# Patient Record
Sex: MALE | Race: BLACK OR AFRICAN AMERICAN | NOT HISPANIC OR LATINO | URBAN - METROPOLITAN AREA
[De-identification: names, ages, dates, MRNs, and addresses within clinical notes are randomized per-mention and may not be internally consistent; named-entity substitution may affect disease eponyms.]

---

## 2015-04-28 RX ORDER — METFORMIN HYDROCHLORIDE 850 MG/1
2 TABLET ORAL
Qty: 0 | Refills: 0 | DISCHARGE
Start: 2015-04-28

## 2015-04-28 RX ORDER — INSULIN GLARGINE 100 [IU]/ML
15 INJECTION, SOLUTION SUBCUTANEOUS
Qty: 0 | Refills: 0 | DISCHARGE
Start: 2015-04-28

## 2019-07-05 ENCOUNTER — INPATIENT (INPATIENT)
Facility: HOSPITAL | Age: 69
LOS: 32 days | Discharge: NURSING, REHAB OR LONG-TERM CARE | DRG: 239 | End: 2019-08-07
Attending: HOSPITALIST | Admitting: INTERNAL MEDICINE
Payer: MEDICARE

## 2019-07-05 VITALS — DIASTOLIC BLOOD PRESSURE: 76 MMHG | OXYGEN SATURATION: 97 % | SYSTOLIC BLOOD PRESSURE: 113 MMHG | HEART RATE: 110 BPM

## 2019-07-05 DIAGNOSIS — I21.4 NON-ST ELEVATION (NSTEMI) MYOCARDIAL INFARCTION: ICD-10-CM

## 2019-07-05 LAB
BASE EXCESS BLDV CALC-SCNC: -2.6 MMOL/L — LOW (ref -2–2)
CA-I SERPL-SCNC: 1.19 MMOL/L — SIGNIFICANT CHANGE UP (ref 1.12–1.3)
CHLORIDE BLDV-SCNC: 109 MMOL/L — HIGH (ref 96–108)
CO2 BLDV-SCNC: 23 MMOL/L — SIGNIFICANT CHANGE UP (ref 22–30)
GAS PNL BLDV: 138 MMOL/L — SIGNIFICANT CHANGE UP (ref 135–145)
GAS PNL BLDV: SIGNIFICANT CHANGE UP
GAS PNL BLDV: SIGNIFICANT CHANGE UP
GLUCOSE BLDV-MCNC: 305 MG/DL — HIGH (ref 70–99)
HCO3 BLDV-SCNC: 22 MMOL/L — SIGNIFICANT CHANGE UP (ref 21–29)
HCT VFR BLDA CALC: 34 % — LOW (ref 39–50)
HGB BLD CALC-MCNC: 11.2 G/DL — LOW (ref 13–17)
HOROWITZ INDEX BLDV+IHG-RTO: 40 — SIGNIFICANT CHANGE UP
LACTATE BLDV-MCNC: 3 MMOL/L — HIGH (ref 0.7–2)
OTHER CELLS CSF MANUAL: 10 ML/DL — LOW (ref 18–22)
PCO2 BLDV: 38 MMHG — SIGNIFICANT CHANGE UP (ref 35–50)
PH BLDV: 7.38 — SIGNIFICANT CHANGE UP (ref 7.35–7.45)
PO2 BLDV: 39 MMHG — SIGNIFICANT CHANGE UP (ref 25–45)
POTASSIUM BLDV-SCNC: 3.9 MMOL/L — SIGNIFICANT CHANGE UP (ref 3.5–5.3)
SAO2 % BLDV: 65 % — LOW (ref 67–88)

## 2019-07-05 RX ORDER — DOBUTAMINE HCL 250MG/20ML
5 VIAL (ML) INTRAVENOUS
Qty: 500 | Refills: 0 | Status: DISCONTINUED | OUTPATIENT
Start: 2019-07-05 | End: 2019-07-07

## 2019-07-05 RX ORDER — HEPARIN SODIUM 5000 [USP'U]/ML
1740 INJECTION INTRAVENOUS; SUBCUTANEOUS
Qty: 25000 | Refills: 0 | Status: DISCONTINUED | OUTPATIENT
Start: 2019-07-05 | End: 2019-07-06

## 2019-07-05 RX ORDER — CHLORHEXIDINE GLUCONATE 213 G/1000ML
1 SOLUTION TOPICAL
Refills: 0 | Status: DISCONTINUED | OUTPATIENT
Start: 2019-07-05 | End: 2019-07-09

## 2019-07-05 RX ORDER — NOREPINEPHRINE BITARTRATE/D5W 8 MG/250ML
0.05 PLASTIC BAG, INJECTION (ML) INTRAVENOUS
Qty: 8 | Refills: 0 | Status: DISCONTINUED | OUTPATIENT
Start: 2019-07-05 | End: 2019-07-06

## 2019-07-05 RX ORDER — HEPARIN SODIUM 5000 [USP'U]/ML
4000 INJECTION INTRAVENOUS; SUBCUTANEOUS EVERY 6 HOURS
Refills: 0 | Status: DISCONTINUED | OUTPATIENT
Start: 2019-07-05 | End: 2019-07-06

## 2019-07-05 RX ORDER — PANTOPRAZOLE SODIUM 20 MG/1
40 TABLET, DELAYED RELEASE ORAL DAILY
Refills: 0 | Status: DISCONTINUED | OUTPATIENT
Start: 2019-07-05 | End: 2019-07-09

## 2019-07-05 RX ORDER — DEXMEDETOMIDINE HYDROCHLORIDE IN 0.9% SODIUM CHLORIDE 4 UG/ML
1.5 INJECTION INTRAVENOUS
Qty: 200 | Refills: 0 | Status: DISCONTINUED | OUTPATIENT
Start: 2019-07-05 | End: 2019-07-06

## 2019-07-05 RX ORDER — ASPIRIN/CALCIUM CARB/MAGNESIUM 324 MG
81 TABLET ORAL DAILY
Refills: 0 | Status: DISCONTINUED | OUTPATIENT
Start: 2019-07-05 | End: 2019-07-22

## 2019-07-05 RX ORDER — CLOPIDOGREL BISULFATE 75 MG/1
75 TABLET, FILM COATED ORAL DAILY
Refills: 0 | Status: DISCONTINUED | OUTPATIENT
Start: 2019-07-05 | End: 2019-07-22

## 2019-07-05 RX ADMIN — DEXMEDETOMIDINE HYDROCHLORIDE IN 0.9% SODIUM CHLORIDE 33.52 MICROGRAM(S)/KG/HR: 4 INJECTION INTRAVENOUS at 23:48

## 2019-07-05 RX ADMIN — Medication 13.41 MICROGRAM(S)/KG/MIN: at 23:48

## 2019-07-05 NOTE — H&P ADULT - ASSESSMENT
68 y/o M w/ PMH of diabetes with poor medication compliance admitted to CCU for STEMI management with a course complicated with hypoxemic respiratory failure requiring intubation and Afib w/ RVR 68 y/o M w/ PMH of diabetes with poor medication compliance and PAD admitted to CCU for STEMI management with a course complicated by hypoxemic respiratory failure requiring intubation and Afib w/ RVR      #Neuro  Intubated and sedated currently on prexedex 1  Wean off as tolerated and reassess mental status; as per nephew pt has intact cognition and is able to carry out his ADLs at baseline    #Respiratory:  Intubated for airway protection  Currently intubated with vent setting: RR 14,   PEEP 5   Monitor ABGs   Consider CPAP trial in AM     #CV   Coronaries  s/p aspirin, plavix load, will continue with maintenance dose of aspirnin/plavix  c/w hep gtt  Pt will need cath once more HD stable  f/u lipid profile    Heart function  will obtain echo  c/w dobutamine 5 and downtitrate as tolerated  Monitor I/Os    #GI  NG tube feeds while sedated/unrespoive    #Renal  No active issues, no VINAY      #ID   Pt w/ fevers and leukocytosis meeting SIRS criteria  will f/u UA, blood culture, RVP, legionella; can consider starting Abx pending infectious w.u    #Endo  Pt w/ hx of DM unknown duration not compliant with meds with unclear home medication regimen  f.u AI1C   start in ISS, f/u regarding home insulin regimen    #Heme  no active issues    #DVT ppx  hep gtt 68 y/o M w/ PMH of diabetes with poor medication compliance and PAD admitted to CCU for STEMI management with a course complicated by hypoxemic respiratory failure requiring intubation and Afib w/ RVR      #Neuro  Intubated and sedated currently on prexedex 1  Wean off as tolerated and reassess mental status; as per nephew pt has intact cognition and is able to carry out his ADLs at baseline    #Respiratory:  Intubated for airway protection  Currently intubated with vent setting: RR 14,   PEEP 5   Monitor ABGs   Consider CPAP trial in AM     #CV   Coronaries  s/p aspirin, plavix load, will continue with maintenance dose of aspirnin/plavix  c/w hep gtt  Pt will need cath once more HD stable  f/u lipid profile    Heart function  will obtain echo  c/w dobutamine 5 and downtitrate as tolerated  Monitor I/Os    #GI  NG tube feeds while sedated/unrespoive    #Renal  No active issues, no VINAY      #ID   Pt w/ fevers and leukocytosis meeting SIRS criteria  will f/u UA, blood culture, RVP, legionella; can consider starting Abx pending infectious w.u    #Endo  Pt w/ hx of DM unknown duration not compliant with meds with unclear home medication regimen  f/u AI1C  start in ISS, f/u regarding home insulin regimen  f/u TSH    #Heme  no active issues    #DVT ppx  hep gtt 70 y/o M w/ PMH of diabetes with poor medication compliance and PAD admitted to CCU for STEMI management with a course complicated by hypoxemic respiratory failure requiring intubation and Afib w/ RVR      #Neuro  Intubated and sedated currently on prexedex 1  Wean off as tolerated and reassess mental status; as per nephew pt has intact cognition and is able to carry out his ADLs at baseline    #Respiratory:  Intubated for airway protection  Vent settings: RR 14,   PEEP 5   Monitor ABGs   Consider CPAP trial in AM     #CV   Coronaries  s/p aspirin, plavix load, will continue with maintenance dose of aspirin/plavix  c/w hep gtt  Pt will need cath once more HD stable  f/u lipid profile    Heart function  will obtain echo  c/w dobutamine 5 and downtitrate as tolerated  Monitor I/Os    #GI  Pt w/ transaminitis 1811/1148 likely in the setting of cardiogenic shock  Will continue to trend CMPs  NG tube feeds while sedated/ unresponsive    #Renal  No active issues, no VINAY      #ID   Pt w/ fevers and leukocytosis meeting SIRS criteria  will f/u UA, blood culture, RVP, legionella; can consider starting Abx pending infectious w.u    #Endo  Pt w/ hx of DM unknown duration and non-compliance  As per chart review, pt on metformin 500 BID at home  f/u AI1C  start in ISS, f/u regarding home insulin regimen  f/u TSH    #Heme  no active issues    #DVT ppx  hep gtt 70 y/o M w/ PMH of diabetes with poor medication compliance and PAD admitted to CCU for cardiogenic shock management w/ NSTEMI and a course complicated by hypoxemic respiratory failure secondary to suspected aspiration PNA requiring intubation now in septic shock      #Neuro  Intubated and sedated currently on prexedex 1  Wean off as tolerated and reassess mental status; as per nephew pt has intact cognition and is able to carry out his ADLs at baseline    #Respiratory:  Intubated for airway protection  Vent settings: RR 14,   PEEP 5  FiO2 40  Monitor ABGs   CPAP trial once more clinically stable     #CV   Coronaries  s/p aspirin, plavix load, will continue with maintenance dose of aspirin/plavix  c/w hep gtt  Pt may need cath once more   f/u lipid profile    Heart function  will obtain repeat ECHO: as per documentation, ECHO at [facility] showed EF<15%  c/w dobutamine 5 and downtitrate as tolerated  Monitor I/Os    #GI  Pt w/ transaminitis 1811/1148 likely in the setting of cardiogenic/septic shock  Will continue to trend CMPs  NG tube feeds while sedated/ unresponsive    #Renal  No active issues, no VINAY; trend CMP      #ID   Pt w/ fevers and leukocytosis w/ R lung opaciy on CXR and evidence of end organ damage with transaminitis and elevated lactate w/ hemodynamic instability requiring pressor support suggestive of septic shock  will f/u UA, blood culture, RVP, legionella; will started on broad spectrum Abx with vanc/zosyn pending infectious w/u    #Endo  Pt w/ hx of DM unknown duration and non-compliance  As per chart review, pt on metformin 500 BID at home  f/u AI1C  start in ISS, f/u regarding home insulin regimen  f/u TSH    #Heme  no active issues    #DVT ppx  hep gtt 70 y/o M w/ PMH of diabetes with poor medication compliance and PAD admitted to CCU for cardiogenic shock management w/ NSTEMI and a course complicated by hypoxemic respiratory failure secondary to suspected aspiration PNA requiring intubation now in septic shock      #Neuro  Intubated and sedated currently on prexedex 1  Wean off as tolerated and reassess mental status; as per nephew pt has intact cognition and is able to carry out his ADLs at baseline    #Respiratory:  Intubated for airway protection  Vent settings: RR 14,   PEEP 5  FiO2 40  Monitor ABGs   CPAP trial once more clinically stable     #CV   Coronaries  s/p aspirin, plavix load for NSTEMI, will continue with maintenance dose of aspirin/plavix  c/w hep gtt  Pt may need cath once more   f/u lipid profile    Heart function  Pt in cardiogenic shock with EF<15% and end-organ organ damage with hemodynamic instability requiring pressor suport  will obtain repeat ECHO: as per documentation, ECHO at [facility] showed EF<15%  c/w dobutamine 5 and downtitrate as tolerated  Monitor I/Os    #GI  Pt w/ transaminitis 1811/1148 likely in the setting of cardiogenic/septic shock  Will continue to trend CMPs  NG tube feeds while sedated/ unresponsive    #Renal  Pt with VINAY to 1.6, baseline at around 1; likely in the setting of cardiogenic/spetic shock  Trend CMP, monitor UO, avoid nephrotoxic agents, renally dose meds      #ID   Pt w/ fevers and leukocytosis w/ R lung opaciy on CXR and evidence of end organ damage with transaminitis and elevated lactate w/ hemodynamic instability requiring pressor support suggestive of septic shock  will f/u UA, blood culture, RVP, legionella; will started on broad spectrum Abx with vanc/zosyn pending infectious w/u    #Endo  Pt w/ hx of DM unknown duration and non-compliance  As per chart review, pt on metformin 500 BID at home  f/u AI1C  start in ISS, f/u regarding home insulin regimen  f/u TSH    #Heme  Pt normocytic anemia  Will send hemolysis labs, iron studies, B12, folate; no active source of bleed identified    #DVT ppx  hep gtt 70 y/o M w/ PMH of diabetes with poor medication compliance and PAD admitted to CCU for cardiogenic shock management w/ NSTEMI and a course complicated by hypoxemic respiratory failure secondary to suspected aspiration PNA requiring intubation now in septic shock      #Neuro  Intubated and sedated currently on prexedex 1  Wean off as tolerated and reassess mental status; as per nephew pt has intact cognition and is able to carry out his ADLs at baseline    #Respiratory:  Intubated for airway protection  Vent settings: RR 14,   PEEP 5  FiO2 40  Monitor ABGs   CPAP trial once more clinically stable     #CV   Coronaries  s/p aspirin, plavix load for NSTEMI, will continue with maintenance dose of aspirin/plavix  c/w hep gtt  Pt may need cath once more   f/u lipid profile    Heart function  Pt in cardiogenic shock with EF<15% and end-organ organ damage with hemodynamic instability requiring pressor suport  will obtain repeat ECHO: as per documentation, ECHO at [facility] showed EF<15%  c/w dobutamine 5 and downtitrate as tolerated  Monitor I/Os    #GI  Pt w/ transaminitis 1811/1148 likely in the setting of cardiogenic/septic shock  Will continue to trend CMPs  NG tube feeds while sedated/ unresponsive    #Renal  Pt with VINAY to 1.6, baseline at around 1; likely in the setting of cardiogenic/spetic shock  Trend CMP, monitor UO, avoid nephrotoxic agents, renally dose meds, send urine studies      #ID   Pt w/ fevers and leukocytosis w/ R lung opacity on CXR and evidence of end organ damage with transaminitis and elevated lactate w/ hemodynamic instability requiring pressor support suggestive of septic shock  will f/u UA, blood culture, RVP, legionella; will started on broad spectrum Abx with vanc/zosyn pending infectious w/u    #Endo  Pt w/ hx of DM unknown duration and non-compliance  As per chart review, pt on metformin 500 BID at home and lantus 20 daily  f/u AI1C  start in ISS, f/u regarding home insulin regimen  f/u TSH  Should f/u w/ pharmacy in AM to confirm medication regimen    #Heme  Pt normocytic anemia  Will send hemolysis labs, iron studies, B12, folate; no active source of bleed identified    #DVT ppx  hep gtt 70 y/o M w/ PMH of diabetes with poor medication compliance and PAD admitted to CCU for cardiogenic shock management w/ NSTEMI and a course complicated by hypoxemic respiratory failure secondary to suspected aspiration PNA requiring intubation now in septic shock      #Neuro  Intubated and sedated currently on prexedex 1  Wean off as tolerated and reassess mental status; as per nephew pt has intact cognition and is able to carry out his ADLs at baseline    #Respiratory:  Intubated for airway protection  Vent settings: RR 14,   PEEP 5  FiO2 40  Monitor ABGs   CPAP trial once more clinically stable     #CV   Coronaries  s/p aspirin, plavix load for NSTEMI, will continue with maintenance dose of aspirin/plavix  c/w hep gtt  Pt may need cath once more   f/u lipid profile    Heart function  Pt in cardiogenic shock with EF<15% and end-organ organ damage with hemodynamic instability requiring pressor suport  will obtain repeat ECHO: as per documentation, ECHO at [facility] showed EF<15%  c/w dobutamine 5 and levo; downtitrate as tolerated  Monitor I/Os    #GI  Pt w/ transaminitis 1811/1148 likely in the setting of cardiogenic/septic shock  Will continue to trend CMPs  NG tube feeds while sedated/ unresponsive    #Renal  Pt with VINAY to 1.6, baseline at around 1; likely in the setting of cardiogenic/spetic shock  Trend CMP, monitor UO, avoid nephrotoxic agents, renally dose meds, send urine studies      #ID   Pt w/ fevers and leukocytosis w/ R lung opacity on CXR and evidence of end organ damage with transaminitis and elevated lactate w/ hemodynamic instability requiring pressor support suggestive of septic shock  will f/u UA, blood culture, RVP, legionella; will started on broad spectrum Abx with vanc/zosyn pending infectious w/u    #Endo  Pt w/ hx of DM unknown duration and non-compliance  As per chart review, pt on metformin 500 BID at home and lantus 20 daily  f/u AI1C  start in ISS, f/u regarding home insulin regimen  f/u TSH  Should f/u w/ pharmacy in AM to confirm medication regimen    #Heme  Pt normocytic anemia  Will send hemolysis labs, iron studies, B12, folate; no active source of bleed identified    #DVT ppx  hep gtt 70 y/o M w/ PMH of diabetes with poor medication compliance and PAD admitted to CCU for cardiogenic shock management w/ NSTEMI and a course complicated by hypoxemic respiratory failure secondary to suspected aspiration PNA requiring intubation now in septic shock      #Neuro  Intubated and sedated currently on prexedex 1  Wean off as tolerated and reassess mental status; as per nephew pt has intact cognition and is able to carry out his ADLs at baseline    #Respiratory:  Intubated for airway protection  Vent settings: RR 14,   PEEP 5  FiO2 40  Monitor ABGs   CPAP trial once more clinically stable     #CV   Coronaries  s/p aspirin, plavix load for NSTEMI, will continue with maintenance dose of aspirin/plavix  c/w hep gtt  Pt may need cath once more clinically stable  f/u lipid profile    Heart function  Pt in cardiogenic shock with EF<15% and end-organ organ damage with hemodynamic instability requiring pressor suport  will obtain repeat ECHO: as per documentation, ECHO at [facility] showed EF<15%  c/w dobutamine 5 and levo; downtitrate as tolerated  Monitor I/Os    #GI  Pt w/ transaminitis 1811/1148 likely in the setting of cardiogenic/septic shock  Will continue to trend CMPs  NG tube feeds while sedated/ unresponsive    #Renal  Pt with VINAY to 1.6, baseline at around 1; likely in the setting of cardiogenic/spetic shock  Trend CMP, monitor UO, avoid nephrotoxic agents, renally dose meds, send urine studies      #ID   Pt w/ fevers and leukocytosis w/ R lung opacity on CXR and evidence of end organ damage with transaminitis and elevated lactate w/ hemodynamic instability requiring pressor support suggestive of septic shock  will f/u UA, blood culture, RVP, legionella; will started on broad spectrum Abx with vanc/zosyn pending infectious w/u    #Endo  Pt w/ hx of DM unknown duration and non-compliance  As per chart review, pt on metformin 500 BID at home and lantus 20 daily  f/u AI1C  start in ISS, f/u regarding home insulin regimen  f/u TSH  Should f/u w/ pharmacy in AM to confirm medication regimen    #Heme  Pt normocytic anemia  Will send hemolysis labs, iron studies, B12, folate; no active source of bleed identified    #DVT ppx  hep gtt 70 y/o M w/ PMH of diabetes with poor medication compliance and PAD admitted to CCU for cardiogenic shock management w/ NSTEMI and a course complicated by hypoxemic respiratory failure secondary to suspected aspiration PNA requiring intubation now in septic shock      #Neuro  Intubated and sedated currently on prexedex 1  Wean off as tolerated and reassess mental status; as per nephew pt has intact cognition and is able to carry out his ADLs at baseline    #Respiratory:  Intubated for airway protection  Vent settings: RR 14,   PEEP 5  FiO2 40  Monitor ABGs   CPAP trial once more clinically stable     #CV   Coronaries  s/p aspirin, plavix load for NSTEMI, will continue with maintenance dose of aspirin/plavix  c/w hep gtt  Pt may need cath once more clinically stable  f/u lipid profile    Heart function  Pt in cardiogenic shock with EF<15% and end-organ organ damage with hemodynamic instability requiring pressor suport  will obtain repeat ECHO: as per documentation, ECHO at [facility] showed EF<15%  c/w dobutamine 5 and levo; downtitrate as tolerated  Monitor I/Os    #GI  Pt w/ transaminitis 1811/1148 likely in the setting of cardiogenic/septic shock  Will continue to trend CMPs  NG tube feeds while sedated/ unresponsive    #Renal  Pt with VINAY to 1.6, baseline at around 1; likely in the setting of cardiogenic/spetic shock  Trend CMP, monitor UO, avoid nephrotoxic agents, renally dose meds, send urine studies      #ID   Pt w/ fevers and leukocytosis w/ R lung opacity on CXR and evidence of end organ damage with transaminitis and elevated lactate w/ hemodynamic instability requiring pressor support suggestive of septic shock  will f/u UA, blood culture, RVP, legionella; will started on broad spectrum Abx with vanc/zosyn pending infectious w/u    #Endo  Pt w/ hx of DM unknown duration and non-compliance  As per chart review, pt on metformin 500 BID at home and lantus 20 daily  f/u AI1C  start in ISS and reduced lantus dose; will titrate as needed    Should f/u w/ pharmacy in AM to confirm medication regimen    #Heme  Pt normocytic anemia  Will send hemolysis labs, iron studies, B12, folate; no active source of bleed identified    #DVT ppx  hep gtt 70 y/o M w/ PMH of diabetes with poor medication compliance and PAD admitted to CCU for cardiogenic shock management w/ NSTEMI and a course complicated by hypoxemic respiratory failure secondary to suspected aspiration PNA requiring intubation now in septic shock      #Neuro  Intubated and sedated currently on prexedex 1  Wean off as tolerated and reassess mental status; as per nephew pt has intact cognition and is able to carry out his ADLs at baseline    #Respiratory:  Intubated for airway protection  Vent settings: RR 14,   PEEP 5  FiO2 40  Monitor ABGs   CPAP trial once more clinically stable     #CV   Coronaries  s/p aspirin, plavix load for NSTEMI, will continue with maintenance dose of aspirin/plavix  c/w hep gtt  Pt may need cath once more clinically stable  f/u lipid profile    Heart function  Pt in cardiogenic shock with EF<15% and end-organ organ damage with hemodynamic instability requiring pressor suport  will obtain repeat ECHO: as per documentation, ECHO at [facility] showed EF<15%  c/w dobutamine 5 and levo; downtitrate as tolerated  Monitor I/Os    #GI  Pt w/ transaminitis 1811/1148 likely in the setting of cardiogenic/septic shock  Will continue to trend CMPs  NG tube feeds while sedated/ unresponsive    #Renal  Pt with VINAY to 1.6, baseline at around 1; likely in the setting of cardiogenic/spetic shock  Trend CMP, monitor UO, avoid nephrotoxic agents, renally dose meds, send urine studies      #ID   Pt w/ fevers and leukocytosis w/ R lung opacity on CXR and evidence of end organ damage with transaminitis and elevated lactate w/ hemodynamic instability requiring pressor support suggestive of septic shock  will f/u UA, blood culture, RVP, legionella; will started on broad spectrum Abx with vanc/zosyn pending infectious w/u    #Endo  Pt w/ hx of DM unknown duration and non-compliance  As per chart review, pt on metformin 500 BID at home and lantus 20 daily  f/u AI1C  start in ISS and reduced lantus dose; will titrate as needed  Should f/u w/ pharmacy in AM to confirm medication regimen    #Heme  Pt normocytic anemia  Will send hemolysis labs, iron studies, B12, folate; no active source of bleed identified    #DVT ppx  hep gtt 68 y/o M w/ PMH of diabetes with poor medication compliance and PAD admitted to CCU for cardiogenic shock management w/ NSTEMI and a course complicated by hypoxemic respiratory failure secondary to suspected aspiration PNA requiring intubation now in septic shock      #Neuro  Intubated and sedated currently on prexedex 1  Wean off as tolerated and reassess mental status; as per nephew pt has intact cognition and is able to carry out his ADLs at baseline    #Respiratory:  Intubated for airway protection  Vent settings: RR 14,   PEEP 5  FiO2 40  Monitor ABGs   CPAP trial once more clinically stable     #CV   Coronaries  s/p aspirin, plavix load for NSTEMI, will continue with maintenance dose of aspirin/plavix  c/w hep gtt  Pt may need cath once more clinically stable  f/u lipid profile    Heart function  Pt in cardiogenic shock with EF<15% and end-organ organ damage with hemodynamic instability requiring pressor suport  will obtain repeat ECHO: as per documentation, ECHO at [facility] showed EF<15%  c/w dobutamine 5 and levo; downtitrate as tolerated  Monitor I/Os    #GI  Pt w/ transaminitis 1811/1148 likely in the setting of cardiogenic/septic shock  Will continue to trend CMPs  NG tube feeds while sedated/ unresponsive    #Renal  Pt with VINAY to 1.6, baseline at around 1; likely in the setting of cardiogenic/spetic shock  Trend CMP, monitor UO, avoid nephrotoxic agents, renally dose meds, send urine studies      #ID   Pt w/ fevers and leukocytosis w/ R lung opacity on CXR and evidence of end organ damage with transaminitis and elevated lactate w/ hemodynamic instability requiring pressor support suggestive of septic shock  will f/u UA, blood culture, RVP, legionella; will started on broad spectrum Abx with vanc/zosyn pending infectious w/u    #Endo  Pt w/ hx of DM unknown duration and non-compliance  As per chart review, pt on metformin 500 BID at home and lantus 20 daily  f/u AI1C  start in ISS and reduced lantus dose; will titrate as needed  Should f/u w/ pharmacy in AM to confirm medication regimen    #Heme  Pt normocytic anemia  Will send hemolysis labs, iron studies, B12, folate; no active source of bleed identified    #DVT ppx  hep gtt  Vascular consult for LLE fem-pt bypass 68 y/o M w/ PMH of DM, HTN with poor medication compliance and PAD is admitted to CCU for cardiogenic shock management w/ NSTEMI and a course complicated by hypoxemic respiratory failure secondary to suspected aspiration PNA requiring intubation now in septic shock      #Neuro  -Intubated and sedated currently on versed and levophed  -Wean off as tolerated and reassess mental status; as per nephew pt has intact cognition and is able to carry out his -ADLs at baseline    #Respiratory:  -Intubated for airway protection  -Vent settings: RR 14,   PEEP 5  FiO2 40  -Monitor ABGs   -CPAP trial once more clinically stable     #CV   -Coronaries  -s/p aspirin, plavix load for NSTEMI, will continue with maintenance dose of aspirin/plavix  -c/w hep gtt  -Pt may need cath once more clinically stable  -f/u lipid profile    Heart function  -Pt in cardiogenic shock with EF<15% and end-organ organ damage with hemodynamic instability requiring pressor suport  -will obtain repeat ECHO: as per documentation, ECHO at [facility] showed EF<15% and global hypokinesis  -c/w dobutamine 5 and levo; downtitrate as tolerated  -Consider swan placement for hemodynamic monitoring  -Monitor I/Os    #GI  -Pt w/ transaminitis 1811/1148 likely in the setting of cardiogenic/septic shock  -Hold atorvastatin for now  -Will continue to trend CMPs  -NG tube feeds while sedated/ unresponsive    #Renal  -Pt with VINAY to 1.6, baseline at around 1; likely in the setting of cardiogenic/spetic shock  -Trend CMP, monitor UO, avoid nephrotoxic agents, renally dose meds, send urine studies      #ID   -Pt w/ fevers and leukocytosis w/ R lung opacity on CXR and evidence of end organ damage with transaminitis and elevated lactate w/ hemodynamic instability requiring pressor support suggestive of septic shock  -UA negative RVP positive for coronavirus; blood culture, urine legionella pending;  started on broad spectrum Abx with vanc/zosyn pending infectious w/u    #Endo  -Pt w/ hx of DM unknown duration and non-compliance  -As per chart review, pt on metformin 500 BID at home and lantus 20 daily  -f/u AI1C  -start in ISS and reduced lantus dose 10mg daily; will titrate as needed  -Should f/u w/ pharmacy in AM to confirm medication regimen    #Heme  Pt normocytic anemia  Will send hemolysis labs, iron studies, B12, folate; no active source of bleed identified    #DVT ppx  hep gtt  Vascular consult for LLE fem-pt bypass

## 2019-07-05 NOTE — H&P ADULT - HISTORY OF PRESENT ILLNESS
History obtained from sign out/nephew  The pt is a 70 y/o M with a PMH of HTN, DM, PAD who is transferred to [facility] for management of cardiogenic shock and NSTEMI. As per sign out, the pt was admitted for L toe popliteal bypass to [facility]. During his time there, he was refusing medications  and was found to be progressively more dyspneic. He had an echo done there which showed that he has an EF of <15% with global hypokinesis; he received 20mg IV lasix. The pt''s respiratory status continued to worsen and the pt was intubated for airway protection. The pt vomited during this time and there was a concern for aspiration. The pt did not complain of CP during the time prior to intubation. As per nephew, the pt has not been complaining of CP, SOB and has been able to carry out his ADLs. The pt has not been compliant with his medications as an outpatient; he know that he had amlodipine prescribed but doesn't know what medications he takes for his diaebetes. History obtained from sign out/nephew  The pt is a 70 y/o M with a PMH of HTN, DM, PAD who is transferred to [facility] for management of cardiogenic shock and NSTEMI. As per sign out, the pt was admitted for L toe popliteal bypass to [facility]. During his time there, he was refusing medications  and was found to be progressively more dyspneic. He had an echo done there which showed that he has an EF of <15% with global hypokinesis; he received 20mg IV lasix. The pt''s respiratory status continued to worsen and the pt was intubated for airway protection. The pt vomited during this time and there was a concern for aspiration. The pt did not complain of CP during the time prior to intubation. As per nephew, the pt has not been complaining of CP, SOB prior to admission to the hospital and has been able to carry out his ADLs. As per the nephew, pt has not been compliant with his medications as an outpatient; he knows that he had amlodipine prescribed but doesn't know what medications he takes for his diabetes.  As per chart from [facility], pt has been prescribed to take amlodipine 10 daily and metformin 500 BID at home. As per med rec from 2015, pt has been taking lantus 20 mg daily at home in addition to metformin. History obtained from sign out/nephew  The pt is a 68 y/o M with a PMH of HTN, DM, PAD who is transferred to [facility] for management of cardiogenic shock and NSTEMI. As per sign out, the pt was admitted for L toe popliteal bypass to [facility]. During his time there, he was refusing medications  and was found to be progressively more dyspneic. He had an echo done there which showed that he has an EF of <15% with global hypokinesis; he received 20mg IV lasix. The pt''s respiratory status continued to worsen and the pt was intubated for airway protection. The pt vomited during this time and there was a concern for aspiration. The pt did not complain of CP during the time prior to intubation. As per nephew, the pt has not been complaining of CP, SOB prior to admission to the hospital and has been able to carry out his ADLs. As per the nephew, pt has not been compliant with his medications as an outpatient; he knows that he had amlodipine prescribed but doesn't know what medications the takes for his diabetes.  As per chart from [facility], pt has been prescribed to take amlodipine 10 daily and metformin 500 BID at home. As per med rec from 2015, pt has been taking lantus 20 mg daily at home in addition to metformin. History obtained from sign out/nephew  The pt is a 68 y/o M with a PMH of HTN, DM, PAD who is transferred to [facility] for management of cardiogenic shock and NSTEMI. As per sign out, the pt was admitted for L popliteal bypass to [facility]. During his time there, he was refusing medications  and was found to be progressively more dyspneic. He had an echo done there which showed that he has an EF of <15% with global hypokinesis; he received 20mg IV lasix for fluid overload. His respiratory status continued to worsen and the pt was intubated for airway protection. The pt vomited during this time and there was a concern for aspiration. The pt did not complain of CP during the time prior to intubation. As per nephew, the pt has not been complaining of CP, SOB prior to admission to the hospital and has been able to carry out his ADLs. As per the nephew, pt has not been compliant with his medications as an outpatient; the nephew knows that he had amlodipine prescribed but doesn't know what medications the the pt takes for his diabetes.  As per chart from [facility], pt has been prescribed to take amlodipine 10 daily and metformin 500 BID at home. As per med rec from 2015, pt has been taking lantus 20 mg daily at home in addition to metformin.

## 2019-07-05 NOTE — H&P ADULT - NSHPSOCIALHISTORY_GEN_ALL_CORE
1 PPD smoker for 30-40 years, has transitioned to a few cigarettes per day about 2 years ago. Pt has been drinking since he was a teenager; he drinks multiple beers per day. He has not used any illicit drugs. 1 PPD smoker for 30-40 years, has transitioned to a few cigarettes per day about 2 years ago. Pt has been drinking since he was a teenager; he drinks multiple beers per day, though the nephew was not able to quantify how many. The nephew denies prior hospitilizations for alcohol withdrawal. He has not used any illicit drugs. 1 PPD smoker for 30-40 years, has transitioned to a few cigarettes per day about 2 years ago. Pt has been drinking since he was a teenager; he drinks multiple beers per day, though the nephew was not able to quantify how many. The nephew denies prior hospitalizations for the pt for alcohol withdrawal. He has not used any illicit drugs.

## 2019-07-05 NOTE — H&P ADULT - NSHPLABSRESULTS_GEN_ALL_CORE
11.0   14.5  )-----------( 240      ( 2019 23:46 )             32.8       07-05    139  |  106  |  37<H>  ----------------------------<  317<H>  4.2   |  20<L>  |  1.63<H>    Ca    8.8      2019 23:46  Phos  2.7     -  Mg     2.2     07-05    TPro  6.6  /  Alb  3.0<L>  /  TBili  0.4  /  DBili  x   /  AST  1811<H>  /  ALT  1148<H>  /  AlkPhos  128<H>  07-05          ABG - ( 2019 00:38 )  pH, Arterial: 7.48  pH, Blood: x     /  pCO2: 27    /  pO2: 95    / HCO3: 20    / Base Excess: -2.3  /  SaO2: 98                  Urinalysis Basic - ( 2019 00:36 )    Color: Yellow / Appearance: Slightly Turbid / S.048 / pH: x  Gluc: x / Ketone: Negative  / Bili: Negative / Urobili: Negative   Blood: x / Protein: 100 / Nitrite: Negative   Leuk Esterase: Negative / RBC: x / WBC x   Sq Epi: x / Non Sq Epi: x / Bacteria: x        PT/INR - ( 2019 23:46 )   PT: 15.6 sec;   INR: 1.36 ratio         PTT - ( 2019 23:46 )  PTT:51.0 sec    Lactate Trend      CARDIAC MARKERS ( 2019 23:46 )  x     / x     / 959 U/L / x     / 40.4 ng/mL        CAPILLARY BLOOD GLUCOSE 11.0   14.5  )-----------( 240      ( 2019 23:46 )             32.8       07-05    139  |  106  |  37<H>  ----------------------------<  317<H>  4.2   |  20<L>  |  1.63<H>    Ca    8.8      2019 23:46  Phos  2.7     07-  Mg     2.2     07-05    TPro  6.6  /  Alb  3.0<L>  /  TBili  0.4  /  DBili  x   /  AST  1811<H>  /  ALT  1148<H>  /  AlkPhos  128<H>  07-05          ABG - ( 2019 00:38 )  pH, Arterial: 7.48  pH, Blood: x     /  pCO2: 27    /  pO2: 95    / HCO3: 20    / Base Excess: -2.3  /  SaO2: 98                  Urinalysis Basic - ( 2019 00:36 )    Color: Yellow / Appearance: Slightly Turbid / S.048 / pH: x  Gluc: x / Ketone: Negative  / Bili: Negative / Urobili: Negative   Blood: x / Protein: 100 / Nitrite: Negative   Leuk Esterase: Negative / RBC: x / WBC x   Sq Epi: x / Non Sq Epi: x / Bacteria: x        PT/INR - ( 2019 23:46 )   PT: 15.6 sec;   INR: 1.36 ratio         PTT - ( 2019 23:46 )  PTT:51.0 sec    Lactate Trend      CARDIAC MARKERS ( 2019 23:46 )  x     / x     / 959 U/L / x     / 40.4 ng/mL        CAPILLARY BLOOD GLUCOSE    19 CXR: endotracheal tube tip above the deepak. enteric tube tip   below the diaphragm, likely in the stomach.  right IJ central venous   catheter with tip in the SVC. right sided opacity, question pneumonia.   left pleural effusion

## 2019-07-05 NOTE — H&P ADULT - NSHPPHYSICALEXAM_GEN_ALL_CORE
PHYSICAL EXAM:    GENERAL: Intubated and sedated  HEAD:  Normocephalic, atraumatic  HEENT: Moist mucous membranes  NECK: Supple, No JVD  NERVOUS SYSTEM:  pt sedated and unresponsive  CHEST/LUNG: Clear to auscultation bilaterally  HEART: Regular rate and rhythm, no murmur   ABDOMEN: Soft, Nontender, Nondistended, Bowel sounds present  EXTREMITIES:   No clubbing, cyanosis, or edema  MUSCULOSKELTAL- No muscle tenderness, no joint tenderness  SKIN- warm and dry, no rash PHYSICAL EXAM:    GENERAL: Intubated and sedated  HEAD:  Normocephalic, atraumatic  HEENT: Moist mucous membranes  NECK: Supple, No JVD  NERVOUS SYSTEM:  pt sedated and unresponsive  CHEST/LUNG: course BS b/l  HEART: Regular rate and rhythm, no murmur   ABDOMEN: Soft, Nontender, Nondistended, Bowel sounds present  EXTREMITIES:   No clubbing, cyanosis, or edema; pulses faint, more difficult to appreciate in L foot  MUSCULOSKELTAL- No muscle tenderness, no joint tenderness  SKIN- warm and dry, no rash

## 2019-07-06 DIAGNOSIS — I21.4 NON-ST ELEVATION (NSTEMI) MYOCARDIAL INFARCTION: ICD-10-CM

## 2019-07-06 DIAGNOSIS — Z90.89 ACQUIRED ABSENCE OF OTHER ORGANS: Chronic | ICD-10-CM

## 2019-07-06 DIAGNOSIS — I99.8 OTHER DISORDER OF CIRCULATORY SYSTEM: ICD-10-CM

## 2019-07-06 DIAGNOSIS — R57.9 SHOCK, UNSPECIFIED: ICD-10-CM

## 2019-07-06 LAB
ALBUMIN SERPL ELPH-MCNC: 2.8 G/DL — LOW (ref 3.3–5)
ALBUMIN SERPL ELPH-MCNC: 2.9 G/DL — LOW (ref 3.3–5)
ALBUMIN SERPL ELPH-MCNC: 2.9 G/DL — LOW (ref 3.3–5)
ALBUMIN SERPL ELPH-MCNC: 3 G/DL — LOW (ref 3.3–5)
ALBUMIN SERPL ELPH-MCNC: 3 G/DL — LOW (ref 3.3–5)
ALP SERPL-CCNC: 117 U/L — SIGNIFICANT CHANGE UP (ref 40–120)
ALP SERPL-CCNC: 117 U/L — SIGNIFICANT CHANGE UP (ref 40–120)
ALP SERPL-CCNC: 122 U/L — HIGH (ref 40–120)
ALP SERPL-CCNC: 122 U/L — HIGH (ref 40–120)
ALP SERPL-CCNC: 128 U/L — HIGH (ref 40–120)
ALT FLD-CCNC: 1023 U/L — HIGH (ref 10–45)
ALT FLD-CCNC: 1148 U/L — HIGH (ref 10–45)
ALT FLD-CCNC: 808 U/L — HIGH (ref 10–45)
ALT FLD-CCNC: 948 U/L — HIGH (ref 10–45)
ANION GAP SERPL CALC-SCNC: 11 MMOL/L — SIGNIFICANT CHANGE UP (ref 5–17)
ANION GAP SERPL CALC-SCNC: 12 MMOL/L — SIGNIFICANT CHANGE UP (ref 5–17)
ANION GAP SERPL CALC-SCNC: 13 MMOL/L — SIGNIFICANT CHANGE UP (ref 5–17)
ANION GAP SERPL CALC-SCNC: 13 MMOL/L — SIGNIFICANT CHANGE UP (ref 5–17)
ANION GAP SERPL CALC-SCNC: 14 MMOL/L — SIGNIFICANT CHANGE UP (ref 5–17)
APPEARANCE UR: ABNORMAL
APTT BLD: 51 SEC — HIGH (ref 27.5–36.3)
APTT BLD: 59.6 SEC — HIGH (ref 27.5–36.3)
APTT BLD: 61.7 SEC — HIGH (ref 27.5–36.3)
AST SERPL-CCNC: 1071 U/L — HIGH (ref 10–40)
AST SERPL-CCNC: 1491 U/L — HIGH (ref 10–40)
AST SERPL-CCNC: 1811 U/L — HIGH (ref 10–40)
AST SERPL-CCNC: 318 U/L — HIGH (ref 10–40)
AST SERPL-CCNC: 490 U/L — HIGH (ref 10–40)
BASE EXCESS BLDMV CALC-SCNC: -0.3 MMOL/L — SIGNIFICANT CHANGE UP (ref -3–3)
BASE EXCESS BLDMV CALC-SCNC: -0.4 MMOL/L — SIGNIFICANT CHANGE UP (ref -3–3)
BASE EXCESS BLDV CALC-SCNC: -1.1 MMOL/L — SIGNIFICANT CHANGE UP (ref -2–2)
BASE EXCESS BLDV CALC-SCNC: -1.5 MMOL/L — SIGNIFICANT CHANGE UP (ref -2–2)
BILIRUB SERPL-MCNC: 0.4 MG/DL — SIGNIFICANT CHANGE UP (ref 0.2–1.2)
BILIRUB SERPL-MCNC: 0.4 MG/DL — SIGNIFICANT CHANGE UP (ref 0.2–1.2)
BILIRUB SERPL-MCNC: 0.5 MG/DL — SIGNIFICANT CHANGE UP (ref 0.2–1.2)
BILIRUB SERPL-MCNC: 0.5 MG/DL — SIGNIFICANT CHANGE UP (ref 0.2–1.2)
BILIRUB SERPL-MCNC: 0.6 MG/DL — SIGNIFICANT CHANGE UP (ref 0.2–1.2)
BILIRUB UR-MCNC: NEGATIVE — SIGNIFICANT CHANGE UP
BLD GP AB SCN SERPL QL: NEGATIVE — SIGNIFICANT CHANGE UP
BUN SERPL-MCNC: 37 MG/DL — HIGH (ref 7–23)
BUN SERPL-MCNC: 40 MG/DL — HIGH (ref 7–23)
BUN SERPL-MCNC: 40 MG/DL — HIGH (ref 7–23)
BUN SERPL-MCNC: 42 MG/DL — HIGH (ref 7–23)
BUN SERPL-MCNC: 42 MG/DL — HIGH (ref 7–23)
CA-I SERPL-SCNC: 1.15 MMOL/L — SIGNIFICANT CHANGE UP (ref 1.12–1.3)
CA-I SERPL-SCNC: 1.16 MMOL/L — SIGNIFICANT CHANGE UP (ref 1.12–1.3)
CALCIUM SERPL-MCNC: 8.1 MG/DL — LOW (ref 8.4–10.5)
CALCIUM SERPL-MCNC: 8.2 MG/DL — LOW (ref 8.4–10.5)
CALCIUM SERPL-MCNC: 8.4 MG/DL — SIGNIFICANT CHANGE UP (ref 8.4–10.5)
CALCIUM SERPL-MCNC: 8.6 MG/DL — SIGNIFICANT CHANGE UP (ref 8.4–10.5)
CALCIUM SERPL-MCNC: 8.8 MG/DL — SIGNIFICANT CHANGE UP (ref 8.4–10.5)
CHLORIDE BLDV-SCNC: 108 MMOL/L — SIGNIFICANT CHANGE UP (ref 96–108)
CHLORIDE BLDV-SCNC: 108 MMOL/L — SIGNIFICANT CHANGE UP (ref 96–108)
CHLORIDE SERPL-SCNC: 103 MMOL/L — SIGNIFICANT CHANGE UP (ref 96–108)
CHLORIDE SERPL-SCNC: 104 MMOL/L — SIGNIFICANT CHANGE UP (ref 96–108)
CHLORIDE SERPL-SCNC: 106 MMOL/L — SIGNIFICANT CHANGE UP (ref 96–108)
CHOLEST SERPL-MCNC: 106 MG/DL — SIGNIFICANT CHANGE UP (ref 10–199)
CK MB BLD-MCNC: 3.8 % — HIGH (ref 0–3.5)
CK MB BLD-MCNC: 4.2 % — HIGH (ref 0–3.5)
CK MB CFR SERPL CALC: 29.8 NG/ML — HIGH (ref 0–6.7)
CK MB CFR SERPL CALC: 40.4 NG/ML — HIGH (ref 0–6.7)
CK SERPL-CCNC: 777 U/L — HIGH (ref 30–200)
CK SERPL-CCNC: 959 U/L — HIGH (ref 30–200)
CO2 BLDMV-SCNC: 26 MMOL/L — SIGNIFICANT CHANGE UP (ref 21–29)
CO2 BLDMV-SCNC: 27 MMOL/L — SIGNIFICANT CHANGE UP (ref 21–29)
CO2 BLDV-SCNC: 23 MMOL/L — SIGNIFICANT CHANGE UP (ref 22–30)
CO2 BLDV-SCNC: 25 MMOL/L — SIGNIFICANT CHANGE UP (ref 22–30)
CO2 SERPL-SCNC: 19 MMOL/L — LOW (ref 22–31)
CO2 SERPL-SCNC: 20 MMOL/L — LOW (ref 22–31)
CO2 SERPL-SCNC: 21 MMOL/L — LOW (ref 22–31)
COLOR SPEC: YELLOW — SIGNIFICANT CHANGE UP
CREAT ?TM UR-MCNC: 182 MG/DL — SIGNIFICANT CHANGE UP
CREAT SERPL-MCNC: 1.39 MG/DL — HIGH (ref 0.5–1.3)
CREAT SERPL-MCNC: 1.45 MG/DL — HIGH (ref 0.5–1.3)
CREAT SERPL-MCNC: 1.54 MG/DL — HIGH (ref 0.5–1.3)
CREAT SERPL-MCNC: 1.61 MG/DL — HIGH (ref 0.5–1.3)
CREAT SERPL-MCNC: 1.63 MG/DL — HIGH (ref 0.5–1.3)
DIFF PNL FLD: ABNORMAL
FERRITIN SERPL-MCNC: 3394 NG/ML — HIGH (ref 30–400)
FOLATE SERPL-MCNC: 14.9 NG/ML — SIGNIFICANT CHANGE UP
GAS PNL BLDA: SIGNIFICANT CHANGE UP
GAS PNL BLDMV: SIGNIFICANT CHANGE UP
GAS PNL BLDMV: SIGNIFICANT CHANGE UP
GAS PNL BLDV: 136 MMOL/L — SIGNIFICANT CHANGE UP (ref 135–145)
GAS PNL BLDV: 137 MMOL/L — SIGNIFICANT CHANGE UP (ref 135–145)
GAS PNL BLDV: SIGNIFICANT CHANGE UP
GLUCOSE BLDC GLUCOMTR-MCNC: 160 MG/DL — HIGH (ref 70–99)
GLUCOSE BLDC GLUCOMTR-MCNC: 189 MG/DL — HIGH (ref 70–99)
GLUCOSE BLDC GLUCOMTR-MCNC: 213 MG/DL — HIGH (ref 70–99)
GLUCOSE BLDC GLUCOMTR-MCNC: 222 MG/DL — HIGH (ref 70–99)
GLUCOSE BLDC GLUCOMTR-MCNC: 312 MG/DL — HIGH (ref 70–99)
GLUCOSE BLDV-MCNC: 327 MG/DL — HIGH (ref 70–99)
GLUCOSE BLDV-MCNC: 342 MG/DL — HIGH (ref 70–99)
GLUCOSE SERPL-MCNC: 171 MG/DL — HIGH (ref 70–99)
GLUCOSE SERPL-MCNC: 210 MG/DL — HIGH (ref 70–99)
GLUCOSE SERPL-MCNC: 317 MG/DL — HIGH (ref 70–99)
GLUCOSE SERPL-MCNC: 337 MG/DL — HIGH (ref 70–99)
GLUCOSE SERPL-MCNC: 350 MG/DL — HIGH (ref 70–99)
GLUCOSE UR QL: ABNORMAL
GRAM STN FLD: SIGNIFICANT CHANGE UP
HAPTOGLOB SERPL-MCNC: 325 MG/DL — HIGH (ref 34–200)
HBA1C BLD-MCNC: 10 % — HIGH (ref 4–5.6)
HCO3 BLDMV-SCNC: 25 MMOL/L — SIGNIFICANT CHANGE UP (ref 20–28)
HCO3 BLDMV-SCNC: 26 MMOL/L — SIGNIFICANT CHANGE UP (ref 20–28)
HCO3 BLDV-SCNC: 22 MMOL/L — SIGNIFICANT CHANGE UP (ref 21–29)
HCO3 BLDV-SCNC: 24 MMOL/L — SIGNIFICANT CHANGE UP (ref 21–29)
HCOV PNL SPEC NAA+PROBE: DETECTED
HCT VFR BLD CALC: 31.3 % — LOW (ref 39–50)
HCT VFR BLD CALC: 31.5 % — LOW (ref 39–50)
HCT VFR BLD CALC: 31.7 % — LOW (ref 39–50)
HCT VFR BLD CALC: 31.8 % — LOW (ref 39–50)
HCT VFR BLD CALC: 32.8 % — LOW (ref 39–50)
HCT VFR BLDA CALC: 32 % — LOW (ref 39–50)
HCT VFR BLDA CALC: 33 % — LOW (ref 39–50)
HCV AB S/CO SERPL IA: 0.05 S/CO — SIGNIFICANT CHANGE UP (ref 0–0.99)
HCV AB SERPL-IMP: SIGNIFICANT CHANGE UP
HDLC SERPL-MCNC: 37 MG/DL — LOW
HGB BLD CALC-MCNC: 10.5 G/DL — LOW (ref 13–17)
HGB BLD CALC-MCNC: 10.6 G/DL — LOW (ref 13–17)
HGB BLD-MCNC: 10.8 G/DL — LOW (ref 13–17)
HGB BLD-MCNC: 11 G/DL — LOW (ref 13–17)
HGB BLD-MCNC: 11 G/DL — LOW (ref 13–17)
HOROWITZ INDEX BLDMV+IHG-RTO: 40 — SIGNIFICANT CHANGE UP
HOROWITZ INDEX BLDMV+IHG-RTO: 40 — SIGNIFICANT CHANGE UP
HOROWITZ INDEX BLDV+IHG-RTO: 40 — SIGNIFICANT CHANGE UP
HOROWITZ INDEX BLDV+IHG-RTO: 40 — SIGNIFICANT CHANGE UP
INR BLD: 1.36 RATIO — HIGH (ref 0.88–1.16)
IRON SATN MFR SERPL: 29 % — SIGNIFICANT CHANGE UP (ref 16–55)
IRON SATN MFR SERPL: 51 UG/DL — SIGNIFICANT CHANGE UP (ref 45–165)
KETONES UR-MCNC: NEGATIVE — SIGNIFICANT CHANGE UP
LACTATE BLDV-MCNC: 1.8 MMOL/L — SIGNIFICANT CHANGE UP (ref 0.7–2)
LACTATE BLDV-MCNC: 2 MMOL/L — SIGNIFICANT CHANGE UP (ref 0.7–2)
LDH SERPL L TO P-CCNC: 1065 U/L — HIGH (ref 50–242)
LEGIONELLA AG UR QL: NEGATIVE — SIGNIFICANT CHANGE UP
LEUKOCYTE ESTERASE UR-ACNC: NEGATIVE — SIGNIFICANT CHANGE UP
LIPID PNL WITH DIRECT LDL SERPL: 55 MG/DL — SIGNIFICANT CHANGE UP
MAGNESIUM SERPL-MCNC: 2.2 MG/DL — SIGNIFICANT CHANGE UP (ref 1.6–2.6)
MAGNESIUM SERPL-MCNC: 2.2 MG/DL — SIGNIFICANT CHANGE UP (ref 1.6–2.6)
MAGNESIUM SERPL-MCNC: 2.3 MG/DL — SIGNIFICANT CHANGE UP (ref 1.6–2.6)
MCHC RBC-ENTMCNC: 31.9 PG — SIGNIFICANT CHANGE UP (ref 27–34)
MCHC RBC-ENTMCNC: 32 PG — SIGNIFICANT CHANGE UP (ref 27–34)
MCHC RBC-ENTMCNC: 32.4 PG — SIGNIFICANT CHANGE UP (ref 27–34)
MCHC RBC-ENTMCNC: 32.7 PG — SIGNIFICANT CHANGE UP (ref 27–34)
MCHC RBC-ENTMCNC: 33 PG — SIGNIFICANT CHANGE UP (ref 27–34)
MCHC RBC-ENTMCNC: 33.7 GM/DL — SIGNIFICANT CHANGE UP (ref 32–36)
MCHC RBC-ENTMCNC: 33.9 GM/DL — SIGNIFICANT CHANGE UP (ref 32–36)
MCHC RBC-ENTMCNC: 34.3 GM/DL — SIGNIFICANT CHANGE UP (ref 32–36)
MCHC RBC-ENTMCNC: 34.6 GM/DL — SIGNIFICANT CHANGE UP (ref 32–36)
MCHC RBC-ENTMCNC: 34.6 GM/DL — SIGNIFICANT CHANGE UP (ref 32–36)
MCV RBC AUTO: 93.5 FL — SIGNIFICANT CHANGE UP (ref 80–100)
MCV RBC AUTO: 94.4 FL — SIGNIFICANT CHANGE UP (ref 80–100)
MCV RBC AUTO: 94.5 FL — SIGNIFICANT CHANGE UP (ref 80–100)
MCV RBC AUTO: 95.2 FL — SIGNIFICANT CHANGE UP (ref 80–100)
MCV RBC AUTO: 95.4 FL — SIGNIFICANT CHANGE UP (ref 80–100)
NITRITE UR-MCNC: NEGATIVE — SIGNIFICANT CHANGE UP
NT-PROBNP SERPL-SCNC: 5792 PG/ML — HIGH (ref 0–300)
O2 CT VFR BLD CALC: 36 MMHG — SIGNIFICANT CHANGE UP (ref 30–65)
O2 CT VFR BLD CALC: 37 MMHG — SIGNIFICANT CHANGE UP (ref 30–65)
OTHER CELLS CSF MANUAL: 7 ML/DL — LOW (ref 18–22)
OTHER CELLS CSF MANUAL: 8 ML/DL — LOW (ref 18–22)
PCO2 BLDMV: 46 MMHG — SIGNIFICANT CHANGE UP (ref 30–65)
PCO2 BLDMV: 50 MMHG — SIGNIFICANT CHANGE UP (ref 30–65)
PCO2 BLDV: 36 MMHG — SIGNIFICANT CHANGE UP (ref 35–50)
PCO2 BLDV: 41 MMHG — SIGNIFICANT CHANGE UP (ref 35–50)
PH BLDMV: 7.33 — SIGNIFICANT CHANGE UP (ref 7.32–7.45)
PH BLDMV: 7.35 — SIGNIFICANT CHANGE UP (ref 7.32–7.45)
PH BLDV: 7.37 — SIGNIFICANT CHANGE UP (ref 7.35–7.45)
PH BLDV: 7.41 — SIGNIFICANT CHANGE UP (ref 7.35–7.45)
PH UR: 6 — SIGNIFICANT CHANGE UP (ref 5–8)
PHOSPHATE SERPL-MCNC: 2.7 MG/DL — SIGNIFICANT CHANGE UP (ref 2.5–4.5)
PHOSPHATE SERPL-MCNC: 3 MG/DL — SIGNIFICANT CHANGE UP (ref 2.5–4.5)
PHOSPHATE SERPL-MCNC: 3.2 MG/DL — SIGNIFICANT CHANGE UP (ref 2.5–4.5)
PLATELET # BLD AUTO: 210 K/UL — SIGNIFICANT CHANGE UP (ref 150–400)
PLATELET # BLD AUTO: 213 K/UL — SIGNIFICANT CHANGE UP (ref 150–400)
PLATELET # BLD AUTO: 220 K/UL — SIGNIFICANT CHANGE UP (ref 150–400)
PLATELET # BLD AUTO: 225 K/UL — SIGNIFICANT CHANGE UP (ref 150–400)
PLATELET # BLD AUTO: 240 K/UL — SIGNIFICANT CHANGE UP (ref 150–400)
PO2 BLDV: 29 MMHG — SIGNIFICANT CHANGE UP (ref 25–45)
PO2 BLDV: 33 MMHG — SIGNIFICANT CHANGE UP (ref 25–45)
POTASSIUM BLDV-SCNC: 4.1 MMOL/L — SIGNIFICANT CHANGE UP (ref 3.5–5.3)
POTASSIUM BLDV-SCNC: 4.3 MMOL/L — SIGNIFICANT CHANGE UP (ref 3.5–5.3)
POTASSIUM SERPL-MCNC: 4 MMOL/L — SIGNIFICANT CHANGE UP (ref 3.5–5.3)
POTASSIUM SERPL-MCNC: 4.1 MMOL/L — SIGNIFICANT CHANGE UP (ref 3.5–5.3)
POTASSIUM SERPL-MCNC: 4.2 MMOL/L — SIGNIFICANT CHANGE UP (ref 3.5–5.3)
POTASSIUM SERPL-MCNC: 4.4 MMOL/L — SIGNIFICANT CHANGE UP (ref 3.5–5.3)
POTASSIUM SERPL-MCNC: 4.6 MMOL/L — SIGNIFICANT CHANGE UP (ref 3.5–5.3)
POTASSIUM SERPL-SCNC: 4 MMOL/L — SIGNIFICANT CHANGE UP (ref 3.5–5.3)
POTASSIUM SERPL-SCNC: 4.1 MMOL/L — SIGNIFICANT CHANGE UP (ref 3.5–5.3)
POTASSIUM SERPL-SCNC: 4.2 MMOL/L — SIGNIFICANT CHANGE UP (ref 3.5–5.3)
POTASSIUM SERPL-SCNC: 4.4 MMOL/L — SIGNIFICANT CHANGE UP (ref 3.5–5.3)
POTASSIUM SERPL-SCNC: 4.6 MMOL/L — SIGNIFICANT CHANGE UP (ref 3.5–5.3)
PROT SERPL-MCNC: 6.2 G/DL — SIGNIFICANT CHANGE UP (ref 6–8.3)
PROT SERPL-MCNC: 6.5 G/DL — SIGNIFICANT CHANGE UP (ref 6–8.3)
PROT SERPL-MCNC: 6.5 G/DL — SIGNIFICANT CHANGE UP (ref 6–8.3)
PROT SERPL-MCNC: 6.6 G/DL — SIGNIFICANT CHANGE UP (ref 6–8.3)
PROT SERPL-MCNC: 6.6 G/DL — SIGNIFICANT CHANGE UP (ref 6–8.3)
PROT UR-MCNC: 100 — SIGNIFICANT CHANGE UP
PROTHROM AB SERPL-ACNC: 15.6 SEC — HIGH (ref 10–12.9)
RAPID RVP RESULT: DETECTED
RBC # BLD: 3.29 M/UL — LOW (ref 4.2–5.8)
RBC # BLD: 3.3 M/UL — LOW (ref 4.2–5.8)
RBC # BLD: 3.36 M/UL — LOW (ref 4.2–5.8)
RBC # BLD: 3.39 M/UL — LOW (ref 4.2–5.8)
RBC # BLD: 3.47 M/UL — LOW (ref 4.2–5.8)
RBC # FLD: 11.9 % — SIGNIFICANT CHANGE UP (ref 10.3–14.5)
RBC # FLD: 11.9 % — SIGNIFICANT CHANGE UP (ref 10.3–14.5)
RBC # FLD: 12 % — SIGNIFICANT CHANGE UP (ref 10.3–14.5)
RBC # FLD: 12.1 % — SIGNIFICANT CHANGE UP (ref 10.3–14.5)
RBC # FLD: 12.2 % — SIGNIFICANT CHANGE UP (ref 10.3–14.5)
RH IG SCN BLD-IMP: POSITIVE — SIGNIFICANT CHANGE UP
SAO2 % BLDMV: 57 % — LOW (ref 60–90)
SAO2 % BLDMV: 62 % — SIGNIFICANT CHANGE UP (ref 60–90)
SAO2 % BLDV: 47 % — LOW (ref 67–88)
SAO2 % BLDV: 53 % — LOW (ref 67–88)
SODIUM SERPL-SCNC: 137 MMOL/L — SIGNIFICANT CHANGE UP (ref 135–145)
SODIUM SERPL-SCNC: 138 MMOL/L — SIGNIFICANT CHANGE UP (ref 135–145)
SODIUM SERPL-SCNC: 139 MMOL/L — SIGNIFICANT CHANGE UP (ref 135–145)
SP GR SPEC: 1.05 — HIGH (ref 1.01–1.02)
SPECIMEN SOURCE: SIGNIFICANT CHANGE UP
TIBC SERPL-MCNC: 173 UG/DL — LOW (ref 220–430)
TOTAL CHOLESTEROL/HDL RATIO MEASUREMENT: 2.9 RATIO — LOW (ref 3.4–9.6)
TRIGL SERPL-MCNC: 72 MG/DL — SIGNIFICANT CHANGE UP (ref 10–149)
TROPONIN T, HIGH SENSITIVITY RESULT: 2174 NG/L — HIGH (ref 0–51)
TROPONIN T, HIGH SENSITIVITY RESULT: 2806 NG/L — HIGH (ref 0–51)
TSH SERPL-MCNC: 0.94 UIU/ML — SIGNIFICANT CHANGE UP (ref 0.27–4.2)
UIBC SERPL-MCNC: 122 UG/DL — SIGNIFICANT CHANGE UP (ref 110–370)
UROBILINOGEN FLD QL: NEGATIVE — SIGNIFICANT CHANGE UP
UUN UR-MCNC: 1102 MG/DL — SIGNIFICANT CHANGE UP
VIT B12 SERPL-MCNC: >2000 PG/ML — HIGH (ref 232–1245)
WBC # BLD: 10.8 K/UL — HIGH (ref 3.8–10.5)
WBC # BLD: 11.5 K/UL — HIGH (ref 3.8–10.5)
WBC # BLD: 11.8 K/UL — HIGH (ref 3.8–10.5)
WBC # BLD: 13.5 K/UL — HIGH (ref 3.8–10.5)
WBC # BLD: 14.5 K/UL — HIGH (ref 3.8–10.5)
WBC # FLD AUTO: 10.8 K/UL — HIGH (ref 3.8–10.5)
WBC # FLD AUTO: 11.5 K/UL — HIGH (ref 3.8–10.5)
WBC # FLD AUTO: 11.8 K/UL — HIGH (ref 3.8–10.5)
WBC # FLD AUTO: 13.5 K/UL — HIGH (ref 3.8–10.5)
WBC # FLD AUTO: 14.5 K/UL — HIGH (ref 3.8–10.5)

## 2019-07-06 PROCEDURE — 99291 CRITICAL CARE FIRST HOUR: CPT

## 2019-07-06 PROCEDURE — 93306 TTE W/DOPPLER COMPLETE: CPT | Mod: 26

## 2019-07-06 PROCEDURE — 71045 X-RAY EXAM CHEST 1 VIEW: CPT | Mod: 26

## 2019-07-06 PROCEDURE — 93925 LOWER EXTREMITY STUDY: CPT | Mod: 26

## 2019-07-06 RX ORDER — INSULIN GLARGINE 100 [IU]/ML
10 INJECTION, SOLUTION SUBCUTANEOUS ONCE
Refills: 0 | Status: COMPLETED | OUTPATIENT
Start: 2019-07-06 | End: 2019-07-06

## 2019-07-06 RX ORDER — GLUCAGON INJECTION, SOLUTION 0.5 MG/.1ML
1 INJECTION, SOLUTION SUBCUTANEOUS ONCE
Refills: 0 | Status: DISCONTINUED | OUTPATIENT
Start: 2019-07-06 | End: 2019-07-14

## 2019-07-06 RX ORDER — FUROSEMIDE 40 MG
5 TABLET ORAL
Qty: 500 | Refills: 0 | Status: DISCONTINUED | OUTPATIENT
Start: 2019-07-06 | End: 2019-07-07

## 2019-07-06 RX ORDER — FUROSEMIDE 40 MG
40 TABLET ORAL ONCE
Refills: 0 | Status: COMPLETED | OUTPATIENT
Start: 2019-07-06 | End: 2019-07-06

## 2019-07-06 RX ORDER — HEPARIN SODIUM 5000 [USP'U]/ML
1800 INJECTION INTRAVENOUS; SUBCUTANEOUS
Qty: 25000 | Refills: 0 | Status: DISCONTINUED | OUTPATIENT
Start: 2019-07-06 | End: 2019-07-13

## 2019-07-06 RX ORDER — VANCOMYCIN HCL 1 G
VIAL (EA) INTRAVENOUS
Refills: 0 | Status: DISCONTINUED | OUTPATIENT
Start: 2019-07-06 | End: 2019-07-08

## 2019-07-06 RX ORDER — INSULIN LISPRO 100/ML
VIAL (ML) SUBCUTANEOUS EVERY 6 HOURS
Refills: 0 | Status: DISCONTINUED | OUTPATIENT
Start: 2019-07-06 | End: 2019-07-08

## 2019-07-06 RX ORDER — HEPARIN SODIUM 5000 [USP'U]/ML
5300 INJECTION INTRAVENOUS; SUBCUTANEOUS EVERY 6 HOURS
Refills: 0 | Status: DISCONTINUED | OUTPATIENT
Start: 2019-07-06 | End: 2019-07-13

## 2019-07-06 RX ORDER — DEXTROSE 50 % IN WATER 50 %
15 SYRINGE (ML) INTRAVENOUS ONCE
Refills: 0 | Status: DISCONTINUED | OUTPATIENT
Start: 2019-07-06 | End: 2019-07-14

## 2019-07-06 RX ORDER — INSULIN LISPRO 100/ML
VIAL (ML) SUBCUTANEOUS AT BEDTIME
Refills: 0 | Status: DISCONTINUED | OUTPATIENT
Start: 2019-07-06 | End: 2019-07-06

## 2019-07-06 RX ORDER — FENTANYL CITRATE 50 UG/ML
0.5 INJECTION INTRAVENOUS
Qty: 5000 | Refills: 0 | Status: DISCONTINUED | OUTPATIENT
Start: 2019-07-06 | End: 2019-07-08

## 2019-07-06 RX ORDER — ATORVASTATIN CALCIUM 80 MG/1
80 TABLET, FILM COATED ORAL AT BEDTIME
Refills: 0 | Status: DISCONTINUED | OUTPATIENT
Start: 2019-07-06 | End: 2019-07-06

## 2019-07-06 RX ORDER — ACETAMINOPHEN 500 MG
650 TABLET ORAL EVERY 6 HOURS
Refills: 0 | Status: DISCONTINUED | OUTPATIENT
Start: 2019-07-06 | End: 2019-07-06

## 2019-07-06 RX ORDER — VANCOMYCIN HCL 1 G
1000 VIAL (EA) INTRAVENOUS ONCE
Refills: 0 | Status: COMPLETED | OUTPATIENT
Start: 2019-07-06 | End: 2019-07-06

## 2019-07-06 RX ORDER — INSULIN GLARGINE 100 [IU]/ML
15 INJECTION, SOLUTION SUBCUTANEOUS AT BEDTIME
Refills: 0 | Status: DISCONTINUED | OUTPATIENT
Start: 2019-07-06 | End: 2019-07-08

## 2019-07-06 RX ORDER — VANCOMYCIN HCL 1 G
1000 VIAL (EA) INTRAVENOUS EVERY 12 HOURS
Refills: 0 | Status: DISCONTINUED | OUTPATIENT
Start: 2019-07-06 | End: 2019-07-08

## 2019-07-06 RX ORDER — SODIUM CHLORIDE 9 MG/ML
1000 INJECTION, SOLUTION INTRAVENOUS
Refills: 0 | Status: DISCONTINUED | OUTPATIENT
Start: 2019-07-06 | End: 2019-07-14

## 2019-07-06 RX ORDER — MIDAZOLAM HYDROCHLORIDE 1 MG/ML
0.02 INJECTION, SOLUTION INTRAMUSCULAR; INTRAVENOUS
Qty: 100 | Refills: 0 | Status: DISCONTINUED | OUTPATIENT
Start: 2019-07-06 | End: 2019-07-07

## 2019-07-06 RX ORDER — FUROSEMIDE 40 MG
60 TABLET ORAL ONCE
Refills: 0 | Status: COMPLETED | OUTPATIENT
Start: 2019-07-06 | End: 2019-07-06

## 2019-07-06 RX ORDER — INSULIN GLARGINE 100 [IU]/ML
10 INJECTION, SOLUTION SUBCUTANEOUS AT BEDTIME
Refills: 0 | Status: DISCONTINUED | OUTPATIENT
Start: 2019-07-06 | End: 2019-07-06

## 2019-07-06 RX ORDER — DEXTROSE 50 % IN WATER 50 %
25 SYRINGE (ML) INTRAVENOUS ONCE
Refills: 0 | Status: DISCONTINUED | OUTPATIENT
Start: 2019-07-06 | End: 2019-07-14

## 2019-07-06 RX ORDER — INSULIN LISPRO 100/ML
VIAL (ML) SUBCUTANEOUS
Refills: 0 | Status: DISCONTINUED | OUTPATIENT
Start: 2019-07-06 | End: 2019-07-06

## 2019-07-06 RX ORDER — PIPERACILLIN AND TAZOBACTAM 4; .5 G/20ML; G/20ML
3.38 INJECTION, POWDER, LYOPHILIZED, FOR SOLUTION INTRAVENOUS EVERY 8 HOURS
Refills: 0 | Status: DISCONTINUED | OUTPATIENT
Start: 2019-07-06 | End: 2019-07-13

## 2019-07-06 RX ORDER — PIPERACILLIN AND TAZOBACTAM 4; .5 G/20ML; G/20ML
3.38 INJECTION, POWDER, LYOPHILIZED, FOR SOLUTION INTRAVENOUS ONCE
Refills: 0 | Status: COMPLETED | OUTPATIENT
Start: 2019-07-06 | End: 2019-07-06

## 2019-07-06 RX ORDER — FENTANYL CITRATE 50 UG/ML
0.5 INJECTION INTRAVENOUS
Qty: 2500 | Refills: 0 | Status: DISCONTINUED | OUTPATIENT
Start: 2019-07-06 | End: 2019-07-06

## 2019-07-06 RX ORDER — DEXTROSE 50 % IN WATER 50 %
12.5 SYRINGE (ML) INTRAVENOUS ONCE
Refills: 0 | Status: DISCONTINUED | OUTPATIENT
Start: 2019-07-06 | End: 2019-07-14

## 2019-07-06 RX ORDER — CHLORHEXIDINE GLUCONATE 213 G/1000ML
15 SOLUTION TOPICAL
Refills: 0 | Status: DISCONTINUED | OUTPATIENT
Start: 2019-07-06 | End: 2019-07-22

## 2019-07-06 RX ADMIN — CHLORHEXIDINE GLUCONATE 1 APPLICATION(S): 213 SOLUTION TOPICAL at 23:15

## 2019-07-06 RX ADMIN — HEPARIN SODIUM 1800 UNIT(S)/HR: 5000 INJECTION INTRAVENOUS; SUBCUTANEOUS at 01:45

## 2019-07-06 RX ADMIN — PIPERACILLIN AND TAZOBACTAM 25 GRAM(S): 4; .5 INJECTION, POWDER, LYOPHILIZED, FOR SOLUTION INTRAVENOUS at 10:10

## 2019-07-06 RX ADMIN — Medication 1 APPLICATION(S): at 22:25

## 2019-07-06 RX ADMIN — Medication 2: at 08:35

## 2019-07-06 RX ADMIN — Medication 1 APPLICATION(S): at 13:21

## 2019-07-06 RX ADMIN — FENTANYL CITRATE 2.23 MICROGRAM(S)/KG/HR: 50 INJECTION INTRAVENOUS at 03:49

## 2019-07-06 RX ADMIN — MIDAZOLAM HYDROCHLORIDE 1.79 MG/KG/HR: 1 INJECTION, SOLUTION INTRAMUSCULAR; INTRAVENOUS at 07:31

## 2019-07-06 RX ADMIN — Medication 60 MILLIGRAM(S): at 13:20

## 2019-07-06 RX ADMIN — Medication 4: at 02:09

## 2019-07-06 RX ADMIN — Medication 13.41 MICROGRAM(S)/KG/MIN: at 07:31

## 2019-07-06 RX ADMIN — CHLORHEXIDINE GLUCONATE 15 MILLILITER(S): 213 SOLUTION TOPICAL at 18:09

## 2019-07-06 RX ADMIN — HEPARIN SODIUM 1800 UNIT(S)/HR: 5000 INJECTION INTRAVENOUS; SUBCUTANEOUS at 08:51

## 2019-07-06 RX ADMIN — Medication 8.38 MICROGRAM(S)/KG/MIN: at 00:35

## 2019-07-06 RX ADMIN — INSULIN GLARGINE 15 UNIT(S): 100 INJECTION, SOLUTION SUBCUTANEOUS at 23:15

## 2019-07-06 RX ADMIN — Medication 250 MILLIGRAM(S): at 17:45

## 2019-07-06 RX ADMIN — FENTANYL CITRATE 2.23 MICROGRAM(S)/KG/HR: 50 INJECTION INTRAVENOUS at 07:31

## 2019-07-06 RX ADMIN — MIDAZOLAM HYDROCHLORIDE 1.79 MG/KG/HR: 1 INJECTION, SOLUTION INTRAMUSCULAR; INTRAVENOUS at 03:50

## 2019-07-06 RX ADMIN — INSULIN GLARGINE 10 UNIT(S): 100 INJECTION, SOLUTION SUBCUTANEOUS at 01:55

## 2019-07-06 RX ADMIN — Medication 40 MILLIGRAM(S): at 17:38

## 2019-07-06 RX ADMIN — CLOPIDOGREL BISULFATE 75 MILLIGRAM(S): 75 TABLET, FILM COATED ORAL at 13:15

## 2019-07-06 RX ADMIN — Medication 2: at 23:14

## 2019-07-06 RX ADMIN — Medication 2.5 MG/HR: at 19:40

## 2019-07-06 RX ADMIN — PIPERACILLIN AND TAZOBACTAM 200 GRAM(S): 4; .5 INJECTION, POWDER, LYOPHILIZED, FOR SOLUTION INTRAVENOUS at 01:47

## 2019-07-06 RX ADMIN — Medication 4: at 13:31

## 2019-07-06 RX ADMIN — Medication 81 MILLIGRAM(S): at 13:15

## 2019-07-06 RX ADMIN — Medication 1 APPLICATION(S): at 05:44

## 2019-07-06 RX ADMIN — CHLORHEXIDINE GLUCONATE 1 APPLICATION(S): 213 SOLUTION TOPICAL at 05:44

## 2019-07-06 RX ADMIN — MIDAZOLAM HYDROCHLORIDE 1.79 MG/KG/HR: 1 INJECTION, SOLUTION INTRAMUSCULAR; INTRAVENOUS at 21:03

## 2019-07-06 RX ADMIN — Medication 8.38 MICROGRAM(S)/KG/MIN: at 07:32

## 2019-07-06 RX ADMIN — Medication 4: at 18:00

## 2019-07-06 RX ADMIN — Medication 650 MILLIGRAM(S): at 02:23

## 2019-07-06 RX ADMIN — PANTOPRAZOLE SODIUM 40 MILLIGRAM(S): 20 TABLET, DELAYED RELEASE ORAL at 13:19

## 2019-07-06 RX ADMIN — PIPERACILLIN AND TAZOBACTAM 25 GRAM(S): 4; .5 INJECTION, POWDER, LYOPHILIZED, FOR SOLUTION INTRAVENOUS at 17:49

## 2019-07-06 RX ADMIN — HEPARIN SODIUM 1800 UNIT(S)/HR: 5000 INJECTION INTRAVENOUS; SUBCUTANEOUS at 16:16

## 2019-07-06 RX ADMIN — Medication 250 MILLIGRAM(S): at 02:23

## 2019-07-06 NOTE — CONSULT NOTE ADULT - SUBJECTIVE AND OBJECTIVE BOX
[facility] General Surgery Consultation     Patient is a 69y old  Male who presents with a chief complaint of NSTEMI (2019 07:24)      HPI:  History obtained from sign out/nephew  The pt is a 70 y/o M with a PMH of HTN, DM, PAD who is transferred to [facility] for management of cardiogenic shock and NSTEMI. As per sign out, the pt was admitted for L popliteal bypass to [facility]. During his time there, he was refusing medications  and was found to be progressively more dyspneic. He had an echo done there which showed that he has an EF of <15% with global hypokinesis; he received 20mg IV lasix for fluid overload. His respiratory status continued to worsen and the pt was intubated for airway protection. The pt vomited during this time and there was a concern for aspiration. The pt did not complain of CP during the time prior to intubation. As per nephew, the pt has not been complaining of CP, SOB prior to admission to the hospital and has been able to carry out his ADLs. As per the nephew, pt has not been compliant with his medications as an outpatient; the nephew knows that he had amlodipine prescribed but doesn't know what medications the the pt takes for his diabetes.  As per chart from [facility], pt has been prescribed to take amlodipine 10 daily and metformin 500 BID at home. As per med rec from , pt has been taking lantus 20 mg daily at home in addition to metformin. (2019 23:50)    Patient was admitted, currently in CCU on heparin ggt, currently subtherapeutic on pressors (dobutamine 5, norepinephrine 0.05) Vascular surgery was consulted because of no pulses/signals this AM after LLE fem to pop bypass (done in [facility], 7/3/19). [facility] was called and confirmed LLE bypass was fem to pop with vein graft, had PT with loss of signals in PACU. later hep ggt was initiated and patient had biphasic PT, left [facility] with PT signal.      PAST MEDICAL & SURGICAL HISTORY:  Hyperlipidemia  Hypertension  Diabetes  H/O mastoidectomy      FAMILY HISTORY:  FHx: diabetes mellitus: In all siblings(2 sisters and 4 brothers)      SOCIAL HISTORY:    MEDICATIONS  (STANDING):  aspirin  chewable 81 milliGRAM(s) Oral daily  chlorhexidine 2% Cloths 1 Application(s) Topical <User Schedule>  clopidogrel Tablet 75 milliGRAM(s) Oral daily  dextrose 5%. 1000 milliLiter(s) (50 mL/Hr) IV Continuous <Continuous>  dextrose 50% Injectable 12.5 Gram(s) IV Push once  dextrose 50% Injectable 25 Gram(s) IV Push once  dextrose 50% Injectable 25 Gram(s) IV Push once  DOBUTamine Infusion 5 MICROgram(s)/kG/Min (13.41 mL/Hr) IV Continuous <Continuous>  fentaNYL   Infusion. 0.5 MICROgram(s)/kG/Hr (2.235 mL/Hr) IV Continuous <Continuous>  heparin  Infusion. 1800 Unit(s)/Hr (18 mL/Hr) IV Continuous <Continuous>  insulin glargine Injectable (LANTUS) 10 Unit(s) SubCutaneous at bedtime  insulin lispro (HumaLOG) corrective regimen sliding scale   SubCutaneous three times a day before meals  insulin lispro (HumaLOG) corrective regimen sliding scale   SubCutaneous at bedtime  midazolam Infusion 0.02 mG/kG/Hr (1.788 mL/Hr) IV Continuous <Continuous>  norepinephrine Infusion 0.05 MICROgram(s)/kG/Min (8.381 mL/Hr) IV Continuous <Continuous>  pantoprazole  Injectable 40 milliGRAM(s) IV Push daily  petrolatum Ophthalmic Ointment 1 Application(s) Both EYES every 8 hours  piperacillin/tazobactam IVPB.. 3.375 Gram(s) IV Intermittent every 8 hours  vancomycin  IVPB      vancomycin  IVPB 1000 milliGRAM(s) IV Intermittent every 12 hours    MEDICATIONS  (PRN):  dextrose 40% Gel 15 Gram(s) Oral once PRN Blood Glucose LESS THAN 70 milliGRAM(s)/deciliter  glucagon  Injectable 1 milliGRAM(s) IntraMuscular once PRN Glucose LESS THAN 70 milligrams/deciliter  heparin  Injectable 5300 Unit(s) IV Push every 6 hours PRN For aPTT less than 40    Allergies    No Known Allergies    Intolerances        Vital Signs Last 24 Hrs  T(C): 38.7 (2019 05:00), Max: 38.7 (2019 05:00)  T(F): 101.7 (2019 05:00), Max: 101.7 (2019 05:00)  HR: 98 (2019 08:07) (98 - 112)  BP: 86/59 (2019 00:30) (70/54 - 117/75)  BP(mean): 69 (2019 00:30) (59 - 93)  RR: 24 (2019 06:45) (9 - 24)  SpO2: 100% (2019 08:07) (69% - 100%)  Daily Height in cm: 182.88 (2019 23:07)    Daily Weight in k.5 (2019 06:30)    General: NAD, well-nourished  Resp: Intubated  CV: Normal sinus rhythm  Ext: RLE: no dopplerable signals, discoloration of toes, LLE: no dopplerable signals, discoloration of toes with tissue loss of 1st toe, leg dressing c/d/i               10.8   11.5  )-----------( 220      ( 2019 08:17 )             31.8     07-06    137  |  106  |  42<H>  ----------------------------<  350<H>  4.4   |  19<L>  |  1.54<H>    Ca    8.6      2019 05:32  Phos  3.0     07-06  Mg     2.3     07-06    TPro  6.5  /  Alb  2.9<L>  /  TBili  0.5  /  DBili  x   /  AST  1071<H>  /  ALT  948<H>  /  AlkPhos  122<H>  07-06    PT/INR - ( 2019 23:46 )   PT: 15.6 sec;   INR: 1.36 ratio         PTT - ( 2019 08:17 )  PTT:59.6 sec  Urinalysis Basic - ( 2019 00:36 )    Color: Yellow / Appearance: Slightly Turbid / S.048 / pH: x  Gluc: x / Ketone: Negative  / Bili: Negative / Urobili: Negative   Blood: x / Protein: 100 / Nitrite: Negative   Leuk Esterase: Negative / RBC: 5 /hpf / WBC 22 /HPF   Sq Epi: x / Non Sq Epi: 11 / Bacteria: Negative        Radiographic Findings:       Assessment: [facility] General Surgery Consultation     Patient is a 69y old  Male who presents with a chief complaint of NSTEMI (2019 07:24)      HPI:  History obtained from sign out/nephew  The pt is a 68 y/o M with a PMH of HTN, DM, PAD who is transferred to [facility] for management of cardiogenic shock and NSTEMI. As per sign out, the pt was admitted for L popliteal bypass to [facility]. During his time there, he was refusing medications  and was found to be progressively more dyspneic. He had an echo done there which showed that he has an EF of <15% with global hypokinesis; he received 20mg IV lasix for fluid overload. His respiratory status continued to worsen and the pt was intubated for airway protection. The pt vomited during this time and there was a concern for aspiration. The pt did not complain of CP during the time prior to intubation. As per nephew, the pt has not been complaining of CP, SOB prior to admission to the hospital and has been able to carry out his ADLs. As per the nephew, pt has not been compliant with his medications as an outpatient; the nephew knows that he had amlodipine prescribed but doesn't know what medications the the pt takes for his diabetes.  As per chart from [facility], pt has been prescribed to take amlodipine 10 daily and metformin 500 BID at home. As per med rec from , pt has been taking lantus 20 mg daily at home in addition to metformin. (2019 23:50)    Patient was admitted, currently in CCU on heparin ggt currently subtherapeutic, on pressors (dobutamine 5, norepinephrine 0.05) Vascular surgery was consulted because of no pulses/signals this AM after LLE fem to pop bypass (done in [facility], 7/3/19). [facility] was called and confirmed LLE bypass was fem to pop with vein graft, had PT with loss of signals in PACU. later hep ggt was initiated and patient had biphasic PT, left [facility] with PT signal.       PAST MEDICAL & SURGICAL HISTORY:  Hyperlipidemia  Hypertension  Diabetes  H/O mastoidectomy      FAMILY HISTORY:  FHx: diabetes mellitus: In all siblings(2 sisters and 4 brothers)      SOCIAL HISTORY:    MEDICATIONS  (STANDING):  aspirin  chewable 81 milliGRAM(s) Oral daily  chlorhexidine 2% Cloths 1 Application(s) Topical <User Schedule>  clopidogrel Tablet 75 milliGRAM(s) Oral daily  dextrose 5%. 1000 milliLiter(s) (50 mL/Hr) IV Continuous <Continuous>  dextrose 50% Injectable 12.5 Gram(s) IV Push once  dextrose 50% Injectable 25 Gram(s) IV Push once  dextrose 50% Injectable 25 Gram(s) IV Push once  DOBUTamine Infusion 5 MICROgram(s)/kG/Min (13.41 mL/Hr) IV Continuous <Continuous>  fentaNYL   Infusion. 0.5 MICROgram(s)/kG/Hr (2.235 mL/Hr) IV Continuous <Continuous>  heparin  Infusion. 1800 Unit(s)/Hr (18 mL/Hr) IV Continuous <Continuous>  insulin glargine Injectable (LANTUS) 10 Unit(s) SubCutaneous at bedtime  insulin lispro (HumaLOG) corrective regimen sliding scale   SubCutaneous three times a day before meals  insulin lispro (HumaLOG) corrective regimen sliding scale   SubCutaneous at bedtime  midazolam Infusion 0.02 mG/kG/Hr (1.788 mL/Hr) IV Continuous <Continuous>  norepinephrine Infusion 0.05 MICROgram(s)/kG/Min (8.381 mL/Hr) IV Continuous <Continuous>  pantoprazole  Injectable 40 milliGRAM(s) IV Push daily  petrolatum Ophthalmic Ointment 1 Application(s) Both EYES every 8 hours  piperacillin/tazobactam IVPB.. 3.375 Gram(s) IV Intermittent every 8 hours  vancomycin  IVPB      vancomycin  IVPB 1000 milliGRAM(s) IV Intermittent every 12 hours    MEDICATIONS  (PRN):  dextrose 40% Gel 15 Gram(s) Oral once PRN Blood Glucose LESS THAN 70 milliGRAM(s)/deciliter  glucagon  Injectable 1 milliGRAM(s) IntraMuscular once PRN Glucose LESS THAN 70 milligrams/deciliter  heparin  Injectable 5300 Unit(s) IV Push every 6 hours PRN For aPTT less than 40    Allergies    No Known Allergies    Intolerances        Vital Signs Last 24 Hrs  T(C): 38.7 (2019 05:00), Max: 38.7 (2019 05:00)  T(F): 101.7 (2019 05:00), Max: 101.7 (2019 05:00)  HR: 98 (2019 08:07) (98 - 112)  BP: 86/59 (2019 00:30) (70/54 - 117/75)  BP(mean): 69 (2019 00:30) (59 - 93)  RR: 24 (2019 06:45) (9 - 24)  SpO2: 100% (2019 08:07) (69% - 100%)  Daily Height in cm: 182.88 (2019 23:07)    Daily Weight in k.5 (2019 06:30)    General: NAD, well-nourished  Resp: Intubated  CV: Normal sinus rhythm  Ext: RLE: no dopplerable signals, discoloration of toes, LLE: no dopplerable signals, discoloration of toes with tissue loss of 1st toe, leg dressing c/d/i               10.8   11.5  )-----------( 220      ( 2019 08:17 )             31.8     07-06    137  |  106  |  42<H>  ----------------------------<  350<H>  4.4   |  19<L>  |  1.54<H>    Ca    8.6      2019 05:32  Phos  3.0     07-06  Mg     2.3     07-06    TPro  6.5  /  Alb  2.9<L>  /  TBili  0.5  /  DBili  x   /  AST  1071<H>  /  ALT  948<H>  /  AlkPhos  122<H>  07-06    PT/INR - ( 2019 23:46 )   PT: 15.6 sec;   INR: 1.36 ratio         PTT - ( 2019 08:17 )  PTT:59.6 sec  Urinalysis Basic - ( 2019 00:36 )    Color: Yellow / Appearance: Slightly Turbid / S.048 / pH: x  Gluc: x / Ketone: Negative  / Bili: Negative / Urobili: Negative   Blood: x / Protein: 100 / Nitrite: Negative   Leuk Esterase: Negative / RBC: 5 /hpf / WBC 22 /HPF   Sq Epi: x / Non Sq Epi: 11 / Bacteria: Negative        Radiographic Findings:       Assessment: [facility] General Surgery Consultation     Patient is a 69y old  Male who presents with a chief complaint of NSTEMI (2019 07:24)      HPI:  History obtained from sign out/nephew  The pt is a 70 y/o M with a PMH of HTN, DM, PAD who is transferred to [facility] for management of cardiogenic shock and NSTEMI. As per sign out, the pt was admitted for L popliteal bypass to [facility]. During his time there, he was refusing medications  and was found to be progressively more dyspneic. He had an echo done there which showed that he has an EF of <15% with global hypokinesis; he received 20mg IV lasix for fluid overload. His respiratory status continued to worsen and the pt was intubated for airway protection. The pt vomited during this time and there was a concern for aspiration. The pt did not complain of CP during the time prior to intubation. As per nephew, the pt has not been complaining of CP, SOB prior to admission to the hospital and has been able to carry out his ADLs. As per the nephew, pt has not been compliant with his medications as an outpatient; the nephew knows that he had amlodipine prescribed but doesn't know what medications the the pt takes for his diabetes.  As per chart from [facility], pt has been prescribed to take amlodipine 10 daily and metformin 500 BID at home. As per med rec from , pt has been taking lantus 20 mg daily at home in addition to metformin. (2019 23:50)    Patient was admitted, currently in CCU on heparin ggt currently subtherapeutic, on pressors (dobutamine 5, norepinephrine 0.05) Vascular surgery was consulted because of no pulses/signals this AM after LLE fem to pop bypass (done in [facility], 7/3/19). [facility] was called and confirmed LLE bypass was fem to pop with vein graft, had PT with loss of signals in PACU. later hep ggt was initiated and patient had biphasic PT, left [facility] with PT signal.       PAST MEDICAL & SURGICAL HISTORY:  Hyperlipidemia  Hypertension  Diabetes  H/O mastoidectomy      FAMILY HISTORY:  FHx: diabetes mellitus: In all siblings(2 sisters and 4 brothers)      SOCIAL HISTORY:    MEDICATIONS  (STANDING):  aspirin  chewable 81 milliGRAM(s) Oral daily  chlorhexidine 2% Cloths 1 Application(s) Topical <User Schedule>  clopidogrel Tablet 75 milliGRAM(s) Oral daily  dextrose 5%. 1000 milliLiter(s) (50 mL/Hr) IV Continuous <Continuous>  dextrose 50% Injectable 12.5 Gram(s) IV Push once  dextrose 50% Injectable 25 Gram(s) IV Push once  dextrose 50% Injectable 25 Gram(s) IV Push once  DOBUTamine Infusion 5 MICROgram(s)/kG/Min (13.41 mL/Hr) IV Continuous <Continuous>  fentaNYL   Infusion. 0.5 MICROgram(s)/kG/Hr (2.235 mL/Hr) IV Continuous <Continuous>  heparin  Infusion. 1800 Unit(s)/Hr (18 mL/Hr) IV Continuous <Continuous>  insulin glargine Injectable (LANTUS) 10 Unit(s) SubCutaneous at bedtime  insulin lispro (HumaLOG) corrective regimen sliding scale   SubCutaneous three times a day before meals  insulin lispro (HumaLOG) corrective regimen sliding scale   SubCutaneous at bedtime  midazolam Infusion 0.02 mG/kG/Hr (1.788 mL/Hr) IV Continuous <Continuous>  norepinephrine Infusion 0.05 MICROgram(s)/kG/Min (8.381 mL/Hr) IV Continuous <Continuous>  pantoprazole  Injectable 40 milliGRAM(s) IV Push daily  petrolatum Ophthalmic Ointment 1 Application(s) Both EYES every 8 hours  piperacillin/tazobactam IVPB.. 3.375 Gram(s) IV Intermittent every 8 hours  vancomycin  IVPB      vancomycin  IVPB 1000 milliGRAM(s) IV Intermittent every 12 hours    MEDICATIONS  (PRN):  dextrose 40% Gel 15 Gram(s) Oral once PRN Blood Glucose LESS THAN 70 milliGRAM(s)/deciliter  glucagon  Injectable 1 milliGRAM(s) IntraMuscular once PRN Glucose LESS THAN 70 milligrams/deciliter  heparin  Injectable 5300 Unit(s) IV Push every 6 hours PRN For aPTT less than 40    Allergies    No Known Allergies    Intolerances        Vital Signs Last 24 Hrs  T(C): 38.7 (2019 05:00), Max: 38.7 (2019 05:00)  T(F): 101.7 (2019 05:00), Max: 101.7 (2019 05:00)  HR: 98 (2019 08:07) (98 - 112)  BP: 86/59 (2019 00:30) (70/54 - 117/75)  BP(mean): 69 (2019 00:30) (59 - 93)  RR: 24 (2019 06:45) (9 - 24)  SpO2: 100% (2019 08:07) (69% - 100%)  Daily Height in cm: 182.88 (2019 23:07)    Daily Weight in k.5 (2019 06:30)    General: NAD, well-nourished  Resp: Intubated  CV: Normal sinus rhythm  Ext: RLE: no dopplerable signals, discoloration of toes, LLE: no dopplerable signals, discoloration of toes with tissue loss of 1st toe, leg dressing c/d/i               10.8   11.5  )-----------( 220      ( 2019 08:17 )             31.8     07-06    137  |  106  |  42<H>  ----------------------------<  350<H>  4.4   |  19<L>  |  1.54<H>    Ca    8.6      2019 05:32  Phos  3.0     07-06  Mg     2.3     07-06    TPro  6.5  /  Alb  2.9<L>  /  TBili  0.5  /  DBili  x   /  AST  1071<H>  /  ALT  948<H>  /  AlkPhos  122<H>  07-06    PT/INR - ( 2019 23:46 )   PT: 15.6 sec;   INR: 1.36 ratio         PTT - ( 2019 08:17 )  PTT:59.6 sec  Urinalysis Basic - ( 2019 00:36 )    Color: Yellow / Appearance: Slightly Turbid / S.048 / pH: x  Gluc: x / Ketone: Negative  / Bili: Negative / Urobili: Negative   Blood: x / Protein: 100 / Nitrite: Negative   Leuk Esterase: Negative / RBC: 5 /hpf / WBC 22 /HPF   Sq Epi: x / Non Sq Epi: 11 / Bacteria: Negative        Radiographic Findings:

## 2019-07-06 NOTE — PATIENT PROFILE ADULT - NSPROGENDIFFINTUB_GEN_A_NUR
unable to assess; patient intubated previously intubated - no problems/unable to assess; patient intubated

## 2019-07-06 NOTE — CHART NOTE - NSCHARTNOTEFT_GEN_A_CORE
====================  CCU MIDNIGHT ROUNDS  ====================    JIM PAREDES  [id number]    ====================  SUMMARY: HPI:  History obtained from sign out/nephew  The pt is a 68 y/o M with a PMH of HTN, DM, PAD who is transferred to [facility] for management of cardiogenic shock and NSTEMI. As per sign out, the pt was admitted for L popliteal bypass to [facility]. During his time there, he was refusing medications  and was found to be progressively more dyspneic. He had an echo done there which showed that he has an EF of <15% with global hypokinesis; he received 20mg IV lasix for fluid overload. His respiratory status continued to worsen and the pt was intubated for airway protection. The pt vomited during this time and there was a concern for aspiration. The pt did not complain of CP during the time prior to intubation. As per nephew, the pt has not been complaining of CP, SOB prior to admission to the hospital and has been able to carry out his ADLs. As per the nephew, pt has not been compliant with his medications as an outpatient; the nephew knows that he had amlodipine prescribed but doesn't know what medications the the pt takes for his diabetes.  As per chart from [facility], pt has been prescribed to take amlodipine 10 daily and metformin 500 BID at home. As per med rec from , pt has been taking lantus 20 mg daily at home in addition to metformin. (2019 23:50)    ====================        ====================  NEW EVENTS:  ====================    Pt continues to be febrile and has been ordered for hypothermia blanket.  Currently on lasix 5, hep gtt, fentanyl 1,  5, midazolam .03      ====================  VITALS (Last 12 hrs):  ====================    T(C): 38.3 (19 @ 22:00), Max: 38.3 (19 @ 22:00)  HR: 108 (19 @ 22:00) (98 - 110)  BP: --  BP(mean): --  ABP: 96/56 (19 @ 22:00) (84/54 - 110/72)  ABP(mean): 68 (19 @ 22:00) (64 - 84)  RR: 14 (19 @ :00) (10 - 15)  SpO2: 100% (19:00) (98% - 100%)  Wt(kg): --  CVP(mm Hg): 10 (19 @ :00) (9 - 20)  CO: --  CI: --  PA: --  PA(mean): --  PA(direct): --  PCWP: --  SVR: --    TELEMETRY:  Teli didn't read events    Mode: AC/ CMV (Assist Control/ Continuous Mandatory Ventilation)  RR (machine): 14  TV (machine): 500  FiO2: 40  PEEP: 5  ITime: 1  MAP: 10  PIP: 31      *BLOOD GAS/ARTERIAL/MIXED/VENOUS  *LACTATE    I&O's Summary    2019 07:  -  2019 07:00  --------------------------------------------------------  IN: 829.4 mL / OUT: 385 mL / NET: 444.4 mL    2019 07:  -  2019 22:53  --------------------------------------------------------  IN: 1152.4 mL / OUT: 1535 mL / NET: -382.6 mL        ====================  PLAN:  ====================    Problem/Plan - 1:  ·  Problem: Shock.  Plan: Cardiogenic Shock 2/2 to NSTEMI and Distributative Shock 2/2 to ASP PNA  reported EF<15% and end-organ organ damage with hemodynamic instability  -c/w dobutamine 5, lasix 5 and levo for MAP >65mmHg  -Consider swan placement for hemodynamic monitoring  -Monitor I/Os.      Problem/Plan - 2:  ·  Problem: NSTEMI (non-ST elevated myocardial infarction).  Plan: Continue Heparin gtt, ASA, Plavix and Lipitor  Cath when stable.      Problem/Plan - 3:  ·  Problem: Ischemia of left lower extremity.  Plan: s/p LLE Fem-Pop Bypass at [facility] 7/3 reportedly loss palpable pulse post procedure regained biphasic doppler pulse with Heparin Infusion  Currently No audible pulses 2/2 to poor vascularity and poor perfusion in Shock state requiring Pressors and Inotropes  Vascular consulted, pt not a candidate for periph vasc intervention due to overall condition ====================  CCU MIDNIGHT ROUNDS  ====================    JIM PAREDES  [id number]    ====================  SUMMARY: HPI:  History obtained from sign out/nephew  The pt is a 70 y/o M with a PMH of HTN, DM, PAD who is transferred to [facility] for management of cardiogenic shock and NSTEMI. As per sign out, the pt was admitted for L popliteal bypass to [facility]. During his time there, he was refusing medications  and was found to be progressively more dyspneic. He had an echo done there which showed that he has an EF of <15% with global hypokinesis; he received 20mg IV lasix for fluid overload. His respiratory status continued to worsen and the pt was intubated for airway protection. The pt vomited during this time and there was a concern for aspiration. The pt did not complain of CP during the time prior to intubation. As per nephew, the pt has not been complaining of CP, SOB prior to admission to the hospital and has been able to carry out his ADLs. As per the nephew, pt has not been compliant with his medications as an outpatient; the nephew knows that he had amlodipine prescribed but doesn't know what medications the the pt takes for his diabetes.  As per chart from [facility], pt has been prescribed to take amlodipine 10 daily and metformin 500 BID at home. As per med rec from , pt has been taking lantus 20 mg daily at home in addition to metformin. (2019 23:50)    ====================        ====================  NEW EVENTS:  ====================    Pt continues to be febrile and has been ordered for hypothermia blanket.  Currently on lasix 5, hep gtt, fentanyl 1,  5, midazolam .03      ====================  VITALS (Last 12 hrs):  ====================    T(C): 38.3 (19 @ 22:00), Max: 38.3 (19 @ 22:00)  HR: 108 (19 @ 22:00) (98 - 110)  BP: --  BP(mean): --  ABP: 96/56 (19 @ 22:00) (84/54 - 110/72)  ABP(mean): 68 (19 @ 22:00) (64 - 84)  RR: 14 (19 @ :00) (10 - 15)  SpO2: 100% (19:00) (98% - 100%)  Wt(kg): --  CVP(mm Hg): 10 (19 @ :00) (9 - 20)  CO: --  CI: --  PA: --  PA(mean): --  PA(direct): --  PCWP: --  SVR: --    TELEMETRY:  Teli didn't read events    Mode: AC/ CMV (Assist Control/ Continuous Mandatory Ventilation)  RR (machine): 14  TV (machine): 500  FiO2: 40  PEEP: 5  ITime: 1  MAP: 10  PIP: 31      *BLOOD GAS/ARTERIAL/MIXED/VENOUS  *LACTATE    I&O's Summary    2019 07:  -  2019 07:00  --------------------------------------------------------  IN: 829.4 mL / OUT: 385 mL / NET: 444.4 mL    2019 07:  -  2019 22:53  --------------------------------------------------------  IN: 1152.4 mL / OUT: 1535 mL / NET: -382.6 mL        ====================  PLAN:  ====================    Problem/Plan - 1:  ·  Problem: Shock.  Plan: Cardiogenic Shock 2/2 to NSTEMI and Distributative Shock 2/2 to ASP PNA  reported EF<15% and end-organ organ damage with hemodynamic instability  -c/w dobutamine 5, lasix 5 and levo for MAP >65mmHg  -Consider swan placement for hemodynamic monitoring  -Monitor I/Os.      Problem/Plan - 2:  ·  Problem: NSTEMI (non-ST elevated myocardial infarction).  Plan: Continue Heparin gtt, ASA, Plavix and Lipitor  Cath when stable.      Problem/Plan - 3:  ·  Problem: Ischemia of left lower extremity.  Plan: s/p LLE Fem-Pop Bypass at [facility] 7/3 reportedly loss palpable pulse post procedure regained biphasic doppler pulse with Heparin Infusion  Currently No audible pulses 2/2 to poor vascularity and poor perfusion in Shock state requiring Pressors and Inotropes  Vascular consulted, pt not a candidate for periph vasc intervention due to overall condition    CCU Fellow Addendum    - Mixed cardiogenic and distributive shock on admission  - Treatment of pneumonia with Vancomycin, Zosyn  - Weaned off of Levophed  - On inotropic support with Dobutamine at 5  - DIureses with Lasix gtt at 5. Net -400 tonight.  - Latest hemodynamics: CO 4.79 CI 2.26 SVR 1086 CVP 9-13, lactate 1.1  - TTE shows EF 20-25%  - Medical mgmt of NSTEMI with heparin gtt, ASA, Plavix  - Sedation vacation/CPAP trial in AM  - Vascular surgery f/u for PAD      Eric Dhaliwal MD  Cardiology Fellow  [phone]  All Cardiology service information can be found  on [email or web address], password: [employer]

## 2019-07-06 NOTE — CONSULT NOTE ADULT - ASSESSMENT
68 y/o M with a PMH of HTN, DM, PAD, s/p LLE fem-pop bypass with vein graft who is transferred to [facility] for management of cardiogenic shock and NSTEMI, vascular surgery consulted for no dopplerable signals    - Please order urgent LLE arterial duplex   - Continue Hep ggt, achieve therapeutic level by trending PTT  - Wean pressors as tolerated  - Care per primary team      Vacular Surgery, [id number]  JEMIMA Mckay, PGY2 70 y/o M with a PMH of HTN, DM, PAD, s/p LLE fem-pop bypass with vein graft who is transferred to [facility] for management of cardiogenic shock and NSTEMI, vascular surgery consulted for no dopplerable signals    - Please order urgent lower extremity arterial duplex   - Continue Hep ggt, achieve therapeutic level by trending PTT  - Wean pressors as tolerated  - Care per primary team      Vacular Surgery, [id number]  JEMIMA Mckay, PGY2

## 2019-07-06 NOTE — PROGRESS NOTE ADULT - SUBJECTIVE AND OBJECTIVE BOX
CHIEF COMPLAINT::    INTERVAL HISTORY:    REVIEW OF SYSTEMS: all others negative    MEDICATIONS  (STANDING):  aspirin  chewable 81 milliGRAM(s) Oral daily  chlorhexidine 2% Cloths 1 Application(s) Topical <User Schedule>  clopidogrel Tablet 75 milliGRAM(s) Oral daily  dextrose 5%. 1000 milliLiter(s) (50 mL/Hr) IV Continuous <Continuous>  dextrose 50% Injectable 12.5 Gram(s) IV Push once  dextrose 50% Injectable 25 Gram(s) IV Push once  dextrose 50% Injectable 25 Gram(s) IV Push once  DOBUTamine Infusion 5 MICROgram(s)/kG/Min (13.41 mL/Hr) IV Continuous <Continuous>  fentaNYL   Infusion. 0.5 MICROgram(s)/kG/Hr (2.235 mL/Hr) IV Continuous <Continuous>  heparin  Infusion. 1800 Unit(s)/Hr (18 mL/Hr) IV Continuous <Continuous>  insulin glargine Injectable (LANTUS) 10 Unit(s) SubCutaneous at bedtime  insulin lispro (HumaLOG) corrective regimen sliding scale   SubCutaneous three times a day before meals  insulin lispro (HumaLOG) corrective regimen sliding scale   SubCutaneous at bedtime  midazolam Infusion 0.02 mG/kG/Hr (1.788 mL/Hr) IV Continuous <Continuous>  norepinephrine Infusion 0.05 MICROgram(s)/kG/Min (8.381 mL/Hr) IV Continuous <Continuous>  pantoprazole  Injectable 40 milliGRAM(s) IV Push daily  petrolatum Ophthalmic Ointment 1 Application(s) Both EYES every 8 hours  piperacillin/tazobactam IVPB.. 3.375 Gram(s) IV Intermittent every 8 hours  vancomycin  IVPB      vancomycin  IVPB 1000 milliGRAM(s) IV Intermittent every 12 hours    MEDICATIONS  (PRN):  dextrose 40% Gel 15 Gram(s) Oral once PRN Blood Glucose LESS THAN 70 milliGRAM(s)/deciliter  glucagon  Injectable 1 milliGRAM(s) IntraMuscular once PRN Glucose LESS THAN 70 milligrams/deciliter  heparin  Injectable 5300 Unit(s) IV Push every 6 hours PRN For aPTT less than 40      Objective:  Vital Signs Last 24 Hrs  T(C): 38.7 (2019 05:00), Max: 38.7 (2019 05:00)  T(F): 101.7 (2019 05:00), Max: 101.7 (2019 05:00)  HR: 102 (2019 06:45) (98 - 112)  BP: 86/59 (2019 00:30) (70/54 - 117/75)  BP(mean): 69 (2019 00:30) (59 - 93)  RR: 24 (2019 06:45) (9 - 24)  SpO2: 100% (2019 06:45) (69% - 100%)  ICU Vital Signs Last 24 Hrs  T(C): 38.7 (2019 05:00), Max: 38.7 (2019 05:00)  T(F): 101.7 (2019 05:00), Max: 101.7 (2019 05:00)  HR: 102 (2019 06:45) (98 - 112)  BP: 86/59 (2019 00:30) (70/54 - 117/75)  BP(mean): 69 (2019 00:30) (59 - 93)  ABP: 96/56 (2019 06:45) (84/50 - 102/60)  ABP(mean): 68 (2019 06:45) (60 - 72)  RR: 24 (2019 06:45) (9 - 24)  SpO2: 100% (2019 06:45) (69% - 100%)    Mode: AC/ CMV (Assist Control/ Continuous Mandatory Ventilation)  RR (machine): 14  TV (machine): 500  FiO2: 40  PEEP: 5  ITime: 1  MAP: 10  PIP: 18    Adult Advanced Hemodynamics Last 24 Hrs  CVP(mm Hg): 8 (2019 06:45) (8 - 18)  CVP(cm H2O): --  CO: --  CI: --  PA: --  PA(mean): --  PCWP: --  SVR: --  SVRI: --  PVR: --  PVRI: --       @ 07:01  -   @ 07:00  --------------------------------------------------------  IN: 782.4 mL / OUT: 385 mL / NET: 397.4 mL      Daily Height in cm: 182.88 (2019 23:07)    Daily Weight in k.5 (2019 06:30)    PHYSICAL EXAM:      Constitutional:    Eyes:    ENMT:    Neck:    Breasts:    Back:    Respiratory:    Cardiovascular:    Gastrointestinal:    Genitourinary:    Rectal:    Extremities:    Vascular:    Neurological:    Skin:    Lymph Nodes:    Musculoskeletal:    Psychiatric:        TELEMETRY:     EKG:     CARDIAC CATH:     ECHO:    IMAGIN.0   11.8  )-----------( 225      ( 2019 05:32 )             31.7         137  |  106  |  42<H>  ----------------------------<  350<H>  4.4   |  19<L>  |  1.54<H>    Ca    8.6      2019 05:32  Phos  3.0       Mg     2.3         TPro  6.5  /  Alb  2.9<L>  /  TBili  0.5  /  DBili  x   /  AST  1071<H>  /  ALT  948<H>  /  AlkPhos  122<H>      LIVER FUNCTIONS - ( 2019 05:32 )  Alb: 2.9 g/dL / Pro: 6.5 g/dL / ALK PHOS: 122 U/L / ALT: 948 U/L / AST: 1071 U/L / GGT: x           PT/INR - ( 2019 23:46 )   PT: 15.6 sec;   INR: 1.36 ratio         PTT - ( 2019 23:46 )  PTT:51.0 sec  Creatine Kinase, Serum: 777 U/L ( @ 06:29)  CKMB Units: 29.8 ng/mL ( @ 06:29)  CKMB Units: 40.4 ng/mL ( @ 23:46)  Creatine Kinase, Serum: 959 U/L ( @ 23:46)    ABG - ( 2019 05:26 )  pH, Arterial: 7.43  pH, Blood: x     /  pCO2: 32    /  pO2: 86    / HCO3: 21    / Base Excess: -2.0  /  SaO2: 96                *BLOOD GAS/ARTERIAL/MIXED/VENOUS  *LACTATE  Urinalysis Basic - ( 2019 00:36 )    Color: Yellow / Appearance: Slightly Turbid / S.048 / pH: x  Gluc: x / Ketone: Negative  / Bili: Negative / Urobili: Negative   Blood: x / Protein: 100 / Nitrite: Negative   Leuk Esterase: Negative / RBC: 5 /hpf / WBC 22 /HPF   Sq Epi: x / Non Sq Epi: 11 / Bacteria: Negative        HEALTH ISSUES - PROBLEM Dx:        HEALTH ISSUES - R/O PROBLEM Dx:      DARRELL Odonnell NP   #[id number] CHIEF COMPLAINT::    INTERVAL HISTORY:  This is a 69 year old man with a past medical history of HTN, DM, PAD who is transferred to [facility] for management of cardiogenic shock and NSTEMI. As per sign out, the pt was admitted for L popliteal bypass to [facility]. During his time there, he was refusing medications  and was found to be progressively more dyspneic. He had an echo done there which showed that he has an EF of <15% with global hypokinesis; he received 20mg IV lasix for fluid overload. His respiratory status continued to worsen and the pt was intubated for airway protection. The pt vomited during this time and there was a concern for aspiration. The pt did not complain of CP during the time prior to intubation. As per nephew, the pt has not been complaining of CP, SOB prior to admission to the hospital and has been able to carry out his ADLs. As per the nephew, pt has not been compliant with his medications as an outpatient; the nephew knows that he had amlodipine prescribed but doesn't know what medications the patient takes for his diabetes.  As per chart from [facility], pt has been prescribed to take amlodipine 10 daily and metformin 500 BID at home. As per med rec from , pt has been taking lantus 20 mg daily at home in addition to metformin.  Patient being treated for sepsis now on Dobutamine and Lasix.  Vascular contacted for loss of lower extremity pulses in the setting of cardogenic/distributive shock.    REVIEW OF SYSTEMS: all others negative    MEDICATIONS  (STANDING):  aspirin  chewable 81 milliGRAM(s) Oral daily  chlorhexidine 2% Cloths 1 Application(s) Topical <User Schedule>  clopidogrel Tablet 75 milliGRAM(s) Oral daily  dextrose 5%. 1000 milliLiter(s) (50 mL/Hr) IV Continuous <Continuous>  dextrose 50% Injectable 12.5 Gram(s) IV Push once  dextrose 50% Injectable 25 Gram(s) IV Push once  dextrose 50% Injectable 25 Gram(s) IV Push once  DOBUTamine Infusion 5 MICROgram(s)/kG/Min (13.41 mL/Hr) IV Continuous <Continuous>  fentaNYL   Infusion. 0.5 MICROgram(s)/kG/Hr (2.235 mL/Hr) IV Continuous <Continuous>  heparin  Infusion. 1800 Unit(s)/Hr (18 mL/Hr) IV Continuous <Continuous>  insulin glargine Injectable (LANTUS) 10 Unit(s) SubCutaneous at bedtime  insulin lispro (HumaLOG) corrective regimen sliding scale   SubCutaneous three times a day before meals  insulin lispro (HumaLOG) corrective regimen sliding scale   SubCutaneous at bedtime  midazolam Infusion 0.02 mG/kG/Hr (1.788 mL/Hr) IV Continuous <Continuous>  norepinephrine Infusion 0.05 MICROgram(s)/kG/Min (8.381 mL/Hr) IV Continuous <Continuous>  pantoprazole  Injectable 40 milliGRAM(s) IV Push daily  petrolatum Ophthalmic Ointment 1 Application(s) Both EYES every 8 hours  piperacillin/tazobactam IVPB.. 3.375 Gram(s) IV Intermittent every 8 hours  vancomycin  IVPB      vancomycin  IVPB 1000 milliGRAM(s) IV Intermittent every 12 hours    MEDICATIONS  (PRN):  dextrose 40% Gel 15 Gram(s) Oral once PRN Blood Glucose LESS THAN 70 milliGRAM(s)/deciliter  glucagon  Injectable 1 milliGRAM(s) IntraMuscular once PRN Glucose LESS THAN 70 milligrams/deciliter  heparin  Injectable 5300 Unit(s) IV Push every 6 hours PRN For aPTT less than 40      Objective:  Vital Signs Last 24 Hrs  T(C): 38.7 (2019 05:00), Max: 38.7 (2019 05:00)  T(F): 101.7 (2019 05:00), Max: 101.7 (2019 05:00)  HR: 102 (2019 06:45) (98 - 112)  BP: 86/59 (2019 00:30) (70/54 - 117/75)  BP(mean): 69 (2019 00:30) (59 - 93)  RR: 24 (2019 06:45) (9 - 24)  SpO2: 100% (2019 06:45) (69% - 100%)  ICU Vital Signs Last 24 Hrs  T(C): 38.7 (2019 05:00), Max: 38.7 (2019 05:00)  T(F): 101.7 (2019 05:00), Max: 101.7 (2019 05:00)  HR: 102 (2019 06:45) (98 - 112)  BP: 86/59 (2019 00:30) (70/54 - 117/75)  BP(mean): 69 (2019 00:30) (59 - 93)  ABP: 96/56 (2019 06:45) (84/50 - 102/60)  ABP(mean): 68 (2019 06:45) (60 - 72)  RR: 24 (2019 06:45) (9 - 24)  SpO2: 100% (2019 06:45) (69% - 100%)    Mode: AC/ CMV (Assist Control/ Continuous Mandatory Ventilation)  RR (machine): 14  TV (machine): 500  FiO2: 40  PEEP: 5  ITime: 1  MAP: 10  PIP: 18    Adult Advanced Hemodynamics Last 24 Hrs  CVP(mm Hg): 8 (2019 06:45) (8 - 18)  CVP(cm H2O): --  CO: --  CI: --  PA: --  PA(mean): --  PCWP: --  SVR: --  SVRI: --  PVR: --  PVRI: --       @ 07:01  -  07- @ 07:00  --------------------------------------------------------  IN: 782.4 mL / OUT: 385 mL / NET: 397.4 mL      Daily Height in cm: 182.88 (2019 23:07)    Daily Weight in k.5 (2019 06:30)    PHYSICAL EXAM:      Constitutional:    Eyes:    ENMT:    Neck:    Breasts:    Back:    Respiratory:    Cardiovascular:    Gastrointestinal:    Genitourinary:    Rectal:    Extremities:    Vascular:    Neurological:    Skin:    Lymph Nodes:    Musculoskeletal:    Psychiatric:        TELEMETRY:     EKG:     CARDIAC CATH:     ECHO:    IMAGIN.0   11.8  )-----------( 225      ( 2019 05:32 )             31.7     07-    137  |  106  |  42<H>  ----------------------------<  350<H>  4.4   |  19<L>  |  1.54<H>    Ca    8.6      2019 05:32  Phos  3.0       Mg     2.3         TPro  6.5  /  Alb  2.9<L>  /  TBili  0.5  /  DBili  x   /  AST  1071<H>  /  ALT  948<H>  /  AlkPhos  122<H>      LIVER FUNCTIONS - ( 2019 05:32 )  Alb: 2.9 g/dL / Pro: 6.5 g/dL / ALK PHOS: 122 U/L / ALT: 948 U/L / AST: 1071 U/L / GGT: x           PT/INR - ( 2019 23:46 )   PT: 15.6 sec;   INR: 1.36 ratio         PTT - ( 2019 23:46 )  PTT:51.0 sec  Creatine Kinase, Serum: 777 U/L ( @ 06:29)  CKMB Units: 29.8 ng/mL ( @ 06:29)  CKMB Units: 40.4 ng/mL ( @ 23:46)  Creatine Kinase, Serum: 959 U/L ( @ 23:46)    ABG - ( 2019 05:26 )  pH, Arterial: 7.43  pH, Blood: x     /  pCO2: 32    /  pO2: 86    / HCO3: 21    / Base Excess: -2.0  /  SaO2: 96                *BLOOD GAS/ARTERIAL/MIXED/VENOUS  *LACTATE  Urinalysis Basic - ( 2019 00:36 )    Color: Yellow / Appearance: Slightly Turbid / S.048 / pH: x  Gluc: x / Ketone: Negative  / Bili: Negative / Urobili: Negative   Blood: x / Protein: 100 / Nitrite: Negative   Leuk Esterase: Negative / RBC: 5 /hpf / WBC 22 /HPF   Sq Epi: x / Non Sq Epi: 11 / Bacteria: Negative        HEALTH ISSUES - PROBLEM Dx:        HEALTH ISSUES - R/O PROBLEM Dx:      Torin Muñoz, CCU NP   #[id number] CHIEF COMPLAINT: NSTEMI    INTERVAL HISTORY:  This is a 69 year old man with a past medical history of HTN, DM, PAD who is transferred to [facility] for management of cardiogenic shock and NSTEMI. As per sign out, the pt was admitted for L popliteal bypass to [facility]. During his time there, he was refusing medications  and was found to be progressively more dyspneic. He had an echo done there which showed that he has an EF of <15% with global hypokinesis; he received 20mg IV lasix for fluid overload. His respiratory status continued to worsen and the pt was intubated for airway protection. The pt vomited during this time and there was a concern for aspiration. The pt did not complain of CP during the time prior to intubation. As per nephew, the pt has not been complaining of CP, SOB prior to admission to the hospital and has been able to carry out his ADLs. As per the nephew, pt has not been compliant with his medications as an outpatient; the nephew knows that he had amlodipine prescribed but doesn't know what medications the patient takes for his diabetes.  As per chart from [facility], pt has been prescribed to take amlodipine 10 daily and metformin 500 BID at home. As per med rec from , pt has been taking lantus 20 mg daily at home in addition to metformin.  Patient being treated for sepsis now on Dobutamine and Lasix.  Vascular contacted for loss of lower extremity pulses in the setting of cardogenic/distributive shock.    REVIEW OF SYSTEMS: Unable to assess intubated Vented and Sedated    MEDICATIONS  (STANDING):  aspirin  chewable 81 milliGRAM(s) Oral daily  chlorhexidine 2% Cloths 1 Application(s) Topical <User Schedule>  clopidogrel Tablet 75 milliGRAM(s) Oral daily  dextrose 5%. 1000 milliLiter(s) (50 mL/Hr) IV Continuous <Continuous>  dextrose 50% Injectable 12.5 Gram(s) IV Push once  dextrose 50% Injectable 25 Gram(s) IV Push once  dextrose 50% Injectable 25 Gram(s) IV Push once  DOBUTamine Infusion 5 MICROgram(s)/kG/Min (13.41 mL/Hr) IV Continuous <Continuous>  fentaNYL   Infusion. 0.5 MICROgram(s)/kG/Hr (2.235 mL/Hr) IV Continuous <Continuous>  heparin  Infusion. 1800 Unit(s)/Hr (18 mL/Hr) IV Continuous <Continuous>  insulin glargine Injectable (LANTUS) 10 Unit(s) SubCutaneous at bedtime  insulin lispro (HumaLOG) corrective regimen sliding scale   SubCutaneous three times a day before meals  insulin lispro (HumaLOG) corrective regimen sliding scale   SubCutaneous at bedtime  midazolam Infusion 0.02 mG/kG/Hr (1.788 mL/Hr) IV Continuous <Continuous>  norepinephrine Infusion 0.05 MICROgram(s)/kG/Min (8.381 mL/Hr) IV Continuous <Continuous>  pantoprazole  Injectable 40 milliGRAM(s) IV Push daily  petrolatum Ophthalmic Ointment 1 Application(s) Both EYES every 8 hours  piperacillin/tazobactam IVPB.. 3.375 Gram(s) IV Intermittent every 8 hours  vancomycin  IVPB      vancomycin  IVPB 1000 milliGRAM(s) IV Intermittent every 12 hours    MEDICATIONS  (PRN):  dextrose 40% Gel 15 Gram(s) Oral once PRN Blood Glucose LESS THAN 70 milliGRAM(s)/deciliter  glucagon  Injectable 1 milliGRAM(s) IntraMuscular once PRN Glucose LESS THAN 70 milligrams/deciliter  heparin  Injectable 5300 Unit(s) IV Push every 6 hours PRN For aPTT less than 40      Objective:  Vital Signs Last 24 Hrs  T(C): 38.7 (2019 05:00), Max: 38.7 (2019 05:00)  T(F): 101.7 (2019 05:00), Max: 101.7 (2019 05:00)  HR: 102 (2019 06:45) (98 - 112)  BP: 86/59 (2019 00:30) (70/54 - 117/75)  BP(mean): 69 (2019 00:30) (59 - 93)  RR: 24 (2019 06:45) (9 - 24)  SpO2: 100% (2019 06:45) (69% - 100%)  ICU Vital Signs Last 24 Hrs  T(C): 38.7 (2019 05:00), Max: 38.7 (2019 05:00)  T(F): 101.7 (2019 05:00), Max: 101.7 (2019 05:00)  HR: 102 (2019 06:45) (98 - 112)  BP: 86/59 (2019 00:30) (70/54 - 117/75)  BP(mean): 69 (2019 00:30) (59 - 93)  ABP: 96/56 (2019 06:45) (84/50 - 102/60)  ABP(mean): 68 (2019 06:45) (60 - 72)  RR: 24 (2019 06:45) (9 - 24)  SpO2: 100% (2019 06:45) (69% - 100%)    Mode: AC/ CMV (Assist Control/ Continuous Mandatory Ventilation)  RR (machine): 14  TV (machine): 500  FiO2: 40  PEEP: 5  ITime: 1  MAP: 10  PIP: 18    Adult Advanced Hemodynamics Last 24 Hrs  CVP(mm Hg): 8 (2019 06:45) (8 - 18)  CVP(cm H2O): --  CO: --  CI: --  PA: --  PA(mean): --  PCWP: --  SVR: --  SVRI: --  PVR: --  PVRI: --      - @ 07:01  -  -06 @ 07:00  --------------------------------------------------------  IN: 782.4 mL / OUT: 385 mL / NET: 397.4 mL      Daily Height in cm: 182.88 (2019 23:07)    Daily Weight in k.5 (2019 06:30)    PHYSICAL EXAM:      GENERAL: Intubated and sedated  HEAD:  Normocephalic, atraumatic  HEENT: Moist mucous membranes  NECK: Supple, No JVD  NERVOUS SYSTEM:  pt sedated and unresponsive  CHEST/LUNG: course BS b/l  HEART: Regular rate and rhythm, no murmur   ABDOMEN: Soft, Nontender, Nondistended, Bowel sounds present  EXTREMITIES:   No PT or PD Pulses bilateral feet, s/p LLE Fem Pop bypass to staples  MUSCULOSKELTAL- No muscle tenderness, no joint tenderness  SKIN- Mottled skin to bilateral LE        TELEMETRY: SR/ST    EKG:     CARDIAC CATH:     ECHO:    IMAGIN.0   11.8  )-----------( 225      ( 2019 05:32 )             31.7         137  |  106  |  42<H>  ----------------------------<  350<H>  4.4   |  19<L>  |  1.54<H>    Ca    8.6      2019 05:32  Phos  3.0       Mg     2.3         TPro  6.5  /  Alb  2.9<L>  /  TBili  0.5  /  DBili  x   /  AST  1071<H>  /  ALT  948<H>  /  AlkPhos  122<H>      LIVER FUNCTIONS - ( 2019 05:32 )  Alb: 2.9 g/dL / Pro: 6.5 g/dL / ALK PHOS: 122 U/L / ALT: 948 U/L / AST: 1071 U/L / GGT: x           PT/INR - ( 2019 23:46 )   PT: 15.6 sec;   INR: 1.36 ratio         PTT - ( 2019 23:46 )  PTT:51.0 sec  Creatine Kinase, Serum: 777 U/L ( @ 06:29)  CKMB Units: 29.8 ng/mL ( @ 06:29)  CKMB Units: 40.4 ng/mL ( @ 23:46)  Creatine Kinase, Serum: 959 U/L ( @ 23:46)    ABG - ( 2019 05:26 )  pH, Arterial: 7.43  pH, Blood: x     /  pCO2: 32    /  pO2: 86    / HCO3: 21    / Base Excess: -2.0  /  SaO2: 96                *BLOOD GAS/ARTERIAL/MIXED/VENOUS  *LACTATE  Urinalysis Basic - ( 2019 00:36 )    Color: Yellow / Appearance: Slightly Turbid / S.048 / pH: x  Gluc: x / Ketone: Negative  / Bili: Negative / Urobili: Negative   Blood: x / Protein: 100 / Nitrite: Negative   Leuk Esterase: Negative / RBC: 5 /hpf / WBC 22 /HPF   Sq Epi: x / Non Sq Epi: 11 / Bacteria: Negative        HEALTH ISSUES - PROBLEM Dx:        HEALTH ISSUES - R/O PROBLEM Dx:      Torin Muñoz, CCU NP   #[id number]

## 2019-07-06 NOTE — PROGRESS NOTE ADULT - PROBLEM SELECTOR PLAN 3
s/p LLE Fem-Pop Bypass at [facility] 7/3 reportedly loss palpable pulse post procedure regained biphasic doppler pulse with Heparin Infusion  Currently No audible pulses 2/2 to poor vascularity and poor perfusion in Shock state requiring Pressors and Inotropes  Vascular consulted.

## 2019-07-06 NOTE — PROGRESS NOTE ADULT - ASSESSMENT
This is a 69 year old man with a past medical history of HTN, DM, PAD who is transferred to [facility] for management of cardiogenic shock and NSTEMI. As per sign out, the pt was admitted for L popliteal bypass to [facility]. During his time there, he was refusing medications  and was found to be progressively more dyspneic. He had an echo done there which showed that he has an EF of <15% with global hypokinesis; he received 20mg IV lasix for fluid overload. His respiratory status continued to worsen and the pt was intubated for airway protection. The pt vomited during this time and there was a concern for aspiration. The pt did not complain of CP during the time prior to intubation. As per nephew, the pt has not been complaining of CP, SOB prior to admission to the hospital and has been able to carry out his ADLs. As per the nephew, pt has not been compliant with his medications as an outpatient; the nephew knows that he had amlodipine prescribed but doesn't know what medications the patient takes for his diabetes.  As per chart from [facility], pt has been prescribed to take amlodipine 10 daily and metformin 500 BID at home. As per med rec from 2015, pt has been taking lantus 20 mg daily at home in addition to metformin.  Patient being treated for sepsis now on Dobutamine and Lasix.  Vascular contacted for loss of lower extremity pulses in the setting of cardogenic/distributive shock.

## 2019-07-06 NOTE — PROGRESS NOTE ADULT - PROBLEM SELECTOR PLAN 1
Cardiogenic Shock 2/2 to NSTEMI and Distributative Shock 2/2 to ASP PNA  reported EF<15% and end-organ organ damage with hemodynamic instability  -c/w dobutamine 5 and levo for MAP >65mmHg  -Consider swan placement for hemodynamic monitoring  -Monitor I/Os

## 2019-07-07 DIAGNOSIS — J69.0 PNEUMONITIS DUE TO INHALATION OF FOOD AND VOMIT: ICD-10-CM

## 2019-07-07 LAB
ALBUMIN SERPL ELPH-MCNC: 2.8 G/DL — LOW (ref 3.3–5)
ALBUMIN SERPL ELPH-MCNC: 2.8 G/DL — LOW (ref 3.3–5)
ALBUMIN SERPL ELPH-MCNC: 2.9 G/DL — LOW (ref 3.3–5)
ALP SERPL-CCNC: 109 U/L — SIGNIFICANT CHANGE UP (ref 40–120)
ALP SERPL-CCNC: 138 U/L — HIGH (ref 40–120)
ALP SERPL-CCNC: 169 U/L — HIGH (ref 40–120)
ALT FLD-CCNC: 517 U/L — HIGH (ref 10–45)
ALT FLD-CCNC: 566 U/L — HIGH (ref 10–45)
ALT FLD-CCNC: 631 U/L — HIGH (ref 10–45)
ALT FLD-CCNC: 715 U/L — HIGH (ref 10–45)
ANION GAP SERPL CALC-SCNC: 12 MMOL/L — SIGNIFICANT CHANGE UP (ref 5–17)
ANION GAP SERPL CALC-SCNC: 13 MMOL/L — SIGNIFICANT CHANGE UP (ref 5–17)
ANION GAP SERPL CALC-SCNC: 13 MMOL/L — SIGNIFICANT CHANGE UP (ref 5–17)
APTT BLD: 60.2 SEC — HIGH (ref 27.5–36.3)
AST SERPL-CCNC: 152 U/L — HIGH (ref 10–40)
AST SERPL-CCNC: 169 U/L — HIGH (ref 10–40)
AST SERPL-CCNC: 232 U/L — HIGH (ref 10–40)
BASE EXCESS BLDA CALC-SCNC: 0.4 MMOL/L — SIGNIFICANT CHANGE UP (ref -2–2)
BASE EXCESS BLDA CALC-SCNC: 0.6 MMOL/L — SIGNIFICANT CHANGE UP (ref -2–2)
BASE EXCESS BLDMV CALC-SCNC: -0.2 MMOL/L — SIGNIFICANT CHANGE UP (ref -3–3)
BASE EXCESS BLDMV CALC-SCNC: 2.1 MMOL/L — SIGNIFICANT CHANGE UP (ref -3–3)
BASE EXCESS BLDMV CALC-SCNC: 2.3 MMOL/L — SIGNIFICANT CHANGE UP (ref -3–3)
BASOPHILS # BLD AUTO: 0 K/UL — SIGNIFICANT CHANGE UP (ref 0–0.2)
BASOPHILS NFR BLD AUTO: 0.3 % — SIGNIFICANT CHANGE UP (ref 0–2)
BILIRUB SERPL-MCNC: 0.7 MG/DL — SIGNIFICANT CHANGE UP (ref 0.2–1.2)
BUN SERPL-MCNC: 37 MG/DL — HIGH (ref 7–23)
BUN SERPL-MCNC: 39 MG/DL — HIGH (ref 7–23)
BUN SERPL-MCNC: 42 MG/DL — HIGH (ref 7–23)
CALCIUM SERPL-MCNC: 7.9 MG/DL — LOW (ref 8.4–10.5)
CALCIUM SERPL-MCNC: 8 MG/DL — LOW (ref 8.4–10.5)
CALCIUM SERPL-MCNC: 8.2 MG/DL — LOW (ref 8.4–10.5)
CHLORIDE SERPL-SCNC: 100 MMOL/L — SIGNIFICANT CHANGE UP (ref 96–108)
CHLORIDE SERPL-SCNC: 101 MMOL/L — SIGNIFICANT CHANGE UP (ref 96–108)
CHLORIDE SERPL-SCNC: 104 MMOL/L — SIGNIFICANT CHANGE UP (ref 96–108)
CK MB BLD-MCNC: 3.5 % — SIGNIFICANT CHANGE UP (ref 0–3.5)
CK MB BLD-MCNC: 3.8 % — HIGH (ref 0–3.5)
CK MB BLD-MCNC: 4.7 % — HIGH (ref 0–3.5)
CK MB CFR SERPL CALC: 10.6 NG/ML — HIGH (ref 0–6.7)
CK MB CFR SERPL CALC: 16.7 NG/ML — HIGH (ref 0–6.7)
CK MB CFR SERPL CALC: 9.6 NG/ML — HIGH (ref 0–6.7)
CK SERPL-CCNC: 273 U/L — HIGH (ref 30–200)
CK SERPL-CCNC: 282 U/L — HIGH (ref 30–200)
CK SERPL-CCNC: 356 U/L — HIGH (ref 30–200)
CO2 BLDA-SCNC: 24 MMOL/L — SIGNIFICANT CHANGE UP (ref 22–30)
CO2 BLDA-SCNC: 25 MMOL/L — SIGNIFICANT CHANGE UP (ref 22–30)
CO2 BLDMV-SCNC: 26 MMOL/L — SIGNIFICANT CHANGE UP (ref 21–29)
CO2 BLDMV-SCNC: 28 MMOL/L — SIGNIFICANT CHANGE UP (ref 21–29)
CO2 BLDMV-SCNC: 28 MMOL/L — SIGNIFICANT CHANGE UP (ref 21–29)
CO2 SERPL-SCNC: 21 MMOL/L — LOW (ref 22–31)
CO2 SERPL-SCNC: 22 MMOL/L — SIGNIFICANT CHANGE UP (ref 22–31)
CO2 SERPL-SCNC: 23 MMOL/L — SIGNIFICANT CHANGE UP (ref 22–31)
CREAT SERPL-MCNC: 1.26 MG/DL — SIGNIFICANT CHANGE UP (ref 0.5–1.3)
CREAT SERPL-MCNC: 1.27 MG/DL — SIGNIFICANT CHANGE UP (ref 0.5–1.3)
CREAT SERPL-MCNC: 1.39 MG/DL — HIGH (ref 0.5–1.3)
EOSINOPHIL # BLD AUTO: 0 K/UL — SIGNIFICANT CHANGE UP (ref 0–0.5)
EOSINOPHIL NFR BLD AUTO: 0.3 % — SIGNIFICANT CHANGE UP (ref 0–6)
GAS PNL BLDA: SIGNIFICANT CHANGE UP
GAS PNL BLDMV: SIGNIFICANT CHANGE UP
GLUCOSE BLDC GLUCOMTR-MCNC: 238 MG/DL — HIGH (ref 70–99)
GLUCOSE BLDC GLUCOMTR-MCNC: 254 MG/DL — HIGH (ref 70–99)
GLUCOSE BLDC GLUCOMTR-MCNC: 285 MG/DL — HIGH (ref 70–99)
GLUCOSE BLDC GLUCOMTR-MCNC: 298 MG/DL — HIGH (ref 70–99)
GLUCOSE SERPL-MCNC: 225 MG/DL — HIGH (ref 70–99)
GLUCOSE SERPL-MCNC: 251 MG/DL — HIGH (ref 70–99)
GLUCOSE SERPL-MCNC: 287 MG/DL — HIGH (ref 70–99)
HCO3 BLDA-SCNC: 23 MMOL/L — SIGNIFICANT CHANGE UP (ref 21–29)
HCO3 BLDA-SCNC: 24 MMOL/L — SIGNIFICANT CHANGE UP (ref 21–29)
HCO3 BLDMV-SCNC: 25 MMOL/L — SIGNIFICANT CHANGE UP (ref 20–28)
HCO3 BLDMV-SCNC: 26 MMOL/L — SIGNIFICANT CHANGE UP (ref 20–28)
HCO3 BLDMV-SCNC: 27 MMOL/L — SIGNIFICANT CHANGE UP (ref 20–28)
HCT VFR BLD CALC: 29.5 % — LOW (ref 39–50)
HCT VFR BLD CALC: 29.8 % — LOW (ref 39–50)
HCT VFR BLD CALC: 30.8 % — LOW (ref 39–50)
HGB BLD-MCNC: 10.2 G/DL — LOW (ref 13–17)
HGB BLD-MCNC: 9.8 G/DL — LOW (ref 13–17)
HGB BLD-MCNC: 9.9 G/DL — LOW (ref 13–17)
HOROWITZ INDEX BLDA+IHG-RTO: 35 — SIGNIFICANT CHANGE UP
HOROWITZ INDEX BLDA+IHG-RTO: 35 — SIGNIFICANT CHANGE UP
HOROWITZ INDEX BLDMV+IHG-RTO: 35 — SIGNIFICANT CHANGE UP
HOROWITZ INDEX BLDMV+IHG-RTO: 35 — SIGNIFICANT CHANGE UP
HOROWITZ INDEX BLDMV+IHG-RTO: 40 — SIGNIFICANT CHANGE UP
INR BLD: 1.4 RATIO — HIGH (ref 0.88–1.16)
LACTATE SERPL-SCNC: 1.3 MMOL/L — SIGNIFICANT CHANGE UP (ref 0.7–2)
LYMPHOCYTES # BLD AUTO: 1.3 K/UL — SIGNIFICANT CHANGE UP (ref 1–3.3)
LYMPHOCYTES # BLD AUTO: 11.6 % — LOW (ref 13–44)
MAGNESIUM SERPL-MCNC: 2.2 MG/DL — SIGNIFICANT CHANGE UP (ref 1.6–2.6)
MAGNESIUM SERPL-MCNC: 2.2 MG/DL — SIGNIFICANT CHANGE UP (ref 1.6–2.6)
MCHC RBC-ENTMCNC: 31.4 PG — SIGNIFICANT CHANGE UP (ref 27–34)
MCHC RBC-ENTMCNC: 31.4 PG — SIGNIFICANT CHANGE UP (ref 27–34)
MCHC RBC-ENTMCNC: 31.6 PG — SIGNIFICANT CHANGE UP (ref 27–34)
MCHC RBC-ENTMCNC: 33.1 GM/DL — SIGNIFICANT CHANGE UP (ref 32–36)
MCHC RBC-ENTMCNC: 33.1 GM/DL — SIGNIFICANT CHANGE UP (ref 32–36)
MCHC RBC-ENTMCNC: 33.2 GM/DL — SIGNIFICANT CHANGE UP (ref 32–36)
MCV RBC AUTO: 94.6 FL — SIGNIFICANT CHANGE UP (ref 80–100)
MCV RBC AUTO: 94.7 FL — SIGNIFICANT CHANGE UP (ref 80–100)
MCV RBC AUTO: 95.3 FL — SIGNIFICANT CHANGE UP (ref 80–100)
MONOCYTES # BLD AUTO: 1.2 K/UL — HIGH (ref 0–0.9)
MONOCYTES NFR BLD AUTO: 10.6 % — SIGNIFICANT CHANGE UP (ref 2–14)
NEUTROPHILS # BLD AUTO: 8.6 K/UL — HIGH (ref 1.8–7.4)
NEUTROPHILS NFR BLD AUTO: 77.3 % — HIGH (ref 43–77)
O2 CT VFR BLD CALC: 28 MMHG — LOW (ref 30–65)
O2 CT VFR BLD CALC: 31 MMHG — SIGNIFICANT CHANGE UP (ref 30–65)
O2 CT VFR BLD CALC: 32 MMHG — SIGNIFICANT CHANGE UP (ref 30–65)
PCO2 BLDA: 30 MMHG — LOW (ref 32–46)
PCO2 BLDA: 35 MMHG — SIGNIFICANT CHANGE UP (ref 32–46)
PCO2 BLDMV: 42 MMHG — SIGNIFICANT CHANGE UP (ref 30–65)
PCO2 BLDMV: 44 MMHG — SIGNIFICANT CHANGE UP (ref 30–65)
PCO2 BLDMV: 47 MMHG — SIGNIFICANT CHANGE UP (ref 30–65)
PH BLDA: 7.45 — SIGNIFICANT CHANGE UP (ref 7.35–7.45)
PH BLDA: 7.49 — HIGH (ref 7.35–7.45)
PH BLDMV: 7.35 — SIGNIFICANT CHANGE UP (ref 7.32–7.45)
PH BLDMV: 7.4 — SIGNIFICANT CHANGE UP (ref 7.32–7.45)
PH BLDMV: 7.41 — SIGNIFICANT CHANGE UP (ref 7.32–7.45)
PHOSPHATE SERPL-MCNC: 2 MG/DL — LOW (ref 2.5–4.5)
PHOSPHATE SERPL-MCNC: 3.3 MG/DL — SIGNIFICANT CHANGE UP (ref 2.5–4.5)
PLATELET # BLD AUTO: 197 K/UL — SIGNIFICANT CHANGE UP (ref 150–400)
PLATELET # BLD AUTO: 200 K/UL — SIGNIFICANT CHANGE UP (ref 150–400)
PLATELET # BLD AUTO: 204 K/UL — SIGNIFICANT CHANGE UP (ref 150–400)
PO2 BLDA: 106 MMHG — SIGNIFICANT CHANGE UP (ref 74–108)
PO2 BLDA: 115 MMHG — HIGH (ref 74–108)
POTASSIUM SERPL-MCNC: 3.6 MMOL/L — SIGNIFICANT CHANGE UP (ref 3.5–5.3)
POTASSIUM SERPL-MCNC: 3.8 MMOL/L — SIGNIFICANT CHANGE UP (ref 3.5–5.3)
POTASSIUM SERPL-MCNC: 4.3 MMOL/L — SIGNIFICANT CHANGE UP (ref 3.5–5.3)
POTASSIUM SERPL-SCNC: 3.6 MMOL/L — SIGNIFICANT CHANGE UP (ref 3.5–5.3)
POTASSIUM SERPL-SCNC: 3.8 MMOL/L — SIGNIFICANT CHANGE UP (ref 3.5–5.3)
POTASSIUM SERPL-SCNC: 4.3 MMOL/L — SIGNIFICANT CHANGE UP (ref 3.5–5.3)
PROCALCITONIN SERPL-MCNC: 4.76 NG/ML — HIGH (ref 0.02–0.1)
PROT SERPL-MCNC: 6.3 G/DL — SIGNIFICANT CHANGE UP (ref 6–8.3)
PROT SERPL-MCNC: 6.3 G/DL — SIGNIFICANT CHANGE UP (ref 6–8.3)
PROT SERPL-MCNC: 6.4 G/DL — SIGNIFICANT CHANGE UP (ref 6–8.3)
PROTHROM AB SERPL-ACNC: 16.3 SEC — HIGH (ref 10–12.9)
RBC # BLD: 3.12 M/UL — LOW (ref 4.2–5.8)
RBC # BLD: 3.15 M/UL — LOW (ref 4.2–5.8)
RBC # BLD: 3.23 M/UL — LOW (ref 4.2–5.8)
RBC # FLD: 12 % — SIGNIFICANT CHANGE UP (ref 10.3–14.5)
RBC # FLD: 12 % — SIGNIFICANT CHANGE UP (ref 10.3–14.5)
RBC # FLD: 12.2 % — SIGNIFICANT CHANGE UP (ref 10.3–14.5)
SAO2 % BLDA: 98 % — HIGH (ref 92–96)
SAO2 % BLDA: 99 % — HIGH (ref 92–96)
SAO2 % BLDMV: 44 % — LOW (ref 60–90)
SAO2 % BLDMV: 52 % — LOW (ref 60–90)
SAO2 % BLDMV: 56 % — LOW (ref 60–90)
SODIUM SERPL-SCNC: 135 MMOL/L — SIGNIFICANT CHANGE UP (ref 135–145)
SODIUM SERPL-SCNC: 137 MMOL/L — SIGNIFICANT CHANGE UP (ref 135–145)
SODIUM SERPL-SCNC: 137 MMOL/L — SIGNIFICANT CHANGE UP (ref 135–145)
TROPONIN T, HIGH SENSITIVITY RESULT: 2605 NG/L — HIGH (ref 0–51)
TROPONIN T, HIGH SENSITIVITY RESULT: 2617 NG/L — HIGH (ref 0–51)
TROPONIN T, HIGH SENSITIVITY RESULT: 2899 NG/L — HIGH (ref 0–51)
VANCOMYCIN TROUGH SERPL-MCNC: 12.3 UG/ML — SIGNIFICANT CHANGE UP (ref 10–20)
WBC # BLD: 11.1 K/UL — HIGH (ref 3.8–10.5)
WBC # BLD: 12.5 K/UL — HIGH (ref 3.8–10.5)
WBC # BLD: 15.1 K/UL — HIGH (ref 3.8–10.5)
WBC # FLD AUTO: 11.1 K/UL — HIGH (ref 3.8–10.5)
WBC # FLD AUTO: 12.5 K/UL — HIGH (ref 3.8–10.5)
WBC # FLD AUTO: 15.1 K/UL — HIGH (ref 3.8–10.5)

## 2019-07-07 PROCEDURE — 71045 X-RAY EXAM CHEST 1 VIEW: CPT | Mod: 26

## 2019-07-07 PROCEDURE — 99233 SBSQ HOSP IP/OBS HIGH 50: CPT

## 2019-07-07 RX ORDER — POTASSIUM CHLORIDE 20 MEQ
40 PACKET (EA) ORAL ONCE
Refills: 0 | Status: COMPLETED | OUTPATIENT
Start: 2019-07-07 | End: 2019-07-07

## 2019-07-07 RX ORDER — DOBUTAMINE HCL 250MG/20ML
2.5 VIAL (ML) INTRAVENOUS
Qty: 500 | Refills: 0 | Status: DISCONTINUED | OUTPATIENT
Start: 2019-07-07 | End: 2019-07-07

## 2019-07-07 RX ORDER — FUROSEMIDE 40 MG
10 TABLET ORAL
Qty: 500 | Refills: 0 | Status: DISCONTINUED | OUTPATIENT
Start: 2019-07-07 | End: 2019-07-08

## 2019-07-07 RX ORDER — DEXMEDETOMIDINE HYDROCHLORIDE IN 0.9% SODIUM CHLORIDE 4 UG/ML
0.2 INJECTION INTRAVENOUS
Qty: 200 | Refills: 0 | Status: DISCONTINUED | OUTPATIENT
Start: 2019-07-07 | End: 2019-07-08

## 2019-07-07 RX ORDER — POTASSIUM CHLORIDE 20 MEQ
40 PACKET (EA) ORAL ONCE
Refills: 0 | Status: DISCONTINUED | OUTPATIENT
Start: 2019-07-07 | End: 2019-07-07

## 2019-07-07 RX ADMIN — FENTANYL CITRATE 2.23 MICROGRAM(S)/KG/HR: 50 INJECTION INTRAVENOUS at 04:37

## 2019-07-07 RX ADMIN — PIPERACILLIN AND TAZOBACTAM 25 GRAM(S): 4; .5 INJECTION, POWDER, LYOPHILIZED, FOR SOLUTION INTRAVENOUS at 01:35

## 2019-07-07 RX ADMIN — FENTANYL CITRATE 2.23 MICROGRAM(S)/KG/HR: 50 INJECTION INTRAVENOUS at 10:56

## 2019-07-07 RX ADMIN — Medication 81 MILLIGRAM(S): at 11:36

## 2019-07-07 RX ADMIN — PANTOPRAZOLE SODIUM 40 MILLIGRAM(S): 20 TABLET, DELAYED RELEASE ORAL at 11:35

## 2019-07-07 RX ADMIN — Medication 6.71 MICROGRAM(S)/KG/MIN: at 09:52

## 2019-07-07 RX ADMIN — Medication 250 MILLIGRAM(S): at 06:10

## 2019-07-07 RX ADMIN — Medication 13.41 MICROGRAM(S)/KG/MIN: at 01:02

## 2019-07-07 RX ADMIN — Medication 1 APPLICATION(S): at 21:39

## 2019-07-07 RX ADMIN — CHLORHEXIDINE GLUCONATE 15 MILLILITER(S): 213 SOLUTION TOPICAL at 05:21

## 2019-07-07 RX ADMIN — HEPARIN SODIUM 1800 UNIT(S)/HR: 5000 INJECTION INTRAVENOUS; SUBCUTANEOUS at 05:20

## 2019-07-07 RX ADMIN — PIPERACILLIN AND TAZOBACTAM 25 GRAM(S): 4; .5 INJECTION, POWDER, LYOPHILIZED, FOR SOLUTION INTRAVENOUS at 17:21

## 2019-07-07 RX ADMIN — CLOPIDOGREL BISULFATE 75 MILLIGRAM(S): 75 TABLET, FILM COATED ORAL at 11:36

## 2019-07-07 RX ADMIN — Medication 4: at 05:21

## 2019-07-07 RX ADMIN — Medication 6: at 21:36

## 2019-07-07 RX ADMIN — Medication 6: at 17:29

## 2019-07-07 RX ADMIN — DEXMEDETOMIDINE HYDROCHLORIDE IN 0.9% SODIUM CHLORIDE 4.47 MICROGRAM(S)/KG/HR: 4 INJECTION INTRAVENOUS at 21:02

## 2019-07-07 RX ADMIN — CHLORHEXIDINE GLUCONATE 15 MILLILITER(S): 213 SOLUTION TOPICAL at 17:21

## 2019-07-07 RX ADMIN — Medication 250 MILLIGRAM(S): at 20:52

## 2019-07-07 RX ADMIN — Medication 1 APPLICATION(S): at 13:41

## 2019-07-07 RX ADMIN — FENTANYL CITRATE 2.23 MICROGRAM(S)/KG/HR: 50 INJECTION INTRAVENOUS at 19:34

## 2019-07-07 RX ADMIN — Medication 6: at 11:35

## 2019-07-07 RX ADMIN — INSULIN GLARGINE 15 UNIT(S): 100 INJECTION, SOLUTION SUBCUTANEOUS at 21:36

## 2019-07-07 RX ADMIN — PIPERACILLIN AND TAZOBACTAM 25 GRAM(S): 4; .5 INJECTION, POWDER, LYOPHILIZED, FOR SOLUTION INTRAVENOUS at 09:46

## 2019-07-07 RX ADMIN — Medication 5 MG/HR: at 09:52

## 2019-07-07 RX ADMIN — Medication 40 MILLIEQUIVALENT(S): at 16:36

## 2019-07-07 RX ADMIN — Medication 1 APPLICATION(S): at 05:21

## 2019-07-07 NOTE — PROGRESS NOTE ADULT - PROBLEM SELECTOR PLAN 1
Mixed Cardiogenic 2/2 to NSTEMI and Distributative 2/2 to ASP PNA  Lactate 1.2 MVo2 56 CVP 7-12 and PAP 40s/20s CO/CI 4.7/2.2 SVR 1038  Levophed weaned to off  Continue Dobutamine 5  Strict I+Os

## 2019-07-07 NOTE — PROGRESS NOTE ADULT - SUBJECTIVE AND OBJECTIVE BOX
====================  CCU MIDNIGHT ROUNDS  ====================    JIM PAREDES  [id number]    Patient is a 69y old  Male who presents with a chief complaint of NSTEMI (07 Jul 2019 07:05)    ====================  SUMMARY:  ====================    ====================  NEW EVENTS:  ====================    MEDICATIONS  (STANDING):  aspirin  chewable 81 milliGRAM(s) Oral daily  chlorhexidine 0.12% Liquid 15 milliLiter(s) Oral Mucosa two times a day  chlorhexidine 2% Cloths 1 Application(s) Topical <User Schedule>  clopidogrel Tablet 75 milliGRAM(s) Oral daily  dextrose 5%. 1000 milliLiter(s) (50 mL/Hr) IV Continuous <Continuous>  dextrose 50% Injectable 12.5 Gram(s) IV Push once  dextrose 50% Injectable 25 Gram(s) IV Push once  dextrose 50% Injectable 25 Gram(s) IV Push once  fentaNYL   Infusion. 0.5 MICROgram(s)/kG/Hr (2.235 mL/Hr) IV Continuous <Continuous>  furosemide Infusion 10 mG/Hr (5 mL/Hr) IV Continuous <Continuous>  heparin  Infusion. 1800 Unit(s)/Hr (18 mL/Hr) IV Continuous <Continuous>  insulin glargine Injectable (LANTUS) 15 Unit(s) SubCutaneous at bedtime  insulin lispro (HumaLOG) corrective regimen sliding scale   SubCutaneous every 6 hours  pantoprazole  Injectable 40 milliGRAM(s) IV Push daily  petrolatum Ophthalmic Ointment 1 Application(s) Both EYES every 8 hours  piperacillin/tazobactam IVPB.. 3.375 Gram(s) IV Intermittent every 8 hours  vancomycin  IVPB      vancomycin  IVPB 1000 milliGRAM(s) IV Intermittent every 12 hours    MEDICATIONS  (PRN):  dextrose 40% Gel 15 Gram(s) Oral once PRN Blood Glucose LESS THAN 70 milliGRAM(s)/deciliter  glucagon  Injectable 1 milliGRAM(s) IntraMuscular once PRN Glucose LESS THAN 70 milligrams/deciliter  heparin  Injectable 5300 Unit(s) IV Push every 6 hours PRN For aPTT less than 40    ====================  VITALS (Last 12 hrs):  ====================  T(C): 37.9 (07-07-19 @ 18:00), Max: 38.1 (07-07-19 @ 11:00)  T(F): 100.2 (07-07-19 @ 18:00), Max: 100.6 (07-07-19 @ 11:00)  ABP: 98/58 (07-07-19 @ 18:00) (94/50 - 110/54)  ABP(mean): 70 (07-07-19 @ 18:00) (62 - 76)  RR: 14 (07-07-19 @ 18:00) (14 - 20)  SpO2: 100% (07-07-19 @ 18:00) (95% - 100%)  CVP(mm Hg): 12 (07-07-19 @ 18:00) (10 - 14)      I&O's Summary    06 Jul 2019 07:01  -  07 Jul 2019 07:00  --------------------------------------------------------  IN: 1876.2 mL / OUT: 2315 mL / NET: -438.8 mL    07 Jul 2019 07:01  -  07 Jul 2019 19:25  --------------------------------------------------------  IN: 664.7 mL / OUT: 1975 mL / NET: -1310.3 mL        Mode: AC/ CMV (Assist Control/ Continuous Mandatory Ventilation)  RR (machine): 12  TV (machine): 500  FiO2: 35  PEEP: 5  ITime: 1  MAP: 8  PIP: 14      ====================  NEW LABS:  ====================  07-07    137  |  101  |  39<H>  ----------------------------<  251<H>  3.6   |  23  |  1.27    Ca    8.0<L>      07 Jul 2019 14:30  Phos  3.3     07-07  Mg     2.2     07-07    TPro  6.4  /  Alb  2.9<L>  /  TBili  0.7  /  DBili  x   /  AST  169<H>  /  ALT  566<H>  /  AlkPhos  138<H>  07-07    PT/INR - ( 07 Jul 2019 04:49 )   PT: 16.3 sec;   INR: 1.40 ratio       PTT - ( 07 Jul 2019 04:49 )  PTT:60.2 sec  Creatine Kinase, Serum: 282 U/L <H> (07-07-19 @ 15:10)  Creatine Kinase, Serum: 356 U/L <H> (07-07-19 @ 04:49)    CKMB Units: 10.6 ng/mL (07-07 @ 15:10)  CKMB Units: 16.7 ng/mL (07-07 @ 04:49)    ABG - ( 07 Jul 2019 14:29 )  pH, Arterial: 7.45  pH, Blood: x     /  pCO2: 35    /  pO2: 115   / HCO3: 24    / Base Excess: .4    /  SaO2: 99        ====================  PLAN:  ====================      Tran Christine [facility] NP  Beeper #[id number]  Spectra # [phone] ====================  CCU MIDNIGHT ROUNDS  ====================    JIM PAREDES  [id number]    Patient is a 69y old  Male who presents with a chief complaint of NSTEMI (2019 07:05)    ====================  SUMMARY: 69 year old man with a past medical history of HTN, DM, PAD who is transferred to [facility] for management of cardiogenic shock and NSTEMI. As per sign out, the pt was admitted for L popliteal bypass to [facility]. During his time there, he was refusing medications  and was found to be progressively more dyspneic. He had an echo done there which showed that he has an EF of <15% with global hypokinesis; he received 20mg IV lasix for fluid overload. His respiratory status continued to worsen and the pt was intubated for airway protection. The pt vomited during this time and there was a concern for aspiration. The pt did not complain of CP during the time prior to intubation. As per nephew, the pt has not been complaining of CP, SOB prior to admission to the hospital and has been able to carry out his ADLs. As per the nephew, pt has not been compliant with his medications as an outpatient; the nephew knows that he had amlodipine prescribed but doesn't know what medications the patient takes for his diabetes.  As per chart from [facility], pt has been prescribed to take amlodipine 10 daily and metformin 500 BID at home. As per med rec from , pt has been taking lantus 20 mg daily at home in addition to metformin.  Patient being treated for sepsis now on Dobutamine and Lasix.  Vascular contacted for loss of lower extremity pulses in the setting of cardogenic/distributive shock.  ====================    MEDICATIONS  (STANDING):  aspirin  chewable 81 milliGRAM(s) Oral daily  chlorhexidine 0.12% Liquid 15 milliLiter(s) Oral Mucosa two times a day  chlorhexidine 2% Cloths 1 Application(s) Topical <User Schedule>  clopidogrel Tablet 75 milliGRAM(s) Oral daily  dextrose 5%. 1000 milliLiter(s) (50 mL/Hr) IV Continuous <Continuous>  dextrose 50% Injectable 12.5 Gram(s) IV Push once  dextrose 50% Injectable 25 Gram(s) IV Push once  dextrose 50% Injectable 25 Gram(s) IV Push once  fentaNYL   Infusion. 0.5 MICROgram(s)/kG/Hr (2.235 mL/Hr) IV Continuous <Continuous>  furosemide Infusion 10 mG/Hr (5 mL/Hr) IV Continuous <Continuous>  heparin  Infusion. 1800 Unit(s)/Hr (18 mL/Hr) IV Continuous <Continuous>  insulin glargine Injectable (LANTUS) 15 Unit(s) SubCutaneous at bedtime  insulin lispro (HumaLOG) corrective regimen sliding scale   SubCutaneous every 6 hours  pantoprazole  Injectable 40 milliGRAM(s) IV Push daily  petrolatum Ophthalmic Ointment 1 Application(s) Both EYES every 8 hours  piperacillin/tazobactam IVPB.. 3.375 Gram(s) IV Intermittent every 8 hours  vancomycin  IVPB      vancomycin  IVPB 1000 milliGRAM(s) IV Intermittent every 12 hours    MEDICATIONS  (PRN):  dextrose 40% Gel 15 Gram(s) Oral once PRN Blood Glucose LESS THAN 70 milliGRAM(s)/deciliter  glucagon  Injectable 1 milliGRAM(s) IntraMuscular once PRN Glucose LESS THAN 70 milligrams/deciliter  heparin  Injectable 5300 Unit(s) IV Push every 6 hours PRN For aPTT less than 40    ====================  VITALS (Last 12 hrs):  ====================  T(C): 37.9 (19 @ 18:00), Max: 38.1 (19 @ 11:00)  T(F): 100.2 (19 @ 18:00), Max: 100.6 (19 @ 11:00)  ABP: 98/58 (19 @ 18:00) (94/50 - 110/54)  ABP(mean): 70 (19 @ 18:00) (62 - 76)  RR: 14 (19 @ 18:00) (14 - 20)  SpO2: 100% (07-07-19 @ 18:00) (95% - 100%)  CVP(mm Hg): 12 (19 @ 18:00) (10 - 14)      I&O's Summary    2019 07:  -  2019 07:00  --------------------------------------------------------  IN: 1876.2 mL / OUT: 2315 mL / NET: -438.8 mL    2019 07:  -  2019 19:25  --------------------------------------------------------  IN: 664.7 mL / OUT: 1975 mL / NET: -1310.3 mL        Mode: AC/ CMV (Assist Control/ Continuous Mandatory Ventilation)  RR (machine): 12  TV (machine): 500  FiO2: 35  PEEP: 5  ITime: 1  MAP: 8  PIP: 14      ====================  NEW LABS:  ====================      137  |  101  |  39<H>  ----------------------------<  251<H>  3.6   |  23  |  1.27    Ca    8.0<L>      2019 14:30  Phos  3.3       Mg     2.2         TPro  6.4  /  Alb  2.9<L>  /  TBili  0.7  /  DBili  x   /  AST  169<H>  /  ALT  566<H>  /  AlkPhos  138<H>      PT/INR - ( 2019 04:49 )   PT: 16.3 sec;   INR: 1.40 ratio       PTT - ( 2019 04:49 )  PTT:60.2 sec  Creatine Kinase, Serum: 282 U/L <H> (19 @ 15:10)  Creatine Kinase, Serum: 356 U/L <H> (19 @ 04:49)    CKMB Units: 10.6 ng/mL ( @ 15:10)  CKMB Units: 16.7 ng/mL ( @ 04:49)    ABG - ( 2019 14:29 )  pH, Arterial: 7.45  pH, Blood: x     /  pCO2: 35    /  pO2: 115   / HCO3: 24    / Base Excess: .4    /  SaO2: 99        ====================  PLAN:  ====================  #NSTEMI  - Cont. ACS Heparin gtt, DAPT, high intensity statin   - Holding beta blocker / afterload agents 2/2 liable BPs  - Trend CE, echo, pending [facility]     #Shock; mixed Cardiogenic/Distributive   - Lactate 1.3 MVo2 56 -> 46 CVP 7-12 and PAP 40s/20s CO/CI 4.7/2.2 SVR 1038  - Weaned off pressors, remains off ; repeat hemodynamics q6hrs   - Cont. strict I/Os    #Critical limb ischemia s/p LLE Fem-Pop Bypass at [facility] 7/3   - Loss palpable pulses post-op, regained biphasic doppler pulses with Heparin gtt  - Currently No audible pulses 2/2 to poor vascularity/poor perfusion shock state   - Lactate wnl, weaned off Vaso; cont. to trend CBC w/ diff, lactate, procal   - Vascular following no plans for surgical revasc intervention due to current condition  - Consult Dr. Simmons in AM for second opinion   - Neurovascular checks per protocol     #Aspiration pneumonia  - Cont. broad spectrum Abx, f/u BCx   - Trend CBC w/ diff, lactate, procal  - Antipyretics prn, pan culture if spikes         Tran Christine [facility] NP  Beeper #[id number]  Spectra # [phone]

## 2019-07-07 NOTE — PROGRESS NOTE ADULT - SUBJECTIVE AND OBJECTIVE BOX
CHIEF COMPLAINT::    INTERVAL HISTORY:    REVIEW OF SYSTEMS: all others negative    MEDICATIONS  (STANDING):  aspirin  chewable 81 milliGRAM(s) Oral daily  chlorhexidine 0.12% Liquid 15 milliLiter(s) Oral Mucosa two times a day  chlorhexidine 2% Cloths 1 Application(s) Topical <User Schedule>  clopidogrel Tablet 75 milliGRAM(s) Oral daily  dextrose 5%. 1000 milliLiter(s) (50 mL/Hr) IV Continuous <Continuous>  dextrose 50% Injectable 12.5 Gram(s) IV Push once  dextrose 50% Injectable 25 Gram(s) IV Push once  dextrose 50% Injectable 25 Gram(s) IV Push once  DOBUTamine Infusion 5 MICROgram(s)/kG/Min (13.41 mL/Hr) IV Continuous <Continuous>  fentaNYL   Infusion. 0.5 MICROgram(s)/kG/Hr (2.235 mL/Hr) IV Continuous <Continuous>  furosemide Infusion 5 mG/Hr (2.5 mL/Hr) IV Continuous <Continuous>  heparin  Infusion. 1800 Unit(s)/Hr (18 mL/Hr) IV Continuous <Continuous>  insulin glargine Injectable (LANTUS) 15 Unit(s) SubCutaneous at bedtime  insulin lispro (HumaLOG) corrective regimen sliding scale   SubCutaneous every 6 hours  midazolam Infusion 0.02 mG/kG/Hr (1.788 mL/Hr) IV Continuous <Continuous>  pantoprazole  Injectable 40 milliGRAM(s) IV Push daily  petrolatum Ophthalmic Ointment 1 Application(s) Both EYES every 8 hours  piperacillin/tazobactam IVPB.. 3.375 Gram(s) IV Intermittent every 8 hours  vancomycin  IVPB      vancomycin  IVPB 1000 milliGRAM(s) IV Intermittent every 12 hours    MEDICATIONS  (PRN):  dextrose 40% Gel 15 Gram(s) Oral once PRN Blood Glucose LESS THAN 70 milliGRAM(s)/deciliter  glucagon  Injectable 1 milliGRAM(s) IntraMuscular once PRN Glucose LESS THAN 70 milligrams/deciliter  heparin  Injectable 5300 Unit(s) IV Push every 6 hours PRN For aPTT less than 40      Objective:  Vital Signs Last 24 Hrs  T(C): 37.9 (2019 06:00), Max: 38.5 (2019 08:00)  T(F): 100.2 (2019 06:00), Max: 101.3 (2019 08:00)  HR: 106 (2019 06:00) (94 - 110)  BP: --  BP(mean): --  RR: 12 (2019 06:00) (10 - 18)  SpO2: 96% (2019 06:00) (96% - 100%)  ICU Vital Signs Last 24 Hrs  T(C): 37.9 (2019 06:00), Max: 38.5 (2019 08:00)  T(F): 100.2 (2019 06:00), Max: 101.3 (2019 08:00)  HR: 106 (2019 06:00) (94 - 110)  BP: --  BP(mean): --  ABP: 102/56 (2019 06:00) (84/54 - 112/64)  ABP(mean): 70 (2019 06:00) (64 - 84)  RR: 12 (2019 06:00) (10 - 18)  SpO2: 96% (2019 06:00) (96% - 100%)    Mode: AC/ CMV (Assist Control/ Continuous Mandatory Ventilation)  RR (machine): 12  TV (machine): 500  FiO2: 40  PEEP: 5  ITime: 1  MAP: 10  PIP: 26    Adult Advanced Hemodynamics Last 24 Hrs  CVP(mm Hg): 7 (2019 06:00) (7 - 20)  CVP(cm H2O): --  CO: --  CI: --  PA: --  PA(mean): --  PCWP: --  SVR: --  SVRI: --  PVR: --  PVRI: --       @ 07:01  -   @ 07:00  --------------------------------------------------------  IN: 1842.3 mL / OUT: 2215 mL / NET: -372.7 mL      Daily     Daily Weight in k.3 (2019 04:00)    PHYSICAL EXAM:      Constitutional:    Eyes:    ENMT:    Neck:    Breasts:    Back:    Respiratory:    Cardiovascular:    Gastrointestinal:    Genitourinary:    Rectal:    Extremities:    Vascular:    Neurological:    Skin:    Lymph Nodes:    Musculoskeletal:    Psychiatric:        TELEMETRY:     EKG:     CARDIAC CATH:     ECHO:    IMAGING:                           10.2   15.1  )-----------( 200      ( 2019 04:49 )             30.8     -    137  |  104  |  42<H>  ----------------------------<  225<H>  4.3   |  21<L>  |  1.39<H>    Ca    7.9<L>      2019 04:49  Phos  3.3       Mg     2.2         TPro  6.3  /  Alb  2.8<L>  /  TBili  0.7  /  DBili  x   /  AST  232<H>  /  ALT  631<H>  /  AlkPhos  109      LIVER FUNCTIONS - ( 2019 04:49 )  Alb: 2.8 g/dL / Pro: 6.3 g/dL / ALK PHOS: 109 U/L / ALT: 631 U/L / AST: 232 U/L / GGT: x           PT/INR - ( 2019 04:49 )   PT: 16.3 sec;   INR: 1.40 ratio         PTT - ( 2019 04:49 )  PTT:60.2 sec    ABG - ( 2019 04:44 )  pH, Arterial: 7.38  pH, Blood: x     /  pCO2: 40    /  pO2: 251   / HCO3: 23    / Base Excess: -1.4  /  SaO2: 100               *BLOOD GAS/ARTERIAL/MIXED/VENOUS  *LACTATE  Urinalysis Basic - ( 2019 00:36 )    Color: Yellow / Appearance: Slightly Turbid / S.048 / pH: x  Gluc: x / Ketone: Negative  / Bili: Negative / Urobili: Negative   Blood: x / Protein: 100 / Nitrite: Negative   Leuk Esterase: Negative / RBC: 5 /hpf / WBC 22 /HPF   Sq Epi: x / Non Sq Epi: 11 / Bacteria: Negative        HEALTH ISSUES - PROBLEM Dx:  Ischemia of left lower extremity: Ischemia of left lower extremity  NSTEMI (non-ST elevated myocardial infarction): NSTEMI (non-ST elevated myocardial infarction)  Shock: Shock        HEALTH ISSUES - R/O PROBLEM Dx:      Torin Muñoz, CCU NP   #[id number] CHIEF COMPLAINT::    INTERVAL HISTORY:  This is a 69 year old man with a past medical history of HTN, DM, PAD who is transferred to [facility] for management of cardiogenic shock and NSTEMI. As per sign out, the pt was admitted for L popliteal bypass to [facility]. During his time there, he was refusing medications  and was found to be progressively more dyspneic. He had an echo done there which showed that he has an EF of <15% with global hypokinesis; he received 20mg IV lasix for fluid overload. His respiratory status continued to worsen and the pt was intubated for airway protection. The pt vomited during this time and there was a concern for aspiration. The pt did not complain of CP during the time prior to intubation. As per nephew, the pt has not been complaining of CP, SOB prior to admission to the hospital and has been able to carry out his ADLs. As per the nephew, pt has not been compliant with his medications as an outpatient; the nephew knows that he had amlodipine prescribed but doesn't know what medications the patient takes for his diabetes.  As per chart from [facility], pt has been prescribed to take amlodipine 10 daily and metformin 500 BID at home. As per med rec from , pt has been taking lantus 20 mg daily at home in addition to metformin.  Patient being treated for sepsis now on Dobutamine and Lasix.  Vascular contacted for loss of lower extremity pulses in the setting of cardogenic/distributive shock.    REVIEW OF SYSTEMS: all others negative    MEDICATIONS  (STANDING):  aspirin  chewable 81 milliGRAM(s) Oral daily  chlorhexidine 0.12% Liquid 15 milliLiter(s) Oral Mucosa two times a day  chlorhexidine 2% Cloths 1 Application(s) Topical <User Schedule>  clopidogrel Tablet 75 milliGRAM(s) Oral daily  dextrose 5%. 1000 milliLiter(s) (50 mL/Hr) IV Continuous <Continuous>  dextrose 50% Injectable 12.5 Gram(s) IV Push once  dextrose 50% Injectable 25 Gram(s) IV Push once  dextrose 50% Injectable 25 Gram(s) IV Push once  DOBUTamine Infusion 5 MICROgram(s)/kG/Min (13.41 mL/Hr) IV Continuous <Continuous>  fentaNYL   Infusion. 0.5 MICROgram(s)/kG/Hr (2.235 mL/Hr) IV Continuous <Continuous>  furosemide Infusion 5 mG/Hr (2.5 mL/Hr) IV Continuous <Continuous>  heparin  Infusion. 1800 Unit(s)/Hr (18 mL/Hr) IV Continuous <Continuous>  insulin glargine Injectable (LANTUS) 15 Unit(s) SubCutaneous at bedtime  insulin lispro (HumaLOG) corrective regimen sliding scale   SubCutaneous every 6 hours  midazolam Infusion 0.02 mG/kG/Hr (1.788 mL/Hr) IV Continuous <Continuous>  pantoprazole  Injectable 40 milliGRAM(s) IV Push daily  petrolatum Ophthalmic Ointment 1 Application(s) Both EYES every 8 hours  piperacillin/tazobactam IVPB.. 3.375 Gram(s) IV Intermittent every 8 hours  vancomycin  IVPB      vancomycin  IVPB 1000 milliGRAM(s) IV Intermittent every 12 hours    MEDICATIONS  (PRN):  dextrose 40% Gel 15 Gram(s) Oral once PRN Blood Glucose LESS THAN 70 milliGRAM(s)/deciliter  glucagon  Injectable 1 milliGRAM(s) IntraMuscular once PRN Glucose LESS THAN 70 milligrams/deciliter  heparin  Injectable 5300 Unit(s) IV Push every 6 hours PRN For aPTT less than 40      Objective:  Vital Signs Last 24 Hrs  T(C): 37.9 (2019 06:00), Max: 38.5 (2019 08:00)  T(F): 100.2 (2019 06:00), Max: 101.3 (2019 08:00)  HR: 106 (2019 06:00) (94 - 110)  BP: --  BP(mean): --  RR: 12 (2019 06:00) (10 - 18)  SpO2: 96% (2019 06:00) (96% - 100%)  ICU Vital Signs Last 24 Hrs  T(C): 37.9 (2019 06:00), Max: 38.5 (2019 08:00)  T(F): 100.2 (2019 06:00), Max: 101.3 (2019 08:00)  HR: 106 (2019 06:00) (94 - 110)  BP: --  BP(mean): --  ABP: 102/56 (2019 06:00) (84/54 - 112/64)  ABP(mean): 70 (2019 06:00) (64 - 84)  RR: 12 (2019 06:00) (10 - 18)  SpO2: 96% (2019 06:00) (96% - 100%)    Mode: AC/ CMV (Assist Control/ Continuous Mandatory Ventilation)  RR (machine): 12  TV (machine): 500  FiO2: 40  PEEP: 5  ITime: 1  MAP: 10  PIP: 26    Adult Advanced Hemodynamics Last 24 Hrs  CVP(mm Hg): 7 (2019 06:00) (7 - 20)  CVP(cm H2O): --  CO: --  CI: --  PA: --  PA(mean): --  PCWP: --  SVR: --  SVRI: --  PVR: --  PVRI: --       @ 07:01  -   @ 07:00  --------------------------------------------------------  IN: 1842.3 mL / OUT: 2215 mL / NET: -372.7 mL      Daily     Daily Weight in k.3 (2019 04:00)    PHYSICAL EXAM:      Constitutional:    Eyes:    ENMT:    Neck:    Breasts:    Back:    Respiratory:    Cardiovascular:    Gastrointestinal:    Genitourinary:    Rectal:    Extremities:    Vascular:    Neurological:    Skin:    Lymph Nodes:    Musculoskeletal:    Psychiatric:        TELEMETRY:     EKG:     CARDIAC CATH:     ECHO:    IMAGING:                           10.2   15.1  )-----------( 200      ( 2019 04:49 )             30.8     07    137  |  104  |  42<H>  ----------------------------<  225<H>  4.3   |  21<L>  |  1.39<H>    Ca    7.9<L>      2019 04:49  Phos  3.3       Mg     2.2     -07    TPro  6.3  /  Alb  2.8<L>  /  TBili  0.7  /  DBili  x   /  AST  232<H>  /  ALT  631<H>  /  AlkPhos  109  07-07    LIVER FUNCTIONS - ( 2019 04:49 )  Alb: 2.8 g/dL / Pro: 6.3 g/dL / ALK PHOS: 109 U/L / ALT: 631 U/L / AST: 232 U/L / GGT: x           PT/INR - ( 2019 04:49 )   PT: 16.3 sec;   INR: 1.40 ratio         PTT - ( 2019 04:49 )  PTT:60.2 sec    ABG - ( 2019 04:44 )  pH, Arterial: 7.38  pH, Blood: x     /  pCO2: 40    /  pO2: 251   / HCO3: 23    / Base Excess: -1.4  /  SaO2: 100               *BLOOD GAS/ARTERIAL/MIXED/VENOUS  *LACTATE  Urinalysis Basic - ( 2019 00:36 )    Color: Yellow / Appearance: Slightly Turbid / S.048 / pH: x  Gluc: x / Ketone: Negative  / Bili: Negative / Urobili: Negative   Blood: x / Protein: 100 / Nitrite: Negative   Leuk Esterase: Negative / RBC: 5 /hpf / WBC 22 /HPF   Sq Epi: x / Non Sq Epi: 11 / Bacteria: Negative        HEALTH ISSUES - PROBLEM Dx:  Ischemia of left lower extremity: Ischemia of left lower extremity  NSTEMI (non-ST elevated myocardial infarction): NSTEMI (non-ST elevated myocardial infarction)  Shock: Shock        HEALTH ISSUES - R/O PROBLEM Dx:      Torin Muñoz, CCU NP   #[id number] CHIEF COMPLAINT: NSTEMI    INTERVAL HISTORY:  This is a 69 year old man with a past medical history of HTN, DM, PAD who is transferred to [facility] for management of cardiogenic shock and NSTEMI. As per sign out, the pt was admitted for L popliteal bypass to [facility]. During his time there, he was refusing medications  and was found to be progressively more dyspneic. He had an echo done there which showed that he has an EF of <15% with global hypokinesis; he received 20mg IV lasix for fluid overload. His respiratory status continued to worsen and the pt was intubated for airway protection. The pt vomited during this time and there was a concern for aspiration. The pt did not complain of CP during the time prior to intubation. As per nephew, the pt has not been complaining of CP, SOB prior to admission to the hospital and has been able to carry out his ADLs. As per the nephew, pt has not been compliant with his medications as an outpatient; the nephew knows that he had amlodipine prescribed but doesn't know what medications the patient takes for his diabetes.  As per chart from [facility], pt has been prescribed to take amlodipine 10 daily and metformin 500 BID at home. As per med rec from , pt has been taking lantus 20 mg daily at home in addition to metformin.  Patient being treated for sepsis now on Dobutamine and Lasix.  Vascular contacted for loss of lower extremity pulses in the setting of cardogenic/distributive shock.    REVIEW OF SYSTEMS: Unable to assess due to Intubation And Sedtion    MEDICATIONS  (STANDING):  aspirin  chewable 81 milliGRAM(s) Oral daily  chlorhexidine 0.12% Liquid 15 milliLiter(s) Oral Mucosa two times a day  chlorhexidine 2% Cloths 1 Application(s) Topical <User Schedule>  clopidogrel Tablet 75 milliGRAM(s) Oral daily  dextrose 5%. 1000 milliLiter(s) (50 mL/Hr) IV Continuous <Continuous>  dextrose 50% Injectable 12.5 Gram(s) IV Push once  dextrose 50% Injectable 25 Gram(s) IV Push once  dextrose 50% Injectable 25 Gram(s) IV Push once  DOBUTamine Infusion 5 MICROgram(s)/kG/Min (13.41 mL/Hr) IV Continuous <Continuous>  fentaNYL   Infusion. 0.5 MICROgram(s)/kG/Hr (2.235 mL/Hr) IV Continuous <Continuous>  furosemide Infusion 5 mG/Hr (2.5 mL/Hr) IV Continuous <Continuous>  heparin  Infusion. 1800 Unit(s)/Hr (18 mL/Hr) IV Continuous <Continuous>  insulin glargine Injectable (LANTUS) 15 Unit(s) SubCutaneous at bedtime  insulin lispro (HumaLOG) corrective regimen sliding scale   SubCutaneous every 6 hours  midazolam Infusion 0.02 mG/kG/Hr (1.788 mL/Hr) IV Continuous <Continuous>  pantoprazole  Injectable 40 milliGRAM(s) IV Push daily  petrolatum Ophthalmic Ointment 1 Application(s) Both EYES every 8 hours  piperacillin/tazobactam IVPB.. 3.375 Gram(s) IV Intermittent every 8 hours  vancomycin  IVPB      vancomycin  IVPB 1000 milliGRAM(s) IV Intermittent every 12 hours    MEDICATIONS  (PRN):  dextrose 40% Gel 15 Gram(s) Oral once PRN Blood Glucose LESS THAN 70 milliGRAM(s)/deciliter  glucagon  Injectable 1 milliGRAM(s) IntraMuscular once PRN Glucose LESS THAN 70 milligrams/deciliter  heparin  Injectable 5300 Unit(s) IV Push every 6 hours PRN For aPTT less than 40      Objective:  Vital Signs Last 24 Hrs  T(C): 37.9 (2019 06:00), Max: 38.5 (2019 08:00)  T(F): 100.2 (2019 06:00), Max: 101.3 (2019 08:00)  HR: 106 (2019 06:00) (94 - 110)  BP: --  BP(mean): --  RR: 12 (2019 06:00) (10 - 18)  SpO2: 96% (2019 06:00) (96% - 100%)  ICU Vital Signs Last 24 Hrs  T(C): 37.9 (2019 06:00), Max: 38.5 (2019 08:00)  T(F): 100.2 (2019 06:00), Max: 101.3 (2019 08:00)  HR: 106 (2019 06:00) (94 - 110)  BP: --  BP(mean): --  ABP: 102/56 (2019 06:00) (84/54 - 112/64)  ABP(mean): 70 (2019 06:00) (64 - 84)  RR: 12 (2019 06:00) (10 - 18)  SpO2: 96% (2019 06:00) (96% - 100%)    Mode: AC/ CMV (Assist Control/ Continuous Mandatory Ventilation)  RR (machine): 12  TV (machine): 500  FiO2: 40  PEEP: 5  ITime: 1  MAP: 10  PIP: 26    Adult Advanced Hemodynamics Last 24 Hrs  CVP(mm Hg): 7 (2019 06:00) (7 - 20)  CVP(cm H2O): --  CO: --  CI: --  PA: --  PA(mean): --  PCWP: --  SVR: --  SVRI: --  PVR: --  PVRI: --       @ 07:01  -   @ 07:00  --------------------------------------------------------  IN: 1842.3 mL / OUT: 2215 mL / NET: -372.7 mL      Daily     Daily Weight in k.3 (2019 04:00)    PHYSICAL EXAM:      Constitutional:    Eyes:    ENMT:    Neck:    Breasts:    Back:    Respiratory:    Cardiovascular:    Gastrointestinal:    Genitourinary:    Rectal:    Extremities:    Vascular:    Neurological:    Skin:    Lymph Nodes:    Musculoskeletal:    Psychiatric:        TELEMETRY:     EKG:     CARDIAC CATH:     ECHO:    IMAGING:                           10.2   15.1  )-----------( 200      ( 2019 04:49 )             30.8     -07    137  |  104  |  42<H>  ----------------------------<  225<H>  4.3   |  21<L>  |  1.39<H>    Ca    7.9<L>      2019 04:49  Phos  3.3       Mg     2.2     07-07    TPro  6.3  /  Alb  2.8<L>  /  TBili  0.7  /  DBili  x   /  AST  232<H>  /  ALT  631<H>  /  AlkPhos  109  -07    LIVER FUNCTIONS - ( 2019 04:49 )  Alb: 2.8 g/dL / Pro: 6.3 g/dL / ALK PHOS: 109 U/L / ALT: 631 U/L / AST: 232 U/L / GGT: x           PT/INR - ( 2019 04:49 )   PT: 16.3 sec;   INR: 1.40 ratio         PTT - ( 2019 04:49 )  PTT:60.2 sec    ABG - ( 2019 04:44 )  pH, Arterial: 7.38  pH, Blood: x     /  pCO2: 40    /  pO2: 251   / HCO3: 23    / Base Excess: -1.4  /  SaO2: 100               *BLOOD GAS/ARTERIAL/MIXED/VENOUS  *LACTATE  Urinalysis Basic - ( 2019 00:36 )    Color: Yellow / Appearance: Slightly Turbid / S.048 / pH: x  Gluc: x / Ketone: Negative  / Bili: Negative / Urobili: Negative   Blood: x / Protein: 100 / Nitrite: Negative   Leuk Esterase: Negative / RBC: 5 /hpf / WBC 22 /HPF   Sq Epi: x / Non Sq Epi: 11 / Bacteria: Negative        HEALTH ISSUES - PROBLEM Dx:  Ischemia of left lower extremity: Ischemia of left lower extremity  NSTEMI (non-ST elevated myocardial infarction): NSTEMI (non-ST elevated myocardial infarction)  Shock: Shock        HEALTH ISSUES - R/O PROBLEM Dx:      Torin Muñoz, CCU NP   #[id number] CHIEF COMPLAINT: NSTEMI    INTERVAL HISTORY:  This is a 69 year old man with a past medical history of HTN, DM, PAD who is transferred to [facility] for management of cardiogenic shock and NSTEMI. As per sign out, the pt was admitted for L popliteal bypass to [facility]. During his time there, he was refusing medications  and was found to be progressively more dyspneic. He had an echo done there which showed that he has an EF of <15% with global hypokinesis; he received 20mg IV lasix for fluid overload. His respiratory status continued to worsen and the pt was intubated for airway protection. The pt vomited during this time and there was a concern for aspiration. The pt did not complain of CP during the time prior to intubation. As per nephew, the pt has not been complaining of CP, SOB prior to admission to the hospital and has been able to carry out his ADLs. As per the nephew, pt has not been compliant with his medications as an outpatient; the nephew knows that he had amlodipine prescribed but doesn't know what medications the patient takes for his diabetes.  As per chart from [facility], pt has been prescribed to take amlodipine 10 daily and metformin 500 BID at home. As per med rec from , pt has been taking lantus 20 mg daily at home in addition to metformin.  Patient being treated for sepsis now on Dobutamine and Lasix.  Vascular contacted for loss of lower extremity pulses in the setting of cardogenic/distributive shock.    REVIEW OF SYSTEMS: Unable to assess due to Intubation And Sedtion    MEDICATIONS  (STANDING):  aspirin  chewable 81 milliGRAM(s) Oral daily  chlorhexidine 0.12% Liquid 15 milliLiter(s) Oral Mucosa two times a day  chlorhexidine 2% Cloths 1 Application(s) Topical <User Schedule>  clopidogrel Tablet 75 milliGRAM(s) Oral daily  dextrose 5%. 1000 milliLiter(s) (50 mL/Hr) IV Continuous <Continuous>  dextrose 50% Injectable 12.5 Gram(s) IV Push once  dextrose 50% Injectable 25 Gram(s) IV Push once  dextrose 50% Injectable 25 Gram(s) IV Push once  DOBUTamine Infusion 5 MICROgram(s)/kG/Min (13.41 mL/Hr) IV Continuous <Continuous>  fentaNYL   Infusion. 0.5 MICROgram(s)/kG/Hr (2.235 mL/Hr) IV Continuous <Continuous>  furosemide Infusion 5 mG/Hr (2.5 mL/Hr) IV Continuous <Continuous>  heparin  Infusion. 1800 Unit(s)/Hr (18 mL/Hr) IV Continuous <Continuous>  insulin glargine Injectable (LANTUS) 15 Unit(s) SubCutaneous at bedtime  insulin lispro (HumaLOG) corrective regimen sliding scale   SubCutaneous every 6 hours  midazolam Infusion 0.02 mG/kG/Hr (1.788 mL/Hr) IV Continuous <Continuous>  pantoprazole  Injectable 40 milliGRAM(s) IV Push daily  petrolatum Ophthalmic Ointment 1 Application(s) Both EYES every 8 hours  piperacillin/tazobactam IVPB.. 3.375 Gram(s) IV Intermittent every 8 hours  vancomycin  IVPB      vancomycin  IVPB 1000 milliGRAM(s) IV Intermittent every 12 hours    MEDICATIONS  (PRN):  dextrose 40% Gel 15 Gram(s) Oral once PRN Blood Glucose LESS THAN 70 milliGRAM(s)/deciliter  glucagon  Injectable 1 milliGRAM(s) IntraMuscular once PRN Glucose LESS THAN 70 milligrams/deciliter  heparin  Injectable 5300 Unit(s) IV Push every 6 hours PRN For aPTT less than 40      Objective:  Vital Signs Last 24 Hrs  T(C): 37.9 (2019 06:00), Max: 38.5 (2019 08:00)  T(F): 100.2 (2019 06:00), Max: 101.3 (2019 08:00)  HR: 106 (2019 06:00) (94 - 110)  BP: --  BP(mean): --  RR: 12 (2019 06:00) (10 - 18)  SpO2: 96% (2019 06:00) (96% - 100%)  ICU Vital Signs Last 24 Hrs  T(C): 37.9 (2019 06:00), Max: 38.5 (2019 08:00)  T(F): 100.2 (2019 06:00), Max: 101.3 (2019 08:00)  HR: 106 (2019 06:00) (94 - 110)  BP: --  BP(mean): --  ABP: 102/56 (2019 06:00) (84/54 - 112/64)  ABP(mean): 70 (2019 06:00) (64 - 84)  RR: 12 (2019 06:00) (10 - 18)  SpO2: 96% (2019 06:00) (96% - 100%)    Mode: AC/ CMV (Assist Control/ Continuous Mandatory Ventilation)  RR (machine): 12  TV (machine): 500  FiO2: 40  PEEP: 5  ITime: 1  MAP: 10  PIP: 26    Adult Advanced Hemodynamics Last 24 Hrs  CVP(mm Hg): 7 (2019 06:00) (7 - 20)  CVP(cm H2O): --  CO: --  CI: --  PA: --  PA(mean): --  PCWP: --  SVR: --  SVRI: --  PVR: --  PVRI: --       @ 07:01  -   @ 07:00  --------------------------------------------------------  IN: 1842.3 mL / OUT: 2215 mL / NET: -372.7 mL      Daily     Daily Weight in k.3 (2019 04:00)    PHYSICAL EXAM:      Constitutional: Vented and Sedated    Eyes: Sluggish    ENMT: nml oral mucosa orally intubated    Respiratory: CTA Bilaterally    Cardiovascular: S1S2 RRR    Gastrointestinal: +BS    Extremities: Bilat LE dusky, cool Left LE [place]    Vascular: Doppler Pulses at Popliteal    Neurological: Sedated        TELEMETRY: SR    EKG:     CARDIAC CATH:     ECHO:  < from: Transthoracic Echocardiogram (19 @ 09:48) >    Patient name: JIM PAREDES  YOB: 1950   Age: 69 (M)   MR#: [id number]  Study Date: 2019  Location: [place] [id number]: [facility] [place]  Study quality: Technically good  Referring Physician: Jillian Shahid MD  Blood Pressure: 100/58 mmHg  Height: 182 cm  Weight: 89 kg  BSA: 2.1 m2  Heart Rate: 100 mmHg  ------------------------------------------------------------------------  PROCEDURE: Transthoracic echocardiogram with 2-D, M-Mode  and complete spectral and color flowDoppler.  INDICATION: Cardiogenic shock (R57.0)  ------------------------------------------------------------------------  Dimensions:    Normal Values:  LA:     3.9    2.0 - 4.0 cm  Ao:     3.4    2.0 - 3.8 cm  SEPTUM: 1.3    0.6 - 1.2 cm  PWT:    1.2    0.6 - 1.1 cm  LVIDd:  96.3    3.0 - 5.6 cm  LVIDs:  5.6    1.8 - 4.0 cm  Derived variables:  LVMI: 36442 g/m2  RWT: 0.02  Doppler Peak Velocity (m/sec): AoV=0.8  ------------------------------------------------------------------------  Observations:  Mitral Valve: Mild mitral regurgitation.  Aortic Valve/Aorta: Trileaflet aortic valve. There is no  aortic stenosis.  Peak transaortic valve gradient equals 3 mm Hg, mean  transaortic valve gradient equals 1 mm Hg, aortic valve  velocity time integral equals 11 cm. There is no aortic  regurgitation. Peak left ventricular outflow tract gradient  equals 1 mm Hg, LVOT velocity time integral equals 7 cm.  Aortic Root: 3.4 cm.  Normal sized aortic root.  Left Atrium: Moderately dilated left atrium.  LA volume  index = 47 cc/m2.  The interatrial setpum is bowed towards the right  suggesting elevated left atrial pressure.  Left Ventricle: There is global hypokineiss with minor  regional variation.  The LVEF about 20-25%. The left  ventricle is moderately dilated.  Right Heart: Normal right atrium.  The right atrial area=  12cm2. Normal right ventricular size and function. Normal  tricuspid valve. No tricuspid regurgitation. Minimal  pulmonic regurgitation.  Pericardium/Pleura: There is no pericardial effusion.  There is a left pleural effusion.  Hemodynamic: The IVC is dilated to 2.1cm.  The patient was  intubated at the time of the study.  ------------------------------------------------------------------------  Conclusions:  1. Mild mitral regurgitation.  2. Moderately dilated left atrium.  LA volume index = 47  cc/m2.  The interatrial setpum is bowed towards the right  suggesting elevated left atrial pressure.  3. The left ventricle is moderately dilated.  4. There is global hypokineiss with minor regional  variation.  The LVEF about 20-25%.  5. Normal right ventricular size and function.  6. Normal tricuspid valve. No tricuspid regurgitation.  7. There is no pericardial effusion.  8. There is a left pleural effusion.  There are no prior studies available for comparison.  ------------------------------------------------------------------------  Confirmed on  2019 - 13:40:59 by Marilee Hahn M.D.  ------------------------------------------------------------------------    < end of copied text >      IMAGING:                           10.2   15.1  )-----------( 200      ( 2019 04:49 )             30.8     -07    137  |  104  |  42<H>  ----------------------------<  225<H>  4.3   |  21<L>  |  1.39<H>    Ca    7.9<L>      2019 04:49  Phos  3.3       Mg     2.2         TPro  6.3  /  Alb  2.8<L>  /  TBili  0.7  /  DBili  x   /  AST  232<H>  /  ALT  631<H>  /  AlkPhos  109      LIVER FUNCTIONS - ( 2019 04:49 )  Alb: 2.8 g/dL / Pro: 6.3 g/dL / ALK PHOS: 109 U/L / ALT: 631 U/L / AST: 232 U/L / GGT: x           PT/INR - ( 2019 04:49 )   PT: 16.3 sec;   INR: 1.40 ratio         PTT - ( 2019 04:49 )  PTT:60.2 sec    ABG - ( 2019 04:44 )  pH, Arterial: 7.38  pH, Blood: x     /  pCO2: 40    /  pO2: 251   / HCO3: 23    / Base Excess: -1.4  /  SaO2: 100               *BLOOD GAS/ARTERIAL/MIXED/VENOUS  *LACTATE  Urinalysis Basic - ( 2019 00:36 )    Color: Yellow / Appearance: Slightly Turbid / S.048 / pH: x  Gluc: x / Ketone: Negative  / Bili: Negative / Urobili: Negative   Blood: x / Protein: 100 / Nitrite: Negative   Leuk Esterase: Negative / RBC: 5 /hpf / WBC 22 /HPF   Sq Epi: x / Non Sq Epi: 11 / Bacteria: Negative        HEALTH ISSUES - PROBLEM Dx:  Ischemia of left lower extremity: Ischemia of left lower extremity  NSTEMI (non-ST elevated myocardial infarction): NSTEMI (non-ST elevated myocardial infarction)  Shock: Shock        HEALTH ISSUES - R/O PROBLEM Dx:      Torin Muñoz, CCU NP   #[id number]

## 2019-07-07 NOTE — PROGRESS NOTE ADULT - ASSESSMENT
This is a 69 year old man with a past medical history of HTN, DM, PAD who is transferred to [facility] for management of cardiogenic shock and NSTEMI. As per sign out, the pt was admitted for L popliteal bypass to [facility]. During his time there, he was refusing medications  and was found to be progressively more dyspneic. He had an echo done there which showed that he has an EF of <15% with global hypokinesis; he received 20mg IV lasix for fluid overload. His respiratory status continued to worsen and the pt was intubated for airway protection. The pt vomited during this time and there was a concern for aspiration. The pt did not complain of CP during the time prior to intubation. As per nephew, the pt has not been complaining of CP, SOB prior to admission to the hospital and has been able to carry out his ADLs. As per the nephew, pt has not been compliant with his medications as an outpatient; the nephew knows that he had amlodipine prescribed but doesn't know what medications the patient takes for his diabetes.  As per chart from [facility], pt has been prescribed to take amlodipine 10 daily and metformin 500 BID at home. As per med rec from 2015, pt has been taking lantus 20 mg daily at home in addition to metformin.  Patient being treated for sepsis now on Dobutamine and Lasix.  Vascular contacted for loss of lower extremity pulses in the setting of cardogenic/distributive shock. This is a 69 year old man with a past medical history of HTN, DM, PAD who is transferred to [facility] for management of cardiogenic shock and NSTEMI. As per sign out, the pt was admitted for L popliteal bypass to [facility]. During his time there, he was refusing medications  and was found to be progressively more dyspneic. He had an echo done there which showed that he has an EF of <15% with global hypokinesis; he received 20mg IV lasix for fluid overload. His respiratory status continued to worsen and the pt was intubated for airway protection. The pt vomited during this time and there was a concern for aspiration. The pt did not complain of CP during the time prior to intubation. As per nephew, the pt has not been complaining of CP, SOB prior to admission to the hospital and has been able to carry out his ADLs. As per the nephew, pt has not been compliant with his medications as an outpatient; the nephew knows that he had amlodipine prescribed but doesn't know what medications the patient takes for his diabetes.  As per chart from [facility], pt has been prescribed to take amlodipine 10 daily and metformin 500 BID at home. As per med rec from 2015, pt has been taking lantus 20 mg daily at home in addition to metformin.  Patient being treated for sepsis now on Dobutamine and Lasix.  Vascular contacted for loss of lower extremity pulses in the setting of cardogenic/distributive shock.         Problem/Plan - 3:  ·  Problem: Ischemia of left lower extremity.  Plan: s/p LLE Fem-Pop Bypass at [facility] 7/3 reportedly loss palpable pulse post procedure regained biphasic doppler pulse with Heparin Infusion  Currently No audible pulses 2/2 to poor vascularity and poor perfusion in Shock state requiring Pressors and Inotropes  Vascular consulted.

## 2019-07-07 NOTE — PROGRESS NOTE ADULT - PROBLEM SELECTOR PLAN 2
Trop elevated CK CKMB downtrending  Continue Heparin gtt, ASA, Plavix and Lipitor  Cath when stable.   Continue to trend Sailaja

## 2019-07-07 NOTE — PROGRESS NOTE ADULT - PROBLEM SELECTOR PLAN 3
s/p LLE Fem-Pop Bypass at [facility] 7/3 reportedly loss palpable pulse post procedure regained biphasic doppler pulse with Heparin Infusion  Currently No audible pulses 2/2 to poor vascularity and poor perfusion in Shock state requiring Pressors and Inotropes  Lactate 1.2  Levo weaned to off  Vascular following no plans for surgical revasc intervention due to current condition  f/u Arterial Dopplers of Bilat LE  Trend lactates. LE Pulse checks q4h

## 2019-07-08 LAB
ALBUMIN SERPL ELPH-MCNC: 2.7 G/DL — LOW (ref 3.3–5)
ALBUMIN SERPL ELPH-MCNC: 2.7 G/DL — LOW (ref 3.3–5)
ALBUMIN SERPL ELPH-MCNC: 2.8 G/DL — LOW (ref 3.3–5)
ALP SERPL-CCNC: 198 U/L — HIGH (ref 40–120)
ALP SERPL-CCNC: 204 U/L — HIGH (ref 40–120)
ALP SERPL-CCNC: 210 U/L — HIGH (ref 40–120)
ALT FLD-CCNC: 397 U/L — HIGH (ref 10–45)
ALT FLD-CCNC: 453 U/L — HIGH (ref 10–45)
ALT FLD-CCNC: 517 U/L — HIGH (ref 10–45)
ANION GAP SERPL CALC-SCNC: 13 MMOL/L — SIGNIFICANT CHANGE UP (ref 5–17)
ANION GAP SERPL CALC-SCNC: 14 MMOL/L — SIGNIFICANT CHANGE UP (ref 5–17)
ANION GAP SERPL CALC-SCNC: 15 MMOL/L — SIGNIFICANT CHANGE UP (ref 5–17)
APTT BLD: 45.6 SEC — HIGH (ref 27.5–36.3)
APTT BLD: 45.6 SEC — HIGH (ref 27.5–36.3)
APTT BLD: 63.7 SEC — HIGH (ref 27.5–36.3)
AST SERPL-CCNC: 111 U/L — HIGH (ref 10–40)
AST SERPL-CCNC: 139 U/L — HIGH (ref 10–40)
AST SERPL-CCNC: 190 U/L — HIGH (ref 10–40)
BASE EXCESS BLDA CALC-SCNC: 1.3 MMOL/L — SIGNIFICANT CHANGE UP (ref -2–2)
BASE EXCESS BLDMV CALC-SCNC: 3 MMOL/L — SIGNIFICANT CHANGE UP (ref -3–3)
BASE EXCESS BLDMV CALC-SCNC: 3.2 MMOL/L — HIGH (ref -3–3)
BASOPHILS # BLD AUTO: 0 K/UL — SIGNIFICANT CHANGE UP (ref 0–0.2)
BASOPHILS NFR BLD AUTO: 0 % — SIGNIFICANT CHANGE UP (ref 0–2)
BASOPHILS NFR BLD AUTO: 0.1 % — SIGNIFICANT CHANGE UP (ref 0–2)
BASOPHILS NFR BLD AUTO: 0.5 % — SIGNIFICANT CHANGE UP (ref 0–2)
BILIRUB SERPL-MCNC: 0.6 MG/DL — SIGNIFICANT CHANGE UP (ref 0.2–1.2)
BILIRUB SERPL-MCNC: 0.9 MG/DL — SIGNIFICANT CHANGE UP (ref 0.2–1.2)
BILIRUB SERPL-MCNC: 0.9 MG/DL — SIGNIFICANT CHANGE UP (ref 0.2–1.2)
BUN SERPL-MCNC: 32 MG/DL — HIGH (ref 7–23)
BUN SERPL-MCNC: 35 MG/DL — HIGH (ref 7–23)
BUN SERPL-MCNC: 38 MG/DL — HIGH (ref 7–23)
CALCIUM SERPL-MCNC: 8.6 MG/DL — SIGNIFICANT CHANGE UP (ref 8.4–10.5)
CALCIUM SERPL-MCNC: 8.7 MG/DL — SIGNIFICANT CHANGE UP (ref 8.4–10.5)
CALCIUM SERPL-MCNC: 8.8 MG/DL — SIGNIFICANT CHANGE UP (ref 8.4–10.5)
CHLORIDE SERPL-SCNC: 100 MMOL/L — SIGNIFICANT CHANGE UP (ref 96–108)
CHLORIDE SERPL-SCNC: 102 MMOL/L — SIGNIFICANT CHANGE UP (ref 96–108)
CHLORIDE SERPL-SCNC: 103 MMOL/L — SIGNIFICANT CHANGE UP (ref 96–108)
CK MB BLD-MCNC: 2.8 % — SIGNIFICANT CHANGE UP (ref 0–3.5)
CK MB CFR SERPL CALC: 7.3 NG/ML — HIGH (ref 0–6.7)
CK SERPL-CCNC: 260 U/L — HIGH (ref 30–200)
CO2 BLDA-SCNC: 25 MMOL/L — SIGNIFICANT CHANGE UP (ref 22–30)
CO2 BLDMV-SCNC: 27 MMOL/L — SIGNIFICANT CHANGE UP (ref 21–29)
CO2 BLDMV-SCNC: 28 MMOL/L — SIGNIFICANT CHANGE UP (ref 21–29)
CO2 SERPL-SCNC: 23 MMOL/L — SIGNIFICANT CHANGE UP (ref 22–31)
CREAT SERPL-MCNC: 1.1 MG/DL — SIGNIFICANT CHANGE UP (ref 0.5–1.3)
CREAT SERPL-MCNC: 1.17 MG/DL — SIGNIFICANT CHANGE UP (ref 0.5–1.3)
CREAT SERPL-MCNC: 1.32 MG/DL — HIGH (ref 0.5–1.3)
CULTURE RESULTS: SIGNIFICANT CHANGE UP
EOSINOPHIL # BLD AUTO: 0 K/UL — SIGNIFICANT CHANGE UP (ref 0–0.5)
EOSINOPHIL NFR BLD AUTO: 0.1 % — SIGNIFICANT CHANGE UP (ref 0–6)
EOSINOPHIL NFR BLD AUTO: 0.3 % — SIGNIFICANT CHANGE UP (ref 0–6)
EOSINOPHIL NFR BLD AUTO: 0.4 % — SIGNIFICANT CHANGE UP (ref 0–6)
GAS PNL BLDA: SIGNIFICANT CHANGE UP
GAS PNL BLDMV: SIGNIFICANT CHANGE UP
GAS PNL BLDMV: SIGNIFICANT CHANGE UP
GLUCOSE BLDC GLUCOMTR-MCNC: 163 MG/DL — HIGH (ref 70–99)
GLUCOSE BLDC GLUCOMTR-MCNC: 181 MG/DL — HIGH (ref 70–99)
GLUCOSE SERPL-MCNC: 146 MG/DL — HIGH (ref 70–99)
GLUCOSE SERPL-MCNC: 150 MG/DL — HIGH (ref 70–99)
GLUCOSE SERPL-MCNC: 197 MG/DL — HIGH (ref 70–99)
HCO3 BLDA-SCNC: 24 MMOL/L — SIGNIFICANT CHANGE UP (ref 21–29)
HCO3 BLDMV-SCNC: 26 MMOL/L — SIGNIFICANT CHANGE UP (ref 20–28)
HCO3 BLDMV-SCNC: 27 MMOL/L — SIGNIFICANT CHANGE UP (ref 20–28)
HCT VFR BLD CALC: 28.7 % — LOW (ref 39–50)
HCT VFR BLD CALC: 29 % — LOW (ref 39–50)
HCT VFR BLD CALC: 32.4 % — LOW (ref 39–50)
HGB BLD-MCNC: 10.2 G/DL — LOW (ref 13–17)
HGB BLD-MCNC: 9.8 G/DL — LOW (ref 13–17)
HGB BLD-MCNC: 9.9 G/DL — LOW (ref 13–17)
HOROWITZ INDEX BLDA+IHG-RTO: 35 — SIGNIFICANT CHANGE UP
HOROWITZ INDEX BLDMV+IHG-RTO: 32 — SIGNIFICANT CHANGE UP
HOROWITZ INDEX BLDMV+IHG-RTO: 35 — SIGNIFICANT CHANGE UP
INR BLD: 1.41 RATIO — HIGH (ref 0.88–1.16)
LACTATE SERPL-SCNC: 1 MMOL/L — SIGNIFICANT CHANGE UP (ref 0.7–2)
LACTATE SERPL-SCNC: 1.3 MMOL/L — SIGNIFICANT CHANGE UP (ref 0.7–2)
LYMPHOCYTES # BLD AUTO: 0.9 K/UL — LOW (ref 1–3.3)
LYMPHOCYTES # BLD AUTO: 1.4 K/UL — SIGNIFICANT CHANGE UP (ref 1–3.3)
LYMPHOCYTES # BLD AUTO: 1.6 K/UL — SIGNIFICANT CHANGE UP (ref 1–3.3)
LYMPHOCYTES # BLD AUTO: 13.6 % — SIGNIFICANT CHANGE UP (ref 13–44)
LYMPHOCYTES # BLD AUTO: 14.1 % — SIGNIFICANT CHANGE UP (ref 13–44)
LYMPHOCYTES # BLD AUTO: 9.4 % — LOW (ref 13–44)
MAGNESIUM SERPL-MCNC: 1.9 MG/DL — SIGNIFICANT CHANGE UP (ref 1.6–2.6)
MAGNESIUM SERPL-MCNC: 2.1 MG/DL — SIGNIFICANT CHANGE UP (ref 1.6–2.6)
MAGNESIUM SERPL-MCNC: 2.1 MG/DL — SIGNIFICANT CHANGE UP (ref 1.6–2.6)
MCHC RBC-ENTMCNC: 29.7 PG — SIGNIFICANT CHANGE UP (ref 27–34)
MCHC RBC-ENTMCNC: 31.4 GM/DL — LOW (ref 32–36)
MCHC RBC-ENTMCNC: 32 PG — SIGNIFICANT CHANGE UP (ref 27–34)
MCHC RBC-ENTMCNC: 32.4 PG — SIGNIFICANT CHANGE UP (ref 27–34)
MCHC RBC-ENTMCNC: 33.8 GM/DL — SIGNIFICANT CHANGE UP (ref 32–36)
MCHC RBC-ENTMCNC: 34.6 GM/DL — SIGNIFICANT CHANGE UP (ref 32–36)
MCV RBC AUTO: 93.7 FL — SIGNIFICANT CHANGE UP (ref 80–100)
MCV RBC AUTO: 94.4 FL — SIGNIFICANT CHANGE UP (ref 80–100)
MCV RBC AUTO: 94.6 FL — SIGNIFICANT CHANGE UP (ref 80–100)
MONOCYTES # BLD AUTO: 1 K/UL — HIGH (ref 0–0.9)
MONOCYTES # BLD AUTO: 1.2 K/UL — HIGH (ref 0–0.9)
MONOCYTES # BLD AUTO: 1.2 K/UL — HIGH (ref 0–0.9)
MONOCYTES NFR BLD AUTO: 10.5 % — SIGNIFICANT CHANGE UP (ref 2–14)
MONOCYTES NFR BLD AUTO: 10.8 % — SIGNIFICANT CHANGE UP (ref 2–14)
MONOCYTES NFR BLD AUTO: 11.4 % — SIGNIFICANT CHANGE UP (ref 2–14)
NEUTROPHILS # BLD AUTO: 7.7 K/UL — HIGH (ref 1.8–7.4)
NEUTROPHILS # BLD AUTO: 7.9 K/UL — HIGH (ref 1.8–7.4)
NEUTROPHILS # BLD AUTO: 8.2 K/UL — HIGH (ref 1.8–7.4)
NEUTROPHILS NFR BLD AUTO: 74.7 % — SIGNIFICANT CHANGE UP (ref 43–77)
NEUTROPHILS NFR BLD AUTO: 75 % — SIGNIFICANT CHANGE UP (ref 43–77)
NEUTROPHILS NFR BLD AUTO: 79.2 % — HIGH (ref 43–77)
O2 CT VFR BLD CALC: 30 MMHG — SIGNIFICANT CHANGE UP (ref 30–65)
O2 CT VFR BLD CALC: 33 MMHG — SIGNIFICANT CHANGE UP (ref 30–65)
PCO2 BLDA: 30 MMHG — LOW (ref 32–46)
PCO2 BLDMV: 34 MMHG — SIGNIFICANT CHANGE UP (ref 30–65)
PCO2 BLDMV: 41 MMHG — SIGNIFICANT CHANGE UP (ref 30–65)
PH BLDA: 7.5 — HIGH (ref 7.35–7.45)
PH BLDMV: 7.44 — SIGNIFICANT CHANGE UP (ref 7.32–7.45)
PH BLDMV: 7.49 — HIGH (ref 7.32–7.45)
PHOSPHATE SERPL-MCNC: 1.7 MG/DL — LOW (ref 2.5–4.5)
PHOSPHATE SERPL-MCNC: 2 MG/DL — LOW (ref 2.5–4.5)
PHOSPHATE SERPL-MCNC: 2.2 MG/DL — LOW (ref 2.5–4.5)
PLATELET # BLD AUTO: 190 K/UL — SIGNIFICANT CHANGE UP (ref 150–400)
PLATELET # BLD AUTO: 207 K/UL — SIGNIFICANT CHANGE UP (ref 150–400)
PLATELET # BLD AUTO: 224 K/UL — SIGNIFICANT CHANGE UP (ref 150–400)
PO2 BLDA: 125 MMHG — HIGH (ref 74–108)
POTASSIUM SERPL-MCNC: 3.1 MMOL/L — LOW (ref 3.5–5.3)
POTASSIUM SERPL-MCNC: 3.7 MMOL/L — SIGNIFICANT CHANGE UP (ref 3.5–5.3)
POTASSIUM SERPL-MCNC: 3.9 MMOL/L — SIGNIFICANT CHANGE UP (ref 3.5–5.3)
POTASSIUM SERPL-SCNC: 3.1 MMOL/L — LOW (ref 3.5–5.3)
POTASSIUM SERPL-SCNC: 3.7 MMOL/L — SIGNIFICANT CHANGE UP (ref 3.5–5.3)
POTASSIUM SERPL-SCNC: 3.9 MMOL/L — SIGNIFICANT CHANGE UP (ref 3.5–5.3)
PROCALCITONIN SERPL-MCNC: 2.64 NG/ML — HIGH (ref 0.02–0.1)
PROCALCITONIN SERPL-MCNC: 4.36 NG/ML — HIGH (ref 0.02–0.1)
PROT SERPL-MCNC: 6.2 G/DL — SIGNIFICANT CHANGE UP (ref 6–8.3)
PROT SERPL-MCNC: 6.5 G/DL — SIGNIFICANT CHANGE UP (ref 6–8.3)
PROT SERPL-MCNC: 6.6 G/DL — SIGNIFICANT CHANGE UP (ref 6–8.3)
PROTHROM AB SERPL-ACNC: 16.2 SEC — HIGH (ref 10–12.9)
RBC # BLD: 3.06 M/UL — LOW (ref 4.2–5.8)
RBC # BLD: 3.07 M/UL — LOW (ref 4.2–5.8)
RBC # BLD: 3.44 M/UL — LOW (ref 4.2–5.8)
RBC # FLD: 11.9 % — SIGNIFICANT CHANGE UP (ref 10.3–14.5)
RBC # FLD: 11.9 % — SIGNIFICANT CHANGE UP (ref 10.3–14.5)
RBC # FLD: 12 % — SIGNIFICANT CHANGE UP (ref 10.3–14.5)
SAO2 % BLDA: 99 % — HIGH (ref 92–96)
SAO2 % BLDMV: 49 % — LOW (ref 60–90)
SAO2 % BLDMV: 60 % — SIGNIFICANT CHANGE UP (ref 60–90)
SODIUM SERPL-SCNC: 137 MMOL/L — SIGNIFICANT CHANGE UP (ref 135–145)
SODIUM SERPL-SCNC: 138 MMOL/L — SIGNIFICANT CHANGE UP (ref 135–145)
SODIUM SERPL-SCNC: 141 MMOL/L — SIGNIFICANT CHANGE UP (ref 135–145)
SPECIMEN SOURCE: SIGNIFICANT CHANGE UP
TROPONIN T, HIGH SENSITIVITY RESULT: 3194 NG/L — HIGH (ref 0–51)
WBC # BLD: 10.5 K/UL — SIGNIFICANT CHANGE UP (ref 3.8–10.5)
WBC # BLD: 11 K/UL — HIGH (ref 3.8–10.5)
WBC # BLD: 9.7 K/UL — SIGNIFICANT CHANGE UP (ref 3.8–10.5)
WBC # FLD AUTO: 10.5 K/UL — SIGNIFICANT CHANGE UP (ref 3.8–10.5)
WBC # FLD AUTO: 11 K/UL — HIGH (ref 3.8–10.5)
WBC # FLD AUTO: 9.7 K/UL — SIGNIFICANT CHANGE UP (ref 3.8–10.5)

## 2019-07-08 PROCEDURE — 76700 US EXAM ABDOM COMPLETE: CPT | Mod: 26

## 2019-07-08 PROCEDURE — 71045 X-RAY EXAM CHEST 1 VIEW: CPT | Mod: 26

## 2019-07-08 RX ORDER — MAGNESIUM SULFATE 500 MG/ML
2 VIAL (ML) INJECTION ONCE
Refills: 0 | Status: COMPLETED | OUTPATIENT
Start: 2019-07-08 | End: 2019-07-08

## 2019-07-08 RX ORDER — POTASSIUM CHLORIDE 20 MEQ
20 PACKET (EA) ORAL ONCE
Refills: 0 | Status: COMPLETED | OUTPATIENT
Start: 2019-07-08 | End: 2019-07-08

## 2019-07-08 RX ORDER — INSULIN GLARGINE 100 [IU]/ML
6 INJECTION, SOLUTION SUBCUTANEOUS AT BEDTIME
Refills: 0 | Status: DISCONTINUED | OUTPATIENT
Start: 2019-07-08 | End: 2019-07-11

## 2019-07-08 RX ORDER — POTASSIUM PHOSPHATE, MONOBASIC POTASSIUM PHOSPHATE, DIBASIC 236; 224 MG/ML; MG/ML
30 INJECTION, SOLUTION INTRAVENOUS ONCE
Refills: 0 | Status: COMPLETED | OUTPATIENT
Start: 2019-07-08 | End: 2019-07-08

## 2019-07-08 RX ORDER — ATORVASTATIN CALCIUM 80 MG/1
40 TABLET, FILM COATED ORAL AT BEDTIME
Refills: 0 | Status: DISCONTINUED | OUTPATIENT
Start: 2019-07-08 | End: 2019-07-09

## 2019-07-08 RX ORDER — POTASSIUM CHLORIDE 20 MEQ
40 PACKET (EA) ORAL
Refills: 0 | Status: DISCONTINUED | OUTPATIENT
Start: 2019-07-08 | End: 2019-07-08

## 2019-07-08 RX ORDER — FUROSEMIDE 40 MG
60 TABLET ORAL ONCE
Refills: 0 | Status: DISCONTINUED | OUTPATIENT
Start: 2019-07-08 | End: 2019-07-08

## 2019-07-08 RX ORDER — POTASSIUM CHLORIDE 20 MEQ
20 PACKET (EA) ORAL
Refills: 0 | Status: COMPLETED | OUTPATIENT
Start: 2019-07-08 | End: 2019-07-08

## 2019-07-08 RX ORDER — CALCIUM GLUCONATE 100 MG/ML
2 VIAL (ML) INTRAVENOUS ONCE
Refills: 0 | Status: COMPLETED | OUTPATIENT
Start: 2019-07-08 | End: 2019-07-08

## 2019-07-08 RX ORDER — ATORVASTATIN CALCIUM 80 MG/1
40 TABLET, FILM COATED ORAL AT BEDTIME
Refills: 0 | Status: DISCONTINUED | OUTPATIENT
Start: 2019-07-08 | End: 2019-07-08

## 2019-07-08 RX ORDER — INSULIN LISPRO 100/ML
VIAL (ML) SUBCUTANEOUS EVERY 6 HOURS
Refills: 0 | Status: DISCONTINUED | OUTPATIENT
Start: 2019-07-08 | End: 2019-07-10

## 2019-07-08 RX ADMIN — INSULIN GLARGINE 6 UNIT(S): 100 INJECTION, SOLUTION SUBCUTANEOUS at 23:45

## 2019-07-08 RX ADMIN — CHLORHEXIDINE GLUCONATE 1 APPLICATION(S): 213 SOLUTION TOPICAL at 05:21

## 2019-07-08 RX ADMIN — Medication 100 MILLIEQUIVALENT(S): at 18:14

## 2019-07-08 RX ADMIN — Medication 100 MILLIEQUIVALENT(S): at 20:15

## 2019-07-08 RX ADMIN — Medication 1 APPLICATION(S): at 14:42

## 2019-07-08 RX ADMIN — POTASSIUM PHOSPHATE, MONOBASIC POTASSIUM PHOSPHATE, DIBASIC 83.33 MILLIMOLE(S): 236; 224 INJECTION, SOLUTION INTRAVENOUS at 23:27

## 2019-07-08 RX ADMIN — HEPARIN SODIUM 2200 UNIT(S)/HR: 5000 INJECTION INTRAVENOUS; SUBCUTANEOUS at 12:36

## 2019-07-08 RX ADMIN — HEPARIN SODIUM 2200 UNIT(S)/HR: 5000 INJECTION INTRAVENOUS; SUBCUTANEOUS at 18:19

## 2019-07-08 RX ADMIN — Medication 2: at 05:26

## 2019-07-08 RX ADMIN — CLOPIDOGREL BISULFATE 75 MILLIGRAM(S): 75 TABLET, FILM COATED ORAL at 09:56

## 2019-07-08 RX ADMIN — Medication 200 GRAM(S): at 14:45

## 2019-07-08 RX ADMIN — Medication 50 GRAM(S): at 17:59

## 2019-07-08 RX ADMIN — PIPERACILLIN AND TAZOBACTAM 25 GRAM(S): 4; .5 INJECTION, POWDER, LYOPHILIZED, FOR SOLUTION INTRAVENOUS at 17:13

## 2019-07-08 RX ADMIN — Medication 5 MG/HR: at 07:02

## 2019-07-08 RX ADMIN — DEXMEDETOMIDINE HYDROCHLORIDE IN 0.9% SODIUM CHLORIDE 4.47 MICROGRAM(S)/KG/HR: 4 INJECTION INTRAVENOUS at 02:00

## 2019-07-08 RX ADMIN — ATORVASTATIN CALCIUM 40 MILLIGRAM(S): 80 TABLET, FILM COATED ORAL at 23:27

## 2019-07-08 RX ADMIN — PANTOPRAZOLE SODIUM 40 MILLIGRAM(S): 20 TABLET, DELAYED RELEASE ORAL at 09:56

## 2019-07-08 RX ADMIN — Medication 100 MILLIEQUIVALENT(S): at 17:13

## 2019-07-08 RX ADMIN — Medication 1 APPLICATION(S): at 21:58

## 2019-07-08 RX ADMIN — Medication 20 MILLIEQUIVALENT(S): at 11:28

## 2019-07-08 RX ADMIN — Medication 1 APPLICATION(S): at 05:22

## 2019-07-08 RX ADMIN — Medication 250 MILLIGRAM(S): at 05:43

## 2019-07-08 RX ADMIN — HEPARIN SODIUM 2000 UNIT(S)/HR: 5000 INJECTION INTRAVENOUS; SUBCUTANEOUS at 05:47

## 2019-07-08 RX ADMIN — Medication 81 MILLIGRAM(S): at 09:56

## 2019-07-08 RX ADMIN — CHLORHEXIDINE GLUCONATE 15 MILLILITER(S): 213 SOLUTION TOPICAL at 17:13

## 2019-07-08 RX ADMIN — Medication 20 MILLIEQUIVALENT(S): at 02:06

## 2019-07-08 RX ADMIN — Medication 5 MG/HR: at 03:07

## 2019-07-08 RX ADMIN — PIPERACILLIN AND TAZOBACTAM 25 GRAM(S): 4; .5 INJECTION, POWDER, LYOPHILIZED, FOR SOLUTION INTRAVENOUS at 09:57

## 2019-07-08 RX ADMIN — CHLORHEXIDINE GLUCONATE 15 MILLILITER(S): 213 SOLUTION TOPICAL at 05:21

## 2019-07-08 RX ADMIN — Medication 1: at 23:45

## 2019-07-08 RX ADMIN — PIPERACILLIN AND TAZOBACTAM 25 GRAM(S): 4; .5 INJECTION, POWDER, LYOPHILIZED, FOR SOLUTION INTRAVENOUS at 02:01

## 2019-07-08 NOTE — PROGRESS NOTE ADULT - ASSESSMENT
70 y/o M w/ PMH of DM, HTN with poor medication compliance and PAD is admitted to CCU for cardiogenic shock management w/ NSTEMI and a course complicated by hypoxemic respiratory failure secondary to suspected aspiration PNA requiring intubation now in septic shock      #Neuro  -Intubated and sedated currently on versed and levophed  -Wean off as tolerated and reassess mental status; as per nephew pt has intact cognition and is able to carry out his -ADLs at baseline    #Respiratory:  -Intubated for airway protection  -Vent settings: RR 14,   PEEP 5  FiO2 40  -Monitor ABGs   -CPAP trial once more clinically stable     #CV   -Coronaries  -s/p aspirin, plavix load for NSTEMI, will continue with maintenance dose of aspirin/plavix  -c/w hep gtt  -Pt may need cath once more clinically stable  -f/u lipid profile    Heart function  -Pt in cardiogenic shock with EF<15% and end-organ organ damage with hemodynamic instability requiring pressor suport  -will obtain repeat ECHO: as per documentation, ECHO at [facility] showed EF<15% and global hypokinesis  -c/w dobutamine 5 and levo; downtitrate as tolerated  -Consider swan placement for hemodynamic monitoring  -Monitor I/Os    #GI  -Pt w/ transaminitis 1811/1148 likely in the setting of cardiogenic/septic shock  -Hold atorvastatin for now  -Will continue to trend CMPs  -NG tube feeds while sedated/ unresponsive    #Renal  -Pt with VINAY to 1.6, baseline at around 1; likely in the setting of cardiogenic/spetic shock  -Trend CMP, monitor UO, avoid nephrotoxic agents, renally dose meds, send urine studies      #ID   -Pt w/ fevers and leukocytosis w/ R lung opacity on CXR and evidence of end organ damage with transaminitis and elevated lactate w/ hemodynamic instability requiring pressor support suggestive of septic shock  -UA negative RVP positive for coronavirus; blood culture, urine legionella pending;  started on broad spectrum Abx with vanc/zosyn pending infectious w/u    #Endo  -Pt w/ hx of DM unknown duration and non-compliance  -As per chart review, pt on metformin 500 BID at home and lantus 20 daily  -f/u AI1C  -start in ISS and reduced lantus dose 10mg daily; will titrate as needed  -Should f/u w/ pharmacy in AM to confirm medication regimen    #Heme  Pt normocytic anemia  Will send hemolysis labs, iron studies, B12, folate; no active source of bleed identified    #DVT ppx  hep gtt  Vascular consult for LLE fem-pt bypass 68 y/o M w/ PMH of DM, HTN with poor medication compliance and PAD is admitted to CCU for cardiogenic shock management w/ NSTEMI and a course complicated by hypoxemic respiratory failure secondary to suspected aspiration PNA requiring intubation now extubated 7/8.        #Neuro  -off all sedation and extubated on 7/8  -reassess mental status     #Respiratory:  -extubated on 7/8  -wean O2 as tolerated     #CV   NSTEMI  -s/p aspirin, plavix load for NSTEMI, will continue with maintenance dose of aspirin/plavix  -c/w hep gtt  -c/w atorvastatin     Cardiogenic shock -resolved  -required dobutamine and levophed but currently off  -TTE: EF 20-25% with global hypokinesis and mod dilated LV/LA  -currently has swan for hemodynamic monitoring: CO 4.3; CI 2; SVR: 1116.3; MVO2: 49; CVP 4-10    PAD  -VA duplex of b/l LE: 07/06: On the right, there is a severe flow-limiting stenosis of the popliteal artery and the trifurcation arteries are occluded in the calf.  On the left, the bypass graft is occluded, the popliteal artery is occluded and the trifurcation arteries are occluded.  -vascular surgery on board: no surgical intervention at this time;   -will c/s vascular cardiology    #Pulm  aspiration pneumonia  -c/w vanc/zosyn (07/06-) day 3  -f/u vanc trough  -continue to monitor    Pulm edema  -c/w lasix ggt 10mg/hr  -strict I/o  -continue to monitor    #GI  -Pt w/ transaminitis 1811/1148 likely in the setting of cardiogenic/septic shock; currently trending down   -Will continue to trend CMPs  -NG tube feeds for now; S&S later as patient becomes more alert     #Renal  -Pt with VINAY to 1.6, baseline at around 1; likely in the setting of cardiogenic/spetic shock  -Trend CMP, monitor UO, avoid nephrotoxic agents, renally dose meds,   -currently 1.3    #ID   -Pt w/ fevers and leukocytosis w/ R lung opacity on CXR and evidence of end organ damage with transaminitis and elevated lactate w/ hemodynamic instability requiring pressor support suggestive of septic shock; currently resolved  -UA negative; RVP positive for coronavirus;  -blood cx: NGTD;    -urine legionella negative   -c/w Abx with vanc/zosyn (day 3)     #Endo  -Pt w/ hx of DM unknown duration and non-compliance  -As per chart review, pt on metformin 500 BID at home and lantus 20 daily  -HbA1c: 10  -start in ISS and c/w Lantus 15U    #Heme  Pt normocytic anemia  -vitamin B12/folate normal  -iron studies consistent with anemia of chronic disease   -continue to monitor     #DVT ppx  hep gtt

## 2019-07-08 NOTE — PROGRESS NOTE ADULT - SUBJECTIVE AND OBJECTIVE BOX
PATIENT:  JIM PAREDES  [id number]    CHIEF COMPLAINT:  Patient is a 69y old  Male who presents with a chief complaint of NSTEMI (07 Jul 2019 19:25)      INTERVAL HISTORY / SUBJECTIVE:        MEDICATIONS  (STANDING):  aspirin  chewable 81 milliGRAM(s) Oral daily  chlorhexidine 0.12% Liquid 15 milliLiter(s) Oral Mucosa two times a day  chlorhexidine 2% Cloths 1 Application(s) Topical <User Schedule>  clopidogrel Tablet 75 milliGRAM(s) Oral daily  dexmedetomidine Infusion 0.2 MICROgram(s)/kG/Hr (4.47 mL/Hr) IV Continuous <Continuous>  dextrose 5%. 1000 milliLiter(s) (50 mL/Hr) IV Continuous <Continuous>  dextrose 50% Injectable 12.5 Gram(s) IV Push once  dextrose 50% Injectable 25 Gram(s) IV Push once  dextrose 50% Injectable 25 Gram(s) IV Push once  furosemide Infusion 10 mG/Hr (5 mL/Hr) IV Continuous <Continuous>  heparin  Infusion. 1800 Unit(s)/Hr (18 mL/Hr) IV Continuous <Continuous>  insulin glargine Injectable (LANTUS) 15 Unit(s) SubCutaneous at bedtime  insulin lispro (HumaLOG) corrective regimen sliding scale   SubCutaneous every 6 hours  pantoprazole  Injectable 40 milliGRAM(s) IV Push daily  petrolatum Ophthalmic Ointment 1 Application(s) Both EYES every 8 hours  piperacillin/tazobactam IVPB.. 3.375 Gram(s) IV Intermittent every 8 hours  vancomycin  IVPB      vancomycin  IVPB 1000 milliGRAM(s) IV Intermittent every 12 hours    MEDICATIONS  (PRN):  dextrose 40% Gel 15 Gram(s) Oral once PRN Blood Glucose LESS THAN 70 milliGRAM(s)/deciliter  glucagon  Injectable 1 milliGRAM(s) IntraMuscular once PRN Glucose LESS THAN 70 milligrams/deciliter  heparin  Injectable 5300 Unit(s) IV Push every 6 hours PRN For aPTT less than 40      OBJECTIVE:  ICU Vital Signs Last 24 Hrs  T(C): 38 (08 Jul 2019 07:00), Max: 38.2 (08 Jul 2019 01:00)  T(F): 100.4 (08 Jul 2019 07:00), Max: 100.8 (08 Jul 2019 01:00)  HR: 84 (08 Jul 2019 07:00) (76 - 111)  BP: 90/63 (07 Jul 2019 19:45) (90/63 - 90/63)  BP(mean): 71 (07 Jul 2019 19:45) (71 - 71)  ABP: 96/54 (08 Jul 2019 07:00) (90/52 - 113/57)  ABP(mean): 68 (08 Jul 2019 07:00) (60 - 76)  RR: 18 (08 Jul 2019 07:00) (14 - 20)  SpO2: 99% (08 Jul 2019 07:00) (95% - 100%)    Mode: AC/ CMV (Assist Control/ Continuous Mandatory Ventilation)  RR (machine): 12  TV (machine): 500  FiO2: 35  PEEP: 5  ITime: 1  MAP: 8  PIP: 16    Adult Advanced Hemodynamics Last 24 Hrs  CVP(mm Hg): 7 (08 Jul 2019 07:00) (6 - 14)  CVP(cm H2O): --  CO: 4.3 (08 Jul 2019 05:00) (3.9 - 4.3)  CI: 2 (08 Jul 2019 05:00) (1.8 - 2)  PA: 36/13 (08 Jul 2019 07:00) (30/12 - 48/19)  PA(mean): 25 (08 Jul 2019 07:00) (21 - 36)  PCWP: --  SVR: 1331 (07 Jul 2019 21:30) (1331 - 1331)  SVRI: 2885 (07 Jul 2019 21:30) (2885 - 2885)  PVR: --  PVRI: --  CAPILLARY BLOOD GLUCOSE      POCT Blood Glucose.: 181 mg/dL (08 Jul 2019 05:06)  POCT Blood Glucose.: 285 mg/dL (07 Jul 2019 21:27)  POCT Blood Glucose.: 254 mg/dL (07 Jul 2019 17:19)  POCT Blood Glucose.: 298 mg/dL (07 Jul 2019 11:28)    CAPILLARY BLOOD GLUCOSE      POCT Blood Glucose.: 181 mg/dL (08 Jul 2019 05:06)      I&O's Summary    07 Jul 2019 07:01  -  08 Jul 2019 07:00  --------------------------------------------------------  IN: 2147 mL / OUT: 3350 mL / NET: -1203 mL        PHYSICAL EXAM:       General: Comfortable, NAD       HEENT: Head atraumatic; EOMI, PERRL; normal oropharynx, no oral lesions       Neck: Supple, no JVD, no LAD, thyroid normal       Chest/Lungs: Clear to ausculation bilaterally; no wheezes, rales or rhonchi       Heart: RRR, normal S1, S2, no murmurs       Abdomen: Soft, ND, NTTP       : No CVA tenderness       Extremities: Peripheral pulses 2+ bilaterally; no LE edema or cyanosis       Skin: Warm, well-perfused; no rashes or lesions       Neuro: AOx3, no focal deficits      TELEMETRY:     EKG:     LABS:  ABG - ( 08 Jul 2019 05:09 )  pH, Arterial: 7.50  pH, Blood: x     /  pCO2: 30    /  pO2: 125   / HCO3: 24    / Base Excess: 1.3   /  SaO2: 99                                      9.8    9.7   )-----------( 190      ( 08 Jul 2019 05:11 )             29.0     07-08    141  |  103  |  38<H>  ----------------------------<  197<H>  3.9   |  23  |  1.32<H>    Ca    8.7      08 Jul 2019 05:10  Phos  2.2     07-08  Mg     2.1     07-08    TPro  6.6  /  Alb  2.8<L>  /  TBili  0.6  /  DBili  x   /  AST  190<H>  /  ALT  517<H>  /  AlkPhos  210<H>  07-08    LIVER FUNCTIONS - ( 08 Jul 2019 05:10 )  Alb: 2.8 g/dL / Pro: 6.6 g/dL / ALK PHOS: 210 U/L / ALT: 517 U/L / AST: 190 U/L / GGT: x           PT/INR - ( 08 Jul 2019 05:11 )   PT: 16.2 sec;   INR: 1.41 ratio         PTT - ( 08 Jul 2019 05:11 )  PTT:45.6 sec  CKMB Units: 7.3 ng/mL (07-08 @ 05:10)  Creatine Kinase, Serum: 260 U/L (07-08 @ 05:10)  Creatine Kinase, Serum: 273 U/L (07-07 @ 21:31)  CKMB Units: 9.6 ng/mL (07-07 @ 21:31)  CKMB Units: 10.6 ng/mL (07-07 @ 15:10)  Creatine Kinase, Serum: 282 U/L (07-07 @ 15:10)        IMAGING: PATIENT:  JIM PAREDES  [id number]    CHIEF COMPLAINT:  Patient is a 69y old  Male who presents with a chief complaint of NSTEMI (07 Jul 2019 19:25)      INTERVAL HISTORY / SUBJECTIVE:    No acute events overnight.  Patient was intubated this AM and off sedation.  Patient self-extubated.  Following commands.        MEDICATIONS  (STANDING):  aspirin  chewable 81 milliGRAM(s) Oral daily  chlorhexidine 0.12% Liquid 15 milliLiter(s) Oral Mucosa two times a day  chlorhexidine 2% Cloths 1 Application(s) Topical <User Schedule>  clopidogrel Tablet 75 milliGRAM(s) Oral daily  dexmedetomidine Infusion 0.2 MICROgram(s)/kG/Hr (4.47 mL/Hr) IV Continuous <Continuous>  dextrose 5%. 1000 milliLiter(s) (50 mL/Hr) IV Continuous <Continuous>  dextrose 50% Injectable 12.5 Gram(s) IV Push once  dextrose 50% Injectable 25 Gram(s) IV Push once  dextrose 50% Injectable 25 Gram(s) IV Push once  furosemide Infusion 10 mG/Hr (5 mL/Hr) IV Continuous <Continuous>  heparin  Infusion. 1800 Unit(s)/Hr (18 mL/Hr) IV Continuous <Continuous>  insulin glargine Injectable (LANTUS) 15 Unit(s) SubCutaneous at bedtime  insulin lispro (HumaLOG) corrective regimen sliding scale   SubCutaneous every 6 hours  pantoprazole  Injectable 40 milliGRAM(s) IV Push daily  petrolatum Ophthalmic Ointment 1 Application(s) Both EYES every 8 hours  piperacillin/tazobactam IVPB.. 3.375 Gram(s) IV Intermittent every 8 hours  vancomycin  IVPB      vancomycin  IVPB 1000 milliGRAM(s) IV Intermittent every 12 hours    MEDICATIONS  (PRN):  dextrose 40% Gel 15 Gram(s) Oral once PRN Blood Glucose LESS THAN 70 milliGRAM(s)/deciliter  glucagon  Injectable 1 milliGRAM(s) IntraMuscular once PRN Glucose LESS THAN 70 milligrams/deciliter  heparin  Injectable 5300 Unit(s) IV Push every 6 hours PRN For aPTT less than 40      OBJECTIVE:  ICU Vital Signs Last 24 Hrs  T(C): 38 (08 Jul 2019 07:00), Max: 38.2 (08 Jul 2019 01:00)  T(F): 100.4 (08 Jul 2019 07:00), Max: 100.8 (08 Jul 2019 01:00)  HR: 84 (08 Jul 2019 07:00) (76 - 111)  BP: 90/63 (07 Jul 2019 19:45) (90/63 - 90/63)  BP(mean): 71 (07 Jul 2019 19:45) (71 - 71)  ABP: 96/54 (08 Jul 2019 07:00) (90/52 - 113/57)  ABP(mean): 68 (08 Jul 2019 07:00) (60 - 76)  RR: 18 (08 Jul 2019 07:00) (14 - 20)  SpO2: 99% (08 Jul 2019 07:00) (95% - 100%)    Mode: AC/ CMV (Assist Control/ Continuous Mandatory Ventilation)  RR (machine): 12  TV (machine): 500  FiO2: 35  PEEP: 5  ITime: 1  MAP: 8  PIP: 16    Adult Advanced Hemodynamics Last 24 Hrs  CVP(mm Hg): 7 (08 Jul 2019 07:00) (6 - 14)  CVP(cm H2O): --  CO: 4.3 (08 Jul 2019 05:00) (3.9 - 4.3)  CI: 2 (08 Jul 2019 05:00) (1.8 - 2)  PA: 36/13 (08 Jul 2019 07:00) (30/12 - 48/19)  PA(mean): 25 (08 Jul 2019 07:00) (21 - 36)  PCWP: --  SVR: 1331 (07 Jul 2019 21:30) (1331 - 1331)  SVRI: 2885 (07 Jul 2019 21:30) (2885 - 2885)  PVR: --  PVRI: --  CAPILLARY BLOOD GLUCOSE      POCT Blood Glucose.: 181 mg/dL (08 Jul 2019 05:06)  POCT Blood Glucose.: 285 mg/dL (07 Jul 2019 21:27)  POCT Blood Glucose.: 254 mg/dL (07 Jul 2019 17:19)  POCT Blood Glucose.: 298 mg/dL (07 Jul 2019 11:28)    CAPILLARY BLOOD GLUCOSE      POCT Blood Glucose.: 181 mg/dL (08 Jul 2019 05:06)      I&O's Summary    07 Jul 2019 07:01  -  08 Jul 2019 07:00  --------------------------------------------------------  IN: 2147 mL / OUT: 3350 mL / NET: -1203 mL        PHYSICAL EXAM:       General: mild agitated; has mittens trying to take them off        HEENT: Head atraumatic; EOMI, PERRL;        Neck: R swan-man in R pulm artery        Chest/Lungs: Clear to ausculation bilaterally; no wheezes, rales or rhonchi       Heart: RRR, normal S1, S2, no murmurs       Abdomen: Soft, ND, NTTP       Extremities: b/l feet and shins cold to touch; no DP/PT pulses; popliteal pulse by dopplers; staples present on L leg        Neuro: following commands       TELEMETRY: no events overnight         LABS:  ABG - ( 08 Jul 2019 05:09 )  pH, Arterial: 7.50  pH, Blood: x     /  pCO2: 30    /  pO2: 125   / HCO3: 24    / Base Excess: 1.3   /  SaO2: 99                                      9.8    9.7   )-----------( 190      ( 08 Jul 2019 05:11 )             29.0     07-08    141  |  103  |  38<H>  ----------------------------<  197<H>  3.9   |  23  |  1.32<H>    Ca    8.7      08 Jul 2019 05:10  Phos  2.2     07-08  Mg     2.1     07-08    TPro  6.6  /  Alb  2.8<L>  /  TBili  0.6  /  DBili  x   /  AST  190<H>  /  ALT  517<H>  /  AlkPhos  210<H>  07-08    LIVER FUNCTIONS - ( 08 Jul 2019 05:10 )  Alb: 2.8 g/dL / Pro: 6.6 g/dL / ALK PHOS: 210 U/L / ALT: 517 U/L / AST: 190 U/L / GGT: x           PT/INR - ( 08 Jul 2019 05:11 )   PT: 16.2 sec;   INR: 1.41 ratio         PTT - ( 08 Jul 2019 05:11 )  PTT:45.6 sec  CKMB Units: 7.3 ng/mL (07-08 @ 05:10)  Creatine Kinase, Serum: 260 U/L (07-08 @ 05:10)  Creatine Kinase, Serum: 273 U/L (07-07 @ 21:31)  CKMB Units: 9.6 ng/mL (07-07 @ 21:31)  CKMB Units: 10.6 ng/mL (07-07 @ 15:10)  Creatine Kinase, Serum: 282 U/L (07-07 @ 15:10)        IMAGING: No new imaging.

## 2019-07-08 NOTE — PROGRESS NOTE ADULT - SUBJECTIVE AND OBJECTIVE BOX
====================  CCU MIDNIGHT ROUNDS  ====================    JIM PAREDES  [id number]    Patient is a 69y old  Male who presents with a chief complaint of NSTEMI (2019 07:51)    ====================  SUMMARY: 69 year old man with a past medical history of HTN, DM, PAD who is transferred to [facility] for management of cardiogenic shock and NSTEMI. As per sign out, the pt was admitted for L popliteal bypass to [facility]. During his time there, he was refusing medications  and was found to be progressively more dyspneic. He had an echo done there which showed that he has an EF of <15% with global hypokinesis; he received 20mg IV lasix for fluid overload. His respiratory status continued to worsen and the pt was intubated for airway protection. The pt vomited during this time and there was a concern for aspiration. The pt did not complain of CP during the time prior to intubation. As per nephew, the pt has not been complaining of CP, SOB prior to admission to the hospital and has been able to carry out his ADLs. As per the nephew, pt has not been compliant with his medications as an outpatient; the nephew knows that he had amlodipine prescribed but doesn't know what medications the patient takes for his diabetes.  As per chart from [facility], pt has been prescribed to take amlodipine 10 daily and metformin 500 BID at home. As per med rec from , pt has been taking lantus 20 mg daily at home in addition to metformin.  Patient being treated for sepsis now on Dobutamine and Lasix.  Vascular contacted for loss of lower extremity pulses in the setting of cardogenic/distributive shock.  ====================    ====================  NEW EVENTS: 10pm: Lactate 1.0 MVo2 60 CVP 7-12 CO/CI 5.9/2.7   ====================    MEDICATIONS  (STANDING):  aspirin  chewable 81 milliGRAM(s) Oral daily  atorvastatin 40 milliGRAM(s) Oral at bedtime  chlorhexidine 0.12% Liquid 15 milliLiter(s) Oral Mucosa two times a day  chlorhexidine 2% Cloths 1 Application(s) Topical <User Schedule>  clopidogrel Tablet 75 milliGRAM(s) Oral daily  dextrose 5%. 1000 milliLiter(s) (50 mL/Hr) IV Continuous <Continuous>  dextrose 50% Injectable 12.5 Gram(s) IV Push once  dextrose 50% Injectable 25 Gram(s) IV Push once  dextrose 50% Injectable 25 Gram(s) IV Push once  heparin  Infusion. 1800 Unit(s)/Hr (18 mL/Hr) IV Continuous <Continuous>  insulin glargine Injectable (LANTUS) 6 Unit(s) SubCutaneous at bedtime  insulin lispro (HumaLOG) corrective regimen sliding scale   SubCutaneous every 6 hours  pantoprazole  Injectable 40 milliGRAM(s) IV Push daily  petrolatum Ophthalmic Ointment 1 Application(s) Both EYES every 8 hours  piperacillin/tazobactam IVPB.. 3.375 Gram(s) IV Intermittent every 8 hours    MEDICATIONS  (PRN):  dextrose 40% Gel 15 Gram(s) Oral once PRN Blood Glucose LESS THAN 70 milliGRAM(s)/deciliter  glucagon  Injectable 1 milliGRAM(s) IntraMuscular once PRN Glucose LESS THAN 70 milligrams/deciliter  heparin  Injectable 5300 Unit(s) IV Push every 6 hours PRN For aPTT less than 40    ====================  VITALS (Last 12 hrs):  ====================  T(C): 37.6 (19 @ 23:30), Max: 37.7 (19 @ 21:00)  T(F): 99.7 (19 @ 23:30), Max: 99.9 (19 @ 21:00)  HR: 82 (19 @ 23:30) (82 - 86)  BP: 94/49 (19 @ 20:00) (94/49 - 94/49)  BP(mean): 69 (07-08-19 @ 20:00) (69 - 69)  ABP: 100/44 (19 @ 23:30) (100/44 - 140/66)  ABP(mean): 60 (19 @ 23:30) (60 - 86)  RR: 20 (19 @ 23:30) (17 - 24)  SpO2: 100% (19 @ 23:30) (95% - 100%)  CVP(mm Hg): 7 (19 @ 23:30) (2 - 7)  PA: 36/12 (19 @ 23:30) (33/13 - 43/16)  PA(mean): 19 (19 @ 23:30) (18 - 25)      I&O's Summary    2019 07:  -  2019 07:00  --------------------------------------------------------  IN: 2147 mL / OUT: 3350 mL / NET: -1203 mL    2019 07:  -  2019 23:59  --------------------------------------------------------  IN: 1387.3 mL / OUT: 3125 mL / NET: -1737.7 mL      ====================  NEW LABS:  ====================      138  |  102  |  32<H>  ----------------------------<  150<H>  3.7   |  23  |  1.10    Ca    8.6      2019 22:19  Phos  1.7     07-08  Mg     2.1     07-08    TPro  6.2  /  Alb  2.7<L>  /  TBili  0.9  /  DBili  x   /  AST  111<H>  /  ALT  397<H>  /  AlkPhos  198<H>      PT/INR - ( 2019 05:11 )   PT: 16.2 sec;   INR: 1.41 ratio       PTT - ( 2019 17:53 )  PTT:63.7 sec  Creatine Kinase, Serum: 260 U/L <H> (19 @ 05:10)    CKMB Units: 7.3 ng/mL ( @ 05:10)    ABG - ( 2019 22:19 )  pH, Arterial: 7.54  pH, Blood: x     /  pCO2: 29    /  pO2: 71    / HCO3: 24    / Base Excess: 2.5   /  SaO2: 96        ====================  PLAN:  ====================  #NSTEMI  - Cont. ACS Heparin gtt, DAPT, high intensity statin   - Holding beta blocker / afterload agents 2/2 liable BPs  - Pending ischemic work up     #Shock; mixed Cardiogenic/Distributive   - Lactate 1.0 MVo2 60 CVP 7-12 CO/CI 5.9/2.7   - Remains off  and Levo; c/w hemodynamics q6hrs   - Cont. strict I/Os    #Critical limb ischemia s/p LLE Fem-Pop Bypass at [facility] 7/3   - Loss palpable pulses post-op, regained biphasic doppler pulses with Heparin gtt  - Currently No audible pulses 2/2 to poor vascularity/poor perfusion shock state   - Lactate wnl, remains off pressors; cont. to trend CBC w/ diff, lactate, procal   - Vascular following no plans for surgical revasc intervention due to current condition  - Neurovascular checks per protocol     #Aspiration pneumonia  - Cont. broad spectrum Abx, f/u BCx   - Trend CBC w/ diff, lactate, procal  - Antipyretics prn, pan culture if spikes     Tran Christine CCU NP  Beeper #[id number]  Spectra # [phone]

## 2019-07-09 DIAGNOSIS — I10 ESSENTIAL (PRIMARY) HYPERTENSION: ICD-10-CM

## 2019-07-09 DIAGNOSIS — R74.0 NONSPECIFIC ELEVATION OF LEVELS OF TRANSAMINASE AND LACTIC ACID DEHYDROGENASE [LDH]: ICD-10-CM

## 2019-07-09 DIAGNOSIS — N17.9 ACUTE KIDNEY FAILURE, UNSPECIFIED: ICD-10-CM

## 2019-07-09 DIAGNOSIS — E11.65 TYPE 2 DIABETES MELLITUS WITH HYPERGLYCEMIA: ICD-10-CM

## 2019-07-09 LAB
ALBUMIN SERPL ELPH-MCNC: 2.7 G/DL — LOW (ref 3.3–5)
ALBUMIN SERPL ELPH-MCNC: 2.7 G/DL — LOW (ref 3.3–5)
ALP SERPL-CCNC: 200 U/L — HIGH (ref 40–120)
ALP SERPL-CCNC: 214 U/L — HIGH (ref 40–120)
ALT FLD-CCNC: 324 U/L — HIGH (ref 10–45)
ALT FLD-CCNC: 349 U/L — HIGH (ref 10–45)
ANION GAP SERPL CALC-SCNC: 13 MMOL/L — SIGNIFICANT CHANGE UP (ref 5–17)
ANION GAP SERPL CALC-SCNC: 14 MMOL/L — SIGNIFICANT CHANGE UP (ref 5–17)
APTT BLD: 64.7 SEC — HIGH (ref 27.5–36.3)
APTT BLD: 68.3 SEC — HIGH (ref 27.5–36.3)
APTT BLD: 70.3 SEC — HIGH (ref 27.5–36.3)
APTT BLD: 82.4 SEC — HIGH (ref 27.5–36.3)
AST SERPL-CCNC: 77 U/L — HIGH (ref 10–40)
AST SERPL-CCNC: 93 U/L — HIGH (ref 10–40)
BASE EXCESS BLDMV CALC-SCNC: 2.6 MMOL/L — SIGNIFICANT CHANGE UP (ref -3–3)
BASOPHILS # BLD AUTO: 0 K/UL — SIGNIFICANT CHANGE UP (ref 0–0.2)
BASOPHILS # BLD AUTO: 0 K/UL — SIGNIFICANT CHANGE UP (ref 0–0.2)
BASOPHILS NFR BLD AUTO: 0 % — SIGNIFICANT CHANGE UP (ref 0–2)
BASOPHILS NFR BLD AUTO: 0.2 % — SIGNIFICANT CHANGE UP (ref 0–2)
BILIRUB SERPL-MCNC: 1 MG/DL — SIGNIFICANT CHANGE UP (ref 0.2–1.2)
BILIRUB SERPL-MCNC: 1.1 MG/DL — SIGNIFICANT CHANGE UP (ref 0.2–1.2)
BUN SERPL-MCNC: 28 MG/DL — HIGH (ref 7–23)
BUN SERPL-MCNC: 30 MG/DL — HIGH (ref 7–23)
CALCIUM SERPL-MCNC: 8.3 MG/DL — LOW (ref 8.4–10.5)
CALCIUM SERPL-MCNC: 8.4 MG/DL — SIGNIFICANT CHANGE UP (ref 8.4–10.5)
CHLORIDE SERPL-SCNC: 103 MMOL/L — SIGNIFICANT CHANGE UP (ref 96–108)
CHLORIDE SERPL-SCNC: 103 MMOL/L — SIGNIFICANT CHANGE UP (ref 96–108)
CK MB BLD-MCNC: 2 % — SIGNIFICANT CHANGE UP (ref 0–3.5)
CK MB CFR SERPL CALC: 3.3 NG/ML — SIGNIFICANT CHANGE UP (ref 0–6.7)
CK SERPL-CCNC: 162 U/L — SIGNIFICANT CHANGE UP (ref 30–200)
CO2 BLDMV-SCNC: 27 MMOL/L — SIGNIFICANT CHANGE UP (ref 21–29)
CO2 SERPL-SCNC: 22 MMOL/L — SIGNIFICANT CHANGE UP (ref 22–31)
CO2 SERPL-SCNC: 23 MMOL/L — SIGNIFICANT CHANGE UP (ref 22–31)
CREAT SERPL-MCNC: 0.96 MG/DL — SIGNIFICANT CHANGE UP (ref 0.5–1.3)
CREAT SERPL-MCNC: 1 MG/DL — SIGNIFICANT CHANGE UP (ref 0.5–1.3)
EOSINOPHIL # BLD AUTO: 0 K/UL — SIGNIFICANT CHANGE UP (ref 0–0.5)
EOSINOPHIL # BLD AUTO: 0 K/UL — SIGNIFICANT CHANGE UP (ref 0–0.5)
EOSINOPHIL NFR BLD AUTO: 0 % — SIGNIFICANT CHANGE UP (ref 0–6)
EOSINOPHIL NFR BLD AUTO: 0.1 % — SIGNIFICANT CHANGE UP (ref 0–6)
GAS PNL BLDA: SIGNIFICANT CHANGE UP
GAS PNL BLDMV: SIGNIFICANT CHANGE UP
GLUCOSE BLDC GLUCOMTR-MCNC: 141 MG/DL — HIGH (ref 70–99)
GLUCOSE BLDC GLUCOMTR-MCNC: 151 MG/DL — HIGH (ref 70–99)
GLUCOSE BLDC GLUCOMTR-MCNC: 160 MG/DL — HIGH (ref 70–99)
GLUCOSE BLDC GLUCOMTR-MCNC: 191 MG/DL — HIGH (ref 70–99)
GLUCOSE SERPL-MCNC: 163 MG/DL — HIGH (ref 70–99)
GLUCOSE SERPL-MCNC: 165 MG/DL — HIGH (ref 70–99)
HCO3 BLDMV-SCNC: 26 MMOL/L — SIGNIFICANT CHANGE UP (ref 20–28)
HCT VFR BLD CALC: 28.5 % — LOW (ref 39–50)
HCT VFR BLD CALC: 30 % — LOW (ref 39–50)
HGB BLD-MCNC: 10.1 G/DL — LOW (ref 13–17)
HGB BLD-MCNC: 9.9 G/DL — LOW (ref 13–17)
HOROWITZ INDEX BLDMV+IHG-RTO: 32 — SIGNIFICANT CHANGE UP
INR BLD: 1.34 RATIO — HIGH (ref 0.88–1.16)
INR BLD: 1.37 RATIO — HIGH (ref 0.88–1.16)
LACTATE SERPL-SCNC: 1 MMOL/L — SIGNIFICANT CHANGE UP (ref 0.7–2)
LYMPHOCYTES # BLD AUTO: 1.4 K/UL — SIGNIFICANT CHANGE UP (ref 1–3.3)
LYMPHOCYTES # BLD AUTO: 1.5 K/UL — SIGNIFICANT CHANGE UP (ref 1–3.3)
LYMPHOCYTES # BLD AUTO: 11.5 % — LOW (ref 13–44)
LYMPHOCYTES # BLD AUTO: 13.6 % — SIGNIFICANT CHANGE UP (ref 13–44)
MAGNESIUM SERPL-MCNC: 2 MG/DL — SIGNIFICANT CHANGE UP (ref 1.6–2.6)
MAGNESIUM SERPL-MCNC: 2 MG/DL — SIGNIFICANT CHANGE UP (ref 1.6–2.6)
MCHC RBC-ENTMCNC: 31.2 PG — SIGNIFICANT CHANGE UP (ref 27–34)
MCHC RBC-ENTMCNC: 32.7 PG — SIGNIFICANT CHANGE UP (ref 27–34)
MCHC RBC-ENTMCNC: 33.8 GM/DL — SIGNIFICANT CHANGE UP (ref 32–36)
MCHC RBC-ENTMCNC: 34.9 GM/DL — SIGNIFICANT CHANGE UP (ref 32–36)
MCV RBC AUTO: 92.2 FL — SIGNIFICANT CHANGE UP (ref 80–100)
MCV RBC AUTO: 93.8 FL — SIGNIFICANT CHANGE UP (ref 80–100)
MONOCYTES # BLD AUTO: 1.2 K/UL — HIGH (ref 0–0.9)
MONOCYTES # BLD AUTO: 1.2 K/UL — HIGH (ref 0–0.9)
MONOCYTES NFR BLD AUTO: 10.6 % — SIGNIFICANT CHANGE UP (ref 2–14)
MONOCYTES NFR BLD AUTO: 9.5 % — SIGNIFICANT CHANGE UP (ref 2–14)
NEUTROPHILS # BLD AUTO: 8.5 K/UL — HIGH (ref 1.8–7.4)
NEUTROPHILS # BLD AUTO: 9.5 K/UL — HIGH (ref 1.8–7.4)
NEUTROPHILS NFR BLD AUTO: 75.8 % — SIGNIFICANT CHANGE UP (ref 43–77)
NEUTROPHILS NFR BLD AUTO: 78.7 % — HIGH (ref 43–77)
O2 CT VFR BLD CALC: 32 MMHG — SIGNIFICANT CHANGE UP (ref 30–65)
PCO2 BLDMV: 36 MMHG — SIGNIFICANT CHANGE UP (ref 30–65)
PH BLDMV: 7.46 — HIGH (ref 7.32–7.45)
PHOSPHATE SERPL-MCNC: 2.9 MG/DL — SIGNIFICANT CHANGE UP (ref 2.5–4.5)
PHOSPHATE SERPL-MCNC: 3.8 MG/DL — SIGNIFICANT CHANGE UP (ref 2.5–4.5)
PLATELET # BLD AUTO: 209 K/UL — SIGNIFICANT CHANGE UP (ref 150–400)
PLATELET # BLD AUTO: 260 K/UL — SIGNIFICANT CHANGE UP (ref 150–400)
POTASSIUM SERPL-MCNC: 3.6 MMOL/L — SIGNIFICANT CHANGE UP (ref 3.5–5.3)
POTASSIUM SERPL-MCNC: 4 MMOL/L — SIGNIFICANT CHANGE UP (ref 3.5–5.3)
POTASSIUM SERPL-SCNC: 3.6 MMOL/L — SIGNIFICANT CHANGE UP (ref 3.5–5.3)
POTASSIUM SERPL-SCNC: 4 MMOL/L — SIGNIFICANT CHANGE UP (ref 3.5–5.3)
PROCALCITONIN SERPL-MCNC: 1.97 NG/ML — HIGH (ref 0.02–0.1)
PROT SERPL-MCNC: 6.2 G/DL — SIGNIFICANT CHANGE UP (ref 6–8.3)
PROT SERPL-MCNC: 6.4 G/DL — SIGNIFICANT CHANGE UP (ref 6–8.3)
PROTHROM AB SERPL-ACNC: 15.6 SEC — HIGH (ref 10–12.9)
PROTHROM AB SERPL-ACNC: 15.9 SEC — HIGH (ref 10–12.9)
RBC # BLD: 3.03 M/UL — LOW (ref 4.2–5.8)
RBC # BLD: 3.25 M/UL — LOW (ref 4.2–5.8)
RBC # FLD: 11.7 % — SIGNIFICANT CHANGE UP (ref 10.3–14.5)
RBC # FLD: 11.8 % — SIGNIFICANT CHANGE UP (ref 10.3–14.5)
SAO2 % BLDMV: 55 % — LOW (ref 60–90)
SODIUM SERPL-SCNC: 139 MMOL/L — SIGNIFICANT CHANGE UP (ref 135–145)
SODIUM SERPL-SCNC: 139 MMOL/L — SIGNIFICANT CHANGE UP (ref 135–145)
TROPONIN T, HIGH SENSITIVITY RESULT: 2468 NG/L — HIGH (ref 0–51)
WBC # BLD: 11.2 K/UL — HIGH (ref 3.8–10.5)
WBC # BLD: 12.1 K/UL — HIGH (ref 3.8–10.5)
WBC # FLD AUTO: 11.2 K/UL — HIGH (ref 3.8–10.5)
WBC # FLD AUTO: 12.1 K/UL — HIGH (ref 3.8–10.5)

## 2019-07-09 PROCEDURE — 99233 SBSQ HOSP IP/OBS HIGH 50: CPT | Mod: GC

## 2019-07-09 PROCEDURE — 99223 1ST HOSP IP/OBS HIGH 75: CPT

## 2019-07-09 PROCEDURE — 73630 X-RAY EXAM OF FOOT: CPT | Mod: 26,50

## 2019-07-09 RX ORDER — FUROSEMIDE 40 MG
40 TABLET ORAL DAILY
Refills: 0 | Status: DISCONTINUED | OUTPATIENT
Start: 2019-07-09 | End: 2019-07-21

## 2019-07-09 RX ORDER — FUROSEMIDE 40 MG
40 TABLET ORAL DAILY
Refills: 0 | Status: DISCONTINUED | OUTPATIENT
Start: 2019-07-09 | End: 2019-07-09

## 2019-07-09 RX ORDER — POTASSIUM CHLORIDE 20 MEQ
40 PACKET (EA) ORAL ONCE
Refills: 0 | Status: COMPLETED | OUTPATIENT
Start: 2019-07-09 | End: 2019-07-09

## 2019-07-09 RX ORDER — ATORVASTATIN CALCIUM 80 MG/1
80 TABLET, FILM COATED ORAL AT BEDTIME
Refills: 0 | Status: DISCONTINUED | OUTPATIENT
Start: 2019-07-09 | End: 2019-07-22

## 2019-07-09 RX ADMIN — PIPERACILLIN AND TAZOBACTAM 25 GRAM(S): 4; .5 INJECTION, POWDER, LYOPHILIZED, FOR SOLUTION INTRAVENOUS at 01:21

## 2019-07-09 RX ADMIN — CHLORHEXIDINE GLUCONATE 15 MILLILITER(S): 213 SOLUTION TOPICAL at 05:16

## 2019-07-09 RX ADMIN — PIPERACILLIN AND TAZOBACTAM 25 GRAM(S): 4; .5 INJECTION, POWDER, LYOPHILIZED, FOR SOLUTION INTRAVENOUS at 09:41

## 2019-07-09 RX ADMIN — PANTOPRAZOLE SODIUM 40 MILLIGRAM(S): 20 TABLET, DELAYED RELEASE ORAL at 07:48

## 2019-07-09 RX ADMIN — Medication 1: at 11:24

## 2019-07-09 RX ADMIN — Medication 1 APPLICATION(S): at 05:16

## 2019-07-09 RX ADMIN — HEPARIN SODIUM 2200 UNIT(S)/HR: 5000 INJECTION INTRAVENOUS; SUBCUTANEOUS at 00:10

## 2019-07-09 RX ADMIN — Medication 40 MILLIEQUIVALENT(S): at 09:41

## 2019-07-09 RX ADMIN — HEPARIN SODIUM 2100 UNIT(S)/HR: 5000 INJECTION INTRAVENOUS; SUBCUTANEOUS at 14:48

## 2019-07-09 RX ADMIN — PIPERACILLIN AND TAZOBACTAM 25 GRAM(S): 4; .5 INJECTION, POWDER, LYOPHILIZED, FOR SOLUTION INTRAVENOUS at 17:31

## 2019-07-09 RX ADMIN — INSULIN GLARGINE 6 UNIT(S): 100 INJECTION, SOLUTION SUBCUTANEOUS at 23:17

## 2019-07-09 RX ADMIN — HEPARIN SODIUM 2100 UNIT(S)/HR: 5000 INJECTION INTRAVENOUS; SUBCUTANEOUS at 21:25

## 2019-07-09 RX ADMIN — CHLORHEXIDINE GLUCONATE 1 APPLICATION(S): 213 SOLUTION TOPICAL at 05:16

## 2019-07-09 RX ADMIN — Medication 1: at 18:08

## 2019-07-09 RX ADMIN — HEPARIN SODIUM 2100 UNIT(S)/HR: 5000 INJECTION INTRAVENOUS; SUBCUTANEOUS at 06:32

## 2019-07-09 RX ADMIN — Medication 1: at 05:19

## 2019-07-09 RX ADMIN — CLOPIDOGREL BISULFATE 75 MILLIGRAM(S): 75 TABLET, FILM COATED ORAL at 07:48

## 2019-07-09 RX ADMIN — Medication 81 MILLIGRAM(S): at 07:48

## 2019-07-09 RX ADMIN — Medication 40 MILLIGRAM(S): at 10:52

## 2019-07-09 NOTE — DIETITIAN INITIAL EVALUATION ADULT. - OTHER INFO
INFORMATION PTA  Source: RN, electronic medical record  Attempted to interview Patient; limited information obtained; noted to be altered per RN.  Pt answered yes/no questions.  •         Diet PTA: Unable to assess  •         Nutrition Status PTA: Unable to assess  •         Nutrition Supplements PTA: none per Pt report  •         Food Allergies: None per Pt report  •         Weight History PTA: Pt unsure of usual body weight. No weight records x2 years per [facility] flowsheets  INFORMATION THIS ADMISSION  •         Last BM: unknown; ?PTA  •         Other Subjective Information: Limited subjective information obtained from Pt. NGT in place; previously on TF.  S/p extubation 7/8.  Per RN, Pt failed bedside swallow. Would consider consult to speech; defer diet advancement and texture to SLP/medical team.  •         Therapeutic Diet Education Provided: Noted HgbA1c 10%, however, Pt unable to participate in education at this time.  RD remains available PRN.

## 2019-07-09 NOTE — PROGRESS NOTE ADULT - SUBJECTIVE AND OBJECTIVE BOX
PATIENT:  JIM PAREDES  [id number]    CHIEF COMPLAINT:  Patient is a 69y old  Male who presents with a chief complaint of NSTEMI (08 Jul 2019 23:58)      INTERVAL HISTORY / SUBJECTIVE:    No acute events overnight.       MEDICATIONS  (STANDING):  aspirin  chewable 81 milliGRAM(s) Oral daily  atorvastatin 40 milliGRAM(s) Oral at bedtime  chlorhexidine 0.12% Liquid 15 milliLiter(s) Oral Mucosa two times a day  chlorhexidine 2% Cloths 1 Application(s) Topical <User Schedule>  clopidogrel Tablet 75 milliGRAM(s) Oral daily  dextrose 5%. 1000 milliLiter(s) (50 mL/Hr) IV Continuous <Continuous>  dextrose 50% Injectable 12.5 Gram(s) IV Push once  dextrose 50% Injectable 25 Gram(s) IV Push once  dextrose 50% Injectable 25 Gram(s) IV Push once  heparin  Infusion. 1800 Unit(s)/Hr (18 mL/Hr) IV Continuous <Continuous>  insulin glargine Injectable (LANTUS) 6 Unit(s) SubCutaneous at bedtime  insulin lispro (HumaLOG) corrective regimen sliding scale   SubCutaneous every 6 hours  pantoprazole  Injectable 40 milliGRAM(s) IV Push daily  petrolatum Ophthalmic Ointment 1 Application(s) Both EYES every 8 hours  piperacillin/tazobactam IVPB.. 3.375 Gram(s) IV Intermittent every 8 hours  potassium chloride   Powder 40 milliEquivalent(s) Oral once    MEDICATIONS  (PRN):  dextrose 40% Gel 15 Gram(s) Oral once PRN Blood Glucose LESS THAN 70 milliGRAM(s)/deciliter  glucagon  Injectable 1 milliGRAM(s) IntraMuscular once PRN Glucose LESS THAN 70 milligrams/deciliter  heparin  Injectable 5300 Unit(s) IV Push every 6 hours PRN For aPTT less than 40      OBJECTIVE:  ICU Vital Signs Last 24 Hrs  T(C): 37.2 (09 Jul 2019 07:00), Max: 37.7 (08 Jul 2019 21:00)  T(F): 99 (09 Jul 2019 07:00), Max: 99.9 (08 Jul 2019 21:00)  HR: 86 (09 Jul 2019 07:00) (78 - 92)  BP: 94/49 (08 Jul 2019 20:00) (94/49 - 94/49)  BP(mean): 69 (08 Jul 2019 20:00) (69 - 69)  ABP: 118/44 (09 Jul 2019 07:00) (92/50 - 140/66)  ABP(mean): 66 (09 Jul 2019 07:00) (60 - 86)  RR: 14 (09 Jul 2019 07:00) (14 - 25)  SpO2: 95% (09 Jul 2019 07:00) (95% - 100%)      Adult Advanced Hemodynamics Last 24 Hrs  CVP(mm Hg): 5 (09 Jul 2019 07:00) (2 - 9)  CVP(cm H2O): --  CO: 5 (09 Jul 2019 05:00) (5 - 5.9)  CI: 2.3 (09 Jul 2019 05:00) (2.3 - 2.7)  PA: 38/13 (09 Jul 2019 07:00) (30/12 - 48/19)  PA(mean): 24 (09 Jul 2019 07:00) (18 - 32)  PCWP: --  SVR: 958 (09 Jul 2019 05:00) (799 - 958)  SVRI: 2084 (09 Jul 2019 05:00) (1745 - 2084)  PVR: --  PVRI: --  CAPILLARY BLOOD GLUCOSE      POCT Blood Glucose.: 191 mg/dL (09 Jul 2019 05:08)  POCT Blood Glucose.: 163 mg/dL (08 Jul 2019 23:40)    CAPILLARY BLOOD GLUCOSE      POCT Blood Glucose.: 191 mg/dL (09 Jul 2019 05:08)      I&O's Summary    08 Jul 2019 07:01  -  09 Jul 2019 07:00  --------------------------------------------------------  IN: 2056.8 mL / OUT: 3450 mL / NET: -1393.2 mL        PHYSICAL EXAM:       General: Comfortable, NAD       HEENT: Head atraumatic; EOMI, PERRL; normal oropharynx, no oral lesions       Neck: Supple, no JVD, no LAD, thyroid normal       Chest/Lungs: Clear to ausculation bilaterally; no wheezes, rales or rhonchi       Heart: RRR, normal S1, S2, no murmurs       Abdomen: Soft, ND, NTTP       : No CVA tenderness       Extremities: Peripheral pulses 2+ bilaterally; no LE edema or cyanosis       Skin: Warm, well-perfused; no rashes or lesions       Neuro: AOx3, no focal deficits      TELEMETRY:     EKG:     LABS:  ABG - ( 09 Jul 2019 05:11 )  pH, Arterial: 7.54  pH, Blood: x     /  pCO2: 27    /  pO2: 81    / HCO3: 23    / Base Excess: 1.6   /  SaO2: 97                                      9.9    11.2  )-----------( 209      ( 09 Jul 2019 05:16 )             28.5     07-09    139  |  103  |  30<H>  ----------------------------<  165<H>  3.6   |  23  |  1.00    Ca    8.3<L>      09 Jul 2019 05:16  Phos  3.8     07-09  Mg     2.0     07-09    TPro  6.2  /  Alb  2.7<L>  /  TBili  1.0  /  DBili  x   /  AST  93<H>  /  ALT  349<H>  /  AlkPhos  200<H>  07-09    LIVER FUNCTIONS - ( 09 Jul 2019 05:16 )  Alb: 2.7 g/dL / Pro: 6.2 g/dL / ALK PHOS: 200 U/L / ALT: 349 U/L / AST: 93 U/L / GGT: x           PT/INR - ( 09 Jul 2019 05:16 )   PT: 15.9 sec;   INR: 1.37 ratio         PTT - ( 09 Jul 2019 05:16 )  PTT:82.4 sec  CKMB Units: 3.3 ng/mL (07-09 @ 05:16)  Creatine Kinase, Serum: 162 U/L (07-09 @ 05:16)        IMAGING: PATIENT:  JIM PAREDES  [id number]    CHIEF COMPLAINT:  Patient is a 69y old  Male who presents with a chief complaint of NSTEMI (08 Jul 2019 23:58)      INTERVAL HISTORY / SUBJECTIVE:    No acute events overnight. Denies any fevers, chills, night sweats.  Denies any chest pain, SOB, lightheadedness, dizziness.  Denies any leg pain.        MEDICATIONS  (STANDING):  aspirin  chewable 81 milliGRAM(s) Oral daily  atorvastatin 40 milliGRAM(s) Oral at bedtime  chlorhexidine 0.12% Liquid 15 milliLiter(s) Oral Mucosa two times a day  chlorhexidine 2% Cloths 1 Application(s) Topical <User Schedule>  clopidogrel Tablet 75 milliGRAM(s) Oral daily  dextrose 5%. 1000 milliLiter(s) (50 mL/Hr) IV Continuous <Continuous>  dextrose 50% Injectable 12.5 Gram(s) IV Push once  dextrose 50% Injectable 25 Gram(s) IV Push once  dextrose 50% Injectable 25 Gram(s) IV Push once  heparin  Infusion. 1800 Unit(s)/Hr (18 mL/Hr) IV Continuous <Continuous>  insulin glargine Injectable (LANTUS) 6 Unit(s) SubCutaneous at bedtime  insulin lispro (HumaLOG) corrective regimen sliding scale   SubCutaneous every 6 hours  pantoprazole  Injectable 40 milliGRAM(s) IV Push daily  petrolatum Ophthalmic Ointment 1 Application(s) Both EYES every 8 hours  piperacillin/tazobactam IVPB.. 3.375 Gram(s) IV Intermittent every 8 hours  potassium chloride   Powder 40 milliEquivalent(s) Oral once    MEDICATIONS  (PRN):  dextrose 40% Gel 15 Gram(s) Oral once PRN Blood Glucose LESS THAN 70 milliGRAM(s)/deciliter  glucagon  Injectable 1 milliGRAM(s) IntraMuscular once PRN Glucose LESS THAN 70 milligrams/deciliter  heparin  Injectable 5300 Unit(s) IV Push every 6 hours PRN For aPTT less than 40      OBJECTIVE:  ICU Vital Signs Last 24 Hrs  T(C): 37.2 (09 Jul 2019 07:00), Max: 37.7 (08 Jul 2019 21:00)  T(F): 99 (09 Jul 2019 07:00), Max: 99.9 (08 Jul 2019 21:00)  HR: 86 (09 Jul 2019 07:00) (78 - 92)  BP: 94/49 (08 Jul 2019 20:00) (94/49 - 94/49)  BP(mean): 69 (08 Jul 2019 20:00) (69 - 69)  ABP: 118/44 (09 Jul 2019 07:00) (92/50 - 140/66)  ABP(mean): 66 (09 Jul 2019 07:00) (60 - 86)  RR: 14 (09 Jul 2019 07:00) (14 - 25)  SpO2: 95% (09 Jul 2019 07:00) (95% - 100%)      Adult Advanced Hemodynamics Last 24 Hrs  CVP(mm Hg): 5 (09 Jul 2019 07:00) (2 - 9)  CVP(cm H2O): --  CO: 5 (09 Jul 2019 05:00) (5 - 5.9)  CI: 2.3 (09 Jul 2019 05:00) (2.3 - 2.7)  PA: 38/13 (09 Jul 2019 07:00) (30/12 - 48/19)  PA(mean): 24 (09 Jul 2019 07:00) (18 - 32)  PCWP: --  SVR: 958 (09 Jul 2019 05:00) (799 - 958)  SVRI: 2084 (09 Jul 2019 05:00) (1745 - 2084)  PVR: --  PVRI: --  CAPILLARY BLOOD GLUCOSE      POCT Blood Glucose.: 191 mg/dL (09 Jul 2019 05:08)  POCT Blood Glucose.: 163 mg/dL (08 Jul 2019 23:40)    CAPILLARY BLOOD GLUCOSE      POCT Blood Glucose.: 191 mg/dL (09 Jul 2019 05:08)      I&O's Summary    08 Jul 2019 07:01  -  09 Jul 2019 07:00  --------------------------------------------------------  IN: 2056.8 mL / OUT: 3450 mL / NET: -1393.2 mL        PHYSICAL EXAM:       General: Comfortable, NAD       HEENT: Head atraumatic; EOMI, +swan       Chest/Lungs: +ronchi L > R        Heart: +holosystolic murmur in RUSB, normal S1, S2        Abdomen: Soft, ND, NTTP       Extremities:  b/l feet and shins cold to touch; no DP/PT pulses; popliteal pulse by dopplers; staples present on L leg        Neuro: AOx1, no focal deficits      TELEMETRY: none      LABS:  ABG - ( 09 Jul 2019 05:11 )  pH, Arterial: 7.54  pH, Blood: x     /  pCO2: 27    /  pO2: 81    / HCO3: 23    / Base Excess: 1.6   /  SaO2: 97                                      9.9    11.2  )-----------( 209      ( 09 Jul 2019 05:16 )             28.5     07-09    139  |  103  |  30<H>  ----------------------------<  165<H>  3.6   |  23  |  1.00    Ca    8.3<L>      09 Jul 2019 05:16  Phos  3.8     07-09  Mg     2.0     07-09    TPro  6.2  /  Alb  2.7<L>  /  TBili  1.0  /  DBili  x   /  AST  93<H>  /  ALT  349<H>  /  AlkPhos  200<H>  07-09    LIVER FUNCTIONS - ( 09 Jul 2019 05:16 )  Alb: 2.7 g/dL / Pro: 6.2 g/dL / ALK PHOS: 200 U/L / ALT: 349 U/L / AST: 93 U/L / GGT: x           PT/INR - ( 09 Jul 2019 05:16 )   PT: 15.9 sec;   INR: 1.37 ratio         PTT - ( 09 Jul 2019 05:16 )  PTT:82.4 sec  CKMB Units: 3.3 ng/mL (07-09 @ 05:16)  Creatine Kinase, Serum: 162 U/L (07-09 @ 05:16)        IMAGING: No new imaging.

## 2019-07-09 NOTE — DIETITIAN INITIAL EVALUATION ADULT. - MALNUTRITION
However, continue to monitor given limited subjective information available and no weight records noted.

## 2019-07-09 NOTE — CONSULT NOTE ADULT - SUBJECTIVE AND OBJECTIVE BOX
Podiatry pager #: [facility] [phone]/ [phone]    Patient is a 69y old  Male who presents with a chief complaint of NSTEMI (09 Jul 2019 13:03)      HPI:  History obtained from sign out/nephew  The pt is a 68 y/o M with a PMH of HTN, DM, PAD who is transferred to [facility] for management of cardiogenic shock and NSTEMI. As per sign out, the pt was admitted for L popliteal bypass to [facility]. During his time there, he was refusing medications  and was found to be progressively more dyspneic. He had an echo done there which showed that he has an EF of <15% with global hypokinesis; he received 20mg IV lasix for fluid overload. His respiratory status continued to worsen and the pt was intubated for airway protection. The pt vomited during this time and there was a concern for aspiration. The pt did not complain of CP during the time prior to intubation. As per nephew, the pt has not been complaining of CP, SOB prior to admission to the hospital and has been able to carry out his ADLs. As per the nephew, pt has not been compliant with his medications as an outpatient; the nephew knows that he had amlodipine prescribed but doesn't know what medications the the pt takes for his diabetes.  As per chart from [facility], pt has been prescribed to take amlodipine 10 daily and metformin 500 BID at home. As per med rec from 2015, pt has been taking lantus 20 mg daily at home in addition to metformin. (05 Jul 2019 23:50)      PAST MEDICAL & SURGICAL HISTORY:  Hyperlipidemia  Hypertension  Diabetes  H/O mastoidectomy      MEDICATIONS  (STANDING):  aspirin  chewable 81 milliGRAM(s) Oral daily  atorvastatin 80 milliGRAM(s) Oral at bedtime  chlorhexidine 0.12% Liquid 15 milliLiter(s) Oral Mucosa two times a day  chlorhexidine 2% Cloths 1 Application(s) Topical <User Schedule>  clopidogrel Tablet 75 milliGRAM(s) Oral daily  dextrose 5%. 1000 milliLiter(s) (50 mL/Hr) IV Continuous <Continuous>  dextrose 50% Injectable 12.5 Gram(s) IV Push once  dextrose 50% Injectable 25 Gram(s) IV Push once  dextrose 50% Injectable 25 Gram(s) IV Push once  furosemide   Injectable 40 milliGRAM(s) IV Push daily  heparin  Infusion. 1800 Unit(s)/Hr (18 mL/Hr) IV Continuous <Continuous>  insulin glargine Injectable (LANTUS) 6 Unit(s) SubCutaneous at bedtime  insulin lispro (HumaLOG) corrective regimen sliding scale   SubCutaneous every 6 hours  piperacillin/tazobactam IVPB.. 3.375 Gram(s) IV Intermittent every 8 hours    MEDICATIONS  (PRN):  dextrose 40% Gel 15 Gram(s) Oral once PRN Blood Glucose LESS THAN 70 milliGRAM(s)/deciliter  glucagon  Injectable 1 milliGRAM(s) IntraMuscular once PRN Glucose LESS THAN 70 milligrams/deciliter  heparin  Injectable 5300 Unit(s) IV Push every 6 hours PRN For aPTT less than 40      Allergies    No Known Allergies    Intolerances        VITALS:    Vital Signs Last 24 Hrs  T(C): 37.2 (09 Jul 2019 07:00), Max: 37.7 (08 Jul 2019 21:00)  T(F): 99 (09 Jul 2019 07:00), Max: 99.9 (08 Jul 2019 21:00)  HR: 80 (09 Jul 2019 13:00) (80 - 92)  BP: 112/59 (09 Jul 2019 13:00) (94/49 - 112/59)  BP(mean): 73 (09 Jul 2019 13:00) (69 - 73)  RR: 21 (09 Jul 2019 13:00) (14 - 26)  SpO2: 94% (09 Jul 2019 13:00) (92% - 100%)    LABS:                          10.1   12.1  )-----------( 260      ( 09 Jul 2019 13:28 )             30.0       07-09    139  |  103  |  28<H>  ----------------------------<  163<H>  4.0   |  22  |  0.96    Ca    8.4      09 Jul 2019 13:28  Phos  2.9     07-09  Mg     2.0     07-09    TPro  6.4  /  Alb  2.7<L>  /  TBili  1.1  /  DBili  x   /  AST  77<H>  /  ALT  324<H>  /  AlkPhos  214<H>  07-09      CAPILLARY BLOOD GLUCOSE      POCT Blood Glucose.: 151 mg/dL (09 Jul 2019 11:22)  POCT Blood Glucose.: 191 mg/dL (09 Jul 2019 05:08)  POCT Blood Glucose.: 163 mg/dL (08 Jul 2019 23:40)      PT/INR - ( 09 Jul 2019 05:16 )   PT: 15.9 sec;   INR: 1.37 ratio         PTT - ( 09 Jul 2019 13:28 )  PTT:70.3 sec    LOWER EXTREMITY PHYSICAL EXAM:    Vasular: DP/PT 0/4, B/L, CFT not appreciated bl, Temperature gradient cold, B/L.   Neuro: Epicritic sensation diminished to the level of digits, B/L.  Musculoskeletal/Ortho: contracted digits b/l  Skin: cold bilateral LE with dusky toes. Dry gangrene to the left hallux and dry eschar to the distal right hallux. No active signs of infection   Etiology: arterial     RADIOLOGY & ADDITIONAL STUDIES:

## 2019-07-09 NOTE — PROGRESS NOTE ADULT - PROBLEM SELECTOR PLAN 6
transaminitis 1811/1148 likely in the setting of cardiogenic/septic shock; currently trending down   -Will continue to trend CMPs

## 2019-07-09 NOTE — CONSULT NOTE ADULT - ASSESSMENT
70 yo M presents with bilateral dusky toes and dry gangrene   - pt was seen and evaluated   - feet are cold, no CFT notes, dusky toes and dry stable gangrene to the bilateral hallux. No active signs of infection  - betadine applied to the dry gangrene   - 7/6 Art dulp: bilateral LE arterial and bypass occlusion  - no podiatry emergent surgical intervention  - foot does not appear to be the source of infection  - d/w attending

## 2019-07-09 NOTE — PROGRESS NOTE ADULT - SUBJECTIVE AND OBJECTIVE BOX
VACULAR SURGERY DAILY PROGRESS NOTE:       Subjective:  Patient seen and examined on AM rounds. No acute events overnight. At this time denies pain. Extubated. Off pressors.       Objective:    General: NAD, well-nourished  Resp: Extubated, responding appropriately   CV: Normal sinus rhythm  Ext: RLE: cool, no dopplerable signals, discoloration of toes, LLE: cool, no dopplerable signals, discoloration of toes with tissue loss of 1st toe, leg dressing c/d/i    Vital Signs Last 24 Hrs  T(C): 37.2 (2019 07:00), Max: 37.7 (2019 21:00)  T(F): 99 (2019 07:00), Max: 99.9 (2019 21:00)  HR: 84 (2019 11:00) (80 - 92)  BP: 94/49 (2019 20:00) (94/49 - 94/49)  BP(mean): 69 (2019 20:00) (69 - 69)  RR: 26 (2019 11:00) (14 - 26)  SpO2: 95% (2019 11:00) (92% - 100%)    I&O's Detail    2019 07:01  -  2019 07:00  --------------------------------------------------------  IN:    Enteral Tube Flush: 240 mL    furosemide Infusion: 50 mL    heparin  Infusion.: 517 mL    IV PiggyBack: 1049.8 mL    Solution: 200 mL  Total IN: 2056.8 mL    OUT:    Indwelling Catheter - Urethral: 3500 mL  Total OUT: 3500 mL    Total NET: -1443.2 mL      2019 07:01  -  2019 13:03  --------------------------------------------------------  IN:    heparin  Infusion.: 84 mL    IV PiggyBack: 75 mL  Total IN: 159 mL    OUT:    Indwelling Catheter - Urethral: 625 mL  Total OUT: 625 mL    Total NET: -466 mL          Daily     Daily Weight in k.3 (2019 11:40)    MEDICATIONS  (STANDING):  aspirin  chewable 81 milliGRAM(s) Oral daily  atorvastatin 80 milliGRAM(s) Oral at bedtime  chlorhexidine 0.12% Liquid 15 milliLiter(s) Oral Mucosa two times a day  chlorhexidine 2% Cloths 1 Application(s) Topical <User Schedule>  clopidogrel Tablet 75 milliGRAM(s) Oral daily  dextrose 5%. 1000 milliLiter(s) (50 mL/Hr) IV Continuous <Continuous>  dextrose 50% Injectable 12.5 Gram(s) IV Push once  dextrose 50% Injectable 25 Gram(s) IV Push once  dextrose 50% Injectable 25 Gram(s) IV Push once  furosemide   Injectable 40 milliGRAM(s) IV Push daily  heparin  Infusion. 1800 Unit(s)/Hr (18 mL/Hr) IV Continuous <Continuous>  insulin glargine Injectable (LANTUS) 6 Unit(s) SubCutaneous at bedtime  insulin lispro (HumaLOG) corrective regimen sliding scale   SubCutaneous every 6 hours  piperacillin/tazobactam IVPB.. 3.375 Gram(s) IV Intermittent every 8 hours    MEDICATIONS  (PRN):  dextrose 40% Gel 15 Gram(s) Oral once PRN Blood Glucose LESS THAN 70 milliGRAM(s)/deciliter  glucagon  Injectable 1 milliGRAM(s) IntraMuscular once PRN Glucose LESS THAN 70 milligrams/deciliter  heparin  Injectable 5300 Unit(s) IV Push every 6 hours PRN For aPTT less than 40      LABS:                        9.9    11.2  )-----------( 209      ( 2019 05:16 )             28.5         139  |  103  |  30<H>  ----------------------------<  165<H>  3.6   |  23  |  1.00    Ca    8.3<L>      2019 05:16  Phos  3.8       Mg     2.0         TPro  6.2  /  Alb  2.7<L>  /  TBili  1.0  /  DBili  x   /  AST  93<H>  /  ALT  349<H>  /  AlkPhos  200<H>  07-09    PT/INR - ( 2019 05:16 )   PT: 15.9 sec;   INR: 1.37 ratio         PTT - ( 2019 05:16 )  PTT:82.4 sec      RADIOLOGY & ADDITIONAL STUDIES:

## 2019-07-09 NOTE — PROGRESS NOTE ADULT - ASSESSMENT
70 y/o M with a PMH of HTN, DM, PAD, s/p LLE fem-pop bypass with vein graft who is transferred to [facility] for management of cardiogenic shock and NSTEMI, vascular surgery consulted for no dopplerable signals    - Continue to watch for demarcation of b/l lower extremities   - Will continue to follow and re-address amputation discussion at later time   - Duplex reviewed   - Continue Hep ggt  - Avoid pressors as tolerated   - Care per primary team      Vacular Surgery, [id number] 68 y/o M with a PMH of HTN, DM, PAD, s/p LLE fem-pop bypass with vein graft who is transferred to [facility] for management of cardiogenic shock and NSTEMI, vascular surgery consulted for no dopplerable signals    - Continue to watch for demarcation of b/l lower extremities   - Will continue to follow and re-address amputation discussion at later time   - Tentatively plan for OR early next week  - Duplex reviewed   - Continue Hep ggt  - Avoid pressors as tolerated   - Care per primary team      Vacular Surgery, [id number]

## 2019-07-09 NOTE — PROGRESS NOTE ADULT - PROBLEM SELECTOR PLAN 5
Pt with VINAY to 1.6, now 1.3. likely in the setting of cardiogenic/spetic shock  - BMP daily avoid nephrotoxic agents, renally dose meds,

## 2019-07-09 NOTE — DIETITIAN INITIAL EVALUATION ADULT. - ENTERAL
If EN appropriate recommend Glucerna 1.2 - start at 20 ml/hr advance by 15 ml q4h as tolerated to gal rate 65 ml/hr x 24 hours.  Provides 1560 ml, 1872 kcal, 93.6 gm protein, 1256 ml free water.  Per dosing weight 89.4 k kcal/kg, 1.05 gm protein/kg.

## 2019-07-09 NOTE — PROGRESS NOTE ADULT - SUBJECTIVE AND OBJECTIVE BOX
Mohsin Khan, MD  Attending Physician, Division Of Hospital Medicine  Pager: [phone], Office: [phone]  Off hour pager: [phone]    Accepted the patient as a CCU transfer-  This is a 69 M w h/o DM, HTN, PAD, underwent L fem-pop bypass on 7/3, post-op developed hypoxic respiratory failure requiring intubation, noted to have elevated cardiac enzymes and LV dysfunction, transferred to [facility] from [facility]. He was in cardiogenic shock and found to have aspiration pneumonia ( On IV Zosyn 4/7). Vascular Sx and Podiatry has been following and recommended for possible amputation.      SUBJECTIVE / OVERNIGHT EVENTS:  Sitting in bed, No acute distress, no chest pain, SOB. Has a NFT for feeding, at time agitated.  BP- 108/71, T 97.4F, O2 96% on O2        MEDICATIONS  (STANDING):  aspirin  chewable 81 milliGRAM(s) Oral daily  atorvastatin 80 milliGRAM(s) Oral at bedtime  chlorhexidine 0.12% Liquid 15 milliLiter(s) Oral Mucosa two times a day  clopidogrel Tablet 75 milliGRAM(s) Oral daily  dextrose 5%. 1000 milliLiter(s) (50 mL/Hr) IV Continuous <Continuous>  dextrose 50% Injectable 12.5 Gram(s) IV Push once  dextrose 50% Injectable 25 Gram(s) IV Push once  dextrose 50% Injectable 25 Gram(s) IV Push once  furosemide   Injectable 40 milliGRAM(s) IV Push daily  heparin  Infusion. 1800 Unit(s)/Hr (18 mL/Hr) IV Continuous <Continuous>  insulin glargine Injectable (LANTUS) 6 Unit(s) SubCutaneous at bedtime  insulin lispro (HumaLOG) corrective regimen sliding scale   SubCutaneous every 6 hours  piperacillin/tazobactam IVPB.. 3.375 Gram(s) IV Intermittent every 8 hours    MEDICATIONS  (PRN):  dextrose 40% Gel 15 Gram(s) Oral once PRN Blood Glucose LESS THAN 70 milliGRAM(s)/deciliter  glucagon  Injectable 1 milliGRAM(s) IntraMuscular once PRN Glucose LESS THAN 70 milligrams/deciliter  heparin  Injectable 5300 Unit(s) IV Push every 6 hours PRN For aPTT less than 40      Vital Signs Last 24 Hrs  T(C): 36.6 (09 Jul 2019 16:14), Max: 37.7 (08 Jul 2019 21:00)  T(F): 97.9 (09 Jul 2019 16:14), Max: 99.9 (08 Jul 2019 21:00)  HR: 89 (09 Jul 2019 16:14) (80 - 92)  BP: 108/71 (09 Jul 2019 16:14) (94/49 - 112/59)  BP(mean): 72 (09 Jul 2019 15:00) (69 - 74)  RR: 18 (09 Jul 2019 16:14) (14 - 26)  SpO2: 96% (09 Jul 2019 16:14) (92% - 100%)  CAPILLARY BLOOD GLUCOSE      POCT Blood Glucose.: 151 mg/dL (09 Jul 2019 11:22)  POCT Blood Glucose.: 191 mg/dL (09 Jul 2019 05:08)  POCT Blood Glucose.: 163 mg/dL (08 Jul 2019 23:40)    I&O's Summary    08 Jul 2019 07:01  -  09 Jul 2019 07:00  --------------------------------------------------------  IN: 2056.8 mL / OUT: 3500 mL / NET: -1443.2 mL    09 Jul 2019 07:01  -  09 Jul 2019 17:03  --------------------------------------------------------  IN: 247 mL / OUT: 750 mL / NET: -503 mL        PHYSICAL EXAM:-  GENERAL: NAD, well-developed, no SOB, has NGT, calm  EYES: EOMI, PERRLA, conjunctiva and sclera clear  NECK: Supple, No JVD, no thyromegaly  CHEST/LUNG: rhonchi at right base to auscultation bilaterally; No wheeze  HEART: Regular rate and rhythm; S1, S2 audible, No murmurs, rubs, or gallops  ABDOMEN: Soft, Nontender, Nondistended; Bowel sounds present  EXTREMITIES:  2+ Peripheral Pulses in UE and upto mid leg of LE, cold distal LE. LLE- s/p bypass, staples noted  NEURO: AAOx3, no focal deficit      LABS:                        10.1   12.1  )-----------( 260      ( 09 Jul 2019 13:28 )             30.0     07-09    139  |  103  |  28<H>  ----------------------------<  163<H>  4.0   |  22  |  0.96    Ca    8.4      09 Jul 2019 13:28  Phos  2.9     07-09  Mg     2.0     07-09    TPro  6.4  /  Alb  2.7<L>  /  TBili  1.1  /  DBili  x   /  AST  77<H>  /  ALT  324<H>  /  AlkPhos  214<H>  07-09    PT/INR - ( 09 Jul 2019 05:16 )   PT: 15.9 sec;   INR: 1.37 ratio         PTT - ( 09 Jul 2019 13:28 )  PTT:70.3 sec  CARDIAC MARKERS ( 09 Jul 2019 05:16 )  x     / x     / 162 U/L / x     / 3.3 ng/mL  CARDIAC MARKERS ( 08 Jul 2019 05:10 )  x     / x     / 260 U/L / x     / 7.3 ng/mL  CARDIAC MARKERS ( 07 Jul 2019 21:31 )  x     / x     / 273 U/L / x     / 9.6 ng/mL      RADIOLOGY & ADDITIONAL TESTS:    Imaging Personally Reviewed: CXR, Doppled LR, Echo  Consultant(s) Notes Reviewed: Card, Vascular Sx, Podiatry  Care Discussed with Consultants/Other Providers: Vascular Sx. Card

## 2019-07-09 NOTE — CHART NOTE - NSCHARTNOTEFT_GEN_A_CORE
CCU Transfer Note    Transfer from: CCU    Transfer to: ( x ) Medicine    (  ) Telemetry    (  ) RCU                               (  ) Palliative    (  ) Stroke Unit    (  ) MICU    (  ) __________________    Accepting Physician:    Signout given to:     HPI / CCU COURSE:    70 y/o M with a PMH of HTN, DM, PAD who is transferred to [facility] for management of cardiogenic shock and NSTEMI. As per sign out, the pt was admitted for L popliteal bypass to [facility]. During his time there, he was refusing medications  and was found to be progressively more dyspneic. He had an echo done there which showed that he has an EF of <15% with global hypokinesis; he received 20mg IV lasix for fluid overload. His respiratory status continued to worsen and the pt was intubated for airway protection. The pt vomited during this time and there was a concern for aspiration. The pt did not complain of CP during the time prior to intubation. As per nephew, the pt has not been complaining of CP, SOB prior to admission to the hospital and has been able to carry out his ADLs.  Patient admitted to CCU for cardiogenic shock management w/ NSTEMI and a course complicated by hypoxemic respiratory failure secondary to suspected aspiration PNA requiring intubation now extubated 7/8.  Patient on day 4 of 7 zosyn.      For PAD, vascular surgery was consulted and said no surgical intervention at this time.  Vascular cardiology is aware of the patient.          Vital Signs Last 24 Hrs  T(C): 37.2 (09 Jul 2019 07:00), Max: 37.7 (08 Jul 2019 21:00)  T(F): 99 (09 Jul 2019 07:00), Max: 99.9 (08 Jul 2019 21:00)  HR: 82 (09 Jul 2019 14:00) (80 - 92)  BP: 111/54 (09 Jul 2019 14:00) (94/49 - 112/59)  BP(mean): 74 (09 Jul 2019 14:00) (69 - 74)  RR: 21 (09 Jul 2019 14:00) (14 - 26)  SpO2: 94% (09 Jul 2019 14:00) (92% - 100%)    I&O's Summary    08 Jul 2019 07:01  -  09 Jul 2019 07:00  --------------------------------------------------------  IN: 2056.8 mL / OUT: 3500 mL / NET: -1443.2 mL    09 Jul 2019 07:01  -  09 Jul 2019 14:33  --------------------------------------------------------  IN: 159 mL / OUT: 625 mL / NET: -466 mL        Physical Exam:       LABS:   CARDIAC MARKERS ( 09 Jul 2019 05:16 )  x     / x     / 162 U/L / x     / 3.3 ng/mL  CARDIAC MARKERS ( 08 Jul 2019 05:10 )  x     / x     / 260 U/L / x     / 7.3 ng/mL  CARDIAC MARKERS ( 07 Jul 2019 21:31 )  x     / x     / 273 U/L / x     / 9.6 ng/mL  CARDIAC MARKERS ( 07 Jul 2019 15:10 )  x     / x     / 282 U/L / x     / 10.6 ng/mL                              10.1   12.1  )-----------( 260      ( 09 Jul 2019 13:28 )             30.0       07-09    139  |  103  |  28<H>  ----------------------------<  163<H>  4.0   |  22  |  0.96    Ca    8.4      09 Jul 2019 13:28  Phos  2.9     07-09  Mg     2.0     07-09    TPro  6.4  /  Alb  2.7<L>  /  TBili  1.1  /  DBili  x   /  AST  77<H>  /  ALT  324<H>  /  AlkPhos  214<H>  07-09      PT/INR - ( 09 Jul 2019 05:16 )   PT: 15.9 sec;   INR: 1.37 ratio         PTT - ( 09 Jul 2019 13:28 )  PTT:70.3 sec    ABG - ( 09 Jul 2019 05:11 )  pH, Arterial: 7.54  pH, Blood: x     /  pCO2: 27    /  pO2: 81    / HCO3: 23    / Base Excess: 1.6   /  SaO2: 97                ASSESSMENT & PLAN:     70 y/o M w/ PMH of DM, HTN with poor medication compliance and PAD is admitted to CCU for cardiogenic shock management w/ NSTEMI and a course complicated by hypoxemic respiratory failure secondary to suspected aspiration PNA requiring intubation now extubated 7/8.        #Neuro  -off all sedation and extubated on 7/8  -pt only oriented to person but not place or time (unclear what baseline is)  - continue to assess      #Respiratory:  -extubated on 7/8  -wean O2 as tolerated     #CV   NSTEMI  -s/p aspirin, plavix load for NSTEMI, will continue with maintenance dose of aspirin/plavix  -c/w hep gtt  -c/w atorvastatin     Cardiogenic shock -resolved  -required dobutamine and levophed but currently off  -TTE: EF 20-25% with global hypokinesis and mod dilated LV/LA  -take out [place]     PAD  -VA duplex of b/l LE: 07/06: On the right, there is a severe flow-limiting stenosis of the popliteal artery and the trifurcation arteries are occluded in the calf.  On the left, the bypass graft is occluded, the popliteal artery is occluded and the trifurcation arteries are occluded.  -vascular surgery on board: no surgical intervention at this time;   -await vascular cardiology recs     #Pulm  aspiration pneumonia  -c/w zosyn day 4of 7  -continue to monitor    Pulm edema  -currently euvolemic   -strict I/o  -continue to monitor    #GI  -Pt w/ transaminitis 1811/1148 likely in the setting of cardiogenic/septic shock; currently trending down   -Will continue to trend CMPs  -NG tube feeds for now; S&S later as patient becomes more alert     #Renal  -Pt with VINAY to 1.6, baseline at around 1; likely in the setting of cardiogenic/spetic shock  -Trend CMP, monitor UO, avoid nephrotoxic agents, renally dose meds,   -currently 1.3    #ID   -Pt w/ fevers and leukocytosis w/ R lung opacity on CXR and evidence of end organ damage with transaminitis and elevated lactate w/ hemodynamic instability requiring pressor support suggestive of septic shock; currently resolved  -UA negative; RVP positive for coronavirus;  -blood cx: NGTD;    -urine legionella negative   -c/w zosyn day 4 of 7    #Endo  -Pt w/ hx of DM unknown duration and non-compliance  -As per chart review, pt on metformin 500 BID at home and lantus 20 daily  -HbA1c: 10  -start in ISS and c/w Lantus 15U    #Heme  Pt normocytic anemia  -vitamin B12/folate normal  -iron studies consistent with anemia of chronic disease   -continue to monitor     #DVT ppx  hep gtt        FOR FOLLOW UP:  [ ] for PAD, f/u with vascular surgery; vascular cardiology is aware of the pt  [ ] complete 7 day course of abx      Cl Perez, PGY-2   Pager [phone] | [phone] CCU Transfer Note    Transfer from: CCU    Transfer to: ( x ) Medicine    (  ) Telemetry    (  ) RCU                               (  ) Palliative    (  ) Stroke Unit    (  ) MICU    (  ) __________________    Accepting Physician:    Signout given to:     HPI / CCU COURSE:    70 y/o M with a PMH of HTN, DM, PAD who is transferred to [facility] for management of cardiogenic shock and NSTEMI. As per sign out, the pt was admitted for L popliteal bypass to [facility]. During his time there, he was refusing medications  and was found to be progressively more dyspneic. He had an echo done there which showed that he has an EF of <15% with global hypokinesis; he received 20mg IV lasix for fluid overload. His respiratory status continued to worsen and the pt was intubated for airway protection. The pt vomited during this time and there was a concern for aspiration. The pt did not complain of CP during the time prior to intubation. As per nephew, the pt has not been complaining of CP, SOB prior to admission to the hospital and has been able to carry out his ADLs.  Patient admitted to CCU for cardiogenic shock management w/ NSTEMI and a course complicated by hypoxemic respiratory failure secondary to suspected aspiration PNA requiring intubation now extubated 7/8.  Patient on day 4 of 7 zosyn.      For PAD, vascular surgery was consulted and said no surgical intervention at this time.  Vascular cardiology is aware of the patient.          Vital Signs Last 24 Hrs  T(C): 37.2 (09 Jul 2019 07:00), Max: 37.7 (08 Jul 2019 21:00)  T(F): 99 (09 Jul 2019 07:00), Max: 99.9 (08 Jul 2019 21:00)  HR: 82 (09 Jul 2019 14:00) (80 - 92)  BP: 111/54 (09 Jul 2019 14:00) (94/49 - 112/59)  BP(mean): 74 (09 Jul 2019 14:00) (69 - 74)  RR: 21 (09 Jul 2019 14:00) (14 - 26)  SpO2: 94% (09 Jul 2019 14:00) (92% - 100%)    I&O's Summary    08 Jul 2019 07:01  -  09 Jul 2019 07:00  --------------------------------------------------------  IN: 2056.8 mL / OUT: 3500 mL / NET: -1443.2 mL    09 Jul 2019 07:01  -  09 Jul 2019 14:33  --------------------------------------------------------  IN: 159 mL / OUT: 625 mL / NET: -466 mL        Physical Exam:       LABS:   CARDIAC MARKERS ( 09 Jul 2019 05:16 )  x     / x     / 162 U/L / x     / 3.3 ng/mL  CARDIAC MARKERS ( 08 Jul 2019 05:10 )  x     / x     / 260 U/L / x     / 7.3 ng/mL  CARDIAC MARKERS ( 07 Jul 2019 21:31 )  x     / x     / 273 U/L / x     / 9.6 ng/mL  CARDIAC MARKERS ( 07 Jul 2019 15:10 )  x     / x     / 282 U/L / x     / 10.6 ng/mL                              10.1   12.1  )-----------( 260      ( 09 Jul 2019 13:28 )             30.0       07-09    139  |  103  |  28<H>  ----------------------------<  163<H>  4.0   |  22  |  0.96    Ca    8.4      09 Jul 2019 13:28  Phos  2.9     07-09  Mg     2.0     07-09    TPro  6.4  /  Alb  2.7<L>  /  TBili  1.1  /  DBili  x   /  AST  77<H>  /  ALT  324<H>  /  AlkPhos  214<H>  07-09      PT/INR - ( 09 Jul 2019 05:16 )   PT: 15.9 sec;   INR: 1.37 ratio         PTT - ( 09 Jul 2019 13:28 )  PTT:70.3 sec    ABG - ( 09 Jul 2019 05:11 )  pH, Arterial: 7.54  pH, Blood: x     /  pCO2: 27    /  pO2: 81    / HCO3: 23    / Base Excess: 1.6   /  SaO2: 97                ASSESSMENT & PLAN:     70 y/o M w/ PMH of DM, HTN with poor medication compliance and PAD is admitted to CCU for cardiogenic shock management w/ NSTEMI and a course complicated by hypoxemic respiratory failure secondary to suspected aspiration PNA requiring intubation now extubated 7/8.        #Neuro  -off all sedation and extubated on 7/8  -pt only oriented to person but not place or time (unclear what baseline is)  - continue to assess      #Respiratory:  -extubated on 7/8  -wean O2 as tolerated     #CV   NSTEMI  -s/p aspirin, plavix load for NSTEMI, will continue with maintenance dose of aspirin/plavix  -c/w hep gtt  -c/w atorvastatin     Cardiogenic shock -resolved  -required dobutamine and levophed but currently off  -TTE: EF 20-25% with global hypokinesis and mod dilated LV/LA  -take out [place]     PAD  -VA duplex of b/l LE: 07/06: On the right, there is a severe flow-limiting stenosis of the popliteal artery and the trifurcation arteries are occluded in the calf.  On the left, the bypass graft is occluded, the popliteal artery is occluded and the trifurcation arteries are occluded.  -vascular surgery on board: no surgical intervention at this time;   -await vascular cardiology recs     #Pulm  aspiration pneumonia  -c/w zosyn day 4of 7  -continue to monitor    Pulm edema  -currently euvolemic   -strict I/o  -continue to monitor    #GI  -Pt w/ transaminitis 1811/1148 likely in the setting of cardiogenic/septic shock; currently trending down   -Will continue to trend CMPs  -NG tube feeds for now; S&S later as patient becomes more alert     #Renal  -Pt with VINAY to 1.6, baseline at around 1; likely in the setting of cardiogenic/spetic shock  -Trend CMP, monitor UO, avoid nephrotoxic agents, renally dose meds,   -currently 1.3    #ID   -Pt w/ fevers and leukocytosis w/ R lung opacity on CXR and evidence of end organ damage with transaminitis and elevated lactate w/ hemodynamic instability requiring pressor support suggestive of septic shock; currently resolved  -UA negative; RVP positive for coronavirus;  -blood cx: NGTD;    -urine legionella negative   -c/w zosyn day 4 of 7    #Endo  -Pt w/ hx of DM unknown duration and non-compliance  -As per chart review, pt on metformin 500 BID at home and lantus 20 daily  -HbA1c: 10  -start in ISS and c/w Lantus 15U    #Heme  Pt normocytic anemia  -vitamin B12/folate normal  -iron studies consistent with anemia of chronic disease   -continue to monitor     #DVT ppx  hep gtt        FOR FOLLOW UP:  [ ] for PAD, f/u with vascular surgery; vascular cardiology is aware of the pt  [ ] complete 7 day course of abx  [ ] f/u official speech and swallow and advance diet per mirian Perez, PGY-2   Pager [phone] | [phone]

## 2019-07-09 NOTE — CHART NOTE - NSCHARTNOTEFT_GEN_A_CORE
70 y/o M with a PMH of HTN, DM, PAD who is transferred to [facility] for management of cardiogenic shock and NSTEMI. As per sign out, the pt was admitted for L popliteal bypass to [facility]. During his time there, he was refusing medications  and was found to be progressively more dyspneic. He had an echo done there which showed that he has an EF of <15% with global hypokinesis; he received 20mg IV lasix for fluid overload. His respiratory status continued to worsen and the pt was intubated for airway protection. The pt vomited during this time and there was a concern for aspiration. The pt did not complain of CP during the time prior to intubation. As per nephew, the pt has not been complaining of CP, SOB prior to admission to the hospital and has been able to carry out his ADLs.  Patient admitted to CCU for cardiogenic shock management w/ NSTEMI and a course complicated by hypoxemic respiratory failure secondary to suspected aspiration PNA requiring intubation now self-extubated 7/8.  Patient on day 4 of 7 zosyn.      For PAD, vascular surgery was consulted and said no surgical intervention at this time.  Vascular cardiology is aware of the patient.      FOR FOLLOW UP:  [ ] for PAD, f/u with vascular surgery; vascular cardiology is aware of the pt c/w hep gtt and DAPT for now and await recs  [ ] complete 7 day course of abx w/ zosyn (7/6-)  [ ] f/u official speech and swallow and advance diet per recs, NPO for now, very agitated at times and has restraints (self-extubated and was pulling at swan-mna) 70 y/o M with a PMH of HTN, DM, PAD who is transferred to [facility] for management of cardiogenic shock and NSTEMI. As per sign out, the pt was admitted for L popliteal bypass to [facility]. During his time there, he was refusing medications  and was found to be progressively more dyspneic. He had an echo done there which showed that he has an EF of <15% with global hypokinesis; he received 20mg IV lasix for fluid overload. His respiratory status continued to worsen and the pt was intubated for airway protection. The pt vomited during this time and there was a concern for aspiration. The pt did not complain of CP during the time prior to intubation. As per nephew, the pt has not been complaining of CP, SOB prior to admission to the hospital and has been able to carry out his ADLs.  Patient admitted to CCU for cardiogenic shock management w/ NSTEMI and a course complicated by hypoxemic respiratory failure secondary to suspected aspiration PNA requiring intubation now self-extubated 7/8.  Patient on day 4 of 7 zosyn.      For PAD, vascular surgery was consulted and said no surgical intervention at this time.  Vascular cardiology is aware of the patient.      FOR FOLLOW UP:  [ ] for PAD, f/u with vascular surgery; vascular cardiology is aware of the pt c/w hep gtt and DAPT for now and await recs  [ ] complete 7 day course of abx w/ zosyn (7/6-)  [ ] f/u official speech and swallow and advance diet per recs, NPO for now, very agitated at times and has restraints (self-extubated and was pulling at swan-man)  [ ] pending repeat [facility]

## 2019-07-09 NOTE — PROGRESS NOTE ADULT - PROBLEM SELECTOR PLAN 4
His T2DM is of unknown duration and non-compliance on diet and meds  -As per chart review, pt on metformin 500 BID at home and lantus 20 daily  -HbA1c: 10%  - c/e current basal and bolus insulin with HSS

## 2019-07-09 NOTE — DIETITIAN INITIAL EVALUATION ADULT. - PHYSICAL APPEARANCE
other (specify) Skin per nursing documentation: no pressure injuries noted; ulcer L foot  Edema: none noted  nutrition focused physical exam deferred - view obstructed by icu equipment / Pt wearing mitts at time of RD visit.  Continue to monitor.

## 2019-07-09 NOTE — PROGRESS NOTE ADULT - PROBLEM SELECTOR PLAN 1
Stable now, no c/o chest pain, SOB in rest. Sailaja were >3000 in this admission, Reviewed Echo - EF 20-25%, global systolic dysfunction  - c/w DAPT, IV heparin gtt, statin  - will f/u plan from cardiology for Cath

## 2019-07-09 NOTE — PROGRESS NOTE ADULT - ASSESSMENT
70 y/o M w/ PMH of DM, HTN with poor medication compliance and PAD is admitted to CCU for cardiogenic shock management w/ NSTEMI and a course complicated by hypoxemic respiratory failure secondary to suspected aspiration PNA requiring intubation now extubated 7/8.

## 2019-07-09 NOTE — DIETITIAN INITIAL EVALUATION ADULT. - REASON INDICATOR FOR ASSESSMENT
Patient seen for nutrition assessment, ICU length of stay >48h.  Per chart: 70 y/o M w/ PMH of DM, HTN with poor medication compliance and PAD is admitted to CCU for cardiogenic shock management w/ NSTEMI and a course complicated by hypoxemic respiratory failure secondary to suspected aspiration PNA requiring intubation now extubated 7/8.

## 2019-07-09 NOTE — DIETITIAN INITIAL EVALUATION ADULT. - ADD RECOMMEND
1) If po diet appropriate, recommend DASH, consistent carbohydrate.  Defer texture to medical team.  2) RD remains available for education PRN / as Pt able to participate.

## 2019-07-09 NOTE — PROGRESS NOTE ADULT - PROBLEM SELECTOR PLAN 3
S/P septic shock requiring pressor support suggestive of septic shock; Self extubated on 7/8, off pressors. afebrile, blood c/s neg, O2 sat 96% on O2  -c/w IV zosyn day 4/7, on NGT feed now  - S & S eval, aspiration precaution

## 2019-07-09 NOTE — PROGRESS NOTE ADULT - PROBLEM SELECTOR PLAN 2
Distal LE are cold, no pulses. VA duplex of b/l LE: 07/06: On the right, there is a severe flow-limiting stenosis of the popliteal artery and the trifurcation arteries are occluded in the calf.  On the left, the bypass graft is occluded, the popliteal artery is occluded and the trifurcation arteries are occluded.  - vascular surgery and podiatry plan noted, Possible amputation   needed   - Plan to be made from Card & vascular combined at this point   - c/w DAPT, IV heparin gtt, statin

## 2019-07-09 NOTE — PROGRESS NOTE ADULT - ASSESSMENT
70 y/o M w/ PMH of DM, HTN with poor medication compliance and PAD is admitted to CCU for cardiogenic shock management w/ NSTEMI and a course complicated by hypoxemic respiratory failure secondary to suspected aspiration PNA requiring intubation now extubated 7/8.        #Neuro  -off all sedation and extubated on 7/8  -reassess mental status     #Respiratory:  -extubated on 7/8  -wean O2 as tolerated     #CV   NSTEMI  -s/p aspirin, plavix load for NSTEMI, will continue with maintenance dose of aspirin/plavix  -c/w hep gtt  -c/w atorvastatin     Cardiogenic shock -resolved  -required dobutamine and levophed but currently off  -TTE: EF 20-25% with global hypokinesis and mod dilated LV/LA  -currently has swan for hemodynamic monitoring: CO 4.3; CI 2; SVR: 1116.3; MVO2: 49; CVP 4-10    PAD  -VA duplex of b/l LE: 07/06: On the right, there is a severe flow-limiting stenosis of the popliteal artery and the trifurcation arteries are occluded in the calf.  On the left, the bypass graft is occluded, the popliteal artery is occluded and the trifurcation arteries are occluded.  -vascular surgery on board: no surgical intervention at this time;   -will c/s vascular cardiology    #Pulm  aspiration pneumonia  -c/w vanc/zosyn (07/06-) day 4  -f/u vanc trough  -continue to monitor    Pulm edema  -c/w lasix ggt 10mg/hr  -strict I/o  -continue to monitor    #GI  -Pt w/ transaminitis 1811/1148 likely in the setting of cardiogenic/septic shock; currently trending down   -Will continue to trend CMPs  -NG tube feeds for now; S&S later as patient becomes more alert     #Renal  -Pt with VINAY to 1.6, baseline at around 1; likely in the setting of cardiogenic/spetic shock  -Trend CMP, monitor UO, avoid nephrotoxic agents, renally dose meds,   -currently 1.3    #ID   -Pt w/ fevers and leukocytosis w/ R lung opacity on CXR and evidence of end organ damage with transaminitis and elevated lactate w/ hemodynamic instability requiring pressor support suggestive of septic shock; currently resolved  -UA negative; RVP positive for coronavirus;  -blood cx: NGTD;    -urine legionella negative   -c/w Abx with vanc/zosyn (day 3)     #Endo  -Pt w/ hx of DM unknown duration and non-compliance  -As per chart review, pt on metformin 500 BID at home and lantus 20 daily  -HbA1c: 10  -start in ISS and c/w Lantus 15U    #Heme  Pt normocytic anemia  -vitamin B12/folate normal  -iron studies consistent with anemia of chronic disease   -continue to monitor     #DVT ppx  hep gtt 68 y/o M w/ PMH of DM, HTN with poor medication compliance and PAD is admitted to CCU for cardiogenic shock management w/ NSTEMI and a course complicated by hypoxemic respiratory failure secondary to suspected aspiration PNA requiring intubation now extubated 7/8.        #Neuro  -off all sedation and extubated on 7/8  -pt only oriented to person but not place or time (unclear what baseline is)  - continue to assess      #Respiratory:  -extubated on 7/8  -wean O2 as tolerated     #CV   NSTEMI  -s/p aspirin, plavix load for NSTEMI, will continue with maintenance dose of aspirin/plavix  -c/w hep gtt  -c/w atorvastatin     Cardiogenic shock -resolved  -required dobutamine and levophed but currently off  -TTE: EF 20-25% with global hypokinesis and mod dilated LV/LA  -take out Mulga     PAD  -VA duplex of b/l LE: 07/06: On the right, there is a severe flow-limiting stenosis of the popliteal artery and the trifurcation arteries are occluded in the calf.  On the left, the bypass graft is occluded, the popliteal artery is occluded and the trifurcation arteries are occluded.  -vascular surgery on board: no surgical intervention at this time;   -await vascular cardiology recs     #Pulm  aspiration pneumonia  -c/w zosyn day 4of 7  -continue to monitor    Pulm edema  -currently euvolemic   -strict I/o  -continue to monitor    #GI  -Pt w/ transaminitis 1811/1148 likely in the setting of cardiogenic/septic shock; currently trending down   -Will continue to trend CMPs  -NG tube feeds for now; S&S later as patient becomes more alert     #Renal  -Pt with VINAY to 1.6, baseline at around 1; likely in the setting of cardiogenic/spetic shock  -Trend CMP, monitor UO, avoid nephrotoxic agents, renally dose meds,   -currently 1.3    #ID   -Pt w/ fevers and leukocytosis w/ R lung opacity on CXR and evidence of end organ damage with transaminitis and elevated lactate w/ hemodynamic instability requiring pressor support suggestive of septic shock; currently resolved  -UA negative; RVP positive for coronavirus;  -blood cx: NGTD;    -urine legionella negative   -c/w zosyn day 4 of 7    #Endo  -Pt w/ hx of DM unknown duration and non-compliance  -As per chart review, pt on metformin 500 BID at home and lantus 20 daily  -HbA1c: 10  -start in ISS and c/w Lantus 15U    #Heme  Pt normocytic anemia  -vitamin B12/folate normal  -iron studies consistent with anemia of chronic disease   -continue to monitor     #DVT ppx  hep gtt

## 2019-07-10 DIAGNOSIS — R45.1 RESTLESSNESS AND AGITATION: ICD-10-CM

## 2019-07-10 DIAGNOSIS — J96.01 ACUTE RESPIRATORY FAILURE WITH HYPOXIA: ICD-10-CM

## 2019-07-10 DIAGNOSIS — I50.21 ACUTE SYSTOLIC (CONGESTIVE) HEART FAILURE: ICD-10-CM

## 2019-07-10 DIAGNOSIS — D64.9 ANEMIA, UNSPECIFIED: ICD-10-CM

## 2019-07-10 LAB
ANION GAP SERPL CALC-SCNC: 13 MMOL/L — SIGNIFICANT CHANGE UP (ref 5–17)
APTT BLD: 41.9 SEC — HIGH (ref 27.5–36.3)
APTT BLD: 70.3 SEC — HIGH (ref 27.5–36.3)
BASOPHILS # BLD AUTO: 0.02 K/UL — SIGNIFICANT CHANGE UP (ref 0–0.2)
BASOPHILS NFR BLD AUTO: 0.2 % — SIGNIFICANT CHANGE UP (ref 0–2)
BUN SERPL-MCNC: 27 MG/DL — HIGH (ref 7–23)
CALCIUM SERPL-MCNC: 8.5 MG/DL — SIGNIFICANT CHANGE UP (ref 8.4–10.5)
CHLORIDE SERPL-SCNC: 103 MMOL/L — SIGNIFICANT CHANGE UP (ref 96–108)
CO2 SERPL-SCNC: 26 MMOL/L — SIGNIFICANT CHANGE UP (ref 22–31)
CREAT SERPL-MCNC: 1.01 MG/DL — SIGNIFICANT CHANGE UP (ref 0.5–1.3)
EOSINOPHIL # BLD AUTO: 0 K/UL — SIGNIFICANT CHANGE UP (ref 0–0.5)
EOSINOPHIL NFR BLD AUTO: 0 % — SIGNIFICANT CHANGE UP (ref 0–6)
GLUCOSE BLDC GLUCOMTR-MCNC: 135 MG/DL — HIGH (ref 70–99)
GLUCOSE BLDC GLUCOMTR-MCNC: 151 MG/DL — HIGH (ref 70–99)
GLUCOSE BLDC GLUCOMTR-MCNC: 152 MG/DL — HIGH (ref 70–99)
GLUCOSE BLDC GLUCOMTR-MCNC: 162 MG/DL — HIGH (ref 70–99)
GLUCOSE BLDC GLUCOMTR-MCNC: 214 MG/DL — HIGH (ref 70–99)
GLUCOSE BLDC GLUCOMTR-MCNC: 277 MG/DL — HIGH (ref 70–99)
GLUCOSE SERPL-MCNC: 165 MG/DL — HIGH (ref 70–99)
HCT VFR BLD CALC: 29.4 % — LOW (ref 39–50)
HGB BLD-MCNC: 9.5 G/DL — LOW (ref 13–17)
IMM GRANULOCYTES NFR BLD AUTO: 0.6 % — SIGNIFICANT CHANGE UP (ref 0–1.5)
LYMPHOCYTES # BLD AUTO: 1.68 K/UL — SIGNIFICANT CHANGE UP (ref 1–3.3)
LYMPHOCYTES # BLD AUTO: 15.5 % — SIGNIFICANT CHANGE UP (ref 13–44)
MCHC RBC-ENTMCNC: 30 PG — SIGNIFICANT CHANGE UP (ref 27–34)
MCHC RBC-ENTMCNC: 32.3 GM/DL — SIGNIFICANT CHANGE UP (ref 32–36)
MCV RBC AUTO: 92.7 FL — SIGNIFICANT CHANGE UP (ref 80–100)
MONOCYTES # BLD AUTO: 1.02 K/UL — HIGH (ref 0–0.9)
MONOCYTES NFR BLD AUTO: 9.4 % — SIGNIFICANT CHANGE UP (ref 2–14)
NEUTROPHILS # BLD AUTO: 8.04 K/UL — HIGH (ref 1.8–7.4)
NEUTROPHILS NFR BLD AUTO: 74.3 % — SIGNIFICANT CHANGE UP (ref 43–77)
PLATELET # BLD AUTO: 265 K/UL — SIGNIFICANT CHANGE UP (ref 150–400)
POTASSIUM SERPL-MCNC: 3.5 MMOL/L — SIGNIFICANT CHANGE UP (ref 3.5–5.3)
POTASSIUM SERPL-SCNC: 3.5 MMOL/L — SIGNIFICANT CHANGE UP (ref 3.5–5.3)
RBC # BLD: 3.17 M/UL — LOW (ref 4.2–5.8)
RBC # FLD: 12.6 % — SIGNIFICANT CHANGE UP (ref 10.3–14.5)
SODIUM SERPL-SCNC: 142 MMOL/L — SIGNIFICANT CHANGE UP (ref 135–145)
TSH SERPL-MCNC: 3.57 UIU/ML — SIGNIFICANT CHANGE UP (ref 0.27–4.2)
WBC # BLD: 10.83 K/UL — HIGH (ref 3.8–10.5)
WBC # FLD AUTO: 10.83 K/UL — HIGH (ref 3.8–10.5)

## 2019-07-10 PROCEDURE — 93010 ELECTROCARDIOGRAM REPORT: CPT

## 2019-07-10 PROCEDURE — 99233 SBSQ HOSP IP/OBS HIGH 50: CPT

## 2019-07-10 RX ORDER — INSULIN LISPRO 100/ML
VIAL (ML) SUBCUTANEOUS
Refills: 0 | Status: DISCONTINUED | OUTPATIENT
Start: 2019-07-10 | End: 2019-07-14

## 2019-07-10 RX ORDER — INSULIN LISPRO 100/ML
VIAL (ML) SUBCUTANEOUS AT BEDTIME
Refills: 0 | Status: DISCONTINUED | OUTPATIENT
Start: 2019-07-10 | End: 2019-07-14

## 2019-07-10 RX ORDER — HALOPERIDOL DECANOATE 100 MG/ML
0.5 INJECTION INTRAMUSCULAR ONCE
Refills: 0 | Status: COMPLETED | OUTPATIENT
Start: 2019-07-10 | End: 2019-07-10

## 2019-07-10 RX ORDER — LANOLIN ALCOHOL/MO/W.PET/CERES
3 CREAM (GRAM) TOPICAL AT BEDTIME
Refills: 0 | Status: DISCONTINUED | OUTPATIENT
Start: 2019-07-10 | End: 2019-07-22

## 2019-07-10 RX ORDER — QUETIAPINE FUMARATE 200 MG/1
25 TABLET, FILM COATED ORAL AT BEDTIME
Refills: 0 | Status: DISCONTINUED | OUTPATIENT
Start: 2019-07-10 | End: 2019-07-14

## 2019-07-10 RX ORDER — QUETIAPINE FUMARATE 200 MG/1
25 TABLET, FILM COATED ORAL
Refills: 0 | Status: DISCONTINUED | OUTPATIENT
Start: 2019-07-10 | End: 2019-07-14

## 2019-07-10 RX ADMIN — PIPERACILLIN AND TAZOBACTAM 25 GRAM(S): 4; .5 INJECTION, POWDER, LYOPHILIZED, FOR SOLUTION INTRAVENOUS at 09:29

## 2019-07-10 RX ADMIN — Medication 2: at 16:46

## 2019-07-10 RX ADMIN — Medication 40 MILLIGRAM(S): at 05:44

## 2019-07-10 RX ADMIN — Medication 1: at 12:45

## 2019-07-10 RX ADMIN — HALOPERIDOL DECANOATE 0.5 MILLIGRAM(S): 100 INJECTION INTRAMUSCULAR at 15:36

## 2019-07-10 RX ADMIN — PIPERACILLIN AND TAZOBACTAM 25 GRAM(S): 4; .5 INJECTION, POWDER, LYOPHILIZED, FOR SOLUTION INTRAVENOUS at 18:49

## 2019-07-10 RX ADMIN — QUETIAPINE FUMARATE 25 MILLIGRAM(S): 200 TABLET, FILM COATED ORAL at 21:47

## 2019-07-10 RX ADMIN — ATORVASTATIN CALCIUM 80 MILLIGRAM(S): 80 TABLET, FILM COATED ORAL at 21:47

## 2019-07-10 RX ADMIN — Medication 1: at 21:47

## 2019-07-10 RX ADMIN — HEPARIN SODIUM 2300 UNIT(S)/HR: 5000 INJECTION INTRAVENOUS; SUBCUTANEOUS at 18:50

## 2019-07-10 RX ADMIN — HEPARIN SODIUM 2300 UNIT(S)/HR: 5000 INJECTION INTRAVENOUS; SUBCUTANEOUS at 11:27

## 2019-07-10 RX ADMIN — INSULIN GLARGINE 6 UNIT(S): 100 INJECTION, SOLUTION SUBCUTANEOUS at 21:46

## 2019-07-10 RX ADMIN — QUETIAPINE FUMARATE 25 MILLIGRAM(S): 200 TABLET, FILM COATED ORAL at 17:15

## 2019-07-10 RX ADMIN — PIPERACILLIN AND TAZOBACTAM 25 GRAM(S): 4; .5 INJECTION, POWDER, LYOPHILIZED, FOR SOLUTION INTRAVENOUS at 02:04

## 2019-07-10 RX ADMIN — Medication 1: at 06:56

## 2019-07-10 RX ADMIN — Medication 3 MILLIGRAM(S): at 21:47

## 2019-07-10 NOTE — PROGRESS NOTE ADULT - ASSESSMENT
68 y/o M w/ PMH of DM, HTN with poor medication compliance and PAD is admitted to CCU for cardiogenic shock management w/ NSTEMI and a course complicated by hypoxemic respiratory failure secondary to suspected aspiration PNA requiring intubation now extubated 7/8. 70 y/o M w/ PMH of poor med compliance DM, HTN, PAD s/p recent surgery at [facility] transferred to [facility] CCU for NSTEMI with cardiogenic shock with course complicated by acute hypoxemic respiratory failure +/- septic shock secondary to suspected aspiration PNA requiring intubation now extubated 7/8 downgraded from CCU

## 2019-07-10 NOTE — PROGRESS NOTE ADULT - ASSESSMENT
A/P: 70 y/o M with a PMH of HTN, DM, PAD who is transferred to [facility] for management of cardiogenic shock and NSTEMI, transferred out of CCU.    - clinically stable with no chest pain  - self-extubated, bilateral hand mittens  - c/w DAPT, heparin  - will consider LHC once clinically stable off mittens and no restrictions  - will continue to follow, please call with further questions    Bashir Mulligan, #[phone]  Cardiology Fellow

## 2019-07-10 NOTE — PROGRESS NOTE ADULT - PROBLEM SELECTOR PLAN 2
Distal LE are cold, no pulses. VA duplex of b/l LE: 07/06: On the right, there is a severe flow-limiting stenosis of the popliteal artery and the trifurcation arteries are occluded in the calf.  On the left, the bypass graft is occluded, the popliteal artery is occluded and the trifurcation arteries are occluded.  - vascular surgery and podiatry plan noted, Possible amputation   needed   - Plan to be made from Card & vascular combined at this point   - c/w DAPT, IV heparin gtt, statin Distal LE are cold, no pulses. VA duplex of b/l LE: 07/06: On the right, there is a severe flow-limiting stenosis of the popliteal artery and the trifurcation arteries are occluded in the calf.  On the left, the bypass graft is occluded, the popliteal artery is occluded and the trifurcation arteries are occluded.  - vascular surgery and podiatry plan noted, Possible amputation  needed   - c/w DAPT, IV heparin gtt, statin - TTE with EF 20-25%, global systolic dysfunction, mildly hypervolemic with b/l pleural efffusions  -c/w IV lasix 40mg IV daily  -VINAY improving, eventually will need ACE  -cards following  -strict I and O, daily weights - TTE with EF 20-25%, global systolic dysfunction, mildly hypervolemic with b/l pleural efffusions  -c/w IV lasix 40mg IV daily  -VINAY improving, eventually will need ACE  -cards following  -strict I and O, daily weights  -TOV today

## 2019-07-10 NOTE — PROGRESS NOTE ADULT - ASSESSMENT
70 y/o M with a PMH of HTN, DM, PAD, s/p LLE fem-pop bypass with vein graft who is transferred to [facility] for management of cardiogenic shock and NSTEMI, vascular surgery consulted for no dopplerable signals    - Continue to watch for demarcation of b/l lower extremities   - Will continue to follow and re-address amputation discussion at later time   - Tentatively plan for OR early next week  - Duplex reviewed   - Continue Hep ggt  - Avoid pressors as tolerated   - Care per primary team    Alicia Navas PA-C   Vacular Surgery   [phone]

## 2019-07-10 NOTE — PROGRESS NOTE ADULT - PROBLEM SELECTOR PLAN 5
Pt with VINAY to 1.6, now 1.3. likely in the setting of cardiogenic/spetic shock  - BMP daily avoid nephrotoxic agents, renally dose meds, a1c 10.0, FS stable  - c/w lantus 6 units qhs and FIDENCIO  -monitor FS suspected aspiration pna  -c/w IV zosyn day 5/7, afebrile, leukocytosis improving  -pulled out NG tube overnight, NPO except meds until speech eval today  - S & S eval, aspiration precaution.

## 2019-07-10 NOTE — PROGRESS NOTE ADULT - PROBLEM SELECTOR PLAN 1
Stable now, no c/o chest pain, SOB in rest. Sailaja were >3000 in this admission, Reviewed Echo - EF 20-25%, global systolic dysfunction  - c/w DAPT, IV heparin gtt, statin  - will f/u plan from cardiology for Cath Trops downtrended  - TTE with EF 20-25%, global systolic dysfunction  - c/w DAPT, IV heparin gtt, statin  - per cards, will need eventual cath when not agitated

## 2019-07-10 NOTE — PROGRESS NOTE ADULT - PROBLEM SELECTOR PROBLEM 5
VINAY (acute kidney injury) Uncontrolled type 2 diabetes mellitus with hyperglycemia Aspiration pneumonia

## 2019-07-10 NOTE — PROGRESS NOTE ADULT - SUBJECTIVE AND OBJECTIVE BOX
Podiatry pager #: [phone] ([place])/ [phone] ([facility])    Patient is a 69y old  Male who presents with a chief complaint of NSTEMI (10 Jul 2019 08:19)       INTERVAL HPI/OVERNIGHT EVENTS:  Patient seen and evaluated at bedside.  Pt is resting comfortable in NAD. Denies N/V/F/C.     Allergies    No Known Allergies    Intolerances        Vital Signs Last 24 Hrs  T(C): 36.9 (10 Jul 2019 04:05), Max: 37.1 (09 Jul 2019 20:05)  T(F): 98.5 (10 Jul 2019 04:05), Max: 98.8 (09 Jul 2019 20:05)  HR: 72 (10 Jul 2019 04:05) (72 - 89)  BP: 115/67 (10 Jul 2019 04:05) (105/55 - 115/67)  BP(mean): 72 (09 Jul 2019 15:00) (72 - 74)  RR: 18 (10 Jul 2019 04:05) (18 - 26)  SpO2: 92% (10 Jul 2019 04:05) (92% - 98%)    LABS:                        10.1   12.1  )-----------( 260      ( 09 Jul 2019 13:28 )             30.0     07-10    142  |  103  |  27<H>  ----------------------------<  165<H>  3.5   |  26  |  1.01    Ca    8.5      10 Jul 2019 07:14  Phos  2.9     07-09  Mg     2.0     07-09    TPro  6.4  /  Alb  2.7<L>  /  TBili  1.1  /  DBili  x   /  AST  77<H>  /  ALT  324<H>  /  AlkPhos  214<H>  07-09    PT/INR - ( 09 Jul 2019 05:16 )   PT: 15.9 sec;   INR: 1.37 ratio         PTT - ( 09 Jul 2019 20:53 )  PTT:64.7 sec    CAPILLARY BLOOD GLUCOSE      POCT Blood Glucose.: 152 mg/dL (10 Jul 2019 08:05)  POCT Blood Glucose.: 162 mg/dL (10 Jul 2019 06:12)  POCT Blood Glucose.: 135 mg/dL (10 Jul 2019 00:08)  POCT Blood Glucose.: 141 mg/dL (09 Jul 2019 22:50)  POCT Blood Glucose.: 160 mg/dL (09 Jul 2019 18:03)  POCT Blood Glucose.: 151 mg/dL (09 Jul 2019 11:22)      Lower Extremity Physical Exam:  Vascular: DP/PT 0/4, B/L, CFT not appreciated bl, Temperature gradient cold, B/L.   Neuro: Epicritic sensation diminished to the level of digits, B/L.  Musculoskeletal/Ortho: contracted digits b/l  Skin: cold bilateral LE with dusky toes. Dry gangrene to the left hallux and dry eschar to the distal right hallux. No active signs of infection   Etiology: arterial

## 2019-07-10 NOTE — PROGRESS NOTE ADULT - PROBLEM SELECTOR PLAN 8
BP stable  - c/w IV Lasix 40mg daily  -monitor bp VINAY now resolved, likely due to cardiogenic/septic shock  -monitor bmp

## 2019-07-10 NOTE — SWALLOW BEDSIDE ASSESSMENT ADULT - SLP PERTINENT HISTORY OF CURRENT PROBLEM
68 y/o M w/ PMH of DM, HTN with poor medication compliance and PAD was admitted to CCU (7/5/19) from [facility] for cardiogenic shock management w/ NSTEMI and a course complicated by hypoxemic respiratory failure secondary to suspected aspiration PNA 2/2 episode of vomiting while intubated; now in septic shock. Known hx of poor medication compliance. Vascular surgery consulted (7/6/19) and following for no dopplerable signals and ordered an urgent lower extremity arterial duplex. Patient regained biphasic Doppler pulse when given Heparin infusion as per Cardiology (7/6/19). Patient self-extubated himself (7/8/19). Podiatry consulted (7/9/19) for dry gangrene, being treated with betadine. Patient transferred from CCU to general medicine (7/9/19). Patient self-removed NGT (7/9/19), B/L mittens placed. 68 y/o M w/ PMH of DM, HTN with poor medication compliance and PAD was admitted to CCU (7/5/19) from [facility] for cardiogenic shock management w/ NSTEMI and a course complicated by hypoxemic respiratory failure secondary to suspected aspiration PNA 2/2 episode of vomiting while intubated; now in septic shock. Known hx of poor medication compliance. Vascular surgery consulted (7/6/19) and following for no dopplerable signals and ordered an urgent lower extremity arterial duplex. Patient regained biphasic Doppler pulse when given Heparin infusion as per Cardiology (7/6/19). Patient self-extubated (7/8/19). Podiatry consulted (7/9/19) for dry gangrene, being treated with betadine. Patient transferred from CCU to general medicine (7/9/19). Patient self-removed NGT (7/9/19), B/L mittens placed. Patient remains on antibiotics for aspiration PNA. Pt with VINAY likely in the setting of cardiogenic/septic shock. 68 y/o M w/ PMH of DM, HTN with poor medication compliance and PAD was admitted to CCU (7/5/19) from [facility] s/p L popliteal bypass for cardiogenic shock management w/ NSTEMI and a course complicated by hypoxemic respiratory failure secondary to suspected aspiration PNA 2/2 episode of vomiting while intubated; now in septic shock. Known hx of poor medication compliance. Vascular surgery consulted (7/6/19) and following for no dopplerable signals and ordered an urgent lower extremity arterial duplex. Patient regained biphasic Doppler pulse when given Heparin infusion as per Cardiology (7/6/19). Patient self-extubated (7/8/19). Podiatry consulted (7/9/19) for dry gangrene, being treated with betadine. Patient transferred from CCU to general medicine (7/9/19). Patient self-removed NGT (7/9/19), B/L mittens placed. Patient remains on antibiotics for aspiration PNA. Pt with VINAY likely in the setting of cardiogenic/septic shock.

## 2019-07-10 NOTE — SWALLOW BEDSIDE ASSESSMENT ADULT - SWALLOW EVAL: DIAGNOSIS
Patient presents with oropharyngeal dysphagia compounded by cognitive deficits Patient presents with oropharyngeal dysphagia compounded by cognitive deficits. Oral stage characterized by prolonged oral prep/AP transit on ground consistency 2/2 reduced lingual coordination, missing dentition, and distractibility. Pharyngeal stage characterized by mildly delayed swallow initiation via palpation during puree and ground consistencies. Patient observed with audible swallow and slight change in work of breathing during PO intake of thin liquids, clinically suggestive of penetration/aspiration.

## 2019-07-10 NOTE — PROGRESS NOTE ADULT - PROBLEM SELECTOR PLAN 3
S/P septic shock requiring pressor support suggestive of septic shock; Self extubated on 7/8, off pressors. afebrile, blood c/s neg, O2 sat 96% on O2  -c/w IV zosyn day 4/7, on NGT feed now  - S & S eval, aspiration precaution acute hypoxic respiratory failure due to suspected aspiration pna  -self extubated on 7/8, off pressors, currentrly satting well on 3L NC  -c/w IV zosyn day 5/7,   -pulled out NG tube overnight, NPO except meds until speech eval today  - S & S eval, aspiration precaution. PAD s/p recent R Leg surgery at [facility]  -VA duplex of b/l LE: 07/06: On the right, there is a severe flow-limiting stenosis of the popliteal artery and the trifurcation arteries are occluded in the calf.  On the left, the bypass graft is occluded, the popliteal artery is occluded and the trifurcation arteries are occluded.  - vascular surgery and podiatry plan noted, Possible amputation needed, monitor LEs  - c/w DAPT, IV heparin gtt, statin

## 2019-07-10 NOTE — PROGRESS NOTE ADULT - SUBJECTIVE AND OBJECTIVE BOX
Patient is a 69y old  Male who presents with a chief complaint of NSTEMI (10 Jul 2019 09:30)      SUBJECTIVE / OVERNIGHT EVENTS: Overnight was very agitated, pulled out NG tube. Pt says he feels well, says hes hungry. Denies cp, sob    MEDICATIONS  (STANDING):  aspirin  chewable 81 milliGRAM(s) Oral daily  atorvastatin 80 milliGRAM(s) Oral at bedtime  chlorhexidine 0.12% Liquid 15 milliLiter(s) Oral Mucosa two times a day  clopidogrel Tablet 75 milliGRAM(s) Oral daily  dextrose 5%. 1000 milliLiter(s) (50 mL/Hr) IV Continuous <Continuous>  dextrose 50% Injectable 12.5 Gram(s) IV Push once  dextrose 50% Injectable 25 Gram(s) IV Push once  dextrose 50% Injectable 25 Gram(s) IV Push once  furosemide   Injectable 40 milliGRAM(s) IV Push daily  haloperidol    Injectable 0.5 milliGRAM(s) IV Push once  heparin  Infusion. 1800 Unit(s)/Hr (18 mL/Hr) IV Continuous <Continuous>  insulin glargine Injectable (LANTUS) 6 Unit(s) SubCutaneous at bedtime  insulin lispro (HumaLOG) corrective regimen sliding scale   SubCutaneous every 6 hours  melatonin 3 milliGRAM(s) Oral at bedtime  piperacillin/tazobactam IVPB.. 3.375 Gram(s) IV Intermittent every 8 hours  QUEtiapine 25 milliGRAM(s) Oral at bedtime    MEDICATIONS  (PRN):  dextrose 40% Gel 15 Gram(s) Oral once PRN Blood Glucose LESS THAN 70 milliGRAM(s)/deciliter  glucagon  Injectable 1 milliGRAM(s) IntraMuscular once PRN Glucose LESS THAN 70 milligrams/deciliter  heparin  Injectable 5300 Unit(s) IV Push every 6 hours PRN For aPTT less than 40  QUEtiapine 25 milliGRAM(s) Oral two times a day PRN agitation      Vital Signs Last 24 Hrs  T(C): 36.7 (10 Jul 2019 11:54), Max: 37.1 (09 Jul 2019 20:05)  T(F): 98.1 (10 Jul 2019 11:54), Max: 98.8 (09 Jul 2019 20:05)  HR: 78 (10 Jul 2019 11:54) (72 - 89)  BP: 110/58 (10 Jul 2019 11:54) (105/66 - 115/67)  BP(mean): --  RR: 18 (10 Jul 2019 11:54) (18 - 18)  SpO2: 98% (10 Jul 2019 11:54) (92% - 98%)  CAPILLARY BLOOD GLUCOSE      POCT Blood Glucose.: 151 mg/dL (10 Jul 2019 12:13)  POCT Blood Glucose.: 152 mg/dL (10 Jul 2019 08:05)  POCT Blood Glucose.: 162 mg/dL (10 Jul 2019 06:12)  POCT Blood Glucose.: 135 mg/dL (10 Jul 2019 00:08)  POCT Blood Glucose.: 141 mg/dL (09 Jul 2019 22:50)  POCT Blood Glucose.: 160 mg/dL (09 Jul 2019 18:03)    I&O's Summary    09 Jul 2019 07:01  -  10 Jul 2019 07:00  --------------------------------------------------------  IN: 488.5 mL / OUT: 1750 mL / NET: -1261.5 mL    10 Jul 2019 07:01  -  10 Jul 2019 15:22  --------------------------------------------------------  IN: 0 mL / OUT: 950 mL / NET: -950 mL        PHYSICAL EXAM:  GENERAL: NAD, well-developed  HEAD:  Atraumatic, Normocephalic  EYES: EOMI, PERRLA, conjunctiva and sclera clear  NECK: Supple, No JVD  CHEST/LUNG: Clear to auscultation bilaterally; No wheeze  HEART: Regular rate and rhythm; No murmurs, rubs, or gallops  ABDOMEN: Soft, Nontender, Nondistended; Bowel sounds present  EXTREMITIES:  2+ Peripheral Pulses, No clubbing, cyanosis, or edema  PSYCH: AAOx3  NEUROLOGY: non-focal  SKIN: No rashes or lesions    LABS:                        9.5    10.83 )-----------( 265      ( 10 Jul 2019 10:14 )             29.4     07-10    142  |  103  |  27<H>  ----------------------------<  165<H>  3.5   |  26  |  1.01    Ca    8.5      10 Jul 2019 07:14  Phos  2.9     07-09  Mg     2.0     07-09    TPro  6.4  /  Alb  2.7<L>  /  TBili  1.1  /  DBili  x   /  AST  77<H>  /  ALT  324<H>  /  AlkPhos  214<H>  07-09    PT/INR - ( 09 Jul 2019 05:16 )   PT: 15.9 sec;   INR: 1.37 ratio         PTT - ( 10 Jul 2019 10:04 )  PTT:41.9 sec  CARDIAC MARKERS ( 09 Jul 2019 05:16 )  x     / x     / 162 U/L / x     / 3.3 ng/mL              RADIOLOGY & ADDITIONAL TESTS:    Imaging Personally Reviewed:    Consultant(s) Notes Reviewed:      Care Discussed with Consultants/Other Providers: Patient is a 69y old  Male who presents with a chief complaint of NSTEMI (10 Jul 2019 09:30)      SUBJECTIVE / OVERNIGHT EVENTS: Overnight was very agitated, pulled out NG tube, was put on mittens. Pt says he feels well, says hes hungry. Denies pain in general but Cannot get good ROS as patient agitated and not making sense.     Tele reviewed: sinus 70-80, pvcs    MEDICATIONS  (STANDING):  aspirin  chewable 81 milliGRAM(s) Oral daily  atorvastatin 80 milliGRAM(s) Oral at bedtime  chlorhexidine 0.12% Liquid 15 milliLiter(s) Oral Mucosa two times a day  clopidogrel Tablet 75 milliGRAM(s) Oral daily  dextrose 5%. 1000 milliLiter(s) (50 mL/Hr) IV Continuous <Continuous>  dextrose 50% Injectable 12.5 Gram(s) IV Push once  dextrose 50% Injectable 25 Gram(s) IV Push once  dextrose 50% Injectable 25 Gram(s) IV Push once  furosemide   Injectable 40 milliGRAM(s) IV Push daily  haloperidol    Injectable 0.5 milliGRAM(s) IV Push once  heparin  Infusion. 1800 Unit(s)/Hr (18 mL/Hr) IV Continuous <Continuous>  insulin glargine Injectable (LANTUS) 6 Unit(s) SubCutaneous at bedtime  insulin lispro (HumaLOG) corrective regimen sliding scale   SubCutaneous every 6 hours  melatonin 3 milliGRAM(s) Oral at bedtime  piperacillin/tazobactam IVPB.. 3.375 Gram(s) IV Intermittent every 8 hours  QUEtiapine 25 milliGRAM(s) Oral at bedtime    MEDICATIONS  (PRN):  dextrose 40% Gel 15 Gram(s) Oral once PRN Blood Glucose LESS THAN 70 milliGRAM(s)/deciliter  glucagon  Injectable 1 milliGRAM(s) IntraMuscular once PRN Glucose LESS THAN 70 milligrams/deciliter  heparin  Injectable 5300 Unit(s) IV Push every 6 hours PRN For aPTT less than 40  QUEtiapine 25 milliGRAM(s) Oral two times a day PRN agitation      Vital Signs Last 24 Hrs  T(C): 36.7 (10 Jul 2019 11:54), Max: 37.1 (09 Jul 2019 20:05)  T(F): 98.1 (10 Jul 2019 11:54), Max: 98.8 (09 Jul 2019 20:05)  HR: 78 (10 Jul 2019 11:54) (72 - 89)  BP: 110/58 (10 Jul 2019 11:54) (105/66 - 115/67)  BP(mean): --  RR: 18 (10 Jul 2019 11:54) (18 - 18)  SpO2: 98% (10 Jul 2019 11:54) (92% - 98%)  CAPILLARY BLOOD GLUCOSE      POCT Blood Glucose.: 151 mg/dL (10 Jul 2019 12:13)  POCT Blood Glucose.: 152 mg/dL (10 Jul 2019 08:05)  POCT Blood Glucose.: 162 mg/dL (10 Jul 2019 06:12)  POCT Blood Glucose.: 135 mg/dL (10 Jul 2019 00:08)  POCT Blood Glucose.: 141 mg/dL (09 Jul 2019 22:50)  POCT Blood Glucose.: 160 mg/dL (09 Jul 2019 18:03)    I&O's Summary    09 Jul 2019 07:01  -  10 Jul 2019 07:00  --------------------------------------------------------  IN: 488.5 mL / OUT: 1750 mL / NET: -1261.5 mL    10 Jul 2019 07:01  -  10 Jul 2019 15:22  --------------------------------------------------------  IN: 0 mL / OUT: 950 mL / NET: -950 mL        PHYSICAL EXAM:  GENERAL: NAD, well-developed, mildly agitated,   HEAD:  Atraumatic, Normocephalic  NECK: Supple, No JVD  CHEST/LUNG: Clear to auscultation bilaterally; No wheeze  HEART: Regular rate and rhythm;   ABDOMEN: Soft, Nontender, Nondistended; Bowel sounds present  EXTREMITIES:  No clubbing, cyanosis, or edema. LLE with staple marks, DP nonpalpable b/l, LE cold  PSYCH: AAOx1, thinks hes in [facility],. agitated+  s    LABS:                        9.5    10.83 )-----------( 265      ( 10 Jul 2019 10:14 )             29.4     07-10    142  |  103  |  27<H>  ----------------------------<  165<H>  3.5   |  26  |  1.01    Ca    8.5      10 Jul 2019 07:14  Phos  2.9     07-09  Mg     2.0     07-09    TPro  6.4  /  Alb  2.7<L>  /  TBili  1.1  /  DBili  x   /  AST  77<H>  /  ALT  324<H>  /  AlkPhos  214<H>  07-09    PT/INR - ( 09 Jul 2019 05:16 )   PT: 15.9 sec;   INR: 1.37 ratio         PTT - ( 10 Jul 2019 10:04 )  PTT:41.9 sec  CARDIAC MARKERS ( 09 Jul 2019 05:16 )  x     / x     / 162 U/L / x     / 3.3 ng/mL              RADIOLOGY & ADDITIONAL TESTS:    Imaging Personally Reviewed: reviewed EKG: sinus 68, with 1st degree AV block,  LBBB, qtc 467    Consultant(s) Notes Reviewed:  all    Care Discussed with Consultants/Other Providers: Patient is a 69y old  Male who presents with a chief complaint of NSTEMI (10 Jul 2019 09:30)      SUBJECTIVE / OVERNIGHT EVENTS: Overnight was very agitated, pulled out NG tube, was put on mittens. Pt says he feels well, says hes hungry. Denies pain in general but Cannot get good ROS as patient agitated and not making sense. No bleeding noted per nursing    Tele reviewed: sinus 70-80, pvcs    MEDICATIONS  (STANDING):  aspirin  chewable 81 milliGRAM(s) Oral daily  atorvastatin 80 milliGRAM(s) Oral at bedtime  chlorhexidine 0.12% Liquid 15 milliLiter(s) Oral Mucosa two times a day  clopidogrel Tablet 75 milliGRAM(s) Oral daily  dextrose 5%. 1000 milliLiter(s) (50 mL/Hr) IV Continuous <Continuous>  dextrose 50% Injectable 12.5 Gram(s) IV Push once  dextrose 50% Injectable 25 Gram(s) IV Push once  dextrose 50% Injectable 25 Gram(s) IV Push once  furosemide   Injectable 40 milliGRAM(s) IV Push daily  haloperidol    Injectable 0.5 milliGRAM(s) IV Push once  heparin  Infusion. 1800 Unit(s)/Hr (18 mL/Hr) IV Continuous <Continuous>  insulin glargine Injectable (LANTUS) 6 Unit(s) SubCutaneous at bedtime  insulin lispro (HumaLOG) corrective regimen sliding scale   SubCutaneous every 6 hours  melatonin 3 milliGRAM(s) Oral at bedtime  piperacillin/tazobactam IVPB.. 3.375 Gram(s) IV Intermittent every 8 hours  QUEtiapine 25 milliGRAM(s) Oral at bedtime    MEDICATIONS  (PRN):  dextrose 40% Gel 15 Gram(s) Oral once PRN Blood Glucose LESS THAN 70 milliGRAM(s)/deciliter  glucagon  Injectable 1 milliGRAM(s) IntraMuscular once PRN Glucose LESS THAN 70 milligrams/deciliter  heparin  Injectable 5300 Unit(s) IV Push every 6 hours PRN For aPTT less than 40  QUEtiapine 25 milliGRAM(s) Oral two times a day PRN agitation      Vital Signs Last 24 Hrs  T(C): 36.7 (10 Jul 2019 11:54), Max: 37.1 (09 Jul 2019 20:05)  T(F): 98.1 (10 Jul 2019 11:54), Max: 98.8 (09 Jul 2019 20:05)  HR: 78 (10 Jul 2019 11:54) (72 - 89)  BP: 110/58 (10 Jul 2019 11:54) (105/66 - 115/67)  BP(mean): --  RR: 18 (10 Jul 2019 11:54) (18 - 18)  SpO2: 98% (10 Jul 2019 11:54) (92% - 98%)  CAPILLARY BLOOD GLUCOSE      POCT Blood Glucose.: 151 mg/dL (10 Jul 2019 12:13)  POCT Blood Glucose.: 152 mg/dL (10 Jul 2019 08:05)  POCT Blood Glucose.: 162 mg/dL (10 Jul 2019 06:12)  POCT Blood Glucose.: 135 mg/dL (10 Jul 2019 00:08)  POCT Blood Glucose.: 141 mg/dL (09 Jul 2019 22:50)  POCT Blood Glucose.: 160 mg/dL (09 Jul 2019 18:03)    I&O's Summary    09 Jul 2019 07:01  -  10 Jul 2019 07:00  --------------------------------------------------------  IN: 488.5 mL / OUT: 1750 mL / NET: -1261.5 mL    10 Jul 2019 07:01  -  10 Jul 2019 15:22  --------------------------------------------------------  IN: 0 mL / OUT: 950 mL / NET: -950 mL        PHYSICAL EXAM:  GENERAL: NAD, well-developed, mildly agitated,   HEAD:  Atraumatic, Normocephalic  NECK: Supple, No JVD  CHEST/LUNG: Clear to auscultation bilaterally; No wheeze  HEART: Regular rate and rhythm;   ABDOMEN: Soft, Nontender, Nondistended; Bowel sounds present  EXTREMITIES:  No clubbing, cyanosis, or edema. LLE with staple marks, DP nonpalpable b/l, LE cold  PSYCH: AAOx1, thinks hes in [facility],. agitated+  : south draining yellow urine    LABS:                        9.5    10.83 )-----------( 265      ( 10 Jul 2019 10:14 )             29.4     07-10    142  |  103  |  27<H>  ----------------------------<  165<H>  3.5   |  26  |  1.01    Ca    8.5      10 Jul 2019 07:14  Phos  2.9     07-09  Mg     2.0     07-09    TPro  6.4  /  Alb  2.7<L>  /  TBili  1.1  /  DBili  x   /  AST  77<H>  /  ALT  324<H>  /  AlkPhos  214<H>  07-09    PT/INR - ( 09 Jul 2019 05:16 )   PT: 15.9 sec;   INR: 1.37 ratio         PTT - ( 10 Jul 2019 10:04 )  PTT:41.9 sec  CARDIAC MARKERS ( 09 Jul 2019 05:16 )  x     / x     / 162 U/L / x     / 3.3 ng/mL              RADIOLOGY & ADDITIONAL TESTS:    Imaging Personally Reviewed: reviewed EKG: sinus 68, with 1st degree AV block,  LBBB, qtc 467    Consultant(s) Notes Reviewed:  all    Care Discussed with Consultants/Other Providers:

## 2019-07-10 NOTE — PROGRESS NOTE ADULT - PROBLEM SELECTOR PLAN 4
His T2DM is of unknown duration and non-compliance on diet and meds  -As per chart review, pt on metformin 500 BID at home and lantus 20 daily  -HbA1c: 10%  - c/e current basal and bolus insulin with HSS agitated overnight, oriented x 1 this morning, likely delirium with possible hx of dementia, will need to get more hx  -reviewed EKG, Qtc 467, start seroquel 25mg bid prn agitation, seroquel 25mg qhs and melatonin 3mg qhs  -monitor EKGs periodically likely due to aspiration pna and b/l pleural effusions, s/p self extubation on 7/8  -diuresis as above  -antibiotics as below  -satting well on 3L NC, wean as tolerated

## 2019-07-10 NOTE — PROGRESS NOTE ADULT - SUBJECTIVE AND OBJECTIVE BOX
Cardiology Progress Note    Interval: Pt resting comfortably in bed.     Tele:    HPI:  History obtained from sign out/nephew  The pt is a 70 y/o M with a PMH of HTN, DM, PAD who is transferred to [facility] for management of cardiogenic shock and NSTEMI. As per sign out, the pt was admitted for L popliteal bypass to [facility]. During his time there, he was refusing medications  and was found to be progressively more dyspneic. He had an echo done there which showed that he has an EF of <15% with global hypokinesis; he received 20mg IV lasix for fluid overload. His respiratory status continued to worsen and the pt was intubated for airway protection. The pt vomited during this time and there was a concern for aspiration. The pt did not complain of CP during the time prior to intubation. As per nephew, the pt has not been complaining of CP, SOB prior to admission to the hospital and has been able to carry out his ADLs. As per the nephew, pt has not been compliant with his medications as an outpatient; the nephew knows that he had amlodipine prescribed but doesn't know what medications the the pt takes for his diabetes.  As per chart from [facility], pt has been prescribed to take amlodipine 10 daily and metformin 500 BID at home. As per med rec from , pt has been taking lantus 20 mg daily at home in addition to metformin. (2019 23:50)      Medications:  aspirin  chewable 81 milliGRAM(s) Oral daily  atorvastatin 80 milliGRAM(s) Oral at bedtime  chlorhexidine 0.12% Liquid 15 milliLiter(s) Oral Mucosa two times a day  clopidogrel Tablet 75 milliGRAM(s) Oral daily  dextrose 40% Gel 15 Gram(s) Oral once PRN  dextrose 5%. 1000 milliLiter(s) IV Continuous <Continuous>  dextrose 50% Injectable 12.5 Gram(s) IV Push once  dextrose 50% Injectable 25 Gram(s) IV Push once  dextrose 50% Injectable 25 Gram(s) IV Push once  furosemide   Injectable 40 milliGRAM(s) IV Push daily  glucagon  Injectable 1 milliGRAM(s) IntraMuscular once PRN  heparin  Infusion. 1800 Unit(s)/Hr IV Continuous <Continuous>  heparin  Injectable 5300 Unit(s) IV Push every 6 hours PRN  insulin glargine Injectable (LANTUS) 6 Unit(s) SubCutaneous at bedtime  insulin lispro (HumaLOG) corrective regimen sliding scale   SubCutaneous every 6 hours  piperacillin/tazobactam IVPB.. 3.375 Gram(s) IV Intermittent every 8 hours      Review of Systems:  Constitutional: [ ] Fever [ ] Chills [ ] Fatigue [ ] Weight change   HEENT: [ ] Blurred vision [ ] Eye Pain [ ] Headache [ ] Runny nose [ ] Sore Throat   Respiratory: [ ] Cough [ ] Wheezing [ ] Shortness of breath  Cardiovascular: [ ] Chest Pain [ ] Palpitations [ ] ABARCA [ ] PND [ ] Orthopnea  Gastrointestinal: [ ] Abdominal Pain [ ] Diarrhea [ ] Constipation [ ] Hemorrhoids [ ] Nausea [ ] Vomiting  Genitourinary: [ ] Nocturia [ ] Dysuria [ ] Incontinence  Extremities: [ ] Swelling [ ] Joint Pain  Neurologic: [ ] Focal deficit [ ] Paresthesias [ ] Syncope  Lymphatic: [ ] Swelling [ ] Lymphadenopathy   Skin: [ ] Rash [ ] Ecchymoses [ ] Wounds [ ] Lesions  Psychiatry: [ ] Depression [ ] Suicidal/Homicidal Ideation [ ] Anxiety [ ] Sleep Disturbances  [ ] 10 point review of systems is otherwise negative except as mentioned above            [ ]Unable to obtain    Vitals:  T(C): 36.6 (07-10-19 @ 09:00), Max: 37.1 (19 @ 20:05)  HR: 72 (07-10-19 @ 09:00) (72 - 89)  BP: 105/66 (07-10-19 @ 09:00) (105/55 - 115/67)  BP(mean): 72 (19 @ 15:00) (72 - 74)  RR: 18 (07-10-19 @ 09:00) (18 - 26)  SpO2: 92% (07-10-19 @ 04:05) (92% - 98%)  Wt(kg): --  Daily     Daily Weight in k.3 (2019 11:40)  I&O's Summary    2019 07:01  -  10 Jul 2019 07:00  --------------------------------------------------------  IN: 488.5 mL / OUT: 1750 mL / NET: -1261.5 mL        Physical Exam:  General: NAD  Eye: PERRL, EOMI  HENT: Normal oral mucosa NC/AT  CV: Normal S1/S2, RRR, No M/R/G, no edema, no elevation in JVP  Resp: Normal respiratory effort, clear to auscultation bilaterally, no wheezing, no crackles  Abd: Soft, Non-tender, Non-distended, BS+  Ext: No clubbing, No joint deformity   Neuro: Non-focal, motor and sensory intact  Lymph: No lymphadenopathy  Psych: AAOx3, Mood & affect appropriate  Skin: No rashes, No ecchymoses, No cyanosis    Labs:                        10.1   12.1  )-----------( 260      ( 2019 13:28 )             30.0     07-10    142  |  103  |  27<H>  ----------------------------<  165<H>  3.5   |  26  |  1.01    Ca    8.5      10 Jul 2019 07:14  Phos  2.9     07-  Mg     2.0     07-    TPro  6.4  /  Alb  2.7<L>  /  TBili  1.1  /  DBili  x   /  AST  77<H>  /  ALT  324<H>  /  AlkPhos  214<H>  07    PT/INR - ( 2019 05:16 )   PT: 15.9 sec;   INR: 1.37 ratio         PTT - ( 2019 20:53 )  PTT:64.7 sec  CARDIAC MARKERS ( 2019 05:16 )  x     / x     / 162 U/L / x     / 3.3 ng/mL      Serum Pro-Brain Natriuretic Peptide: 5792 pg/mL ( @ 08:38)          New results/imaging: Cardiology Progress Note    Interval: Pt resting comfortably in bed. No chest pain.    Tele:    HPI:  History obtained from sign out/nephew  The pt is a 68 y/o M with a PMH of HTN, DM, PAD who is transferred to [facility] for management of cardiogenic shock and NSTEMI. As per sign out, the pt was admitted for L popliteal bypass to [facility]. During his time there, he was refusing medications  and was found to be progressively more dyspneic. He had an echo done there which showed that he has an EF of <15% with global hypokinesis; he received 20mg IV lasix for fluid overload. His respiratory status continued to worsen and the pt was intubated for airway protection. The pt vomited during this time and there was a concern for aspiration. The pt did not complain of CP during the time prior to intubation. As per nephew, the pt has not been complaining of CP, SOB prior to admission to the hospital and has been able to carry out his ADLs. As per the nephew, pt has not been compliant with his medications as an outpatient; the nephew knows that he had amlodipine prescribed but doesn't know what medications the the pt takes for his diabetes.  As per chart from [facility], pt has been prescribed to take amlodipine 10 daily and metformin 500 BID at home. As per med rec from , pt has been taking lantus 20 mg daily at home in addition to metformin. (2019 23:50)      Medications:  aspirin  chewable 81 milliGRAM(s) Oral daily  atorvastatin 80 milliGRAM(s) Oral at bedtime  chlorhexidine 0.12% Liquid 15 milliLiter(s) Oral Mucosa two times a day  clopidogrel Tablet 75 milliGRAM(s) Oral daily  dextrose 40% Gel 15 Gram(s) Oral once PRN  dextrose 5%. 1000 milliLiter(s) IV Continuous <Continuous>  dextrose 50% Injectable 12.5 Gram(s) IV Push once  dextrose 50% Injectable 25 Gram(s) IV Push once  dextrose 50% Injectable 25 Gram(s) IV Push once  furosemide   Injectable 40 milliGRAM(s) IV Push daily  glucagon  Injectable 1 milliGRAM(s) IntraMuscular once PRN  heparin  Infusion. 1800 Unit(s)/Hr IV Continuous <Continuous>  heparin  Injectable 5300 Unit(s) IV Push every 6 hours PRN  insulin glargine Injectable (LANTUS) 6 Unit(s) SubCutaneous at bedtime  insulin lispro (HumaLOG) corrective regimen sliding scale   SubCutaneous every 6 hours  piperacillin/tazobactam IVPB.. 3.375 Gram(s) IV Intermittent every 8 hours      Review of Systems:  Constitutional: [ ] Fever [ ] Chills [ ] Fatigue [ ] Weight change   HEENT: [ ] Blurred vision [ ] Eye Pain [ ] Headache [ ] Runny nose [ ] Sore Throat   Respiratory: [ ] Cough [ ] Wheezing [ ] Shortness of breath  Cardiovascular: [ ] Chest Pain [ ] Palpitations [ ] ABARCA [ ] PND [ ] Orthopnea  Gastrointestinal: [ ] Abdominal Pain [ ] Diarrhea [ ] Constipation [ ] Hemorrhoids [ ] Nausea [ ] Vomiting  Genitourinary: [ ] Nocturia [ ] Dysuria [ ] Incontinence  Extremities: [ ] Swelling [ ] Joint Pain  Neurologic: [ ] Focal deficit [ ] Paresthesias [ ] Syncope  Lymphatic: [ ] Swelling [ ] Lymphadenopathy   Skin: [ ] Rash [ ] Ecchymoses [ ] Wounds [ ] Lesions  Psychiatry: [ ] Depression [ ] Suicidal/Homicidal Ideation [ ] Anxiety [ ] Sleep Disturbances  [ ] 10 point review of systems is otherwise negative except as mentioned above            [ ]Unable to obtain    Vitals:  T(C): 36.6 (07-10-19 @ 09:00), Max: 37.1 (19 @ 20:05)  HR: 72 (07-10-19 @ 09:00) (72 - 89)  BP: 105/66 (07-10-19 @ 09:00) (105/55 - 115/67)  BP(mean): 72 (19 @ 15:00) (72 - 74)  RR: 18 (07-10-19 @ 09:00) (18 - 26)  SpO2: 92% (07-10-19 @ 04:05) (92% - 98%)  Wt(kg): --  Daily     Daily Weight in k.3 (2019 11:40)  I&O's Summary    2019 07:01  -  10 Jul 2019 07:00  --------------------------------------------------------  IN: 488.5 mL / OUT: 1750 mL / NET: -1261.5 mL        Physical Exam:  General: NAD  Eye: PERRL, EOMI  HENT: Normal oral mucosa NC/AT  CV: Normal S1/S2, RRR, No M/R/G, no edema, no elevation in JVP  Resp: Normal respiratory effort, clear to auscultation bilaterally, no wheezing, no crackles  Abd: Soft, Non-tender, Non-distended, BS+  Ext: No clubbing, No joint deformity, hand mittens  Neuro: Non-focal, motor and sensory intact  Lymph: No lymphadenopathy  Psych: AAOx3, Mood & affect appropriate  Skin: No rashes, No ecchymoses, No cyanosis    Labs:                        10.1   12.1  )-----------( 260      ( 2019 13:28 )             30.0     07-10    142  |  103  |  27<H>  ----------------------------<  165<H>  3.5   |  26  |  1.01    Ca    8.5      10 Jul 2019 07:14  Phos  2.9     07-  Mg     2.0     07-    TPro  6.4  /  Alb  2.7<L>  /  TBili  1.1  /  DBili  x   /  AST  77<H>  /  ALT  324<H>  /  AlkPhos  214<H>      PT/INR - ( 2019 05:16 )   PT: 15.9 sec;   INR: 1.37 ratio         PTT - ( 2019 20:53 )  PTT:64.7 sec  CARDIAC MARKERS ( 2019 05:16 )  x     / x     / 162 U/L / x     / 3.3 ng/mL      Serum Pro-Brain Natriuretic Peptide: 5792 pg/mL ( @ 08:38)          New results/imaging:

## 2019-07-10 NOTE — PROGRESS NOTE ADULT - PROBLEM SELECTOR PROBLEM 4
Uncontrolled type 2 diabetes mellitus with hyperglycemia Agitation Acute respiratory failure with hypoxia

## 2019-07-10 NOTE — PROGRESS NOTE ADULT - PROBLEM SELECTOR PLAN 7
BP has been stable now  - c/w IV Lasix 40mgdaily transaminitis  likely in the setting of cardiogenic/septic shock;   -currently trending down, monitor cmps a1c 10.0, FS stable  - c/w lantus 6 units qhs and FIDENCIO  -monitor FS

## 2019-07-10 NOTE — PROGRESS NOTE ADULT - PROBLEM SELECTOR PLAN 10
Hgb downtrending to 9.5, admit was 11.0, no s/s of bleeding, labs consistent with AOCD  -trend cbcs,   Dvt ppx: on heparin Hgb downtrending to 9.5, admit was 11.0, no s/s of bleeding, labs consistent with AOCD  -trend cbcs,   Dvt ppx: on heparin  prognosis guarded

## 2019-07-10 NOTE — PROGRESS NOTE ADULT - ASSESSMENT
70 yo M w/ bilateral dusky toes and dry gangrene of b/l hallux  - Pt seen and evaluated   - Feet are cold, no CFT, dusky toes and dry stable gangrene to the bilateral hallux, no acute signs of infection  - Betadine applied to the dry gangrene   - 7/6 Art dulp: bilateral LE arterial and bypass occlusion  - Awaiting vasc plan  - No emergent podiatry surgical intervention  - Foot does not appear to be the source of infection  - Seen w/ attending

## 2019-07-10 NOTE — SWALLOW BEDSIDE ASSESSMENT ADULT - COMMENTS
See results tab for imaging. See results tab for imaging.  Pt w/ fevers and leukocytosis w/ R lung opacity on CXR and evidence of end organ damage with transaminitis and elevated lactate w/ hemodynamic instability requiring pressor support suggestive of septic shock; currently resolved

## 2019-07-10 NOTE — PROGRESS NOTE ADULT - SUBJECTIVE AND OBJECTIVE BOX
SURGERY DAILY PROGRESS NOTE: VASCULAR SURGERY       SUBJECTIVE/ROS: Patient seen and examined on AM rounds. On enhanced supervision for confusion, oriented to only self. Transferred out of CCU yesterday.   Denies nausea, vomiting, chest pain, shortness of breath.        OBJECTIVE:    Vital Signs Last 24 Hrs  T(C): 36.9 (10 Jul 2019 04:05), Max: 37.1 (2019 20:05)  T(F): 98.5 (10 Jul 2019 04:05), Max: 98.8 (2019 20:05)  HR: 72 (10 Jul 2019 04:05) (72 - 89)  BP: 115/67 (10 Jul 2019 04:05) (105/55 - 115/67)  BP(mean): 72 (2019 15:00) (72 - 74)  RR: 18 (10 Jul 2019 04:05) (18 - 26)  SpO2: 92% (10 Jul 2019 04:05) (92% - 98%)  I&O's Detail    2019 07:01  -  10 Jul 2019 07:00  --------------------------------------------------------  IN:    heparin  Infusion.: 388.5 mL    IV PiggyBack: 100 mL  Total IN: 488.5 mL    OUT:    Indwelling Catheter - Urethral: 1750 mL  Total OUT: 1750 mL    Total NET: -1261.5 mL        Daily     Daily Weight in k.3 (2019 11:40)  MEDICATIONS  (STANDING):  aspirin  chewable 81 milliGRAM(s) Oral daily  atorvastatin 80 milliGRAM(s) Oral at bedtime  chlorhexidine 0.12% Liquid 15 milliLiter(s) Oral Mucosa two times a day  clopidogrel Tablet 75 milliGRAM(s) Oral daily  dextrose 5%. 1000 milliLiter(s) (50 mL/Hr) IV Continuous <Continuous>  dextrose 50% Injectable 12.5 Gram(s) IV Push once  dextrose 50% Injectable 25 Gram(s) IV Push once  dextrose 50% Injectable 25 Gram(s) IV Push once  furosemide   Injectable 40 milliGRAM(s) IV Push daily  heparin  Infusion. 1800 Unit(s)/Hr (18 mL/Hr) IV Continuous <Continuous>  insulin glargine Injectable (LANTUS) 6 Unit(s) SubCutaneous at bedtime  insulin lispro (HumaLOG) corrective regimen sliding scale   SubCutaneous every 6 hours  piperacillin/tazobactam IVPB.. 3.375 Gram(s) IV Intermittent every 8 hours    MEDICATIONS  (PRN):  dextrose 40% Gel 15 Gram(s) Oral once PRN Blood Glucose LESS THAN 70 milliGRAM(s)/deciliter  glucagon  Injectable 1 milliGRAM(s) IntraMuscular once PRN Glucose LESS THAN 70 milligrams/deciliter  heparin  Injectable 5300 Unit(s) IV Push every 6 hours PRN For aPTT less than 40      LABS:                        10.1   12.1  )-----------( 260      ( 2019 13:28 )             30.0     07-10    142  |  103  |  27<H>  ----------------------------<  165<H>  3.5   |  26  |  1.01    Ca    8.5      10 Jul 2019 07:14  Phos  2.9       Mg     2.0         TPro  6.4  /  Alb  2.7<L>  /  TBili  1.1  /  DBili  x   /  AST  77<H>  /  ALT  324<H>  /  AlkPhos  214<H>      PT/INR - ( 2019 05:16 )   PT: 15.9 sec;   INR: 1.37 ratio         PTT - ( 2019 20:53 )  PTT:64.7 sec      PHYSICAL EXAM:  General: NAD, well-nourished  Neuro: A&O x 1   Resp: non labored breathing   CV: Normal sinus rhythm  Ext: RLE: cool, no dopplerable signals, discoloration of toes, LLE: cool, no dopplerable signals, discoloration of toes with tissue loss of 1st toe, leg dressing c/d/i

## 2019-07-10 NOTE — PROGRESS NOTE ADULT - PROBLEM SELECTOR PLAN 6
transaminitis 1811/1148 likely in the setting of cardiogenic/septic shock; currently trending down   -Will continue to trend CMPs VINAY now resolved, likely due to cardiogenic/septic shock  -monitor bmp agitated overnight, oriented x 1 this morning, likely delirium with possible hx of dementia, will need to get more hx  -reviewed EKG, Qtc 467, start seroquel 25mg bid prn agitation, seroquel 25mg qhs and melatonin 3mg qhs  -monitor EKGs periodically

## 2019-07-11 LAB
ALBUMIN SERPL ELPH-MCNC: 2.7 G/DL — LOW (ref 3.3–5)
ALP SERPL-CCNC: 209 U/L — HIGH (ref 40–120)
ALT FLD-CCNC: 165 U/L — HIGH (ref 10–45)
ANION GAP SERPL CALC-SCNC: 14 MMOL/L — SIGNIFICANT CHANGE UP (ref 5–17)
ANION GAP SERPL CALC-SCNC: 14 MMOL/L — SIGNIFICANT CHANGE UP (ref 5–17)
APTT BLD: 79.6 SEC — HIGH (ref 27.5–36.3)
APTT BLD: 81 SEC — HIGH (ref 27.5–36.3)
APTT BLD: 93.9 SEC — HIGH (ref 27.5–36.3)
AST SERPL-CCNC: 29 U/L — SIGNIFICANT CHANGE UP (ref 10–40)
BILIRUB SERPL-MCNC: 1.2 MG/DL — SIGNIFICANT CHANGE UP (ref 0.2–1.2)
BUN SERPL-MCNC: 21 MG/DL — SIGNIFICANT CHANGE UP (ref 7–23)
BUN SERPL-MCNC: 21 MG/DL — SIGNIFICANT CHANGE UP (ref 7–23)
CALCIUM SERPL-MCNC: 8.6 MG/DL — SIGNIFICANT CHANGE UP (ref 8.4–10.5)
CALCIUM SERPL-MCNC: 8.7 MG/DL — SIGNIFICANT CHANGE UP (ref 8.4–10.5)
CHLORIDE SERPL-SCNC: 101 MMOL/L — SIGNIFICANT CHANGE UP (ref 96–108)
CHLORIDE SERPL-SCNC: 99 MMOL/L — SIGNIFICANT CHANGE UP (ref 96–108)
CO2 SERPL-SCNC: 25 MMOL/L — SIGNIFICANT CHANGE UP (ref 22–31)
CO2 SERPL-SCNC: 25 MMOL/L — SIGNIFICANT CHANGE UP (ref 22–31)
CREAT SERPL-MCNC: 0.96 MG/DL — SIGNIFICANT CHANGE UP (ref 0.5–1.3)
CREAT SERPL-MCNC: 0.99 MG/DL — SIGNIFICANT CHANGE UP (ref 0.5–1.3)
CULTURE RESULTS: SIGNIFICANT CHANGE UP
CULTURE RESULTS: SIGNIFICANT CHANGE UP
GLUCOSE BLDC GLUCOMTR-MCNC: 226 MG/DL — HIGH (ref 70–99)
GLUCOSE BLDC GLUCOMTR-MCNC: 239 MG/DL — HIGH (ref 70–99)
GLUCOSE BLDC GLUCOMTR-MCNC: 257 MG/DL — HIGH (ref 70–99)
GLUCOSE BLDC GLUCOMTR-MCNC: 281 MG/DL — HIGH (ref 70–99)
GLUCOSE SERPL-MCNC: 258 MG/DL — HIGH (ref 70–99)
GLUCOSE SERPL-MCNC: 265 MG/DL — HIGH (ref 70–99)
HCT VFR BLD CALC: 30.4 % — LOW (ref 39–50)
HGB BLD-MCNC: 9.8 G/DL — LOW (ref 13–17)
MAGNESIUM SERPL-MCNC: 1.9 MG/DL — SIGNIFICANT CHANGE UP (ref 1.6–2.6)
MCHC RBC-ENTMCNC: 30.2 PG — SIGNIFICANT CHANGE UP (ref 27–34)
MCHC RBC-ENTMCNC: 32.2 GM/DL — SIGNIFICANT CHANGE UP (ref 32–36)
MCV RBC AUTO: 93.5 FL — SIGNIFICANT CHANGE UP (ref 80–100)
PHOSPHATE SERPL-MCNC: 3 MG/DL — SIGNIFICANT CHANGE UP (ref 2.5–4.5)
PLATELET # BLD AUTO: 287 K/UL — SIGNIFICANT CHANGE UP (ref 150–400)
POTASSIUM SERPL-MCNC: 3.2 MMOL/L — LOW (ref 3.5–5.3)
POTASSIUM SERPL-MCNC: 3.2 MMOL/L — LOW (ref 3.5–5.3)
POTASSIUM SERPL-SCNC: 3.2 MMOL/L — LOW (ref 3.5–5.3)
POTASSIUM SERPL-SCNC: 3.2 MMOL/L — LOW (ref 3.5–5.3)
PROT SERPL-MCNC: 6.4 G/DL — SIGNIFICANT CHANGE UP (ref 6–8.3)
RBC # BLD: 3.25 M/UL — LOW (ref 4.2–5.8)
RBC # FLD: 12.7 % — SIGNIFICANT CHANGE UP (ref 10.3–14.5)
SODIUM SERPL-SCNC: 138 MMOL/L — SIGNIFICANT CHANGE UP (ref 135–145)
SODIUM SERPL-SCNC: 140 MMOL/L — SIGNIFICANT CHANGE UP (ref 135–145)
SPECIMEN SOURCE: SIGNIFICANT CHANGE UP
SPECIMEN SOURCE: SIGNIFICANT CHANGE UP
WBC # BLD: 9.33 K/UL — SIGNIFICANT CHANGE UP (ref 3.8–10.5)
WBC # FLD AUTO: 9.33 K/UL — SIGNIFICANT CHANGE UP (ref 3.8–10.5)

## 2019-07-11 PROCEDURE — 99233 SBSQ HOSP IP/OBS HIGH 50: CPT

## 2019-07-11 PROCEDURE — 74230 X-RAY XM SWLNG FUNCJ C+: CPT | Mod: 26

## 2019-07-11 RX ORDER — POTASSIUM CHLORIDE 20 MEQ
10 PACKET (EA) ORAL
Refills: 0 | Status: COMPLETED | OUTPATIENT
Start: 2019-07-11 | End: 2019-07-11

## 2019-07-11 RX ORDER — INSULIN GLARGINE 100 [IU]/ML
7 INJECTION, SOLUTION SUBCUTANEOUS AT BEDTIME
Refills: 0 | Status: DISCONTINUED | OUTPATIENT
Start: 2019-07-11 | End: 2019-07-12

## 2019-07-11 RX ORDER — POTASSIUM CHLORIDE 20 MEQ
20 PACKET (EA) ORAL ONCE
Refills: 0 | Status: COMPLETED | OUTPATIENT
Start: 2019-07-11 | End: 2019-07-11

## 2019-07-11 RX ADMIN — PIPERACILLIN AND TAZOBACTAM 25 GRAM(S): 4; .5 INJECTION, POWDER, LYOPHILIZED, FOR SOLUTION INTRAVENOUS at 17:25

## 2019-07-11 RX ADMIN — HEPARIN SODIUM 2100 UNIT(S)/HR: 5000 INJECTION INTRAVENOUS; SUBCUTANEOUS at 11:19

## 2019-07-11 RX ADMIN — Medication 3: at 12:51

## 2019-07-11 RX ADMIN — Medication 20 MILLIEQUIVALENT(S): at 12:54

## 2019-07-11 RX ADMIN — CHLORHEXIDINE GLUCONATE 15 MILLILITER(S): 213 SOLUTION TOPICAL at 05:53

## 2019-07-11 RX ADMIN — Medication 81 MILLIGRAM(S): at 12:54

## 2019-07-11 RX ADMIN — HEPARIN SODIUM 0 UNIT(S)/HR: 5000 INJECTION INTRAVENOUS; SUBCUTANEOUS at 17:43

## 2019-07-11 RX ADMIN — Medication 100 MILLIEQUIVALENT(S): at 12:54

## 2019-07-11 RX ADMIN — QUETIAPINE FUMARATE 25 MILLIGRAM(S): 200 TABLET, FILM COATED ORAL at 21:57

## 2019-07-11 RX ADMIN — CLOPIDOGREL BISULFATE 75 MILLIGRAM(S): 75 TABLET, FILM COATED ORAL at 12:54

## 2019-07-11 RX ADMIN — Medication 100 MILLIEQUIVALENT(S): at 14:17

## 2019-07-11 RX ADMIN — ATORVASTATIN CALCIUM 80 MILLIGRAM(S): 80 TABLET, FILM COATED ORAL at 21:57

## 2019-07-11 RX ADMIN — PIPERACILLIN AND TAZOBACTAM 25 GRAM(S): 4; .5 INJECTION, POWDER, LYOPHILIZED, FOR SOLUTION INTRAVENOUS at 09:03

## 2019-07-11 RX ADMIN — Medication 40 MILLIGRAM(S): at 05:53

## 2019-07-11 RX ADMIN — Medication 100 MILLIEQUIVALENT(S): at 15:45

## 2019-07-11 RX ADMIN — CHLORHEXIDINE GLUCONATE 15 MILLILITER(S): 213 SOLUTION TOPICAL at 17:12

## 2019-07-11 RX ADMIN — Medication 2: at 09:02

## 2019-07-11 RX ADMIN — HEPARIN SODIUM 1850 UNIT(S)/HR: 5000 INJECTION INTRAVENOUS; SUBCUTANEOUS at 18:51

## 2019-07-11 RX ADMIN — HEPARIN SODIUM 2200 UNIT(S)/HR: 5000 INJECTION INTRAVENOUS; SUBCUTANEOUS at 01:25

## 2019-07-11 RX ADMIN — INSULIN GLARGINE 7 UNIT(S): 100 INJECTION, SOLUTION SUBCUTANEOUS at 21:57

## 2019-07-11 RX ADMIN — PIPERACILLIN AND TAZOBACTAM 25 GRAM(S): 4; .5 INJECTION, POWDER, LYOPHILIZED, FOR SOLUTION INTRAVENOUS at 02:12

## 2019-07-11 RX ADMIN — Medication 3: at 17:25

## 2019-07-11 RX ADMIN — Medication 3 MILLIGRAM(S): at 21:57

## 2019-07-11 NOTE — PHARMACOTHERAPY INTERVENTION NOTE - COMMENTS
Allergies:  No Known Allergies    CC/HPI:  Patient is a 69y old  Male who presents with a chief complaint of NSTEMI (10 Jul 2019 15:22)    PMH:  Hyperlipidemia  Hypertension  Diabetes  No pertinent past medical history    Vitals:  Height (cm): 182.88 (07-05 @ 23:07)  Weight (kg): 89.4 (07-05 @ 23:07)  BMI (kg/m2): 26.7 (07-05 @ 23:07)    Outpatient DM Medications: metformin, insulin glargine 20 units subcutaneously qhs     Current Inpatient Medications (Endocrine/Metabolic):    atorvastatin 80 milliGRAM(s) Oral at bedtime  dextrose 40% Gel 15 Gram(s) Oral once PRN Blood Glucose LESS THAN 70 milliGRAM(s)/deciliter  dextrose 50% Injectable 12.5 Gram(s) IV Push once  dextrose 50% Injectable 25 Gram(s) IV Push once  dextrose 50% Injectable 25 Gram(s) IV Push once  glucagon  Injectable 1 milliGRAM(s) IntraMuscular once PRN Glucose LESS THAN 70 milligrams/deciliter  insulin glargine Injectable (LANTUS) 6 Unit(s) SubCutaneous at bedtime  insulin lispro (HumaLOG) corrective regimen sliding scale   SubCutaneous three times a day before meals  insulin lispro (HumaLOG) corrective regimen sliding scale   SubCutaneous at bedtime    Current Inpatient Medications (Other):    aspirin  chewable 81 milliGRAM(s) Oral daily  chlorhexidine 0.12% Liquid 15 milliLiter(s) Oral Mucosa two times a day  clopidogrel Tablet 75 milliGRAM(s) Oral daily  dextrose 5%. 1000 milliLiter(s) IV Continuous <Continuous>  furosemide   Injectable 40 milliGRAM(s) IV Push daily  heparin  Infusion. 1800 Unit(s)/Hr IV Continuous <Continuous>  heparin  Injectable 5300 Unit(s) IV Push every 6 hours PRN  melatonin 3 milliGRAM(s) Oral at bedtime  piperacillin/tazobactam IVPB.. 3.375 Gram(s) IV Intermittent every 8 hours  QUEtiapine 25 milliGRAM(s) Oral two times a day PRN  QUEtiapine 25 milliGRAM(s) Oral at bedtime      Labs:  07-11    140  |  101  |  21  ----------------------------<  258<H>  3.2<L>   |  25  |  0.96    Ca    8.7      11 Jul 2019 09:57  Phos  2.9     07-09  Mg     2.0     07-09    TPro  6.4  /  Alb  2.7<L>  /  TBili  1.2  /  DBili  x   /  AST  29  /  ALT  165<H>  /  AlkPhos  209<H>  07-11                          9.5    10.83 )-----------( 265      ( 10 Jul 2019 10:14 )             29.4     Hemoglobin A1C, Whole Blood: 10.0 % (07-06 @ 04:54)    CAPILLARY BLOOD GLUCOSE    POCT Blood Glucose.: 239 mg/dL (11 Jul 2019 08:32)  POCT Blood Glucose.: 277 mg/dL (10 Jul 2019 20:53)  POCT Blood Glucose.: 214 mg/dL (10 Jul 2019 16:32)  POCT Blood Glucose.: 151 mg/dL (10 Jul 2019 12:13)    Diet:  dysphagia 1 pureed- nectar     ASSESSMENT / RECOMMENDATIONS:  Correctional Coverage   7/10: 1 - 1 - 2 - 1  7/11: 2   this morning, may be increased with patient starting PO intake  Recommend increasing insulin glargine to 7 or 8 units qhs

## 2019-07-11 NOTE — PROGRESS NOTE ADULT - SUBJECTIVE AND OBJECTIVE BOX
Lisa Madrid MD  Attending Physician (Hospitalist)  pager: [phone]    Patient is a 69y old  Male who presents with a chief complaint of NSTEMI (10 Jul 2019 15:22)        SUBJECTIVE / OVERNIGHT EVENTS:  awake and responsive. oriented to self only. not time and place.   Denies any specific complaints. afebrile. some agitation overnight now calm.     MEDICATIONS  (STANDING):  aspirin  chewable 81 milliGRAM(s) Oral daily  atorvastatin 80 milliGRAM(s) Oral at bedtime  chlorhexidine 0.12% Liquid 15 milliLiter(s) Oral Mucosa two times a day  clopidogrel Tablet 75 milliGRAM(s) Oral daily  dextrose 5%. 1000 milliLiter(s) (50 mL/Hr) IV Continuous <Continuous>  dextrose 50% Injectable 12.5 Gram(s) IV Push once  dextrose 50% Injectable 25 Gram(s) IV Push once  dextrose 50% Injectable 25 Gram(s) IV Push once  furosemide   Injectable 40 milliGRAM(s) IV Push daily  heparin  Infusion. 1800 Unit(s)/Hr (18 mL/Hr) IV Continuous <Continuous>  insulin glargine Injectable (LANTUS) 7 Unit(s) SubCutaneous at bedtime  insulin lispro (HumaLOG) corrective regimen sliding scale   SubCutaneous three times a day before meals  insulin lispro (HumaLOG) corrective regimen sliding scale   SubCutaneous at bedtime  melatonin 3 milliGRAM(s) Oral at bedtime  piperacillin/tazobactam IVPB.. 3.375 Gram(s) IV Intermittent every 8 hours  potassium chloride  10 mEq/100 mL IVPB 10 milliEquivalent(s) IV Intermittent every 1 hour  QUEtiapine 25 milliGRAM(s) Oral at bedtime    MEDICATIONS  (PRN):  dextrose 40% Gel 15 Gram(s) Oral once PRN Blood Glucose LESS THAN 70 milliGRAM(s)/deciliter  glucagon  Injectable 1 milliGRAM(s) IntraMuscular once PRN Glucose LESS THAN 70 milligrams/deciliter  heparin  Injectable 5300 Unit(s) IV Push every 6 hours PRN For aPTT less than 40  QUEtiapine 25 milliGRAM(s) Oral two times a day PRN agitation      Vital Signs Last 24 Hrs  T(C): 37.2 (11 Jul 2019 11:15), Max: 37.2 (11 Jul 2019 11:15)  T(F): 98.9 (11 Jul 2019 11:15), Max: 98.9 (11 Jul 2019 11:15)  HR: 72 (11 Jul 2019 12:46) (70 - 96)  BP: 109/54 (11 Jul 2019 12:46) (107/64 - 121/73)  BP(mean): --  RR: 18 (11 Jul 2019 11:15) (18 - 18)  SpO2: 96% (11 Jul 2019 11:15) (94% - 96%)  CAPILLARY BLOOD GLUCOSE      POCT Blood Glucose.: 257 mg/dL (11 Jul 2019 11:50)  POCT Blood Glucose.: 239 mg/dL (11 Jul 2019 08:32)  POCT Blood Glucose.: 277 mg/dL (10 Jul 2019 20:53)  POCT Blood Glucose.: 214 mg/dL (10 Jul 2019 16:32)    I&O's Summary    10 Jul 2019 07:01  -  11 Jul 2019 07:00  --------------------------------------------------------  IN: 269 mL / OUT: 951 mL / NET: -682 mL    11 Jul 2019 07:01  -  11 Jul 2019 13:53  --------------------------------------------------------  IN: 360 mL / OUT: 1 mL / NET: 359 mL          PHYSICAL EXAM  GENERAL: NAD, well-developed  HEAD:  Atraumatic, Normocephalic  EYES: EOMI, conjunctiva and sclera clear  NECK: Supple, No JVD  CHEST/LUNG: Clear to auscultation bilaterally; No wheeze  HEART: Regular rate and rhythm; No murmurs, rubs, or gallops  ABDOMEN: Soft, Nontender, Nondistended; Bowel sounds present  EXTREMITIES:  LLE surg wound with staples. cold bilateral LE with dusky toes.   Dry gangrene to the left hallux and dry eschar to the distal right hallux.  PSYCH: AAOx1  SKIN: No rashes or lesions    LABS:                        9.8    9.33  )-----------( 287      ( 11 Jul 2019 13:27 )             30.4     07-11    140  |  101  |  21  ----------------------------<  258<H>  3.2<L>   |  25  |  0.96    Ca    8.7      11 Jul 2019 09:57  Phos  3.0     07-11  Mg     1.9     07-11    TPro  6.4  /  Alb  2.7<L>  /  TBili  1.2  /  DBili  x   /  AST  29  /  ALT  165<H>  /  AlkPhos  209<H>  07-11    PTT - ( 11 Jul 2019 09:57 )  PTT:81.0 sec            RADIOLOGY & ADDITIONAL TESTS:    Imaging Personally Reviewed:  Consultant(s) Notes Reviewed:    Care Discussed with Consultants/Other Providers:

## 2019-07-11 NOTE — SWALLOW VFSS/MBS ASSESSMENT ADULT - ORAL PHASE
Uncontrolled bolus / spillover in lambert-pharynx/mild spillover to the valleculae, trace lingual residue/Incomplete tongue to palate contact Delayed oral transit time/Incomplete tongue to palate contact/Uncontrolled bolus / spillover in lambert-pharynx/Uncontrolled bolus / spillover in hypopharynx/variable spillover to the pyriform sinus (mild-moderate), x1 episode of trace laryngeal penetration along the tip of the epiglottis prior to the swallow moderate spillover to the valleculae, trace oral residue/Delayed oral transit time/Uncontrolled bolus / spillover in lambert-pharynx mild spillover to the valleculae moderate oral cavity residue/Delayed oral transit time/Reduced anterior - posterior transport/Incomplete tongue to palate contact/Uncontrolled bolus / spillover in lambert-pharynx/Residue in oral cavity

## 2019-07-11 NOTE — PROGRESS NOTE ADULT - ASSESSMENT
68 y/o M w/ PMH of poor med compliance DM, HTN, PAD s/p recent surgery at [facility] transferred to [facility] CCU for NSTEMI with cardiogenic shock with course complicated by acute hypoxemic respiratory failure +/- septic shock secondary to suspected aspiration PNA requiring intubation now extubated 7/8 downgraded from CCU

## 2019-07-11 NOTE — SWALLOW VFSS/MBS ASSESSMENT ADULT - RECOMMENDED FEEDING/EATING TECHNIQUES
crush medication (when feasible)/no straws/oral hygiene/small sips/bites/maintain upright posture during/after eating for 30 mins/position upright (90 degrees)

## 2019-07-11 NOTE — PROGRESS NOTE ADULT - PROBLEM SELECTOR PLAN 10
Hgb downtrending to 9.5, admit was 11.0, no s/s of bleeding, labs consistent with AOCD  -trend cbcs,   Dvt ppx: on heparin  prognosis guarded

## 2019-07-11 NOTE — PROGRESS NOTE ADULT - PROBLEM SELECTOR PLAN 5
suspected aspiration pna  -c/w IV zosyn day 6/7, afebrile, leukocytosis improving  -pulled out NG tube overnight, NPO except meds until speech eval today  - S & S eval, aspiration precaution.

## 2019-07-11 NOTE — SWALLOW VFSS/MBS ASSESSMENT ADULT - SLP GENERAL OBSERVATIONS
Pt arrived in radiology secure in LULU chair. +IV, patient on room air. Pt following directions given frequent verbal cues ~25%. Pt requires frequent re-direction to task. Speech is tangential.

## 2019-07-11 NOTE — PROGRESS NOTE ADULT - PROBLEM SELECTOR PLAN 6
agitated overnight, oriented x 1 this morning, likely delirium with possible hx of dementia, will need to get more hx  - start seroquel 25mg bid prn agitation, seroquel 25mg qhs and melatonin 3mg qhs  -monitor EKGs periodically

## 2019-07-11 NOTE — PROGRESS NOTE ADULT - PROBLEM SELECTOR PLAN 4
likely due to aspiration pna and b/l pleural effusions, s/p self extubation on 7/8  -diuresis as above  -antibiotics as below  -satting well on 3L NC, wean as tolerated

## 2019-07-11 NOTE — PROGRESS NOTE ADULT - PROBLEM SELECTOR PLAN 3
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  On the left, the bypass graft is occluded, the popliteal artery is occluded and the trifurcation arteries are occluded.  - vascular surgery and podiatry plan noted, Possible amputation needed, monitor LEs  - c/w DAPT, IV heparin gtt, statin

## 2019-07-11 NOTE — PROGRESS NOTE ADULT - PROBLEM SELECTOR PLAN 1
Trops downtrended  - TTE with EF 20-25%, global systolic dysfunction  - c/w DAPT, IV heparin gtt, statin  - per cards, will need eventual cath

## 2019-07-11 NOTE — PROGRESS NOTE ADULT - PROBLEM SELECTOR PLAN 2
- TTE with EF 20-25%, global systolic dysfunction, mildly hypervolemic with b/l pleural efffusions  -c/w IV lasix 40mg IV daily  -VINAY improving, eventually will need ACE  -cards following  -strict I and O, daily weights

## 2019-07-12 LAB
ALBUMIN SERPL ELPH-MCNC: 2.8 G/DL — LOW (ref 3.3–5)
ALP SERPL-CCNC: 191 U/L — HIGH (ref 40–120)
ALT FLD-CCNC: 128 U/L — HIGH (ref 10–45)
ANION GAP SERPL CALC-SCNC: 14 MMOL/L — SIGNIFICANT CHANGE UP (ref 5–17)
APTT BLD: 63.1 SEC — HIGH (ref 27.5–36.3)
APTT BLD: 65.4 SEC — HIGH (ref 27.5–36.3)
AST SERPL-CCNC: 25 U/L — SIGNIFICANT CHANGE UP (ref 10–40)
BILIRUB SERPL-MCNC: 1.2 MG/DL — SIGNIFICANT CHANGE UP (ref 0.2–1.2)
BUN SERPL-MCNC: 17 MG/DL — SIGNIFICANT CHANGE UP (ref 7–23)
CALCIUM SERPL-MCNC: 8.9 MG/DL — SIGNIFICANT CHANGE UP (ref 8.4–10.5)
CHLORIDE SERPL-SCNC: 100 MMOL/L — SIGNIFICANT CHANGE UP (ref 96–108)
CO2 SERPL-SCNC: 25 MMOL/L — SIGNIFICANT CHANGE UP (ref 22–31)
CREAT SERPL-MCNC: 0.92 MG/DL — SIGNIFICANT CHANGE UP (ref 0.5–1.3)
GLUCOSE BLDC GLUCOMTR-MCNC: 184 MG/DL — HIGH (ref 70–99)
GLUCOSE BLDC GLUCOMTR-MCNC: 246 MG/DL — HIGH (ref 70–99)
GLUCOSE BLDC GLUCOMTR-MCNC: 285 MG/DL — HIGH (ref 70–99)
GLUCOSE BLDC GLUCOMTR-MCNC: 287 MG/DL — HIGH (ref 70–99)
GLUCOSE SERPL-MCNC: 266 MG/DL — HIGH (ref 70–99)
HCT VFR BLD CALC: 34.2 % — LOW (ref 39–50)
HGB BLD-MCNC: 11.1 G/DL — LOW (ref 13–17)
MAGNESIUM SERPL-MCNC: 1.9 MG/DL — SIGNIFICANT CHANGE UP (ref 1.6–2.6)
MCHC RBC-ENTMCNC: 30 PG — SIGNIFICANT CHANGE UP (ref 27–34)
MCHC RBC-ENTMCNC: 32.4 GM/DL — SIGNIFICANT CHANGE UP (ref 32–36)
MCV RBC AUTO: 92.6 FL — SIGNIFICANT CHANGE UP (ref 80–100)
PHOSPHATE SERPL-MCNC: 3.1 MG/DL — SIGNIFICANT CHANGE UP (ref 2.5–4.5)
PLATELET # BLD AUTO: 345 K/UL — SIGNIFICANT CHANGE UP (ref 150–400)
POTASSIUM SERPL-MCNC: 3.3 MMOL/L — LOW (ref 3.5–5.3)
POTASSIUM SERPL-SCNC: 3.3 MMOL/L — LOW (ref 3.5–5.3)
PROT SERPL-MCNC: 6.8 G/DL — SIGNIFICANT CHANGE UP (ref 6–8.3)
RBC # BLD: 3.69 M/UL — LOW (ref 4.2–5.8)
RBC # FLD: 11.8 % — SIGNIFICANT CHANGE UP (ref 10.3–14.5)
SODIUM SERPL-SCNC: 139 MMOL/L — SIGNIFICANT CHANGE UP (ref 135–145)
WBC # BLD: 8.8 K/UL — SIGNIFICANT CHANGE UP (ref 3.8–10.5)
WBC # FLD AUTO: 8.8 K/UL — SIGNIFICANT CHANGE UP (ref 3.8–10.5)

## 2019-07-12 PROCEDURE — 99232 SBSQ HOSP IP/OBS MODERATE 35: CPT

## 2019-07-12 PROCEDURE — 99233 SBSQ HOSP IP/OBS HIGH 50: CPT

## 2019-07-12 PROCEDURE — 99222 1ST HOSP IP/OBS MODERATE 55: CPT

## 2019-07-12 RX ORDER — POTASSIUM CHLORIDE 20 MEQ
40 PACKET (EA) ORAL EVERY 4 HOURS
Refills: 0 | Status: COMPLETED | OUTPATIENT
Start: 2019-07-12 | End: 2019-07-12

## 2019-07-12 RX ORDER — INSULIN GLARGINE 100 [IU]/ML
9 INJECTION, SOLUTION SUBCUTANEOUS AT BEDTIME
Refills: 0 | Status: DISCONTINUED | OUTPATIENT
Start: 2019-07-12 | End: 2019-07-14

## 2019-07-12 RX ORDER — NICOTINE POLACRILEX 2 MG
1 GUM BUCCAL DAILY
Refills: 0 | Status: DISCONTINUED | OUTPATIENT
Start: 2019-07-12 | End: 2019-07-22

## 2019-07-12 RX ORDER — INSULIN LISPRO 100/ML
3 VIAL (ML) SUBCUTANEOUS
Refills: 0 | Status: DISCONTINUED | OUTPATIENT
Start: 2019-07-12 | End: 2019-07-14

## 2019-07-12 RX ORDER — HALOPERIDOL DECANOATE 100 MG/ML
0.5 INJECTION INTRAMUSCULAR ONCE
Refills: 0 | Status: COMPLETED | OUTPATIENT
Start: 2019-07-12 | End: 2019-07-12

## 2019-07-12 RX ADMIN — Medication 3 UNIT(S): at 17:11

## 2019-07-12 RX ADMIN — INSULIN GLARGINE 9 UNIT(S): 100 INJECTION, SOLUTION SUBCUTANEOUS at 22:31

## 2019-07-12 RX ADMIN — CHLORHEXIDINE GLUCONATE 15 MILLILITER(S): 213 SOLUTION TOPICAL at 08:12

## 2019-07-12 RX ADMIN — PIPERACILLIN AND TAZOBACTAM 25 GRAM(S): 4; .5 INJECTION, POWDER, LYOPHILIZED, FOR SOLUTION INTRAVENOUS at 09:10

## 2019-07-12 RX ADMIN — Medication 1 PATCH: at 19:31

## 2019-07-12 RX ADMIN — Medication 40 MILLIGRAM(S): at 05:32

## 2019-07-12 RX ADMIN — Medication 3 UNIT(S): at 12:15

## 2019-07-12 RX ADMIN — Medication 3 MILLIGRAM(S): at 22:31

## 2019-07-12 RX ADMIN — Medication 40 MILLIEQUIVALENT(S): at 17:48

## 2019-07-12 RX ADMIN — CHLORHEXIDINE GLUCONATE 15 MILLILITER(S): 213 SOLUTION TOPICAL at 17:12

## 2019-07-12 RX ADMIN — Medication 3: at 17:11

## 2019-07-12 RX ADMIN — Medication 1 PATCH: at 17:12

## 2019-07-12 RX ADMIN — HALOPERIDOL DECANOATE 0.5 MILLIGRAM(S): 100 INJECTION INTRAMUSCULAR at 23:38

## 2019-07-12 RX ADMIN — ATORVASTATIN CALCIUM 80 MILLIGRAM(S): 80 TABLET, FILM COATED ORAL at 22:32

## 2019-07-12 RX ADMIN — Medication 2: at 12:15

## 2019-07-12 RX ADMIN — Medication 81 MILLIGRAM(S): at 12:15

## 2019-07-12 RX ADMIN — HEPARIN SODIUM 1850 UNIT(S)/HR: 5000 INJECTION INTRAVENOUS; SUBCUTANEOUS at 10:02

## 2019-07-12 RX ADMIN — PIPERACILLIN AND TAZOBACTAM 25 GRAM(S): 4; .5 INJECTION, POWDER, LYOPHILIZED, FOR SOLUTION INTRAVENOUS at 17:12

## 2019-07-12 RX ADMIN — PIPERACILLIN AND TAZOBACTAM 25 GRAM(S): 4; .5 INJECTION, POWDER, LYOPHILIZED, FOR SOLUTION INTRAVENOUS at 00:08

## 2019-07-12 RX ADMIN — QUETIAPINE FUMARATE 25 MILLIGRAM(S): 200 TABLET, FILM COATED ORAL at 22:31

## 2019-07-12 RX ADMIN — Medication 3: at 08:11

## 2019-07-12 RX ADMIN — HEPARIN SODIUM 1850 UNIT(S)/HR: 5000 INJECTION INTRAVENOUS; SUBCUTANEOUS at 02:22

## 2019-07-12 RX ADMIN — CLOPIDOGREL BISULFATE 75 MILLIGRAM(S): 75 TABLET, FILM COATED ORAL at 12:15

## 2019-07-12 NOTE — PROGRESS NOTE ADULT - SUBJECTIVE AND OBJECTIVE BOX
Cardiology Progress Note    Interval: Pt resting comfortably. No acute events overnight. No chest pain, SOB this morning.    Tele:    HPI:  History obtained from sign out/nephew  The pt is a 70 y/o M with a PMH of HTN, DM, PAD who is transferred to [facility] for management of cardiogenic shock and NSTEMI. As per sign out, the pt was admitted for L popliteal bypass to [facility]. During his time there, he was refusing medications  and was found to be progressively more dyspneic. He had an echo done there which showed that he has an EF of <15% with global hypokinesis; he received 20mg IV lasix for fluid overload. His respiratory status continued to worsen and the pt was intubated for airway protection. The pt vomited during this time and there was a concern for aspiration. The pt did not complain of CP during the time prior to intubation. As per nephew, the pt has not been complaining of CP, SOB prior to admission to the hospital and has been able to carry out his ADLs. As per the nephew, pt has not been compliant with his medications as an outpatient; the nephew knows that he had amlodipine prescribed but doesn't know what medications the the pt takes for his diabetes.  As per chart from [facility], pt has been prescribed to take amlodipine 10 daily and metformin 500 BID at home. As per med rec from 2015, pt has been taking lantus 20 mg daily at home in addition to metformin. (05 Jul 2019 23:50)      Medications:  aspirin  chewable 81 milliGRAM(s) Oral daily  atorvastatin 80 milliGRAM(s) Oral at bedtime  chlorhexidine 0.12% Liquid 15 milliLiter(s) Oral Mucosa two times a day  clopidogrel Tablet 75 milliGRAM(s) Oral daily  dextrose 40% Gel 15 Gram(s) Oral once PRN  dextrose 5%. 1000 milliLiter(s) IV Continuous <Continuous>  dextrose 50% Injectable 12.5 Gram(s) IV Push once  dextrose 50% Injectable 25 Gram(s) IV Push once  dextrose 50% Injectable 25 Gram(s) IV Push once  furosemide   Injectable 40 milliGRAM(s) IV Push daily  glucagon  Injectable 1 milliGRAM(s) IntraMuscular once PRN  heparin  Infusion. 1800 Unit(s)/Hr IV Continuous <Continuous>  heparin  Injectable 5300 Unit(s) IV Push every 6 hours PRN  insulin glargine Injectable (LANTUS) 7 Unit(s) SubCutaneous at bedtime  insulin lispro (HumaLOG) corrective regimen sliding scale   SubCutaneous three times a day before meals  insulin lispro (HumaLOG) corrective regimen sliding scale   SubCutaneous at bedtime  melatonin 3 milliGRAM(s) Oral at bedtime  piperacillin/tazobactam IVPB.. 3.375 Gram(s) IV Intermittent every 8 hours  QUEtiapine 25 milliGRAM(s) Oral two times a day PRN  QUEtiapine 25 milliGRAM(s) Oral at bedtime      Review of Systems:  Constitutional: [ ] Fever [ ] Chills [ ] Fatigue [ ] Weight change   HEENT: [ ] Blurred vision [ ] Eye Pain [ ] Headache [ ] Runny nose [ ] Sore Throat   Respiratory: [ ] Cough [ ] Wheezing [ ] Shortness of breath  Cardiovascular: [ ] Chest Pain [ ] Palpitations [ ] ABARCA [ ] PND [ ] Orthopnea  Gastrointestinal: [ ] Abdominal Pain [ ] Diarrhea [ ] Constipation [ ] Hemorrhoids [ ] Nausea [ ] Vomiting  Genitourinary: [ ] Nocturia [ ] Dysuria [ ] Incontinence  Extremities: [ ] Swelling [ ] Joint Pain  Neurologic: [ ] Focal deficit [ ] Paresthesias [ ] Syncope  Lymphatic: [ ] Swelling [ ] Lymphadenopathy   Skin: [ ] Rash [ ] Ecchymoses [ ] Wounds [ ] Lesions  Psychiatry: [ ] Depression [ ] Suicidal/Homicidal Ideation [ ] Anxiety [ ] Sleep Disturbances  [ ] 10 point review of systems is otherwise negative except as mentioned above            [ ]Unable to obtain    Vitals:  T(C): 36.4 (07-12-19 @ 05:04), Max: 37.2 (07-11-19 @ 11:15)  HR: 74 (07-12-19 @ 05:04) (70 - 82)  BP: 117/70 (07-12-19 @ 05:04) (107/64 - 129/72)  BP(mean): --  RR: 18 (07-12-19 @ 05:04) (18 - 18)  SpO2: 97% (07-12-19 @ 05:04) (96% - 97%)  Wt(kg): --  Daily     Daily   I&O's Summary    11 Jul 2019 07:01  -  12 Jul 2019 07:00  --------------------------------------------------------  IN: 822 mL / OUT: 1 mL / NET: 821 mL        Physical Exam:  General: NAD  Eye: PERRL, EOMI  HENT: Normal oral mucosa NC/AT  CV: Normal S1/S2, RRR, No M/R/G, no edema, no elevation in JVP  Resp: Normal respiratory effort, clear to auscultation bilaterally, no wheezing, no crackles  Abd: Soft, Non-tender, Non-distended, BS+  Ext: No clubbing, No joint deformity , RLE diabetic ulcer  Neuro: Non-focal, motor and sensory intact  Lymph: No lymphadenopathy  Psych: AAOx1-2, Mood & affect appropriate  Skin: No rashes, No ecchymoses, No cyanosis    Labs:                        9.8    9.33  )-----------( 287      ( 11 Jul 2019 13:27 )             30.4     07-11    138  |  99  |  21  ----------------------------<  265<H>  3.2<L>   |  25  |  0.99    Ca    8.6      11 Jul 2019 17:11  Phos  3.0     07-11  Mg     1.9     07-11    TPro  6.4  /  Alb  2.7<L>  /  TBili  1.2  /  DBili  x   /  AST  29  /  ALT  165<H>  /  AlkPhos  209<H>  07-11    PTT - ( 12 Jul 2019 01:09 )  PTT:65.4 sec      Serum Pro-Brain Natriuretic Peptide: 5792 pg/mL (07-06 @ 08:38)          New results/imaging:

## 2019-07-12 NOTE — PROGRESS NOTE ADULT - PROBLEM SELECTOR PLAN 5
suspected aspiration pna  -c/w IV zosyn day 7/7, afebrile, leukocytosis improving, can stop after today  -NGT out. MBS done with dysphagia diet rec.   -  aspiration precaution.

## 2019-07-12 NOTE — CONSULT NOTE ADULT - SUBJECTIVE AND OBJECTIVE BOX
Patient is a 69y old  Male who presents with a chief complaint of NSTEMI (12 Jul 2019 07:33)      Admission HPI:  History obtained from sign out/nephew  The pt is a 70 y/o M with a PMH of HTN, DM, PAD who is transferred to [facility] for management of cardiogenic shock and NSTEMI. As per sign out, the pt was admitted for L popliteal bypass to [facility]. During his time there, he was refusing medications  and was found to be progressively more dyspneic. He had an echo done there which showed that he has an EF of <15% with global hypokinesis; he received 20mg IV lasix for fluid overload. His respiratory status continued to worsen and the pt was intubated for airway protection. The pt vomited during this time and there was a concern for aspiration. The pt did not complain of CP during the time prior to intubation. As per nephew, the pt has not been complaining of CP, SOB prior to admission to the hospital and has been able to carry out his ADLs. As per the nephew, pt has not been compliant with his medications as an outpatient; the nephew knows that he had amlodipine prescribed but doesn't know what medications the the pt takes for his diabetes.  As per chart from [facility], pt has been prescribed to take amlodipine 10 daily and metformin 500 BID at home. As per med rec from 2015, pt has been taking lantus 20 mg daily at home in addition to metformin. (05 Jul 2019 23:50)    Interval History:  Patient treated for aspiration PNA, septic shock, VINAY. Also with ischemia of the LLE.   Extubated. With persistent functional deficits.     REVIEW OF SYSTEMS: No chest pain, shortness of breath, nausea, vomiting or diarhea; other ROS neg     PAST MEDICAL & SURGICAL HISTORY  Hyperlipidemia  Hypertension  Diabetes  H/O mastoidectomy    FUNCTIONAL HISTORY:   PTA Independent    CURRENT FUNCTIONAL STATUS:  Min A transfers and gait.    FAMILY HISTORY   FHx: diabetes mellitus  No pertinent family history in first degree relatives      RECENT LABS/IMAGING  CBC Full  -  ( 12 Jul 2019 08:58 )  WBC Count : 8.8 K/uL  RBC Count : 3.69 M/uL  Hemoglobin : 11.1 g/dL  Hematocrit : 34.2 %  Platelet Count - Automated : 345 K/uL  Mean Cell Volume : 92.6 fl  Mean Cell Hemoglobin : 30.0 pg  Mean Cell Hemoglobin Concentration : 32.4 gm/dL  Auto Neutrophil # : x  Auto Lymphocyte # : x  Auto Monocyte # : x  Auto Eosinophil # : x  Auto Basophil # : x  Auto Neutrophil % : x  Auto Lymphocyte % : x  Auto Monocyte % : x  Auto Eosinophil % : x  Auto Basophil % : x    07-11    138  |  99  |  21  ----------------------------<  265<H>  3.2<L>   |  25  |  0.99    Ca    8.6      11 Jul 2019 17:11  Phos  3.0     07-11  Mg     1.9     07-11    TPro  6.4  /  Alb  2.7<L>  /  TBili  1.2  /  DBili  x   /  AST  29  /  ALT  165<H>  /  AlkPhos  209<H>  07-11        VITALS  T(C): 36.4 (07-12-19 @ 05:04), Max: 37.2 (07-11-19 @ 11:15)  HR: 83 (07-12-19 @ 09:16) (70 - 83)  BP: 116/64 (07-12-19 @ 09:16) (107/64 - 129/72)  RR: 18 (07-12-19 @ 05:04) (18 - 18)  SpO2: 96% (07-12-19 @ 09:16) (96% - 97%)  Wt(kg): --    ALLERGIES  No Known Allergies      MEDICATIONS   aspirin  chewable 81 milliGRAM(s) Oral daily  atorvastatin 80 milliGRAM(s) Oral at bedtime  chlorhexidine 0.12% Liquid 15 milliLiter(s) Oral Mucosa two times a day  clopidogrel Tablet 75 milliGRAM(s) Oral daily  dextrose 40% Gel 15 Gram(s) Oral once PRN  dextrose 5%. 1000 milliLiter(s) IV Continuous <Continuous>  dextrose 50% Injectable 12.5 Gram(s) IV Push once  dextrose 50% Injectable 25 Gram(s) IV Push once  dextrose 50% Injectable 25 Gram(s) IV Push once  furosemide   Injectable 40 milliGRAM(s) IV Push daily  glucagon  Injectable 1 milliGRAM(s) IntraMuscular once PRN  heparin  Infusion. 1800 Unit(s)/Hr IV Continuous <Continuous>  heparin  Injectable 5300 Unit(s) IV Push every 6 hours PRN  insulin glargine Injectable (LANTUS) 7 Unit(s) SubCutaneous at bedtime  insulin lispro (HumaLOG) corrective regimen sliding scale   SubCutaneous three times a day before meals  insulin lispro (HumaLOG) corrective regimen sliding scale   SubCutaneous at bedtime  melatonin 3 milliGRAM(s) Oral at bedtime  piperacillin/tazobactam IVPB.. 3.375 Gram(s) IV Intermittent every 8 hours  QUEtiapine 25 milliGRAM(s) Oral two times a day PRN  QUEtiapine 25 milliGRAM(s) Oral at bedtime      ----------------------------------------------------------------------------------------  PHYSICAL EXAM  Constitutional - NAD, Comfortable  HEENT - NCAT, EOMI  Neck - Supple, No limited ROM  Chest - CTA bilaterally, No wheeze, No rhonchi, No crackles  Cardiovascular - RRR, S1S2, No murmurs  Abdomen - BS+, Soft, NTND  Extremities - No C/C/E, No calf tenderness   Neurologic Exam -                    Cognitive - Awake, Alert, AAO to self, place, date, year, situation     Communication - Fluent, No dysarthria, no aphasia     Cranial Nerves - CN 2-12 intact     Motor - No focal deficits      Sensory - Intact to LT     Reflexes - DTR Intact, No primitive reflexive       Psychiatric - Mood stable, Affect WNL      Impression:   68 yo with functional deficits secondary to diagnosis of debility    Plan:  PT- ROM, Bed Mob, Transfers, Amb w AD and bracing as needed  OT- ADLs, bracing  SLP- Dysphagia eval and treat  Prec- Falls, Cardiac, Pulmonary  DVT Prophylaxis- on heparin  Skin- Turn q2 h  Dispo- Patient is a 69y old  Male who presents with a chief complaint of NSTEMI (12 Jul 2019 07:33)      Admission HPI:  History obtained from sign out/nephew  The pt is a 68 y/o M with a PMH of HTN, DM, PAD who is transferred to [facility] for management of cardiogenic shock and NSTEMI. As per sign out, the pt was admitted for L popliteal bypass to [facility]. During his time there, he was refusing medications  and was found to be progressively more dyspneic. He had an echo done there which showed that he has an EF of <15% with global hypokinesis; he received 20mg IV lasix for fluid overload. His respiratory status continued to worsen and the pt was intubated for airway protection. The pt vomited during this time and there was a concern for aspiration. The pt did not complain of CP during the time prior to intubation. As per nephew, the pt has not been complaining of CP, SOB prior to admission to the hospital and has been able to carry out his ADLs. As per the nephew, pt has not been compliant with his medications as an outpatient; the nephew knows that he had amlodipine prescribed but doesn't know what medications the the pt takes for his diabetes.  As per chart from [facility], pt has been prescribed to take amlodipine 10 daily and metformin 500 BID at home. As per med rec from 2015, pt has been taking lantus 20 mg daily at home in addition to metformin. (05 Jul 2019 23:50)    Interval History:  Patient treated for aspiration PNA, septic shock, VINAY. Also with ischemia of the LLE.   Extubated. With persistent functional deficits and confusion.     REVIEW OF SYSTEMS: + confused and unable to provide ROS    PAST MEDICAL & SURGICAL HISTORY  Hyperlipidemia  Hypertension  Diabetes  H/O mastoidectomy    FUNCTIONAL HISTORY:   PTA Independent    CURRENT FUNCTIONAL STATUS:  Min A transfers and gait.    FAMILY HISTORY   FHx: diabetes mellitus  No pertinent family history in first degree relatives      RECENT LABS/IMAGING  CBC Full  -  ( 12 Jul 2019 08:58 )  WBC Count : 8.8 K/uL  RBC Count : 3.69 M/uL  Hemoglobin : 11.1 g/dL  Hematocrit : 34.2 %  Platelet Count - Automated : 345 K/uL  Mean Cell Volume : 92.6 fl  Mean Cell Hemoglobin : 30.0 pg  Mean Cell Hemoglobin Concentration : 32.4 gm/dL  Auto Neutrophil # : x  Auto Lymphocyte # : x  Auto Monocyte # : x  Auto Eosinophil # : x  Auto Basophil # : x  Auto Neutrophil % : x  Auto Lymphocyte % : x  Auto Monocyte % : x  Auto Eosinophil % : x  Auto Basophil % : x    07-11    138  |  99  |  21  ----------------------------<  265<H>  3.2<L>   |  25  |  0.99    Ca    8.6      11 Jul 2019 17:11  Phos  3.0     07-11  Mg     1.9     07-11    TPro  6.4  /  Alb  2.7<L>  /  TBili  1.2  /  DBili  x   /  AST  29  /  ALT  165<H>  /  AlkPhos  209<H>  07-11        VITALS  T(C): 36.4 (07-12-19 @ 05:04), Max: 37.2 (07-11-19 @ 11:15)  HR: 83 (07-12-19 @ 09:16) (70 - 83)  BP: 116/64 (07-12-19 @ 09:16) (107/64 - 129/72)  RR: 18 (07-12-19 @ 05:04) (18 - 18)  SpO2: 96% (07-12-19 @ 09:16) (96% - 97%)  Wt(kg): --    ALLERGIES  No Known Allergies      MEDICATIONS   aspirin  chewable 81 milliGRAM(s) Oral daily  atorvastatin 80 milliGRAM(s) Oral at bedtime  chlorhexidine 0.12% Liquid 15 milliLiter(s) Oral Mucosa two times a day  clopidogrel Tablet 75 milliGRAM(s) Oral daily  dextrose 40% Gel 15 Gram(s) Oral once PRN  dextrose 5%. 1000 milliLiter(s) IV Continuous <Continuous>  dextrose 50% Injectable 12.5 Gram(s) IV Push once  dextrose 50% Injectable 25 Gram(s) IV Push once  dextrose 50% Injectable 25 Gram(s) IV Push once  furosemide   Injectable 40 milliGRAM(s) IV Push daily  glucagon  Injectable 1 milliGRAM(s) IntraMuscular once PRN  heparin  Infusion. 1800 Unit(s)/Hr IV Continuous <Continuous>  heparin  Injectable 5300 Unit(s) IV Push every 6 hours PRN  insulin glargine Injectable (LANTUS) 7 Unit(s) SubCutaneous at bedtime  insulin lispro (HumaLOG) corrective regimen sliding scale   SubCutaneous three times a day before meals  insulin lispro (HumaLOG) corrective regimen sliding scale   SubCutaneous at bedtime  melatonin 3 milliGRAM(s) Oral at bedtime  piperacillin/tazobactam IVPB.. 3.375 Gram(s) IV Intermittent every 8 hours  QUEtiapine 25 milliGRAM(s) Oral two times a day PRN  QUEtiapine 25 milliGRAM(s) Oral at bedtime      ----------------------------------------------------------------------------------------  PHYSICAL EXAM  Constitutional - NAD, Comfortable  HEENT - NCAT, EOMI  Neck - Supple, No limited ROM  Chest - CTA bilaterally, No wheeze, No rhonchi, No crackles  Cardiovascular - RRR, S1S2, No murmurs  Abdomen - BS+, Soft, NTND  Extremities - No C/C/E, No calf tenderness , leg incision intact  Neurologic Exam -                     Alert      Impulsive      Inconsistent w commands      AAO x 1, poor insight     ALBERTO x 4 non-focal  Psychiatric - Mood stable, Affect WNL      Impression:   70 yo with functional deficits secondary to diagnosis of debility    Plan:  PT- ROM, Bed Mob, Transfers, Amb w AD and bracing as needed  OT- ADLs, bracing  SLP- Dysphagia eval and treat  Prec- Falls, Cardiac, Pulmonary  DVT Prophylaxis- on heparin  Skin- Turn q2 h  Dispo- KERI

## 2019-07-12 NOTE — OCCUPATIONAL THERAPY INITIAL EVALUATION ADULT - IMPAIRMENTS CONTRIBUTING IMPAIRED BED MOBILITY, REHAB EVAL
impaired motor control/impaired coordination/decreased strength impaired coordination/impaired motor control/cognition/decreased strength

## 2019-07-12 NOTE — PROGRESS NOTE ADULT - SUBJECTIVE AND OBJECTIVE BOX
Lisa Madrid MD  Attending Physician (Hospitalist)  pager: [phone]    Patient is a 69y old  Male who presents with a chief complaint of NSTEMI (12 Jul 2019 09:50)        SUBJECTIVE / OVERNIGHT EVENTS:  awake and responsive, still confused. stating needs to go  his nephew at the bus stop.   afebrile. no acute issues overnight.     MEDICATIONS  (STANDING):  aspirin  chewable 81 milliGRAM(s) Oral daily  atorvastatin 80 milliGRAM(s) Oral at bedtime  chlorhexidine 0.12% Liquid 15 milliLiter(s) Oral Mucosa two times a day  clopidogrel Tablet 75 milliGRAM(s) Oral daily  dextrose 5%. 1000 milliLiter(s) (50 mL/Hr) IV Continuous <Continuous>  dextrose 50% Injectable 12.5 Gram(s) IV Push once  dextrose 50% Injectable 25 Gram(s) IV Push once  dextrose 50% Injectable 25 Gram(s) IV Push once  furosemide   Injectable 40 milliGRAM(s) IV Push daily  heparin  Infusion. 1800 Unit(s)/Hr (18 mL/Hr) IV Continuous <Continuous>  insulin glargine Injectable (LANTUS) 9 Unit(s) SubCutaneous at bedtime  insulin lispro (HumaLOG) corrective regimen sliding scale   SubCutaneous three times a day before meals  insulin lispro (HumaLOG) corrective regimen sliding scale   SubCutaneous at bedtime  insulin lispro Injectable (HumaLOG) 3 Unit(s) SubCutaneous three times a day before meals  melatonin 3 milliGRAM(s) Oral at bedtime  nicotine -   7 mG/24Hr(s) Patch 1 patch Transdermal daily  piperacillin/tazobactam IVPB.. 3.375 Gram(s) IV Intermittent every 8 hours  QUEtiapine 25 milliGRAM(s) Oral at bedtime    MEDICATIONS  (PRN):  dextrose 40% Gel 15 Gram(s) Oral once PRN Blood Glucose LESS THAN 70 milliGRAM(s)/deciliter  glucagon  Injectable 1 milliGRAM(s) IntraMuscular once PRN Glucose LESS THAN 70 milligrams/deciliter  heparin  Injectable 5300 Unit(s) IV Push every 6 hours PRN For aPTT less than 40  QUEtiapine 25 milliGRAM(s) Oral two times a day PRN agitation      Vital Signs Last 24 Hrs  T(C): 36.8 (12 Jul 2019 11:44), Max: 36.8 (11 Jul 2019 21:47)  T(F): 98.2 (12 Jul 2019 11:44), Max: 98.2 (11 Jul 2019 21:47)  HR: 85 (12 Jul 2019 11:44) (74 - 85)  BP: 111/47 (12 Jul 2019 11:44) (111/47 - 129/72)  BP(mean): --  RR: 18 (12 Jul 2019 11:44) (18 - 18)  SpO2: 95% (12 Jul 2019 11:44) (95% - 97%)  CAPILLARY BLOOD GLUCOSE      POCT Blood Glucose.: 246 mg/dL (12 Jul 2019 12:02)  POCT Blood Glucose.: 287 mg/dL (12 Jul 2019 07:48)  POCT Blood Glucose.: 226 mg/dL (11 Jul 2019 21:43)  POCT Blood Glucose.: 281 mg/dL (11 Jul 2019 16:53)    I&O's Summary    11 Jul 2019 07:01  -  12 Jul 2019 07:00  --------------------------------------------------------  IN: 822 mL / OUT: 1 mL / NET: 821 mL    12 Jul 2019 07:01  -  12 Jul 2019 15:03  --------------------------------------------------------  IN: 120 mL / OUT: 0 mL / NET: 120 mL          PHYSICAL EXAM  GENERAL: NAD, well-developed  HEAD:  Atraumatic, Normocephalic  EYES: EOMI, conjunctiva and sclera clear  NECK: Supple, No JVD  CHEST/LUNG: Clear to auscultation bilaterally; No wheeze  HEART: Regular rate and rhythm; No murmurs, rubs, or gallops  ABDOMEN: Soft, Nontender, Nondistended; Bowel sounds present  EXTREMITIES:  LLE surg wound with staples. cold bilateral LE with dusky toes.   Dry gangrene to the left hallux and dry eschar to the distal right hallux.  PSYCH: AAOx1  SKIN: No rashes or lesions  LABS:                        11.1   8.8   )-----------( 345      ( 12 Jul 2019 08:58 )             34.2     07-12    139  |  100  |  17  ----------------------------<  266<H>  3.3<L>   |  25  |  0.92    Ca    8.9      12 Jul 2019 08:58  Phos  3.1     07-12  Mg     1.9     07-12    TPro  6.8  /  Alb  2.8<L>  /  TBili  1.2  /  DBili  x   /  AST  25  /  ALT  128<H>  /  AlkPhos  191<H>  07-12    PTT - ( 12 Jul 2019 08:58 )  PTT:63.1 sec            RADIOLOGY & ADDITIONAL TESTS:    Imaging Personally Reviewed:  Consultant(s) Notes Reviewed:    Care Discussed with Consultants/Other Providers:

## 2019-07-12 NOTE — PROGRESS NOTE ADULT - PROBLEM SELECTOR PLAN 2
Clinically euvolemic  - TTE with EF 20-25%, global systolic dysfunction, mildly hypervolemic with b/l pleural efffusions  -c/w IV lasix 40mg IV daily  -VINAY improving, eventually will need ACE  -cards following  -strict I and O, daily weights

## 2019-07-12 NOTE — PROGRESS NOTE ADULT - ASSESSMENT
68 y/o M w/ PMH of poor med compliance DM, HTN, PAD s/p recent surgery at [facility] transferred to [facility] CCU for NSTEMI with cardiogenic shock with course complicated by acute hypoxemic respiratory failure +/- septic shock secondary to suspected aspiration PNA requiring intubation now extubated 7/8 downgraded from CCU

## 2019-07-12 NOTE — PROGRESS NOTE ADULT - PROBLEM SELECTOR PLAN 3
PAD s/p recent R Leg surgery at [facility]  -VA duplex of b/l LE: 07/06: On the right, there is a severe flow-limiting stenosis of the popliteal artery and the trifurcation arteries are occluded in the calf.  On the left, the bypass graft is occluded, the popliteal artery is occluded and the trifurcation arteries are occluded.  - vascular surgery and podiatry plan noted, Possible amputation needed, monitor LEs  - c/w DAPT, IV heparin gtt, statin

## 2019-07-12 NOTE — PROGRESS NOTE ADULT - PROBLEM SELECTOR PLAN 6
more calm.   - cont seroquel 25mg bid prn agitation, seroquel 25mg qhs and melatonin 3mg qhs  -monitor EKGs periodically

## 2019-07-12 NOTE — PROGRESS NOTE ADULT - ASSESSMENT
68 y/o M with a PMH of HTN, DM, PAD, s/p LLE fem-pop bypass with vein graft who is transferred to [facility] for management of cardiogenic shock and NSTEMI, vascular surgery consulted for no dopplerable signals    - Continue to watch for demarcation of b/l lower extremities   - Will continue to follow and re-address amputation discussion at later time   - Tentatively plan for OR early next week pending patient/family plan  - Duplex reviewed   - Continue Hep ggt  - Care per primary team    Vacular Surgery   [id number]

## 2019-07-12 NOTE — PROGRESS NOTE ADULT - PROBLEM SELECTOR PLAN 1
Trops downtrended  - TTE with EF 20-25%, global systolic dysfunction  - c/w DAPT, IV heparin gtt, statin  - d/w card fellow today. attempting to pursue cardiac cath but so far unable due to his MS. will reattampt likely early next week.   tentative plans by vasc for OR next week as well for surg ?BKA?angio

## 2019-07-12 NOTE — PROGRESS NOTE ADULT - ASSESSMENT
A/P: 68 y/o M with a PMH of HTN, DM, PAD who is transferred to [facility] for management of cardiogenic shock and NSTEMI, transferred out of CCU.    - clinically stable with no chest pain  - self-extubated, bilateral hand mittens  - c/w DAPT, heparin  - LHC cancelled due to AOx1-2  - vascular on board, pending OR next week    Bashir Mulligan, #[phone]  Cardiology Fellow

## 2019-07-12 NOTE — PROGRESS NOTE ADULT - SUBJECTIVE AND OBJECTIVE BOX
VACULAR SURGERY DAILY PROGRESS NOTE:       Subjective:  Patient seen and examined on AM rounds. Doing well on floors. No acute events overnight. AF/VSS. Patient said he had thought about surgery but did not further explain what decision was made.       Objective:    PHYSICAL EXAM:  General: NAD, well-nourished  Neuro: A&O x 1   Resp: non labored breathing   CV: Normal sinus rhythm  Ext: RLE: cool, no dopplerable signals, discoloration of toes, LLE: cool, no dopplerable signals, discoloration of toes with tissue loss of 1st toe, leg dressing c/d/i    Vital Signs Last 24 Hrs  T(C): 36.4 (2019 05:04), Max: 37.2 (2019 11:15)  T(F): 97.6 (2019 05:04), Max: 98.9 (2019 11:15)  HR: 83 (2019 09:16) (70 - 83)  BP: 116/64 (2019 09:16) (107/64 - 129/72)  BP(mean): --  RR: 18 (2019 05:04) (18 - 18)  SpO2: 96% (2019 09:16) (96% - 97%)    I&O's Detail    2019 07:01  -  2019 07:00  --------------------------------------------------------  IN:    heparin  Infusion.: 222 mL    Oral Fluid: 600 mL  Total IN: 822 mL    OUT:    Voided: 1 mL  Total OUT: 1 mL    Total NET: 821 mL      2019 07:01  -  2019 09:51  --------------------------------------------------------  IN:    Oral Fluid: 120 mL  Total IN: 120 mL    OUT:  Total OUT: 0 mL    Total NET: 120 mL          Daily     Daily Weight in k.9 (2019 07:52)    MEDICATIONS  (STANDING):  aspirin  chewable 81 milliGRAM(s) Oral daily  atorvastatin 80 milliGRAM(s) Oral at bedtime  chlorhexidine 0.12% Liquid 15 milliLiter(s) Oral Mucosa two times a day  clopidogrel Tablet 75 milliGRAM(s) Oral daily  dextrose 5%. 1000 milliLiter(s) (50 mL/Hr) IV Continuous <Continuous>  dextrose 50% Injectable 12.5 Gram(s) IV Push once  dextrose 50% Injectable 25 Gram(s) IV Push once  dextrose 50% Injectable 25 Gram(s) IV Push once  furosemide   Injectable 40 milliGRAM(s) IV Push daily  heparin  Infusion. 1800 Unit(s)/Hr (18 mL/Hr) IV Continuous <Continuous>  insulin glargine Injectable (LANTUS) 7 Unit(s) SubCutaneous at bedtime  insulin lispro (HumaLOG) corrective regimen sliding scale   SubCutaneous three times a day before meals  insulin lispro (HumaLOG) corrective regimen sliding scale   SubCutaneous at bedtime  melatonin 3 milliGRAM(s) Oral at bedtime  piperacillin/tazobactam IVPB.. 3.375 Gram(s) IV Intermittent every 8 hours  QUEtiapine 25 milliGRAM(s) Oral at bedtime    MEDICATIONS  (PRN):  dextrose 40% Gel 15 Gram(s) Oral once PRN Blood Glucose LESS THAN 70 milliGRAM(s)/deciliter  glucagon  Injectable 1 milliGRAM(s) IntraMuscular once PRN Glucose LESS THAN 70 milligrams/deciliter  heparin  Injectable 5300 Unit(s) IV Push every 6 hours PRN For aPTT less than 40  QUEtiapine 25 milliGRAM(s) Oral two times a day PRN agitation      LABS:                        11.1   8.8   )-----------( 345      ( 2019 08:58 )             34.2     07-12    139  |  100  |  17  ----------------------------<  266<H>  3.3<L>   |  25  |  0.92    Ca    8.9      2019 08:58  Phos  3.1     07-12  Mg     1.9     -    TPro  6.8  /  Alb  2.8<L>  /  TBili  1.2  /  DBili  x   /  AST  25  /  ALT  128<H>  /  AlkPhos  191<H>      PTT - ( 2019 08:58 )  PTT:63.1 sec      RADIOLOGY & ADDITIONAL STUDIES:

## 2019-07-12 NOTE — PROGRESS NOTE ADULT - PROBLEM SELECTOR PLAN 7
a1c 10.0, FS stable  - increase lantus to 9 units qhs and FIDENCIO, add humalog 3 units TID  -monitor FS

## 2019-07-13 LAB
ANION GAP SERPL CALC-SCNC: 14 MMOL/L — SIGNIFICANT CHANGE UP (ref 5–17)
APTT BLD: 51.9 SEC — HIGH (ref 27.5–36.3)
APTT BLD: 98.4 SEC — HIGH (ref 27.5–36.3)
BUN SERPL-MCNC: 19 MG/DL — SIGNIFICANT CHANGE UP (ref 7–23)
CALCIUM SERPL-MCNC: 8.7 MG/DL — SIGNIFICANT CHANGE UP (ref 8.4–10.5)
CHLORIDE SERPL-SCNC: 103 MMOL/L — SIGNIFICANT CHANGE UP (ref 96–108)
CO2 SERPL-SCNC: 26 MMOL/L — SIGNIFICANT CHANGE UP (ref 22–31)
CREAT SERPL-MCNC: 0.99 MG/DL — SIGNIFICANT CHANGE UP (ref 0.5–1.3)
GLUCOSE BLDC GLUCOMTR-MCNC: 235 MG/DL — HIGH (ref 70–99)
GLUCOSE BLDC GLUCOMTR-MCNC: 247 MG/DL — HIGH (ref 70–99)
GLUCOSE BLDC GLUCOMTR-MCNC: 255 MG/DL — HIGH (ref 70–99)
GLUCOSE BLDC GLUCOMTR-MCNC: 267 MG/DL — HIGH (ref 70–99)
GLUCOSE SERPL-MCNC: 243 MG/DL — HIGH (ref 70–99)
HCT VFR BLD CALC: 29.8 % — LOW (ref 39–50)
HGB BLD-MCNC: 9.9 G/DL — LOW (ref 13–17)
MAGNESIUM SERPL-MCNC: 1.8 MG/DL — SIGNIFICANT CHANGE UP (ref 1.6–2.6)
MCHC RBC-ENTMCNC: 31.2 PG — SIGNIFICANT CHANGE UP (ref 27–34)
MCHC RBC-ENTMCNC: 33.4 GM/DL — SIGNIFICANT CHANGE UP (ref 32–36)
MCV RBC AUTO: 93.4 FL — SIGNIFICANT CHANGE UP (ref 80–100)
PHOSPHATE SERPL-MCNC: 2.9 MG/DL — SIGNIFICANT CHANGE UP (ref 2.5–4.5)
PLATELET # BLD AUTO: 327 K/UL — SIGNIFICANT CHANGE UP (ref 150–400)
POTASSIUM SERPL-MCNC: 3.2 MMOL/L — LOW (ref 3.5–5.3)
POTASSIUM SERPL-SCNC: 3.2 MMOL/L — LOW (ref 3.5–5.3)
RBC # BLD: 3.19 M/UL — LOW (ref 4.2–5.8)
RBC # FLD: 11.9 % — SIGNIFICANT CHANGE UP (ref 10.3–14.5)
SODIUM SERPL-SCNC: 143 MMOL/L — SIGNIFICANT CHANGE UP (ref 135–145)
WBC # BLD: 11.2 K/UL — HIGH (ref 3.8–10.5)
WBC # FLD AUTO: 11.2 K/UL — HIGH (ref 3.8–10.5)

## 2019-07-13 PROCEDURE — 99233 SBSQ HOSP IP/OBS HIGH 50: CPT

## 2019-07-13 PROCEDURE — 93010 ELECTROCARDIOGRAM REPORT: CPT

## 2019-07-13 RX ORDER — HALOPERIDOL DECANOATE 100 MG/ML
1 INJECTION INTRAMUSCULAR ONCE
Refills: 0 | Status: COMPLETED | OUTPATIENT
Start: 2019-07-13 | End: 2019-07-13

## 2019-07-13 RX ORDER — HEPARIN SODIUM 5000 [USP'U]/ML
5300 INJECTION INTRAVENOUS; SUBCUTANEOUS EVERY 6 HOURS
Refills: 0 | Status: DISCONTINUED | OUTPATIENT
Start: 2019-07-13 | End: 2019-07-14

## 2019-07-13 RX ORDER — POTASSIUM CHLORIDE 20 MEQ
40 PACKET (EA) ORAL
Refills: 0 | Status: COMPLETED | OUTPATIENT
Start: 2019-07-13 | End: 2019-07-13

## 2019-07-13 RX ORDER — HALOPERIDOL DECANOATE 100 MG/ML
0.5 INJECTION INTRAMUSCULAR ONCE
Refills: 0 | Status: DISCONTINUED | OUTPATIENT
Start: 2019-07-13 | End: 2019-07-14

## 2019-07-13 RX ORDER — HEPARIN SODIUM 5000 [USP'U]/ML
1850 INJECTION INTRAVENOUS; SUBCUTANEOUS
Qty: 25000 | Refills: 0 | Status: DISCONTINUED | OUTPATIENT
Start: 2019-07-13 | End: 2019-07-14

## 2019-07-13 RX ADMIN — Medication 3: at 12:14

## 2019-07-13 RX ADMIN — HEPARIN SODIUM 0 UNIT(S)/HR: 5000 INJECTION INTRAVENOUS; SUBCUTANEOUS at 18:47

## 2019-07-13 RX ADMIN — Medication 3 UNIT(S): at 12:14

## 2019-07-13 RX ADMIN — HEPARIN SODIUM 2050 UNIT(S)/HR: 5000 INJECTION INTRAVENOUS; SUBCUTANEOUS at 08:16

## 2019-07-13 RX ADMIN — ATORVASTATIN CALCIUM 80 MILLIGRAM(S): 80 TABLET, FILM COATED ORAL at 22:18

## 2019-07-13 RX ADMIN — Medication 40 MILLIEQUIVALENT(S): at 22:20

## 2019-07-13 RX ADMIN — HALOPERIDOL DECANOATE 1 MILLIGRAM(S): 100 INJECTION INTRAMUSCULAR at 22:18

## 2019-07-13 RX ADMIN — INSULIN GLARGINE 9 UNIT(S): 100 INJECTION, SOLUTION SUBCUTANEOUS at 22:18

## 2019-07-13 RX ADMIN — Medication 3 UNIT(S): at 08:20

## 2019-07-13 RX ADMIN — Medication 2: at 17:26

## 2019-07-13 RX ADMIN — PIPERACILLIN AND TAZOBACTAM 25 GRAM(S): 4; .5 INJECTION, POWDER, LYOPHILIZED, FOR SOLUTION INTRAVENOUS at 01:07

## 2019-07-13 RX ADMIN — Medication 3: at 08:20

## 2019-07-13 RX ADMIN — Medication 40 MILLIGRAM(S): at 06:36

## 2019-07-13 RX ADMIN — Medication 81 MILLIGRAM(S): at 12:15

## 2019-07-13 RX ADMIN — CLOPIDOGREL BISULFATE 75 MILLIGRAM(S): 75 TABLET, FILM COATED ORAL at 12:15

## 2019-07-13 RX ADMIN — Medication 1 PATCH: at 07:58

## 2019-07-13 RX ADMIN — QUETIAPINE FUMARATE 25 MILLIGRAM(S): 200 TABLET, FILM COATED ORAL at 22:20

## 2019-07-13 RX ADMIN — Medication 3 UNIT(S): at 17:26

## 2019-07-13 RX ADMIN — HEPARIN SODIUM 1800 UNIT(S)/HR: 5000 INJECTION INTRAVENOUS; SUBCUTANEOUS at 19:54

## 2019-07-13 RX ADMIN — PIPERACILLIN AND TAZOBACTAM 25 GRAM(S): 4; .5 INJECTION, POWDER, LYOPHILIZED, FOR SOLUTION INTRAVENOUS at 10:32

## 2019-07-13 RX ADMIN — HEPARIN SODIUM 1850 UNIT(S)/HR: 5000 INJECTION INTRAVENOUS; SUBCUTANEOUS at 01:07

## 2019-07-13 NOTE — PROGRESS NOTE ADULT - PROBLEM SELECTOR PLAN 1
Trops downtrended  - TTE with EF 20-25%, global systolic dysfunction  - c/w DAPT, IV heparin gtt, statin  - Cardiology wants to pursue cardiac cath. Will need to obtain consent from the patient's nephew most likely.   - Tentative plans by vasc for OR next week as well for surg ?BKA?angio

## 2019-07-13 NOTE — PROGRESS NOTE ADULT - ASSESSMENT
69M w/ PMH of poor med compliance, DM type 2, HTN, PAD s/p recent surgery at [facility], transferred to [facility] CCU for NSTEMI with cardiogenic shock; course complicated by acute respiratory failure with hypoxia and septic shock secondary to suspected aspiration pneumina requiring intubation (now extubated 7/8) and downgraded from CCU.

## 2019-07-13 NOTE — PROGRESS NOTE ADULT - PROBLEM SELECTOR PLAN 3
PAD s/p recent R Leg surgery at [facility]  -VA duplex of b/l LE: 07/06: On the right, there is a severe flow-limiting stenosis of the popliteal artery and the trifurcation arteries are occluded in the calf.  On the left, the bypass graft is occluded, the popliteal artery is occluded and the trifurcation arteries are occluded.  - Vascular surgery and podiatry plan noted, Possible amputation needed, monitor LEs  - c/w DAPT, IV heparin gtt, statin

## 2019-07-13 NOTE — PROGRESS NOTE ADULT - SUBJECTIVE AND OBJECTIVE BOX
Kwame De La Cruz MD, MBA ([facility] Hospitalist)  Pager [phone]  (During off hours please page [phone])      Patient is a 69y old  Male who presents with a chief complaint of NSTEMI (12 Jul 2019 10:00)      SUBJECTIVE / OVERNIGHT EVENTS: The patient was agitated overnight, requiring haldol. Currently on 1:1 observation. He did say that his nephew Fer is involved in his care and would not mind if he made all of his medical decisions in the future.     MEDICATIONS  (STANDING):  aspirin  chewable 81 milliGRAM(s) Oral daily  atorvastatin 80 milliGRAM(s) Oral at bedtime  chlorhexidine 0.12% Liquid 15 milliLiter(s) Oral Mucosa two times a day  clopidogrel Tablet 75 milliGRAM(s) Oral daily  furosemide   Injectable 40 milliGRAM(s) IV Push daily  heparin  Infusion. 1850 Unit(s)/Hr (18.5 mL/Hr) IV Continuous <Continuous>  insulin glargine Injectable (LANTUS) 9 Unit(s) SubCutaneous at bedtime  insulin lispro (HumaLOG) corrective regimen sliding scale   SubCutaneous three times a day before meals  insulin lispro (HumaLOG) corrective regimen sliding scale   SubCutaneous at bedtime  insulin lispro Injectable (HumaLOG) 3 Unit(s) SubCutaneous three times a day before meals  melatonin 3 milliGRAM(s) Oral at bedtime  nicotine -   7 mG/24Hr(s) Patch 1 patch Transdermal daily  piperacillin/tazobactam IVPB.. 3.375 Gram(s) IV Intermittent every 8 hours  QUEtiapine 25 milliGRAM(s) Oral at bedtime    MEDICATIONS  (PRN):  dextrose 40% Gel 15 Gram(s) Oral once PRN Blood Glucose LESS THAN 70 milliGRAM(s)/deciliter  glucagon  Injectable 1 milliGRAM(s) IntraMuscular once PRN Glucose LESS THAN 70 milligrams/deciliter  haloperidol    Injectable 0.5 milliGRAM(s) IntraMuscular once PRN Agitation  heparin  Injectable 5300 Unit(s) IV Push every 6 hours PRN For aPTT less than 40  QUEtiapine 25 milliGRAM(s) Oral two times a day PRN agitation      Vital Signs Last 24 Hrs  T(C): 36.7 (13 Jul 2019 13:53), Max: 37 (12 Jul 2019 20:51)  T(F): 98 (13 Jul 2019 13:53), Max: 98.6 (12 Jul 2019 20:51)  HR: 90 (13 Jul 2019 13:53) (80 - 90)  BP: 112/71 (13 Jul 2019 13:53) (112/71 - 120/68)  BP(mean): --  RR: 18 (13 Jul 2019 13:53) (18 - 18)  SpO2: 100% (13 Jul 2019 13:53) (97% - 100%)    CAPILLARY BLOOD GLUCOSE  POCT Blood Glucose.: 267 mg/dL (13 Jul 2019 12:10)  POCT Blood Glucose.: 255 mg/dL (13 Jul 2019 08:17)  POCT Blood Glucose.: 184 mg/dL (12 Jul 2019 21:34)  POCT Blood Glucose.: 285 mg/dL (12 Jul 2019 16:35)    I&O's Summary    12 Jul 2019 07:01  -  13 Jul 2019 07:00  --------------------------------------------------------  IN: 873 mL / OUT: 0 mL / NET: 873 mL    13 Jul 2019 07:01  -  13 Jul 2019 16:09  --------------------------------------------------------  IN: 240 mL / OUT: 200 mL / NET: 40 mL        PHYSICAL EXAM:  GENERAL: NAD, well-developed  HEAD:  Atraumatic, Normocephalic  EYES: EOMI, conjunctiva and sclera clear  NECK: Supple, No JVD  CHEST/LUNG: Clear to auscultation bilaterally; No wheeze  HEART: Regular rate and rhythm; No murmurs, rubs, or gallops  ABDOMEN: Soft, Nontender, Nondistended; Bowel sounds present  EXTREMITIES:  LLE surg wound with staples. cold bilateral LE with dusky toes.   Dry gangrene to the left hallux and dry eschar to the distal right hallux.  PSYCH: AAOx1    LABS:                        9.9    11.2  )-----------( 327      ( 13 Jul 2019 07:23 )             29.8     07-13    143  |  103  |  19  ----------------------------<  243<H>  3.2<L>   |  26  |  0.99    Ca    8.7      13 Jul 2019 07:36  Phos  2.9     07-13  Mg     1.8     07-13    TPro  6.8  /  Alb  2.8<L>  /  TBili  1.2  /  DBili  x   /  AST  25  /  ALT  128<H>  /  AlkPhos  191<H>  07-12    PTT - ( 13 Jul 2019 07:23 )  PTT:51.9 sec          RADIOLOGY & ADDITIONAL TESTS:    Consultant(s) Notes Reviewed: cardiology, vascular

## 2019-07-13 NOTE — PROGRESS NOTE ADULT - PROBLEM SELECTOR PLAN 4
likely due to aspiration pna and b/l pleural effusions, s/p self extubation on 7/8  -diuresis as above  -antibiotics as below  -satting well on room air at this time

## 2019-07-13 NOTE — PROGRESS NOTE ADULT - PROBLEM SELECTOR PLAN 6
1:1 observation. Required haldol overnight.   - Cont Seroquel 25mg qHS and melatonin 3mg qHS  - Continue seroquel 25mg bid PRN agitation, haldol 0.5mg IM PRN  - monitor EKGs periodically

## 2019-07-13 NOTE — PROGRESS NOTE ADULT - PROBLEM SELECTOR PLAN 5
suspected aspiration pna  - Completed 2 day course of Zosyn (anbx stopped today 7/13)  - MBS done with dysphagia diet rec.   - Aspiration precautions.

## 2019-07-13 NOTE — PROGRESS NOTE ADULT - PROBLEM SELECTOR PLAN 10
Hgb downtrending to 9.5, admit was 11.0, no s/s of bleeding, labs consistent with AOCD  -trend cbcs,   DVT ppx: on heparin gtt (monitor PTT)  Prognosis guarded

## 2019-07-13 NOTE — CHART NOTE - NSCHARTNOTEFT_GEN_A_CORE
MEDICINE NP    JIM PAREDES  69y Male    Patient is a 69y old  Male who presents with a chief complaint of NSTEMI (12 Jul 2019 10:00)       > Event Summary:  @8:30PM:   Notified by RN, Patient with agitation, removing IVL on Heparin gtt ACS protocol.  Patient seen at bedside, OOB to chair, AOX0-1 to self.  Uncooperative, becomes loud and aggressive.    Unable to contribute.       > Vital Signs Last 24 Hrs  T(C): 37 (12 Jul 2019 20:51), Max: 37 (12 Jul 2019 20:51)  T(F): 98.6 (12 Jul 2019 20:51), Max: 98.6 (12 Jul 2019 20:51)  HR: 84 (12 Jul 2019 20:51) (74 - 85)  BP: 115/65 (12 Jul 2019 20:51) (111/47 - 117/70)  RR: 18 (12 Jul 2019 20:51) (18 - 18)  SpO2: 98% (12 Jul 2019 20:51) (95% - 98%)    > Review Of System:   General:	+Agitation.  Uncooperative, becomes loud and aggressive.    Unable to contribute.         > Physical Assessment:  General: AOX0-1 to self.  +Agitation.  Uncooperative, becomes loud and aggressive.    Unable to contribute.   CV: +S1S2, RRR.  No peripheral edema  Respiratory: Even, unlabored.  CTA B/L.    Abdomen:  +BS.  Soft, NT, ND.  No palpable mass  MSK: Acitve ALBERTO x4.   Skin:  +LLE staples clean/dry/intact.         > Assessment & Plan:  - HPI: 68 y/o M with a PMH of HTN, DM, PAD, s/p LLE fem-pop bypass with vein Graft @[facility] c/b NSTEMI.  Transferred to [facility] for management of Cardiogenic Shock and NSTEMI;  complicated by Acute Hypoxemic Respiratory Failure 2/2 suspected Aspiration PNA requiring intubation now extubated 7/8 downgraded from CCU, acute on chronic CHF on iv lasix. Now with recurrent agitation.      1) Agitation -recurrent   -Constant Observation for concern for fall and safety risks given heparin gtt.   -s/p Seroquel with minimal relief, patient still refusing IVL restart and with agitation  -ECG 7/10 QTc 467, Haldol 0.5mg IM x1 and re-evaluate  -ECG when calm     2) IVL Accsess -out  -Patient on Heparin gtt ACS normogram, therapeutic dosing.  Will re-attempt once patient calm     D/w RN  Attending to follow in AM         MAYNOR Pardo-BC  Medicine Department  #[id number] MEDICINE NP    JIM PAREDES  69y Male    Patient is a 69y old  Male who presents with a chief complaint of NSTEMI (12 Jul 2019 10:00)       > Event Summary:  @8:30PM:   Notified by RN, Patient with agitation, removing IVL on Heparin gtt ACS protocol.  Patient seen at bedside, OOB to chair, AOX0-1 to self.  Uncooperative, becomes loud and aggressive.    Unable to contribute.       > Vital Signs Last 24 Hrs  T(C): 37 (12 Jul 2019 20:51), Max: 37 (12 Jul 2019 20:51)  T(F): 98.6 (12 Jul 2019 20:51), Max: 98.6 (12 Jul 2019 20:51)  HR: 84 (12 Jul 2019 20:51) (74 - 85)  BP: 115/65 (12 Jul 2019 20:51) (111/47 - 117/70)  RR: 18 (12 Jul 2019 20:51) (18 - 18)  SpO2: 98% (12 Jul 2019 20:51) (95% - 98%)    > Review Of System:   General:	+Agitation.  Uncooperative, becomes loud and aggressive.    Unable to contribute.         > Physical Assessment:  General: AOX0-1 to self.  +Agitation.  Uncooperative, becomes loud and aggressive.    Unable to contribute.   CV: +S1S2, RRR.  No peripheral edema  Respiratory: Even, unlabored.  CTA B/L.    Abdomen:  +BS.  Soft, NT, ND.  No palpable mass  MSK: Acitve ALBERTO x4.   Skin:  +LLE staples clean/dry/intact.         > Assessment & Plan:  - HPI: 70 y/o M with a PMH of HTN, DM, PAD, s/p LLE fem-pop bypass with vein Graft @[facility] c/b NSTEMI.  Transferred to [facility] for management of Cardiogenic Shock and NSTEMI;  complicated by Acute Hypoxemic Respiratory Failure 2/2 suspected Aspiration PNA requiring intubation now extubated 7/8 downgraded from CCU, acute on chronic CHF on iv lasix. Now with recurrent agitation.      1) Agitation -recurrent   -Constant Observation for concern for fall and safety risks given heparin gtt.   -s/p Seroquel with minimal relief, patient still refusing IVL restart and with agitation  -ECG 7/10 QTc 467, Haldol 0.5mg IM x1 and re-evaluate  -ECG when calm     2) IVL Accsess -out  -Patient on Heparin gtt ACS normogram, therapeutic dosing.  Will re-attempt IVL access once patient calm       D/w RN  Attending to follow in AM         MAYNOR Pardo-BC  Medicine Department  #[id number]      >>ADDEND : (13 Jul 2019 02:00)  -@01:00 New IVL placed by RN, Heparin gtt restarted per prior therapeutic rate   -Trend aPTT /CBC  -Bleeding precautions

## 2019-07-13 NOTE — PROGRESS NOTE ADULT - PROBLEM SELECTOR PLAN 7
HgA1c 10.0, FS stable  - Continue Lantus 9 units qHS (increase pending tomorrow's FS), humalog 3 units TID with meals, HISS  - monitor FS  - Hypoglycemia protocol

## 2019-07-13 NOTE — PROGRESS NOTE ADULT - PROBLEM SELECTOR PLAN 2
Clinically euvolemic  - TTE with EF 20-25%, global systolic dysfunction, mildly hypervolemic with b/l pleural efffusions  - c/w IV lasix 40mg IV daily  - VINAY resolved (likely had ATN)  - Eventually will need ACE inhibitor  - cards following  - strict I and O, daily weights

## 2019-07-14 LAB
ANION GAP SERPL CALC-SCNC: 13 MMOL/L — SIGNIFICANT CHANGE UP (ref 5–17)
APTT BLD: 66.2 SEC — HIGH (ref 27.5–36.3)
APTT BLD: 72.1 SEC — HIGH (ref 27.5–36.3)
BUN SERPL-MCNC: 17 MG/DL — SIGNIFICANT CHANGE UP (ref 7–23)
CALCIUM SERPL-MCNC: 9.3 MG/DL — SIGNIFICANT CHANGE UP (ref 8.4–10.5)
CHLORIDE SERPL-SCNC: 100 MMOL/L — SIGNIFICANT CHANGE UP (ref 96–108)
CO2 SERPL-SCNC: 27 MMOL/L — SIGNIFICANT CHANGE UP (ref 22–31)
CREAT SERPL-MCNC: 0.97 MG/DL — SIGNIFICANT CHANGE UP (ref 0.5–1.3)
GLUCOSE BLDC GLUCOMTR-MCNC: 147 MG/DL — HIGH (ref 70–99)
GLUCOSE BLDC GLUCOMTR-MCNC: 187 MG/DL — HIGH (ref 70–99)
GLUCOSE BLDC GLUCOMTR-MCNC: 279 MG/DL — HIGH (ref 70–99)
GLUCOSE BLDC GLUCOMTR-MCNC: 289 MG/DL — HIGH (ref 70–99)
GLUCOSE BLDC GLUCOMTR-MCNC: 456 MG/DL — CRITICAL HIGH (ref 70–99)
GLUCOSE SERPL-MCNC: 275 MG/DL — HIGH (ref 70–99)
HCT VFR BLD CALC: 30.1 % — LOW (ref 39–50)
HGB BLD-MCNC: 9.6 G/DL — LOW (ref 13–17)
MAGNESIUM SERPL-MCNC: 1.9 MG/DL — SIGNIFICANT CHANGE UP (ref 1.6–2.6)
MCHC RBC-ENTMCNC: 30.2 PG — SIGNIFICANT CHANGE UP (ref 27–34)
MCHC RBC-ENTMCNC: 31.9 GM/DL — LOW (ref 32–36)
MCV RBC AUTO: 94.7 FL — SIGNIFICANT CHANGE UP (ref 80–100)
PLATELET # BLD AUTO: 415 K/UL — HIGH (ref 150–400)
POTASSIUM SERPL-MCNC: 3 MMOL/L — LOW (ref 3.5–5.3)
POTASSIUM SERPL-SCNC: 3 MMOL/L — LOW (ref 3.5–5.3)
RBC # BLD: 3.18 M/UL — LOW (ref 4.2–5.8)
RBC # FLD: 12.8 % — SIGNIFICANT CHANGE UP (ref 10.3–14.5)
SODIUM SERPL-SCNC: 140 MMOL/L — SIGNIFICANT CHANGE UP (ref 135–145)
WBC # BLD: 10.75 K/UL — HIGH (ref 3.8–10.5)
WBC # FLD AUTO: 10.75 K/UL — HIGH (ref 3.8–10.5)

## 2019-07-14 PROCEDURE — 93010 ELECTROCARDIOGRAM REPORT: CPT

## 2019-07-14 PROCEDURE — 99233 SBSQ HOSP IP/OBS HIGH 50: CPT | Mod: GC

## 2019-07-14 RX ORDER — HUMAN INSULIN 100 [IU]/ML
6 INJECTION, SUSPENSION SUBCUTANEOUS ONCE
Refills: 0 | Status: COMPLETED | OUTPATIENT
Start: 2019-07-14 | End: 2019-07-14

## 2019-07-14 RX ORDER — HALOPERIDOL DECANOATE 100 MG/ML
1 INJECTION INTRAMUSCULAR ONCE
Refills: 0 | Status: COMPLETED | OUTPATIENT
Start: 2019-07-14 | End: 2019-07-14

## 2019-07-14 RX ORDER — INSULIN LISPRO 100/ML
3 VIAL (ML) SUBCUTANEOUS
Refills: 0 | Status: DISCONTINUED | OUTPATIENT
Start: 2019-07-14 | End: 2019-07-20

## 2019-07-14 RX ORDER — INSULIN LISPRO 100/ML
VIAL (ML) SUBCUTANEOUS
Refills: 0 | Status: DISCONTINUED | OUTPATIENT
Start: 2019-07-14 | End: 2019-07-21

## 2019-07-14 RX ORDER — POTASSIUM CHLORIDE 20 MEQ
40 PACKET (EA) ORAL EVERY 4 HOURS
Refills: 0 | Status: DISCONTINUED | OUTPATIENT
Start: 2019-07-14 | End: 2019-07-14

## 2019-07-14 RX ORDER — POTASSIUM CHLORIDE 20 MEQ
40 PACKET (EA) ORAL ONCE
Refills: 0 | Status: COMPLETED | OUTPATIENT
Start: 2019-07-14 | End: 2019-07-14

## 2019-07-14 RX ORDER — INSULIN LISPRO 100/ML
5 VIAL (ML) SUBCUTANEOUS
Refills: 0 | Status: DISCONTINUED | OUTPATIENT
Start: 2019-07-14 | End: 2019-07-14

## 2019-07-14 RX ORDER — ACETAMINOPHEN 500 MG
650 TABLET ORAL EVERY 6 HOURS
Refills: 0 | Status: DISCONTINUED | OUTPATIENT
Start: 2019-07-14 | End: 2019-07-22

## 2019-07-14 RX ORDER — HEPARIN SODIUM 5000 [USP'U]/ML
INJECTION INTRAVENOUS; SUBCUTANEOUS
Qty: 25000 | Refills: 0 | Status: DISCONTINUED | OUTPATIENT
Start: 2019-07-14 | End: 2019-07-14

## 2019-07-14 RX ORDER — HEPARIN SODIUM 5000 [USP'U]/ML
1800 INJECTION INTRAVENOUS; SUBCUTANEOUS
Qty: 25000 | Refills: 0 | Status: DISCONTINUED | OUTPATIENT
Start: 2019-07-14 | End: 2019-07-22

## 2019-07-14 RX ORDER — INSULIN GLARGINE 100 [IU]/ML
20 INJECTION, SOLUTION SUBCUTANEOUS AT BEDTIME
Refills: 0 | Status: DISCONTINUED | OUTPATIENT
Start: 2019-07-14 | End: 2019-07-20

## 2019-07-14 RX ADMIN — Medication 40 MILLIEQUIVALENT(S): at 12:40

## 2019-07-14 RX ADMIN — Medication 40 MILLIEQUIVALENT(S): at 22:44

## 2019-07-14 RX ADMIN — Medication 1 PATCH: at 12:38

## 2019-07-14 RX ADMIN — Medication 6: at 12:15

## 2019-07-14 RX ADMIN — CHLORHEXIDINE GLUCONATE 15 MILLILITER(S): 213 SOLUTION TOPICAL at 18:17

## 2019-07-14 RX ADMIN — Medication 40 MILLIGRAM(S): at 06:08

## 2019-07-14 RX ADMIN — Medication 3 MILLIGRAM(S): at 05:40

## 2019-07-14 RX ADMIN — HEPARIN SODIUM 1800 UNIT(S)/HR: 5000 INJECTION INTRAVENOUS; SUBCUTANEOUS at 03:20

## 2019-07-14 RX ADMIN — INSULIN GLARGINE 20 UNIT(S): 100 INJECTION, SOLUTION SUBCUTANEOUS at 22:41

## 2019-07-14 RX ADMIN — Medication 3 UNIT(S): at 08:31

## 2019-07-14 RX ADMIN — Medication 2: at 21:39

## 2019-07-14 RX ADMIN — Medication 1 PATCH: at 20:30

## 2019-07-14 RX ADMIN — Medication 3 UNIT(S): at 17:03

## 2019-07-14 RX ADMIN — Medication 3 UNIT(S): at 12:15

## 2019-07-14 RX ADMIN — CLOPIDOGREL BISULFATE 75 MILLIGRAM(S): 75 TABLET, FILM COATED ORAL at 12:38

## 2019-07-14 RX ADMIN — HEPARIN SODIUM 1800 UNIT(S)/HR: 5000 INJECTION INTRAVENOUS; SUBCUTANEOUS at 12:14

## 2019-07-14 RX ADMIN — HALOPERIDOL DECANOATE 1 MILLIGRAM(S): 100 INJECTION INTRAMUSCULAR at 12:37

## 2019-07-14 RX ADMIN — Medication 3: at 08:31

## 2019-07-14 RX ADMIN — Medication 3 MILLIGRAM(S): at 22:41

## 2019-07-14 RX ADMIN — ATORVASTATIN CALCIUM 80 MILLIGRAM(S): 80 TABLET, FILM COATED ORAL at 22:43

## 2019-07-14 RX ADMIN — Medication 81 MILLIGRAM(S): at 12:38

## 2019-07-14 NOTE — PROGRESS NOTE ADULT - SUBJECTIVE AND OBJECTIVE BOX
Dr. Teddy Murillo, DO  Division of Hospital Medicine, [facility]  Pager:  [phone] Dr. Teddy Murillo, DO  Division of Hospital Medicine, [facility]  Pager:  [phone]    SUBJECTIVE  Patient reports that his legs have hurt for the past week but have no increased in pain. The pain is sharp, non-radiating, worse with palpation, improved with rest. He has not noticed any discharge or bleeding.     REVIEW OF SYSTEMS  CONSTITUTIONAL: No fevers or chills  EYES/ENT: No visual changes. No discharge from eyes. No vertigo. No throat pain. No dysphagia.  NECK: No pain or stiffness or rigidity.  RESPIRATORY: No cough, wheezing, or hemoptysis. No shortness of breath.  CARDIOVASCULAR: No chest pain or palpitations.   GASTROINTESTINAL: No abdominal pain. No nausea, vomiting, or hematemesis; No diarrhea or constipation. No melena or hematochezia.  GENITOURINARY: No dysuria, hesitancy, frequency or hematuria.  NEUROLOGICAL: No numbness or weakness. No change in speech. No fecal or urinary incontinence.   MSK: No joint swelling or erythema. No back pain.  SKIN:  +staples and wounds of the LLE with some pain.   PSYCH: Normal affect. Normal mood.    Review of systems negative except for items noted above.    PAST MEDICAL & SURGICAL HISTORY:  Hyperlipidemia  Hypertension  Diabetes  H/O mastoidectomy    MEDICATIONS  (STANDING):  aspirin  chewable 81 milliGRAM(s) Oral daily  atorvastatin 80 milliGRAM(s) Oral at bedtime  chlorhexidine 0.12% Liquid 15 milliLiter(s) Oral Mucosa two times a day  clopidogrel Tablet 75 milliGRAM(s) Oral daily  furosemide   Injectable 40 milliGRAM(s) IV Push daily  heparin  Infusion. 1800 Unit(s)/Hr (18 mL/Hr) IV Continuous <Continuous>  insulin glargine Injectable (LANTUS) 20 Unit(s) SubCutaneous at bedtime  insulin lispro (HumaLOG) corrective regimen sliding scale   SubCutaneous Before meals and at bedtime  insulin lispro Injectable (HumaLOG) 3 Unit(s) SubCutaneous three times a day before meals  melatonin 3 milliGRAM(s) Oral at bedtime  nicotine -   7 mG/24Hr(s) Patch 1 patch Transdermal daily  potassium chloride   Powder 40 milliEquivalent(s) Oral every 4 hours    MEDICATIONS  (PRN):  acetaminophen   Tablet .. 650 milliGRAM(s) Oral every 6 hours PRN Temp greater or equal to 38C (100.4F), Mild Pain (1 - 3)    Allergies  No Known Allergies    Vital Signs Last 24 Hrs  T(C): 36.9 (14 Jul 2019 12:07), Max: 36.9 (14 Jul 2019 12:07)  T(F): 98.4 (14 Jul 2019 12:07), Max: 98.4 (14 Jul 2019 12:07)  HR: 88 (14 Jul 2019 12:07) (81 - 90)  BP: 122/68 (14 Jul 2019 12:07) (105/60 - 122/68)  RR: 20 (14 Jul 2019 12:07) (18 - 20)  SpO2: 100% (14 Jul 2019 12:07) (95% - 100%)    CONSTITUTIONAL: No acute distress.   HEENT:  Conjunctiva clear B/L. Nasal mucosa normal. Moist oral mucosa. No posterior pharyngeal lesions noted.  Cardiovascular: RRR with no murmurs. No JVD noted. 1+ lower extremity edema B/L. Dry gangrenous toes notes.   Dorsalis pedis pulses 2+ B/L. Capillary refill <2s  Respiratory: Lungs CTAB. No accessory muscle use.   Gastrointestinal:  Soft, nontender. Non-distended. Non-rigid. No CVA tenderness B/L.  MSK:  No joint swelling. No joint erythema B/L. No midline spinal tenderness.  Neurologic:  Alert and awake. Oriented x3. Moving all extremities. Following commands. Making eye contact. No focal deficits.  Skin:  LLE staples noted without signs of bleeding or purulence.   Psych:  Normal Mood. No si or hi.     LABS                        9.9    11.2  )-----------( 327      ( 13 Jul 2019 07:23 )             29.8     07-14    140  |  100  |  17  ----------------------------<  275<H>  3.0<L>   |  27  |  0.97    Ca    9.3      14 Jul 2019 10:59  Phos  2.9     07-13  Mg     1.9     07-14    PTT - ( 14 Jul 2019 10:59 )  PTT:72.1 sec

## 2019-07-14 NOTE — PROGRESS NOTE ADULT - PROBLEM SELECTOR PLAN 6
1:1 observation. Required haldol overnight.   - melatonin 3mg qHS  - Held Seroquel and Haldol given QTC of 510.  - Repeat EKG in AM.

## 2019-07-14 NOTE — PROGRESS NOTE ADULT - PROBLEM SELECTOR PLAN 7
HgA1c 10.0, FS stable  - Lantus 20 units qhs (prior was 9)  - humalog 3 units TID with meals  - Increased to MSSI  - monitor FS  - Hypoglycemia protocol

## 2019-07-14 NOTE — PROGRESS NOTE ADULT - PROBLEM SELECTOR PLAN 4
likely due to aspiration pna and b/l pleural effusions, s/p self extubation on 7/8  -diuresis as above  -monitoring off abx. No signs of sepsis at this point in time.   -satting well on room air at this time

## 2019-07-14 NOTE — PROGRESS NOTE ADULT - PROBLEM SELECTOR PLAN 3
PAD s/p recent R Leg surgery at [facility]  -VA duplex of b/l LE: 07/06: On the right, there is a severe flow-limiting stenosis of the popliteal artery and the trifurcation arteries are occluded in the calf.  On the left, the bypass graft is occluded, the popliteal artery is occluded and the trifurcation arteries are occluded.  - Vascular surgery and podiatry plan noted, Possible amputation needed. Input greatly appreciated.   - c/w DAPT, IV heparin gtt, statin

## 2019-07-14 NOTE — PROGRESS NOTE ADULT - PROBLEM SELECTOR PLAN 10
-no signs or symptoms of bleeding / labs consistent with AOCD  -trend cbc daily.     #DVT ppx: on heparin gtt (monitor PTT)

## 2019-07-14 NOTE — PROGRESS NOTE ADULT - PROBLEM SELECTOR PLAN 1
Trops downtrended  - TTE with EF 20-25%, global systolic dysfunction  - c/w DAPT, IV heparin gtt, statin  - C cancelled secondary to lack of capacity.   - Tentative plans by vasc for OR this week for intervention on LLE occluded vascular graft. Possible BKA vs Angio.

## 2019-07-14 NOTE — PROGRESS NOTE ADULT - PROBLEM SELECTOR PLAN 5
but possibly pneumonitis.   - Completed 2 day course of Zosyn (anbx stopped today 7/13)  - MBS done with dysphagia diet rec.   - Aspiration precautions.

## 2019-07-14 NOTE — PROGRESS NOTE ADULT - ASSESSMENT
69M w/ PMH of poor med compliance, DM type 2, HTN, PAD s/p recent surgery at [facility], transferred to [facility] CCU for NSTEMI with cardiogenic shock; course complicated by acute respiratory failure with hypoxia and septic shock secondary to suspected aspiration pneumonia requiring intubation (now extubated 7/8) and downgraded from CCU. Currently pending further evaluation by vascular surgery for LLE occluded vascular graft.

## 2019-07-15 LAB
ALBUMIN SERPL ELPH-MCNC: 2.7 G/DL — LOW (ref 3.3–5)
ALP SERPL-CCNC: 172 U/L — HIGH (ref 40–120)
ALT FLD-CCNC: 69 U/L — HIGH (ref 10–45)
ANION GAP SERPL CALC-SCNC: 14 MMOL/L — SIGNIFICANT CHANGE UP (ref 5–17)
APTT BLD: 36 SEC — SIGNIFICANT CHANGE UP (ref 27.5–36.3)
APTT BLD: 94.3 SEC — HIGH (ref 27.5–36.3)
AST SERPL-CCNC: 21 U/L — SIGNIFICANT CHANGE UP (ref 10–40)
BILIRUB DIRECT SERPL-MCNC: 0.2 MG/DL — SIGNIFICANT CHANGE UP (ref 0–0.2)
BILIRUB INDIRECT FLD-MCNC: 0.4 MG/DL — SIGNIFICANT CHANGE UP (ref 0.2–1)
BILIRUB SERPL-MCNC: 0.6 MG/DL — SIGNIFICANT CHANGE UP (ref 0.2–1.2)
BUN SERPL-MCNC: 18 MG/DL — SIGNIFICANT CHANGE UP (ref 7–23)
CALCIUM SERPL-MCNC: 8.6 MG/DL — SIGNIFICANT CHANGE UP (ref 8.4–10.5)
CHLORIDE SERPL-SCNC: 101 MMOL/L — SIGNIFICANT CHANGE UP (ref 96–108)
CO2 SERPL-SCNC: 23 MMOL/L — SIGNIFICANT CHANGE UP (ref 22–31)
CREAT SERPL-MCNC: 0.89 MG/DL — SIGNIFICANT CHANGE UP (ref 0.5–1.3)
GLUCOSE BLDC GLUCOMTR-MCNC: 133 MG/DL — HIGH (ref 70–99)
GLUCOSE BLDC GLUCOMTR-MCNC: 168 MG/DL — HIGH (ref 70–99)
GLUCOSE BLDC GLUCOMTR-MCNC: 173 MG/DL — HIGH (ref 70–99)
GLUCOSE BLDC GLUCOMTR-MCNC: 260 MG/DL — HIGH (ref 70–99)
GLUCOSE SERPL-MCNC: 216 MG/DL — HIGH (ref 70–99)
HCT VFR BLD CALC: 29 % — LOW (ref 39–50)
HCT VFR BLD CALC: 30.1 % — LOW (ref 39–50)
HGB BLD-MCNC: 9 G/DL — LOW (ref 13–17)
HGB BLD-MCNC: 9.9 G/DL — LOW (ref 13–17)
MAGNESIUM SERPL-MCNC: 1.7 MG/DL — SIGNIFICANT CHANGE UP (ref 1.6–2.6)
MCHC RBC-ENTMCNC: 29 PG — SIGNIFICANT CHANGE UP (ref 27–34)
MCHC RBC-ENTMCNC: 30.6 PG — SIGNIFICANT CHANGE UP (ref 27–34)
MCHC RBC-ENTMCNC: 31 GM/DL — LOW (ref 32–36)
MCHC RBC-ENTMCNC: 32.8 GM/DL — SIGNIFICANT CHANGE UP (ref 32–36)
MCV RBC AUTO: 93.2 FL — SIGNIFICANT CHANGE UP (ref 80–100)
MCV RBC AUTO: 93.5 FL — SIGNIFICANT CHANGE UP (ref 80–100)
PHOSPHATE SERPL-MCNC: 2.7 MG/DL — SIGNIFICANT CHANGE UP (ref 2.5–4.5)
PLATELET # BLD AUTO: 363 K/UL — SIGNIFICANT CHANGE UP (ref 150–400)
PLATELET # BLD AUTO: 417 K/UL — HIGH (ref 150–400)
POTASSIUM SERPL-MCNC: 3.4 MMOL/L — LOW (ref 3.5–5.3)
POTASSIUM SERPL-SCNC: 3.4 MMOL/L — LOW (ref 3.5–5.3)
PROT SERPL-MCNC: 6.6 G/DL — SIGNIFICANT CHANGE UP (ref 6–8.3)
RBC # BLD: 3.1 M/UL — LOW (ref 4.2–5.8)
RBC # BLD: 3.23 M/UL — LOW (ref 4.2–5.8)
RBC # FLD: 12.1 % — SIGNIFICANT CHANGE UP (ref 10.3–14.5)
RBC # FLD: 12.8 % — SIGNIFICANT CHANGE UP (ref 10.3–14.5)
SODIUM SERPL-SCNC: 138 MMOL/L — SIGNIFICANT CHANGE UP (ref 135–145)
WBC # BLD: 11.4 K/UL — HIGH (ref 3.8–10.5)
WBC # BLD: 14.8 K/UL — HIGH (ref 3.8–10.5)
WBC # FLD AUTO: 11.4 K/UL — HIGH (ref 3.8–10.5)
WBC # FLD AUTO: 14.8 K/UL — HIGH (ref 3.8–10.5)

## 2019-07-15 PROCEDURE — 93010 ELECTROCARDIOGRAM REPORT: CPT | Mod: 77

## 2019-07-15 PROCEDURE — 99233 SBSQ HOSP IP/OBS HIGH 50: CPT

## 2019-07-15 PROCEDURE — 93010 ELECTROCARDIOGRAM REPORT: CPT

## 2019-07-15 RX ORDER — POTASSIUM CHLORIDE 20 MEQ
40 PACKET (EA) ORAL ONCE
Refills: 0 | Status: COMPLETED | OUTPATIENT
Start: 2019-07-15 | End: 2019-07-15

## 2019-07-15 RX ORDER — LISINOPRIL 2.5 MG/1
2.5 TABLET ORAL DAILY
Refills: 0 | Status: DISCONTINUED | OUTPATIENT
Start: 2019-07-15 | End: 2019-07-22

## 2019-07-15 RX ORDER — MAGNESIUM SULFATE 500 MG/ML
1 VIAL (ML) INJECTION ONCE
Refills: 0 | Status: COMPLETED | OUTPATIENT
Start: 2019-07-15 | End: 2019-07-15

## 2019-07-15 RX ORDER — METOPROLOL TARTRATE 50 MG
25 TABLET ORAL
Refills: 0 | Status: DISCONTINUED | OUTPATIENT
Start: 2019-07-15 | End: 2019-07-20

## 2019-07-15 RX ADMIN — Medication 40 MILLIGRAM(S): at 07:00

## 2019-07-15 RX ADMIN — Medication 25 MILLIGRAM(S): at 18:18

## 2019-07-15 RX ADMIN — CHLORHEXIDINE GLUCONATE 15 MILLILITER(S): 213 SOLUTION TOPICAL at 18:16

## 2019-07-15 RX ADMIN — Medication 2: at 21:49

## 2019-07-15 RX ADMIN — Medication 3 MILLIGRAM(S): at 20:33

## 2019-07-15 RX ADMIN — CLOPIDOGREL BISULFATE 75 MILLIGRAM(S): 75 TABLET, FILM COATED ORAL at 13:38

## 2019-07-15 RX ADMIN — HEPARIN SODIUM 1800 UNIT(S)/HR: 5000 INJECTION INTRAVENOUS; SUBCUTANEOUS at 10:24

## 2019-07-15 RX ADMIN — Medication 81 MILLIGRAM(S): at 13:38

## 2019-07-15 RX ADMIN — INSULIN GLARGINE 20 UNIT(S): 100 INJECTION, SOLUTION SUBCUTANEOUS at 21:49

## 2019-07-15 RX ADMIN — Medication 3 UNIT(S): at 18:36

## 2019-07-15 RX ADMIN — Medication 6: at 10:02

## 2019-07-15 RX ADMIN — Medication 1 PATCH: at 13:38

## 2019-07-15 RX ADMIN — Medication 3 UNIT(S): at 10:03

## 2019-07-15 RX ADMIN — ATORVASTATIN CALCIUM 80 MILLIGRAM(S): 80 TABLET, FILM COATED ORAL at 20:33

## 2019-07-15 RX ADMIN — Medication 40 MILLIEQUIVALENT(S): at 04:34

## 2019-07-15 RX ADMIN — HEPARIN SODIUM 1800 UNIT(S)/HR: 5000 INJECTION INTRAVENOUS; SUBCUTANEOUS at 19:56

## 2019-07-15 RX ADMIN — CHLORHEXIDINE GLUCONATE 15 MILLILITER(S): 213 SOLUTION TOPICAL at 06:59

## 2019-07-15 RX ADMIN — Medication 2: at 18:35

## 2019-07-15 RX ADMIN — Medication 25 MILLIGRAM(S): at 13:41

## 2019-07-15 RX ADMIN — Medication 1 PATCH: at 12:55

## 2019-07-15 RX ADMIN — Medication 3 UNIT(S): at 13:04

## 2019-07-15 RX ADMIN — Medication 100 GRAM(S): at 04:34

## 2019-07-15 NOTE — PROGRESS NOTE ADULT - ASSESSMENT
70 yo M w/ bilateral dusky toes and dry gangrene of b/l hallux  - Pt seen and evaluated   - Feet are cold, no CFT, dusky toes and dry stable gangrene to the bilateral hallux, no acute signs of infection  - Betadine applied to the dry gangrene  - 7/6 Art dulp: bilateral LE arterial and bypass occlusion  - Vasc plan to readdress amputation discussion w/ patient and pending cards/med clearance    - No emergent podiatry surgical intervention  - Foot does not appear to be the source of infection  - Patient stable for discharge from podiatry standpoint  - Please have patient to reconsult as needed  - Seen w/ attending 68 yo M w/ bilateral dusky toes and dry gangrene of b/l hallux  - Pt seen and evaluated   - Feet are cold, no CFT, dusky toes and dry stable gangrene to the bilateral hallux, no acute signs of infection  - Betadine applied to the dry gangrene  - 7/6 Art dulp: bilateral LE arterial and bypass occlusion  - Vasc plan to readdress amputation discussion w/ patient and pending cards/med clearance    - No pod surg intervention at this time  - Please reconsult as needed  - Seen w/ attending

## 2019-07-15 NOTE — PROGRESS NOTE ADULT - PROBLEM SELECTOR PLAN 1
Trops downtrended  - TTE with EF 20-25%, global systolic dysfunction  - c/w DAPT, IV heparin gtt, statin  - Started metoprolol 25mg PO BID (telemetry with NSVT)   - C cancelled secondary to lack of capacity.  - Follow up with cardiology re: medical optimization.   - Tentative plans by vasc for OR this week for intervention on LLE occluded vascular graft. Possible BKA vs Angio.

## 2019-07-15 NOTE — CHART NOTE - NSCHARTNOTEFT_GEN_A_CORE
Called to bedside to evaluated pt for 2 runs of NSVT (13 beats, then 22 beats). On my assessment, pt AOX1, NAD. Tele shows sinus rhythm in the 80s. Unable to meaningfully contribute. Denies pain.  Appears restless and attempting to get out of bed per 1:1.     Recs:  -Monitor and replete lytes (K>4, Mg >2)  -C/w DAPT, IV heparin gtt, statin  -Can start metoprolol tartrate 25 mg BID as BP permits

## 2019-07-15 NOTE — PROGRESS NOTE ADULT - PROBLEM SELECTOR PLAN 7
HgA1c 10.0, FS stable  - Lantus 20 units qHS  - Humalog 3 units TID with meals  - Increased to MSSI  - monitor FS  - Hypoglycemia protocol

## 2019-07-15 NOTE — PROGRESS NOTE ADULT - ASSESSMENT
69M w/ PMH of poor med compliance, DM type 2, HTN, PAD s/p recent surgery at [facility], transferred to [facility] CCU for NSTEMI with cardiogenic shock; course complicated by acute respiratory failure with hypoxia and septic shock secondary to suspected aspiration pneumonia requiring intubation (now extubated 7/8) and downgraded from CCU. Currently pending further evaluation by vascular surgery for LLE occluded vascular graft.

## 2019-07-15 NOTE — PROGRESS NOTE ADULT - SUBJECTIVE AND OBJECTIVE BOX
Kwame De La Cruz MD, MBA ([facility] Hospitalist)  Pager [phone]  (During off hours please page [phone])      Patient is a 69y old  Male who presents with a chief complaint of NSTEMI (15 Jul 2019 08:41)      SUBJECTIVE / OVERNIGHT EVENTS: No events overnight. C/o left leg pain. Remains on 1:1 observation for episodes of agitation, safety.     MEDICATIONS  (STANDING):  aspirin  chewable 81 milliGRAM(s) Oral daily  atorvastatin 80 milliGRAM(s) Oral at bedtime  chlorhexidine 0.12% Liquid 15 milliLiter(s) Oral Mucosa two times a day  clopidogrel Tablet 75 milliGRAM(s) Oral daily  furosemide   Injectable 40 milliGRAM(s) IV Push daily  heparin  Infusion. 1800 Unit(s)/Hr (18 mL/Hr) IV Continuous <Continuous>  insulin glargine Injectable (LANTUS) 20 Unit(s) SubCutaneous at bedtime  insulin lispro (HumaLOG) corrective regimen sliding scale   SubCutaneous Before meals and at bedtime  insulin lispro Injectable (HumaLOG) 3 Unit(s) SubCutaneous three times a day before meals  melatonin 3 milliGRAM(s) Oral at bedtime  metoprolol tartrate 25 milliGRAM(s) Oral two times a day  nicotine -   7 mG/24Hr(s) Patch 1 patch Transdermal daily    MEDICATIONS  (PRN):  acetaminophen   Tablet .. 650 milliGRAM(s) Oral every 6 hours PRN Temp greater or equal to 38C (100.4F), Mild Pain (1 - 3)      Vital Signs Last 24 Hrs  T(C): 36.9 (15 Jul 2019 08:25), Max: 37 (15 Jul 2019 05:08)  T(F): 98.4 (15 Jul 2019 08:25), Max: 98.6 (15 Jul 2019 05:08)  HR: 95 (15 Jul 2019 08:25) (90 - 95)  BP: 122/78 (15 Jul 2019 08:25) (103/67 - 122/78)  BP(mean): --  RR: 20 (15 Jul 2019 08:25) (19 - 20)  SpO2: 96% (15 Jul 2019 08:25) (95% - 96%)  CAPILLARY BLOOD GLUCOSE      POCT Blood Glucose.: 133 mg/dL (15 Jul 2019 12:44)  POCT Blood Glucose.: 260 mg/dL (15 Jul 2019 09:14)  POCT Blood Glucose.: 187 mg/dL (14 Jul 2019 21:36)    I&O's Summary    14 Jul 2019 07:01  -  15 Jul 2019 07:00  --------------------------------------------------------  IN: 676 mL / OUT: 50 mL / NET: 626 mL        PHYSICAL EXAM:  GENERAL: NAD, well-developed  HEAD:  Atraumatic, Normocephalic  EYES: EOMI, conjunctiva and sclera clear  NECK: Supple, No JVD  CHEST/LUNG: Clear to auscultation bilaterally; No wheeze  HEART: Regular rate and rhythm; No murmurs, rubs, or gallops  ABDOMEN: Soft, Nontender, Nondistended; Bowel sounds present  EXTREMITIES:  LLE surg wound with staples. cold bilateral LE with dusky toes.   Dry gangrene to the left hallux and dry eschar to the distal right hallux.  PSYCH: AAOx1    LABS:                        9.9    14.8  )-----------( 417      ( 15 Jul 2019 17:38 )             30.1     07-15    138  |  101  |  18  ----------------------------<  216<H>  3.4<L>   |  23  |  0.89    Ca    8.6      15 Jul 2019 02:28  Phos  2.7     07-15  Mg     1.7     07-15    TPro  6.6  /  Alb  2.7<L>  /  TBili  0.6  /  DBili  0.2  /  AST  21  /  ALT  69<H>  /  AlkPhos  172<H>  07-15    PTT - ( 15 Jul 2019 17:38 )  PTT:94.3 sec          RADIOLOGY & ADDITIONAL TESTS:    Consultant(s) Notes Reviewed: Cardiology, vascular, podiatry    Care Discussed with Consultants/Other Providers: cardiology. Dr Carin moulton cardiac ischemia, Vascular re plan of care

## 2019-07-15 NOTE — PROGRESS NOTE ADULT - SUBJECTIVE AND OBJECTIVE BOX
VACULAR SURGERY DAILY PROGRESS NOTE:       Subjective:  Patient seen and examined on AM rounds. No acute events overnight. Requiring 1:1 supervision overnight. AF/VSS.       Objective:    PHYSICAL EXAM:  General: NAD, resting, not interactive.   Neuro: A&O x 1   Resp: non labored breathing   CV: Normal sinus rhythm  Ext: RLE: cool, no dopplerable signals, discoloration of toes, LLE: cool, no dopplerable signals, discoloration of toes with tissue loss of 1st toe, leg dressing c/d/i    Vital Signs Last 24 Hrs  T(C): 37 (15 Jul 2019 05:08), Max: 37.1 (14 Jul 2019 16:44)  T(F): 98.6 (15 Jul 2019 05:08), Max: 98.8 (14 Jul 2019 16:44)  HR: 93 (15 Jul 2019 05:08) (82 - 93)  BP: 103/67 (15 Jul 2019 05:08) (103/67 - 122/68)  BP(mean): --  RR: 19 (15 Jul 2019 05:08) (19 - 20)  SpO2: 96% (15 Jul 2019 05:08) (95% - 100%)    I&O's Detail    13 Jul 2019 07:01  -  14 Jul 2019 07:00  --------------------------------------------------------  IN:    heparin  Infusion.: 198 mL    Oral Fluid: 480 mL  Total IN: 678 mL    OUT:    Voided: 200 mL  Total OUT: 200 mL    Total NET: 478 mL      14 Jul 2019 07:01  -  15 Jul 2019 06:50  --------------------------------------------------------  IN:    Oral Fluid: 360 mL  Total IN: 360 mL    OUT:    Voided: 50 mL  Total OUT: 50 mL    Total NET: 310 mL          Daily     Daily     MEDICATIONS  (STANDING):  aspirin  chewable 81 milliGRAM(s) Oral daily  atorvastatin 80 milliGRAM(s) Oral at bedtime  chlorhexidine 0.12% Liquid 15 milliLiter(s) Oral Mucosa two times a day  clopidogrel Tablet 75 milliGRAM(s) Oral daily  furosemide   Injectable 40 milliGRAM(s) IV Push daily  heparin  Infusion. 1800 Unit(s)/Hr (18 mL/Hr) IV Continuous <Continuous>  insulin glargine Injectable (LANTUS) 20 Unit(s) SubCutaneous at bedtime  insulin lispro (HumaLOG) corrective regimen sliding scale   SubCutaneous Before meals and at bedtime  insulin lispro Injectable (HumaLOG) 3 Unit(s) SubCutaneous three times a day before meals  melatonin 3 milliGRAM(s) Oral at bedtime  nicotine -   7 mG/24Hr(s) Patch 1 patch Transdermal daily    MEDICATIONS  (PRN):  acetaminophen   Tablet .. 650 milliGRAM(s) Oral every 6 hours PRN Temp greater or equal to 38C (100.4F), Mild Pain (1 - 3)      LABS:                        9.6    10.75 )-----------( 415      ( 14 Jul 2019 15:00 )             30.1     07-15    138  |  101  |  18  ----------------------------<  216<H>  3.4<L>   |  23  |  0.89    Ca    8.6      15 Jul 2019 02:28  Phos  2.7     07-15  Mg     1.7     07-15    TPro  6.6  /  Alb  2.7<L>  /  TBili  0.6  /  DBili  0.2  /  AST  21  /  ALT  69<H>  /  AlkPhos  172<H>  07-15    PTT - ( 14 Jul 2019 10:59 )  PTT:72.1 sec      RADIOLOGY & ADDITIONAL STUDIES:

## 2019-07-15 NOTE — PROGRESS NOTE ADULT - SUBJECTIVE AND OBJECTIVE BOX
SUBJ:  No acute events overnight or throughout the day. Denies chest pain and shortness of breath. Tentative plans for lower extremity amputation (toe versus BKA). I discussed the possibility of surgical intervention and he was initially refusing. However, I discussed with him his condition and the consequences of not intervening on his gangrenous foot, at which time he seemed more receptive with a bit of explaining.     MEDICATIONS  (STANDING):  aspirin  chewable 81 milliGRAM(s) Oral daily  atorvastatin 80 milliGRAM(s) Oral at bedtime  chlorhexidine 0.12% Liquid 15 milliLiter(s) Oral Mucosa two times a day  clopidogrel Tablet 75 milliGRAM(s) Oral daily  furosemide   Injectable 40 milliGRAM(s) IV Push daily  heparin  Infusion. 1800 Unit(s)/Hr (18 mL/Hr) IV Continuous <Continuous>  insulin glargine Injectable (LANTUS) 20 Unit(s) SubCutaneous at bedtime  insulin lispro (HumaLOG) corrective regimen sliding scale   SubCutaneous Before meals and at bedtime  insulin lispro Injectable (HumaLOG) 3 Unit(s) SubCutaneous three times a day before meals  lisinopril 2.5 milliGRAM(s) Oral daily  melatonin 3 milliGRAM(s) Oral at bedtime  metoprolol tartrate 25 milliGRAM(s) Oral two times a day  nicotine -   7 mG/24Hr(s) Patch 1 patch Transdermal daily    MEDICATIONS  (PRN):  acetaminophen   Tablet .. 650 milliGRAM(s) Oral every 6 hours PRN Temp greater or equal to 38C (100.4F), Mild Pain (1 - 3)    Vital Signs Last 24 Hrs  T(C): 36.9 (15 Jul 2019 21:18), Max: 36.9 (15 Jul 2019 08:25)  T(F): 98.5 (15 Jul 2019 21:18), Max: 98.5 (15 Jul 2019 21:18)  HR: 101 (15 Jul 2019 21:18) (95 - 101)  BP: 118/76 (15 Jul 2019 21:18) (118/76 - 122/78)  RR: 18 (15 Jul 2019 21:18) (18 - 20)  SpO2: 94% (15 Jul 2019 21:18) (94% - 96%)    REVIEW OF SYSTEMS:  As per HPI, otherwise unremarkable.     PHYSICAL EXAM:  · CONSTITUTIONAL: Well-developed  · EYES: no drainage or redness  · NECK: Supple  ·RESPIRATORY: airway patent; good air movement; respirations non-labored; clear to auscultation bilaterally  · CARDIOVASCULAR: regular rate and rhythm   . GASTROINTESTINAL:  no distention  · EXTREMITIES: No cyanosis, clubbing, edema.   · VASCULAR:  Equal and normal pulses (radial)  ·NEUROLOGICAL:  Alert and oriented x 3  · SKIN: No lesions; no rash.  . LYMPH NODES: No lymphadedenopathy  · MUSCULOSKELETAL:  No calf tenderness. Lower extremity pain with walking.     TELEMETRY: 7/15/2019  NSR     TTE: 7/6/2019  Conclusions:  1. Mild mitral regurgitation.  2. Moderately dilated left atrium.  LA volume index = 47  cc/m2.  The interatrial setpum is bowed towards the right  suggesting elevated left atrial pressure.  3. The left ventricle is moderately dilated.  4. There is global hypokineiss with minor regional  variation.  The LVEF about 20-25%.  5. Normal right ventricular size and function.  6. Normal tricuspid valve. No tricuspid regurgitation.  7. There is no pericardial effusion.  8. There is a left pleural effusion.  There are no prior studies available for comparison.    Lower extremity vascular doppler 7/7/2019  Impression:  On the right, there is a severe flow-limiting stenosis of the popliteal   artery and the trifurcation arteries are occluded in the calf.    On the left, the bypass graft is occluded, the popliteal artery is   occluded and the trifurcation arteries are occluded.    LABS:   CBC Full  -  ( 15 Jul 2019 17:38 )  WBC Count : 14.8 K/uL  RBC Count : 3.23 M/uL  Hemoglobin : 9.9 g/dL  Hematocrit : 30.1 %  Platelet Count - Automated : 417 K/uL  Mean Cell Volume : 93.2 fl  Mean Cell Hemoglobin : 30.6 pg  Mean Cell Hemoglobin Concentration : 32.8 gm/dL    07-15    138  |  101  |  18  ----------------------------<  216<H>  3.4<L>   |  23  |  0.89    Ca    8.6      15 Jul 2019 02:28  Phos  2.7     07-15  Mg     1.7     07-15    TPro  6.6  /  Alb  2.7<L>  /  TBili  0.6  /  DBili  0.2  /  AST  21  /  ALT  69<H>  /  AlkPhos  172<H>  07-15    IMPRESSION AND PLAN:  68 y/o male with past medical hsitory of HTN, DM, PAD presenting with cardiogenic shock in the setting of decompensated CHF (EF 20-25%) and NSTEMI.     1) Systolic CHF  Acute, new diagnosis   Likely ischemic given extensive risk factors including DM, HTN.  EF 20-25%  Symptomatic on presentation with dyspnea, and volume overloaded; eu-volemic after diuresis.  BNP 5792 (7/6/2019)    ·	Continue medical management with lisinopril 2.5 mg daily.  ·	Increase metoprolol tartrate to 50 mg BID. (Ballpark target HR <70)  ·	Diuresis with IV furosemide 40 mg daily.   ·	Order BNP to get a value near baseline for reference.   ·	Ischemic workup pending (outpatient stress testing versus cardiac cath) given his lack of medical capacity, but he is currently tolerating guideline directed medical treatment for both CAD and CHF.     2) CAD  Risk factors: DM, HTN   hs-troponin peaked 3194  ECG NSR, LBBB.  ECHO EF 20-25%.   Asymptomatic without chest pain.     ·	Continue medical management with aspirin 81 mg daily, clopidogrel 75 mg daily, lisinopril 2.5 mg daily, metoprolol tartrate 25 mg BID, atorvastatin 80 mg nightly.   ·	Pending workup as explained above.     3) HLD   DM/CAD; statin benefit groups.     ·	Continue medical management with atorvastatin 80 mg nightly.     4) HTN   Well controlled.     ·	Continue medical management with lisinopril 2.5 mg daily.     5) Pre-operative Optimization   RCRI 3; 15% 30 day risk of MI, cardiac arrest, or death.   Intermediate risk operation   METS <4  No absolute contraindications including active chest pain, decompensated heart failure, and/or life-threatening arrythmia.   ECHO 20-25%.   No medical decision making capacity; family involved in decisions.     ·	No additional cardiac testing indicated at this time. Noted ECG and ECG. Ischemic evaluation appropriate at some point in the future; however, if he needs a stent, lower extremity amputation will likely need to be delayed at least a month to tolerate monotherapy anti-platelet regimen and undergo surgical amputation. That being said, he has worsening leukocytosis, high risk of sepsis, inability to care for self, etc, making this operation more urgent that elective. The CHF is near compensated and is on appropriate CAD management with overall acceptable optimization to consider proceeding.   ·	Very high risk for delphine-operative complication given risk factors and recent clinical history. But as noted, his medical conditions are near optimized and proceeding with surgiical intervention should be a joint decision, understanding the high risk that cannot be further reduced at this time.     Carin Del Rosario MD, MPH, JOSUE  Cardiovascular Specialist Attending  [facility]  C: [phone]  E: [email or web address]  (Cardiology nocturnist available every night 7 pm - 7 am; available week days for follow-up; general cardiology consult coverage weekend days)

## 2019-07-15 NOTE — PROGRESS NOTE ADULT - ASSESSMENT
70 y/o M with a PMH of HTN, DM, PAD, s/p LLE fem-pop bypass with vein graft who is transferred to [facility] for management of cardiogenic shock and NSTEMI, vascular surgery consulted for no dopplerable signals    - Continue to watch for demarcation of b/l lower extremities   - Will continue to follow and re-address amputation discussion at later time   - Tentatively plan for OR this week pending patient/family plan  - Patient will need documented cardiac/medicine clearance before OR  - Duplex reviewed   - Continue Hep ggt  - Care per primary team    Vacular Surgery   [id number]

## 2019-07-15 NOTE — PROGRESS NOTE ADULT - PROBLEM SELECTOR PLAN 2
Clinically euvolemic  - TTE with EF 20-25%, global systolic dysfunction, mildly hypervolemic with b/l pleural effusions  - c/w IV lasix 40mg IV daily  - VINAY resolved (likely had ATN)  - Start Lisinopril 2.5mg daily   - cards following  - strict I and O, daily weights

## 2019-07-15 NOTE — PROGRESS NOTE ADULT - SUBJECTIVE AND OBJECTIVE BOX
Patient is a 69y old  Male who presents with a chief complaint of NSTEMI (15 Jul 2019 06:49)       INTERVAL HPI/OVERNIGHT EVENTS:  Patient seen and evaluated at bedside.  Pt is resting comfortable in NAD. Denies N/V/F/C.      Allergies    No Known Allergies    Intolerances        Vital Signs Last 24 Hrs  T(C): 36.9 (15 Jul 2019 08:25), Max: 37.1 (14 Jul 2019 16:44)  T(F): 98.4 (15 Jul 2019 08:25), Max: 98.8 (14 Jul 2019 16:44)  HR: 95 (15 Jul 2019 08:25) (82 - 95)  BP: 122/78 (15 Jul 2019 08:25) (103/67 - 122/78)  BP(mean): --  RR: 20 (15 Jul 2019 08:25) (19 - 20)  SpO2: 96% (15 Jul 2019 08:25) (95% - 100%)    LABS:                        9.6    10.75 )-----------( 415      ( 14 Jul 2019 15:00 )             30.1     07-15    138  |  101  |  18  ----------------------------<  216<H>  3.4<L>   |  23  |  0.89    Ca    8.6      15 Jul 2019 02:28  Phos  2.7     07-15  Mg     1.7     07-15    TPro  6.6  /  Alb  2.7<L>  /  TBili  0.6  /  DBili  0.2  /  AST  21  /  ALT  69<H>  /  AlkPhos  172<H>  07-15    PTT - ( 14 Jul 2019 10:59 )  PTT:72.1 sec    CAPILLARY BLOOD GLUCOSE      POCT Blood Glucose.: 187 mg/dL (14 Jul 2019 21:36)  POCT Blood Glucose.: 147 mg/dL (14 Jul 2019 16:44)  POCT Blood Glucose.: 289 mg/dL (14 Jul 2019 12:02)      Lower Extremity Physical Exam:  Vascular: DP/PT 0/4, B/L, CFT not appreciated bl, Temperature gradient cold, B/L.   Neuro: Epicritic sensation diminished to the level of digits, B/L.  Musculoskeletal/Ortho: contracted digits b/l  Skin: cold bilateral LE with dusky toes. Dry gangrene to the left hallux and dry eschar to the distal right hallux. No active signs of infection   Etiology: arterial     RADIOLOGY & ADDITIONAL TESTS:

## 2019-07-15 NOTE — PROGRESS NOTE ADULT - PROBLEM SELECTOR PLAN 5
but possibly pneumonitis.   - Completed 2 day course of Zosyn (anbx stopped 7/13)  - MBS done with dysphagia diet rec.   - Aspiration precautions.

## 2019-07-15 NOTE — CHART NOTE - NSCHARTNOTEFT_GEN_A_CORE
MEDICINE NP    JIM PAREDES  69y Male    Patient is a 69y old  Male who presents with a chief complaint of NSTEMI (14 Jul 2019 09:19)       > Event Summary:  Notified by RN, Patient with 13BWCT, and 22BWCT on Tele.  RN reports during episode of arrythmia, x1 tele monitor lead was off.  Patient seen at bedside, AOX1, NAD.  Unable to contribute. Denies pain.    +Restless and attempting to get out of bed reported by 1:1 .          -Vital Signs Last 24 Hrs  T(C): 36.9 (15 Jul 2019 00:48), Max: 37.1 (14 Jul 2019 16:44)  T(F): 98.4 (15 Jul 2019 00:48), Max: 98.8 (14 Jul 2019 16:44)  HR: 91 (15 Jul 2019 00:48) (81 - 91)  BP: 110/64 (15 Jul 2019 00:48) (105/60 - 122/68)  RR: 20 (15 Jul 2019 00:48) (19 - 20)  SpO2: 95% (15 Jul 2019 00:48) (95% - 100%)    >Assessment & Plan:  - HPI: 70 y/o M with a PMH of HTN, DM, PAD, s/p LLE fem-pop bypass with vein Graft @[facility] c/b NSTEMI.  Transferred to [facility] for management of Cardiogenic Shock and NSTEMI;  complicated by Acute Hypoxemic Respiratory Failure 2/2 suspected Aspiration PNA requiring intubation now extubated 7/8 downgraded from CCU, acute on chronic CHF on iv lasix. Now with recurrent arrythmia on Tele.    1) Arrythmia -13beats & 22 beats WCT  : Concerning for NST  -STAT ECG : SR w/ LBBB, 93bpm, ?inc T-wave V3,V4.   -f/u STAT BMP /Lytes  -D/w Dr. Shearer, cardiology fellow and awaiting recommendations.       Grace Lambert, MAYNOR-BC  Medicine Department  #[id number] MEDICINE NP    JIM PAREDES  69y Male    Patient is a 69y old  Male who presents with a chief complaint of NSTEMI (14 Jul 2019 09:19)       > Event Summary:  Notified by RN, Patient with 13BWCT, and 22BWCT on Tele.  RN reports during episode of arrythmia, x1 tele monitor lead was off.  Patient seen at bedside, AOX1, NAD.  Unable to contribute. Denies pain.    +Restless and attempting to get out of bed reported by 1:1 .          -Vital Signs Last 24 Hrs  T(C): 36.9 (15 Jul 2019 00:48), Max: 37.1 (14 Jul 2019 16:44)  T(F): 98.4 (15 Jul 2019 00:48), Max: 98.8 (14 Jul 2019 16:44)  HR: 91 (15 Jul 2019 00:48) (81 - 91)  BP: 110/64 (15 Jul 2019 00:48) (105/60 - 122/68)  RR: 20 (15 Jul 2019 00:48) (19 - 20)  SpO2: 95% (15 Jul 2019 00:48) (95% - 100%)    >Assessment & Plan:  - HPI: 68 y/o M with a PMH of HTN, DM, PAD, s/p LLE fem-pop bypass with vein Graft @[facility] c/b NSTEMI.  Transferred to [facility] for management of Cardiogenic Shock and NSTEMI;  complicated by Acute Hypoxemic Respiratory Failure 2/2 suspected Aspiration PNA requiring intubation now extubated 7/8 downgraded from CCU, acute on chronic CHF on iv lasix. Now with recurrent arrythmia on Tele.    1) Arrythmia -13beats & 22 beats WCT  : Concerning for NST  -STAT ECG : SR w/ LBBB, 93bpm, ?inc T-wave V3,V4. Qt/QTc 420/522  -f/u STAT BMP /Lytes  -D/w Dr. Shearer, cardiology fellow and awaiting recommendations.     2) Prolonged Qtc 522  -Qtc uptrending from 510 to 522  C/w holding Haldol and Seroquel  -C/w Constant Observation       Grace Lambert, [facility]-BC  Medicine Department  #[id number]

## 2019-07-15 NOTE — PROGRESS NOTE ADULT - PROBLEM SELECTOR PLAN 3
PAD s/p recent R Leg surgery at [facility]  -VA duplex of b/l LE: 07/06: On the right, there is a severe flow-limiting stenosis of the popliteal artery and the trifurcation arteries are occluded in the calf.  On the left, the bypass graft is occluded, the popliteal artery is occluded and the trifurcation arteries are occluded.  - Vascular surgery and podiatry plan noted, Possible amputation needed. Input greatly appreciated.   - c/w DAPT, IV heparin gtt, statin

## 2019-07-16 DIAGNOSIS — R41.0 DISORIENTATION, UNSPECIFIED: ICD-10-CM

## 2019-07-16 LAB
ALBUMIN SERPL ELPH-MCNC: 3 G/DL — LOW (ref 3.3–5)
ALP SERPL-CCNC: 182 U/L — HIGH (ref 40–120)
ALT FLD-CCNC: 57 U/L — HIGH (ref 10–45)
ANION GAP SERPL CALC-SCNC: 13 MMOL/L — SIGNIFICANT CHANGE UP (ref 5–17)
APTT BLD: 80.2 SEC — HIGH (ref 27.5–36.3)
AST SERPL-CCNC: 15 U/L — SIGNIFICANT CHANGE UP (ref 10–40)
BASOPHILS # BLD AUTO: 0.02 K/UL — SIGNIFICANT CHANGE UP (ref 0–0.2)
BASOPHILS NFR BLD AUTO: 0.1 % — SIGNIFICANT CHANGE UP (ref 0–2)
BILIRUB SERPL-MCNC: 0.7 MG/DL — SIGNIFICANT CHANGE UP (ref 0.2–1.2)
BUN SERPL-MCNC: 20 MG/DL — SIGNIFICANT CHANGE UP (ref 7–23)
CALCIUM SERPL-MCNC: 9.1 MG/DL — SIGNIFICANT CHANGE UP (ref 8.4–10.5)
CHLORIDE SERPL-SCNC: 106 MMOL/L — SIGNIFICANT CHANGE UP (ref 96–108)
CK MB CFR SERPL CALC: 2.3 NG/ML — SIGNIFICANT CHANGE UP (ref 0–6.7)
CK SERPL-CCNC: 68 U/L — SIGNIFICANT CHANGE UP (ref 30–200)
CO2 SERPL-SCNC: 22 MMOL/L — SIGNIFICANT CHANGE UP (ref 22–31)
CREAT SERPL-MCNC: 1.04 MG/DL — SIGNIFICANT CHANGE UP (ref 0.5–1.3)
EOSINOPHIL # BLD AUTO: 0 K/UL — SIGNIFICANT CHANGE UP (ref 0–0.5)
EOSINOPHIL NFR BLD AUTO: 0 % — SIGNIFICANT CHANGE UP (ref 0–6)
GLUCOSE BLDC GLUCOMTR-MCNC: 158 MG/DL — HIGH (ref 70–99)
GLUCOSE BLDC GLUCOMTR-MCNC: 167 MG/DL — HIGH (ref 70–99)
GLUCOSE BLDC GLUCOMTR-MCNC: 185 MG/DL — HIGH (ref 70–99)
GLUCOSE BLDC GLUCOMTR-MCNC: 201 MG/DL — HIGH (ref 70–99)
GLUCOSE SERPL-MCNC: 176 MG/DL — HIGH (ref 70–99)
HCT VFR BLD CALC: 29.9 % — LOW (ref 39–50)
HGB BLD-MCNC: 9.5 G/DL — LOW (ref 13–17)
IMM GRANULOCYTES NFR BLD AUTO: 0.5 % — SIGNIFICANT CHANGE UP (ref 0–1.5)
LYMPHOCYTES # BLD AUTO: 15.5 % — SIGNIFICANT CHANGE UP (ref 13–44)
LYMPHOCYTES # BLD AUTO: 2.26 K/UL — SIGNIFICANT CHANGE UP (ref 1–3.3)
MAGNESIUM SERPL-MCNC: 2.1 MG/DL — SIGNIFICANT CHANGE UP (ref 1.6–2.6)
MCHC RBC-ENTMCNC: 30.2 PG — SIGNIFICANT CHANGE UP (ref 27–34)
MCHC RBC-ENTMCNC: 31.8 GM/DL — LOW (ref 32–36)
MCV RBC AUTO: 94.9 FL — SIGNIFICANT CHANGE UP (ref 80–100)
MONOCYTES # BLD AUTO: 1.14 K/UL — HIGH (ref 0–0.9)
MONOCYTES NFR BLD AUTO: 7.8 % — SIGNIFICANT CHANGE UP (ref 2–14)
NEUTROPHILS # BLD AUTO: 11.11 K/UL — HIGH (ref 1.8–7.4)
NEUTROPHILS NFR BLD AUTO: 76.1 % — SIGNIFICANT CHANGE UP (ref 43–77)
PHOSPHATE SERPL-MCNC: 3.3 MG/DL — SIGNIFICANT CHANGE UP (ref 2.5–4.5)
PLATELET # BLD AUTO: 478 K/UL — HIGH (ref 150–400)
POTASSIUM SERPL-MCNC: 3.5 MMOL/L — SIGNIFICANT CHANGE UP (ref 3.5–5.3)
POTASSIUM SERPL-SCNC: 3.5 MMOL/L — SIGNIFICANT CHANGE UP (ref 3.5–5.3)
PROT SERPL-MCNC: 7.3 G/DL — SIGNIFICANT CHANGE UP (ref 6–8.3)
RBC # BLD: 3.15 M/UL — LOW (ref 4.2–5.8)
RBC # FLD: 13.2 % — SIGNIFICANT CHANGE UP (ref 10.3–14.5)
SODIUM SERPL-SCNC: 141 MMOL/L — SIGNIFICANT CHANGE UP (ref 135–145)
TROPONIN T, HIGH SENSITIVITY RESULT: 1075 NG/L — HIGH (ref 0–51)
WBC # BLD: 14.61 K/UL — HIGH (ref 3.8–10.5)
WBC # FLD AUTO: 14.61 K/UL — HIGH (ref 3.8–10.5)

## 2019-07-16 PROCEDURE — 99223 1ST HOSP IP/OBS HIGH 75: CPT

## 2019-07-16 PROCEDURE — 99233 SBSQ HOSP IP/OBS HIGH 50: CPT

## 2019-07-16 PROCEDURE — 71045 X-RAY EXAM CHEST 1 VIEW: CPT | Mod: 26

## 2019-07-16 RX ORDER — ACETAMINOPHEN 500 MG
1000 TABLET ORAL ONCE
Refills: 0 | Status: COMPLETED | OUTPATIENT
Start: 2019-07-16 | End: 2019-07-16

## 2019-07-16 RX ORDER — POTASSIUM CHLORIDE 20 MEQ
40 PACKET (EA) ORAL ONCE
Refills: 0 | Status: COMPLETED | OUTPATIENT
Start: 2019-07-16 | End: 2019-07-16

## 2019-07-16 RX ORDER — VALPROIC ACID (AS SODIUM SALT) 250 MG/5ML
250 SOLUTION, ORAL ORAL
Refills: 0 | Status: DISCONTINUED | OUTPATIENT
Start: 2019-07-16 | End: 2019-07-22

## 2019-07-16 RX ORDER — DIVALPROEX SODIUM 500 MG/1
250 TABLET, DELAYED RELEASE ORAL
Refills: 0 | Status: DISCONTINUED | OUTPATIENT
Start: 2019-07-16 | End: 2019-07-22

## 2019-07-16 RX ADMIN — Medication 400 MILLIGRAM(S): at 07:07

## 2019-07-16 RX ADMIN — Medication 25 MILLIGRAM(S): at 12:23

## 2019-07-16 RX ADMIN — CLOPIDOGREL BISULFATE 75 MILLIGRAM(S): 75 TABLET, FILM COATED ORAL at 12:24

## 2019-07-16 RX ADMIN — INSULIN GLARGINE 20 UNIT(S): 100 INJECTION, SOLUTION SUBCUTANEOUS at 21:42

## 2019-07-16 RX ADMIN — Medication 1 PATCH: at 12:35

## 2019-07-16 RX ADMIN — Medication 3 UNIT(S): at 17:53

## 2019-07-16 RX ADMIN — Medication 2: at 12:24

## 2019-07-16 RX ADMIN — ATORVASTATIN CALCIUM 80 MILLIGRAM(S): 80 TABLET, FILM COATED ORAL at 21:45

## 2019-07-16 RX ADMIN — CHLORHEXIDINE GLUCONATE 15 MILLILITER(S): 213 SOLUTION TOPICAL at 17:54

## 2019-07-16 RX ADMIN — Medication 81 MILLIGRAM(S): at 12:23

## 2019-07-16 RX ADMIN — HEPARIN SODIUM 1800 UNIT(S)/HR: 5000 INJECTION INTRAVENOUS; SUBCUTANEOUS at 02:25

## 2019-07-16 RX ADMIN — Medication 1 PATCH: at 21:50

## 2019-07-16 RX ADMIN — Medication 25 MILLIGRAM(S): at 17:55

## 2019-07-16 RX ADMIN — Medication 1000 MILLIGRAM(S): at 07:37

## 2019-07-16 RX ADMIN — Medication 2: at 17:53

## 2019-07-16 RX ADMIN — Medication 1 PATCH: at 13:38

## 2019-07-16 RX ADMIN — Medication 40 MILLIGRAM(S): at 06:56

## 2019-07-16 RX ADMIN — Medication 2: at 21:45

## 2019-07-16 RX ADMIN — Medication 3 MILLIGRAM(S): at 21:45

## 2019-07-16 RX ADMIN — DIVALPROEX SODIUM 250 MILLIGRAM(S): 500 TABLET, DELAYED RELEASE ORAL at 17:53

## 2019-07-16 NOTE — PROGRESS NOTE ADULT - SUBJECTIVE AND OBJECTIVE BOX
Kwame De La Cruz MD, MBA ([facility] Hospitalist)  Pager [phone]  (During off hours please page [phone])      Patient is a 69y old  Male who presents with a chief complaint of NSTEMI (15 Jul 2019 23:24)      SUBJECTIVE / OVERNIGHT EVENTS: The patient was agitated earlier. Still on 1:1 observation. Currently sleeping, no acute distress.     MEDICATIONS  (STANDING):  aspirin  chewable 81 milliGRAM(s) Oral daily  atorvastatin 80 milliGRAM(s) Oral at bedtime  chlorhexidine 0.12% Liquid 15 milliLiter(s) Oral Mucosa two times a day  clopidogrel Tablet 75 milliGRAM(s) Oral daily  furosemide   Injectable 40 milliGRAM(s) IV Push daily  heparin  Infusion. 1800 Unit(s)/Hr (18 mL/Hr) IV Continuous <Continuous>  insulin glargine Injectable (LANTUS) 20 Unit(s) SubCutaneous at bedtime  insulin lispro (HumaLOG) corrective regimen sliding scale   SubCutaneous Before meals and at bedtime  insulin lispro Injectable (HumaLOG) 3 Unit(s) SubCutaneous three times a day before meals  lisinopril 2.5 milliGRAM(s) Oral daily  melatonin 3 milliGRAM(s) Oral at bedtime  metoprolol tartrate 25 milliGRAM(s) Oral two times a day  nicotine -   7 mG/24Hr(s) Patch 1 patch Transdermal daily    MEDICATIONS  (PRN):  acetaminophen   Tablet .. 650 milliGRAM(s) Oral every 6 hours PRN Temp greater or equal to 38C (100.4F), Mild Pain (1 - 3)      Vital Signs Last 24 Hrs  T(C): 36.6 (16 Jul 2019 11:03), Max: 36.9 (15 Jul 2019 21:18)  T(F): 97.9 (16 Jul 2019 11:03), Max: 98.5 (15 Jul 2019 21:18)  HR: 88 (16 Jul 2019 11:03) (86 - 117)  BP: 101/61 (16 Jul 2019 11:03) (101/61 - 123/81)  BP(mean): --  RR: 18 (16 Jul 2019 11:03) (18 - 20)  SpO2: 97% (16 Jul 2019 11:03) (94% - 97%)    CAPILLARY BLOOD GLUCOSE  POCT Blood Glucose.: 185 mg/dL (16 Jul 2019 12:06)  POCT Blood Glucose.: 201 mg/dL (16 Jul 2019 10:19)  POCT Blood Glucose.: 173 mg/dL (15 Jul 2019 20:58)  POCT Blood Glucose.: 168 mg/dL (15 Jul 2019 18:22)  POCT Blood Glucose.: 133 mg/dL (15 Jul 2019 12:44)    I&O's Summary    15 Jul 2019 07:01  -  16 Jul 2019 07:00  --------------------------------------------------------  IN: 126 mL / OUT: 0 mL / NET: 126 mL        PHYSICAL EXAM:  GENERAL: NAD, well-developed  HEAD:  Atraumatic, Normocephalic  EYES: EOMI, conjunctiva and sclera clear  NECK: Supple, No JVD  CHEST/LUNG: Clear to auscultation bilaterally; No wheeze  HEART: Regular rate and rhythm; No murmurs, rubs, or gallops  ABDOMEN: Soft, Nontender, Nondistended; Bowel sounds present  EXTREMITIES:  LLE surg wound with staples. cold bilateral LE with dusky toes.   Dry gangrene to the left hallux and dry eschar to the distal right hallux.  PSYCH: AAOx1    LABS:                        9.5    14.61 )-----------( 478      ( 16 Jul 2019 09:15 )             29.9     07-16    141  |  106  |  20  ----------------------------<  176<H>  3.5   |  22  |  1.04    Ca    9.1      16 Jul 2019 06:00  Phos  3.3     07-16  Mg     2.1     07-16    TPro  7.3  /  Alb  3.0<L>  /  TBili  0.7  /  DBili  x   /  AST  15  /  ALT  57<H>  /  AlkPhos  182<H>  07-16    PTT - ( 16 Jul 2019 02:00 )  PTT:80.2 sec  CARDIAC MARKERS ( 16 Jul 2019 06:00 )  x     / x     / 68 U/L / x     / 2.3 ng/mL          RADIOLOGY & ADDITIONAL TESTS:    Consultant(s) Notes Reviewed: Cardiology, Podiatry, Vascular    Care Discussed with Consultants/Other Providers: Covering NP

## 2019-07-16 NOTE — BEHAVIORAL HEALTH ASSESSMENT NOTE - NSBHCHARTREVIEWLAB_PSY_A_CORE FT
.  LABS:                         9.5    14.61 )-----------( 478      ( 16 Jul 2019 09:15 )             29.9     07-16    141  |  106  |  20  ----------------------------<  176<H>  3.5   |  22  |  1.04    Ca    9.1      16 Jul 2019 06:00  Phos  3.3     07-16  Mg     2.1     07-16    TPro  7.3  /  Alb  3.0<L>  /  TBili  0.7  /  DBili  x   /  AST  15  /  ALT  57<H>  /  AlkPhos  182<H>  07-16    PTT - ( 16 Jul 2019 02:00 )  PTT:80.2 sec    CARDIAC MARKERS ( 16 Jul 2019 06:00 )  x     / x     / 68 U/L / x     / 2.3 ng/mL

## 2019-07-16 NOTE — PROGRESS NOTE ADULT - PROBLEM SELECTOR PLAN 6
1:1 observation. Consulting psychiatry today.   - Held Seroquel and Haldol given QTC> 500 ms.   - melatonin 3mg qHS  - Repeat EKG daily in AM.

## 2019-07-16 NOTE — PROGRESS NOTE ADULT - PROBLEM SELECTOR PLAN 2
- TTE with EF 20-25%, global systolic dysfunction  - Mildly hypervolemic with b/l pleural effusions  - c/w IV lasix 40mg IV daily  - VINAY resolved (likely had ATN)  - on 7/16 started Lisinopril 2.5mg daily, monitor renal function   - Cardiology following  - Strict I and O, daily weights

## 2019-07-16 NOTE — BEHAVIORAL HEALTH ASSESSMENT NOTE - RISK ASSESSMENT
Risk factors: +agitation  Protective factors: no current SIIP/HIIP,    Overall, pt is a low risk of harm to self/others and does not require psychiatric admission for safety and stabilization.

## 2019-07-16 NOTE — PROGRESS NOTE ADULT - PROBLEM SELECTOR PLAN 7
HgA1c 10.0, FS stable  - Lantus 20 units qHS  - Humalog 3 units TID with meals  - monitor FS  - Hypoglycemia protocol

## 2019-07-16 NOTE — BEHAVIORAL HEALTH ASSESSMENT NOTE - SUMMARY
Patient is a 69 year old M, no known PPH, PMH of HTN, DM, PAD, admitted for cardiogenic shock management w/ NSTEMI, course complicated by acute respiratory failure with hypoxia and septic shock secondary to suspected aspiration pneumonia requiring intubation (now extubated 7/8) and downgraded from CCU, currently pending further evaluation by vascular surgery for LLE occluded vascular graft, psychiatry consulted for agitation.    Given patient's acute mental status change and increased agitation in the context of elevated QTC, consider giving patient Depakote 250mg PO BID, with 250mg Q12 IV PRN for agitation. If patient is sedated, would consider lowering dose to 125mg PO BID.

## 2019-07-16 NOTE — CHART NOTE - NSCHARTNOTEFT_GEN_A_CORE
MEDICINE NP    JIM PAREDES  69y Male    Patient is a 69y old  Male who presents with a chief complaint of NSTEMI (15 Jul 2019 18:23)       > Event Summary:  Notified by RN, Patient with increased recurrent agitation.  Patient seen at bedside, AOX0, unable to contribute.  Appears restless, in NAD.   Repeated ECG : SR w/ PVC's, LBBB, 96bpm.  New increase T-wave in V5,V6  1:1  at bedside reports noting patient intermittently grabbing at chest overnight.  Patient admitted with NSTEMI on DAPT, Heparin gtt, statin. Unable to perform LHC 2/2 lacking capacity.       -Vital Signs Last 24 Hrs  T(C): 36.9 (15 Jul 2019 21:18), Max: 36.9 (15 Jul 2019 08:25)  T(F): 98.5 (15 Jul 2019 21:18), Max: 98.5 (15 Jul 2019 21:18)  HR: 101 (15 Jul 2019 21:18) (95 - 101)  BP: 118/76 (15 Jul 2019 21:18) (118/76 - 122/78)  RR: 18 (15 Jul 2019 21:18) (18 - 20)  SpO2: 94% (15 Jul 2019 21:18) (94% - 96%)      MAYNOR Pardo-BC  Medicine Department  #[id number] MEDICINE NP    JIM PAREDES  69y Male    Patient is a 69y old  Male who presents with a chief complaint of NSTEMI (15 Jul 2019 18:23)       > Event Summary:  Notified by RN, Patient with increased recurrent agitation.  Patient seen at bedside, AOX0, unable to contribute.  Appears restless, in NAD.   Repeated ECG : SR w/ PVC's, LBBB, 96bpm.  New increase T-wave in V5,V6  1:1  at bedside reports noting patient intermittently grabbing at chest overnight.  Patient admitted with LLE Ischemic Limb and NSTEMI on DAPT, Heparin gtt. (therapeutic dosed).   Unable to perform LHC 2/2 lacking capacity.   -C/w Current management of Heparin gtt, DAPT, Statin, Lopressor.   -Will order CE and trend. Likely may not change treatment plan  -f/u Cardiology  -Attending to follow   -Will endorse to incoming day providers           MAYNOR Pardo-BC  Medicine Department  #[id number] MEDICINE NP    JIM PAREDES  69y Male    Patient is a 69y old  Male who presents with a chief complaint of NSTEMI (15 Jul 2019 18:23)       > Event Summary:  Notified by RN, Patient with increased recurrent agitation.  Patient seen at bedside, AOX0, unable to contribute.  Appears restless, mild ABARCA. Lungs CTA. Pulses w/ RRR.    Repeated ECG : SR w/ PVC's, LBBB, 96bpm.  New increase T-wave in V5,V6  1:1  at bedside reports noting patient intermittently grabbing at chest overnight and c/o pain.      > Assessment & Plan:    Patient is an 68y/o Male, poor med compliance, DM type 2, HTN, PAD s/p recent surgery at [facility], transferred to [facility] CCU for NSTEMI with cardiogenic shock; course complicated by acute respiratory failure with hypoxia and septic shock secondary to suspected aspiration pneumonia requiring intubation (now extubated 7/8) and downgraded from CCU. Currently pending further evaluation by vascular surgery for LLE occluded vascular graft.  TTE with EF 20-25%, global systolic dysfunction.  LHC cancelled secondary to lack of capacity.  Now with recurrent agitations, ?recurrent chest pain, ABARCA.      1) Agitation:  -C/w holding Seroquel and Haldol given > QTC   -Continue 1:1 Observation  -f/u Psych as indicated    2) ?Chest Pain with ABARCA:  Likely 2/2 NSTEMI +/- acute systolic CHF  -C/w Current management of Heparin gtt, DAPT, Statin, Lopressor.   -Will order CE and trend. Likely may not change treatment plan  -c/w Lasix   -f/u CXR  -Keep SpO2 > 92%  -f/u Cardiology  -Attending to follow   -Will endorse to incoming day providers           MAYNOR Pardo-BC  Medicine Department  #[id number]

## 2019-07-16 NOTE — BEHAVIORAL HEALTH ASSESSMENT NOTE - HPI (INCLUDE ILLNESS QUALITY, SEVERITY, DURATION, TIMING, CONTEXT, MODIFYING FACTORS, ASSOCIATED SIGNS AND SYMPTOMS)
Patient is a 69 year old M, no known PPH, PMH of HTN, DM, PAD, admitted for cardiogenic shock management w/ NSTEMI, course complicated by acute respiratory failure with hypoxia and septic shock secondary to suspected aspiration pneumonia requiring intubation (now extubated 7/8) and downgraded from CCU, currently pending further evaluation by vascular surgery for LLE occluded vascular graft, psychiatry consulted for agitation.     As per chart- nephew stated pt has not been compliant with his medications as an outpatient; the nephew knows that he had amlodipine prescribed but doesn't know what medications the the pt takes for his diabetes.  As per chart from [facility], pt has been prescribed to take amlodipine 10 daily and metformin 500 BID at home. As per med rec from 2015, pt has been taking lantus 20 mg daily at home in addition to metformin.    Patient was seen at bedside, stated that he was doing well. Patient oriented to self, not oriented to place, situation, or time. Patient denied any psychiatric history. Per team, patient has been very agitated, swatting at staff members.     Attempted to contact [employer] , for collateral, but did not answer and voicemail full.

## 2019-07-16 NOTE — PROGRESS NOTE ADULT - PROBLEM SELECTOR PLAN 3
PAD s/p recent R Leg surgery at [facility]  -VA duplex of b/l LE: 07/06: On the right, there is a severe flow-limiting stenosis of the popliteal artery and the trifurcation arteries are occluded in the calf.  On the left, the bypass graft is occluded, the popliteal artery is occluded and the trifurcation arteries are occluded.  - Vascular surgery and podiatry plan noted, Possible amputation needed. Input greatly appreciated.   - c/w DAPT, IV heparin gtt, statin

## 2019-07-16 NOTE — BEHAVIORAL HEALTH ASSESSMENT NOTE - CASE SUMMARY
Pt is a 68 y/o black male with hx of severe vascular disease, presented from outside hospital with NSTEMI, now found to be confused with agitation. pt seen, AOA x 2, was pleasant, but staff reports periods of agitation. QTc prolonged, and plan to give depakote 250 mg BID for emotional stability. attempted to obtain collateral from nephew, unable to leave message. no indication for psychiatric hospitalization.

## 2019-07-16 NOTE — PROGRESS NOTE ADULT - PROBLEM SELECTOR PLAN 1
Trops downtrending  - TTE with EF 20-25%, global systolic dysfunction  - c/w DAPT, IV heparin gtt, statin, metoprolol 25mg PO BID  - No plan for C due to ongoing refusal and lack of capacity.  - Cardiology follow up appreciated - medically optimized for possible vascular surgery.   - Tentative plans by vasc for OR this week for intervention on LLE occluded vascular graft. Possible BKA vs Angio.

## 2019-07-16 NOTE — CHART NOTE - NSCHARTNOTEFT_GEN_A_CORE
HPI:  Notified by RN that patient removed IVL with heparin gtt. Patient seen and assessed at bedside, NAD, A&O x1. Patient is agitated, not allowing staff to insert new IVL.           Vital Signs Last 24 Hrs  T(C): 36.7 (16 Jul 2019 20:44), Max: 36.8 (16 Jul 2019 06:35)  T(F): 98 (16 Jul 2019 20:44), Max: 98.3 (16 Jul 2019 06:35)  HR: 100 (16 Jul 2019 20:44) (86 - 117)  BP: 121/79 (16 Jul 2019 20:44) (101/61 - 123/81)  RR: 18 (16 Jul 2019 20:44) (18 - 20)  SpO2: 97% (16 Jul 2019 20:44) (94% - 100%)      Physical Exam:  General: WN/WD NAD, AOx3, nontoxic appearing  Head:  NC/AT  CV: RRR, S1S2   Respiratory: CTA B/L, nonlabored  Abdominal: (+) bowel sounds x4. Soft, NT, ND, no palpable mass, no guarding, or rebound tenderness  Genitourinary: ? Fletcher   MSK: No BLLE edema, + peripheral pulses, FROM all 4 extremity  Skin: (+) warm, dry   Psych: Appropriate affect       Labs:                        9.5    14.61 )-----------( 478      ( 16 Jul 2019 09:15 )             29.9     07-16    141  |  106  |  20  ----------------------------<  176<H>  3.5   |  22  |  1.04    Ca    9.1      16 Jul 2019 06:00  Phos  3.3     07-16  Mg     2.1     07-16    TPro  7.3  /  Alb  3.0<L>  /  TBili  0.7  /  DBili  x   /  AST  15  /  ALT  57<H>  /  AlkPhos  182<H>  07-16              Radiology:          Assessment & Plan:  HPI:  History obtained from sign out/nephew  The pt is a 70 y/o M with a PMH of HTN, DM, PAD who is transferred to [facility] for management of cardiogenic shock and NSTEMI. As per sign out, the pt was admitted for L popliteal bypass to [facility]. During his time there, he was refusing medications  and was found to be progressively more dyspneic. He had an echo done there which showed that he has an EF of <15% with global hypokinesis; he received 20mg IV lasix for fluid overload. His respiratory status continued to worsen and the pt was intubated for airway protection. The pt vomited during this time and there was a concern for aspiration. The pt did not complain of CP during the time prior to intubation. As per nephew, the pt has not been complaining of CP, SOB prior to admission to the hospital and has been able to carry out his ADLs. As per the nephew, pt has not been compliant with his medications as an outpatient; the nephew knows that he had amlodipine prescribed but doesn't know what medications the the pt takes for his diabetes.  As per chart from [facility], pt has been prescribed to take amlodipine 10 daily and metformin 500 BID at home. As per med rec from 2015, pt has been taking lantus 20 mg daily at home in addition to metformin. (05 Jul 2019 23:50)        -  -  -Will continue to closely monitor patient/vitals  -Primary Team to follow up in AM, attending to follow       Jesus Wallace PA-C  Dept of Medicine HPI:  Notified by RN that patient removed IVL, heparin gtt was infusing at that time. Site of IVL lock was bandaged, no signs/symptoms of active bleeding noted. Patient seen and assessed at bedside, NAD, A&O x1. Patient is agitated, not allowing staff to insert new IVL.       Vital Signs Last 24 Hrs  T(C): 36.7 (16 Jul 2019 20:44), Max: 36.8 (16 Jul 2019 06:35)  T(F): 98 (16 Jul 2019 20:44), Max: 98.3 (16 Jul 2019 06:35)  HR: 100 (16 Jul 2019 20:44) (86 - 117)  BP: 121/79 (16 Jul 2019 20:44) (101/61 - 123/81)  RR: 18 (16 Jul 2019 20:44) (18 - 20)  SpO2: 97% (16 Jul 2019 20:44) (94% - 100%)      Physical Exam:  General: WN/WD, NAD, AOx1, nontoxic appearing  Head:  NC/AT  MSK: No BLLE edema, + peripheral pulses, FROM all 4 extremity  Skin: (+) warm, dry   Psych: Moments of agitation      Labs:                        9.5    14.61 )-----------( 478      ( 16 Jul 2019 09:15 )             29.9     07-16    141  |  106  |  20  ----------------------------<  176<H>  3.5   |  22  |  1.04    Ca    9.1      16 Jul 2019 06:00  Phos  3.3     07-16  Mg     2.1     07-16    TPro  7.3  /  Alb  3.0<L>  /  TBili  0.7  /  DBili  x   /  AST  15  /  ALT  57<H>  /  AlkPhos  182<H>  07-16        Assessment & Plan:  69M w/ PMH of poor med compliance, DM type 2, HTN, PAD s/p recent surgery at [facility], transferred to [facility] CCU for NSTEMI with cardiogenic shock; course complicated by acute respiratory failure with hypoxia and septic shock secondary to suspected aspiration pneumonia requiring intubation (now extubated 7/8) and downgraded from CCU. Currently pending further evaluation by vascular surgery for LLE occluded vascular graft.   Patient now presenting acutely with agitation and found to have pulled out IVL.       #Agitation  -EKG with QTc 526, continue to hold Seroquel and Haldol given prolonged QTc  -Continue 1:1 Observation  -Continue with Depakote and Depacon as ordered  -Behavioral health is following patient, appreciate recommendations  -Will continue to closely monitor patient/vitals  -Primary Team to follow up in AM, attending to follow       Jesus Wallace PA-C  Dept of Medicine  [id number]

## 2019-07-16 NOTE — PROGRESS NOTE ADULT - ASSESSMENT
69M w/ PMH of poor med compliance, DM type 2, HTN, PAD s/p recent surgery at [facility], transferred to [facility] CCU for NSTEMI with cardiogenic shock; course complicated by acute respiratory failure with hypoxia and septic shock secondary to suspected aspiration pneumonia requiring intubation (now extubated 7/8) and downgraded from CCU. Currently pending further evaluation by vascular surgery for LLE occluded vascular graft.

## 2019-07-16 NOTE — BEHAVIORAL HEALTH ASSESSMENT NOTE - NSBHCHARTREVIEWIMAGING_PSY_A_CORE FT
< from: Xray Modified Barium Swallow (07.11.19 @ 14:10) >    IMPRESSION:         Evidence of penetration. No aspiration.    For further information and recommendations, please refer to the speech   pathologist final report which is available for review in the electronic   medical record.          < end of copied text >

## 2019-07-16 NOTE — BEHAVIORAL HEALTH ASSESSMENT NOTE - NSBHCHARTREVIEWVS_PSY_A_CORE FT
Vital Signs Last 24 Hrs  T(C): 36.6 (16 Jul 2019 11:03), Max: 36.9 (15 Jul 2019 21:18)  T(F): 97.9 (16 Jul 2019 11:03), Max: 98.5 (15 Jul 2019 21:18)  HR: 88 (16 Jul 2019 11:03) (86 - 117)  BP: 101/61 (16 Jul 2019 11:03) (101/61 - 123/81)  BP(mean): --  RR: 18 (16 Jul 2019 11:03) (18 - 20)  SpO2: 97% (16 Jul 2019 11:03) (94% - 97%)

## 2019-07-17 LAB
ANION GAP SERPL CALC-SCNC: 14 MMOL/L — SIGNIFICANT CHANGE UP (ref 5–17)
ANION GAP SERPL CALC-SCNC: 14 MMOL/L — SIGNIFICANT CHANGE UP (ref 5–17)
APTT BLD: 118.3 SEC — HIGH (ref 27.5–36.3)
APTT BLD: 43.5 SEC — HIGH (ref 27.5–36.3)
APTT BLD: 92.2 SEC — HIGH (ref 27.5–36.3)
BUN SERPL-MCNC: 26 MG/DL — HIGH (ref 7–23)
BUN SERPL-MCNC: 27 MG/DL — HIGH (ref 7–23)
CALCIUM SERPL-MCNC: 8.7 MG/DL — SIGNIFICANT CHANGE UP (ref 8.4–10.5)
CALCIUM SERPL-MCNC: 8.8 MG/DL — SIGNIFICANT CHANGE UP (ref 8.4–10.5)
CHLORIDE SERPL-SCNC: 105 MMOL/L — SIGNIFICANT CHANGE UP (ref 96–108)
CHLORIDE SERPL-SCNC: 105 MMOL/L — SIGNIFICANT CHANGE UP (ref 96–108)
CO2 SERPL-SCNC: 22 MMOL/L — SIGNIFICANT CHANGE UP (ref 22–31)
CO2 SERPL-SCNC: 23 MMOL/L — SIGNIFICANT CHANGE UP (ref 22–31)
CREAT SERPL-MCNC: 1.08 MG/DL — SIGNIFICANT CHANGE UP (ref 0.5–1.3)
CREAT SERPL-MCNC: 1.32 MG/DL — HIGH (ref 0.5–1.3)
GLUCOSE BLDC GLUCOMTR-MCNC: 110 MG/DL — HIGH (ref 70–99)
GLUCOSE BLDC GLUCOMTR-MCNC: 135 MG/DL — HIGH (ref 70–99)
GLUCOSE BLDC GLUCOMTR-MCNC: 149 MG/DL — HIGH (ref 70–99)
GLUCOSE BLDC GLUCOMTR-MCNC: 158 MG/DL — HIGH (ref 70–99)
GLUCOSE BLDC GLUCOMTR-MCNC: 183 MG/DL — HIGH (ref 70–99)
GLUCOSE SERPL-MCNC: 131 MG/DL — HIGH (ref 70–99)
GLUCOSE SERPL-MCNC: 177 MG/DL — HIGH (ref 70–99)
HCT VFR BLD CALC: 30.1 % — LOW (ref 39–50)
HCT VFR BLD CALC: 30.8 % — LOW (ref 39–50)
HGB BLD-MCNC: 9.2 G/DL — LOW (ref 13–17)
HGB BLD-MCNC: 9.5 G/DL — LOW (ref 13–17)
MCHC RBC-ENTMCNC: 29 PG — SIGNIFICANT CHANGE UP (ref 27–34)
MCHC RBC-ENTMCNC: 29.4 PG — SIGNIFICANT CHANGE UP (ref 27–34)
MCHC RBC-ENTMCNC: 30.6 GM/DL — LOW (ref 32–36)
MCHC RBC-ENTMCNC: 30.8 GM/DL — LOW (ref 32–36)
MCV RBC AUTO: 95 FL — SIGNIFICANT CHANGE UP (ref 80–100)
MCV RBC AUTO: 95.4 FL — SIGNIFICANT CHANGE UP (ref 80–100)
PLATELET # BLD AUTO: 422 K/UL — HIGH (ref 150–400)
PLATELET # BLD AUTO: 470 K/UL — HIGH (ref 150–400)
POTASSIUM SERPL-MCNC: 3.2 MMOL/L — LOW (ref 3.5–5.3)
POTASSIUM SERPL-MCNC: 3.5 MMOL/L — SIGNIFICANT CHANGE UP (ref 3.5–5.3)
POTASSIUM SERPL-SCNC: 3.2 MMOL/L — LOW (ref 3.5–5.3)
POTASSIUM SERPL-SCNC: 3.5 MMOL/L — SIGNIFICANT CHANGE UP (ref 3.5–5.3)
RBC # BLD: 3.17 M/UL — LOW (ref 4.2–5.8)
RBC # BLD: 3.23 M/UL — LOW (ref 4.2–5.8)
RBC # FLD: 13.1 % — SIGNIFICANT CHANGE UP (ref 10.3–14.5)
RBC # FLD: 13.4 % — SIGNIFICANT CHANGE UP (ref 10.3–14.5)
SODIUM SERPL-SCNC: 141 MMOL/L — SIGNIFICANT CHANGE UP (ref 135–145)
SODIUM SERPL-SCNC: 142 MMOL/L — SIGNIFICANT CHANGE UP (ref 135–145)
WBC # BLD: 12.5 K/UL — HIGH (ref 3.8–10.5)
WBC # BLD: 13.81 K/UL — HIGH (ref 3.8–10.5)
WBC # FLD AUTO: 12.5 K/UL — HIGH (ref 3.8–10.5)
WBC # FLD AUTO: 13.81 K/UL — HIGH (ref 3.8–10.5)

## 2019-07-17 PROCEDURE — 99232 SBSQ HOSP IP/OBS MODERATE 35: CPT | Mod: GC

## 2019-07-17 PROCEDURE — 93010 ELECTROCARDIOGRAM REPORT: CPT

## 2019-07-17 PROCEDURE — 99233 SBSQ HOSP IP/OBS HIGH 50: CPT

## 2019-07-17 RX ORDER — POTASSIUM CHLORIDE 20 MEQ
10 PACKET (EA) ORAL
Refills: 0 | Status: COMPLETED | OUTPATIENT
Start: 2019-07-17 | End: 2019-07-18

## 2019-07-17 RX ORDER — POTASSIUM CHLORIDE 20 MEQ
20 PACKET (EA) ORAL
Refills: 0 | Status: COMPLETED | OUTPATIENT
Start: 2019-07-17 | End: 2019-07-18

## 2019-07-17 RX ADMIN — Medication 3 UNIT(S): at 08:51

## 2019-07-17 RX ADMIN — HEPARIN SODIUM 2000 UNIT(S)/HR: 5000 INJECTION INTRAVENOUS; SUBCUTANEOUS at 09:53

## 2019-07-17 RX ADMIN — Medication 1 PATCH: at 07:30

## 2019-07-17 RX ADMIN — Medication 81 MILLIGRAM(S): at 13:08

## 2019-07-17 RX ADMIN — DIVALPROEX SODIUM 250 MILLIGRAM(S): 500 TABLET, DELAYED RELEASE ORAL at 05:25

## 2019-07-17 RX ADMIN — CLOPIDOGREL BISULFATE 75 MILLIGRAM(S): 75 TABLET, FILM COATED ORAL at 13:08

## 2019-07-17 RX ADMIN — Medication 40 MILLIGRAM(S): at 05:26

## 2019-07-17 RX ADMIN — Medication 25 MILLIGRAM(S): at 17:00

## 2019-07-17 RX ADMIN — Medication 100 MILLIEQUIVALENT(S): at 23:23

## 2019-07-17 RX ADMIN — Medication 3 MILLIGRAM(S): at 20:47

## 2019-07-17 RX ADMIN — Medication 2: at 13:09

## 2019-07-17 RX ADMIN — ATORVASTATIN CALCIUM 80 MILLIGRAM(S): 80 TABLET, FILM COATED ORAL at 20:47

## 2019-07-17 RX ADMIN — DIVALPROEX SODIUM 250 MILLIGRAM(S): 500 TABLET, DELAYED RELEASE ORAL at 17:07

## 2019-07-17 RX ADMIN — CHLORHEXIDINE GLUCONATE 15 MILLILITER(S): 213 SOLUTION TOPICAL at 17:03

## 2019-07-17 RX ADMIN — HEPARIN SODIUM 1800 UNIT(S)/HR: 5000 INJECTION INTRAVENOUS; SUBCUTANEOUS at 22:56

## 2019-07-17 RX ADMIN — Medication 25 MILLIGRAM(S): at 05:26

## 2019-07-17 RX ADMIN — HEPARIN SODIUM 1800 UNIT(S)/HR: 5000 INJECTION INTRAVENOUS; SUBCUTANEOUS at 16:16

## 2019-07-17 RX ADMIN — Medication 3 UNIT(S): at 17:03

## 2019-07-17 RX ADMIN — CHLORHEXIDINE GLUCONATE 15 MILLILITER(S): 213 SOLUTION TOPICAL at 05:25

## 2019-07-17 RX ADMIN — Medication 1 PATCH: at 13:10

## 2019-07-17 RX ADMIN — Medication 2: at 21:30

## 2019-07-17 RX ADMIN — Medication 1 PATCH: at 13:08

## 2019-07-17 RX ADMIN — Medication 1 PATCH: at 19:45

## 2019-07-17 RX ADMIN — Medication 650 MILLIGRAM(S): at 22:15

## 2019-07-17 RX ADMIN — Medication 3 UNIT(S): at 13:09

## 2019-07-17 RX ADMIN — INSULIN GLARGINE 20 UNIT(S): 100 INJECTION, SOLUTION SUBCUTANEOUS at 21:30

## 2019-07-17 RX ADMIN — LISINOPRIL 2.5 MILLIGRAM(S): 2.5 TABLET ORAL at 05:25

## 2019-07-17 RX ADMIN — Medication 650 MILLIGRAM(S): at 20:48

## 2019-07-17 NOTE — PROGRESS NOTE ADULT - PROBLEM SELECTOR PLAN 6
Enhansed observation. Psychiatry consult appreciated.   - Held Seroquel and Haldol given QTC> 500 ms.   - melatonin 3mg qHS  - Depakote started.

## 2019-07-17 NOTE — PROGRESS NOTE ADULT - ASSESSMENT
69M w/ PMH of poor med compliance, DM type 2, HTN, PAD s/p recent surgery at [facility], transferred to [facility] CCU for NSTEMI with cardiogenic shock; course complicated by acute respiratory failure with hypoxia and septic shock secondary to suspected aspiration pneumonia requiring intubation (now extubated 7/8) and downgraded from CCU. Currently pending further evaluation by vascular surgery for LLE occluded vascular graft.

## 2019-07-17 NOTE — PROGRESS NOTE ADULT - ASSESSMENT
68 y/o M with a PMH of HTN, DM, PAD, s/p LLE fem-pop bypass with vein graft who is transferred to [facility] for management of cardiogenic shock and NSTEMI, vascular surgery consulted for no dopplerable signals    - Continue to watch for demarcation of b/l lower extremities   - Tentatively plan for OR this week pending patient/family plan  -- Vascular team to be notified when nephew at bedside to discuss surgical intervention   - Patient will need documented cardiac/medicine clearance before OR  - Duplex reviewed   - Continue Hep ggt  - Care per primary team    Vacular Surgery   [id number]

## 2019-07-17 NOTE — PROGRESS NOTE BEHAVIORAL HEALTH - NSBHCHARTREVIEWIMAGING_PSY_A_CORE FT
< from: Xray Chest 1 View- PORTABLE-Urgent (07.16.19 @ 07:18) >      Impression:    Right upper and lower lobe opacity, likely infection versus pulmonary   edema.                ALISTAIR DELGADO M.D., RADIOLOGY RESIDENT  This document has been electronically signed.  DOTTIE JONES M.D., ATTENDING RADIOLOGIST  This document has been electronically signed. Jul 16 2019  3:22PM    < end of copied text >

## 2019-07-17 NOTE — PROGRESS NOTE BEHAVIORAL HEALTH - CASE SUMMARY
This is a 69-year-old CM, pt, with no known PPH, PMH of HTN, DM, PAD, admitted for cardiogenic shock management w/ NSTEMI, course complicated by acute respiratory failure with hypoxia and septic shock secondary to suspected aspiration pneumonia requiring intubation (now extubated 7/8) and downgraded from CCU, currently pending further evaluation by vascular surgery for LLE occluded vascular graft, psychiatry consulted for agitation.    I have seen and evaluated this patient myself. Chart, labs, meds reviewed. I agree with resident's assessment and plan.

## 2019-07-17 NOTE — CHART NOTE - NSCHARTNOTEFT_GEN_A_CORE
Patient with an episode of agitation last night and reported no agitation after. Patient on Depakote 250mg PO BID and PRN IV Depakote. Will discontinue constant observation and place pt on enhanced superversion    [id number]

## 2019-07-17 NOTE — SWALLOW BEDSIDE ASSESSMENT ADULT - SWALLOW EVAL: DIAGNOSIS
Patient presents with oropharyngeal dysphagia characterized by prolonged oral prep of soft solid consistencies likely due to missing dentition, and delayed swallow initiation via manual palpation.

## 2019-07-17 NOTE — SWALLOW BEDSIDE ASSESSMENT ADULT - SWALLOW EVAL: RECOMMENDED FEEDING/EATING TECHNIQUES
position upright (90 degrees)/small sips/bites/maintain upright posture during/after eating for 30 mins/slow pace, medicine in applesauce/no straws/oral hygiene/allow for swallow between intakes
slow pace/position upright (90 degrees)/small sips/bites/allow for swallow between intakes/maintain upright posture during/after eating for 30 mins/oral hygiene

## 2019-07-17 NOTE — PROGRESS NOTE ADULT - PROBLEM SELECTOR PLAN 3
- TTE with EF 20-25%, global systolic dysfunction  - Mildly hypervolemic with b/l pleural effusions  - c/w lasix 40mg IV daily  - VINAY resolved (likely had ATN)  - on 7/16 started Lisinopril 2.5mg daily, monitor renal function   - Cardiology following  - Strict I and O, daily weights

## 2019-07-17 NOTE — PROGRESS NOTE ADULT - PROBLEM SELECTOR PLAN 2
Trops downtrending  - TTE with EF 20-25%, global systolic dysfunction  - c/w DAPT, IV heparin gtt, statin, metoprolol 25mg PO BID  - No plan for LHC due to ongoing refusal and lack of capacity.  - Cardiology follow up appreciated - medically optimized for possible vascular surgery.

## 2019-07-17 NOTE — PROGRESS NOTE BEHAVIORAL HEALTH - NSBHCHARTREVIEWINVESTIGATE_PSY_A_CORE FT
< from: 12 Lead ECG (07.15.19 @ 23:30) >    Ventricular Rate 96 BPM    Atrial Rate 96 BPM    P-R Interval 176 ms    QRS Duration 144 ms    Q-T Interval 424 ms    QTC Calculation(Bezet) 535 ms    P Axis 63 degrees    R Axis -39 degrees    T Axis 94 degrees    Diagnosis Line SINUS RHYTHM WITH OCCASIONAL PREMATURE VENTRICULAR COMPLEXES  POSSIBLE LEFT ATRIAL ENLARGEMENT  LEFT BUNDLE BRANCH BLOCK  ABNORMAL ECG    Confirmed by LING MUHAMMAD, MARGARETH ([id number]) on 7/16/2019 4:05:32 PM    < end of copied text >

## 2019-07-17 NOTE — PROGRESS NOTE ADULT - PROBLEM SELECTOR PLAN 8
VINAY has resolved. Likely ATN due to cardiogenic/septic shock  Cr today did increase- will send a repeat BMP STAT

## 2019-07-17 NOTE — PROGRESS NOTE ADULT - SUBJECTIVE AND OBJECTIVE BOX
Kwame De La Cruz MD, MBA ([facility] Hospitalist)  Pager [phone]  (During off hours please page [phone])      Patient is a 69y old  Male who presents with a chief complaint of NSTEMI (17 Jul 2019 10:30)      SUBJECTIVE / OVERNIGHT EVENTS: No events overnight. The patient's nephew is supposed to come today to discuss possible BKA with the patient himself and the vascular team.     MEDICATIONS  (STANDING):  aspirin  chewable 81 milliGRAM(s) Oral daily  atorvastatin 80 milliGRAM(s) Oral at bedtime  chlorhexidine 0.12% Liquid 15 milliLiter(s) Oral Mucosa two times a day  clopidogrel Tablet 75 milliGRAM(s) Oral daily  diVALproex  milliGRAM(s) Oral two times a day  furosemide   Injectable 40 milliGRAM(s) IV Push daily  heparin  Infusion. 1800 Unit(s)/Hr (18 mL/Hr) IV Continuous <Continuous>  insulin glargine Injectable (LANTUS) 20 Unit(s) SubCutaneous at bedtime  insulin lispro (HumaLOG) corrective regimen sliding scale   SubCutaneous Before meals and at bedtime  insulin lispro Injectable (HumaLOG) 3 Unit(s) SubCutaneous three times a day before meals  lisinopril 2.5 milliGRAM(s) Oral daily  melatonin 3 milliGRAM(s) Oral at bedtime  metoprolol tartrate 25 milliGRAM(s) Oral two times a day  nicotine -   7 mG/24Hr(s) Patch 1 patch Transdermal daily    MEDICATIONS  (PRN):  acetaminophen   Tablet .. 650 milliGRAM(s) Oral every 6 hours PRN Temp greater or equal to 38C (100.4F), Mild Pain (1 - 3)  valproate sodium IVPB 250 milliGRAM(s) IV Intermittent two times a day PRN Agitation      Vital Signs Last 24 Hrs  T(C): 36.7 (17 Jul 2019 13:12), Max: 36.7 (16 Jul 2019 17:41)  T(F): 98 (17 Jul 2019 13:12), Max: 98.1 (17 Jul 2019 07:36)  HR: 76 (17 Jul 2019 13:12) (76 - 100)  BP: 112/70 (17 Jul 2019 13:12) (104/67 - 121/79)  BP(mean): --  RR: 19 (17 Jul 2019 13:12) (18 - 20)  SpO2: 94% (17 Jul 2019 13:12) (94% - 100%)    CAPILLARY BLOOD GLUCOSE  POCT Blood Glucose.: 158 mg/dL (17 Jul 2019 12:20)  POCT Blood Glucose.: 110 mg/dL (17 Jul 2019 08:17)  POCT Blood Glucose.: 135 mg/dL (17 Jul 2019 02:00)  POCT Blood Glucose.: 167 mg/dL (16 Jul 2019 20:48)  POCT Blood Glucose.: 158 mg/dL (16 Jul 2019 17:17)      PHYSICAL EXAM:  GENERAL: NAD, well-developed  HEAD:  Atraumatic, Normocephalic  EYES: EOMI, conjunctiva and sclera clear  NECK: Supple, No JVD  CHEST/LUNG: Clear to auscultation bilaterally; No wheeze  HEART: Regular rate and rhythm; No murmurs, rubs, or gallops  ABDOMEN: Soft, Nontender, Nondistended; Bowel sounds present  EXTREMITIES:  LLE surg wound with staples. cold bilateral LE with dusky toes.   Dry gangrene to the left hallux and dry eschar to the distal right hallux.  PSYCH: AAOx1    LABS:                        9.5    13.81 )-----------( 470      ( 17 Jul 2019 09:06 )             30.8     07-17    141  |  105  |  27<H>  ----------------------------<  131<H>  3.5   |  22  |  1.32<H>    Ca    8.8      17 Jul 2019 06:56  Phos  3.3     07-16  Mg     2.1     07-16    TPro  7.3  /  Alb  3.0<L>  /  TBili  0.7  /  DBili  x   /  AST  15  /  ALT  57<H>  /  AlkPhos  182<H>  07-16    PTT - ( 17 Jul 2019 08:50 )  PTT:43.5 sec  CARDIAC MARKERS ( 16 Jul 2019 06:00 )  x     / x     / 68 U/L / x     / 2.3 ng/mL      RADIOLOGY & ADDITIONAL TESTS:    Consultant(s) Notes Reviewed: Vascular    Care Discussed with Consultants/Other Providers: Vascular re planned procedure

## 2019-07-17 NOTE — PROGRESS NOTE BEHAVIORAL HEALTH - NSBHFUPINTERVALHXFT_PSY_A_CORE
Patient examined this morning at bedside. Patient stated that he was doing well this morning and that he slept well. Patient's mood this morning was "fine." Patient remains disoriented to location, situation or date, but oriented to self. Patient denied SIIP/HIIP, hallucinations.    Per team, patient had 1 episode of agitation over night, but was overall doing better than before. Did not require PRN for agitation, and slept well after his episode.

## 2019-07-17 NOTE — PROGRESS NOTE ADULT - PROBLEM SELECTOR PLAN 1
PAD s/p recent R Leg surgery at [facility]  -VA duplex of b/l LE: 07/06: On the right, there is a severe flow-limiting stenosis of the popliteal artery and the trifurcation arteries are occluded in the calf.  On the left, the bypass graft is occluded, the popliteal artery is occluded and the trifurcation arteries are occluded.  - Vascular surgery following - awaiting discussion with the nephew regarding possible left BKA.   - c/w DAPT, IV heparin gtt, statin

## 2019-07-17 NOTE — SWALLOW BEDSIDE ASSESSMENT ADULT - COMMENTS
(Continued) [facility] completed 7/11/19 noting: Pt presents with an oropharyngeal dysphagia superimposed upon an altered mental status. The oral stage of swallow is characterized by oral holding of food/liquid material, poor bolus formation/control, piecemeal deglutition, premature spillover to the oropharynx on thin puree, honey thick and nectar thick liquids and to the hypopharynx on thick puree and thin liquid, delayed pharyngeal swallows and laryngeal penetration with retrieval on thin liquid. Although aspiration not evident on exam, would suggest a conservative approach to feeding given current cognitive status.  Disorders: reduced lingual strength/ROM/Rate of motion, reduced BOT to posterior pharyngeal wall contact, delay in trigger of the swallow reflex, reduced supraglottic sensation unable to completely masticate bolus for swallow initiation; required verbal cues to remove remaining bolus 2/2 severely delayed mastication time.

## 2019-07-17 NOTE — PROGRESS NOTE BEHAVIORAL HEALTH - NSBHCHARTREVIEWLAB_PSY_A_CORE FT
.  LABS:                         9.5    13.81 )-----------( 470      ( 17 Jul 2019 09:06 )             30.8     07-17    141  |  105  |  27<H>  ----------------------------<  131<H>  3.5   |  22  |  1.32<H>    Ca    8.8      17 Jul 2019 06:56  Phos  3.3     07-16  Mg     2.1     07-16    TPro  7.3  /  Alb  3.0<L>  /  TBili  0.7  /  DBili  x   /  AST  15  /  ALT  57<H>  /  AlkPhos  182<H>  07-16    PTT - ( 17 Jul 2019 08:50 )  PTT:43.5 sec    CARDIAC MARKERS ( 16 Jul 2019 06:00 )  x     / x     / 68 U/L / x     / 2.3 ng/mL

## 2019-07-17 NOTE — PROGRESS NOTE BEHAVIORAL HEALTH - SUMMARY
Patient is a 69 year old M, no known PPH, PMH of HTN, DM, PAD, admitted for cardiogenic shock management w/ NSTEMI, course complicated by acute respiratory failure with hypoxia and septic shock secondary to suspected aspiration pneumonia requiring intubation (now extubated 7/8) and downgraded from CCU, currently pending further evaluation by vascular surgery for LLE occluded vascular graft, psychiatry consulted for agitation.    Patient doing well on Depakote 250mg PO BID, consider keeping patient on this regimen with 250mg Q12 IV PRN for agitation. If patient is sedated, would consider lowering dose to 125mg PO BID.     Patient's diagnosis is most likely delirium.

## 2019-07-17 NOTE — PROGRESS NOTE ADULT - SUBJECTIVE AND OBJECTIVE BOX
VACULAR SURGERY DAILY PROGRESS NOTE:       Subjective:  Patient seen and examined on AM rounds. No acute events overnight. AF/VSS. Discussion with nephew yesterday- will come to visit today and discuss surgical options with Uncle.      Objective:    PHYSICAL EXAM:  General: NAD, resting, not interactive.   Neuro: A&O x 1   Resp: non labored breathing   CV: Normal sinus rhythm  Ext: RLE: cool, no dopplerable signals, discoloration of toes, LLE: cool, no dopplerable signals, discoloration of toes with tissue loss of 1st toe, leg dressing c/d/i    Vital Signs Last 24 Hrs  T(C): 36.7 (17 Jul 2019 07:36), Max: 36.7 (16 Jul 2019 17:41)  T(F): 98.1 (17 Jul 2019 07:36), Max: 98.1 (17 Jul 2019 07:36)  HR: 82 (17 Jul 2019 07:36) (82 - 100)  BP: 104/67 (17 Jul 2019 07:36) (101/61 - 121/79)  BP(mean): --  RR: 19 (17 Jul 2019 07:36) (18 - 20)  SpO2: 100% (17 Jul 2019 07:36) (97% - 100%)    I&O's Detail      Daily     Daily     MEDICATIONS  (STANDING):  aspirin  chewable 81 milliGRAM(s) Oral daily  atorvastatin 80 milliGRAM(s) Oral at bedtime  chlorhexidine 0.12% Liquid 15 milliLiter(s) Oral Mucosa two times a day  clopidogrel Tablet 75 milliGRAM(s) Oral daily  diVALproex  milliGRAM(s) Oral two times a day  furosemide   Injectable 40 milliGRAM(s) IV Push daily  heparin  Infusion. 1800 Unit(s)/Hr (18 mL/Hr) IV Continuous <Continuous>  insulin glargine Injectable (LANTUS) 20 Unit(s) SubCutaneous at bedtime  insulin lispro (HumaLOG) corrective regimen sliding scale   SubCutaneous Before meals and at bedtime  insulin lispro Injectable (HumaLOG) 3 Unit(s) SubCutaneous three times a day before meals  lisinopril 2.5 milliGRAM(s) Oral daily  melatonin 3 milliGRAM(s) Oral at bedtime  metoprolol tartrate 25 milliGRAM(s) Oral two times a day  nicotine -   7 mG/24Hr(s) Patch 1 patch Transdermal daily    MEDICATIONS  (PRN):  acetaminophen   Tablet .. 650 milliGRAM(s) Oral every 6 hours PRN Temp greater or equal to 38C (100.4F), Mild Pain (1 - 3)  valproate sodium IVPB 250 milliGRAM(s) IV Intermittent two times a day PRN Agitation      LABS:                        9.5    13.81 )-----------( 470      ( 17 Jul 2019 09:06 )             30.8     07-17    141  |  105  |  27<H>  ----------------------------<  131<H>  3.5   |  22  |  1.32<H>    Ca    8.8      17 Jul 2019 06:56  Phos  3.3     07-16  Mg     2.1     07-16    TPro  7.3  /  Alb  3.0<L>  /  TBili  0.7  /  DBili  x   /  AST  15  /  ALT  57<H>  /  AlkPhos  182<H>  07-16    PTT - ( 17 Jul 2019 08:50 )  PTT:43.5 sec      RADIOLOGY & ADDITIONAL STUDIES:

## 2019-07-17 NOTE — CHART NOTE - NSCHARTNOTEFT_GEN_A_CORE
Nutrition Follow Up Note  Patient seen for: nutrition follow-up     Chart reviewed, events noted. This is a 68 y/o M w/ PMH of DM, HTN with poor medication compliance and PAD is admitted to CCU for cardiogenic shock management w/ NSTEMI and a course complicated by hypoxemic respiratory failure secondary to suspected aspiration PNA requiring intubation, extubated on 7/8. Psych following for agitation, pt on 1:1. Vascular following, plan for OR this week for intervention on LLE occluded vascular graft, possible BKA vs Angio.     Source: patient (pt confused per chart however able to participate in interview), medical record, PCA     Diet : Dysphagia 1 with nectar thick liquids + carbohydrate consistent     Pt seen now s/p MBS on 7/11, recommended for Dysphagia 1 diet with nectar thick liquids. Pt with fair PO intake, noted to prefer liquids over foods. Pt is amendable to receiving diet Health Shakes TID. RD obtained additional food preferences, will honor as able. Pt denies any acute GI distress at this time. Last BM 7/17.      PO intake : variable %     Source for PO intake: per nursing flow sheets, report     Enteral /Parenteral Nutrition: N/A    Weights: wt down trending, will continue to monitor.   191.5lbs (7/12)  dosing 196.7lbs (7/5)    Pertinent Medications: MEDICATIONS  (STANDING):  aspirin  chewable 81 milliGRAM(s) Oral daily  atorvastatin 80 milliGRAM(s) Oral at bedtime  chlorhexidine 0.12% Liquid 15 milliLiter(s) Oral Mucosa two times a day  clopidogrel Tablet 75 milliGRAM(s) Oral daily  diVALproex  milliGRAM(s) Oral two times a day  furosemide   Injectable 40 milliGRAM(s) IV Push daily  heparin  Infusion. 1800 Unit(s)/Hr (18 mL/Hr) IV Continuous <Continuous>  insulin glargine Injectable (LANTUS) 20 Unit(s) SubCutaneous at bedtime  insulin lispro (HumaLOG) corrective regimen sliding scale   SubCutaneous Before meals and at bedtime  insulin lispro Injectable (HumaLOG) 3 Unit(s) SubCutaneous three times a day before meals  lisinopril 2.5 milliGRAM(s) Oral daily  melatonin 3 milliGRAM(s) Oral at bedtime  metoprolol tartrate 25 milliGRAM(s) Oral two times a day  nicotine -   7 mG/24Hr(s) Patch 1 patch Transdermal daily    MEDICATIONS  (PRN):  acetaminophen   Tablet .. 650 milliGRAM(s) Oral every 6 hours PRN Temp greater or equal to 38C (100.4F), Mild Pain (1 - 3)  valproate sodium IVPB 250 milliGRAM(s) IV Intermittent two times a day PRN Agitation    Pertinent Labs: 07-17 @ 06:56: Na 141, BUN 27<H>, Cr 1.32<H>, <H>, K+ 3.5, Phos --, Mg --, Alk Phos --, ALT/SGPT --, AST/SGOT --, HbA1c --    Finger Sticks:  POCT Blood Glucose.: 110 mg/dL (07-17 @ 08:17)  POCT Blood Glucose.: 135 mg/dL (07-17 @ 02:00)  POCT Blood Glucose.: 167 mg/dL (07-16 @ 20:48)  POCT Blood Glucose.: 158 mg/dL (07-16 @ 17:17)  POCT Blood Glucose.: 185 mg/dL (07-16 @ 12:06)      Skin per nursing documentation: Left foot wound, no pressure injuries noted per nursing flow sheets   Edema: none    Estimated Needs:   [x] no change since previous assessment  [ ] recalculated:     Previous Nutrition Diagnosis:  Inadequate Energy Intake  Nutrition Diagnosis is: on going, to be addressed with nutrition supplements     New Nutrition Diagnosis: N/A       Interventions:     Recommend  1) Defer diet consistency to SLP/team.   2) RD to add diet Health Shakes TID to optimize PO intake (200 kcal and 6gm protein per serving)  3) Obtain/honor food preferences as able.   4) Provided diet education as appropriate, deferred at this time due to AMS.     Monitoring and Evaluation:     Continue to monitor Nutritional intake, Tolerance to diet prescription, weights, labs, skin integrity    RD remains available upon request and will follow up per protocol  Silvia Becker RD, CDN, Pager # [phone]

## 2019-07-17 NOTE — PROGRESS NOTE BEHAVIORAL HEALTH - RISK ASSESSMENT
Risk Assessment Risk factors: +agitation  Protective factors: no current SIIP/HIIP    Overall, pt is a low risk of harm to self/others and does not require psychiatric admission for safety and stabilization.

## 2019-07-17 NOTE — PROGRESS NOTE BEHAVIORAL HEALTH - NSBHCHARTREVIEWVS_PSY_A_CORE FT
Vital Signs Last 24 Hrs  T(C): 36.7 (17 Jul 2019 07:36), Max: 36.7 (16 Jul 2019 17:41)  T(F): 98.1 (17 Jul 2019 07:36), Max: 98.1 (17 Jul 2019 07:36)  HR: 82 (17 Jul 2019 07:36) (82 - 100)  BP: 104/67 (17 Jul 2019 07:36) (101/61 - 121/79)  BP(mean): --  RR: 19 (17 Jul 2019 07:36) (18 - 20)  SpO2: 100% (17 Jul 2019 07:36) (97% - 100%)

## 2019-07-17 NOTE — SWALLOW BEDSIDE ASSESSMENT ADULT - SLP PERTINENT HISTORY OF CURRENT PROBLEM
68 y/o M w/ PMH of DM, HTN with poor medication compliance and PAD was admitted to CCU (7/5/19) from [facility] s/p L popliteal bypass for cardiogenic shock management w/ NSTEMI and a course complicated by hypoxemic respiratory failure secondary to suspected aspiration PNA 2/2 episode of vomiting while intubated; now in septic shock. Known hx of poor medication compliance. Vascular surgery consulted (7/6/19) and following for no dopplerable signals and ordered an urgent lower extremity arterial duplex. Patient regained biphasic Doppler pulse when given Heparin infusion as per Cardiology (7/6/19). Patient self-extubated (7/8/19). Podiatry consulted (7/9/19) for dry gangrene, being treated with betadine. Patient transferred from CCU to general medicine (7/9/19). Patient self-removed NGT (7/9/19), B/L mittens placed. Patient remains on antibiotics for aspiration PNA. Pt with VINAY likely in the setting of cardiogenic/septic shock. (Continued)

## 2019-07-18 LAB
ANION GAP SERPL CALC-SCNC: 14 MMOL/L — SIGNIFICANT CHANGE UP (ref 5–17)
APTT BLD: 80.2 SEC — HIGH (ref 27.5–36.3)
BUN SERPL-MCNC: 25 MG/DL — HIGH (ref 7–23)
CALCIUM SERPL-MCNC: 8.6 MG/DL — SIGNIFICANT CHANGE UP (ref 8.4–10.5)
CHLORIDE SERPL-SCNC: 102 MMOL/L — SIGNIFICANT CHANGE UP (ref 96–108)
CO2 SERPL-SCNC: 23 MMOL/L — SIGNIFICANT CHANGE UP (ref 22–31)
CREAT SERPL-MCNC: 0.96 MG/DL — SIGNIFICANT CHANGE UP (ref 0.5–1.3)
GLUCOSE BLDC GLUCOMTR-MCNC: 149 MG/DL — HIGH (ref 70–99)
GLUCOSE BLDC GLUCOMTR-MCNC: 154 MG/DL — HIGH (ref 70–99)
GLUCOSE BLDC GLUCOMTR-MCNC: 157 MG/DL — HIGH (ref 70–99)
GLUCOSE BLDC GLUCOMTR-MCNC: 90 MG/DL — SIGNIFICANT CHANGE UP (ref 70–99)
GLUCOSE SERPL-MCNC: 139 MG/DL — HIGH (ref 70–99)
POTASSIUM SERPL-MCNC: 3.5 MMOL/L — SIGNIFICANT CHANGE UP (ref 3.5–5.3)
POTASSIUM SERPL-SCNC: 3.5 MMOL/L — SIGNIFICANT CHANGE UP (ref 3.5–5.3)
SODIUM SERPL-SCNC: 139 MMOL/L — SIGNIFICANT CHANGE UP (ref 135–145)

## 2019-07-18 PROCEDURE — 99233 SBSQ HOSP IP/OBS HIGH 50: CPT

## 2019-07-18 RX ORDER — POTASSIUM CHLORIDE 20 MEQ
40 PACKET (EA) ORAL ONCE
Refills: 0 | Status: COMPLETED | OUTPATIENT
Start: 2019-07-18 | End: 2019-07-18

## 2019-07-18 RX ADMIN — INSULIN GLARGINE 20 UNIT(S): 100 INJECTION, SOLUTION SUBCUTANEOUS at 22:29

## 2019-07-18 RX ADMIN — Medication 3 UNIT(S): at 08:40

## 2019-07-18 RX ADMIN — HEPARIN SODIUM 1800 UNIT(S)/HR: 5000 INJECTION INTRAVENOUS; SUBCUTANEOUS at 06:42

## 2019-07-18 RX ADMIN — Medication 25 MILLIGRAM(S): at 19:52

## 2019-07-18 RX ADMIN — Medication 1 PATCH: at 10:55

## 2019-07-18 RX ADMIN — Medication 25 MILLIGRAM(S): at 06:17

## 2019-07-18 RX ADMIN — Medication 100 MILLIEQUIVALENT(S): at 03:34

## 2019-07-18 RX ADMIN — CLOPIDOGREL BISULFATE 75 MILLIGRAM(S): 75 TABLET, FILM COATED ORAL at 10:52

## 2019-07-18 RX ADMIN — Medication 40 MILLIEQUIVALENT(S): at 10:50

## 2019-07-18 RX ADMIN — Medication 2: at 22:46

## 2019-07-18 RX ADMIN — LISINOPRIL 2.5 MILLIGRAM(S): 2.5 TABLET ORAL at 06:17

## 2019-07-18 RX ADMIN — Medication 100 MILLIEQUIVALENT(S): at 01:31

## 2019-07-18 RX ADMIN — Medication 40 MILLIGRAM(S): at 06:17

## 2019-07-18 RX ADMIN — Medication 1 PATCH: at 10:56

## 2019-07-18 RX ADMIN — DIVALPROEX SODIUM 250 MILLIGRAM(S): 500 TABLET, DELAYED RELEASE ORAL at 06:17

## 2019-07-18 RX ADMIN — Medication 81 MILLIGRAM(S): at 10:52

## 2019-07-18 RX ADMIN — Medication 2: at 17:32

## 2019-07-18 RX ADMIN — Medication 25 MILLIGRAM(S): at 17:32

## 2019-07-18 RX ADMIN — Medication 1 PATCH: at 07:05

## 2019-07-18 RX ADMIN — CHLORHEXIDINE GLUCONATE 15 MILLILITER(S): 213 SOLUTION TOPICAL at 06:18

## 2019-07-18 RX ADMIN — Medication 3 UNIT(S): at 17:32

## 2019-07-18 RX ADMIN — Medication 25 MILLIGRAM(S): at 10:46

## 2019-07-18 RX ADMIN — Medication 3 MILLIGRAM(S): at 22:29

## 2019-07-18 RX ADMIN — ATORVASTATIN CALCIUM 80 MILLIGRAM(S): 80 TABLET, FILM COATED ORAL at 22:29

## 2019-07-18 RX ADMIN — Medication 1 PATCH: at 19:00

## 2019-07-18 RX ADMIN — DIVALPROEX SODIUM 250 MILLIGRAM(S): 500 TABLET, DELAYED RELEASE ORAL at 17:32

## 2019-07-18 NOTE — PROGRESS NOTE ADULT - SUBJECTIVE AND OBJECTIVE BOX
Kwame De La Cruz MD, MBA ([facility] Hospitalist)  Pager [phone]  (During off hours please page [phone])      Patient is a 69y old  Male who presents with a chief complaint of NSTEMI (17 Jul 2019 15:50)      SUBJECTIVE / OVERNIGHT EVENTS: No events overnight. No new complaints.     MEDICATIONS  (STANDING):  aspirin  chewable 81 milliGRAM(s) Oral daily  atorvastatin 80 milliGRAM(s) Oral at bedtime  chlorhexidine 0.12% Liquid 15 milliLiter(s) Oral Mucosa two times a day  clopidogrel Tablet 75 milliGRAM(s) Oral daily  diVALproex  milliGRAM(s) Oral two times a day  furosemide   Injectable 40 milliGRAM(s) IV Push daily  heparin  Infusion. 1800 Unit(s)/Hr (18 mL/Hr) IV Continuous <Continuous>  insulin glargine Injectable (LANTUS) 20 Unit(s) SubCutaneous at bedtime  insulin lispro (HumaLOG) corrective regimen sliding scale   SubCutaneous Before meals and at bedtime  insulin lispro Injectable (HumaLOG) 3 Unit(s) SubCutaneous three times a day before meals  lisinopril 2.5 milliGRAM(s) Oral daily  melatonin 3 milliGRAM(s) Oral at bedtime  metoprolol tartrate 25 milliGRAM(s) Oral two times a day  nicotine -   7 mG/24Hr(s) Patch 1 patch Transdermal daily    MEDICATIONS  (PRN):  acetaminophen   Tablet .. 650 milliGRAM(s) Oral every 6 hours PRN Temp greater or equal to 38C (100.4F), Mild Pain (1 - 3)  valproate sodium IVPB 250 milliGRAM(s) IV Intermittent two times a day PRN Agitation      Vital Signs Last 24 Hrs  T(C): 36.9 (18 Jul 2019 09:07), Max: 36.9 (18 Jul 2019 09:07)  T(F): 98.5 (18 Jul 2019 09:07), Max: 98.5 (18 Jul 2019 09:07)  HR: 76 (18 Jul 2019 09:07) (76 - 91)  BP: 100/60 (18 Jul 2019 09:07) (95/62 - 120/77)  BP(mean): --  RR: 18 (18 Jul 2019 09:07) (18 - 19)  SpO2: 97% (18 Jul 2019 09:07) (94% - 97%)    CAPILLARY BLOOD GLUCOSE  POCT Blood Glucose.: 149 mg/dL (18 Jul 2019 08:03)  POCT Blood Glucose.: 183 mg/dL (17 Jul 2019 21:02)  POCT Blood Glucose.: 149 mg/dL (17 Jul 2019 16:45)  POCT Blood Glucose.: 158 mg/dL (17 Jul 2019 12:20)    I&O's Summary    17 Jul 2019 07:01  -  18 Jul 2019 07:00  --------------------------------------------------------  IN: 1104 mL / OUT: 0 mL / NET: 1104 mL    18 Jul 2019 07:01  -  18 Jul 2019 11:37  --------------------------------------------------------  IN: 0 mL / OUT: 0 mL / NET: 0 mL        PHYSICAL EXAM:  GENERAL: NAD, well-developed  HEAD:  Atraumatic, Normocephalic  EYES: EOMI, conjunctiva and sclera clear  NECK: Supple, No JVD  CHEST/LUNG: Clear to auscultation bilaterally; No wheeze  HEART: Regular rate and rhythm; No murmurs, rubs, or gallops  ABDOMEN: Soft, Nontender, Nondistended; Bowel sounds present  EXTREMITIES:  LLE surg wound with staples. cold bilateral LE with dusky toes.   Dry gangrene to the left hallux and dry eschar to the distal right hallux.  PSYCH: AAOx1    LABS:                        9.2    12.50 )-----------( 422      ( 17 Jul 2019 17:52 )             30.1     07-18    139  |  102  |  25<H>  ----------------------------<  139<H>  3.5   |  23  |  0.96    Ca    8.6      18 Jul 2019 06:10      PTT - ( 18 Jul 2019 06:10 )  PTT:80.2 sec          RADIOLOGY & ADDITIONAL TESTS:    Consultant(s) Notes Reviewed: Vascular

## 2019-07-18 NOTE — PROGRESS NOTE ADULT - ASSESSMENT
69M w/ PMH of poor med compliance, DM type 2, HTN, PAD s/p recent surgery at [facility], transferred to [facility] CCU for NSTEMI with cardiogenic shock; course complicated by acute respiratory failure with hypoxia and septic shock secondary to suspected aspiration pneumonia requiring intubation (now extubated 7/8) and downgraded from CCU. Currently pending further evaluation by vascular surgery for LLE occluded vascular graft.

## 2019-07-18 NOTE — PROGRESS NOTE ADULT - PROBLEM SELECTOR PLAN 5
likely had aspiration pneumonitis. Currently no issues.   Per S+S eval, diet upgrated to Dysphagia 2 with thin liquids.   - Aspiration precautions.

## 2019-07-18 NOTE — PROGRESS NOTE ADULT - PROBLEM SELECTOR PLAN 6
Enhanced observation. Psychiatry consult appreciated.   - Held Seroquel and Haldol given QTC> 500 ms.   - melatonin 3mg qHS  - C/w Depakote  - Consider Ativan 0.5 IV is we need to acute calm him down

## 2019-07-18 NOTE — PROGRESS NOTE ADULT - PROBLEM SELECTOR PLAN 1
PAD s/p recent R Leg surgery at [facility]  -VA duplex of b/l LE: 07/06: On the right, there is a severe flow-limiting stenosis of the popliteal artery and the trifurcation arteries are occluded in the calf.  On the left, the bypass graft is occluded, the popliteal artery is occluded and the trifurcation arteries are occluded.  - Vascular surgery following - they will be reaching out to the naz [place] ([phone]) again today to try to obtain consent for possible left BKA.   - c/w DAPT, IV heparin gtt, statin

## 2019-07-19 LAB
ANION GAP SERPL CALC-SCNC: 15 MMOL/L — SIGNIFICANT CHANGE UP (ref 5–17)
APTT BLD: 67.4 SEC — HIGH (ref 27.5–36.3)
BUN SERPL-MCNC: 25 MG/DL — HIGH (ref 7–23)
CALCIUM SERPL-MCNC: 8.8 MG/DL — SIGNIFICANT CHANGE UP (ref 8.4–10.5)
CHLORIDE SERPL-SCNC: 105 MMOL/L — SIGNIFICANT CHANGE UP (ref 96–108)
CO2 SERPL-SCNC: 22 MMOL/L — SIGNIFICANT CHANGE UP (ref 22–31)
CREAT SERPL-MCNC: 1.09 MG/DL — SIGNIFICANT CHANGE UP (ref 0.5–1.3)
GLUCOSE BLDC GLUCOMTR-MCNC: 116 MG/DL — HIGH (ref 70–99)
GLUCOSE BLDC GLUCOMTR-MCNC: 119 MG/DL — HIGH (ref 70–99)
GLUCOSE BLDC GLUCOMTR-MCNC: 205 MG/DL — HIGH (ref 70–99)
GLUCOSE BLDC GLUCOMTR-MCNC: 98 MG/DL — SIGNIFICANT CHANGE UP (ref 70–99)
GLUCOSE SERPL-MCNC: 106 MG/DL — HIGH (ref 70–99)
HCT VFR BLD CALC: 29.9 % — LOW (ref 39–50)
HGB BLD-MCNC: 9.3 G/DL — LOW (ref 13–17)
MCHC RBC-ENTMCNC: 29.7 PG — SIGNIFICANT CHANGE UP (ref 27–34)
MCHC RBC-ENTMCNC: 31.1 GM/DL — LOW (ref 32–36)
MCV RBC AUTO: 95.5 FL — SIGNIFICANT CHANGE UP (ref 80–100)
PLATELET # BLD AUTO: 418 K/UL — HIGH (ref 150–400)
POTASSIUM SERPL-MCNC: 3.8 MMOL/L — SIGNIFICANT CHANGE UP (ref 3.5–5.3)
POTASSIUM SERPL-SCNC: 3.8 MMOL/L — SIGNIFICANT CHANGE UP (ref 3.5–5.3)
RBC # BLD: 3.13 M/UL — LOW (ref 4.2–5.8)
RBC # FLD: 13.2 % — SIGNIFICANT CHANGE UP (ref 10.3–14.5)
SODIUM SERPL-SCNC: 142 MMOL/L — SIGNIFICANT CHANGE UP (ref 135–145)
WBC # BLD: 13.07 K/UL — HIGH (ref 3.8–10.5)
WBC # FLD AUTO: 13.07 K/UL — HIGH (ref 3.8–10.5)

## 2019-07-19 PROCEDURE — 99233 SBSQ HOSP IP/OBS HIGH 50: CPT

## 2019-07-19 RX ADMIN — Medication 81 MILLIGRAM(S): at 11:48

## 2019-07-19 RX ADMIN — Medication 1 PATCH: at 09:17

## 2019-07-19 RX ADMIN — Medication 3 UNIT(S): at 08:30

## 2019-07-19 RX ADMIN — DIVALPROEX SODIUM 250 MILLIGRAM(S): 500 TABLET, DELAYED RELEASE ORAL at 06:00

## 2019-07-19 RX ADMIN — INSULIN GLARGINE 20 UNIT(S): 100 INJECTION, SOLUTION SUBCUTANEOUS at 22:32

## 2019-07-19 RX ADMIN — Medication 3 UNIT(S): at 12:30

## 2019-07-19 RX ADMIN — Medication 1 PATCH: at 10:06

## 2019-07-19 RX ADMIN — Medication 1 PATCH: at 11:48

## 2019-07-19 RX ADMIN — Medication 3 UNIT(S): at 17:30

## 2019-07-19 RX ADMIN — HEPARIN SODIUM 1800 UNIT(S)/HR: 5000 INJECTION INTRAVENOUS; SUBCUTANEOUS at 22:34

## 2019-07-19 RX ADMIN — CHLORHEXIDINE GLUCONATE 15 MILLILITER(S): 213 SOLUTION TOPICAL at 18:31

## 2019-07-19 RX ADMIN — Medication 0.25 MILLIGRAM(S): at 23:33

## 2019-07-19 RX ADMIN — LISINOPRIL 2.5 MILLIGRAM(S): 2.5 TABLET ORAL at 06:00

## 2019-07-19 RX ADMIN — CHLORHEXIDINE GLUCONATE 15 MILLILITER(S): 213 SOLUTION TOPICAL at 06:00

## 2019-07-19 RX ADMIN — Medication 25 MILLIGRAM(S): at 06:00

## 2019-07-19 RX ADMIN — Medication 25 MILLIGRAM(S): at 11:47

## 2019-07-19 RX ADMIN — Medication 40 MILLIGRAM(S): at 06:00

## 2019-07-19 RX ADMIN — CLOPIDOGREL BISULFATE 75 MILLIGRAM(S): 75 TABLET, FILM COATED ORAL at 11:48

## 2019-07-19 RX ADMIN — Medication 4: at 17:30

## 2019-07-19 NOTE — PROGRESS NOTE ADULT - ASSESSMENT
70 y/o M with a PMH of HTN, DM, PAD, s/p LLE fem-pop bypass with vein graft who is transferred to [facility] for management of cardiogenic shock and NSTEMI, vascular surgery consulted for no dopplerable signals    - Continue to watch for demarcation of b/l lower extremities   - Tentatively plan for OR MONDAY pending patient/family plan  -- Vascular team to be notified when nephew at bedside to discuss surgical intervention   - Patient will need documented cardiac/medicine clearance before OR  - Duplex reviewed   - Continue Hep ggt  - Care per primary team    Vacular Surgery   [id number]

## 2019-07-19 NOTE — PROGRESS NOTE ADULT - PROBLEM SELECTOR PLAN 3
- TTE with EF 20-25%, global systolic dysfunction  - c/w lasix 40mg IV daily  - VINAY resolved (likely had ATN)  - on 7/16 started Lisinopril 2.5mg daily, monitor renal function   - Cardiology following  - Strict I and O, daily weights

## 2019-07-19 NOTE — PROGRESS NOTE ADULT - SUBJECTIVE AND OBJECTIVE BOX
VACULAR SURGERY DAILY PROGRESS NOTE:       Subjective:  Patient seen and examined on AM rounds. PCA at bedside reports patient was agitated overnight. Nephew did not come to discuss surgical intervention- will attempt to recontact today.     Objective:    PHYSICAL EXAM:  General: NAD, resting, not interactive.   Neuro: A&O x 1   Resp: non labored breathing   CV: Normal sinus rhythm  Ext: RLE: cool, no dopplerable signals, discoloration of toes, LLE: cool, no dopplerable signals, discoloration of toes with tissue loss of 1st toe, leg dressing c/d/i    Vital Signs Last 24 Hrs  T(C): 36.7 (19 Jul 2019 04:06), Max: 36.9 (18 Jul 2019 09:07)  T(F): 98 (19 Jul 2019 04:06), Max: 98.5 (18 Jul 2019 09:07)  HR: 87 (19 Jul 2019 04:06) (71 - 90)  BP: 104/65 (19 Jul 2019 04:06) (93/58 - 104/67)  BP(mean): --  RR: 18 (19 Jul 2019 04:06) (18 - 18)  SpO2: 97% (19 Jul 2019 04:06) (94% - 98%)    I&O's Detail    18 Jul 2019 07:01  -  19 Jul 2019 07:00  --------------------------------------------------------  IN:    IV PiggyBack: 216 mL    Oral Fluid: 360 mL    Solution: 50 mL  Total IN: 626 mL    OUT:  Total OUT: 0 mL    Total NET: 626 mL          Daily     Daily     MEDICATIONS  (STANDING):  aspirin  chewable 81 milliGRAM(s) Oral daily  atorvastatin 80 milliGRAM(s) Oral at bedtime  chlorhexidine 0.12% Liquid 15 milliLiter(s) Oral Mucosa two times a day  clopidogrel Tablet 75 milliGRAM(s) Oral daily  diVALproex  milliGRAM(s) Oral two times a day  furosemide   Injectable 40 milliGRAM(s) IV Push daily  heparin  Infusion. 1800 Unit(s)/Hr (18 mL/Hr) IV Continuous <Continuous>  insulin glargine Injectable (LANTUS) 20 Unit(s) SubCutaneous at bedtime  insulin lispro (HumaLOG) corrective regimen sliding scale   SubCutaneous Before meals and at bedtime  insulin lispro Injectable (HumaLOG) 3 Unit(s) SubCutaneous three times a day before meals  lisinopril 2.5 milliGRAM(s) Oral daily  melatonin 3 milliGRAM(s) Oral at bedtime  metoprolol tartrate 25 milliGRAM(s) Oral two times a day  nicotine -   7 mG/24Hr(s) Patch 1 patch Transdermal daily    MEDICATIONS  (PRN):  acetaminophen   Tablet .. 650 milliGRAM(s) Oral every 6 hours PRN Temp greater or equal to 38C (100.4F), Mild Pain (1 - 3)  valproate sodium IVPB 250 milliGRAM(s) IV Intermittent two times a day PRN Agitation      LABS:                        9.2    12.50 )-----------( 422      ( 17 Jul 2019 17:52 )             30.1     07-19    142  |  105  |  25<H>  ----------------------------<  106<H>  3.8   |  22  |  1.09    Ca    8.8      19 Jul 2019 05:58      PTT - ( 18 Jul 2019 06:10 )  PTT:80.2 sec      RADIOLOGY & ADDITIONAL STUDIES: VACULAR SURGERY DAILY PROGRESS NOTE:       Subjective:  Patient seen and examined on AM rounds. PCA at bedside reports patient was agitated overnight. Nephew did not come to discuss surgical intervention- will attempt to recontact today.     Objective:    PHYSICAL EXAM:  General: NAD, resting, not interactive.   Neuro: A&O x 1   Resp: non labored breathing   CV: Normal sinus rhythm  Ext: RLE: cool, no dopplerable signals, discoloration of toes, LLE: cool, no dopplerable signals, discoloration of toes with tissue loss of 1st toe    Vital Signs Last 24 Hrs  T(C): 36.7 (19 Jul 2019 04:06), Max: 36.9 (18 Jul 2019 09:07)  T(F): 98 (19 Jul 2019 04:06), Max: 98.5 (18 Jul 2019 09:07)  HR: 87 (19 Jul 2019 04:06) (71 - 90)  BP: 104/65 (19 Jul 2019 04:06) (93/58 - 104/67)  BP(mean): --  RR: 18 (19 Jul 2019 04:06) (18 - 18)  SpO2: 97% (19 Jul 2019 04:06) (94% - 98%)    I&O's Detail    18 Jul 2019 07:01  -  19 Jul 2019 07:00  --------------------------------------------------------  IN:    IV PiggyBack: 216 mL    Oral Fluid: 360 mL    Solution: 50 mL  Total IN: 626 mL    OUT:  Total OUT: 0 mL    Total NET: 626 mL          Daily     Daily     MEDICATIONS  (STANDING):  aspirin  chewable 81 milliGRAM(s) Oral daily  atorvastatin 80 milliGRAM(s) Oral at bedtime  chlorhexidine 0.12% Liquid 15 milliLiter(s) Oral Mucosa two times a day  clopidogrel Tablet 75 milliGRAM(s) Oral daily  diVALproex  milliGRAM(s) Oral two times a day  furosemide   Injectable 40 milliGRAM(s) IV Push daily  heparin  Infusion. 1800 Unit(s)/Hr (18 mL/Hr) IV Continuous <Continuous>  insulin glargine Injectable (LANTUS) 20 Unit(s) SubCutaneous at bedtime  insulin lispro (HumaLOG) corrective regimen sliding scale   SubCutaneous Before meals and at bedtime  insulin lispro Injectable (HumaLOG) 3 Unit(s) SubCutaneous three times a day before meals  lisinopril 2.5 milliGRAM(s) Oral daily  melatonin 3 milliGRAM(s) Oral at bedtime  metoprolol tartrate 25 milliGRAM(s) Oral two times a day  nicotine -   7 mG/24Hr(s) Patch 1 patch Transdermal daily    MEDICATIONS  (PRN):  acetaminophen   Tablet .. 650 milliGRAM(s) Oral every 6 hours PRN Temp greater or equal to 38C (100.4F), Mild Pain (1 - 3)  valproate sodium IVPB 250 milliGRAM(s) IV Intermittent two times a day PRN Agitation      LABS:                        9.2    12.50 )-----------( 422      ( 17 Jul 2019 17:52 )             30.1     07-19    142  |  105  |  25<H>  ----------------------------<  106<H>  3.8   |  22  |  1.09    Ca    8.8      19 Jul 2019 05:58      PTT - ( 18 Jul 2019 06:10 )  PTT:80.2 sec      RADIOLOGY & ADDITIONAL STUDIES:

## 2019-07-19 NOTE — PROGRESS NOTE ADULT - SUBJECTIVE AND OBJECTIVE BOX
JIM [id number]  69y  Male  Non-ST elevation myocardial infarction (NSTEMI)  FHx: diabetes mellitus  No pertinent family history in first degree relatives  Handoff  MEWS Score  Hyperlipidemia  Hypertension  Diabetes  No pertinent past medical history  Delirium  Anemia  Acute respiratory failure with hypoxia  Acute systolic heart failure  Agitation  HTN (hypertension)  Transaminitis  VINAY (acute kidney injury)  Uncontrolled type 2 diabetes mellitus with hyperglycemia  Aspiration pneumonia  Ischemia of left lower extremity  NSTEMI (non-ST elevated myocardial infarction)  Shock  H/O mastoidectomy  No significant past surgical history  No significant past surgical history  NON-ST ELEVATION (NSTEMI) MYOC    acetaminophen   Tablet .. 650 milliGRAM(s) Oral every 6 hours PRN  aspirin  chewable 81 milliGRAM(s) Oral daily  atorvastatin 80 milliGRAM(s) Oral at bedtime  chlorhexidine 0.12% Liquid 15 milliLiter(s) Oral Mucosa two times a day  clopidogrel Tablet 75 milliGRAM(s) Oral daily  diVALproex  milliGRAM(s) Oral two times a day  furosemide   Injectable 40 milliGRAM(s) IV Push daily  heparin  Infusion. 1800 Unit(s)/Hr IV Continuous <Continuous>  insulin glargine Injectable (LANTUS) 20 Unit(s) SubCutaneous at bedtime  insulin lispro (HumaLOG) corrective regimen sliding scale   SubCutaneous Before meals and at bedtime  insulin lispro Injectable (HumaLOG) 3 Unit(s) SubCutaneous three times a day before meals  lisinopril 2.5 milliGRAM(s) Oral daily  melatonin 3 milliGRAM(s) Oral at bedtime  metoprolol tartrate 25 milliGRAM(s) Oral two times a day  nicotine -   7 mG/24Hr(s) Patch 1 patch Transdermal daily  valproate sodium IVPB 250 milliGRAM(s) IV Intermittent two times a day PRN  No Known Allergies    CBC Full  -  ( 17 Jul 2019 17:52 )  WBC Count : 12.50 K/uL  RBC Count : 3.17 M/uL  Hemoglobin : 9.2 g/dL  Hematocrit : 30.1 %  Platelet Count - Automated : 422 K/uL  Mean Cell Volume : 95.0 fl  Mean Cell Hemoglobin : 29.0 pg  Mean Cell Hemoglobin Concentration : 30.6 gm/dL  Auto Neutrophil # : x  Auto Lymphocyte # : x  Auto Monocyte # : x  Auto Eosinophil # : x  Auto Basophil # : x  Auto Neutrophil % : x  Auto Lymphocyte % : x  Auto Monocyte % : x  Auto Eosinophil % : x  Auto Basophil % : x    Patient visited at chairside with no bandages on both feet. Patient noted to have urinated on floor and his legs. Patient s/p bypass with gangrene noted on right hallux and ischemic changes both feet. Recommend patient continue with daily dressing changes. Apply gauze and chio right foot . Stable gangrene right foot. no signs of cellulitis. Podiatry to follow 2-3 times a week or As needed.

## 2019-07-19 NOTE — PROGRESS NOTE ADULT - SUBJECTIVE AND OBJECTIVE BOX
Patient is a 69y old  Male who presents with a chief complaint of NSTEMI (19 Jul 2019 08:36)      INTERVAL HPI/OVERNIGHT EVENTS:    Patient is seen with enhanced supervision in room.  NO issues presented       Medications:MEDICATIONS  (STANDING):  aspirin  chewable 81 milliGRAM(s) Oral daily  atorvastatin 80 milliGRAM(s) Oral at bedtime  chlorhexidine 0.12% Liquid 15 milliLiter(s) Oral Mucosa two times a day  clopidogrel Tablet 75 milliGRAM(s) Oral daily  diVALproex  milliGRAM(s) Oral two times a day  furosemide   Injectable 40 milliGRAM(s) IV Push daily  heparin  Infusion. 1800 Unit(s)/Hr (18 mL/Hr) IV Continuous <Continuous>  insulin glargine Injectable (LANTUS) 20 Unit(s) SubCutaneous at bedtime  insulin lispro (HumaLOG) corrective regimen sliding scale   SubCutaneous Before meals and at bedtime  insulin lispro Injectable (HumaLOG) 3 Unit(s) SubCutaneous three times a day before meals  lisinopril 2.5 milliGRAM(s) Oral daily  melatonin 3 milliGRAM(s) Oral at bedtime  metoprolol tartrate 25 milliGRAM(s) Oral two times a day  nicotine -   7 mG/24Hr(s) Patch 1 patch Transdermal daily    MEDICATIONS  (PRN):  acetaminophen   Tablet .. 650 milliGRAM(s) Oral every 6 hours PRN Temp greater or equal to 38C (100.4F), Mild Pain (1 - 3)  valproate sodium IVPB 250 milliGRAM(s) IV Intermittent two times a day PRN Agitation      Allergies: Allergies    No Known Allergies    Intolerances        REVIEW OF SYSTEMS:  unable to obtain due to poor mentation    T(C): 36.4 (07-19-19 @ 21:04), Max: 36.7 (07-19-19 @ 04:06)  HR: 81 (07-19-19 @ 21:04) (71 - 91)  BP: 92/56 (07-19-19 @ 21:04) (92/56 - 119/80)  RR: 18 (07-19-19 @ 21:04) (17 - 18)  SpO2: 95% (07-19-19 @ 21:04) (94% - 98%)  Wt(kg): --Vital Signs Last 24 Hrs  T(C): 36.4 (19 Jul 2019 21:04), Max: 36.7 (19 Jul 2019 04:06)  T(F): 97.6 (19 Jul 2019 21:04), Max: 98 (19 Jul 2019 04:06)  HR: 81 (19 Jul 2019 21:04) (71 - 91)  BP: 92/56 (19 Jul 2019 21:04) (92/56 - 119/80)  BP(mean): --  RR: 18 (19 Jul 2019 21:04) (17 - 18)  SpO2: 95% (19 Jul 2019 21:04) (94% - 98%)  I&O's Summary    18 Jul 2019 07:01  -  19 Jul 2019 07:00  --------------------------------------------------------  IN: 626 mL / OUT: 0 mL / NET: 626 mL    19 Jul 2019 07:01  -  19 Jul 2019 21:44  --------------------------------------------------------  IN: 600 mL / OUT: 0 mL / NET: 600 mL        PHYSICAL EXAM:  GENERAL: NAD, well-groomed, well-developed  NECK: Supple, No JVD, Normal thyroid  NERVOUS SYSTEM:  awake and responsive.    CHEST/LUNG: Clear to percussion bilaterally; No rales, rhonchi, wheezing, or rubs  HEART: Regular rate and rhythm  ABDOMEN: Soft, Nontender, Nondistended; Bowel sounds present  EXTREMITIES: Left anterior leg with staples noted that are clean     Consultant(s) Notes Reviewed:  [x ] YES  [ ] NO  Care Discussed with Consultants/Other Providers [ x] YES  [ ] NO  Name of Consultant  LABS:                        9.3    13.07 )-----------( 418      ( 19 Jul 2019 08:27 )             29.9     07-19    142  |  105  |  25<H>  ----------------------------<  106<H>  3.8   |  22  |  1.09    Ca    8.8      19 Jul 2019 05:58      PTT - ( 18 Jul 2019 06:10 )  PTT:80.2 sec    CAPILLARY BLOOD GLUCOSE      POCT Blood Glucose.: 205 mg/dL (19 Jul 2019 16:46)  POCT Blood Glucose.: 119 mg/dL (19 Jul 2019 12:03)  POCT Blood Glucose.: 98 mg/dL (19 Jul 2019 08:04)  POCT Blood Glucose.: 157 mg/dL (18 Jul 2019 22:27)            RADIOLOGY & ADDITIONAL TESTS:  EKG :     Imaging Personally Reviewed:  [ ] YES  [ ] NO  HEALTH ISSUES - PROBLEM Dx:  Delirium  Anemia: Anemia  Acute respiratory failure with hypoxia: Acute respiratory failure with hypoxia  Acute systolic heart failure: Acute systolic heart failure  Agitation: Agitation  HTN (hypertension): HTN (hypertension)  Transaminitis: Transaminitis  VINAY (acute kidney injury): VINAY (acute kidney injury)  Uncontrolled type 2 diabetes mellitus with hyperglycemia: Uncontrolled type 2 diabetes mellitus with hyperglycemia  Aspiration pneumonia: Aspiration pneumonia  Ischemia of left lower extremity: Ischemia of left lower extremity  NSTEMI (non-ST elevated myocardial infarction): NSTEMI (non-ST elevated myocardial infarction)  Shock: Shock

## 2019-07-19 NOTE — PROGRESS NOTE ADULT - PROBLEM SELECTOR PLAN 1
PAD s/p recent R Leg surgery at [facility]  -VA duplex of b/l LE: 07/06: On the right, there is a severe flow-limiting stenosis of the popliteal artery and the trifurcation arteries are occluded in the calf.  On the left, the bypass graft is occluded, the popliteal artery is occluded and the trifurcation arteries are occluded.  - As reported , Vascular surgery following - they will be reaching out to the naz Monroe ([phone]) again today to try to obtain consent for possible left BKA/Tentative for Monday  - c/w DAPT, IV heparin gtt, statin/ MOnitor PTT

## 2019-07-19 NOTE — PROGRESS NOTE ADULT - ASSESSMENT
69M w/ PMH of poor med compliance, DM type 2, HTN, PAD s/p recent surgery at [facility], transferred to [facility] CCU for NSTEMI with cardiogenic shock; course complicated by acute respiratory failure with hypoxia and septic shock secondary to suspected aspiration pneumonia requiring intubation (now extubated 7/8) and downgraded from CCU. Currently pending further evaluation by vascular surgery for LLE occluded vascular graft.

## 2019-07-20 LAB
ANION GAP SERPL CALC-SCNC: 15 MMOL/L — SIGNIFICANT CHANGE UP (ref 5–17)
APTT BLD: 60.9 SEC — HIGH (ref 27.5–36.3)
BASOPHILS # BLD AUTO: 0.01 K/UL — SIGNIFICANT CHANGE UP (ref 0–0.2)
BASOPHILS NFR BLD AUTO: 0.1 % — SIGNIFICANT CHANGE UP (ref 0–2)
BUN SERPL-MCNC: 23 MG/DL — SIGNIFICANT CHANGE UP (ref 7–23)
CALCIUM SERPL-MCNC: 8.2 MG/DL — LOW (ref 8.4–10.5)
CHLORIDE SERPL-SCNC: 105 MMOL/L — SIGNIFICANT CHANGE UP (ref 96–108)
CO2 SERPL-SCNC: 22 MMOL/L — SIGNIFICANT CHANGE UP (ref 22–31)
CREAT SERPL-MCNC: 0.99 MG/DL — SIGNIFICANT CHANGE UP (ref 0.5–1.3)
EOSINOPHIL # BLD AUTO: 0 K/UL — SIGNIFICANT CHANGE UP (ref 0–0.5)
EOSINOPHIL NFR BLD AUTO: 0 % — SIGNIFICANT CHANGE UP (ref 0–6)
GLUCOSE BLDC GLUCOMTR-MCNC: 101 MG/DL — HIGH (ref 70–99)
GLUCOSE BLDC GLUCOMTR-MCNC: 161 MG/DL — HIGH (ref 70–99)
GLUCOSE BLDC GLUCOMTR-MCNC: 80 MG/DL — SIGNIFICANT CHANGE UP (ref 70–99)
GLUCOSE BLDC GLUCOMTR-MCNC: 88 MG/DL — SIGNIFICANT CHANGE UP (ref 70–99)
GLUCOSE SERPL-MCNC: 80 MG/DL — SIGNIFICANT CHANGE UP (ref 70–99)
HCT VFR BLD CALC: 27.8 % — LOW (ref 39–50)
HGB BLD-MCNC: 8.6 G/DL — LOW (ref 13–17)
IMM GRANULOCYTES NFR BLD AUTO: 0.5 % — SIGNIFICANT CHANGE UP (ref 0–1.5)
LYMPHOCYTES # BLD AUTO: 1.86 K/UL — SIGNIFICANT CHANGE UP (ref 1–3.3)
LYMPHOCYTES # BLD AUTO: 24.8 % — SIGNIFICANT CHANGE UP (ref 13–44)
MCHC RBC-ENTMCNC: 29.3 PG — SIGNIFICANT CHANGE UP (ref 27–34)
MCHC RBC-ENTMCNC: 30.9 GM/DL — LOW (ref 32–36)
MCV RBC AUTO: 94.6 FL — SIGNIFICANT CHANGE UP (ref 80–100)
MONOCYTES # BLD AUTO: 0.72 K/UL — SIGNIFICANT CHANGE UP (ref 0–0.9)
MONOCYTES NFR BLD AUTO: 9.6 % — SIGNIFICANT CHANGE UP (ref 2–14)
NEUTROPHILS # BLD AUTO: 4.87 K/UL — SIGNIFICANT CHANGE UP (ref 1.8–7.4)
NEUTROPHILS NFR BLD AUTO: 65 % — SIGNIFICANT CHANGE UP (ref 43–77)
PLATELET # BLD AUTO: 381 K/UL — SIGNIFICANT CHANGE UP (ref 150–400)
POTASSIUM SERPL-MCNC: 3.7 MMOL/L — SIGNIFICANT CHANGE UP (ref 3.5–5.3)
POTASSIUM SERPL-SCNC: 3.7 MMOL/L — SIGNIFICANT CHANGE UP (ref 3.5–5.3)
RBC # BLD: 2.94 M/UL — LOW (ref 4.2–5.8)
RBC # FLD: 13.3 % — SIGNIFICANT CHANGE UP (ref 10.3–14.5)
SODIUM SERPL-SCNC: 142 MMOL/L — SIGNIFICANT CHANGE UP (ref 135–145)
WBC # BLD: 7.5 K/UL — SIGNIFICANT CHANGE UP (ref 3.8–10.5)
WBC # FLD AUTO: 7.5 K/UL — SIGNIFICANT CHANGE UP (ref 3.8–10.5)

## 2019-07-20 PROCEDURE — 99233 SBSQ HOSP IP/OBS HIGH 50: CPT

## 2019-07-20 RX ORDER — INSULIN GLARGINE 100 [IU]/ML
10 INJECTION, SOLUTION SUBCUTANEOUS AT BEDTIME
Refills: 0 | Status: DISCONTINUED | OUTPATIENT
Start: 2019-07-20 | End: 2019-07-22

## 2019-07-20 RX ORDER — METOPROLOL TARTRATE 50 MG
25 TABLET ORAL
Refills: 0 | Status: DISCONTINUED | OUTPATIENT
Start: 2019-07-20 | End: 2019-07-22

## 2019-07-20 RX ADMIN — Medication 40 MILLIGRAM(S): at 06:24

## 2019-07-20 RX ADMIN — HEPARIN SODIUM 1800 UNIT(S)/HR: 5000 INJECTION INTRAVENOUS; SUBCUTANEOUS at 12:59

## 2019-07-20 RX ADMIN — CHLORHEXIDINE GLUCONATE 15 MILLILITER(S): 213 SOLUTION TOPICAL at 18:15

## 2019-07-20 RX ADMIN — Medication 1 PATCH: at 07:30

## 2019-07-20 RX ADMIN — Medication 1 PATCH: at 19:00

## 2019-07-20 RX ADMIN — DIVALPROEX SODIUM 250 MILLIGRAM(S): 500 TABLET, DELAYED RELEASE ORAL at 22:13

## 2019-07-20 RX ADMIN — Medication 25 MILLIGRAM(S): at 22:13

## 2019-07-20 RX ADMIN — Medication 0.5 MILLIGRAM(S): at 01:24

## 2019-07-20 RX ADMIN — Medication 81 MILLIGRAM(S): at 11:13

## 2019-07-20 RX ADMIN — Medication 3 MILLIGRAM(S): at 22:13

## 2019-07-20 RX ADMIN — Medication 25 MILLIGRAM(S): at 11:14

## 2019-07-20 RX ADMIN — ATORVASTATIN CALCIUM 80 MILLIGRAM(S): 80 TABLET, FILM COATED ORAL at 22:13

## 2019-07-20 RX ADMIN — Medication 2: at 22:10

## 2019-07-20 RX ADMIN — CLOPIDOGREL BISULFATE 75 MILLIGRAM(S): 75 TABLET, FILM COATED ORAL at 11:13

## 2019-07-20 RX ADMIN — INSULIN GLARGINE 10 UNIT(S): 100 INJECTION, SOLUTION SUBCUTANEOUS at 22:10

## 2019-07-20 RX ADMIN — Medication 1 PATCH: at 11:30

## 2019-07-20 RX ADMIN — CHLORHEXIDINE GLUCONATE 15 MILLILITER(S): 213 SOLUTION TOPICAL at 06:29

## 2019-07-20 RX ADMIN — DIVALPROEX SODIUM 250 MILLIGRAM(S): 500 TABLET, DELAYED RELEASE ORAL at 11:15

## 2019-07-20 RX ADMIN — Medication 1 PATCH: at 11:13

## 2019-07-20 NOTE — PROGRESS NOTE ADULT - SUBJECTIVE AND OBJECTIVE BOX
Patient is a 69y old  Male who presents with a chief complaint of NSTEMI (19 Jul 2019 21:44)      INTERVAL HPI/OVERNIGHT EVENTS:    Patient was mostly sleeping in Am when seen , as reported by the PCA patient was agitated all night and is now sleeping       Medications:MEDICATIONS  (STANDING):  aspirin  chewable 81 milliGRAM(s) Oral daily  atorvastatin 80 milliGRAM(s) Oral at bedtime  chlorhexidine 0.12% Liquid 15 milliLiter(s) Oral Mucosa two times a day  clopidogrel Tablet 75 milliGRAM(s) Oral daily  diVALproex  milliGRAM(s) Oral two times a day  furosemide   Injectable 40 milliGRAM(s) IV Push daily  heparin  Infusion. 1800 Unit(s)/Hr (18 mL/Hr) IV Continuous <Continuous>  insulin glargine Injectable (LANTUS) 10 Unit(s) SubCutaneous at bedtime  insulin lispro (HumaLOG) corrective regimen sliding scale   SubCutaneous Before meals and at bedtime  lisinopril 2.5 milliGRAM(s) Oral daily  melatonin 3 milliGRAM(s) Oral at bedtime  metoprolol tartrate 25 milliGRAM(s) Oral two times a day  nicotine -   7 mG/24Hr(s) Patch 1 patch Transdermal daily    MEDICATIONS  (PRN):  acetaminophen   Tablet .. 650 milliGRAM(s) Oral every 6 hours PRN Temp greater or equal to 38C (100.4F), Mild Pain (1 - 3)  valproate sodium IVPB 250 milliGRAM(s) IV Intermittent two times a day PRN Agitation      Allergies: Allergies    No Known Allergies      REVIEW OF SYSTEMS:  Unable to obtain     T(C): 36.4 (07-20-19 @ 20:53), Max: 36.4 (07-19-19 @ 21:04)  HR: 85 (07-20-19 @ 20:53) (77 - 85)  BP: 111/63 (07-20-19 @ 20:53) (92/56 - 111/63)  RR: 18 (07-20-19 @ 20:53) (18 - 18)  SpO2: 95% (07-20-19 @ 20:53) (95% - 98%)  Wt(kg): --Vital Signs Last 24 Hrs  T(C): 36.4 (20 Jul 2019 20:53), Max: 36.4 (19 Jul 2019 21:04)  T(F): 97.5 (20 Jul 2019 20:53), Max: 97.6 (19 Jul 2019 21:04)  HR: 85 (20 Jul 2019 20:53) (77 - 85)  BP: 111/63 (20 Jul 2019 20:53) (92/56 - 111/63)  BP(mean): --  RR: 18 (20 Jul 2019 20:53) (18 - 18)  SpO2: 95% (20 Jul 2019 20:53) (95% - 98%)  I&O's Summary    19 Jul 2019 07:01  -  20 Jul 2019 07:00  --------------------------------------------------------  IN: 600 mL / OUT: 0 mL / NET: 600 mL    20 Jul 2019 07:01  -  20 Jul 2019 21:01  --------------------------------------------------------  IN: 736 mL / OUT: 0 mL / NET: 736 mL          PHYSICAL EXAM:  GENERAL: NAD, well-groomed, well-developed  HEAD:  Atraumatic, Normocephalic  NECK: Supple, No JVD, Normal thyroid  NERVOUS SYSTEM:  sleeping   CHEST/LUNG: Clear to percussion bilaterally; No rales, rhonchi, wheezing, or rubs  HEART: Regular rate and rhythm; No murmurs, rubs, or gallops  ABDOMEN: Soft, Nontender, Nondistended; Bowel sounds present      Consultant(s) Notes Reviewed:  [x ] YES  [ ] NO  Care Discussed with Consultants/Other Providers [ x] YES  [ ] NO  Name of Consultant  LABS:                        8.6    7.50  )-----------( 381      ( 20 Jul 2019 11:10 )             27.8     07-20    142  |  105  |  23  ----------------------------<  80  3.7   |  22  |  0.99    Ca    8.2<L>      20 Jul 2019 05:20      PTT - ( 20 Jul 2019 11:05 )  PTT:60.9 sec    CAPILLARY BLOOD GLUCOSE      POCT Blood Glucose.: 88 mg/dL (20 Jul 2019 17:27)  POCT Blood Glucose.: 80 mg/dL (20 Jul 2019 11:57)  POCT Blood Glucose.: 101 mg/dL (20 Jul 2019 08:38)  POCT Blood Glucose.: 116 mg/dL (19 Jul 2019 21:48)            RADIOLOGY & ADDITIONAL TESTS:  EKG :     Imaging Personally Reviewed:  [ ] YES  [ ] NO  HEALTH ISSUES - PROBLEM Dx:  Delirium  Anemia: Anemia  Acute respiratory failure with hypoxia: Acute respiratory failure with hypoxia  Acute systolic heart failure: Acute systolic heart failure  Agitation: Agitation  HTN (hypertension): HTN (hypertension)  Transaminitis: Transaminitis  VINAY (acute kidney injury): VINAY (acute kidney injury)  Uncontrolled type 2 diabetes mellitus with hyperglycemia: Uncontrolled type 2 diabetes mellitus with hyperglycemia  Aspiration pneumonia: Aspiration pneumonia  Ischemia of left lower extremity: Ischemia of left lower extremity  NSTEMI (non-ST elevated myocardial infarction): NSTEMI (non-ST elevated myocardial infarction)  Shock: Shock

## 2019-07-20 NOTE — PROGRESS NOTE ADULT - ASSESSMENT
69M w/ PMH of poor med compliance, DM type 2, HTN, PAD s/p recent surgery at [facility], transferred to [facility] CCU for NSTEMI with cardiogenic shock; course complicated by acute respiratory failure with hypoxia and septic shock secondary to suspected aspiration pneumonia requiring intubation (now extubated 7/8) and downgraded from CCU. Currently pending further evaluation by vascular surgery for LLE occluded vascular graft.

## 2019-07-20 NOTE — PROGRESS NOTE ADULT - PROBLEM SELECTOR PLAN 1
PAD s/p recent R Leg surgery at [facility]  -VA duplex of b/l LE: 07/06: On the right, there is a severe flow-limiting stenosis of the popliteal artery and the trifurcation arteries are occluded in the calf.  On the left, the bypass graft is occluded, the popliteal artery is occluded and the trifurcation arteries are occluded.  - As reported , Vascular surgery following - they will be reaching out to the naz Monroe ([phone]) again today to try to obtain consent for possible left BKA/Tentative for Monday  - c/w DAPT, IV heparin gtt, statin/ MOnitor PTT

## 2019-07-20 NOTE — CHART NOTE - NSCHARTNOTEFT_GEN_A_CORE
Pt examined during evening rounds  Pt combative and very agitated, getting out of bed, kicking staff. pt in NAD, confused.   Ativan IM ordered given pt with one functioning IV, will try to get 2nd access when pt comes down  - cont constant observation Pt examined during evening rounds  Pt combative and very agitated, getting out of bed, kicking staff. pt in NAD, confused.   Ativan IM ordered given pt with one functioning IV  with heparin infusion, will try to get 2nd access when pt comes down  - cont constant observation

## 2019-07-21 ENCOUNTER — TRANSCRIPTION ENCOUNTER (OUTPATIENT)
Age: 69
End: 2019-07-21

## 2019-07-21 LAB
ALBUMIN SERPL ELPH-MCNC: 3.2 G/DL — LOW (ref 3.3–5)
ALP SERPL-CCNC: 174 U/L — HIGH (ref 40–120)
ALT FLD-CCNC: 27 U/L — SIGNIFICANT CHANGE UP (ref 10–45)
ANION GAP SERPL CALC-SCNC: 12 MMOL/L — SIGNIFICANT CHANGE UP (ref 5–17)
APTT BLD: 82.3 SEC — HIGH (ref 27.5–36.3)
AST SERPL-CCNC: 16 U/L — SIGNIFICANT CHANGE UP (ref 10–40)
BASOPHILS # BLD AUTO: 0.02 K/UL — SIGNIFICANT CHANGE UP (ref 0–0.2)
BASOPHILS NFR BLD AUTO: 0.2 % — SIGNIFICANT CHANGE UP (ref 0–2)
BILIRUB SERPL-MCNC: 0.5 MG/DL — SIGNIFICANT CHANGE UP (ref 0.2–1.2)
BLD GP AB SCN SERPL QL: NEGATIVE — SIGNIFICANT CHANGE UP
BUN SERPL-MCNC: 16 MG/DL — SIGNIFICANT CHANGE UP (ref 7–23)
CALCIUM SERPL-MCNC: 9 MG/DL — SIGNIFICANT CHANGE UP (ref 8.4–10.5)
CHLORIDE SERPL-SCNC: 103 MMOL/L — SIGNIFICANT CHANGE UP (ref 96–108)
CO2 SERPL-SCNC: 27 MMOL/L — SIGNIFICANT CHANGE UP (ref 22–31)
CREAT SERPL-MCNC: 1.02 MG/DL — SIGNIFICANT CHANGE UP (ref 0.5–1.3)
EOSINOPHIL # BLD AUTO: 0.15 K/UL — SIGNIFICANT CHANGE UP (ref 0–0.5)
EOSINOPHIL NFR BLD AUTO: 1.8 % — SIGNIFICANT CHANGE UP (ref 0–6)
GLUCOSE BLDC GLUCOMTR-MCNC: 141 MG/DL — HIGH (ref 70–99)
GLUCOSE BLDC GLUCOMTR-MCNC: 167 MG/DL — HIGH (ref 70–99)
GLUCOSE BLDC GLUCOMTR-MCNC: 207 MG/DL — HIGH (ref 70–99)
GLUCOSE BLDC GLUCOMTR-MCNC: 75 MG/DL — SIGNIFICANT CHANGE UP (ref 70–99)
GLUCOSE SERPL-MCNC: 139 MG/DL — HIGH (ref 70–99)
HCT VFR BLD CALC: 29.9 % — LOW (ref 39–50)
HGB BLD-MCNC: 9.2 G/DL — LOW (ref 13–17)
IMM GRANULOCYTES NFR BLD AUTO: 0.4 % — SIGNIFICANT CHANGE UP (ref 0–1.5)
LYMPHOCYTES # BLD AUTO: 1.43 K/UL — SIGNIFICANT CHANGE UP (ref 1–3.3)
LYMPHOCYTES # BLD AUTO: 17.1 % — SIGNIFICANT CHANGE UP (ref 13–44)
MCHC RBC-ENTMCNC: 29.2 PG — SIGNIFICANT CHANGE UP (ref 27–34)
MCHC RBC-ENTMCNC: 30.8 GM/DL — LOW (ref 32–36)
MCV RBC AUTO: 94.9 FL — SIGNIFICANT CHANGE UP (ref 80–100)
MONOCYTES # BLD AUTO: 0.66 K/UL — SIGNIFICANT CHANGE UP (ref 0–0.9)
MONOCYTES NFR BLD AUTO: 7.9 % — SIGNIFICANT CHANGE UP (ref 2–14)
NEUTROPHILS # BLD AUTO: 6.05 K/UL — SIGNIFICANT CHANGE UP (ref 1.8–7.4)
NEUTROPHILS NFR BLD AUTO: 72.6 % — SIGNIFICANT CHANGE UP (ref 43–77)
PLATELET # BLD AUTO: 402 K/UL — HIGH (ref 150–400)
POTASSIUM SERPL-MCNC: 3.5 MMOL/L — SIGNIFICANT CHANGE UP (ref 3.5–5.3)
POTASSIUM SERPL-SCNC: 3.5 MMOL/L — SIGNIFICANT CHANGE UP (ref 3.5–5.3)
PROT SERPL-MCNC: 7.2 G/DL — SIGNIFICANT CHANGE UP (ref 6–8.3)
RBC # BLD: 3.15 M/UL — LOW (ref 4.2–5.8)
RBC # FLD: 13.7 % — SIGNIFICANT CHANGE UP (ref 10.3–14.5)
RH IG SCN BLD-IMP: POSITIVE — SIGNIFICANT CHANGE UP
SODIUM SERPL-SCNC: 142 MMOL/L — SIGNIFICANT CHANGE UP (ref 135–145)
WBC # BLD: 8.34 K/UL — SIGNIFICANT CHANGE UP (ref 3.8–10.5)
WBC # FLD AUTO: 8.34 K/UL — SIGNIFICANT CHANGE UP (ref 3.8–10.5)

## 2019-07-21 PROCEDURE — 99233 SBSQ HOSP IP/OBS HIGH 50: CPT

## 2019-07-21 RX ORDER — INSULIN LISPRO 100/ML
VIAL (ML) SUBCUTANEOUS
Refills: 0 | Status: DISCONTINUED | OUTPATIENT
Start: 2019-07-21 | End: 2019-07-22

## 2019-07-21 RX ORDER — FUROSEMIDE 40 MG
40 TABLET ORAL DAILY
Refills: 0 | Status: DISCONTINUED | OUTPATIENT
Start: 2019-07-22 | End: 2019-07-22

## 2019-07-21 RX ORDER — INSULIN LISPRO 100/ML
VIAL (ML) SUBCUTANEOUS AT BEDTIME
Refills: 0 | Status: DISCONTINUED | OUTPATIENT
Start: 2019-07-21 | End: 2019-07-22

## 2019-07-21 RX ADMIN — Medication 1 PATCH: at 12:21

## 2019-07-21 RX ADMIN — Medication 25 MILLIGRAM(S): at 06:33

## 2019-07-21 RX ADMIN — Medication 1 PATCH: at 11:28

## 2019-07-21 RX ADMIN — CHLORHEXIDINE GLUCONATE 15 MILLILITER(S): 213 SOLUTION TOPICAL at 17:03

## 2019-07-21 RX ADMIN — CHLORHEXIDINE GLUCONATE 15 MILLILITER(S): 213 SOLUTION TOPICAL at 06:33

## 2019-07-21 RX ADMIN — Medication 1 PATCH: at 07:56

## 2019-07-21 RX ADMIN — INSULIN GLARGINE 10 UNIT(S): 100 INJECTION, SOLUTION SUBCUTANEOUS at 22:18

## 2019-07-21 RX ADMIN — Medication 25 MILLIGRAM(S): at 17:03

## 2019-07-21 RX ADMIN — CLOPIDOGREL BISULFATE 75 MILLIGRAM(S): 75 TABLET, FILM COATED ORAL at 12:22

## 2019-07-21 RX ADMIN — Medication 2: at 12:22

## 2019-07-21 RX ADMIN — Medication 40 MILLIGRAM(S): at 06:33

## 2019-07-21 RX ADMIN — LISINOPRIL 2.5 MILLIGRAM(S): 2.5 TABLET ORAL at 06:33

## 2019-07-21 RX ADMIN — Medication 81 MILLIGRAM(S): at 12:22

## 2019-07-21 RX ADMIN — HEPARIN SODIUM 1800 UNIT(S)/HR: 5000 INJECTION INTRAVENOUS; SUBCUTANEOUS at 13:04

## 2019-07-21 RX ADMIN — DIVALPROEX SODIUM 250 MILLIGRAM(S): 500 TABLET, DELAYED RELEASE ORAL at 17:03

## 2019-07-21 RX ADMIN — DIVALPROEX SODIUM 250 MILLIGRAM(S): 500 TABLET, DELAYED RELEASE ORAL at 06:33

## 2019-07-21 NOTE — PROGRESS NOTE ADULT - PROBLEM SELECTOR PLAN 2
Trops downtrending  - TTE with EF 20-25%, global systolic dysfunction  - c/w DAPT, IV heparin gtt, statin, metoprolol 25mg PO BID  -AS reported,  No plan for LHC due to ongoing refusal and lack of capacity.  - Cardiology follow up appreciated - medically optimized for possible vascular surgery.

## 2019-07-21 NOTE — PROGRESS NOTE ADULT - PROBLEM SELECTOR PLAN 10
-no signs or symptoms of bleeding / labs consistent with AOCD  -stable H/H today     #DVT ppx: on heparin gtt (monitor PTT)

## 2019-07-21 NOTE — PROGRESS NOTE ADULT - ASSESSMENT
68 y/o M with a PMH of HTN, DM, PAD, s/p LLE fem-pop bypass with vein graft who is transferred to [facility] for management of cardiogenic shock and NSTEMI, vascular surgery consulted for no dopplerable signals    - Continue to watch for demarcation of b/l lower extremities   - Plan for OR MONDAY. Consent in chart.   - Patient will need documented cardiac/medicine clearance before OR  - Continue Hep ggt  - Care per primary team    Vacular Surgery   [id number] 68 y/o M with a PMH of HTN, DM, PAD, s/p LLE fem-pop bypass with vein graft who is transferred to [facility] for management of cardiogenic shock and NSTEMI, vascular surgery consulted for no dopplerable signals    - Continue to watch for demarcation of b/l lower extremities   - Plan for OR MONDAY. Consent in chart.   - Patient will need documented cardiac/medicine clearance before OR  - d/c hep gtt on call to OR  - Care per primary team    Vacular Surgery   [id number]

## 2019-07-21 NOTE — PROGRESS NOTE ADULT - SUBJECTIVE AND OBJECTIVE BOX
Patient is a 69y old  Male who presents with a chief complaint of NSTEMI (21 Jul 2019 09:03)      INTERVAL HPI/OVERNIGHT EVENTS:    Patient was seen this AM , he was a lot calmer and he understands that he is scheduled for surgery tomorrow.  He offers no complaints.        Medications:MEDICATIONS  (STANDING):  aspirin  chewable 81 milliGRAM(s) Oral daily  atorvastatin 80 milliGRAM(s) Oral at bedtime  chlorhexidine 0.12% Liquid 15 milliLiter(s) Oral Mucosa two times a day  clopidogrel Tablet 75 milliGRAM(s) Oral daily  diVALproex  milliGRAM(s) Oral two times a day  furosemide   Injectable 40 milliGRAM(s) IV Push daily  heparin  Infusion. 1800 Unit(s)/Hr (18 mL/Hr) IV Continuous <Continuous>  insulin glargine Injectable (LANTUS) 10 Unit(s) SubCutaneous at bedtime  insulin lispro (HumaLOG) corrective regimen sliding scale   SubCutaneous Before meals and at bedtime  lisinopril 2.5 milliGRAM(s) Oral daily  melatonin 3 milliGRAM(s) Oral at bedtime  metoprolol tartrate 25 milliGRAM(s) Oral two times a day  nicotine -   7 mG/24Hr(s) Patch 1 patch Transdermal daily    MEDICATIONS  (PRN):  acetaminophen   Tablet .. 650 milliGRAM(s) Oral every 6 hours PRN Temp greater or equal to 38C (100.4F), Mild Pain (1 - 3)  valproate sodium IVPB 250 milliGRAM(s) IV Intermittent two times a day PRN Agitation      Allergies: Allergies    No Known Allergies        REVIEW OF SYSTEMS:  CONSTITUTIONAL: No fever  NECK: No pain or stiffness  RESPIRATORY: No cough  CARDIOVASCULAR: No chest pain  GASTROINTESTINAL: No abdominal pain , no abd pain         T(C): 36.4 (07-21-19 @ 11:22), Max: 36.5 (07-21-19 @ 05:10)  HR: 87 (07-21-19 @ 17:01) (77 - 87)  BP: 124/77 (07-21-19 @ 17:01) (103/67 - 124/77)  RR: 17 (07-21-19 @ 17:01) (17 - 18)  SpO2: 99% (07-21-19 @ 17:01) (95% - 100%)  Wt(kg): --Vital Signs Last 24 Hrs  T(C): 36.4 (21 Jul 2019 11:22), Max: 36.5 (21 Jul 2019 05:10)  T(F): 97.6 (21 Jul 2019 11:22), Max: 97.7 (21 Jul 2019 05:10)  HR: 87 (21 Jul 2019 17:01) (77 - 87)  BP: 124/77 (21 Jul 2019 17:01) (103/67 - 124/77)  BP(mean): --  RR: 17 (21 Jul 2019 17:01) (17 - 18)  SpO2: 99% (21 Jul 2019 17:01) (95% - 100%)  I&O's Summary    20 Jul 2019 07:01  -  21 Jul 2019 07:00  --------------------------------------------------------  IN: 736 mL / OUT: 0 mL / NET: 736 mL    21 Jul 2019 07:01  -  21 Jul 2019 17:07  --------------------------------------------------------  IN: 600 mL / OUT: 0 mL / NET: 600 mL        PHYSICAL EXAM:  GENERAL: NAD, well-groomed, well-developed  HEAD:  Atraumatic, Normocephalic  NECK: Supple, No JVD, Normal thyroid  NERVOUS SYSTEM:  awake and responsive   CHEST/LUNG: Clear to percussion bilaterally; No rales, rhonchi, wheezing, or rubs  HEART: Regular rate and rhythm; No murmurs, rubs, or gallops  ABDOMEN: Soft, Nontender, Nondistended; Bowel sounds present  Left lower Extremity : with staples in place / unchanged       Consultant(s) Notes Reviewed:  [x ] YES  [ ] NO  Care Discussed with Consultants/Other Providers [ x] YES  [ ] NO  Name of Consultant  LABS:                        9.2    8.34  )-----------( 402      ( 21 Jul 2019 15:57 )             29.9     07-21    142  |  103  |  16  ----------------------------<  139<H>  3.5   |  27  |  1.02    Ca    9.0      21 Jul 2019 11:47    TPro  7.2  /  Alb  3.2<L>  /  TBili  0.5  /  DBili  x   /  AST  16  /  ALT  27  /  AlkPhos  174<H>  07-21    PTT - ( 21 Jul 2019 15:55 )  PTT:82.3 sec    CAPILLARY BLOOD GLUCOSE      POCT Blood Glucose.: 141 mg/dL (21 Jul 2019 16:50)  POCT Blood Glucose.: 167 mg/dL (21 Jul 2019 12:13)  POCT Blood Glucose.: 75 mg/dL (21 Jul 2019 07:55)  POCT Blood Glucose.: 161 mg/dL (20 Jul 2019 21:51)  POCT Blood Glucose.: 88 mg/dL (20 Jul 2019 17:27)            RADIOLOGY & ADDITIONAL TESTS:  EKG :     Imaging Personally Reviewed:  [ ] YES  [ ] NO  HEALTH ISSUES - PROBLEM Dx:  Delirium  Anemia: Anemia  Acute respiratory failure with hypoxia: Acute respiratory failure with hypoxia  Acute systolic heart failure: Acute systolic heart failure  Agitation: Agitation  HTN (hypertension): HTN (hypertension)  Transaminitis: Transaminitis  VINAY (acute kidney injury): VINAY (acute kidney injury)  Uncontrolled type 2 diabetes mellitus with hyperglycemia: Uncontrolled type 2 diabetes mellitus with hyperglycemia  Aspiration pneumonia: Aspiration pneumonia  Ischemia of left lower extremity: Ischemia of left lower extremity  NSTEMI (non-ST elevated myocardial infarction): NSTEMI (non-ST elevated myocardial infarction)  Shock: Shock

## 2019-07-21 NOTE — PROGRESS NOTE ADULT - ASSESSMENT
69M w/ PMH of poor med compliance, DM type 2, HTN, PAD s/p recent surgery at [facility], transferred to [facility] CCU for NSTEMI with cardiogenic shock; course complicated by acute respiratory failure with hypoxia and septic shock secondary to suspected aspiration pneumonia requiring intubation (now extubated 7/8) and downgraded from CCU. Currently pending further evaluation by vascular surgery for LLE occluded vascular graft. Patient is planned for Left BKA on 7/22/19.

## 2019-07-21 NOTE — PROGRESS NOTE ADULT - PROBLEM SELECTOR PLAN 7
HgA1c 10.0, FS stable  - Lantus 20 units qHS--->10 units once NPO   - HOLD Humalog 3 units TID with meals while NPO    - monitor FS  - Hypoglycemia protocol

## 2019-07-21 NOTE — PROGRESS NOTE ADULT - SUBJECTIVE AND OBJECTIVE BOX
VASCULAR SURGERY DAILY PROGRESS NOTE:       Interval: Nephew of patient consented for BKA possible AKA of LLE. Patient reports feeling well this morning.      OBJECTIVE:    MEDICATIONS  (STANDING):  aspirin  chewable 81 milliGRAM(s) Oral daily  atorvastatin 80 milliGRAM(s) Oral at bedtime  chlorhexidine 0.12% Liquid 15 milliLiter(s) Oral Mucosa two times a day  clopidogrel Tablet 75 milliGRAM(s) Oral daily  diVALproex  milliGRAM(s) Oral two times a day  furosemide   Injectable 40 milliGRAM(s) IV Push daily  heparin  Infusion. 1800 Unit(s)/Hr (18 mL/Hr) IV Continuous <Continuous>  insulin glargine Injectable (LANTUS) 10 Unit(s) SubCutaneous at bedtime  insulin lispro (HumaLOG) corrective regimen sliding scale   SubCutaneous Before meals and at bedtime  lisinopril 2.5 milliGRAM(s) Oral daily  melatonin 3 milliGRAM(s) Oral at bedtime  metoprolol tartrate 25 milliGRAM(s) Oral two times a day  nicotine -   7 mG/24Hr(s) Patch 1 patch Transdermal daily    MEDICATIONS  (PRN):  acetaminophen   Tablet .. 650 milliGRAM(s) Oral every 6 hours PRN Temp greater or equal to 38C (100.4F), Mild Pain (1 - 3)  valproate sodium IVPB 250 milliGRAM(s) IV Intermittent two times a day PRN Agitation      Vital Signs Last 24 Hrs  T(C): 36.5 (21 Jul 2019 05:10), Max: 36.5 (21 Jul 2019 05:10)  T(F): 97.7 (21 Jul 2019 05:10), Max: 97.7 (21 Jul 2019 05:10)  HR: 78 (21 Jul 2019 05:10) (77 - 85)  BP: 104/66 (21 Jul 2019 05:10) (94/55 - 111/63)  BP(mean): --  RR: 18 (21 Jul 2019 05:10) (18 - 18)  SpO2: 100% (21 Jul 2019 05:10) (95% - 100%)      I&O's Detail    20 Jul 2019 07:01  -  21 Jul 2019 07:00  --------------------------------------------------------  IN:    heparin  Infusion.: 216 mL    Oral Fluid: 520 mL  Total IN: 736 mL    OUT:  Total OUT: 0 mL    Total NET: 736 mL      LABS:                        8.6    7.50  )-----------( 381      ( 20 Jul 2019 11:10 )             27.8     07-20    142  |  105  |  23  ----------------------------<  80  3.7   |  22  |  0.99    Ca    8.2<L>      20 Jul 2019 05:20      PTT - ( 20 Jul 2019 11:05 )  PTT:60.9 sec        PHYSICAL EXAM:  Constitutional: NAD   ENMT: normal facies, symmetric  Neuro: A&O x 1  Respiratory: Normal respiratory effort   Gastrointestinal: abdomen soft, nontender, nondistended. No obvious masses.  Extremities: RLE cool, no dopplerable signals, discoloration of toes, LLE: cool, no dopplerable signals, discoloration of toes with tissue loss of 1st toe

## 2019-07-21 NOTE — PROGRESS NOTE ADULT - PROBLEM SELECTOR PLAN 1
PAD s/p recent R Leg surgery at [facility]  -VA duplex of b/l LE: 07/06: On the right, there is a severe flow-limiting stenosis of the popliteal artery and the trifurcation arteries are occluded in the calf.  On the left, the bypass graft is occluded, the popliteal artery is occluded and the trifurcation arteries are occluded.  - A Vascular surgery team plans on performing Left  BKA on 7/22/19   - RCRI of 3 with a 15% mortality risk from cardiac events  / Cardiology clearance is needed / as discussed with the vascular surgery resident /please see the cardiology note/clearance  on 7/15/19 and see  for full recommendation  - c/w DAPT, IV heparin gtt, statin/ MOnitor PTT  - NPO after midnight / Decrease Lantus while NPO and HOLD Premeal insulin

## 2019-07-21 NOTE — PROGRESS NOTE ADULT - PROBLEM SELECTOR PLAN 3
- TTE with EF 20-25%, global systolic dysfunction  - c/w lasix 40mg IV daily/ to be held before surgery to avoid hypotension in the intraoperative period   - VINAY resolved (likely had ATN)  - on 7/16 started Lisinopril 2.5mg daily, monitor renal function   - Cardiology following  - Strict I and O, daily weights( incotinent)

## 2019-07-21 NOTE — CHART NOTE - NSCHARTNOTEFT_GEN_A_CORE
Nutrition Follow Up Note  Patient seen for: Nutrition consult for assessment, calorie count, education.     Source: Comprehensive chart review, previous RD notes, RN, Pt    Diet : Dysphagia 2 Mechanical soft, thin liquids, Glucerna Shake 8oz 2x/day    As per chart, Pt is a 69 year old male, PMH HLD, HTN, type 2 DM. S/P recent surgery at [facility], transferred to [facility] CCU for NSTEMI with cardiogenic shock; course complicated by acute respiratory failure with hypoxia and septic shock secondary to suspected aspiration pneumonia requiring intubation (now extubated ) and downgraded from CCU. Last seen on  by speech and swallow recommending a dysphagia 2, thin liquids diet order. Currently pending further evaluation by vascular surgery for LLE occluded vascular graft. Plan for O.R. tomorrow 19 for BKA possible AKA of LLE.     Patient A&Ox1, no family at bedside. RN reports calorie count initiated this morning for breakfast. Pt consumed 1/2 egg whites, 1/2 bowl of oatmeal and 1/2 cup of orange juice. RN states Pt does not consume the Glucerna Shakes or health shakes. Tried to talk to patient about importance of consuming adequate nutrition, however nutrition education not appropriate at this time due to Pt's current mental status. No reports of nausea/vomiting/diarrhea/constipation. Last BM documented on . Reported to tolerating diet consistency.     PO intake : 0-95%    Source for PO intake: flow sheets, RN    Enteral /Parenteral Nutrition: N/A    Daily Weight in k.2 (-), Weight in k.2 (-)  % Weight Change: No change     Pertinent Medications: MEDICATIONS  (STANDING):  aspirin  chewable 81 milliGRAM(s) Oral daily  atorvastatin 80 milliGRAM(s) Oral at bedtime  chlorhexidine 0.12% Liquid 15 milliLiter(s) Oral Mucosa two times a day  clopidogrel Tablet 75 milliGRAM(s) Oral daily  diVALproex  milliGRAM(s) Oral two times a day  furosemide   Injectable 40 milliGRAM(s) IV Push daily  heparin  Infusion. 1800 Unit(s)/Hr (18 mL/Hr) IV Continuous <Continuous>  insulin glargine Injectable (LANTUS) 10 Unit(s) SubCutaneous at bedtime  insulin lispro (HumaLOG) corrective regimen sliding scale   SubCutaneous Before meals and at bedtime  lisinopril 2.5 milliGRAM(s) Oral daily  melatonin 3 milliGRAM(s) Oral at bedtime  metoprolol tartrate 25 milliGRAM(s) Oral two times a day  nicotine -   7 mG/24Hr(s) Patch 1 patch Transdermal daily    MEDICATIONS  (PRN):  acetaminophen   Tablet .. 650 milliGRAM(s) Oral every 6 hours PRN Temp greater or equal to 38C (100.4F), Mild Pain (1 - 3)  valproate sodium IVPB 250 milliGRAM(s) IV Intermittent two times a day PRN Agitation     Finger Sticks:  POCT Blood Glucose.: 75 mg/dL ( @ 07:55)  POCT Blood Glucose.: 161 mg/dL ( @ 21:51)  POCT Blood Glucose.: 88 mg/dL ( @ 17:27)  POCT Blood Glucose.: 80 mg/dL ( @ 11:57)      Skin per nursing documentation: Surgical incision s/p bypass 7/3, as per flow sheets  Edema: None noted as per flow sheets    Estimated Needs:   [X] no change since previous assessment    Previous Nutrition Diagnosis: Inadequate oral intake  Nutrition Diagnosis is: Ongoing, being addressed with PO diet, nutritional supplementation, encouragement of good PO intake, initiation of calorie count    New Nutrition Diagnosis: N/A    Recommend  1) Continue with current diet order. Do not feel the need to add consistent carbohydrates at this time due to Pt not eating well (blood sugars are WNL). Recommend adding restrictions once PO intake improves.  2) Continue to encourage good po intake and honor food preferences as able.  3) Monitor pt's PO intake, weight, skin, edema, GI distress.  4) Follow Up with results of calorie count (). Plan for O.R. tomorrow for surgery.    Monitoring and Evaluation:     Continue to monitor Nutritional intake, Tolerance to diet prescription, weights, labs, skin integrity    RD remains available upon request and will follow up per protocol Nutrition Follow Up Note  Patient seen for: Nutrition consult for assessment, calorie count, education.     Source: Comprehensive chart review, previous RD notes, RN, Pt    Diet : Dysphagia 2 Mechanical soft, thin liquids, Glucerna Shake 8oz 2x/day    As per chart, Pt is a 69 year old male, PMH HLD, HTN, type 2 DM. S/P recent surgery at [facility], transferred to [facility] CCU for NSTEMI with cardiogenic shock; course complicated by acute respiratory failure with hypoxia and septic shock secondary to suspected aspiration pneumonia requiring intubation (now extubated ) and downgraded from CCU. Last seen on  by speech and swallow recommending a dysphagia 2, thin liquids diet order. Currently pending further evaluation by vascular surgery for LLE occluded vascular graft. Plan for O.R. tomorrow 19 for BKA possible AKA of LLE.     Patient A&Ox1, no family at bedside. RN reports calorie count initiated this morning for breakfast. Pt consumed 1/2 egg whites, 1/2 bowl of oatmeal and 1/2 cup of orange juice. RN states Pt does not consume the Glucerna Shakes or health shakes. Tried to talk to patient about importance of consuming adequate nutrition, however nutrition education not appropriate at this time due to Pt's current mental status. No reports of nausea/vomiting/diarrhea/constipation. Last BM documented on . Reported to tolerating diet consistency.     PO intake : 0-95%    Source for PO intake: flow sheets, RN    Enteral /Parenteral Nutrition: N/A    Daily Weight in k.2 (-), Weight in k.2 (-)  % Weight Change: No change     Pertinent Medications: MEDICATIONS  (STANDING):  aspirin  chewable 81 milliGRAM(s) Oral daily  atorvastatin 80 milliGRAM(s) Oral at bedtime  chlorhexidine 0.12% Liquid 15 milliLiter(s) Oral Mucosa two times a day  clopidogrel Tablet 75 milliGRAM(s) Oral daily  diVALproex  milliGRAM(s) Oral two times a day  furosemide   Injectable 40 milliGRAM(s) IV Push daily  heparin  Infusion. 1800 Unit(s)/Hr (18 mL/Hr) IV Continuous <Continuous>  insulin glargine Injectable (LANTUS) 10 Unit(s) SubCutaneous at bedtime  insulin lispro (HumaLOG) corrective regimen sliding scale   SubCutaneous Before meals and at bedtime  lisinopril 2.5 milliGRAM(s) Oral daily  melatonin 3 milliGRAM(s) Oral at bedtime  metoprolol tartrate 25 milliGRAM(s) Oral two times a day  nicotine -   7 mG/24Hr(s) Patch 1 patch Transdermal daily    MEDICATIONS  (PRN):  acetaminophen   Tablet .. 650 milliGRAM(s) Oral every 6 hours PRN Temp greater or equal to 38C (100.4F), Mild Pain (1 - 3)  valproate sodium IVPB 250 milliGRAM(s) IV Intermittent two times a day PRN Agitation     Finger Sticks:  POCT Blood Glucose.: 75 mg/dL ( @ 07:55)  POCT Blood Glucose.: 161 mg/dL ( @ 21:51)  POCT Blood Glucose.: 88 mg/dL ( @ 17:27)  POCT Blood Glucose.: 80 mg/dL ( @ 11:57)      Skin per nursing documentation: Surgical incision s/p bypass 7/3, as per flow sheets  Edema: None noted as per flow sheets    Estimated Needs:   [X] no change since previous assessment    Previous Nutrition Diagnosis: Inadequate oral intake  Nutrition Diagnosis is: Ongoing, being addressed with PO diet, nutritional supplementation, encouragement of good PO intake, initiation of calorie count    New Nutrition Diagnosis: N/A    Recommend  1) Continue with current diet order. Defer consistency to medical team if status changes. Do not feel the need to add consistent carbohydrates at this time due to Pt not eating well (blood sugars are WNL). Recommend adding restrictions once PO intake improves.  2) Continue to encourage good po intake and honor food preferences as able.  3) Monitor pt's PO intake, weight, skin, edema, GI distress.  4) Follow Up with results of calorie count (). Plan for O.R. tomorrow for surgery.    Monitoring and Evaluation:     Continue to monitor Nutritional intake, Tolerance to diet prescription, weights, labs, skin integrity    RD remains available upon request and will follow up per protocol

## 2019-07-21 NOTE — CHART NOTE - NSCHARTNOTEFT_GEN_A_CORE
Vascular Surgery Preop Note    Patient is a 69y old  Male who presents with a chief complaint of NSTEMI.  Vascular surgery consulted for no dopplerable signals    Diagnosis:  Procedure: Left lower extremity below knee/possible above the knee amputation   Surgeon: Dr. Mayo                          8.6    7.50  )-----------( 381      ( 20 Jul 2019 11:10 )             27.8     07-20    142  |  105  |  23  ----------------------------<  80  3.7   |  22  |  0.99    Ca    8.2<L>      20 Jul 2019 05:20      PTT - ( 20 Jul 2019 11:05 )  PTT:60.9 sec      [ ] Type & Screen  [ ] CBC  [ ] BMP  [ ] PT/PTT/INR  [ ] Urinalysis  [ ] Chest X-ray  [ ] EKG  [ ] NPO/IVF  [ ] Consent  [ ] Clearance  [ ] Added on to OR Schedule  [ ] Anti-coagulation held  [ ] MRSA/MSSA Nasal Screen Vascular Surgery Preop Note    Patient is a 69y old  Male who presents with a chief complaint of NSTEMI.  Vascular surgery consulted for no dopplerable signals    Diagnosis:  Procedure: Left lower extremity below knee/possible above the knee amputation   Surgeon: Dr. Mayo                          8.6    7.50  )-----------( 381      ( 20 Jul 2019 11:10 )             27.8     07-20    142  |  105  |  23  ----------------------------<  80  3.7   |  22  |  0.99    Ca    8.2<L>      20 Jul 2019 05:20      PTT - ( 20 Jul 2019 11:05 )  PTT:60.9 sec      [ ] Type & Screen  [ ] CBC  [ ] BMP  [ ] PT/PTT/INR  [ ] Urinalysis  [ ] Chest X-ray  [ ] EKG  [ ] NPO/IVF  [ ] Consent  [ ] Clearance  [ ] Added on to OR Schedule  [ ] Anti-coagulation to be held when patient called to OR   [ ] MRSA/MSSA Nasal Screen

## 2019-07-22 ENCOUNTER — RESULT REVIEW (OUTPATIENT)
Age: 69
End: 2019-07-22

## 2019-07-22 LAB
ANION GAP SERPL CALC-SCNC: 12 MMOL/L — SIGNIFICANT CHANGE UP (ref 5–17)
APTT BLD: 29.6 SEC — SIGNIFICANT CHANGE UP (ref 27.5–36.3)
BUN SERPL-MCNC: 18 MG/DL — SIGNIFICANT CHANGE UP (ref 7–23)
CALCIUM SERPL-MCNC: 8.6 MG/DL — SIGNIFICANT CHANGE UP (ref 8.4–10.5)
CHLORIDE SERPL-SCNC: 104 MMOL/L — SIGNIFICANT CHANGE UP (ref 96–108)
CO2 SERPL-SCNC: 25 MMOL/L — SIGNIFICANT CHANGE UP (ref 22–31)
CREAT SERPL-MCNC: 0.95 MG/DL — SIGNIFICANT CHANGE UP (ref 0.5–1.3)
GAS PNL BLDA: SIGNIFICANT CHANGE UP
GAS PNL BLDA: SIGNIFICANT CHANGE UP
GLUCOSE BLDC GLUCOMTR-MCNC: 209 MG/DL — HIGH (ref 70–99)
GLUCOSE BLDC GLUCOMTR-MCNC: 212 MG/DL — HIGH (ref 70–99)
GLUCOSE BLDC GLUCOMTR-MCNC: 235 MG/DL — HIGH (ref 70–99)
GLUCOSE BLDC GLUCOMTR-MCNC: 237 MG/DL — HIGH (ref 70–99)
GLUCOSE BLDC GLUCOMTR-MCNC: 259 MG/DL — HIGH (ref 70–99)
GLUCOSE SERPL-MCNC: 227 MG/DL — HIGH (ref 70–99)
HCT VFR BLD CALC: 24.6 % — LOW (ref 39–50)
HCT VFR BLD CALC: 28 % — LOW (ref 39–50)
HCT VFR BLD CALC: 29.2 % — LOW (ref 39–50)
HGB BLD-MCNC: 8 G/DL — LOW (ref 13–17)
HGB BLD-MCNC: 9.4 G/DL — LOW (ref 13–17)
HGB BLD-MCNC: 9.6 G/DL — LOW (ref 13–17)
INR BLD: 1.36 RATIO — HIGH (ref 0.88–1.16)
MAGNESIUM SERPL-MCNC: 2.2 MG/DL — SIGNIFICANT CHANGE UP (ref 1.6–2.6)
MCHC RBC-ENTMCNC: 30.6 PG — SIGNIFICANT CHANGE UP (ref 27–34)
MCHC RBC-ENTMCNC: 30.6 PG — SIGNIFICANT CHANGE UP (ref 27–34)
MCHC RBC-ENTMCNC: 31.3 PG — SIGNIFICANT CHANGE UP (ref 27–34)
MCHC RBC-ENTMCNC: 32.5 GM/DL — SIGNIFICANT CHANGE UP (ref 32–36)
MCHC RBC-ENTMCNC: 32.8 GM/DL — SIGNIFICANT CHANGE UP (ref 32–36)
MCHC RBC-ENTMCNC: 33.4 GM/DL — SIGNIFICANT CHANGE UP (ref 32–36)
MCV RBC AUTO: 93.2 FL — SIGNIFICANT CHANGE UP (ref 80–100)
MCV RBC AUTO: 93.6 FL — SIGNIFICANT CHANGE UP (ref 80–100)
MCV RBC AUTO: 94.2 FL — SIGNIFICANT CHANGE UP (ref 80–100)
PHOSPHATE SERPL-MCNC: 2.7 MG/DL — SIGNIFICANT CHANGE UP (ref 2.5–4.5)
PLATELET # BLD AUTO: 312 K/UL — SIGNIFICANT CHANGE UP (ref 150–400)
PLATELET # BLD AUTO: 321 K/UL — SIGNIFICANT CHANGE UP (ref 150–400)
PLATELET # BLD AUTO: 338 K/UL — SIGNIFICANT CHANGE UP (ref 150–400)
POTASSIUM SERPL-MCNC: 3.7 MMOL/L — SIGNIFICANT CHANGE UP (ref 3.5–5.3)
POTASSIUM SERPL-SCNC: 3.7 MMOL/L — SIGNIFICANT CHANGE UP (ref 3.5–5.3)
PROTHROM AB SERPL-ACNC: 15.8 SEC — HIGH (ref 10–12.9)
RBC # BLD: 2.61 M/UL — LOW (ref 4.2–5.8)
RBC # BLD: 3 M/UL — LOW (ref 4.2–5.8)
RBC # BLD: 3.14 M/UL — LOW (ref 4.2–5.8)
RBC # FLD: 12.7 % — SIGNIFICANT CHANGE UP (ref 10.3–14.5)
RBC # FLD: 12.8 % — SIGNIFICANT CHANGE UP (ref 10.3–14.5)
RBC # FLD: 13.5 % — SIGNIFICANT CHANGE UP (ref 10.3–14.5)
SODIUM SERPL-SCNC: 141 MMOL/L — SIGNIFICANT CHANGE UP (ref 135–145)
WBC # BLD: 8 K/UL — SIGNIFICANT CHANGE UP (ref 3.8–10.5)
WBC # BLD: 9.3 K/UL — SIGNIFICANT CHANGE UP (ref 3.8–10.5)
WBC # BLD: 9.4 K/UL — SIGNIFICANT CHANGE UP (ref 3.8–10.5)
WBC # FLD AUTO: 8 K/UL — SIGNIFICANT CHANGE UP (ref 3.8–10.5)
WBC # FLD AUTO: 9.3 K/UL — SIGNIFICANT CHANGE UP (ref 3.8–10.5)
WBC # FLD AUTO: 9.4 K/UL — SIGNIFICANT CHANGE UP (ref 3.8–10.5)

## 2019-07-22 PROCEDURE — 88307 TISSUE EXAM BY PATHOLOGIST: CPT | Mod: 26

## 2019-07-22 PROCEDURE — 97605 NEG PRS WND THER DME<=50SQCM: CPT | Mod: 59

## 2019-07-22 PROCEDURE — 88311 DECALCIFY TISSUE: CPT | Mod: 26

## 2019-07-22 PROCEDURE — 27880 AMPUTATION OF LOWER LEG: CPT

## 2019-07-22 RX ORDER — GLUCAGON INJECTION, SOLUTION 0.5 MG/.1ML
1 INJECTION, SOLUTION SUBCUTANEOUS ONCE
Refills: 0 | Status: DISCONTINUED | OUTPATIENT
Start: 2019-07-22 | End: 2019-08-07

## 2019-07-22 RX ORDER — DEXTROSE 50 % IN WATER 50 %
25 SYRINGE (ML) INTRAVENOUS ONCE
Refills: 0 | Status: DISCONTINUED | OUTPATIENT
Start: 2019-07-22 | End: 2019-08-07

## 2019-07-22 RX ORDER — DIVALPROEX SODIUM 500 MG/1
250 TABLET, DELAYED RELEASE ORAL
Refills: 0 | Status: DISCONTINUED | OUTPATIENT
Start: 2019-07-23 | End: 2019-07-24

## 2019-07-22 RX ORDER — DEXTROSE 50 % IN WATER 50 %
12.5 SYRINGE (ML) INTRAVENOUS ONCE
Refills: 0 | Status: DISCONTINUED | OUTPATIENT
Start: 2019-07-22 | End: 2019-08-07

## 2019-07-22 RX ORDER — CLOPIDOGREL BISULFATE 75 MG/1
75 TABLET, FILM COATED ORAL DAILY
Refills: 0 | Status: DISCONTINUED | OUTPATIENT
Start: 2019-07-22 | End: 2019-07-28

## 2019-07-22 RX ORDER — INSULIN GLARGINE 100 [IU]/ML
10 INJECTION, SOLUTION SUBCUTANEOUS AT BEDTIME
Refills: 0 | Status: DISCONTINUED | OUTPATIENT
Start: 2019-07-22 | End: 2019-07-24

## 2019-07-22 RX ORDER — INSULIN LISPRO 100/ML
VIAL (ML) SUBCUTANEOUS
Refills: 0 | Status: DISCONTINUED | OUTPATIENT
Start: 2019-07-22 | End: 2019-07-28

## 2019-07-22 RX ORDER — DEXTROSE 50 % IN WATER 50 %
15 SYRINGE (ML) INTRAVENOUS ONCE
Refills: 0 | Status: DISCONTINUED | OUTPATIENT
Start: 2019-07-22 | End: 2019-08-07

## 2019-07-22 RX ORDER — ASPIRIN/CALCIUM CARB/MAGNESIUM 324 MG
81 TABLET ORAL DAILY
Refills: 0 | Status: DISCONTINUED | OUTPATIENT
Start: 2019-07-22 | End: 2019-07-28

## 2019-07-22 RX ORDER — METOPROLOL TARTRATE 50 MG
25 TABLET ORAL
Refills: 0 | Status: DISCONTINUED | OUTPATIENT
Start: 2019-07-23 | End: 2019-07-24

## 2019-07-22 RX ORDER — INSULIN LISPRO 100/ML
VIAL (ML) SUBCUTANEOUS EVERY 6 HOURS
Refills: 0 | Status: DISCONTINUED | OUTPATIENT
Start: 2019-07-22 | End: 2019-07-22

## 2019-07-22 RX ORDER — ACETAMINOPHEN 500 MG
650 TABLET ORAL EVERY 6 HOURS
Refills: 0 | Status: DISCONTINUED | OUTPATIENT
Start: 2019-07-22 | End: 2019-07-24

## 2019-07-22 RX ORDER — ATORVASTATIN CALCIUM 80 MG/1
80 TABLET, FILM COATED ORAL AT BEDTIME
Refills: 0 | Status: DISCONTINUED | OUTPATIENT
Start: 2019-07-22 | End: 2019-07-24

## 2019-07-22 RX ORDER — OXYCODONE HYDROCHLORIDE 5 MG/1
10 TABLET ORAL EVERY 4 HOURS
Refills: 0 | Status: DISCONTINUED | OUTPATIENT
Start: 2019-07-22 | End: 2019-07-22

## 2019-07-22 RX ORDER — INSULIN LISPRO 100/ML
VIAL (ML) SUBCUTANEOUS
Refills: 0 | Status: DISCONTINUED | OUTPATIENT
Start: 2019-07-22 | End: 2019-07-22

## 2019-07-22 RX ORDER — LISINOPRIL 2.5 MG/1
2.5 TABLET ORAL DAILY
Refills: 0 | Status: DISCONTINUED | OUTPATIENT
Start: 2019-07-23 | End: 2019-07-24

## 2019-07-22 RX ORDER — VALPROIC ACID (AS SODIUM SALT) 250 MG/5ML
250 SOLUTION, ORAL ORAL
Refills: 0 | Status: DISCONTINUED | OUTPATIENT
Start: 2019-07-23 | End: 2019-07-24

## 2019-07-22 RX ORDER — INSULIN GLARGINE 100 [IU]/ML
20 INJECTION, SOLUTION SUBCUTANEOUS AT BEDTIME
Refills: 0 | Status: DISCONTINUED | OUTPATIENT
Start: 2019-07-22 | End: 2019-07-22

## 2019-07-22 RX ORDER — LANOLIN ALCOHOL/MO/W.PET/CERES
3 CREAM (GRAM) TOPICAL AT BEDTIME
Refills: 0 | Status: DISCONTINUED | OUTPATIENT
Start: 2019-07-22 | End: 2019-08-07

## 2019-07-22 RX ORDER — INSULIN LISPRO 100/ML
VIAL (ML) SUBCUTANEOUS AT BEDTIME
Refills: 0 | Status: DISCONTINUED | OUTPATIENT
Start: 2019-07-22 | End: 2019-07-28

## 2019-07-22 RX ORDER — NICOTINE POLACRILEX 2 MG
1 GUM BUCCAL DAILY
Refills: 0 | Status: DISCONTINUED | OUTPATIENT
Start: 2019-07-22 | End: 2019-08-07

## 2019-07-22 RX ORDER — SODIUM CHLORIDE 9 MG/ML
1000 INJECTION, SOLUTION INTRAVENOUS
Refills: 0 | Status: DISCONTINUED | OUTPATIENT
Start: 2019-07-22 | End: 2019-08-07

## 2019-07-22 RX ORDER — INSULIN GLARGINE 100 [IU]/ML
10 INJECTION, SOLUTION SUBCUTANEOUS AT BEDTIME
Refills: 0 | Status: DISCONTINUED | OUTPATIENT
Start: 2019-07-22 | End: 2019-07-22

## 2019-07-22 RX ORDER — OXYCODONE HYDROCHLORIDE 5 MG/1
5 TABLET ORAL EVERY 4 HOURS
Refills: 0 | Status: DISCONTINUED | OUTPATIENT
Start: 2019-07-22 | End: 2019-07-22

## 2019-07-22 RX ORDER — INSULIN LISPRO 100/ML
3 VIAL (ML) SUBCUTANEOUS
Refills: 0 | Status: DISCONTINUED | OUTPATIENT
Start: 2019-07-22 | End: 2019-07-22

## 2019-07-22 RX ADMIN — ATORVASTATIN CALCIUM 80 MILLIGRAM(S): 80 TABLET, FILM COATED ORAL at 22:15

## 2019-07-22 RX ADMIN — Medication 6: at 18:15

## 2019-07-22 RX ADMIN — DIVALPROEX SODIUM 250 MILLIGRAM(S): 500 TABLET, DELAYED RELEASE ORAL at 06:48

## 2019-07-22 RX ADMIN — Medication 40 MILLIGRAM(S): at 07:14

## 2019-07-22 RX ADMIN — CHLORHEXIDINE GLUCONATE 15 MILLILITER(S): 213 SOLUTION TOPICAL at 07:15

## 2019-07-22 RX ADMIN — Medication 4: at 09:10

## 2019-07-22 RX ADMIN — INSULIN GLARGINE 10 UNIT(S): 100 INJECTION, SOLUTION SUBCUTANEOUS at 22:15

## 2019-07-22 NOTE — PROGRESS NOTE ADULT - ASSESSMENT
68 y/o M with a PMH of HTN, DM, PAD, s/p LLE fem-pop bypass with vein graft who is transferred to [facility] for management of cardiogenic shock and NSTEMI, vascular surgery consulted for no dopplerable signals    - Continue to watch for demarcation of b/l lower extremities   - Plan for OR TODAY. Consent in chart.  Nephew of patient consented for BKA possible AKA of LLE.  - cardiac/medicine clearance before OR  - d/c hep gtt 7AM for OR  - Care per primary team    Vacular Surgery   [id number]

## 2019-07-22 NOTE — PROGRESS NOTE ADULT - SUBJECTIVE AND OBJECTIVE BOX
Vascular Surgery Post-op Note   STATUS POST:  left BKA   POST OPERATIVE DAY #: 0    Patient seen and examined bedside. alert, at baseline oriented x 1. no complaints. pain is well controlled. denies dizziness, SOB, CP or palpitations.     Vital Signs Last 24 Hrs  T(C): 36.4 (22 Jul 2019 19:00), Max: 36.6 (21 Jul 2019 20:45)  T(F): 97.5 (22 Jul 2019 19:00), Max: 97.9 (21 Jul 2019 20:45)  HR: 82 (22 Jul 2019 19:30) (65 - 90)  BP: 105/59 (22 Jul 2019 19:30) (82/57 - 110/58)  BP(mean): 79 (22 Jul 2019 19:30) (66 - 79)  RR: 16 (22 Jul 2019 19:30) (14 - 17)  SpO2: 100% (22 Jul 2019 19:30) (94% - 100%)  I&O's Summary    21 Jul 2019 07:01  -  22 Jul 2019 07:00  --------------------------------------------------------  IN: 840 mL / OUT: 0 mL / NET: 840 mL    22 Jul 2019 07:01  -  22 Jul 2019 19:56  --------------------------------------------------------  IN: 300 mL / OUT: 0 mL / NET: 300 mL      I&O's Detail    21 Jul 2019 07:01  -  22 Jul 2019 07:00  --------------------------------------------------------  IN:    Oral Fluid: 840 mL  Total IN: 840 mL    OUT:  Total OUT: 0 mL    Total NET: 840 mL      22 Jul 2019 07:01  -  22 Jul 2019 19:56  --------------------------------------------------------  IN:    Packed Red Blood Cells: 300 mL  Total IN: 300 mL    OUT:  Total OUT: 0 mL    Total NET: 300 mL    MEDICATIONS  (STANDING):  acetaminophen   Tablet .. 650 milliGRAM(s) Oral every 6 hours  aspirin enteric coated 81 milliGRAM(s) Oral daily  clopidogrel Tablet 75 milliGRAM(s) Oral daily  dextrose 5%. 1000 milliLiter(s) (50 mL/Hr) IV Continuous <Continuous>  dextrose 50% Injectable 12.5 Gram(s) IV Push once  dextrose 50% Injectable 25 Gram(s) IV Push once  dextrose 50% Injectable 25 Gram(s) IV Push once  insulin glargine Injectable (LANTUS) 10 Unit(s) SubCutaneous at bedtime  insulin lispro (HumaLOG) corrective regimen sliding scale   SubCutaneous every 6 hours    MEDICATIONS  (PRN):  dextrose 40% Gel 15 Gram(s) Oral once PRN Blood Glucose LESS THAN 70 milliGRAM(s)/deciliter  glucagon  Injectable 1 milliGRAM(s) IntraMuscular once PRN Glucose LESS THAN 70 milligrams/deciliter  oxyCODONE    IR 5 milliGRAM(s) Oral every 4 hours PRN Moderate Pain (4 - 6)  oxyCODONE    IR 10 milliGRAM(s) Oral every 4 hours PRN Moderate Pain (4 - 6)      LABS:                        8.0    9.4   )-----------( 321      ( 22 Jul 2019 15:01 )             24.6     07-22    141  |  104  |  18  ----------------------------<  227<H>  3.7   |  25  |  0.95    Ca    8.6      22 Jul 2019 10:15  Phos  2.7     07-22  Mg     2.2     07-22    TPro  7.2  /  Alb  3.2<L>  /  TBili  0.5  /  DBili  x   /  AST  16  /  ALT  27  /  AlkPhos  174<H>  07-21    PT/INR - ( 22 Jul 2019 10:15 )   PT: 15.8 sec;   INR: 1.36 ratio         PTT - ( 22 Jul 2019 10:15 )  PTT:29.6 sec      RADIOLOGY & ADDITIONAL STUDIES:    PHYSICAL EXAM:  Constitutional: NAD, alert  Respiratory: unlabor breathing   Cardiovascular: RRR  Extremities: left BKA stump with ACE wrap in place, clean and dry, no strike through blood.     A/P: 69y Male with Hx of foot gangrene due to PVD s/p left BKA POD #0     - Heme/CV: post-op H/H 8/24.6. s/p 1u PRBC in PACU. repeat CBC 2 hours post-transfusion. Non tachycardic, normotensive.   - Diet: clears, ADAT  - Activity: OOB with assistance.   - Labs: f/u post-transfusion CBC, f/u AM labs.   - Pain medication: tylenol/oxycodone prn  - Continue ASA and Plavix  - dressing with prevena. Immobilizer for 48 hours. remove bypass staples prior to discharge.     Shirley Hernandez PA-C

## 2019-07-22 NOTE — PRE-ANESTHESIA EVALUATION ADULT - NSANTHOSAYNRD_GEN_A_CORE
No. LIAM screening performed.  STOP BANG Legend: 0-2 = LOW Risk; 3-4 = INTERMEDIATE Risk; 5-8 = HIGH Risk

## 2019-07-22 NOTE — PROGRESS NOTE ADULT - SUBJECTIVE AND OBJECTIVE BOX
Encounter closed for message has been responded to.   24 hour events/subjective: No c/os. No acute events O/N    PAST HISTORY  --------------------------------------------------------------------------------  No significant changes to PMH, PSH, FHx, SHx, unless otherwise noted    ALLERGIES & MEDICATIONS  --------------------------------------------------------------------------------  Allergies    No Known Allergies    Intolerances      Standing Inpatient Medications  aspirin  chewable 81 milliGRAM(s) Oral daily  atorvastatin 80 milliGRAM(s) Oral at bedtime  chlorhexidine 0.12% Liquid 15 milliLiter(s) Oral Mucosa two times a day  clopidogrel Tablet 75 milliGRAM(s) Oral daily  diVALproex  milliGRAM(s) Oral two times a day  furosemide   Injectable 40 milliGRAM(s) IV Push daily  heparin  Infusion. 1800 Unit(s)/Hr IV Continuous <Continuous>  insulin glargine Injectable (LANTUS) 10 Unit(s) SubCutaneous at bedtime  insulin lispro (HumaLOG) corrective regimen sliding scale   SubCutaneous three times a day before meals  insulin lispro (HumaLOG) corrective regimen sliding scale   SubCutaneous at bedtime  lisinopril 2.5 milliGRAM(s) Oral daily  melatonin 3 milliGRAM(s) Oral at bedtime  metoprolol tartrate 25 milliGRAM(s) Oral two times a day  nicotine -   7 mG/24Hr(s) Patch 1 patch Transdermal daily    PRN Inpatient Medications  acetaminophen   Tablet .. 650 milliGRAM(s) Oral every 6 hours PRN  valproate sodium IVPB 250 milliGRAM(s) IV Intermittent two times a day PRN      REVIEW OF SYSTEMS  --------------------------------------------------------------------------------  Gen: as per HPI  Skin: No rashes  Head/Eyes/Ears/Mouth: No headache;No sore throat  Respiratory: No dyspnea, cough,   CV: No chest pain, PND, orthopnea  GI: as per HPI  : No increased frequency, dysuria, hematuria, nocturia  MSK: No joint pain/swelling; no back pain; no edema  Neuro: No dizziness/lightheadedness, weakness, seizures, numbness, tingling  Psych: No significant nervousness, anxiety, stress, depression    All other systems were reviewed and are negative, except as noted.      LABS/STUDIES  --------------------------------------------------------------------------------              9.2    8.34  >-----------<  402      [07-21-19 @ 15:57]              29.9     142  |  103  |  16  ----------------------------<  139      [07-21-19 @ 11:47]  3.5   |  27  |  1.02        Ca     9.0     [07-21-19 @ 11:47]    TPro  7.2  /  Alb  3.2  /  TBili  0.5  /  DBili  x   /  AST  16  /  ALT  27  /  AlkPhos  174  [07-21-19 @ 11:47]      PTT: 82.3       [07-21-19 @ 15:55]        Glucose, Serum: 139 mg/dL (07-21-19 @ 11:47)          07-20-19 @ 07:01  -  07-21-19 @ 07:00  --------------------------------------------------------  IN: 736 mL / OUT: 0 mL / NET: 736 mL    07-21-19 @ 07:01  -  07-22-19 @ 06:59  --------------------------------------------------------  IN: 840 mL / OUT: 0 mL / NET: 840 mL        VITALS/PHYSICAL EXAM  --------------------------------------------------------------------------------  T(C): 36.4 (07-22-19 @ 04:26), Max: 36.6 (07-21-19 @ 20:45)  HR: 65 (07-22-19 @ 04:26) (65 - 87)  BP: 99/64 (07-22-19 @ 04:26) (99/64 - 124/77)  RR: 17 (07-22-19 @ 04:26) (17 - 18)  SpO2: 96% (07-22-19 @ 04:26) (96% - 100%)  Wt(kg): --      PHYSICAL EXAM:      Constitutional: NAD   ENMT: normal facies, symmetric  Neuro: A&O x 1  Respiratory: Normal respiratory effort   Gastrointestinal: abdomen soft, nontender, nondistended. No obvious masses.  Extremities: RLE cool, no dopplerable signals, discoloration of toes, LLE: cool, no dopplerable

## 2019-07-23 LAB
ANION GAP SERPL CALC-SCNC: 15 MMOL/L — SIGNIFICANT CHANGE UP (ref 5–17)
BASOPHILS # BLD AUTO: 0.02 K/UL — SIGNIFICANT CHANGE UP (ref 0–0.2)
BASOPHILS NFR BLD AUTO: 0.1 % — SIGNIFICANT CHANGE UP (ref 0–2)
BUN SERPL-MCNC: 25 MG/DL — HIGH (ref 7–23)
CALCIUM SERPL-MCNC: 8.7 MG/DL — SIGNIFICANT CHANGE UP (ref 8.4–10.5)
CHLORIDE SERPL-SCNC: 106 MMOL/L — SIGNIFICANT CHANGE UP (ref 96–108)
CO2 SERPL-SCNC: 22 MMOL/L — SIGNIFICANT CHANGE UP (ref 22–31)
CREAT SERPL-MCNC: 1.23 MG/DL — SIGNIFICANT CHANGE UP (ref 0.5–1.3)
EOSINOPHIL # BLD AUTO: 0 K/UL — SIGNIFICANT CHANGE UP (ref 0–0.5)
EOSINOPHIL NFR BLD AUTO: 0 % — SIGNIFICANT CHANGE UP (ref 0–6)
GLUCOSE BLDC GLUCOMTR-MCNC: 144 MG/DL — HIGH (ref 70–99)
GLUCOSE BLDC GLUCOMTR-MCNC: 154 MG/DL — HIGH (ref 70–99)
GLUCOSE BLDC GLUCOMTR-MCNC: 158 MG/DL — HIGH (ref 70–99)
GLUCOSE BLDC GLUCOMTR-MCNC: 176 MG/DL — HIGH (ref 70–99)
GLUCOSE SERPL-MCNC: 163 MG/DL — HIGH (ref 70–99)
HCT VFR BLD CALC: 30.7 % — LOW (ref 39–50)
HGB BLD-MCNC: 9.3 G/DL — LOW (ref 13–17)
IMM GRANULOCYTES NFR BLD AUTO: 0.7 % — SIGNIFICANT CHANGE UP (ref 0–1.5)
LYMPHOCYTES # BLD AUTO: 16.8 % — SIGNIFICANT CHANGE UP (ref 13–44)
LYMPHOCYTES # BLD AUTO: 2.54 K/UL — SIGNIFICANT CHANGE UP (ref 1–3.3)
MCHC RBC-ENTMCNC: 28.5 PG — SIGNIFICANT CHANGE UP (ref 27–34)
MCHC RBC-ENTMCNC: 30.3 GM/DL — LOW (ref 32–36)
MCV RBC AUTO: 94.2 FL — SIGNIFICANT CHANGE UP (ref 80–100)
MONOCYTES # BLD AUTO: 1.13 K/UL — HIGH (ref 0–0.9)
MONOCYTES NFR BLD AUTO: 7.5 % — SIGNIFICANT CHANGE UP (ref 2–14)
NEUTROPHILS # BLD AUTO: 11.3 K/UL — HIGH (ref 1.8–7.4)
NEUTROPHILS NFR BLD AUTO: 74.9 % — SIGNIFICANT CHANGE UP (ref 43–77)
PLATELET # BLD AUTO: 379 K/UL — SIGNIFICANT CHANGE UP (ref 150–400)
POTASSIUM SERPL-MCNC: 4.4 MMOL/L — SIGNIFICANT CHANGE UP (ref 3.5–5.3)
POTASSIUM SERPL-SCNC: 4.4 MMOL/L — SIGNIFICANT CHANGE UP (ref 3.5–5.3)
RBC # BLD: 3.26 M/UL — LOW (ref 4.2–5.8)
RBC # FLD: 15.3 % — HIGH (ref 10.3–14.5)
SODIUM SERPL-SCNC: 143 MMOL/L — SIGNIFICANT CHANGE UP (ref 135–145)
WBC # BLD: 15.09 K/UL — HIGH (ref 3.8–10.5)
WBC # FLD AUTO: 15.09 K/UL — HIGH (ref 3.8–10.5)

## 2019-07-23 PROCEDURE — 99233 SBSQ HOSP IP/OBS HIGH 50: CPT

## 2019-07-23 RX ORDER — HEPARIN SODIUM 5000 [USP'U]/ML
5000 INJECTION INTRAVENOUS; SUBCUTANEOUS EVERY 12 HOURS
Refills: 0 | Status: DISCONTINUED | OUTPATIENT
Start: 2019-07-23 | End: 2019-08-07

## 2019-07-23 RX ADMIN — Medication 1 PATCH: at 11:44

## 2019-07-23 RX ADMIN — Medication 81 MILLIGRAM(S): at 11:44

## 2019-07-23 RX ADMIN — Medication 1: at 08:40

## 2019-07-23 RX ADMIN — LISINOPRIL 2.5 MILLIGRAM(S): 2.5 TABLET ORAL at 06:35

## 2019-07-23 RX ADMIN — Medication 25 MILLIGRAM(S): at 17:24

## 2019-07-23 RX ADMIN — HEPARIN SODIUM 5000 UNIT(S): 5000 INJECTION INTRAVENOUS; SUBCUTANEOUS at 18:44

## 2019-07-23 RX ADMIN — CLOPIDOGREL BISULFATE 75 MILLIGRAM(S): 75 TABLET, FILM COATED ORAL at 11:44

## 2019-07-23 RX ADMIN — Medication 650 MILLIGRAM(S): at 22:12

## 2019-07-23 RX ADMIN — Medication 650 MILLIGRAM(S): at 11:44

## 2019-07-23 RX ADMIN — Medication 650 MILLIGRAM(S): at 11:50

## 2019-07-23 RX ADMIN — Medication 25 MILLIGRAM(S): at 06:35

## 2019-07-23 RX ADMIN — Medication 3 MILLIGRAM(S): at 22:13

## 2019-07-23 RX ADMIN — DIVALPROEX SODIUM 250 MILLIGRAM(S): 500 TABLET, DELAYED RELEASE ORAL at 17:24

## 2019-07-23 RX ADMIN — INSULIN GLARGINE 10 UNIT(S): 100 INJECTION, SOLUTION SUBCUTANEOUS at 22:12

## 2019-07-23 RX ADMIN — Medication 650 MILLIGRAM(S): at 17:25

## 2019-07-23 RX ADMIN — Medication 650 MILLIGRAM(S): at 17:28

## 2019-07-23 RX ADMIN — Medication 1 PATCH: at 18:31

## 2019-07-23 RX ADMIN — DIVALPROEX SODIUM 250 MILLIGRAM(S): 500 TABLET, DELAYED RELEASE ORAL at 06:35

## 2019-07-23 RX ADMIN — Medication 650 MILLIGRAM(S): at 23:00

## 2019-07-23 RX ADMIN — ATORVASTATIN CALCIUM 80 MILLIGRAM(S): 80 TABLET, FILM COATED ORAL at 22:12

## 2019-07-23 RX ADMIN — Medication 1: at 12:45

## 2019-07-23 NOTE — PROGRESS NOTE ADULT - PROBLEM SELECTOR PLAN 6
Enhanced observation. Psychiatry consult appreciated.   - Held Seroquel and Haldol given QTC> 500 ms.   - melatonin 3mg qHS  - C/w Depakote

## 2019-07-23 NOTE — PROGRESS NOTE ADULT - SUBJECTIVE AND OBJECTIVE BOX
Kwame De La Cruz MD, MBA ([facility] Hospitalist)  Pager [phone]  (During off hours please page [phone])      Patient is a 69y old  Male who presents with a chief complaint of NSTEMI (23 Jul 2019 10:17)      SUBJECTIVE / OVERNIGHT EVENTS: Patient s/p left BKA yesterday. No issues overnight. Currently without acute complaints.     MEDICATIONS  (STANDING):  heparin  Injectable 5000 Unit(s) SubCutaneous every 12 hours  aspirin enteric coated 81 milliGRAM(s) Oral daily  clopidogrel Tablet 75 milliGRAM(s) Oral daily  metoprolol tartrate 25 milliGRAM(s) Oral two times a day  atorvastatin 80 milliGRAM(s) Oral at bedtime  lisinopril 2.5 milliGRAM(s) Oral daily  diVALproex  milliGRAM(s) Oral two times a day  insulin glargine Injectable (LANTUS) 10 Unit(s) SubCutaneous at bedtime  insulin lispro (HumaLOG) corrective regimen sliding scale   SubCutaneous three times a day before meals  insulin lispro (HumaLOG) corrective regimen sliding scale   SubCutaneous at bedtime  acetaminophen   Tablet .. 650 milliGRAM(s) Oral every 6 hours  nicotine -   7 mG/24Hr(s) Patch 1 patch Transdermal daily    MEDICATIONS  (PRN):  dextrose 40% Gel 15 Gram(s) Oral once PRN Blood Glucose LESS THAN 70 milliGRAM(s)/deciliter  glucagon  Injectable 1 milliGRAM(s) IntraMuscular once PRN Glucose LESS THAN 70 milligrams/deciliter  melatonin 3 milliGRAM(s) Oral at bedtime PRN Insomnia  oxyCODONE    IR 10 milliGRAM(s) Oral every 4 hours PRN Severe Pain (7 - 10)  oxyCODONE    IR 5 milliGRAM(s) Oral every 4 hours PRN Moderate Pain (4 - 6)  valproate sodium IVPB 250 milliGRAM(s) IV Intermittent two times a day PRN Agitation      Vital Signs Last 24 Hrs  T(C): 36.2 (23 Jul 2019 11:34), Max: 36.6 (22 Jul 2019 20:45)  T(F): 97.2 (23 Jul 2019 11:34), Max: 97.8 (22 Jul 2019 20:45)  HR: 103 (23 Jul 2019 17:23) (72 - 103)  BP: 107/63 (23 Jul 2019 17:23) (98/66 - 107/63)  BP(mean): --  RR: 18 (23 Jul 2019 11:34) (18 - 18)  SpO2: 100% (23 Jul 2019 11:34) (95% - 100%)  CAPILLARY BLOOD GLUCOSE      POCT Blood Glucose.: 144 mg/dL (23 Jul 2019 17:15)  POCT Blood Glucose.: 154 mg/dL (23 Jul 2019 12:18)  POCT Blood Glucose.: 176 mg/dL (23 Jul 2019 07:59)  POCT Blood Glucose.: 212 mg/dL (22 Jul 2019 21:59)    I&O's Summary    22 Jul 2019 07:01  -  23 Jul 2019 07:00  --------------------------------------------------------  IN: 540 mL / OUT: 0 mL / NET: 540 mL    23 Jul 2019 07:01  -  23 Jul 2019 20:06  --------------------------------------------------------  IN: 360 mL / OUT: 0 mL / NET: 360 mL        PHYSICAL EXAM:  GENERAL: NAD, somnelent  HEAD:  Atraumatic, Normocephalic  EYES: EOMI, conjunctiva and sclera clear  NECK: Supple, No JVD  CHEST/LUNG: Clear to auscultation bilaterally; No wheeze  HEART: Regular rate and rhythm; No murmurs, rubs, or gallops  ABDOMEN: Soft, Nontender, Nondistended; Bowel sounds present  EXTREMITIES:  LLE s/p BKA - gently ACE wrapped with knee immobilizer in place  PSYCH: AAOx1, calm      LABS:                        9.3    15.09 )-----------( 379      ( 23 Jul 2019 12:10 )             30.7     07-23    143  |  106  |  25<H>  ----------------------------<  163<H>  4.4   |  22  |  1.23    Ca    8.7      23 Jul 2019 10:06  Phos  2.7     07-22  Mg     2.2     07-22      PT/INR - ( 22 Jul 2019 10:15 )   PT: 15.8 sec;   INR: 1.36 ratio    PTT - ( 22 Jul 2019 10:15 )  PTT:29.6 sec      RADIOLOGY & ADDITIONAL TESTS:    Consultant(s) Notes Reviewed: Vascular    Care Discussed with Consultants/Other Providers: Discussed with the vascular team- plan to maintain dressing with negative pressure prevena for 5 days post-op

## 2019-07-23 NOTE — OCCUPATIONAL THERAPY INITIAL EVALUATION ADULT - PHYSICAL ASSIST/NONPHYSICAL ASSIST, REHAB EVAL
nonverbal cues (demo/gestures)/2 person assist/verbal cues supervision/nonverbal cues (demo/gestures)/verbal cues/2 person assist

## 2019-07-23 NOTE — PROGRESS NOTE ADULT - ASSESSMENT
A/P: 69y Male with Hx of foot gangrene due to PVD s/p left BKA 7/22    - H/H stable  - Diet: clears, ADAT  - Activity: OOB with assistance.   - Continue ASA and Plavix  - dressing with prevena. Immobilizer for 48 hours. remove bypass staples prior to discharge.     Vascular Surgery  [phone]

## 2019-07-23 NOTE — PHYSICAL THERAPY INITIAL EVALUATION ADULT - STRENGTHENING, PT EVAL
GOAL: Pt will improve bilateral LE strength to 3+/5, for increased limb stability, to improve gait in 2 weeks.
GOAL: Pt will improve BUE/BLE strength by 1/2 grade in 2wks to improve overall functional mobility

## 2019-07-23 NOTE — PROGRESS NOTE ADULT - SUBJECTIVE AND OBJECTIVE BOX
VACULAR SURGERY DAILY PROGRESS NOTE:       Subjective:  Patient seen and examined on AM rounds. POD1 s/p left BKA. Did okay overnight, patient was restless per 1:1 PCA. Ace wrap and knee immobilizer in place. AF/VSS.      Objective:    PHYSICAL EXAM:  Constitutional: NAD, alert  Respiratory: unlabor breathing   Cardiovascular: RRR  Extremities: left BKA stump with ACE wrap in place, clean and dry, no strike through blood, immobilizer in place    Vital Signs Last 24 Hrs  T(C): 36.2 (23 Jul 2019 04:30), Max: 36.6 (22 Jul 2019 20:45)  T(F): 97.2 (23 Jul 2019 04:30), Max: 97.8 (22 Jul 2019 20:45)  HR: 96 (23 Jul 2019 04:30) (72 - 96)  BP: 104/69 (23 Jul 2019 04:30) (82/57 - 110/58)  BP(mean): 79 (22 Jul 2019 19:30) (66 - 79)  RR: 18 (23 Jul 2019 04:30) (14 - 18)  SpO2: 96% (23 Jul 2019 04:30) (94% - 100%)    I&O's Detail    22 Jul 2019 07:01  -  23 Jul 2019 07:00  --------------------------------------------------------  IN:    Oral Fluid: 240 mL    Packed Red Blood Cells: 300 mL  Total IN: 540 mL    OUT:  Total OUT: 0 mL    Total NET: 540 mL          Daily     Daily     MEDICATIONS  (STANDING):  acetaminophen   Tablet .. 650 milliGRAM(s) Oral every 6 hours  aspirin enteric coated 81 milliGRAM(s) Oral daily  atorvastatin 80 milliGRAM(s) Oral at bedtime  clopidogrel Tablet 75 milliGRAM(s) Oral daily  dextrose 5%. 1000 milliLiter(s) (50 mL/Hr) IV Continuous <Continuous>  dextrose 50% Injectable 12.5 Gram(s) IV Push once  dextrose 50% Injectable 25 Gram(s) IV Push once  dextrose 50% Injectable 25 Gram(s) IV Push once  diVALproex  milliGRAM(s) Oral two times a day  insulin glargine Injectable (LANTUS) 10 Unit(s) SubCutaneous at bedtime  insulin lispro (HumaLOG) corrective regimen sliding scale   SubCutaneous three times a day before meals  insulin lispro (HumaLOG) corrective regimen sliding scale   SubCutaneous at bedtime  lisinopril 2.5 milliGRAM(s) Oral daily  metoprolol tartrate 25 milliGRAM(s) Oral two times a day  nicotine -   7 mG/24Hr(s) Patch 1 patch Transdermal daily    MEDICATIONS  (PRN):  dextrose 40% Gel 15 Gram(s) Oral once PRN Blood Glucose LESS THAN 70 milliGRAM(s)/deciliter  glucagon  Injectable 1 milliGRAM(s) IntraMuscular once PRN Glucose LESS THAN 70 milligrams/deciliter  melatonin 3 milliGRAM(s) Oral at bedtime PRN Insomnia  oxyCODONE    IR 10 milliGRAM(s) Oral every 4 hours PRN Severe Pain (7 - 10)  oxyCODONE    IR 5 milliGRAM(s) Oral every 4 hours PRN Moderate Pain (4 - 6)  valproate sodium IVPB 250 milliGRAM(s) IV Intermittent two times a day PRN Agitation      LABS:                        9.6    8.0   )-----------( 312      ( 22 Jul 2019 21:56 )             29.2     07-22    141  |  104  |  18  ----------------------------<  227<H>  3.7   |  25  |  0.95    Ca    8.6      22 Jul 2019 10:15  Phos  2.7     07-22  Mg     2.2     07-22    TPro  7.2  /  Alb  3.2<L>  /  TBili  0.5  /  DBili  x   /  AST  16  /  ALT  27  /  AlkPhos  174<H>  07-21    PT/INR - ( 22 Jul 2019 10:15 )   PT: 15.8 sec;   INR: 1.36 ratio         PTT - ( 22 Jul 2019 10:15 )  PTT:29.6 sec      RADIOLOGY & ADDITIONAL STUDIES:

## 2019-07-23 NOTE — OCCUPATIONAL THERAPY INITIAL EVALUATION ADULT - PHYSICAL ASSIST/NONPHYSICAL ASSIST: SCOOT/BRIDGE, REHAB EVAL
nonverbal cues (demo/gestures)/1 person assist/supervision/verbal cues
nonverbal cues (demo/gestures)/2 person assist/supervision/verbal cues

## 2019-07-23 NOTE — OCCUPATIONAL THERAPY INITIAL EVALUATION ADULT - PHYSICAL ASSIST/NONPHYSICAL ASSIST:DRESS LOWER BODY, OT EVAL
doff/don socks/nonverbal cues (demo/gestures)/verbal cues/1 person assist/supervision
nonverbal cues (demo/gestures)/1 person assist/supervision/verbal cues

## 2019-07-23 NOTE — OCCUPATIONAL THERAPY INITIAL EVALUATION ADULT - ADL RETRAINING, OT EVAL
GOAL: Pt will perform lower body dressing independently in 4 weeks to improve ADL/IADL performance.
Goal: Pt will perform bathing independently with appropriate AD within 4 weeks. Goal: Pt will perform UE/LE dressing independently within 4 weeks.

## 2019-07-23 NOTE — PHYSICAL THERAPY INITIAL EVALUATION ADULT - BED MOBILITY TRAINING, PT EVAL
GOAL: Pt will perform bed mobility w/CGA, in 2 weeks.
GOAL: Pt will be independent with bed mobility in 2wks.

## 2019-07-23 NOTE — PROGRESS NOTE ADULT - PROBLEM SELECTOR PLAN 3
- TTE with EF 20-25%, global systolic dysfunction  - diuresis held just prior to surgery to avoid hypotension in the intraoperative period. Will consider resuming if labs are stable tomorrow  - VINAY resolved (likely had ATN)  - on 7/16 started Lisinopril 2.5mg daily, monitor renal function   - Cardiology following  - Strict I and O, daily weights (incontinent)

## 2019-07-23 NOTE — PROGRESS NOTE ADULT - PROBLEM SELECTOR PLAN 7
HgA1c 10.0, FS stable  - Lantus 10 units qHS for now until he begins to eat better post-op. Then may increase to 20 units qHS  - Once he starts to eat better, will resume Humalog 3 units TID with meals  - monitor FS  - Hypoglycemia protocol

## 2019-07-23 NOTE — PHYSICAL THERAPY INITIAL EVALUATION ADULT - PRECAUTIONS/LIMITATIONS, REHAB EVAL
cardiac precautions/The pt vomited during this time and there was a concern for aspiration. The pt did not complain of CP during the time prior to intubation. As per nephew, the pt has not been complaining of CP, SOB prior to admission to the hospital and has been able to carry out his ADLs. As per the nephew, pt has not been compliant with his medications as an outpatient. 7/5: NSTEMI.7/8: self-extubated; start atorvastatin; d/c swan today, d/c ng tube pending speech&swallow eval7/9: remove swan; transfer pt VA duplex of b/l LE: 07/06: On the right, there is a severe flow-limiting stenosis of the popliteal artery and the trifurcation arteries are occluded in the calf.  On the left, the bypass graft is occluded, the popliteal artery is occluded and the trifurcation arteries are occluded.- vascular surgery and podiatry plan noted, Possible amputation needed. XRay:Left foot: There is no acute fracture or dislocation. There is mild spurring at the calcaneal insertion. Skin staples are noted at the ankle medially. There is likely loss of the soft tissues adjacent to the first distal phalanx. There is mild irregularity at the tip of the left first distal phalanx. If there is sign of clinical infection in this region, MRI can be performed for further evaluation.Right foot: There is no acute fracture or dislocation. There is spurring at the Achilles insertion. Hospital course c/b gangrene LLE due to PVD, pt s/p BKA on 7/22/19./fall precautions
The pt vomited during this time and there was a concern for aspiration. The pt did not complain of CP during the time prior to intubation. As per nephew, the pt has not been complaining of CP, SOB prior to admission to the hospital and has been able to carry out his ADLs. As per the nephew, pt has not been compliant with his medications as an outpatient. 7/5: NSTEMI.7/8: self-extubated; start atorvastatin; d/c swan today, d/c ng tube pending speech&swallow eval7/9: remove swan; transfer pt VA duplex of b/l LE: 07/06: On the right, there is a severe flow-limiting stenosis of the popliteal artery and the trifurcation arteries are occluded in the calf.  On the left, the bypass graft is occluded, the popliteal artery is occluded and the trifurcation arteries are occluded.- vascular surgery and podiatry plan noted, Possible amputation needed. XRay:Left foot: There is no acute fracture or dislocation. There is mild spurring at the calcaneal insertion. Skin staples are noted at the ankle medially. There is likely loss of the soft tissues adjacent to the first distal phalanx. There is mild irregularity at the tip of the left first distal phalanx. If there is sign of clinical infection in this region, MRI can be performed for further evaluation.Right foot: There is no acute fracture or dislocation. There is spurring at the Achilles insertion./cardiac precautions/fall precautions

## 2019-07-23 NOTE — OCCUPATIONAL THERAPY INITIAL EVALUATION ADULT - DIAGNOSIS, OT EVAL
Pt presents with decreased strength, transfers, endurance, cognition which affects ADL/IADL performance.
Pt currently presents with decreased balance, cognition and strength limiting independence with ADLs and functional mobility.

## 2019-07-23 NOTE — OCCUPATIONAL THERAPY INITIAL EVALUATION ADULT - BALANCE DISTURBANCE, IDENTIFIED IMPAIRMENT CONTRIBUTE, REHAB EVAL
impaired coordination/decreased strength
impaired coordination/impaired postural control/decreased strength

## 2019-07-23 NOTE — OCCUPATIONAL THERAPY INITIAL EVALUATION ADULT - IMPAIRED TRANSFERS: SIT/STAND, REHAB EVAL
decreased strength/impaired balance/cognition
impaired postural control/decreased flexibility/cognition/impaired coordination/impaired balance/decreased strength

## 2019-07-23 NOTE — PHYSICAL THERAPY INITIAL EVALUATION ADULT - LEVEL OF INDEPENDENCE: GAIT, REHAB EVAL
pt became agitated in standing returned to bed for safety with assistance of PCA/unable to perform
unable to perform/2/2 pt agitated and confused

## 2019-07-23 NOTE — OCCUPATIONAL THERAPY INITIAL EVALUATION ADULT - COGNITIVE, VISUAL PERCEPTUAL, OT EVAL
GOAL: Pt will follow multistep commands 100% of the time in 2/4 OT session within 4 weeks GOAL: Pt will follow single step commands 100% of the time in 4/4 OT session within 4 weeks

## 2019-07-23 NOTE — PHYSICAL THERAPY INITIAL EVALUATION ADULT - ADDITIONAL COMMENTS
Pt is a poor historian; as per chart pt was independent prior to admission.
Pt is a poor historian; as per chart pt was independent prior to admission.

## 2019-07-23 NOTE — PROGRESS NOTE ADULT - PROBLEM SELECTOR PLAN 4
likely due to aspiration PNA and b/l pleural effusions, s/p self extubation on 7/8  Monitoring off abx. No signs of sepsis at this point in time.   Satting well on room air at this time - will resume diuresis as above

## 2019-07-23 NOTE — PHYSICAL THERAPY INITIAL EVALUATION ADULT - GENERAL OBSERVATIONS, REHAB EVAL
Pt received semi-supine in bed in NAD +tele monitor, KI LLE, bed alarm, enhanced supervision, preet bedside PT/OT eval poorly.
pt received semi-supine in bed in NAD +tele monitor, IV lock, bed alarm, enhanced supervision

## 2019-07-23 NOTE — PHYSICAL THERAPY INITIAL EVALUATION ADULT - IMPAIRMENTS FOUND, PT EVAL
gait, locomotion, and balance/aerobic capacity/endurance/muscle strength
muscle strength/gait, locomotion, and balance/aerobic capacity/endurance

## 2019-07-23 NOTE — PROGRESS NOTE ADULT - PROBLEM SELECTOR PLAN 2
Trops downtrending  - TTE with EF 20-25%, global systolic dysfunction  - c/w DAPT, statin, metoprolol 25mg PO BID  -AS reported,  No plan for LHC due to ongoing refusal and lack of capacity.  - Cardiology follow up appreciated - medically optimized for possible vascular surgery.

## 2019-07-23 NOTE — PHYSICAL THERAPY INITIAL EVALUATION ADULT - TRANSFER TRAINING, PT EVAL
GOAL: Pt will perform ALL transfers with min-modA, w/use of appropriate assistive device as needed, in 2 weeks.
GOAL: Pt will perform sit<>stand transfer with RW and CG assist in 2wks.

## 2019-07-23 NOTE — OCCUPATIONAL THERAPY INITIAL EVALUATION ADULT - TRANSFER TRAINING, PT EVAL
GOAL: Pt will perform sit to stand, bed <-> chair, toilet transfers with min assist in 4 weeks GOAL: Pt will perform sit to stand, bed <-> chair, toilet and shower transfers with min assist in 4 weeks

## 2019-07-23 NOTE — OCCUPATIONAL THERAPY INITIAL EVALUATION ADULT - PHYSICAL ASSIST/NONPHYSICAL ASSIST: STAND/SIT, REHAB EVAL
1 person assist/verbal cues/supervision/nonverbal cues (demo/gestures)
nonverbal cues (demo/gestures)/supervision/2 person assist/verbal cues

## 2019-07-23 NOTE — PHYSICAL THERAPY INITIAL EVALUATION ADULT - GAIT TRAINING, PT EVAL
GOAL: Pt will ambulate 10 feet w/ modA, w/use of appropriate assistive device in 2 weeks
GOAL:Pt will ambulate 25ft with RW and CG assist in 2wks.

## 2019-07-23 NOTE — PROGRESS NOTE ADULT - ASSESSMENT
69M w/ PMH of poor med compliance, DM type 2, HTN, PAD s/p recent surgery at [facility], transferred to [facility] CCU for NSTEMI with cardiogenic shock; course complicated by acute respiratory failure with hypoxia and septic shock secondary to suspected aspiration pneumonia requiring intubation (now extubated 7/8) and downgraded from CCU. Currently pending further evaluation by vascular surgery for LLE occluded vascular graft. 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

## 2019-07-23 NOTE — PHYSICAL THERAPY INITIAL EVALUATION ADULT - PLANNED THERAPY INTERVENTIONS, PT EVAL
bed mobility training/gait training/strengthening/transfer training
strengthening/transfer training/gait training/balance training/bed mobility training

## 2019-07-23 NOTE — OCCUPATIONAL THERAPY INITIAL EVALUATION ADULT - PHYSICAL ASSIST/NONPHYSICAL ASSIST: SUPINE/SIT, REHAB EVAL
nonverbal cues (demo/gestures)/1 person assist/verbal cues/supervision
nonverbal cues (demo/gestures)/2 person assist/supervision/verbal cues

## 2019-07-23 NOTE — PHYSICAL THERAPY INITIAL EVALUATION ADULT - PERTINENT HX OF CURRENT PROBLEM, REHAB EVAL
70 y/o M transferred to [facility] for management of cardiogenic shock and NSTEMI. As per sign out, the pt was admitted for L popliteal bypass to [facility]. During his time there, he was refusing medications  and was found to be progressively more dyspneic. He had an echo done there which showed that he has an EF of <15% with global hypokinesis; he received 20mg IV lasix for fluid overload. His respiratory status continued to worsen and the pt was intubated for airway protection.
68 y/o M transferred to [facility] for management of cardiogenic shock and NSTEMI. As per sign out, the pt was admitted for L popliteal bypass to [facility]. During his time there, he was refusing medications  and was found to be progressively more dyspneic. He had an echo done there which showed that he has an EF of <15% with global hypokinesis; he received 20mg IV lasix for fluid overload. His respiratory status continued to worsen and the pt was intubated for airway protection.

## 2019-07-23 NOTE — OCCUPATIONAL THERAPY INITIAL EVALUATION ADULT - GENERAL OBSERVATIONS, REHAB EVAL
Pt received semi-supine in bed, +IVL, +KI to LLE Pt received semi-supine in bed, +IVL, +KI to LLE. 7/29 FE completion: Pt received semisupine in bed, A+Ox1-2, +KI to L residual limb, +ng tube, [id number] nc, +south, R Z-float boot, b/l protective mittens

## 2019-07-23 NOTE — OCCUPATIONAL THERAPY INITIAL EVALUATION ADULT - NS ASR FOLLOW COMMAND OT EVAL
25% of the time/able to follow single-step instructions able to follow single-step instructions/50% of the time

## 2019-07-23 NOTE — OCCUPATIONAL THERAPY INITIAL EVALUATION ADULT - LIVES WITH, PROFILE
Pt is poor historian. As per chart review and pt report, pt lives alone in an apt, was ambulatory without AD prior to admission./alone
alone/Pt is poor historian. As per chart review and pt report, pt lives alone in an apt, was ambulatory without AD prior to admission.

## 2019-07-23 NOTE — PHYSICAL THERAPY INITIAL EVALUATION ADULT - DIAGNOSIS, PT EVAL
Impaired functional mobility secondary to decreased strength, balance and endurance
Impaired functional mobility secondary to decreased strength, balance and endurance

## 2019-07-23 NOTE — OCCUPATIONAL THERAPY INITIAL EVALUATION ADULT - PRECAUTIONS/LIMITATIONS, REHAB EVAL
The pt vomited during this time and there was a concern for aspiration. The pt did not complain of CP during the time prior to intubation. As per nephew, the pt has not been complaining of CP, SOB prior to admission to the hospital and has been able to carry out his ADLs. As per the nephew, pt has not been compliant with his medications as an outpatient; the nephew knows that he had amlodipine prescribed but doesn't know what medications the the pt takes for his diabetes. As per chart from [facility], pt has been prescribed to take amlodipine 10 daily and metformin 500 BID at home. As per med rec from 2015, pt has been taking lantus 20 mg daily at home in addition to metformin./fall precautions
fall precautions

## 2019-07-23 NOTE — PHYSICAL THERAPY INITIAL EVALUATION ADULT - FOLLOWS COMMANDS/ANSWERS QUESTIONS, REHAB EVAL
unable to follow commands/unable to answer questions
50% of the time/able to follow single-step instructions

## 2019-07-23 NOTE — PROGRESS NOTE ADULT - PROBLEM SELECTOR PLAN 1
PAD s/p recent R Leg surgery at [facility]  -VA duplex of b/l LE: 07/06: On the right, there is a severe flow-limiting stenosis of the popliteal artery and the trifurcation arteries are occluded in the calf.  On the left, the bypass graft is occluded, the popliteal artery is occluded and the trifurcation arteries are occluded.  - s/p Left BKA on 7/22/19  - Per vascular plan to maintain negative pressure dressing Prevena for 5 days post-op. Some of the staples to be taken out prior to discharge to [facility] next week.  - c/w DAPT, statin

## 2019-07-23 NOTE — OCCUPATIONAL THERAPY INITIAL EVALUATION ADULT - TRANSFER SAFETY CONCERNS NOTED: SIT/STAND, REHAB EVAL
decreased weight-shifting ability/decreased safety awareness/decreased step length
decreased weight-shifting ability/decreased safety awareness

## 2019-07-23 NOTE — OCCUPATIONAL THERAPY INITIAL EVALUATION ADULT - PERTINENT HX OF CURRENT PROBLEM, REHAB EVAL
70yo M with a PMH of HTN, DM, PAD who is transferred to [facility] for management of cardiogenic shock and NSTEMI. As per sign out,the pt was admitted for L popliteal bypass to [facility]. During his time there,he was refusing medications  and was found to be progressively more dyspneic. He had an echo done there which showed that he has an EF of <15% with global hypokinesis;he received 20mg IV lasix for fluid overload. His respiratory status continued to worsen and pt was intubated for airway protection.
70yo M transferred to [facility] for management of cardiogenic shock and NSTEMI. As per sign out,the pt was admitted for L popliteal bypass to [facility]. During his time there,he was refusing medications  and was found to be progressively more dyspneic. He had an echo done there which showed that he has an EF of <15% with global hypokinesis;he received 20mg IV lasix for fluid overload. His respiratory status continued to worsen and pt was intubated for airway protection. +concern for aspiration.

## 2019-07-23 NOTE — OCCUPATIONAL THERAPY INITIAL EVALUATION ADULT - LEVEL OF INDEPENDENCE, REHAB EVAL
pt agitated and combative, further assessment held/maximum assist (25% patients effort) maximum assist (25% patients effort)/moderate assist (50% patients effort)

## 2019-07-23 NOTE — OCCUPATIONAL THERAPY INITIAL EVALUATION ADULT - PLANNED THERAPY INTERVENTIONS, OT EVAL
ADL retraining/cognitive, visual perceptual/transfer training
ADL retraining/bed mobility training/transfer training/cognitive, visual perceptual

## 2019-07-23 NOTE — OCCUPATIONAL THERAPY INITIAL EVALUATION ADULT - PHYSICAL ASSIST/NONPHYSICAL ASSIST: SIT/STAND, REHAB EVAL
nonverbal cues (demo/gestures)/1 person assist/verbal cues/supervision
verbal cues/supervision/2 person assist/nonverbal cues (demo/gestures)

## 2019-07-23 NOTE — PROGRESS NOTE ADULT - PROBLEM SELECTOR PLAN 10
-no signs or symptoms of bleeding / labs consistent with AOCD  -stable H/H    #DVT ppx: on heparin sc BID

## 2019-07-23 NOTE — OCCUPATIONAL THERAPY INITIAL EVALUATION ADULT - RANGE OF MOTION EXAMINATION, LOWER EXTREMITY
bilateral LE Active ROM was WFL  (within functional limits)
L BKA in knee immobilizer/bilateral LE Active ROM was WFL  (within functional limits)

## 2019-07-24 LAB
ALBUMIN SERPL ELPH-MCNC: 2.6 G/DL — LOW (ref 3.3–5)
ALBUMIN SERPL ELPH-MCNC: 2.8 G/DL — LOW (ref 3.3–5)
ALBUMIN SERPL ELPH-MCNC: 2.8 G/DL — LOW (ref 3.3–5)
ALBUMIN SERPL ELPH-MCNC: 2.9 G/DL — LOW (ref 3.3–5)
ALBUMIN SERPL ELPH-MCNC: 3 G/DL — LOW (ref 3.3–5)
ALP SERPL-CCNC: 160 U/L — HIGH (ref 40–120)
ALP SERPL-CCNC: 161 U/L — HIGH (ref 40–120)
ALP SERPL-CCNC: 161 U/L — HIGH (ref 40–120)
ALP SERPL-CCNC: 169 U/L — HIGH (ref 40–120)
ALP SERPL-CCNC: 176 U/L — HIGH (ref 40–120)
ALT FLD-CCNC: 23 U/L — SIGNIFICANT CHANGE UP (ref 10–45)
ALT FLD-CCNC: 607 U/L — HIGH (ref 10–45)
ALT FLD-CCNC: 663 U/L — HIGH (ref 10–45)
ALT FLD-CCNC: 738 U/L — HIGH (ref 10–45)
ALT FLD-CCNC: 818 U/L — HIGH (ref 10–45)
ANION GAP SERPL CALC-SCNC: 15 MMOL/L — SIGNIFICANT CHANGE UP (ref 5–17)
ANION GAP SERPL CALC-SCNC: 16 MMOL/L — SIGNIFICANT CHANGE UP (ref 5–17)
ANION GAP SERPL CALC-SCNC: 17 MMOL/L — SIGNIFICANT CHANGE UP (ref 5–17)
ANION GAP SERPL CALC-SCNC: 20 MMOL/L — HIGH (ref 5–17)
APTT BLD: 31.4 SEC — SIGNIFICANT CHANGE UP (ref 27.5–36.3)
APTT BLD: 32.1 SEC — SIGNIFICANT CHANGE UP (ref 27.5–36.3)
AST SERPL-CCNC: 1025 U/L — HIGH (ref 10–40)
AST SERPL-CCNC: 1443 U/L — HIGH (ref 10–40)
AST SERPL-CCNC: 1444 U/L — HIGH (ref 10–40)
AST SERPL-CCNC: 1550 U/L — HIGH (ref 10–40)
AST SERPL-CCNC: 24 U/L — SIGNIFICANT CHANGE UP (ref 10–40)
BASE EXCESS BLDV CALC-SCNC: 3.7 MMOL/L — HIGH (ref -2–2)
BASOPHILS # BLD AUTO: 0 K/UL — SIGNIFICANT CHANGE UP (ref 0–0.2)
BASOPHILS NFR BLD AUTO: 0 % — SIGNIFICANT CHANGE UP (ref 0–2)
BASOPHILS NFR BLD AUTO: 0 % — SIGNIFICANT CHANGE UP (ref 0–2)
BASOPHILS NFR BLD AUTO: 0.2 % — SIGNIFICANT CHANGE UP (ref 0–2)
BILIRUB DIRECT SERPL-MCNC: 0.3 MG/DL — HIGH (ref 0–0.2)
BILIRUB INDIRECT FLD-MCNC: 0.6 MG/DL — SIGNIFICANT CHANGE UP (ref 0.2–1)
BILIRUB SERPL-MCNC: 0.7 MG/DL — SIGNIFICANT CHANGE UP (ref 0.2–1.2)
BILIRUB SERPL-MCNC: 0.7 MG/DL — SIGNIFICANT CHANGE UP (ref 0.2–1.2)
BILIRUB SERPL-MCNC: 0.9 MG/DL — SIGNIFICANT CHANGE UP (ref 0.2–1.2)
BILIRUB SERPL-MCNC: 0.9 MG/DL — SIGNIFICANT CHANGE UP (ref 0.2–1.2)
BILIRUB SERPL-MCNC: 1 MG/DL — SIGNIFICANT CHANGE UP (ref 0.2–1.2)
BUN SERPL-MCNC: 26 MG/DL — HIGH (ref 7–23)
BUN SERPL-MCNC: 36 MG/DL — HIGH (ref 7–23)
BUN SERPL-MCNC: 39 MG/DL — HIGH (ref 7–23)
BUN SERPL-MCNC: 43 MG/DL — HIGH (ref 7–23)
CA-I SERPL-SCNC: 1.09 MMOL/L — LOW (ref 1.12–1.3)
CALCIUM SERPL-MCNC: 8.6 MG/DL — SIGNIFICANT CHANGE UP (ref 8.4–10.5)
CALCIUM SERPL-MCNC: 8.7 MG/DL — SIGNIFICANT CHANGE UP (ref 8.4–10.5)
CALCIUM SERPL-MCNC: 8.9 MG/DL — SIGNIFICANT CHANGE UP (ref 8.4–10.5)
CALCIUM SERPL-MCNC: 9.2 MG/DL — SIGNIFICANT CHANGE UP (ref 8.4–10.5)
CHLORIDE BLDV-SCNC: 111 MMOL/L — HIGH (ref 96–108)
CHLORIDE SERPL-SCNC: 100 MMOL/L — SIGNIFICANT CHANGE UP (ref 96–108)
CHLORIDE SERPL-SCNC: 100 MMOL/L — SIGNIFICANT CHANGE UP (ref 96–108)
CHLORIDE SERPL-SCNC: 101 MMOL/L — SIGNIFICANT CHANGE UP (ref 96–108)
CHLORIDE SERPL-SCNC: 102 MMOL/L — SIGNIFICANT CHANGE UP (ref 96–108)
CO2 BLDV-SCNC: 29 MMOL/L — SIGNIFICANT CHANGE UP (ref 22–30)
CO2 SERPL-SCNC: 20 MMOL/L — LOW (ref 22–31)
CO2 SERPL-SCNC: 21 MMOL/L — LOW (ref 22–31)
CO2 SERPL-SCNC: 22 MMOL/L — SIGNIFICANT CHANGE UP (ref 22–31)
CO2 SERPL-SCNC: 24 MMOL/L — SIGNIFICANT CHANGE UP (ref 22–31)
CREAT SERPL-MCNC: 1.27 MG/DL — SIGNIFICANT CHANGE UP (ref 0.5–1.3)
CREAT SERPL-MCNC: 1.53 MG/DL — HIGH (ref 0.5–1.3)
CREAT SERPL-MCNC: 1.56 MG/DL — HIGH (ref 0.5–1.3)
CREAT SERPL-MCNC: 1.61 MG/DL — HIGH (ref 0.5–1.3)
EOSINOPHIL # BLD AUTO: 0 K/UL — SIGNIFICANT CHANGE UP (ref 0–0.5)
EOSINOPHIL NFR BLD AUTO: 0.1 % — SIGNIFICANT CHANGE UP (ref 0–6)
EOSINOPHIL NFR BLD AUTO: 0.1 % — SIGNIFICANT CHANGE UP (ref 0–6)
EOSINOPHIL NFR BLD AUTO: 0.2 % — SIGNIFICANT CHANGE UP (ref 0–6)
GAS PNL BLDA: SIGNIFICANT CHANGE UP
GAS PNL BLDV: 135 MMOL/L — SIGNIFICANT CHANGE UP (ref 135–145)
GAS PNL BLDV: SIGNIFICANT CHANGE UP
GAS PNL BLDV: SIGNIFICANT CHANGE UP
GLUCOSE BLDC GLUCOMTR-MCNC: 120 MG/DL — HIGH (ref 70–99)
GLUCOSE BLDC GLUCOMTR-MCNC: 173 MG/DL — HIGH (ref 70–99)
GLUCOSE BLDC GLUCOMTR-MCNC: 178 MG/DL — HIGH (ref 70–99)
GLUCOSE BLDC GLUCOMTR-MCNC: 90 MG/DL — SIGNIFICANT CHANGE UP (ref 70–99)
GLUCOSE BLDV-MCNC: 170 MG/DL — HIGH (ref 70–99)
GLUCOSE SERPL-MCNC: 172 MG/DL — HIGH (ref 70–99)
GLUCOSE SERPL-MCNC: 172 MG/DL — HIGH (ref 70–99)
GLUCOSE SERPL-MCNC: 218 MG/DL — HIGH (ref 70–99)
GLUCOSE SERPL-MCNC: 250 MG/DL — HIGH (ref 70–99)
HCO3 BLDV-SCNC: 27 MMOL/L — SIGNIFICANT CHANGE UP (ref 21–29)
HCT VFR BLD CALC: 31.6 % — LOW (ref 39–50)
HCT VFR BLD CALC: 32.9 % — LOW (ref 39–50)
HCT VFR BLD CALC: 33.6 % — LOW (ref 39–50)
HCT VFR BLD CALC: 36 % — LOW (ref 39–50)
HCT VFR BLDA CALC: 32 % — LOW (ref 39–50)
HGB BLD CALC-MCNC: 10.5 G/DL — LOW (ref 13–17)
HGB BLD-MCNC: 10.2 G/DL — LOW (ref 13–17)
HGB BLD-MCNC: 10.9 G/DL — LOW (ref 13–17)
HGB BLD-MCNC: 11 G/DL — LOW (ref 13–17)
HGB BLD-MCNC: 11.1 G/DL — LOW (ref 13–17)
INR BLD: 2.07 RATIO — HIGH (ref 0.88–1.16)
INR BLD: 2.08 RATIO — HIGH (ref 0.88–1.16)
LACTATE BLDV-MCNC: 2.5 MMOL/L — HIGH (ref 0.7–2)
LACTATE BLDV-MCNC: 3.6 MMOL/L — HIGH (ref 0.7–2)
LACTATE SERPL-SCNC: 7.7 MMOL/L — CRITICAL HIGH (ref 0.7–2)
LYMPHOCYTES # BLD AUTO: 1.6 K/UL — SIGNIFICANT CHANGE UP (ref 1–3.3)
LYMPHOCYTES # BLD AUTO: 1.8 K/UL — SIGNIFICANT CHANGE UP (ref 1–3.3)
LYMPHOCYTES # BLD AUTO: 13.1 % — SIGNIFICANT CHANGE UP (ref 13–44)
LYMPHOCYTES # BLD AUTO: 13.4 % — SIGNIFICANT CHANGE UP (ref 13–44)
LYMPHOCYTES # BLD AUTO: 2.9 K/UL — SIGNIFICANT CHANGE UP (ref 1–3.3)
LYMPHOCYTES # BLD AUTO: 21.2 % — SIGNIFICANT CHANGE UP (ref 13–44)
MAGNESIUM SERPL-MCNC: 2.2 MG/DL — SIGNIFICANT CHANGE UP (ref 1.6–2.6)
MAGNESIUM SERPL-MCNC: 2.4 MG/DL — SIGNIFICANT CHANGE UP (ref 1.6–2.6)
MCHC RBC-ENTMCNC: 28.9 PG — SIGNIFICANT CHANGE UP (ref 27–34)
MCHC RBC-ENTMCNC: 29.9 PG — SIGNIFICANT CHANGE UP (ref 27–34)
MCHC RBC-ENTMCNC: 30.4 PG — SIGNIFICANT CHANGE UP (ref 27–34)
MCHC RBC-ENTMCNC: 30.7 PG — SIGNIFICANT CHANGE UP (ref 27–34)
MCHC RBC-ENTMCNC: 31 GM/DL — LOW (ref 32–36)
MCHC RBC-ENTMCNC: 32.3 GM/DL — SIGNIFICANT CHANGE UP (ref 32–36)
MCHC RBC-ENTMCNC: 32.7 GM/DL — SIGNIFICANT CHANGE UP (ref 32–36)
MCHC RBC-ENTMCNC: 33.3 GM/DL — SIGNIFICANT CHANGE UP (ref 32–36)
MCV RBC AUTO: 92.4 FL — SIGNIFICANT CHANGE UP (ref 80–100)
MCV RBC AUTO: 92.6 FL — SIGNIFICANT CHANGE UP (ref 80–100)
MCV RBC AUTO: 92.8 FL — SIGNIFICANT CHANGE UP (ref 80–100)
MCV RBC AUTO: 93.3 FL — SIGNIFICANT CHANGE UP (ref 80–100)
MONOCYTES # BLD AUTO: 0.7 K/UL — SIGNIFICANT CHANGE UP (ref 0–0.9)
MONOCYTES # BLD AUTO: 0.8 K/UL — SIGNIFICANT CHANGE UP (ref 0–0.9)
MONOCYTES # BLD AUTO: 0.9 K/UL — SIGNIFICANT CHANGE UP (ref 0–0.9)
MONOCYTES NFR BLD AUTO: 5.8 % — SIGNIFICANT CHANGE UP (ref 2–14)
MONOCYTES NFR BLD AUTO: 5.8 % — SIGNIFICANT CHANGE UP (ref 2–14)
MONOCYTES NFR BLD AUTO: 6.2 % — SIGNIFICANT CHANGE UP (ref 2–14)
NEUTROPHILS # BLD AUTO: 10.1 K/UL — HIGH (ref 1.8–7.4)
NEUTROPHILS # BLD AUTO: 11.3 K/UL — HIGH (ref 1.8–7.4)
NEUTROPHILS # BLD AUTO: 9.6 K/UL — HIGH (ref 1.8–7.4)
NEUTROPHILS NFR BLD AUTO: 72.6 % — SIGNIFICANT CHANGE UP (ref 43–77)
NEUTROPHILS NFR BLD AUTO: 80.6 % — HIGH (ref 43–77)
NEUTROPHILS NFR BLD AUTO: 80.7 % — HIGH (ref 43–77)
OTHER CELLS CSF MANUAL: 6 ML/DL — LOW (ref 18–22)
PCO2 BLDV: 40 MMHG — SIGNIFICANT CHANGE UP (ref 35–50)
PH BLDV: 7.45 — SIGNIFICANT CHANGE UP (ref 7.35–7.45)
PHOSPHATE SERPL-MCNC: 3.2 MG/DL — SIGNIFICANT CHANGE UP (ref 2.5–4.5)
PHOSPHATE SERPL-MCNC: 4.4 MG/DL — SIGNIFICANT CHANGE UP (ref 2.5–4.5)
PLATELET # BLD AUTO: 338 K/UL — SIGNIFICANT CHANGE UP (ref 150–400)
PLATELET # BLD AUTO: 352 K/UL — SIGNIFICANT CHANGE UP (ref 150–400)
PLATELET # BLD AUTO: 387 K/UL — SIGNIFICANT CHANGE UP (ref 150–400)
PLATELET # BLD AUTO: 429 K/UL — HIGH (ref 150–400)
PO2 BLDV: 29 MMHG — SIGNIFICANT CHANGE UP (ref 25–45)
POTASSIUM BLDV-SCNC: 4.2 MMOL/L — SIGNIFICANT CHANGE UP (ref 3.5–5.3)
POTASSIUM SERPL-MCNC: 4.5 MMOL/L — SIGNIFICANT CHANGE UP (ref 3.5–5.3)
POTASSIUM SERPL-MCNC: 4.6 MMOL/L — SIGNIFICANT CHANGE UP (ref 3.5–5.3)
POTASSIUM SERPL-MCNC: 5.1 MMOL/L — SIGNIFICANT CHANGE UP (ref 3.5–5.3)
POTASSIUM SERPL-MCNC: 5.7 MMOL/L — HIGH (ref 3.5–5.3)
POTASSIUM SERPL-SCNC: 4.5 MMOL/L — SIGNIFICANT CHANGE UP (ref 3.5–5.3)
POTASSIUM SERPL-SCNC: 4.6 MMOL/L — SIGNIFICANT CHANGE UP (ref 3.5–5.3)
POTASSIUM SERPL-SCNC: 5.1 MMOL/L — SIGNIFICANT CHANGE UP (ref 3.5–5.3)
POTASSIUM SERPL-SCNC: 5.7 MMOL/L — HIGH (ref 3.5–5.3)
PROT SERPL-MCNC: 6.6 G/DL — SIGNIFICANT CHANGE UP (ref 6–8.3)
PROT SERPL-MCNC: 6.7 G/DL — SIGNIFICANT CHANGE UP (ref 6–8.3)
PROT SERPL-MCNC: 6.8 G/DL — SIGNIFICANT CHANGE UP (ref 6–8.3)
PROT SERPL-MCNC: 7.3 G/DL — SIGNIFICANT CHANGE UP (ref 6–8.3)
PROT SERPL-MCNC: 7.4 G/DL — SIGNIFICANT CHANGE UP (ref 6–8.3)
PROTHROM AB SERPL-ACNC: 24.4 SEC — HIGH (ref 10–12.9)
PROTHROM AB SERPL-ACNC: 24.5 SEC — HIGH (ref 10–12.9)
RBC # BLD: 3.41 M/UL — LOW (ref 4.2–5.8)
RBC # BLD: 3.55 M/UL — LOW (ref 4.2–5.8)
RBC # BLD: 3.62 M/UL — LOW (ref 4.2–5.8)
RBC # BLD: 3.85 M/UL — LOW (ref 4.2–5.8)
RBC # FLD: 13.8 % — SIGNIFICANT CHANGE UP (ref 10.3–14.5)
RBC # FLD: 13.9 % — SIGNIFICANT CHANGE UP (ref 10.3–14.5)
RBC # FLD: 14.1 % — SIGNIFICANT CHANGE UP (ref 10.3–14.5)
RBC # FLD: 14.2 % — SIGNIFICANT CHANGE UP (ref 10.3–14.5)
SAO2 % BLDV: 44 % — LOW (ref 67–88)
SODIUM SERPL-SCNC: 139 MMOL/L — SIGNIFICANT CHANGE UP (ref 135–145)
SODIUM SERPL-SCNC: 141 MMOL/L — SIGNIFICANT CHANGE UP (ref 135–145)
TROPONIN T, HIGH SENSITIVITY RESULT: 641 NG/L — HIGH (ref 0–51)
TROPONIN T, HIGH SENSITIVITY RESULT: 826 NG/L — HIGH (ref 0–51)
VALPROATE SERPL-MCNC: 27 UG/ML — LOW (ref 50–100)
WBC # BLD: 11.9 K/UL — HIGH (ref 3.8–10.5)
WBC # BLD: 13.9 K/UL — HIGH (ref 3.8–10.5)
WBC # BLD: 14 K/UL — HIGH (ref 3.8–10.5)
WBC # BLD: 14.1 K/UL — HIGH (ref 3.8–10.5)
WBC # FLD AUTO: 11.9 K/UL — HIGH (ref 3.8–10.5)
WBC # FLD AUTO: 13.9 K/UL — HIGH (ref 3.8–10.5)
WBC # FLD AUTO: 14 K/UL — HIGH (ref 3.8–10.5)
WBC # FLD AUTO: 14.1 K/UL — HIGH (ref 3.8–10.5)

## 2019-07-24 PROCEDURE — 70450 CT HEAD/BRAIN W/O DYE: CPT | Mod: 26

## 2019-07-24 PROCEDURE — 93010 ELECTROCARDIOGRAM REPORT: CPT

## 2019-07-24 PROCEDURE — 71045 X-RAY EXAM CHEST 1 VIEW: CPT | Mod: 26,77

## 2019-07-24 PROCEDURE — 99233 SBSQ HOSP IP/OBS HIGH 50: CPT

## 2019-07-24 PROCEDURE — 70450 CT HEAD/BRAIN W/O DYE: CPT | Mod: 26,77

## 2019-07-24 PROCEDURE — 71045 X-RAY EXAM CHEST 1 VIEW: CPT | Mod: 26

## 2019-07-24 PROCEDURE — 99291 CRITICAL CARE FIRST HOUR: CPT

## 2019-07-24 PROCEDURE — 74176 CT ABD & PELVIS W/O CONTRAST: CPT | Mod: 26

## 2019-07-24 PROCEDURE — 93010 ELECTROCARDIOGRAM REPORT: CPT | Mod: 77

## 2019-07-24 RX ORDER — PIPERACILLIN AND TAZOBACTAM 4; .5 G/20ML; G/20ML
3.38 INJECTION, POWDER, LYOPHILIZED, FOR SOLUTION INTRAVENOUS ONCE
Refills: 0 | Status: COMPLETED | OUTPATIENT
Start: 2019-07-24 | End: 2019-07-25

## 2019-07-24 RX ORDER — IPRATROPIUM BROMIDE 0.2 MG/ML
1 SOLUTION, NON-ORAL INHALATION EVERY 6 HOURS
Refills: 0 | Status: DISCONTINUED | OUTPATIENT
Start: 2019-07-24 | End: 2019-08-07

## 2019-07-24 RX ORDER — IPRATROPIUM BROMIDE 0.2 MG/ML
500 SOLUTION, NON-ORAL INHALATION ONCE
Refills: 0 | Status: COMPLETED | OUTPATIENT
Start: 2019-07-24 | End: 2019-07-24

## 2019-07-24 RX ORDER — FUROSEMIDE 40 MG
40 TABLET ORAL ONCE
Refills: 0 | Status: COMPLETED | OUTPATIENT
Start: 2019-07-24 | End: 2019-07-24

## 2019-07-24 RX ORDER — INSULIN GLARGINE 100 [IU]/ML
6 INJECTION, SOLUTION SUBCUTANEOUS AT BEDTIME
Refills: 0 | Status: DISCONTINUED | OUTPATIENT
Start: 2019-07-24 | End: 2019-07-28

## 2019-07-24 RX ORDER — FUROSEMIDE 40 MG
60 TABLET ORAL ONCE
Refills: 0 | Status: COMPLETED | OUTPATIENT
Start: 2019-07-24 | End: 2019-07-24

## 2019-07-24 RX ORDER — VANCOMYCIN HCL 1 G
1000 VIAL (EA) INTRAVENOUS ONCE
Refills: 0 | Status: COMPLETED | OUTPATIENT
Start: 2019-07-24 | End: 2019-07-25

## 2019-07-24 RX ORDER — CHLORHEXIDINE GLUCONATE 213 G/1000ML
1 SOLUTION TOPICAL
Refills: 0 | Status: DISCONTINUED | OUTPATIENT
Start: 2019-07-24 | End: 2019-07-26

## 2019-07-24 RX ADMIN — Medication 60 MILLIGRAM(S): at 19:53

## 2019-07-24 RX ADMIN — Medication 1 PUFF(S): at 12:31

## 2019-07-24 RX ADMIN — CLOPIDOGREL BISULFATE 75 MILLIGRAM(S): 75 TABLET, FILM COATED ORAL at 12:32

## 2019-07-24 RX ADMIN — Medication 1: at 17:37

## 2019-07-24 RX ADMIN — Medication 500 MICROGRAM(S): at 02:30

## 2019-07-24 RX ADMIN — LISINOPRIL 2.5 MILLIGRAM(S): 2.5 TABLET ORAL at 09:09

## 2019-07-24 RX ADMIN — Medication 25 MILLIGRAM(S): at 09:10

## 2019-07-24 RX ADMIN — Medication 1 PATCH: at 12:32

## 2019-07-24 RX ADMIN — Medication 650 MILLIGRAM(S): at 13:10

## 2019-07-24 RX ADMIN — Medication 81 MILLIGRAM(S): at 12:32

## 2019-07-24 RX ADMIN — Medication 1 PATCH: at 19:56

## 2019-07-24 RX ADMIN — DIVALPROEX SODIUM 250 MILLIGRAM(S): 500 TABLET, DELAYED RELEASE ORAL at 09:10

## 2019-07-24 RX ADMIN — Medication 650 MILLIGRAM(S): at 12:31

## 2019-07-24 RX ADMIN — Medication 40 MILLIGRAM(S): at 03:45

## 2019-07-24 RX ADMIN — Medication 1 PUFF(S): at 17:38

## 2019-07-24 RX ADMIN — HEPARIN SODIUM 5000 UNIT(S): 5000 INJECTION INTRAVENOUS; SUBCUTANEOUS at 17:37

## 2019-07-24 RX ADMIN — Medication 1 PATCH: at 13:29

## 2019-07-24 NOTE — PROGRESS NOTE ADULT - ASSESSMENT
A/P: 69y Male with Hx of foot gangrene due to PVD s/p left BKA 7/22    - H/H stable  - Diet: clears, ADAT  - Activity: OOB with assistance.   - Continue ASA and Plavix  - dressing with prevena. Immobilizer for 48 hours. remove bypass staples prior to discharge.     Vascular Surgery  [phone]

## 2019-07-24 NOTE — PROVIDER CONTACT NOTE (CRITICAL VALUE NOTIFICATION) - ASSESSMENT
Pt asymptomatic, VSS, Pt on Heparin gtts, no CP/SOB,
pt aysmtomatic
pt stable, vitals stable, no c/o CP, no changes on tele
pt sysmtomatic

## 2019-07-24 NOTE — CHART NOTE - NSCHARTNOTEFT_GEN_A_CORE
Pt being transferred to CCU. D/W with Hospitalist in charge. Tried to contact family with no response.     Bobbi Ruvalcaba NP  [phone]

## 2019-07-24 NOTE — CONSULT NOTE ADULT - ASSESSMENT
69M w/ PMH of poor med compliance, DM type 2, HTN, PAD s/p recent surgery at [facility], transferred to [facility] CCU for NSTEMI with cardiogenic shock; course complicated by acute respiratory failure with hypoxia and septic shock secondary to suspected aspiration pneumonia requiring intubation (now extubated 7/8) and downgraded from CCU. Currently pending further evaluation by vascular surgery for LLE occluded vascular graft. 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 Unresponsiveness    Concern for bowel ischemia or cardiac ischemia    Obtain CT abdomen/pelvis now without contrast + CT head to r/o any ischemic event  Trend liver enzymes  Trend lactate, BMP, CBC, cardiac enzymes  Avoid hepatotoxic agents  Fluids per cardiology recommendations given severity of CHF    Will not go to MICU at this time however please re-consult whenever needed.     Plan d/w Dr. Lau.    Kelby Rosenthal M.D.  Internal Medicine PGY-3  Pager: [phone]/ RIKKI [phone] 69M w/ PMH of poor med compliance, DM type 2, HTN, PAD s/p recent surgery at [facility], transferred to [facility] CCU for NSTEMI with cardiogenic shock; course complicated by acute respiratory failure with hypoxia and septic shock secondary to suspected aspiration pneumonia requiring intubation (now extubated 7/8) and downgraded from CCU. Currently pending further evaluation by vascular surgery for LLE occluded vascular graft. 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 Unresponsiveness waxing and waning mental status, with new marked rise in ALT and AST and lactate which is not clearing with sequential labs.     Concern for bowel ischemia or cardiac ischemia    Obtain CT abdomen/pelvis now without contrast + CT head to r/o any ischemic event  Trend liver enzymes  Trend lactate, BMP, CBC, cardiac enzymes  Avoid hepatotoxic agents  Fluids per cardiology recommendations given severity of CHF    Will not go to MICU at this time however please re-consult whenever needed.     Plan d/w Dr. Lau.    Kelby Rosenthal M.D.  Internal Medicine PGY-3  Pager: [phone]/ RIKKI [phone]

## 2019-07-24 NOTE — PROVIDER CONTACT NOTE (OTHER) - BACKGROUND
PMH DM, HTN, PAD, underwent L fem-pop bypass on 7/3, post-op developed hypoxic respiratory failure requiring intubation, noted to have elevated cardiac enzymes and LV dysfunction, transferred to
pt admit with nstemi/ cardiogenic shock, s/p L BKA 7/22
Dx: NSTEMI ..LE stenosis / ischemia.
Dx: NSTEMI.
Pt admitted for suspected NSTEMI. Pt has had a south this admission for retention.
nstemi, dm left fem pop bypass with hypoxic resp failure.
pt admitted for NStemi, l fem pop bypass on 7/3, on hep at 21 ml/hr.
pt admitted with NSTEMI

## 2019-07-24 NOTE — PROCEDURE NOTE - NSPROCDETAILS_GEN_ALL_CORE
positive blood return obtained via catheter/sutured in place/hemostasis with direct pressure, dressing applied/ultrasound guidance/location identified, draped/prepped, sterile technique used, needle inserted/introduced/connected to a pressurized flush line/all materials/supplies accounted for at end of procedure
guidewire recovered/lumen(s) aspirated and flushed/sterile dressing applied/ultrasound guidance/sterile technique, catheter placed
lumen(s) aspirated and flushed/guidewire recovered/sterile dressing applied/sterile technique, catheter placed/ultrasound guidance

## 2019-07-24 NOTE — PROVIDER CONTACT NOTE (OTHER) - ASSESSMENT
Patient alert confused, restless, as baseline, on constant observation.
patient agitated ,trying to climb and refused to open the dressing
pt noted to have changed in breathing, appearing to use accessory muscle when breathing, not following commands, not responding to name, altered from baseline
Bladder scan 354. IV lasix ordered for AM. (patient noted to void during day shift).
Bladder volume via ultrasound: 345. Pt denies suprapubic pn, but does endorse urge to urinate.
Patient VSS. No signs and symptoms of distress noted. No complaints of pain. Patient oxygen saturation 95% on room air. blood sugar check done 135mg/dl.
Patient pulled out IVL..refusing new IV access...Heparin drip and IV zosyn on hold due to no access. Patient is agitated and swinging arms on approach.
Pt A&01, VSS. Pt pulled out NG tube
pt no c/o SOB, CP, or palps;

## 2019-07-24 NOTE — PROGRESS NOTE ADULT - PROBLEM SELECTOR PLAN 8
Enhanced observation. Psychiatry consult appreciated.   - Held Seroquel and Haldol given QTC> 500 ms.   - melatonin 3mg qHS  - Depakote stopped due to transaminitis  - If needed, consider low doses of benzo's for agitation

## 2019-07-24 NOTE — CHART NOTE - NSCHARTNOTEFT_GEN_A_CORE
Nutrition Follow Up Note  Patient seen for: Results of calorie count on 4 Mazin    Source: Comprehensive chart review, PCA, RN, NP    Diet : dysphagia 2 mechanical soft thin liquids, consistent carbohydrate no snacks, dash/tlc, Glucerna shake 3x/day    As per chart, Pt is a 69 year old male, PMH HLD, HTN, type 2 DM. S/P recent surgery at [facility], transferred to [facility] CCU for NSTEMI with cardiogenic shock; course complicated by acute respiratory failure with hypoxia and septic shock secondary to suspected aspiration pneumonia requiring intubation (now extubated ) and downgraded from CCU. Last seen on  by speech and swallow recommending a dysphagia 2, thin liquids diet order. 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 Pt presents with pulmonary edema. RRT called today  due to AMS.    Results of calorie count were incomplete. Only items noted was the breakfast and lunch from , indicating poor po intake. Pt unable to participate in interview due to AMS. Spoke with PCA, reports Pt only consumed a couple sips of chocolate milk this morning. RN states this is her first day with the patient and was unaware of calorie count. Spoke with NP who reported there has not been a discussion with family regarding tube feed consideration as of yet. Recommendations to continue with current po diet order and monitor patient's intake. Will not initiate another calorie count per NP recommendations. No reports of nausea/vomiting. Per flow sheets, last BM documented on .    PO intake : poor    Source for PO intake: flow sheets, RN, PCA, NP    Enteral /Parenteral Nutrition: N/A    Daily Weight in k.2 (), Weight in k.8 (), Weight in k.2 (-), Weight in k.2 (-)  Admission weight: 93.7 (-)  % Weight Change: 14.6% weight loss since admission ~3 weeks ago. Can attribute weight loss to decreased po intake, shift in fluids.    Pertinent Medications: MEDICATIONS  (STANDING):  acetaminophen   Tablet .. 650 milliGRAM(s) Oral every 6 hours  aspirin enteric coated 81 milliGRAM(s) Oral daily  atorvastatin 80 milliGRAM(s) Oral at bedtime  clopidogrel Tablet 75 milliGRAM(s) Oral daily  dextrose 5%. 1000 milliLiter(s) (50 mL/Hr) IV Continuous <Continuous>  dextrose 50% Injectable 12.5 Gram(s) IV Push once  dextrose 50% Injectable 25 Gram(s) IV Push once  dextrose 50% Injectable 25 Gram(s) IV Push once  diVALproex  milliGRAM(s) Oral two times a day  heparin  Injectable 5000 Unit(s) SubCutaneous every 12 hours  insulin glargine Injectable (LANTUS) 10 Unit(s) SubCutaneous at bedtime  insulin lispro (HumaLOG) corrective regimen sliding scale   SubCutaneous three times a day before meals  insulin lispro (HumaLOG) corrective regimen sliding scale   SubCutaneous at bedtime  ipratropium 17 MICROgram(s) HFA Inhaler 1 Puff(s) Inhalation every 6 hours  lisinopril 2.5 milliGRAM(s) Oral daily  metoprolol tartrate 25 milliGRAM(s) Oral two times a day  nicotine -   7 mG/24Hr(s) Patch 1 patch Transdermal daily    MEDICATIONS  (PRN):  dextrose 40% Gel 15 Gram(s) Oral once PRN Blood Glucose LESS THAN 70 milliGRAM(s)/deciliter  glucagon  Injectable 1 milliGRAM(s) IntraMuscular once PRN Glucose LESS THAN 70 milligrams/deciliter  melatonin 3 milliGRAM(s) Oral at bedtime PRN Insomnia  oxyCODONE    IR 10 milliGRAM(s) Oral every 4 hours PRN Severe Pain (7 - 10)  oxyCODONE    IR 5 milliGRAM(s) Oral every 4 hours PRN Moderate Pain (4 - 6)  valproate sodium IVPB 250 milliGRAM(s) IV Intermittent two times a day PRN Agitation    Pertinent Labs:  @ 02:15: Na 139, BUN 26<H>, Cr 1.27, <H>, K+ 4.6, Phos 3.2, Mg 2.4, Alk Phos 169<H>, ALT/SGPT 23, AST/SGOT 24, HbA1c --    Finger Sticks:  POCT Blood Glucose.: 120 mg/dL ( @ 12:17)  POCT Blood Glucose.: 90 mg/dL ( @ 08:07)  POCT Blood Glucose.: 173 mg/dL ( @ 01:54)  POCT Blood Glucose.: 158 mg/dL ( @ 21:58)  POCT Blood Glucose.: 144 mg/dL ( @ 17:15)      Skin per nursing documentation: S/P L BKA (), Surgical incision s/p bypass 7/3, as per flow sheets  Edema: No noted edema per flow sheets    Estimated Needs:   [X] no change since previous assessment    Previous Nutrition Diagnosis: Inadequate oral intake  Nutrition Diagnosis is: Ongoing, being addressed with nutritional supplementation, encouragement of good po intake    New Nutrition Diagnosis: Severe malnutrition in the context of chronic illness  Related to: decreased ability to meet estimated needs   As evidenced by: >14% wt loss x3 weeks, meeting <50% estimated needs     Recommend  1) Continue with current therapeutic diet order of dysphagia 2 mechanical soft thin liquids, consistent carbohydrate no snacks, dash/tlc, Glucerna shake 3x/day  2) Malnutrition sticker placed in chart and discussed with provider  3) Monitor pt's PO intake, weight, skin, edema, GI distress   4) Will follow up for any further interventions regarding Pt's nutritional status per medical team    Monitoring and Evaluation:     Continue to monitor Nutritional intake, Tolerance to diet prescription, weights, labs, skin integrity    RD remains available upon request and will follow up per protocol

## 2019-07-24 NOTE — CONSULT NOTE ADULT - SUBJECTIVE AND OBJECTIVE BOX
CHIEF COMPLAINT:     HPI:  History obtained from sign out/nephew  The pt is a 70 y/o M with a PMH of HTN, DM, PAD who is transferred to [facility] for management of cardiogenic shock and NSTEMI. As per sign out, the pt was admitted for L popliteal bypass to [facility]. During his time there, he was refusing medications  and was found to be progressively more dyspneic. He had an echo done there which showed that he has an EF of <15% with global hypokinesis; he received 20mg IV lasix for fluid overload. His respiratory status continued to worsen and the pt was intubated for airway protection. The pt vomited during this time and there was a concern for aspiration. The pt did not complain of CP during the time prior to intubation. As per nephew, the pt has not been complaining of CP, SOB prior to admission to the hospital and has been able to carry out his ADLs. As per the nephew, pt has not been compliant with his medications as an outpatient; the nephew knows that he had amlodipine prescribed but doesn't know what medications the the pt takes for his diabetes.  As per chart from [facility], pt has been prescribed to take amlodipine 10 daily and metformin 500 BID at home. As per med rec from 2015, pt has been taking lantus 20 mg daily at home in addition to metformin. (05 Jul 2019 23:50)      FAMILY HISTORY:  FHx: diabetes mellitus: In all siblings(2 sisters and 4 brothers)      SOCIAL HISTORY:  Smoking: __ packs x ___ years  EtOH Use:  Marital Status:  Occupation:  Recent Travel:  Country of Birth:  Advance Directives:    Allergies    No Known Allergies    Intolerances        HOME MEDICATIONS:    REVIEW OF SYSTEMS:  Constitutional:   Eyes:  ENT:  CV:  Resp:  GI:  :  MSK:  Integumentary:  Neurological:  Psychiatric:  Endocrine:  Hematologic/Lymphatic:  Allergic/Immunologic:  [ ] All other systems negative  [ ] Unable to assess ROS because ________    OBJECTIVE:  ICU Vital Signs Last 24 Hrs  T(C): 36.7 (24 Jul 2019 11:20), Max: 36.7 (24 Jul 2019 11:20)  T(F): 98.1 (24 Jul 2019 11:20), Max: 98.1 (24 Jul 2019 11:20)  HR: 90 (24 Jul 2019 11:20) (90 - 103)  BP: 106/74 (24 Jul 2019 11:20) (95/59 - 125/72)  BP(mean): --  ABP: --  ABP(mean): --  RR: 18 (24 Jul 2019 11:20) (18 - 20)  SpO2: 96% (24 Jul 2019 11:20) (92% - 97%)        07-23 @ 07:01 - 07-24 @ 07:00  --------------------------------------------------------  IN: 360 mL / OUT: 0 mL / NET: 360 mL    07-24 @ 07:01 - 07-24 @ 15:57  --------------------------------------------------------  IN: 240 mL / OUT: 0 mL / NET: 240 mL      CAPILLARY BLOOD GLUCOSE      POCT Blood Glucose.: 120 mg/dL (24 Jul 2019 12:17)      PHYSICAL EXAM:  GENERAL: AO x0 appears delirious  HEAD:  Atraumatic, Normocephalic  EYES: EOMI, PERRLA, conjunctiva and sclera clear  NECK: Supple, No JVD  CHEST/LUNG: b/l course breath sounds  HEART: Regular rate and rhythm; No murmurs, rubs, or gallops  ABDOMEN: Soft, Nontender, Nondistended; Bowel sounds present  EXTREMITIES:  R. lower extremity cold with poor pulses. L. BKA with wound vac in place. R and L upper extremities cool to touch but + radial pulses.   PSYCH: AAOx0  NEUROLOGY: non-focal  SKIN: No rashes or lesions      HOSPITAL MEDICATIONS:  MEDICATIONS  (STANDING):  acetaminophen   Tablet .. 650 milliGRAM(s) Oral every 6 hours  aspirin enteric coated 81 milliGRAM(s) Oral daily  atorvastatin 80 milliGRAM(s) Oral at bedtime  clopidogrel Tablet 75 milliGRAM(s) Oral daily  dextrose 5%. 1000 milliLiter(s) (50 mL/Hr) IV Continuous <Continuous>  dextrose 50% Injectable 12.5 Gram(s) IV Push once  dextrose 50% Injectable 25 Gram(s) IV Push once  dextrose 50% Injectable 25 Gram(s) IV Push once  diVALproex  milliGRAM(s) Oral two times a day  heparin  Injectable 5000 Unit(s) SubCutaneous every 12 hours  insulin glargine Injectable (LANTUS) 10 Unit(s) SubCutaneous at bedtime  insulin lispro (HumaLOG) corrective regimen sliding scale   SubCutaneous three times a day before meals  insulin lispro (HumaLOG) corrective regimen sliding scale   SubCutaneous at bedtime  ipratropium 17 MICROgram(s) HFA Inhaler 1 Puff(s) Inhalation every 6 hours  lisinopril 2.5 milliGRAM(s) Oral daily  metoprolol tartrate 25 milliGRAM(s) Oral two times a day  nicotine -   7 mG/24Hr(s) Patch 1 patch Transdermal daily    MEDICATIONS  (PRN):  dextrose 40% Gel 15 Gram(s) Oral once PRN Blood Glucose LESS THAN 70 milliGRAM(s)/deciliter  glucagon  Injectable 1 milliGRAM(s) IntraMuscular once PRN Glucose LESS THAN 70 milligrams/deciliter  melatonin 3 milliGRAM(s) Oral at bedtime PRN Insomnia  oxyCODONE    IR 10 milliGRAM(s) Oral every 4 hours PRN Severe Pain (7 - 10)  oxyCODONE    IR 5 milliGRAM(s) Oral every 4 hours PRN Moderate Pain (4 - 6)  valproate sodium IVPB 250 milliGRAM(s) IV Intermittent two times a day PRN Agitation      LABS:                        11.1   14.0  )-----------( 387      ( 24 Jul 2019 13:43 )             36.0     07-24    141  |  100  |  36<H>  ----------------------------<  172<H>  5.7<H>   |  21<L>  |  1.61<H>    Ca    8.9      24 Jul 2019 13:43  Phos  3.2     07-24  Mg     2.4     07-24    TPro  7.3  /  Alb  3.0<L>  /  TBili  1.0  /  DBili  x   /  AST  1550<H>  /  ALT  818<H>  /  AlkPhos  176<H>  07-24    Arterial Blood Gas:  07-24 @ 14:53  7.51/25/73/20/94/-1.7  ABG lactate: --    Venous Blood Gas:  07-24 @ 02:14  7.45/40/29/27/44  VBG Lactate: 3.6    MICROBIOLOGY: Culture - Sputum . (07.06.19 @ 07:14)    Gram Stain:   Moderate polymorphonuclear leukocytes per low power field  No Squamous epithelial cells per low power field  No organisms seen per oil power field    Specimen Source: .Sputum    Culture Results:   Normal Respiratory Hayley present    Culture - Blood (07.06.19 @ 02:25)    Specimen Source: .Blood    Culture Results:   No growth at 5 days.    < from: Xray Chest 1 View- PORTABLE-Urgent (07.16.19 @ 07:18) >  Findings:    The heart is normal in size.    Right upper and lower lobe patchy opacity. The left lower lobe is not   fully assessed on this study. No pleural effusion.    Impression:    Right upper and lower lobe opacity, likely infection versus pulmonary   edema.      < end of copied text >

## 2019-07-24 NOTE — PROGRESS NOTE ADULT - SUBJECTIVE AND OBJECTIVE BOX
Kwame De La Cruz MD, MBA ([facility])  Pager [phone]  (During off hours please page [phone])      Patient is a 69y old  Male who presents with a chief complaint of NSTEMI (24 Jul 2019 15:56)      SUBJECTIVE / OVERNIGHT EVENTS: The patient had an RRT overnight for acute change in mental status, which self-resolved. he was given lasix IV x1 at the conclusion of the RRT for CXR with pulmonary edema. The patient is lethargic today on my exam, but has no acute complaints.     MEDICATIONS  (STANDING):  acetaminophen   Tablet .. 650 milliGRAM(s) Oral every 6 hours  aspirin enteric coated 81 milliGRAM(s) Oral daily  clopidogrel Tablet 75 milliGRAM(s) Oral daily  heparin  Injectable 5000 Unit(s) SubCutaneous every 12 hours  insulin glargine Injectable (LANTUS) 10 Unit(s) SubCutaneous at bedtime  insulin lispro (HumaLOG) corrective regimen sliding scale   SubCutaneous three times a day before meals  insulin lispro (HumaLOG) corrective regimen sliding scale   SubCutaneous at bedtime  ipratropium 17 MICROgram(s) HFA Inhaler 1 Puff(s) Inhalation every 6 hours  metoprolol tartrate 25 milliGRAM(s) Oral two times a day  nicotine -   7 mG/24Hr(s) Patch 1 patch Transdermal daily    MEDICATIONS  (PRN):  dextrose 40% Gel 15 Gram(s) Oral once PRN Blood Glucose LESS THAN 70 milliGRAM(s)/deciliter  glucagon  Injectable 1 milliGRAM(s) IntraMuscular once PRN Glucose LESS THAN 70 milligrams/deciliter  melatonin 3 milliGRAM(s) Oral at bedtime PRN Insomnia  oxyCODONE    IR 10 milliGRAM(s) Oral every 4 hours PRN Severe Pain (7 - 10)  oxyCODONE    IR 5 milliGRAM(s) Oral every 4 hours PRN Moderate Pain (4 - 6)      Vital Signs Last 24 Hrs  T(C): 36.4 (24 Jul 2019 16:40), Max: 36.7 (24 Jul 2019 11:20)  T(F): 97.6 (24 Jul 2019 16:40), Max: 98.1 (24 Jul 2019 11:20)  HR: 91 (24 Jul 2019 16:40) (90 - 103)  BP: 141/75 (24 Jul 2019 16:40) (95/59 - 141/75)  BP(mean): 3 (24 Jul 2019 16:40) (3 - 3)  RR: 18 (24 Jul 2019 16:40) (18 - 20)  SpO2: 94% (24 Jul 2019 16:40) (92% - 97%)  CAPILLARY BLOOD GLUCOSE      POCT Blood Glucose.: 178 mg/dL (24 Jul 2019 16:49)  POCT Blood Glucose.: 120 mg/dL (24 Jul 2019 12:17)  POCT Blood Glucose.: 90 mg/dL (24 Jul 2019 08:07)  POCT Blood Glucose.: 173 mg/dL (24 Jul 2019 01:54)  POCT Blood Glucose.: 158 mg/dL (23 Jul 2019 21:58)    I&O's Summary    23 Jul 2019 07:01  -  24 Jul 2019 07:00  --------------------------------------------------------  IN: 360 mL / OUT: 0 mL / NET: 360 mL    24 Jul 2019 07:01  -  24 Jul 2019 17:17  --------------------------------------------------------  IN: 240 mL / OUT: 0 mL / NET: 240 mL        PHYSICAL EXAM:  GENERAL: NAD, somnolent, lethargic, mildly tachypneic   HEAD:  Atraumatic, Normocephalic  EYES: EOMI, conjunctiva and sclera clear  CHEST/LUNG: Clear to auscultation bilaterally; No wheeze  HEART: Regular rate and rhythm; No murmurs, rubs, or gallops  ABDOMEN: Soft, Nontender, Nondistended; Bowel sounds present  EXTREMITIES:  LLE s/p BKA - gently ACE wrapped with knee immobilizer in place  PSYCH: AAOx0    LABS:                        10.2   14.1  )-----------( 352      ( 24 Jul 2019 16:23 )             31.6     07-24    139  |  102  |  39<H>  ----------------------------<  218<H>  5.1   |  20<L>  |  1.53<H>    Ca    8.7      24 Jul 2019 16:23  Phos  3.2     07-24  Mg     2.4     07-24    TPro  6.8  /  Alb  2.8<L>  /  TBili  0.9  /  DBili  0.3<H>  /  AST  1443<H>  /  ALT  738<H>  /  AlkPhos  161<H>  07-24    PT/INR - ( 24 Jul 2019 16:23 )   PT: 24.4 sec;   INR: 2.07 ratio    PTT - ( 24 Jul 2019 16:23 )  PTT:32.1 sec      RADIOLOGY & ADDITIONAL TESTS:    Imaging Personally Reviewed: CXR- increased pulmonary edema    Consultant(s) Notes Reviewed: MICU consult    Care Discussed with Consultants/Other Providers: Dr. Del Rosario (cardiology) and MICU attending re acute increase of lactate.

## 2019-07-24 NOTE — PROVIDER CONTACT NOTE (MEDICATION) - SITUATION
pt agitated this AM, wont take am meds, unable to get blood work stuck pt 4 times, pt extremely agitated fighting with staff, will not take meds

## 2019-07-24 NOTE — PROGRESS NOTE ADULT - PROBLEM SELECTOR PLAN 6
likely due to aspiration PNA and b/l pleural effusions, s/p self extubation on 7/8  Monitoring off abx. No signs of sepsis at this point in time.   Satting well on room air at this time

## 2019-07-24 NOTE — CONSULT NOTE ADULT - SUBJECTIVE AND OBJECTIVE BOX
Arturo Mckeon MD  Cardiology Fellow  [phone]  All Cardiology service information can be found 24/7 on [email or web address], password: [email or web address]    Patient seen and evaluated at bedside    Chief Complaint: NSTEMI, PAD    Primary team's HPI:  History obtained from sign out/nephew  The pt is a 70 y/o M with a PMH of HTN, DM, PAD who is transferred to [facility] for management of cardiogenic shock and NSTEMI. As per sign out, the pt was admitted for L popliteal bypass to [facility]. During his time there, he was refusing medications  and was found to be progressively more dyspneic. He had an echo done there which showed that he has an EF of <15% with global hypokinesis; he received 20mg IV lasix for fluid overload. His respiratory status continued to worsen and the pt was intubated for airway protection. The pt vomited during this time and there was a concern for aspiration. The pt did not complain of CP during the time prior to intubation. As per nephew, the pt has not been complaining of CP, SOB prior to admission to the hospital and has been able to carry out his ADLs. As per the nephew, pt has not been compliant with his medications as an outpatient; the nephew knows that he had amlodipine prescribed but doesn't know what medications the the pt takes for his diabetes.  As per chart from [facility], pt has been prescribed to take amlodipine 10 daily and metformin 500 BID at home. As per med rec from 2015, pt has been taking lantus 20 mg daily at home in addition to metformin. (05 Jul 2019 23:50)    Since transfer from CCU, pt's HC largely unremarkable but was notable to continued disorientation and periodic agitation. He was awaiting consent for BKA, which was finally done on 7/22. Post-op course was largely unremarkable, however pt noted to have incr WOB and wheezing last night, and suddenly developed AMS and RRT was called. W/u at that time remarkable for pulm edema on CXR and pt was given furosemide IV. Subsequent labs notable for elev lactate and liver tests. Pt seen/examined at bedside, however he is AAOx3 and largely unable/unwilling to provide any hx.    PMHx:   Hyperlipidemia  Hypertension  Diabetes  PAD  HFrEF    PSHx:   H/O mastoidectomy  S/p L BKA    Allergies:  No Known Allergies    Current Medications:   acetaminophen   Tablet .. 650 milliGRAM(s) Oral every 6 hours  aspirin enteric coated 81 milliGRAM(s) Oral daily  clopidogrel Tablet 75 milliGRAM(s) Oral daily  dextrose 40% Gel 15 Gram(s) Oral once PRN  dextrose 5%. 1000 milliLiter(s) IV Continuous <Continuous>  dextrose 50% Injectable 12.5 Gram(s) IV Push once  dextrose 50% Injectable 25 Gram(s) IV Push once  dextrose 50% Injectable 25 Gram(s) IV Push once  glucagon  Injectable 1 milliGRAM(s) IntraMuscular once PRN  heparin  Injectable 5000 Unit(s) SubCutaneous every 12 hours  insulin glargine Injectable (LANTUS) 6 Unit(s) SubCutaneous at bedtime  insulin lispro (HumaLOG) corrective regimen sliding scale   SubCutaneous three times a day before meals  insulin lispro (HumaLOG) corrective regimen sliding scale   SubCutaneous at bedtime  ipratropium 17 MICROgram(s) HFA Inhaler 1 Puff(s) Inhalation every 6 hours  melatonin 3 milliGRAM(s) Oral at bedtime PRN  nicotine -   7 mG/24Hr(s) Patch 1 patch Transdermal daily  oxyCODONE    IR 10 milliGRAM(s) Oral every 4 hours PRN  oxyCODONE    IR 5 milliGRAM(s) Oral every 4 hours PRN    FAMILY HISTORY:  FHx: diabetes mellitus: In all siblings(2 sisters and 4 brothers)    Social History: Unable to reliably obtain    REVIEW OF SYSTEMS:  Unable to reliably obtain    Physical Exam:  T(F): 97.6 (07-24), Max: 98.1 (07-24)  HR: 91 (07-24) (90 - 102)  BP: 141/75 (07-24) (95/59 - 141/75)  RR: 18 (07-24)  SpO2: 100% (07-24)  Gen: Incr WOB at times. Awake, but does not track. Does not answer questions.  HEENT: NCAT. PERRLA b/l.  Neck: EJ engorged and JVP apparently elev to level of R ear lobe.  CV: Normal S1, S2. RRR. No MRG.  Chest: Limited as pt not participating, but R sided rales ausc anteriorly and possibly L lower base rales. No gross wheezes/rhonchi.  Abd: +BSx4. Soft. NTND.  Ext: No LE edema. S/p L BKA. Limbs cold to touch.  Skin: No cyanosis.    Cardiovascular Diagnostic Testing:    ECG: Personally reviewed: L axis deviation, possible LA enlargement, and LBBB. Non-specific TWI in lateral leads.    Echo: Personally reviewed: LVEF 2-25%    Imaging:     CXR: Personally reviewed. R sided infiltrate to apex.    Labs: Personally reviewed                        10.2   14.1  )-----------( 352      ( 24 Jul 2019 16:23 )             31.6     07-24    139  |  102  |  39<H>  ----------------------------<  218<H>  5.1   |  20<L>  |  1.53<H>    Ca    8.7      24 Jul 2019 16:23  Phos  3.2     07-24  Mg     2.4     07-24    TPro  6.8  /  Alb  2.8<L>  /  TBili  0.9  /  DBili  0.3<H>  /  AST  1443<H>  /  ALT  738<H>  /  AlkPhos  161<H>  07-24    PT/INR - ( 24 Jul 2019 16:23 )   PT: 24.4 sec;   INR: 2.07 ratio         PTT - ( 24 Jul 2019 16:23 )  PTT:32.1 sec    CARDIAC MARKERS ( 24 Jul 2019 13:43 )  826 ng/L / x     / x     / x     / x     / x      CARDIAC MARKERS ( 24 Jul 2019 02:15 )  641 ng/L / x     / x     / x     / x     / x

## 2019-07-24 NOTE — PROGRESS NOTE ADULT - SUBJECTIVE AND OBJECTIVE BOX
VACULAR SURGERY DAILY PROGRESS NOTE:       Subjective:  Patient seen and examined on AM rounds. No acute events overnight. AF/VSS. Pain controlled. Denies N/V and tolerating diet.       Objective:    PE:  Gen: NAD  Resp: respirations unlabored, no increased WoB  Abd: soft, ND, NT, no rebound or guarding  Ext:  RUE: Radial/Ulnar signal/palpable  LUE: Radial/ulnar signal/palpable   RLE: DP/PT signal/palpable  LLE: DP/PT signal/palpable    Vital Signs Last 24 Hrs  T(C): 36.1 (24 Jul 2019 06:02), Max: 36.4 (23 Jul 2019 20:44)  T(F): 97 (24 Jul 2019 06:02), Max: 97.5 (23 Jul 2019 20:44)  HR: 102 (24 Jul 2019 06:02) (73 - 103)  BP: 95/59 (24 Jul 2019 06:02) (95/59 - 125/72)  BP(mean): --  RR: 20 (24 Jul 2019 06:02) (18 - 20)  SpO2: 94% (24 Jul 2019 06:02) (92% - 100%)    I&O's Detail    23 Jul 2019 07:01  -  24 Jul 2019 07:00  --------------------------------------------------------  IN:    Oral Fluid: 360 mL  Total IN: 360 mL    OUT:  Total OUT: 0 mL    Total NET: 360 mL          Daily     Daily     MEDICATIONS  (STANDING):  acetaminophen   Tablet .. 650 milliGRAM(s) Oral every 6 hours  aspirin enteric coated 81 milliGRAM(s) Oral daily  atorvastatin 80 milliGRAM(s) Oral at bedtime  clopidogrel Tablet 75 milliGRAM(s) Oral daily  dextrose 5%. 1000 milliLiter(s) (50 mL/Hr) IV Continuous <Continuous>  dextrose 50% Injectable 12.5 Gram(s) IV Push once  dextrose 50% Injectable 25 Gram(s) IV Push once  dextrose 50% Injectable 25 Gram(s) IV Push once  diVALproex  milliGRAM(s) Oral two times a day  heparin  Injectable 5000 Unit(s) SubCutaneous every 12 hours  insulin glargine Injectable (LANTUS) 10 Unit(s) SubCutaneous at bedtime  insulin lispro (HumaLOG) corrective regimen sliding scale   SubCutaneous three times a day before meals  insulin lispro (HumaLOG) corrective regimen sliding scale   SubCutaneous at bedtime  ipratropium 17 MICROgram(s) HFA Inhaler 1 Puff(s) Inhalation every 6 hours  lisinopril 2.5 milliGRAM(s) Oral daily  metoprolol tartrate 25 milliGRAM(s) Oral two times a day  nicotine -   7 mG/24Hr(s) Patch 1 patch Transdermal daily    MEDICATIONS  (PRN):  dextrose 40% Gel 15 Gram(s) Oral once PRN Blood Glucose LESS THAN 70 milliGRAM(s)/deciliter  glucagon  Injectable 1 milliGRAM(s) IntraMuscular once PRN Glucose LESS THAN 70 milligrams/deciliter  melatonin 3 milliGRAM(s) Oral at bedtime PRN Insomnia  oxyCODONE    IR 10 milliGRAM(s) Oral every 4 hours PRN Severe Pain (7 - 10)  oxyCODONE    IR 5 milliGRAM(s) Oral every 4 hours PRN Moderate Pain (4 - 6)  valproate sodium IVPB 250 milliGRAM(s) IV Intermittent two times a day PRN Agitation      LABS:                        10.9   13.9  )-----------( 429      ( 24 Jul 2019 02:15 )             32.9     07-24    139  |  101  |  26<H>  ----------------------------<  172<H>  4.6   |  22  |  1.27    Ca    9.2      24 Jul 2019 02:15  Phos  3.2     07-24  Mg     2.4     07-24    TPro  7.4  /  Alb  2.9<L>  /  TBili  0.7  /  DBili  x   /  AST  24  /  ALT  23  /  AlkPhos  169<H>  07-24    PT/INR - ( 22 Jul 2019 10:15 )   PT: 15.8 sec;   INR: 1.36 ratio         PTT - ( 22 Jul 2019 10:15 )  PTT:29.6 sec      RADIOLOGY & ADDITIONAL STUDIES: VACULAR SURGERY DAILY PROGRESS NOTE:       Subjective:  Patient seen and examined on AM rounds. Pt w/ rapid response overnight for AMS. Respiratory status declining.       Objective:    PE:  Gen: Agitated, awake but not interacting appropriately   Resp: audible wheezing.   Ext:    LLE: ACE and preveena in place c/d/i, immobilizer in place       Vital Signs Last 24 Hrs  T(C): 36.1 (24 Jul 2019 06:02), Max: 36.4 (23 Jul 2019 20:44)  T(F): 97 (24 Jul 2019 06:02), Max: 97.5 (23 Jul 2019 20:44)  HR: 102 (24 Jul 2019 06:02) (73 - 103)  BP: 95/59 (24 Jul 2019 06:02) (95/59 - 125/72)  BP(mean): --  RR: 20 (24 Jul 2019 06:02) (18 - 20)  SpO2: 94% (24 Jul 2019 06:02) (92% - 100%)    I&O's Detail    23 Jul 2019 07:01  -  24 Jul 2019 07:00  --------------------------------------------------------  IN:    Oral Fluid: 360 mL  Total IN: 360 mL    OUT:  Total OUT: 0 mL    Total NET: 360 mL          Daily     Daily     MEDICATIONS  (STANDING):  acetaminophen   Tablet .. 650 milliGRAM(s) Oral every 6 hours  aspirin enteric coated 81 milliGRAM(s) Oral daily  atorvastatin 80 milliGRAM(s) Oral at bedtime  clopidogrel Tablet 75 milliGRAM(s) Oral daily  dextrose 5%. 1000 milliLiter(s) (50 mL/Hr) IV Continuous <Continuous>  dextrose 50% Injectable 12.5 Gram(s) IV Push once  dextrose 50% Injectable 25 Gram(s) IV Push once  dextrose 50% Injectable 25 Gram(s) IV Push once  diVALproex  milliGRAM(s) Oral two times a day  heparin  Injectable 5000 Unit(s) SubCutaneous every 12 hours  insulin glargine Injectable (LANTUS) 10 Unit(s) SubCutaneous at bedtime  insulin lispro (HumaLOG) corrective regimen sliding scale   SubCutaneous three times a day before meals  insulin lispro (HumaLOG) corrective regimen sliding scale   SubCutaneous at bedtime  ipratropium 17 MICROgram(s) HFA Inhaler 1 Puff(s) Inhalation every 6 hours  lisinopril 2.5 milliGRAM(s) Oral daily  metoprolol tartrate 25 milliGRAM(s) Oral two times a day  nicotine -   7 mG/24Hr(s) Patch 1 patch Transdermal daily    MEDICATIONS  (PRN):  dextrose 40% Gel 15 Gram(s) Oral once PRN Blood Glucose LESS THAN 70 milliGRAM(s)/deciliter  glucagon  Injectable 1 milliGRAM(s) IntraMuscular once PRN Glucose LESS THAN 70 milligrams/deciliter  melatonin 3 milliGRAM(s) Oral at bedtime PRN Insomnia  oxyCODONE    IR 10 milliGRAM(s) Oral every 4 hours PRN Severe Pain (7 - 10)  oxyCODONE    IR 5 milliGRAM(s) Oral every 4 hours PRN Moderate Pain (4 - 6)  valproate sodium IVPB 250 milliGRAM(s) IV Intermittent two times a day PRN Agitation      LABS:                        10.9   13.9  )-----------( 429      ( 24 Jul 2019 02:15 )             32.9     07-24    139  |  101  |  26<H>  ----------------------------<  172<H>  4.6   |  22  |  1.27    Ca    9.2      24 Jul 2019 02:15  Phos  3.2     07-24  Mg     2.4     07-24    TPro  7.4  /  Alb  2.9<L>  /  TBili  0.7  /  DBili  x   /  AST  24  /  ALT  23  /  AlkPhos  169<H>  07-24    PT/INR - ( 22 Jul 2019 10:15 )   PT: 15.8 sec;   INR: 1.36 ratio         PTT - ( 22 Jul 2019 10:15 )  PTT:29.6 sec      RADIOLOGY & ADDITIONAL STUDIES:

## 2019-07-24 NOTE — CHART NOTE - NSCHARTNOTEFT_GEN_A_CORE
Pt s/p RRT for unresponsiveness. Seen pt with RRT team at bedside. As per RN pt had episode of unresponsiveness where pt was noted to be staring at the wall while having conversation with staff. Pt's mental status returned to baseline before RRT. Pt also noted with expiratory wheeze and dyspnea     PAST MEDICAL & SURGICAL HISTORY:  Hyperlipidemia  Hypertension  Diabetes  H/O mastoidectomy    Vital Signs Last 24 Hrs  T(C): 36.4 (23 Jul 2019 20:44), Max: 36.4 (23 Jul 2019 20:44)  T(F): 97.5 (23 Jul 2019 20:44), Max: 97.5 (23 Jul 2019 20:44)  HR: 93 (23 Jul 2019 20:44) (73 - 103)  BP: 125/72 (23 Jul 2019 20:44) (98/66 - 125/72)  BP(mean): --  RR: 20 (24 Jul 2019 03:01) (18 - 20)  SpO2: 92% (24 Jul 2019 03:01) (92% - 100%)    PE: General: Alert, awake, confused, combative at times   CV: S1, S2, RRR    Pulm: Expiratory wheeze   Abd: Soft, NT, ND, + BS X 4   Ext: s/p left BKA     Event Summary:  69M w/ PMH of poor med compliance, DM type 2, HTN, PAD s/p recent surgery at [facility], transferred to [facility] CCU for NSTEMI with cardiogenic shock; course complicated by acute respiratory failure with hypoxia and septic shock secondary to suspected aspiration pneumonia requiring intubation (now extubated 7/8) and downgraded from CCU. Currently pending further evaluation by vascular surgery for LLE occluded vascular graft. 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 Seen pt with RRT team at bedside. As per RN pt had episode of unresponsiveness where pt was noted to be staring at the wall while having conversation with staff. Pt's mental status returned to baseline before RRT. Pt also noted with expiratory wheeze and dyspnea     PAST MEDICAL & SURGICAL HISTORY:  Hyperlipidemia  Hypertension  Diabetes  H/O mastoidectomy    Vital Signs Last 24 Hrs  T(C): 36.4 (23 Jul 2019 20:44), Max: 36.4 (23 Jul 2019 20:44)  T(F): 97.5 (23 Jul 2019 20:44), Max: 97.5 (23 Jul 2019 20:44)  HR: 93 (23 Jul 2019 20:44) (73 - 103)  BP: 125/72 (23 Jul 2019 20:44) (98/66 - 125/72)  BP(mean): --  RR: 20 (24 Jul 2019 03:01) (18 - 20)  SpO2: 92% (24 Jul 2019 03:01) (92% - 100%)    PE: General: Alert, awake, confused, combative at times   CV: S1, S2, RRR    Pulm: Expiratory wheeze   Abd: Soft, NT, ND, + BS X 4   Ext: s/p left BKA     Event Summary:  69M w/ PMH of poor med compliance, DM type 2, HTN, PAD s/p recent surgery at [facility], transferred to [facility] CCU for NSTEMI with cardiogenic shock; course complicated by acute respiratory failure with hypoxia and septic shock secondary to suspected aspiration pneumonia requiring intubation (now extubated 7/8) and downgraded from CCU. Currently pending further evaluation by vascular surgery for LLE occluded vascular graft. s/p Left BKA on 7/22/19, now s/p RRT for unresponsiveness and also found with expiratory wheeze/ dyspnea    Unresponsiveness  Now resolved, mental status back to base line  CT head: will f/u results  Neuro check Q6hrs    Dyspnea and expiratory wheeze likely secondary to pulmonary edema  Pt has been off Lasix due to surgery  CXR with pulmonary edema (prelim)  s/p Atrovent neb X 1  Lasix 40 IV X 1  O2 as pt tolerates  Troponin 641 downtrending from 1075  Will continue to monitor on tele   Lactate on VBG 3.6: Will monitor and trend    D/W Dr. Moreno hospitalist.     Bobbi Ruvalcaba NP  [id number]

## 2019-07-24 NOTE — PROGRESS NOTE ADULT - PROBLEM SELECTOR PLAN 3
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  On the left, the bypass graft is occluded, the popliteal artery is occluded and the trifurcation arteries are occluded.  - s/p Left BKA on 7/22/19  - Per vascular plan to maintain negative pressure dressing Prevena for 5 days post-op. Some of the staples to be taken out prior to discharge to [facility] next week.  - c/w DAPT, statin  - I asked vascular to come today to examine the RLE- seems cold - concerning because of the high lactate today.

## 2019-07-24 NOTE — PROVIDER CONTACT NOTE (OTHER) - ACTION/TREATMENT ORDERED:
12 lead EKG done, Stat electrolytes sent ,Will continue to monitor
Intructed to wait a few hours an reassess.
see RRT sheet
Continue to monitor.
MD ordered: continue monitoring; notify of any changes; verbal phone order to discontinue heparin drip given-and written order to follow
Will monitor and encourage patient to void.
Will try again as per NP request, when patient calms down.
monitor.

## 2019-07-24 NOTE — PROGRESS NOTE ADULT - PROBLEM SELECTOR PLAN 5
- TTE with EF 20-25%, global systolic dysfunction  - diuresis held for now - awaiting cardiology input. Need for inotropes?  - Cardiology will follow up  - Strict I and O, daily weights (incontinent)

## 2019-07-24 NOTE — PROVIDER CONTACT NOTE (CRITICAL VALUE NOTIFICATION) - ACTION/TREATMENT ORDERED:
give IV acedomenephin now. continue to monitor
no new orders given,will continue to monitor pt closely.
no new orders given,will continue to monitor pt closely.
NP aware, continue to monitor

## 2019-07-24 NOTE — CONSULT NOTE ADULT - ASSESSMENT
68 y/o M with a PMH of HTN, DM, PAD, HFrEF initially transferred to [facility] for management of cardiogenic shock and NSTEMI now s/p L BKA. HC c/b dyspnea/hypoxia and acute AMS in setting of elev lactate and liver tests.    #Hypoxia - Pt w/ new onset hypoxia and elev liver tests/lactate concerning for shock or otherwise low-perfusion state. He has e/o acutely decomp CHF.  -MICU recs noted  -Given pulm edema, elev JVP, and "cold and wet" appearance, favor tx for acute CHF  -Check BNP level  -Give furosemide 60mg IVP; assess UOP. Aim for net neg ~1L  -Serial trend lactate until WNL  -C/w trend VS; BP in normal range now  -Repeat CXR    #Elev hs-cTn - Unable to elicit sxs. Pt w/ diffuse hypoperfusive state notable given "shock liver", VINAY, and elev lactate. Suspect more related to demand than plaque-rupture event.  -Repeat EKG  -C/w trend hs-cTn and CK/CKMB

## 2019-07-24 NOTE — PROGRESS NOTE ADULT - PROBLEM SELECTOR PLAN 2
Transaminitis likely in the setting of cardiogenic/septic shock. Initially improved in the CCU, but now after the RRT overnight, LFTs have acutely increased. Suspect shock liver again, but still unclear. Stopping lipitor and depakote (hepatotoxic meds).  CT Abdomen pending (recommended by MICU attending) - look for ischemia.   Monitor CMP at least daily.

## 2019-07-24 NOTE — PROGRESS NOTE ADULT - PROBLEM SELECTOR PLAN 1
VINAY (likely ATN due to cardiogenic/septic shock) earlier in the CCU, originally stabilized, but now uptrending again today with a high lactate (up to 7.7). He was given Lasix IV overnight during the RRT for CXR with pulmonary edema. The patient's vital signs remained stable.   Unclear at this time if he needs more diuresis (severe systolic CHF- EF 20-25%) vs volume back vs Inotropic support.   The MICU team was consulted. I also spoke to the CCU fellow to assess this case.   For now, hold diuresis, stopped lisinopril.

## 2019-07-24 NOTE — PROGRESS NOTE ADULT - ASSESSMENT
69M w/ PMH of poor med compliance, DM type 2, HTN, PAD s/p recent surgery at [facility], transferred to [facility] CCU for NSTEMI with cardiogenic shock; course complicated by acute respiratory failure with hypoxia and septic shock secondary to suspected aspiration pneumonia requiring intubation (now extubated 7/8) and downgraded from CCU. Currently pending further evaluation by vascular surgery for LLE occluded vascular graft. 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

## 2019-07-24 NOTE — PROVIDER CONTACT NOTE (OTHER) - RECOMMENDATIONS
call RRT, notify NP
will report to night team to assess the wound
Continue to monitor as per NP request, encourage voiding.
PA made aware.
Re-attempt and re-orientation as per NP request has failed. Patient remains agitated. Heparin has been off for approx. 2 hours. Patient refuses PO seroquel. Will try again as per NP request.
continue monitoring; notify of any changes
monitor patient for  other episodes

## 2019-07-24 NOTE — PROVIDER CONTACT NOTE (OTHER) - DATE AND TIME:
24-Jul-2019 01:55
09-Jul-2019 20:54
10-Jul-2019 19:44
12-Jul-2019 20:30
13-Jul-2019 03:15
13-Jul-2019 22:45
15-Jul-2019 00:38
17-Jul-2019 02:20
22-Jul-2019 07:33

## 2019-07-24 NOTE — CHART NOTE - NSCHARTNOTEFT_GEN_A_CORE
Notified by primary team, MIYA Rahman, that patient has a lactate of 7.7 which concern that the source can be from the right lower extremity. Patient seen and evaluated at the bedside.  Subjective: Patient somnolent. Responds to pain and sternal rub.   Objective:   Vital Signs Last 24 Hrs  T(C): 36.7 (24 Jul 2019 11:20), Max: 36.7 (24 Jul 2019 11:20)  T(F): 98.1 (24 Jul 2019 11:20), Max: 98.1 (24 Jul 2019 11:20)  HR: 90 (24 Jul 2019 11:20) (90 - 103)  BP: 106/74 (24 Jul 2019 11:20) (95/59 - 125/72)  BP(mean): --  RR: 18 (24 Jul 2019 11:20) (18 - 20)  SpO2: 96% (24 Jul 2019 11:20) (92% - 97%)                        11.1   14.0  )-----------( 387      ( 24 Jul 2019 13:43 )             36.0   07-24    141  |  100  |  36<H>  ----------------------------<  172<H>  5.7<H>   |  21<L>  |  1.61<H>    Ca    8.9      24 Jul 2019 13:43  Phos  3.2     07-24  Mg     2.4     07-24    TPro  7.3  /  Alb  3.0<L>  /  TBili  1.0  /  DBili  x   /  AST  1550<H>  /  ALT  818<H>  /  AlkPhos  176<H>  07-24    Troponin T, High Sensitivity (07.24.19 @ 13:43)    Troponin T, High Sensitivity Result: 826: Rapid upward or downward changes in high-sensitivity troponin levels  suggest acute myocardial injury. Renal impairment may cause sustained  troponin elevations.  Normal: <6 - 14 ng/L  Indeterminate: 15-51 ng/L  Elevated: > 51 ng/L  See http://labs/test/[email or web address] on the [facility] intranet for more  information ng/L Notified by primary team that patient has a lactate of 7.7 with concern that the source can be from the right lower extremity. Patient seen and evaluated at the bedside.    Subjective: Patient somnolent. Responds to pain and sternal rub.   Objective:   Vital Signs Last 24 Hrs  T(C): 36.7 (24 Jul 2019 11:20), Max: 36.7 (24 Jul 2019 11:20)  T(F): 98.1 (24 Jul 2019 11:20), Max: 98.1 (24 Jul 2019 11:20)  HR: 90 (24 Jul 2019 11:20) (90 - 103)  BP: 106/74 (24 Jul 2019 11:20) (95/59 - 125/72)  BP(mean): --  RR: 18 (24 Jul 2019 11:20) (18 - 20)  SpO2: 96% (24 Jul 2019 11:20) (92% - 97%)                        11.1   14.0  )-----------( 387      ( 24 Jul 2019 13:43 )             36.0   07-24    141  |  100  |  36<H>  ----------------------------<  172<H>  5.7<H>   |  21<L>  |  1.61<H>    Ca    8.9      24 Jul 2019 13:43  Phos  3.2     07-24  Mg     2.4     07-24    TPro  7.3  /  Alb  3.0<L>  /  TBili  1.0  /  DBili  x   /  AST  1550<H>  /  ALT  818<H>  /  AlkPhos  176<H>  07-24    Troponin T, High Sensitivity (07.24.19 @ 13:43)    Troponin T, High Sensitivity Result: 826: Rapid upward or downward changes in high-sensitivity troponin levels  suggest acute myocardial injury. Renal impairment may cause sustained  troponin elevations.  Normal: <6 - 14 ng/L  Indeterminate: 15-51 ng/L  Elevated: > 51 ng/L  See http://labs/test/[email or web address] on the [facility] intranet for more  information ng/L    Blood Gas Arterial, Lactate (07.24.19 @ 14:53)    Blood Gas Arterial, Lactate: 5.1 mmol/L  Blood Gas Profile - Arterial (07.22.19 @ 13:02)    pH, Arterial: 7.38    pCO2, Arterial: 42 mmHg    pO2, Arterial: 162 mmHg    HCO3, Arterial: 25 mmoL/L    Base Excess, Arterial: .1 mmol/L    Oxygen Saturation, Arterial: 99 %    FIO2, Arterial: 0    Blood Gas Source Arterial: Arterial    Physical Exam:  Gen: somnolent, resting in bed, responses to sternal rub   Respiratory: non labored breathing   Abdomen: soft, nontender, nondistended, no sides of guarding   Ext: LLE: ACE and preveena in place c/d/i, immobilizer in place         RLE: cool to touch, no signals present (unchanged)     A/P :    69y Male with 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 Notified by primary team that patient is septic with an unknown source, R/O RLE as source.     - exam unchanged: RLE cool, no signals   - recommends MICU consult     Alicia Navas PA-C   Vascular Surgery  [phone]

## 2019-07-24 NOTE — CHART NOTE - NSCHARTNOTEFT_GEN_A_CORE
CCU Accept Note    JIM PAREDES  69y  Patient is a 69y old  Male who presents with a chief complaint of NSTEMI, PAD (24 Jul 2019 18:55)      ====================  HPI & Hospital Course:     The pt is a 70 y/o M with a PMH of HTN, DM, PAD who is transferred to [facility] for management of cardiogenic shock and NSTEMI. As per sign out, the pt was admitted for L popliteal bypass to [facility]. During his time there, he was refusing medications  and was found to be progressively more dyspneic. He had an echo done there which showed that he has an EF of <15% with global hypokinesis; he received 20mg IV lasix for fluid overload. His respiratory status continued to worsen and the pt was intubated for airway protection. The pt vomited during this time and there was a concern for aspiration. The pt did not complain of CP during the time prior to intubation. As per nephew, the pt has not been complaining of CP, SOB prior to admission to the hospital and has been able to carry out his ADLs. As per the nephew, pt has not been compliant with his medications as an outpatient; the nephew knows that he had amlodipine prescribed but doesn't know what medications that the pt takes for his diabetes.  As per chart from [facility], pt has been prescribed to take amlodipine 10 daily and metformin 500 BID at home. As per med rec from 2015, pt has been taking lantus 20 mg daily at home in addition to metformin. (05 Jul 2019 23:50)    Since transfer from CCU, pt's HC largely unremarkable but was notable to continued disorientation and periodic agitation. He was awaiting consent for BKA, which was finally done on 7/22. Post-op course was largely unremarkable, however pt noted to have incr WOB and wheezing last night, and suddenly developed AMS and RRT was called. W/u at that time remarkable for pulm edema on CXR and pt was given furosemide IV. Subsequent labs notable for elev lactate and liver tests. Pt seen/examined at bedside, however he is AAOx3 and largely unable/unwilling to provide any hx.      Past Medical History:     Diabetes     Hyperlipidemia     Hypertension.     Past Surgical History:     H/O mastoidectomy.    Home Medications:  acetaminophen-oxyCODONE 325 mg-5 mg oral tablet: 2 tab(s) orally every 6 hours, As needed, Severe Pain (24 Apr 2015 07:37)  Ceftin 500 mg oral tablet: 1 tab(s) orally 2 times a day (27 Apr 2015 13:32)  insulin glargine 100 units/mL subcutaneous solution: 20 unit(s) subcutaneous once a day (at bedtime) (28 Apr 2015 11:51)  metFORMIN 500 mg oral tablet: 1 tab(s) orally 2 times a day (with meals)after one week take two tablets twice a day for a total of 1000 mg twice daily (28 Apr 2015 11:51)  ofloxacin 0.3% otic solution: 5 drop(s) to each affected ear 3 times a day (27 Apr 2015 13:32)      Current Medications:   MEDICATIONS  (STANDING):  acetaminophen   Tablet .. 650 milliGRAM(s) Oral every 6 hours  aspirin enteric coated 81 milliGRAM(s) Oral daily  clopidogrel Tablet 75 milliGRAM(s) Oral daily  dextrose 5%. 1000 milliLiter(s) (50 mL/Hr) IV Continuous <Continuous>  dextrose 50% Injectable 12.5 Gram(s) IV Push once  dextrose 50% Injectable 25 Gram(s) IV Push once  dextrose 50% Injectable 25 Gram(s) IV Push once  heparin  Injectable 5000 Unit(s) SubCutaneous every 12 hours  insulin glargine Injectable (LANTUS) 6 Unit(s) SubCutaneous at bedtime  insulin lispro (HumaLOG) corrective regimen sliding scale   SubCutaneous three times a day before meals  insulin lispro (HumaLOG) corrective regimen sliding scale   SubCutaneous at bedtime  ipratropium 17 MICROgram(s) HFA Inhaler 1 Puff(s) Inhalation every 6 hours  nicotine -   7 mG/24Hr(s) Patch 1 patch Transdermal daily    MEDICATIONS  (PRN):  dextrose 40% Gel 15 Gram(s) Oral once PRN Blood Glucose LESS THAN 70 milliGRAM(s)/deciliter  glucagon  Injectable 1 milliGRAM(s) IntraMuscular once PRN Glucose LESS THAN 70 milligrams/deciliter  melatonin 3 milliGRAM(s) Oral at bedtime PRN Insomnia  oxyCODONE    IR 10 milliGRAM(s) Oral every 4 hours PRN Severe Pain (7 - 10)  oxyCODONE    IR 5 milliGRAM(s) Oral every 4 hours PRN Moderate Pain (4 - 6)      Allergies:     Family History:     diabetes mellitus, In all siblings(2 sisters and 4 brothers).    Social History:     ====================  Vital Signs Last 24 Hrs  T(C): 36.4 (24 Jul 2019 16:40), Max: 36.7 (24 Jul 2019 11:20)  T(F): 97.6 (24 Jul 2019 16:40), Max: 98.1 (24 Jul 2019 11:20)  HR: 91 (24 Jul 2019 16:40) (90 - 102)  BP: 141/75 (24 Jul 2019 16:40) (95/59 - 141/75)  BP(mean): 3 (24 Jul 2019 16:40) (3 - 3)  RR: 18 (24 Jul 2019 16:40) (18 - 20)  SpO2: 100% (24 Jul 2019 18:17) (92% - 100%)    Adult Advanced Hemodynamics Last 24 Hrs  CVP(mm Hg): --  CVP(cm H2O): --  CO: --  CI: --  PA: --  PA(mean): --  PCWP: --  SVR: --  SVRI: --  PVR: --  PVRI: --    Physical Exam:   General: NAD  HEENT: PERRL, EOMI, normal sclera and conjunctiva, no oral lesions  Neck: Supple, no JVD  Lungs: CTA bilaterally  Heart: RRR, normal S1S2, no murmurs/rubs/gallops  Abdomen: Soft, ND/NT  Extremities: 2+ peripheral pulses, no cyanosis/clubbing/edema, full ROM  Skin: Warm, well-perfused  Neuro: A&O x3, no focal deficits      ====================  Labs & Imaging:   CBC Full  -  ( 24 Jul 2019 16:23 )  WBC Count : 14.1 K/uL  RBC Count : 3.41 M/uL  Hemoglobin : 10.2 g/dL  Hematocrit : 31.6 %  Platelet Count - Automated : 352 K/uL  Mean Cell Volume : 92.6 fl  Mean Cell Hemoglobin : 29.9 pg  Mean Cell Hemoglobin Concentration : 32.3 gm/dL  Auto Neutrophil # : x  Auto Lymphocyte # : x  Auto Monocyte # : x  Auto Eosinophil # : x  Auto Basophil # : x  Auto Neutrophil % : x  Auto Lymphocyte % : x  Auto Monocyte % : x  Auto Eosinophil % : x  Auto Basophil % : x    07-24    139  |  102  |  39<H>  ----------------------------<  218<H>  5.1   |  20<L>  |  1.53<H>    Ca    8.7      24 Jul 2019 16:23  Phos  3.2     07-24  Mg     2.4     07-24    TPro  6.8  /  Alb  2.8<L>  /  TBili  0.9  /  DBili  0.3<H>  /  AST  1443<H>  /  ALT  738<H>  /  AlkPhos  161<H>  07-24    PT/INR - ( 24 Jul 2019 16:23 )   PT: 24.4 sec;   INR: 2.07 ratio         PTT - ( 24 Jul 2019 16:23 )  PTT:32.1 sec  ABG - ( 24 Jul 2019 14:53 )  pH, Arterial: 7.51  pH, Blood: x     /  pCO2: 25    /  pO2: 73    / HCO3: 20    / Base Excess: -1.7  /  SaO2: 94                            ====================  Assessment & Plan:     69M w/ PMH of poor med compliance, DM type 2, HTN, PAD s/p recent surgery at [facility], transferred to [facility] CCU for NSTEMI with cardiogenic shock; course complicated by acute respiratory failure with hypoxia and septic shock secondary to suspected aspiration pneumonia requiring intubation (now extubated 7/8) and downgraded from CCU. Currently pending further evaluation by vascular surgery for LLE occluded vascular graft. 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    Neuro:  -    Cardiovascular:  -    Pulmonary:  -    FEN/GI:  -    Renal:  -    :  -    ID:  -    Heme:  -      ====================    Yonny Chávez MD PGY2 CCU Accept Note    JIM PAREDES  69y  Patient is a 69y old  Male who presents with a chief complaint of NSTEMI, PAD (24 Jul 2019 18:55)      ====================  HPI & Hospital Course:     The pt is a 68 y/o M with a PMH of HTN, DM, PAD who is transferred to [facility] for management of cardiogenic shock and NSTEMI. As per sign out, the pt was admitted for L popliteal bypass to [facility]. During his time there, he was refusing medications  and was found to be progressively more dyspneic. He had an echo done there which showed that he has an EF of <15% with global hypokinesis; he received 20mg IV lasix for fluid overload. His respiratory status continued to worsen and the pt was intubated for airway protection. The pt vomited during this time and there was a concern for aspiration. The pt did not complain of CP during the time prior to intubation. As per nephew, the pt has not been complaining of CP, SOB prior to admission to the hospital and has been able to carry out his ADLs. As per the nephew, pt has not been compliant with his medications as an outpatient; the nephew knows that he had amlodipine prescribed but doesn't know what medications that the pt takes for his diabetes.  As per chart from [facility], pt has been prescribed to take amlodipine 10 daily and metformin 500 BID at home. As per med rec from 2015, pt has been taking lantus 20 mg daily at home in addition to metformin. (05 Jul 2019 23:50)    Since transfer from CCU, pt's HC largely unremarkable but was notable to continued disorientation and periodic agitation. He was awaiting consent for BKA, which was finally done on 7/22. Post-op course was largely unremarkable, however pt noted to have incr WOB and wheezing last night, and suddenly developed AMS and RRT was called. W/u at that time remarkable for pulm edema on CXR and pt was given furosemide IV. Subsequent labs notable for elev lactate and liver tests. Pt seen/examined at bedside, however he is AAOx3 and largely unable/unwilling to provide any hx.      Past Medical History:     Diabetes     Hyperlipidemia     Hypertension.     Past Surgical History:     H/O mastoidectomy.    Home Medications:  acetaminophen-oxyCODONE 325 mg-5 mg oral tablet: 2 tab(s) orally every 6 hours, As needed, Severe Pain (24 Apr 2015 07:37)  Ceftin 500 mg oral tablet: 1 tab(s) orally 2 times a day (27 Apr 2015 13:32)  insulin glargine 100 units/mL subcutaneous solution: 20 unit(s) subcutaneous once a day (at bedtime) (28 Apr 2015 11:51)  metFORMIN 500 mg oral tablet: 1 tab(s) orally 2 times a day (with meals)after one week take two tablets twice a day for a total of 1000 mg twice daily (28 Apr 2015 11:51)  ofloxacin 0.3% otic solution: 5 drop(s) to each affected ear 3 times a day (27 Apr 2015 13:32)      Current Medications:   MEDICATIONS  (STANDING):  acetaminophen   Tablet .. 650 milliGRAM(s) Oral every 6 hours  aspirin enteric coated 81 milliGRAM(s) Oral daily  clopidogrel Tablet 75 milliGRAM(s) Oral daily  dextrose 5%. 1000 milliLiter(s) (50 mL/Hr) IV Continuous <Continuous>  dextrose 50% Injectable 12.5 Gram(s) IV Push once  dextrose 50% Injectable 25 Gram(s) IV Push once  dextrose 50% Injectable 25 Gram(s) IV Push once  heparin  Injectable 5000 Unit(s) SubCutaneous every 12 hours  insulin glargine Injectable (LANTUS) 6 Unit(s) SubCutaneous at bedtime  insulin lispro (HumaLOG) corrective regimen sliding scale   SubCutaneous three times a day before meals  insulin lispro (HumaLOG) corrective regimen sliding scale   SubCutaneous at bedtime  ipratropium 17 MICROgram(s) HFA Inhaler 1 Puff(s) Inhalation every 6 hours  nicotine -   7 mG/24Hr(s) Patch 1 patch Transdermal daily    MEDICATIONS  (PRN):  dextrose 40% Gel 15 Gram(s) Oral once PRN Blood Glucose LESS THAN 70 milliGRAM(s)/deciliter  glucagon  Injectable 1 milliGRAM(s) IntraMuscular once PRN Glucose LESS THAN 70 milligrams/deciliter  melatonin 3 milliGRAM(s) Oral at bedtime PRN Insomnia  oxyCODONE    IR 10 milliGRAM(s) Oral every 4 hours PRN Severe Pain (7 - 10)  oxyCODONE    IR 5 milliGRAM(s) Oral every 4 hours PRN Moderate Pain (4 - 6)      Allergies:     Family History:     diabetes mellitus, In all siblings(2 sisters and 4 brothers).    Social History:     ====================  Vital Signs Last 24 Hrs  T(C): 36.4 (24 Jul 2019 16:40), Max: 36.7 (24 Jul 2019 11:20)  T(F): 97.6 (24 Jul 2019 16:40), Max: 98.1 (24 Jul 2019 11:20)  HR: 91 (24 Jul 2019 16:40) (90 - 102)  BP: 141/75 (24 Jul 2019 16:40) (95/59 - 141/75)  BP(mean): 3 (24 Jul 2019 16:40) (3 - 3)  RR: 18 (24 Jul 2019 16:40) (18 - 20)  SpO2: 100% (24 Jul 2019 18:17) (92% - 100%)    Adult Advanced Hemodynamics Last 24 Hrs  CVP(mm Hg): --  CVP(cm H2O): --  CO: --  CI: --  PA: --  PA(mean): --  PCWP: --  SVR: --  SVRI: --  PVR: --  PVRI: --    Physical Exam:   General: NAD  HEENT: PERRL, EOMI, normal sclera and conjunctiva, no oral lesions  Neck: Supple, no JVD  Lungs: CTA bilaterally  Heart: RRR, normal S1S2, no murmurs/rubs/gallops  Abdomen: Soft, ND/NT  Extremities: 2+ peripheral pulses, no cyanosis/clubbing/edema, full ROM  Skin: Warm, well-perfused  Neuro: A&O x3, no focal deficits      ====================  Labs & Imaging:   CBC Full  -  ( 24 Jul 2019 16:23 )  WBC Count : 14.1 K/uL  RBC Count : 3.41 M/uL  Hemoglobin : 10.2 g/dL  Hematocrit : 31.6 %  Platelet Count - Automated : 352 K/uL  Mean Cell Volume : 92.6 fl  Mean Cell Hemoglobin : 29.9 pg  Mean Cell Hemoglobin Concentration : 32.3 gm/dL  Auto Neutrophil # : x  Auto Lymphocyte # : x  Auto Monocyte # : x  Auto Eosinophil # : x  Auto Basophil # : x  Auto Neutrophil % : x  Auto Lymphocyte % : x  Auto Monocyte % : x  Auto Eosinophil % : x  Auto Basophil % : x    07-24    139  |  102  |  39<H>  ----------------------------<  218<H>  5.1   |  20<L>  |  1.53<H>    Ca    8.7      24 Jul 2019 16:23  Phos  3.2     07-24  Mg     2.4     07-24    TPro  6.8  /  Alb  2.8<L>  /  TBili  0.9  /  DBili  0.3<H>  /  AST  1443<H>  /  ALT  738<H>  /  AlkPhos  161<H>  07-24    PT/INR - ( 24 Jul 2019 16:23 )   PT: 24.4 sec;   INR: 2.07 ratio         PTT - ( 24 Jul 2019 16:23 )  PTT:32.1 sec  ABG - ( 24 Jul 2019 14:53 )  pH, Arterial: 7.51  pH, Blood: x     /  pCO2: 25    /  pO2: 73    / HCO3: 20    / Base Excess: -1.7  /  SaO2: 94                            ====================  Assessment & Plan:     69M w/ PMH of poor med compliance, DM type 2, HTN, PAD s/p recent surgery at [facility], transferred to [facility] CCU for NSTEMI with cardiogenic shock; course complicated by acute respiratory failure with hypoxia and septic shock secondary to suspected aspiration pneumonia requiring intubation (now extubated 7/8) and downgraded from CCU. Currently pending further evaluation by vascular surgery for LLE occluded vascular graft. 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    Neuro:  1. AMS  -CT brain 7/24: No evidence of acute infarction or hemorrhage  -    Cardiovascular:  -LVEF  -central line  -mixed venous sat  -diuresis    -Trops 826, trend values  -c/w dual antiplatelet therapy    Pulmonary:  -CXR 7/24: Increased pulmonary edema  -diuresis   -infiltrate on   -abx  -further chest imaging?    FEN/GI:  1. Shock liver  -AST/ALT 1443/738  -trend    Renal:  1. VINAY  -most likely 2/2 hypoperfusion    :  -    ID:  -leukocystosis 14.1  -blood culture?    Heme:  -H/H 10.2/31.6  -monitor      ====================    Yonny Chávez MD PGY2 CCU Accept Note    JIM PAREDES  69y  Patient is a 69y old  Male who presents with a chief complaint of NSTEMI, PAD (24 Jul 2019 18:55)      ====================  HPI & Hospital Course:     The pt is a 68 y/o M with a PMH of HTN, DM, PAD who is transferred to [facility] for management of cardiogenic shock and NSTEMI. As per sign out, the pt was admitted for L popliteal bypass to [facility]. During his time there, he was refusing medications  and was found to be progressively more dyspneic. He had an echo done there which showed that he has an EF of <15% with global hypokinesis; he received 20mg IV lasix for fluid overload. His respiratory status continued to worsen and the pt was intubated for airway protection. The pt vomited during this time and there was a concern for aspiration. The pt did not complain of CP during the time prior to intubation. As per nephew, the pt has not been complaining of CP, SOB prior to admission to the hospital and has been able to carry out his ADLs. As per the nephew, pt has not been compliant with his medications as an outpatient; the nephew knows that he had amlodipine prescribed but doesn't know what medications that the pt takes for his diabetes.  As per chart from [facility], pt has been prescribed to take amlodipine 10 daily and metformin 500 BID at home. As per med rec from 2015, pt has been taking lantus 20 mg daily at home in addition to metformin. (05 Jul 2019 23:50)    Since transfer from CCU, pt's HC largely unremarkable but was notable to continued disorientation and periodic agitation. He was awaiting consent for BKA, which was finally done on 7/22. Post-op course was largely unremarkable, however pt noted to have incr WOB and wheezing last night, and suddenly developed AMS and RRT was called. W/u at that time remarkable for pulm edema on CXR and pt was given furosemide IV. Subsequent labs notable for elev lactate and liver tests. Pt seen/examined at bedside, however he is AAOx3 and largely unable/unwilling to provide any hx.      Past Medical History:     Diabetes     Hyperlipidemia     Hypertension.     Past Surgical History:     H/O mastoidectomy.    Home Medications:  acetaminophen-oxyCODONE 325 mg-5 mg oral tablet: 2 tab(s) orally every 6 hours, As needed, Severe Pain (24 Apr 2015 07:37)  Ceftin 500 mg oral tablet: 1 tab(s) orally 2 times a day (27 Apr 2015 13:32)  insulin glargine 100 units/mL subcutaneous solution: 20 unit(s) subcutaneous once a day (at bedtime) (28 Apr 2015 11:51)  metFORMIN 500 mg oral tablet: 1 tab(s) orally 2 times a day (with meals)after one week take two tablets twice a day for a total of 1000 mg twice daily (28 Apr 2015 11:51)  ofloxacin 0.3% otic solution: 5 drop(s) to each affected ear 3 times a day (27 Apr 2015 13:32)      Current Medications:   MEDICATIONS  (STANDING):  acetaminophen   Tablet .. 650 milliGRAM(s) Oral every 6 hours  aspirin enteric coated 81 milliGRAM(s) Oral daily  clopidogrel Tablet 75 milliGRAM(s) Oral daily  dextrose 5%. 1000 milliLiter(s) (50 mL/Hr) IV Continuous <Continuous>  dextrose 50% Injectable 12.5 Gram(s) IV Push once  dextrose 50% Injectable 25 Gram(s) IV Push once  dextrose 50% Injectable 25 Gram(s) IV Push once  heparin  Injectable 5000 Unit(s) SubCutaneous every 12 hours  insulin glargine Injectable (LANTUS) 6 Unit(s) SubCutaneous at bedtime  insulin lispro (HumaLOG) corrective regimen sliding scale   SubCutaneous three times a day before meals  insulin lispro (HumaLOG) corrective regimen sliding scale   SubCutaneous at bedtime  ipratropium 17 MICROgram(s) HFA Inhaler 1 Puff(s) Inhalation every 6 hours  nicotine -   7 mG/24Hr(s) Patch 1 patch Transdermal daily    MEDICATIONS  (PRN):  dextrose 40% Gel 15 Gram(s) Oral once PRN Blood Glucose LESS THAN 70 milliGRAM(s)/deciliter  glucagon  Injectable 1 milliGRAM(s) IntraMuscular once PRN Glucose LESS THAN 70 milligrams/deciliter  melatonin 3 milliGRAM(s) Oral at bedtime PRN Insomnia  oxyCODONE    IR 10 milliGRAM(s) Oral every 4 hours PRN Severe Pain (7 - 10)  oxyCODONE    IR 5 milliGRAM(s) Oral every 4 hours PRN Moderate Pain (4 - 6)      Allergies:     Family History:     diabetes mellitus, In all siblings(2 sisters and 4 brothers).    Social History:     ====================  Vital Signs Last 24 Hrs  T(C): 36.4 (24 Jul 2019 16:40), Max: 36.7 (24 Jul 2019 11:20)  T(F): 97.6 (24 Jul 2019 16:40), Max: 98.1 (24 Jul 2019 11:20)  HR: 91 (24 Jul 2019 16:40) (90 - 102)  BP: 141/75 (24 Jul 2019 16:40) (95/59 - 141/75)  BP(mean): 3 (24 Jul 2019 16:40) (3 - 3)  RR: 18 (24 Jul 2019 16:40) (18 - 20)  SpO2: 100% (24 Jul 2019 18:17) (92% - 100%)    Physical Exam:   General: NAD  HEENT: EOMI, normal sclera and conjunctiva, no oral lesions, does not track   Neck: Supple, no JVD  Lungs: CTA bilaterally  Heart: RRR, normal S1S2, no murmurs/rubs/gallops  Abdomen: Soft, ND/NT  Extremities: L BKA limbs cold to touch   Neuro: Does not answer question, does not comply with neuro exam       ====================  Labs & Imaging:   CBC Full  -  ( 24 Jul 2019 16:23 )  WBC Count : 14.1 K/uL  RBC Count : 3.41 M/uL  Hemoglobin : 10.2 g/dL  Hematocrit : 31.6 %  Platelet Count - Automated : 352 K/uL  Mean Cell Volume : 92.6 fl  Mean Cell Hemoglobin : 29.9 pg  Mean Cell Hemoglobin Concentration : 32.3 gm/dL  Auto Neutrophil # : x  Auto Lymphocyte # : x  Auto Monocyte # : x  Auto Eosinophil # : x  Auto Basophil # : x  Auto Neutrophil % : x  Auto Lymphocyte % : x  Auto Monocyte % : x  Auto Eosinophil % : x  Auto Basophil % : x    07-24    139  |  102  |  39<H>  ----------------------------<  218<H>  5.1   |  20<L>  |  1.53<H>    Ca    8.7      24 Jul 2019 16:23  Phos  3.2     07-24  Mg     2.4     07-24    TPro  6.8  /  Alb  2.8<L>  /  TBili  0.9  /  DBili  0.3<H>  /  AST  1443<H>  /  ALT  738<H>  /  AlkPhos  161<H>  07-24    PT/INR - ( 24 Jul 2019 16:23 )   PT: 24.4 sec;   INR: 2.07 ratio         PTT - ( 24 Jul 2019 16:23 )  PTT:32.1 sec  ABG - ( 24 Jul 2019 14:53 )  pH, Arterial: 7.51  pH, Blood: x     /  pCO2: 25    /  pO2: 73    / HCO3: 20    / Base Excess: -1.7  /  SaO2: 94        ====================  Assessment & Plan:     69M w/ PMH of poor med compliance, DM type 2, HTN, PAD s/p recent surgery at [facility], transferred to [facility] CCU for NSTEMI with cardiogenic shock; course complicated by acute respiratory failure with hypoxia and septic shock secondary to suspected aspiration pneumonia requiring intubation (now extubated 7/8) and downgraded from CCU. Currently pending further evaluation by vascular surgery for LLE occluded vascular graft. 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     68 y/o M with a PMH of HTN, DM, PAD, HFrEF initially transferred to [facility] for management of cardiogenic shock and NSTEMI now s/p L BKA. HC c/b dyspnea/hypoxia and acute AMS in setting of elev lactate and liver tests concerning for shock liver in the setting of acute decompensated HF     #Neuro   - A&Ox3, no active interventions  - per chart review intermittent agitation requiring haldol/seroquel but held due to prolonged QTc   - f/u CTH     #Pulm   - Concern for asp pna/pneumonitis, does not have a septic picture at this point, but given acute decompensation will give one dose of Vanc/Zosyn and follow blood Cx/sputum Cx   - Stable on 2L NC, protecting airway  - MICU recs noted    #Cardiac   1. Acute decompensated HF  - CXR with pulm edema during RRT, patient received lasix with diuresis and resolution of pulmnonary edema  - TTE with EF 20-25%, moderately dilated left atrium, increased left atrial pressures, moderately dilated LV, global hypokinesis    2. NSTEMI   Medically managed, TTE described as above  - [facility] deferred due to concern for medication non compliance   - c/w DAPT: ASA, Plavix  - Statin held in setting of transaminitis  - BB held due to concern for ADHF     3. PAD s/p L BKA plan   - VA duplex of b/l LE: 07/06: R popliteal artery with severe flow-limiting stenosis, L pop and bypass graft are occluded   - f/u vascular surg for futher recs     #GI Transaminitis w/elevated lactate concern for shock liver   - LFTs currently downtrending   - Trend LFTs   - f/u CT A/P r/o ischemia     #Renal VINAY on CKD, likely ATN in the setting of cardiogenic shock- improving   - Trend sCR   - monitor I/Os, electrolytes  - lisinopril held in setting of VINAY    - Avoid nephrotoxins    #Endo DM, A1C 10  - On lantus 6u, was on lantus 20 units prior to BKA, lowered due to concern of poor PO intake  - Will asses FS and increase Lantus and s/s coverage prn     #Heme: Anemia, no overt source of bleeding  - Trend CBC     ====================

## 2019-07-24 NOTE — RAPID RESPONSE TEAM SUMMARY - NSSITUATIONBACKGROUNDRRT_GEN_ALL_CORE
Rapid Response note written several hours after rapid     RRT called for altered mental status. As per RN at bedside, patient's baseline is agitated and hyperactive and now refused to follow commands.     Briefly, this is a 69M w/ PMH of poor med compliance, DM type 2, HTN, PAD s/p recent surgery at [facility], transferred to [facility] CCU for NSTEMI with cardiogenic shock; course complicated by acute respiratory failure with hypoxia and septic shock secondary to suspected aspiration pneumonia requiring intubation (now extubated 7/8) and downgraded from CCU. Currently pending further evaluation by vascular surgery for LLE occluded vascular graft. 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     Patient afebrile and hemodynamically stable. mentating and moving extremities spontaneously and gets angry when performing exam. This episode was likely hypoactive delirium with already known hyperactive. Concern for infection/metabolic less likely    Recommendations  [ ] F/U CTH     Plan discussed with floor provider and in agreement

## 2019-07-24 NOTE — PROVIDER CONTACT NOTE (OTHER) - SITUATION
pt noted to have changed in breathing, appearing to use accessory muscle when breathing, not following commands, not responding to name, altered from baseline
Bladder scan 354.
Patient had 13 beats and followed by another 22 beats of wide comlexes on telemonitor
Patient noted with heavy breathing at times.
Patient pulled out IVL..refusing new IV access
Pt pulled out NG tube, B/L mittens secured. Pt with transport for xray
Pt retaining urine. Unable to void when encouraged.
request to DC heparin drip order

## 2019-07-25 LAB
ALBUMIN SERPL ELPH-MCNC: 2.3 G/DL — LOW (ref 3.3–5)
ALBUMIN SERPL ELPH-MCNC: 2.3 G/DL — LOW (ref 3.3–5)
ALP SERPL-CCNC: 152 U/L — HIGH (ref 40–120)
ALP SERPL-CCNC: 153 U/L — HIGH (ref 40–120)
ALT FLD-CCNC: 514 U/L — HIGH (ref 10–45)
ALT FLD-CCNC: 588 U/L — HIGH (ref 10–45)
ANION GAP SERPL CALC-SCNC: 11 MMOL/L — SIGNIFICANT CHANGE UP (ref 5–17)
ANION GAP SERPL CALC-SCNC: 12 MMOL/L — SIGNIFICANT CHANGE UP (ref 5–17)
AST SERPL-CCNC: 346 U/L — HIGH (ref 10–40)
AST SERPL-CCNC: 657 U/L — HIGH (ref 10–40)
BASE EXCESS BLDV CALC-SCNC: 4.5 MMOL/L — HIGH (ref -2–2)
BASOPHILS # BLD AUTO: 0 K/UL — SIGNIFICANT CHANGE UP (ref 0–0.2)
BASOPHILS NFR BLD AUTO: 0 % — SIGNIFICANT CHANGE UP (ref 0–2)
BILIRUB DIRECT SERPL-MCNC: 0.2 MG/DL — SIGNIFICANT CHANGE UP (ref 0–0.2)
BILIRUB INDIRECT FLD-MCNC: 0.5 MG/DL — SIGNIFICANT CHANGE UP (ref 0.2–1)
BILIRUB SERPL-MCNC: 0.6 MG/DL — SIGNIFICANT CHANGE UP (ref 0.2–1.2)
BILIRUB SERPL-MCNC: 0.7 MG/DL — SIGNIFICANT CHANGE UP (ref 0.2–1.2)
BUN SERPL-MCNC: 39 MG/DL — HIGH (ref 7–23)
BUN SERPL-MCNC: 43 MG/DL — HIGH (ref 7–23)
CALCIUM SERPL-MCNC: 8 MG/DL — LOW (ref 8.4–10.5)
CALCIUM SERPL-MCNC: 8.1 MG/DL — LOW (ref 8.4–10.5)
CHLORIDE SERPL-SCNC: 103 MMOL/L — SIGNIFICANT CHANGE UP (ref 96–108)
CHLORIDE SERPL-SCNC: 105 MMOL/L — SIGNIFICANT CHANGE UP (ref 96–108)
CO2 BLDV-SCNC: 29 MMOL/L — SIGNIFICANT CHANGE UP (ref 22–30)
CO2 SERPL-SCNC: 24 MMOL/L — SIGNIFICANT CHANGE UP (ref 22–31)
CO2 SERPL-SCNC: 25 MMOL/L — SIGNIFICANT CHANGE UP (ref 22–31)
CREAT SERPL-MCNC: 1.25 MG/DL — SIGNIFICANT CHANGE UP (ref 0.5–1.3)
CREAT SERPL-MCNC: 1.45 MG/DL — HIGH (ref 0.5–1.3)
EOSINOPHIL # BLD AUTO: 0 K/UL — SIGNIFICANT CHANGE UP (ref 0–0.5)
EOSINOPHIL NFR BLD AUTO: 0.1 % — SIGNIFICANT CHANGE UP (ref 0–6)
GAS PNL BLDA: SIGNIFICANT CHANGE UP
GAS PNL BLDA: SIGNIFICANT CHANGE UP
GAS PNL BLDV: SIGNIFICANT CHANGE UP
GAS PNL BLDV: SIGNIFICANT CHANGE UP
GLUCOSE BLDC GLUCOMTR-MCNC: 181 MG/DL — HIGH (ref 70–99)
GLUCOSE BLDC GLUCOMTR-MCNC: 221 MG/DL — HIGH (ref 70–99)
GLUCOSE BLDC GLUCOMTR-MCNC: 228 MG/DL — HIGH (ref 70–99)
GLUCOSE BLDC GLUCOMTR-MCNC: 230 MG/DL — HIGH (ref 70–99)
GLUCOSE BLDC GLUCOMTR-MCNC: 255 MG/DL — HIGH (ref 70–99)
GLUCOSE SERPL-MCNC: 224 MG/DL — HIGH (ref 70–99)
GLUCOSE SERPL-MCNC: 265 MG/DL — HIGH (ref 70–99)
HCO3 BLDV-SCNC: 28 MMOL/L — SIGNIFICANT CHANGE UP (ref 21–29)
HCT VFR BLD CALC: 33.9 % — LOW (ref 39–50)
HGB BLD-MCNC: 11 G/DL — LOW (ref 13–17)
HOROWITZ INDEX BLDV+IHG-RTO: 28 — SIGNIFICANT CHANGE UP
INR BLD: 2.07 RATIO — HIGH (ref 0.88–1.16)
LACTATE BLDV-MCNC: 1.5 MMOL/L — SIGNIFICANT CHANGE UP (ref 0.7–2)
LYMPHOCYTES # BLD AUTO: 17 % — SIGNIFICANT CHANGE UP (ref 13–44)
LYMPHOCYTES # BLD AUTO: 2 K/UL — SIGNIFICANT CHANGE UP (ref 1–3.3)
MAGNESIUM SERPL-MCNC: 2.2 MG/DL — SIGNIFICANT CHANGE UP (ref 1.6–2.6)
MAGNESIUM SERPL-MCNC: 2.2 MG/DL — SIGNIFICANT CHANGE UP (ref 1.6–2.6)
MCHC RBC-ENTMCNC: 30.5 PG — SIGNIFICANT CHANGE UP (ref 27–34)
MCHC RBC-ENTMCNC: 32.3 GM/DL — SIGNIFICANT CHANGE UP (ref 32–36)
MCV RBC AUTO: 94.4 FL — SIGNIFICANT CHANGE UP (ref 80–100)
MONOCYTES # BLD AUTO: 0.9 K/UL — SIGNIFICANT CHANGE UP (ref 0–0.9)
MONOCYTES NFR BLD AUTO: 7.4 % — SIGNIFICANT CHANGE UP (ref 2–14)
NEUTROPHILS # BLD AUTO: 9 K/UL — HIGH (ref 1.8–7.4)
NEUTROPHILS NFR BLD AUTO: 75.4 % — SIGNIFICANT CHANGE UP (ref 43–77)
PCO2 BLDV: 39 MMHG — SIGNIFICANT CHANGE UP (ref 35–50)
PH BLDV: 7.47 — HIGH (ref 7.35–7.45)
PHOSPHATE SERPL-MCNC: 2.5 MG/DL — SIGNIFICANT CHANGE UP (ref 2.5–4.5)
PHOSPHATE SERPL-MCNC: 3.5 MG/DL — SIGNIFICANT CHANGE UP (ref 2.5–4.5)
PLATELET # BLD AUTO: 326 K/UL — SIGNIFICANT CHANGE UP (ref 150–400)
PO2 BLDV: 33 MMHG — SIGNIFICANT CHANGE UP (ref 25–45)
POTASSIUM SERPL-MCNC: 3.9 MMOL/L — SIGNIFICANT CHANGE UP (ref 3.5–5.3)
POTASSIUM SERPL-MCNC: 4.2 MMOL/L — SIGNIFICANT CHANGE UP (ref 3.5–5.3)
POTASSIUM SERPL-SCNC: 3.9 MMOL/L — SIGNIFICANT CHANGE UP (ref 3.5–5.3)
POTASSIUM SERPL-SCNC: 4.2 MMOL/L — SIGNIFICANT CHANGE UP (ref 3.5–5.3)
PROT SERPL-MCNC: 6.2 G/DL — SIGNIFICANT CHANGE UP (ref 6–8.3)
PROT SERPL-MCNC: 6.4 G/DL — SIGNIFICANT CHANGE UP (ref 6–8.3)
PROTHROM AB SERPL-ACNC: 24.3 SEC — HIGH (ref 10–12.9)
RBC # BLD: 3.59 M/UL — LOW (ref 4.2–5.8)
RBC # FLD: 14 % — SIGNIFICANT CHANGE UP (ref 10.3–14.5)
SAO2 % BLDV: 56 % — LOW (ref 67–88)
SODIUM SERPL-SCNC: 139 MMOL/L — SIGNIFICANT CHANGE UP (ref 135–145)
SODIUM SERPL-SCNC: 141 MMOL/L — SIGNIFICANT CHANGE UP (ref 135–145)
WBC # BLD: 12 K/UL — HIGH (ref 3.8–10.5)
WBC # FLD AUTO: 12 K/UL — HIGH (ref 3.8–10.5)

## 2019-07-25 PROCEDURE — 93010 ELECTROCARDIOGRAM REPORT: CPT

## 2019-07-25 PROCEDURE — 99291 CRITICAL CARE FIRST HOUR: CPT

## 2019-07-25 RX ADMIN — Medication 1 PATCH: at 12:31

## 2019-07-25 RX ADMIN — INSULIN GLARGINE 6 UNIT(S): 100 INJECTION, SOLUTION SUBCUTANEOUS at 22:53

## 2019-07-25 RX ADMIN — Medication 2: at 17:26

## 2019-07-25 RX ADMIN — Medication 3: at 12:32

## 2019-07-25 RX ADMIN — Medication 1 PATCH: at 07:00

## 2019-07-25 RX ADMIN — CLOPIDOGREL BISULFATE 75 MILLIGRAM(S): 75 TABLET, FILM COATED ORAL at 12:32

## 2019-07-25 RX ADMIN — CHLORHEXIDINE GLUCONATE 1 APPLICATION(S): 213 SOLUTION TOPICAL at 05:26

## 2019-07-25 RX ADMIN — Medication 2: at 09:53

## 2019-07-25 RX ADMIN — PIPERACILLIN AND TAZOBACTAM 200 GRAM(S): 4; .5 INJECTION, POWDER, LYOPHILIZED, FOR SOLUTION INTRAVENOUS at 02:29

## 2019-07-25 RX ADMIN — HEPARIN SODIUM 5000 UNIT(S): 5000 INJECTION INTRAVENOUS; SUBCUTANEOUS at 05:22

## 2019-07-25 RX ADMIN — INSULIN GLARGINE 6 UNIT(S): 100 INJECTION, SOLUTION SUBCUTANEOUS at 01:54

## 2019-07-25 RX ADMIN — Medication 250 MILLIGRAM(S): at 01:47

## 2019-07-25 RX ADMIN — Medication 1 PUFF(S): at 12:58

## 2019-07-25 RX ADMIN — Medication 1 PATCH: at 09:54

## 2019-07-25 RX ADMIN — HEPARIN SODIUM 5000 UNIT(S): 5000 INJECTION INTRAVENOUS; SUBCUTANEOUS at 17:25

## 2019-07-25 RX ADMIN — Medication 1 PATCH: at 12:32

## 2019-07-25 RX ADMIN — Medication 81 MILLIGRAM(S): at 12:32

## 2019-07-25 NOTE — PROGRESS NOTE ADULT - SUBJECTIVE AND OBJECTIVE BOX
VACULAR SURGERY DAILY PROGRESS NOTE:       Subjective:  Patient seen and examined. AF/VSS. Very minimally interactive per RN at bedside.       Objective:    PE:  Gen: NAD, lethargic, not responding to questions  Resp: No increased WOB at this time  Ext:  LLE: ACE, preveena, immobilizer    Vital Signs Last 24 Hrs  T(C): 36.3 (2019 08:00), Max: 36.7 (2019 11:20)  T(F): 97.3 (2019 08:00), Max: 98.1 (2019 11:20)  HR: 84 (2019 10:00) (82 - 99)  BP: 88/64 (2019 10:00) (84/54 - 141/75)  BP(mean): 73 (2019 10:00) (3 - 94)  RR: 18 (2019 10:00) (15 - 25)  SpO2: 97% (2019 10:00) (92% - 100%)    I&O's Detail    2019 07:01  -  2019 07:00  --------------------------------------------------------  IN:    IV PiggyBack: 250 mL    Oral Fluid: 300 mL    Solution: 100 mL  Total IN: 650 mL    OUT:    Voided: 485 mL  Total OUT: 485 mL    Total NET: 165 mL      2019 07:01  -  2019 11:07  --------------------------------------------------------  IN:    Oral Fluid: 180 mL  Total IN: 180 mL    OUT:    Incontinent per Condom Catheter: 120 mL  Total OUT: 120 mL    Total NET: 60 mL          Daily     Daily Weight in k.4 (2019 05:00)    MEDICATIONS  (STANDING):  aspirin enteric coated 81 milliGRAM(s) Oral daily  chlorhexidine 4% Liquid 1 Application(s) Topical <User Schedule>  clopidogrel Tablet 75 milliGRAM(s) Oral daily  dextrose 5%. 1000 milliLiter(s) (50 mL/Hr) IV Continuous <Continuous>  dextrose 50% Injectable 12.5 Gram(s) IV Push once  dextrose 50% Injectable 25 Gram(s) IV Push once  dextrose 50% Injectable 25 Gram(s) IV Push once  heparin  Injectable 5000 Unit(s) SubCutaneous every 12 hours  insulin glargine Injectable (LANTUS) 6 Unit(s) SubCutaneous at bedtime  insulin lispro (HumaLOG) corrective regimen sliding scale   SubCutaneous three times a day before meals  insulin lispro (HumaLOG) corrective regimen sliding scale   SubCutaneous at bedtime  ipratropium 17 MICROgram(s) HFA Inhaler 1 Puff(s) Inhalation every 6 hours  nicotine -   7 mG/24Hr(s) Patch 1 patch Transdermal daily    MEDICATIONS  (PRN):  dextrose 40% Gel 15 Gram(s) Oral once PRN Blood Glucose LESS THAN 70 milliGRAM(s)/deciliter  glucagon  Injectable 1 milliGRAM(s) IntraMuscular once PRN Glucose LESS THAN 70 milligrams/deciliter  melatonin 3 milliGRAM(s) Oral at bedtime PRN Insomnia  oxyCODONE    IR 10 milliGRAM(s) Oral every 4 hours PRN Severe Pain (7 - 10)  oxyCODONE    IR 5 milliGRAM(s) Oral every 4 hours PRN Moderate Pain (4 - 6)      LABS:                        11.0   12.0  )-----------( 326      ( 2019 04:45 )             33.9     07-    139  |  103  |  43<H>  ----------------------------<  265<H>  4.2   |  24  |  1.45<H>    Ca    8.1<L>      2019 04:45  Phos  3.5     -  Mg     2.2         TPro  6.2  /  Alb  2.3<L>  /  TBili  0.7  /  DBili  0.2  /  AST  657<H>  /  ALT  588<H>  /  AlkPhos  153<H>      PT/INR - ( 2019 04:45 )   PT: 24.3 sec;   INR: 2.07 ratio         PTT - ( 2019 22:38 )  PTT:31.4 sec      RADIOLOGY & ADDITIONAL STUDIES:

## 2019-07-25 NOTE — CONSULT NOTE ADULT - ASSESSMENT
The pt is a 68 y/o M with a PMH of HTN, DM, PAD who is transferred to [facility] for management of cardiogenic shock and NSTEMI. As per sign out, the pt was admitted for L popliteal bypass to [facility]. During his time there, he was refusing medications  and was found to be progressively more dyspneic. He had an echo done there which showed that he has an EF of <15% with global hypokinesis.  Neuro consulted for AMS.  Patient unable to stay awake for exam more than a minute, and when awake would not cooperate with exam or interview.    Impression:  Possible evolving infarct per CT findings.  LDL 55 Hgb A1C 10.  [] MRI without contrast when stable  [] c/w asa and plavix  [] remainder of care per CCU team

## 2019-07-25 NOTE — SWALLOW BEDSIDE ASSESSMENT ADULT - SWALLOW EVAL: DIAGNOSIS
Consult bedside swallow evaluation received and appreciated. Chart reviewed. Exam attempted. Pt unable to maintain sufficient alertness, inconsistently following simple directions 2/2 lethargy. PO trials contraindicated at this time. This service to f/u on 7/26.

## 2019-07-25 NOTE — SWALLOW BEDSIDE ASSESSMENT ADULT - SWALLOW EVAL: CURRENT DIET
dysphagia 2, thin liquids dysphagia 2, thin liquids. as per d/w RN, patient accepted mighty shake in AM however has been refusing most PO intake throughout the day.

## 2019-07-25 NOTE — SWALLOW BEDSIDE ASSESSMENT ADULT - SLP PERTINENT HISTORY OF CURRENT PROBLEM
70 y/o M w/ PMH of DM, HTN with poor medication compliance and PAD was admitted to CCU (7/5/19) from [facility] s/p L popliteal bypass for cardiogenic shock management w/ NSTEMI and a course complicated by hypoxemic respiratory failure secondary to suspected aspiration PNA 2/2 episode of vomiting while intubated; now in septic shock. Known hx of poor medication compliance. Vascular surgery consulted (7/6/19) and following for no dopplerable signals and ordered an urgent lower extremity arterial duplex. Patient regained biphasic Doppler pulse when given Heparin infusion as per Cardiology (7/6/19). Patient self-extubated (7/8/19). Podiatry consulted (7/9/19) for dry gangrene, being treated with betadine. Patient transferred from CCU to general medicine (7/9/19). Patient self-removed NGT (7/9/19), B/L mittens placed. Patient remains on antibiotics for aspiration PNA. Pt with VINAY likely in the setting of cardiogenic/septic shock. (Continued)

## 2019-07-25 NOTE — PROGRESS NOTE ADULT - ASSESSMENT
PLAN:  70 y/o M with a PMH of HTN, DM, PAD, HFrEF initially transferred to [facility] for management of cardiogenic shock and NSTEMI now s/p L BKA. HC c/b dyspnea/hypoxia and acute AMS in setting of elev lactate and liver tests concerning for shock liver in the setting of acute decompensated HF     #Neuro   - A&Ox3, no active interventions  - per chart review intermittent agitation requiring haldol/seroquel but held due to prolonged QTc   - f/u CTH     #Pulm   - Concern for asp pna/pneumonitis, does not have a septic picture at this point, but given acute decompensation will give one dose of Vanc/Zosyn and follow blood Cx/sputum Cx   - Stable on 2L NC, protecting airway  - MICU recs noted    #Cardiac   1. Acute decompensated HF  - JVD and rales appreciate on PE   - CXR with pulm edema during RRT, patient received lasix with diuresis and resolution of pulmnonary edema  - TTE with EF 20-25%, moderately dilated left atrium, increased left atrial pressures, moderately dilated LV, global hypokinesis    2. NSTEMI   Medically managed, TTE described as above  - Acute rise in HsTrop likely 2/2 demand ischemia rather than plaque rupture, neg delta, will defer trending troponin   - LHC deferred due to concern for medication non compliance   - c/w DAPT: ASA, Plavix  - Statin held in setting of transaminitis  - BB held due to concern for ADHF     3. PAD s/p L BKA plan   - VA duplex of b/l LE: 07/06: R popliteal artery with severe flow-limiting stenosis, L pop and bypass graft are occluded   - f/u vascular surg for futher recs     #GI Transaminitis w/elevated lactate concern for shock liver   - LFTs currently downtrending   - Trend LFTs   - f/u CT A/P r/o ischemia     #Renal VINAY on CKD, likely ATN in the setting of cardiogenic shock- improving   - Trend sCR   - monitor I/Os, electrolytes  - lisinopril held in setting of VINAY    - Avoid nephrotoxins    #ID  - Plueral effusion vs RLL infiltrate seen on initial CXR  - CXR post CCU admission with clear lungs  - Initially with leukocytosis concern for septic etiology but now downtrending  - Afebrile, initially with lactate of 7/leukocytosis, that have both now resolved   - dose of Vanc/Zosyn given overnight, and reassess need for further abx   - f/u sepsis workup including: Bcx/sputum cx     #Endo DM, A1C 10  - On lantus 6u, was on lantus 20 units prior to BKA, lowered due to concern of poor PO intake  - Will asses FS and increase Lantus and s/s coverage prn     #Heme: Anemia, no overt source of bleeding  - Trend CBC PLAN:  68 y/o M with a PMH of HTN, DM, PAD, HFrEF initially transferred to [facility] for management of cardiogenic shock and NSTEMI now s/p L BKA. HC c/b dyspnea/hypoxia and acute AMS in setting of elev lactate and liver tests concerning for shock liver in the setting of acute decompensated HF     #Neuro   - per chart review intermittent agitation requiring haldol/seroquel but held due to prolonged QTc   - CTH: Age-appropriate involutional change and microvascular ischemic disease. Questionable evolving infarct at the level of the brainstem specifically the barbara. Cannot entirely exclude evolving infarct in this region though this may represent beam hardening artifact. Correlation with MR recommended.   -will consult neuro    #Pulm   - Concern for asp pna/pneumonitis, does not have a septic picture at this point, but given acute decompensation will give one dose of Vanc/Zosyn and follow blood Cx/sputum Cx   - Stable on 2L NC, protecting airway    #Cardiac   1. Acute decompensated HF  - JVD and rales appreciate on PE   - CXR with pulm edema during RRT, patient received lasix with diuresis and resolution of pulmonary edema  - TTE with EF 20-25%, moderately dilated left atrium, increased left atrial pressures, moderately dilated LV, global hypokinesis    2. NSTEMI   Medically managed, TTE described as above  - Acute rise in HsTrop likely 2/2 demand ischemia rather than plaque rupture, neg delta, will defer trending troponin   - LHC deferred due to concern for medication non compliance   - c/w DAPT: ASA, Plavix  - Statin held in setting of transaminitis, will consider starting tomorrow  - BB held due to concern for ADHF     3. PAD s/p L BKA plan   - VA duplex of b/l LE: 07/06: R popliteal artery with severe flow-limiting stenosis, L pop and bypass graft are occluded   - f/u vascular surg for futher recs     #GI Transaminitis w/elevated lactate concern for shock liver   - LFTs currently downtrending   - Trend LFTs   - f/u CT A/P r/o ischemia: No acute intra-abdominal or pelvic pathology. Moderate bilateral pleural effusions.       #Renal VINAY on CKD, likely ATN in the setting of cardiogenic shock- improving   - Trend sCR   - monitor I/Os, electrolytes  - lisinopril held in setting of VINAY    - Avoid nephrotoxins    #ID  - Plueral effusion vs RLL infiltrate seen on initial CXR  - CXR post CCU admission with clear lungs  - Initially with leukocytosis concern for septic etiology but now downtrending  - Afebrile, initially with lactate of 7/leukocytosis, that have both now resolved   - dose of Vanc/Zosyn given overnight, and reassess need for further abx   - f/u sepsis workup including: Bcx/sputum cx     #Endo DM, A1C 10  - On lantus 6u, was on lantus 20 units prior to BKA, lowered due to concern of poor PO intake  - Will asses FS and increase Lantus and s/s coverage prn     #Heme: Anemia, no overt source of bleeding  - Trend CBC

## 2019-07-25 NOTE — SWALLOW BEDSIDE ASSESSMENT ADULT - COMMENTS
(Continued) MBS completed 7/11/19 noting: Pt presents with an oropharyngeal dysphagia superimposed upon an altered mental status. The oral stage of swallow is characterized by oral holding of food/liquid material, poor bolus formation/control, piecemeal deglutition, premature spillover to the oropharynx on thin puree, honey thick and nectar thick liquids and to the hypopharynx on thick puree and thin liquid, delayed pharyngeal swallows and laryngeal penetration with retrieval on thin liquid. Although aspiration not evident on exam, would suggest a conservative approach to feeding given current cognitive status.  Disorders: reduced lingual strength/ROM/Rate of motion, reduced BOT to posterior pharyngeal wall contact, delay in trigger of the swallow reflex, reduced supraglottic sensation 7/17 last bedside swallow evaluation clinically recommended upgrade of diet to Dysphagia 2, thin liquids 7/20 PA chart note mentioned patient presented with increased agitation and confusion, became combative and put on constant observation. 7/21 Patient noted to have resolved agitation and confusion 7/22 patient underwent left BKA procedure 7/24  RRT called 2/2 change in breathing, appearing to use accessory muscle when breathing, not following commands, not responding to name, altered from baseline- also noted with expiratory wheeze and dyspnea. 7/24 MICU consulted; however not recommended at this time. 7/24 Chart Note-Transfer Not Resident noted concerning for shock liver in the setting of acute decompensated HF 7/24 Pt transferred to CCU and central line inserted. As of 7/25 WBC elevated 12.0

## 2019-07-25 NOTE — PROGRESS NOTE ADULT - ASSESSMENT
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   - Continue ASA and Plavix  - dressing with prevena. Immobilizer for 48 hours. remove bypass staples prior to discharge.     Vascular Surgery  [phone]

## 2019-07-25 NOTE — PROGRESS NOTE ADULT - SUBJECTIVE AND OBJECTIVE BOX
====================  CCU MIDNIGHT ROUNDS  ====================    JIM PAREDES  [id number]  Patient is a 69y old  Male who presents with a chief complaint of NSTEMI, PAD (24 Jul 2019 18:55)      ====================  SUMMARY:  ====================        ====================  NEW EVENTS:  ====================        ====================  VITALS (Last 12 hrs):  ====================    T(C): 36.4 (07-24-19 @ 21:15), Max: 36.4 (07-24-19 @ 16:40)  T(F): 97.6 (07-24-19 @ 21:15), Max: 97.6 (07-24-19 @ 16:40)  HR: 90 (07-24-19 @ 22:30) (87 - 99)  BP: 98/69 (07-24-19 @ 22:30) (87/53 - 141/75)  BP(mean): 78 (07-24-19 @ 22:30) (3 - 94)  ABP: --  ABP(mean): --  RR: 25 (07-24-19 @ 22:30) (18 - 25)  SpO2: 98% (07-24-19 @ 22:30) (94% - 100%)  Wt(kg): --  CVP(mm Hg): --  CVP(cm H2O): --  CO: --  CI: --  PA: --  PA(mean): --  PCWP: --  SVR: --  PVR: --    I&O's Summary    23 Jul 2019 07:01  -  24 Jul 2019 07:00  --------------------------------------------------------  IN: 360 mL / OUT: 0 mL / NET: 360 mL    24 Jul 2019 07:01  -  25 Jul 2019 01:23  --------------------------------------------------------  IN: 300 mL / OUT: 335 mL / NET: -35 mL            ====================  NEW LABS:  ====================                          11.0   11.9  )-----------( 338      ( 24 Jul 2019 22:38 )             33.6     07-24    139  |  100  |  43<H>  ----------------------------<  250<H>  4.5   |  24  |  1.56<H>    Ca    8.6      24 Jul 2019 22:39  Phos  4.4     07-24  Mg     2.2     07-24    TPro  6.6  /  Alb  2.6<L>  /  TBili  0.7  /  DBili  x   /  AST  1025<H>  /  ALT  663<H>  /  AlkPhos  160<H>  07-24    PT/INR - ( 24 Jul 2019 22:38 )   PT: 24.5 sec;   INR: 2.08 ratio         PTT - ( 24 Jul 2019 22:38 )  PTT:31.4 sec      ABG - ( 25 Jul 2019 01:10 )  pH, Arterial: 7.48  pH, Blood: x     /  pCO2: 35    /  pO2: 64    / HCO3: 26    / Base Excess: 3.1   /  SaO2: 90                    ====================  PLAN:    ====================  -     Teddy Ashford, Internal Medicine R1  [phone] ====================  CCU MIDNIGHT ROUNDS  ====================    JIM PAREDES  [id number]  Patient is a 69y old  Male who presents with a chief complaint of NSTEMI, PAD (24 Jul 2019 18:55)    ====================  SUMMARY:  ====================    69M w/ PMH of poor med compliance, DM type 2, HTN, PAD NUMC, transferred to [facility] CCU for NSTEMI c/b cardiogenic shock; acute respiratory failure with hypoxia and septic shock secondary to suspected aspiration pneumonia requiring intubation (now extubated) and downgraded from CCU. 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 Course further complicated by RRT for AMS/cardiogenic shock in the setting of transaminitis possible shock liver, elevated lactate, transferred to the CCU for further management     ====================  NEW EVENTS:  ====================    Transferred to CCU for further management of cardiogenic shock. Started on Vanc/Zosyn, RIJ central line placed. LFTs downtrending, lactate normalized without intervention.     ====================  VITALS (Last 12 hrs):  ====================    T(C): 36.4 (07-24-19 @ 21:15), Max: 36.4 (07-24-19 @ 16:40)  T(F): 97.6 (07-24-19 @ 21:15), Max: 97.6 (07-24-19 @ 16:40)  HR: 90 (07-24-19 @ 22:30) (87 - 99)  BP: 98/69 (07-24-19 @ 22:30) (87/53 - 141/75)  BP(mean): 78 (07-24-19 @ 22:30) (3 - 94)  RR: 25 (07-24-19 @ 22:30) (18 - 25)  SpO2: 98% (07-24-19 @ 22:30) (94% - 100%)    I&O's Summary    23 Jul 2019 07:01  -  24 Jul 2019 07:00  --------------------------------------------------------  IN: 360 mL / OUT: 0 mL / NET: 360 mL    24 Jul 2019 07:01  -  25 Jul 2019 01:23  --------------------------------------------------------  IN: 300 mL / OUT: 335 mL / NET: -35 mL    ====================  NEW LABS:  ====================                        11.0   11.9  )-----------( 338      ( 24 Jul 2019 22:38 )             33.6     07-24    139  |  100  |  43<H>  ----------------------------<  250<H>  4.5   |  24  |  1.56<H>    Ca    8.6      24 Jul 2019 22:39  Phos  4.4     07-24  Mg     2.2     07-24    TPro  6.6  /  Alb  2.6<L>  /  TBili  0.7  /  DBili  x   /  AST  1025<H>  /  ALT  663<H>  /  AlkPhos  160<H>  07-24    PT/INR - ( 24 Jul 2019 22:38 )   PT: 24.5 sec;   INR: 2.08 ratio    PTT - ( 24 Jul 2019 22:38 )  PTT:31.4 sec    ABG - ( 25 Jul 2019 01:10 )  pH, Arterial: 7.48  pH, Blood: x     /  pCO2: 35    /  pO2: 64    / HCO3: 26    / Base Excess: 3.1   /  SaO2: 90        ====================  PLAN:  68 y/o M with a PMH of HTN, DM, PAD, HFrEF initially transferred to [facility] for management of cardiogenic shock and NSTEMI now s/p L BKA. HC c/b dyspnea/hypoxia and acute AMS in setting of elev lactate and liver tests concerning for shock liver in the setting of acute decompensated HF     #Neuro   - A&Ox3, no active interventions  - per chart review intermittent agitation requiring haldol/seroquel but held due to prolonged QTc   - f/u CTH     #Pulm   - Concern for asp pna/pneumonitis, does not have a septic picture at this point, but given acute decompensation will give one dose of Vanc/Zosyn and follow blood Cx/sputum Cx   - Stable on 2L NC, protecting airway  - MICU recs noted    #Cardiac   1. Acute decompensated HF  - CXR with pulm edema during RRT, patient received lasix with diuresis and resolution of pulmnonary edema  - TTE with EF 20-25%, moderately dilated left atrium, increased left atrial pressures, moderately dilated LV, global hypokinesis    2. NSTEMI   Medically managed, TTE described as above  - LHC deferred due to concern for medication non compliance   - c/w DAPT: ASA, Plavix  - Statin held in setting of transaminitis  - BB held due to concern for ADHF     3. PAD s/p L BKA plan   - [facility] duplex of b/l LE: 07/06: R popliteal artery with severe flow-limiting stenosis, L pop and bypass graft are occluded   - f/u vascular surg for futher recs     #GI Transaminitis w/elevated lactate concern for shock liver   - LFTs currently downtrending   - Trend LFTs   - f/u CT A/P r/o ischemia     #Renal VINAY on CKD, likely ATN in the setting of cardiogenic shock- improving   - Trend sCR   - monitor I/Os, electrolytes  - lisinopril held in setting of VINAY    - Avoid nephrotoxins    #Endo DM, A1C 10  - On lantus 6u, was on lantus 20 units prior to BKA, lowered due to concern of poor PO intake  - Will asses FS and increase Lantus and s/s coverage prn     #Heme: Anemia, no overt source of bleeding  - Trend CBC     ====================  -   Teddy Ashford, Internal Medicine R2  [phone] ====================  CCU MIDNIGHT ROUNDS  ====================    JIM PAREDES  [id number]  Patient is a 69y old  Male who presents with a chief complaint of NSTEMI, PAD (24 Jul 2019 18:55)    ====================  SUMMARY:  ====================    69M w/ PMH of poor med compliance, DM type 2, HTN, PAD NUMC, transferred to [facility] CCU for NSTEMI c/b cardiogenic shock; acute respiratory failure with hypoxia and septic shock secondary to suspected aspiration pneumonia requiring intubation (now extubated) and downgraded from CCU. 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 Course further complicated by RRT for AMS/cardiogenic shock in the setting of transaminitis possible shock liver, elevated lactate, transferred to the CCU for further management     ====================  NEW EVENTS:  ====================    Transferred to CCU for further management of cardiogenic shock. Started on Vanc/Zosyn, RIJ central line placed. LFTs downtrending, lactate normalized without intervention.     ====================  VITALS (Last 12 hrs):  ====================    T(C): 36.4 (07-24-19 @ 21:15), Max: 36.4 (07-24-19 @ 16:40)  T(F): 97.6 (07-24-19 @ 21:15), Max: 97.6 (07-24-19 @ 16:40)  HR: 90 (07-24-19 @ 22:30) (87 - 99)  BP: 98/69 (07-24-19 @ 22:30) (87/53 - 141/75)  BP(mean): 78 (07-24-19 @ 22:30) (3 - 94)  RR: 25 (07-24-19 @ 22:30) (18 - 25)  SpO2: 98% (07-24-19 @ 22:30) (94% - 100%)    I&O's Summary    23 Jul 2019 07:01  -  24 Jul 2019 07:00  --------------------------------------------------------  IN: 360 mL / OUT: 0 mL / NET: 360 mL    24 Jul 2019 07:01  -  25 Jul 2019 01:23  --------------------------------------------------------  IN: 300 mL / OUT: 335 mL / NET: -35 mL    ====================  NEW LABS:  ====================                        11.0   11.9  )-----------( 338      ( 24 Jul 2019 22:38 )             33.6     07-24    139  |  100  |  43<H>  ----------------------------<  250<H>  4.5   |  24  |  1.56<H>    Ca    8.6      24 Jul 2019 22:39  Phos  4.4     07-24  Mg     2.2     07-24    TPro  6.6  /  Alb  2.6<L>  /  TBili  0.7  /  DBili  x   /  AST  1025<H>  /  ALT  663<H>  /  AlkPhos  160<H>  07-24    PT/INR - ( 24 Jul 2019 22:38 )   PT: 24.5 sec;   INR: 2.08 ratio    PTT - ( 24 Jul 2019 22:38 )  PTT:31.4 sec    ABG - ( 25 Jul 2019 01:10 )  pH, Arterial: 7.48  pH, Blood: x     /  pCO2: 35    /  pO2: 64    / HCO3: 26    / Base Excess: 3.1   /  SaO2: 90        ====================  PLAN:  70 y/o M with a PMH of HTN, DM, PAD, HFrEF initially transferred to [facility] for management of cardiogenic shock and NSTEMI now s/p L BKA. HC c/b dyspnea/hypoxia and acute AMS in setting of elev lactate and liver tests concerning for shock liver in the setting of acute decompensated HF     #Neuro   - A&Ox3, no active interventions  - per chart review intermittent agitation requiring haldol/seroquel but held due to prolonged QTc   - f/u CTH     #Pulm   - Concern for asp pna/pneumonitis, does not have a septic picture at this point, but given acute decompensation will give one dose of Vanc/Zosyn and follow blood Cx/sputum Cx   - Stable on 2L NC, protecting airway  - MICU recs noted    #Cardiac   1. Acute decompensated HF  - JVD and rales appreciate on PE   - CXR with pulm edema during RRT, patient received lasix with diuresis and resolution of pulmnonary edema  - TTE with EF 20-25%, moderately dilated left atrium, increased left atrial pressures, moderately dilated LV, global hypokinesis    2. NSTEMI   Medically managed, TTE described as above  - Acute rise in HsTrop likely 2/2 demand ischemia rather than plaque rupture, neg delta, will defer trending troponin   - LHC deferred due to concern for medication non compliance   - c/w DAPT: ASA, Plavix  - Statin held in setting of transaminitis  - BB held due to concern for ADHF     3. PAD s/p L BKA plan   - [facility] duplex of b/l LE: 07/06: R popliteal artery with severe flow-limiting stenosis, L pop and bypass graft are occluded   - f/u vascular surg for futher recs     #GI Transaminitis w/elevated lactate concern for shock liver   - LFTs currently downtrending   - Trend LFTs   - f/u CT A/P r/o ischemia     #Renal VINAY on CKD, likely ATN in the setting of cardiogenic shock- improving   - Trend sCR   - monitor I/Os, electrolytes  - lisinopril held in setting of VINAY    - Avoid nephrotoxins    #ID  - Plueral effusion vs RLL infiltrate seen on initial CXR  - CXR post CCU admission with clear lungs per my eye  - Initially with leukocytosis concern for septic etiology but now downtrending  - Afebrile, initially with lactate of 7/leukocytosis, that have both now resolved   - will give a dose of Vanc/Zosyn for now, and reassess need for further abx   - f/u sepsis workup including: Bcx/sputum cx     #Endo DM, A1C 10  - On lantus 6u, was on lantus 20 units prior to BKA, lowered due to concern of poor PO intake  - Will asses FS and increase Lantus and s/s coverage prn     #Heme: Anemia, no overt source of bleeding  - Trend CBC     ====================  -   Teddy Ashford, Internal Medicine R2  [phone]

## 2019-07-25 NOTE — PROGRESS NOTE ADULT - SUBJECTIVE AND OBJECTIVE BOX
PATIENT:  JIM PAREDES  [id number]    CHIEF COMPLAINT:  Patient is a 69y old  Male who presents with a chief complaint of NSTEMI (2019 01:23)    69M w/ PMH of poor med compliance, DM type 2, HTN, PAD NUMC, transferred to [facility] CCU for NSTEMI c/b cardiogenic shock; acute respiratory failure with hypoxia and septic shock secondary to suspected aspiration pneumonia requiring intubation (now extubated) and downgraded from CCU. S/p Left BKA on 19. Course further complicated by RRT for AMS/cardiogenic shock in the setting of transaminitis possible shock liver, elevated lactate, transferred to the CCU for further management.      INTERVAL HISTORYOVERNIGHT EVENTS:    Transferred to CCU for further management of cardiogenic shock. Started on Vanc/Zosyn, RIJ central line placed. LFTs downtrending, lactate normalized without intervention.     REVIEW OF SYSTEMS:    Constitutional:     [ ] negative [ ] fevers [ ] chills [ ] weight loss [ ] weight gain  HEENT:                  [ ] negative [ ] dry eyes [ ] eye irritation [ ] postnasal drip [ ] nasal congestion  CV:                         [ ] negative  [ ] chest pain [ ] orthopnea [ ] palpitations [ ] murmur  Resp:                     [ ] negative [ ] cough [ ] shortness of breath [ ] dyspnea [ ] wheezing [ ] sputum [ ] hemoptysis  GI:                          [ ] negative [ ] nausea [ ] vomiting [ ] diarrhea [ ] constipation [ ] abd pain [ ] dysphagia   :                        [ ] negative [ ] dysuria [ ] nocturia [ ] hematuria [ ] increased urinary frequency  Musculoskeletal: [ ] negative [ ] back pain [ ] myalgias [ ] arthralgias [ ] fracture  Skin:                       [ ] negative [ ] rash [ ] itch  Neurological:        [ ] negative [ ] headache [ ] dizziness [ ] syncope [ ] weakness [ ] numbness  Psychiatric:           [ ] negative [ ] anxiety [ ] depression  Endocrine:            [ ] negative [ ] diabetes [ ] thyroid problem  Heme/Lymph:      [ ] negative [ ] anemia [ ] bleeding problem  Allergic/Immune: [ ] negative [ ] itchy eyes [ ] nasal discharge [ ] hives [ ] angioedema    [ ] All other systems negative  [ ] Unable to assess ROS because ________.    MEDICATIONS:  MEDICATIONS  (STANDING):  aspirin enteric coated 81 milliGRAM(s) Oral daily  chlorhexidine 4% Liquid 1 Application(s) Topical <User Schedule>  clopidogrel Tablet 75 milliGRAM(s) Oral daily  dextrose 5%. 1000 milliLiter(s) (50 mL/Hr) IV Continuous <Continuous>  dextrose 50% Injectable 12.5 Gram(s) IV Push once  dextrose 50% Injectable 25 Gram(s) IV Push once  dextrose 50% Injectable 25 Gram(s) IV Push once  heparin  Injectable 5000 Unit(s) SubCutaneous every 12 hours  insulin glargine Injectable (LANTUS) 6 Unit(s) SubCutaneous at bedtime  insulin lispro (HumaLOG) corrective regimen sliding scale   SubCutaneous three times a day before meals  insulin lispro (HumaLOG) corrective regimen sliding scale   SubCutaneous at bedtime  ipratropium 17 MICROgram(s) HFA Inhaler 1 Puff(s) Inhalation every 6 hours  nicotine -   7 mG/24Hr(s) Patch 1 patch Transdermal daily    MEDICATIONS  (PRN):  dextrose 40% Gel 15 Gram(s) Oral once PRN Blood Glucose LESS THAN 70 milliGRAM(s)/deciliter  glucagon  Injectable 1 milliGRAM(s) IntraMuscular once PRN Glucose LESS THAN 70 milligrams/deciliter  melatonin 3 milliGRAM(s) Oral at bedtime PRN Insomnia  oxyCODONE    IR 10 milliGRAM(s) Oral every 4 hours PRN Severe Pain (7 - 10)  oxyCODONE    IR 5 milliGRAM(s) Oral every 4 hours PRN Moderate Pain (4 - 6)      ALLERGIES:  Allergies    No Known Allergies    Intolerances        OBJECTIVE:  ICU Vital Signs Last 24 Hrs  T(C): 36.4 (2019 05:00), Max: 36.7 (2019 11:20)  T(F): 97.5 (2019 05:00), Max: 98.1 (2019 11:20)  HR: 86 (2019 06:00) (85 - 99)  BP: 95/62 (2019 06:00) (87/53 - 141/75)  BP(mean): 71 (2019 06:00) (3 - 94)  ABP: --  ABP(mean): --  RR: 24 (2019 06:00) (15 - 25)  SpO2: 97% (2019 06:00) (94% - 100%)      Adult Advanced Hemodynamics Last 24 Hrs  CVP(mm Hg): 3 (2019 06:00) (1 - 6)  CVP(cm H2O): --  CO: --  CI: --  PA: --  PA(mean): --  PCWP: --  SVR: --  SVRI: --  PVR: --  PVRI: --  CAPILLARY BLOOD GLUCOSE      POCT Blood Glucose.: 230 mg/dL (2019 01:54)  POCT Blood Glucose.: 178 mg/dL (2019 16:49)  POCT Blood Glucose.: 120 mg/dL (2019 12:17)  POCT Blood Glucose.: 90 mg/dL (2019 08:07)    CAPILLARY BLOOD GLUCOSE      POCT Blood Glucose.: 230 mg/dL (2019 01:54)    I&O's Summary    2019 07:01  -  2019 07:00  --------------------------------------------------------  IN: 650 mL / OUT: 485 mL / NET: 165 mL      Daily     Daily Weight in k.4 (2019 05:00)    PHYSICAL EXAMINATION:  General: WN/WD NAD  HEENT: PERRLA, EOMI, moist mucous membranes  Neurology: A&Ox3, nonfocal, ALBERTO x 4  Respiratory: CTA B/L, normal respiratory effort, no wheezes, crackles, rales  CV: RRR, S1S2, no murmurs, rubs or gallops  Abdominal: Soft, NT, ND +BS, Last BM  Extremities: No edema, + peripheral pulses  Incisions:   Tubes:    LABS:  ABG - ( 2019 02:31 )  pH, Arterial: 7.57  pH, Blood: x     /  pCO2: 27    /  pO2: 134   / HCO3: 25    / Base Excess: 3.5   /  SaO2: 99                                      11.0   12.0  )-----------( 326      ( 2019 04:45 )             33.9     07-25    139  |  103  |  43<H>  ----------------------------<  265<H>  4.2   |  24  |  1.45<H>    Ca    8.1<L>      2019 04:45  Phos  3.5     -  Mg     2.2         TPro  6.6  /  Alb  2.6<L>  /  TBili  0.7  /  DBili  x   /  AST  1025<H>  /  ALT  663<H>  /  AlkPhos  160<H>      LIVER FUNCTIONS - ( 2019 22:39 )  Alb: 2.6 g/dL / Pro: 6.6 g/dL / ALK PHOS: 160 U/L / ALT: 663 U/L / AST: 1025 U/L / GGT: x           PT/INR - ( 2019 04:45 )   PT: 24.3 sec;   INR: 2.07 ratio         PTT - ( 2019 22:38 )  PTT:31.4 sec            TELEMETRY:     EKG:     IMAGING: PATIENT:  JIM PAREDES  [id number]    CHIEF COMPLAINT:  Patient is a 69y old  Male who presents with a chief complaint of NSTEMI (2019 01:23)    69M w/ PMH of poor med compliance, DM type 2, HTN, PAD NUMC, transferred to [facility] CCU for NSTEMI c/b cardiogenic shock; acute respiratory failure with hypoxia and septic shock secondary to suspected aspiration pneumonia requiring intubation (now extubated) and downgraded from CCU. S/p Left BKA on 19. Course further complicated by RRT for AMS/cardiogenic shock in the setting of transaminitis possible shock liver, elevated lactate, transferred to the CCU for further management.      INTERVAL HISTORYOVERNIGHT EVENTS:    Transferred to CCU for further management of cardiogenic shock. Started on Vanc/Zosyn, RIJ central line placed. LFTs downtrending, lactate normalized without intervention.     This morning, patient was unable to hold a conversation due to AMS.     REVIEW OF SYSTEMS:    Constitutional:     [ ] negative [ ] fevers [ ] chills [ ] weight loss [ ] weight gain  HEENT:                  [ ] negative [ ] dry eyes [ ] eye irritation [ ] postnasal drip [ ] nasal congestion  CV:                         [ ] negative  [ ] chest pain [ ] orthopnea [ ] palpitations [ ] murmur  Resp:                     [ ] negative [ ] cough [ ] shortness of breath [ ] dyspnea [ ] wheezing [ ] sputum [ ] hemoptysis  GI:                          [ ] negative [ ] nausea [ ] vomiting [ ] diarrhea [ ] constipation [ ] abd pain [ ] dysphagia   :                        [ ] negative [ ] dysuria [ ] nocturia [ ] hematuria [ ] increased urinary frequency  Musculoskeletal: [ ] negative [ ] back pain [ ] myalgias [ ] arthralgias [ ] fracture  Skin:                       [ ] negative [ ] rash [ ] itch  Neurological:        [ ] negative [ ] headache [ ] dizziness [ ] syncope [ ] weakness [ ] numbness  Psychiatric:           [ ] negative [ ] anxiety [ ] depression  Endocrine:            [ ] negative [ ] diabetes [ ] thyroid problem  Heme/Lymph:      [ ] negative [ ] anemia [ ] bleeding problem  Allergic/Immune: [ ] negative [ ] itchy eyes [ ] nasal discharge [ ] hives [ ] angioedema    [ ] All other systems negative  [x ] Unable to assess ROS because of AMS.    MEDICATIONS:  MEDICATIONS  (STANDING):  aspirin enteric coated 81 milliGRAM(s) Oral daily  chlorhexidine 4% Liquid 1 Application(s) Topical <User Schedule>  clopidogrel Tablet 75 milliGRAM(s) Oral daily  dextrose 5%. 1000 milliLiter(s) (50 mL/Hr) IV Continuous <Continuous>  dextrose 50% Injectable 12.5 Gram(s) IV Push once  dextrose 50% Injectable 25 Gram(s) IV Push once  dextrose 50% Injectable 25 Gram(s) IV Push once  heparin  Injectable 5000 Unit(s) SubCutaneous every 12 hours  insulin glargine Injectable (LANTUS) 6 Unit(s) SubCutaneous at bedtime  insulin lispro (HumaLOG) corrective regimen sliding scale   SubCutaneous three times a day before meals  insulin lispro (HumaLOG) corrective regimen sliding scale   SubCutaneous at bedtime  ipratropium 17 MICROgram(s) HFA Inhaler 1 Puff(s) Inhalation every 6 hours  nicotine -   7 mG/24Hr(s) Patch 1 patch Transdermal daily    MEDICATIONS  (PRN):  dextrose 40% Gel 15 Gram(s) Oral once PRN Blood Glucose LESS THAN 70 milliGRAM(s)/deciliter  glucagon  Injectable 1 milliGRAM(s) IntraMuscular once PRN Glucose LESS THAN 70 milligrams/deciliter  melatonin 3 milliGRAM(s) Oral at bedtime PRN Insomnia  oxyCODONE    IR 10 milliGRAM(s) Oral every 4 hours PRN Severe Pain (7 - 10)  oxyCODONE    IR 5 milliGRAM(s) Oral every 4 hours PRN Moderate Pain (4 - 6)      ALLERGIES:  Allergies    No Known Allergies    Intolerances        OBJECTIVE:  ICU Vital Signs Last 24 Hrs  T(C): 36.4 (2019 05:00), Max: 36.7 (2019 11:20)  T(F): 97.5 (2019 05:00), Max: 98.1 (2019 11:20)  HR: 86 (2019 06:00) (85 - 99)  BP: 95/62 (2019 06:00) (87/53 - 141/75)  BP(mean): 71 (2019 06:00) (3 - 94)  ABP: --  ABP(mean): --  RR: 24 (2019 06:00) (15 - 25)  SpO2: 97% (2019 06:00) (94% - 100%)      Adult Advanced Hemodynamics Last 24 Hrs  CVP(mm Hg): 3 (2019 06:00) (1 - 6)  CVP(cm H2O): --  CO: --  CI: --  PA: --  PA(mean): --  PCWP: --  SVR: --  SVRI: --  PVR: --  PVRI: --  CAPILLARY BLOOD GLUCOSE      POCT Blood Glucose.: 230 mg/dL (2019 01:54)  POCT Blood Glucose.: 178 mg/dL (2019 16:49)  POCT Blood Glucose.: 120 mg/dL (2019 12:17)  POCT Blood Glucose.: 90 mg/dL (2019 08:07)    CAPILLARY BLOOD GLUCOSE      POCT Blood Glucose.: 230 mg/dL (2019 01:54)    I&O's Summary    2019 07:01  -  2019 07:00  --------------------------------------------------------  IN: 650 mL / OUT: 485 mL / NET: 165 mL      Daily     Daily Weight in k.4 (2019 05:00)    PHYSICAL EXAMINATION:  General: WN/WD NAD  HEENT: PERRLA, EOMI, moist mucous membranes  Neurology: A&Ox3, nonfocal, ALBERTO x 4  Respiratory: CTA B/L, normal respiratory effort, no wheezes, crackles, rales  CV: RRR, S1S2, no murmurs, rubs or gallops  Abdominal: Soft, NT, ND +BS, Last BM  Extremities: No edema, + peripheral pulses  Incisions:   Tubes:    LABS:  ABG - ( 2019 02:31 )  pH, Arterial: 7.57  pH, Blood: x     /  pCO2: 27    /  pO2: 134   / HCO3: 25    / Base Excess: 3.5   /  SaO2: 99                                      11.0   12.0  )-----------( 326      ( 2019 04:45 )             33.9     07-25    139  |  103  |  43<H>  ----------------------------<  265<H>  4.2   |  24  |  1.45<H>    Ca    8.1<L>      2019 04:45  Phos  3.5     -  Mg     2.2         TPro  6.6  /  Alb  2.6<L>  /  TBili  0.7  /  DBili  x   /  AST  1025<H>  /  ALT  663<H>  /  AlkPhos  160<H>      LIVER FUNCTIONS - ( 2019 22:39 )  Alb: 2.6 g/dL / Pro: 6.6 g/dL / ALK PHOS: 160 U/L / ALT: 663 U/L / AST: 1025 U/L / GGT: x           PT/INR - ( 2019 04:45 )   PT: 24.3 sec;   INR: 2.07 ratio         PTT - ( 2019 22:38 )  PTT:31.4 sec            TELEMETRY:     EKG:     IMAGING:

## 2019-07-26 ENCOUNTER — TRANSCRIPTION ENCOUNTER (OUTPATIENT)
Age: 69
End: 2019-07-26

## 2019-07-26 LAB
ALBUMIN SERPL ELPH-MCNC: 2.3 G/DL — LOW (ref 3.3–5)
ALP SERPL-CCNC: 150 U/L — HIGH (ref 40–120)
ALT FLD-CCNC: 494 U/L — HIGH (ref 10–45)
ANION GAP SERPL CALC-SCNC: 11 MMOL/L — SIGNIFICANT CHANGE UP (ref 5–17)
AST SERPL-CCNC: 319 U/L — HIGH (ref 10–40)
BASOPHILS # BLD AUTO: 0 K/UL — SIGNIFICANT CHANGE UP (ref 0–0.2)
BASOPHILS NFR BLD AUTO: 0.2 % — SIGNIFICANT CHANGE UP (ref 0–2)
BILIRUB DIRECT SERPL-MCNC: 0.2 MG/DL — SIGNIFICANT CHANGE UP (ref 0–0.2)
BILIRUB INDIRECT FLD-MCNC: 0.5 MG/DL — SIGNIFICANT CHANGE UP (ref 0.2–1)
BILIRUB SERPL-MCNC: 0.7 MG/DL — SIGNIFICANT CHANGE UP (ref 0.2–1.2)
BUN SERPL-MCNC: 33 MG/DL — HIGH (ref 7–23)
CALCIUM SERPL-MCNC: 8.5 MG/DL — SIGNIFICANT CHANGE UP (ref 8.4–10.5)
CHLORIDE SERPL-SCNC: 106 MMOL/L — SIGNIFICANT CHANGE UP (ref 96–108)
CO2 SERPL-SCNC: 24 MMOL/L — SIGNIFICANT CHANGE UP (ref 22–31)
CREAT SERPL-MCNC: 1.07 MG/DL — SIGNIFICANT CHANGE UP (ref 0.5–1.3)
EOSINOPHIL # BLD AUTO: 0 K/UL — SIGNIFICANT CHANGE UP (ref 0–0.5)
EOSINOPHIL NFR BLD AUTO: 0.1 % — SIGNIFICANT CHANGE UP (ref 0–6)
GLUCOSE BLDC GLUCOMTR-MCNC: 128 MG/DL — HIGH (ref 70–99)
GLUCOSE BLDC GLUCOMTR-MCNC: 139 MG/DL — HIGH (ref 70–99)
GLUCOSE BLDC GLUCOMTR-MCNC: 139 MG/DL — HIGH (ref 70–99)
GLUCOSE BLDC GLUCOMTR-MCNC: 160 MG/DL — HIGH (ref 70–99)
GLUCOSE SERPL-MCNC: 176 MG/DL — HIGH (ref 70–99)
HCT VFR BLD CALC: 36.8 % — LOW (ref 39–50)
HGB BLD-MCNC: 11.6 G/DL — LOW (ref 13–17)
LYMPHOCYTES # BLD AUTO: 1.7 K/UL — SIGNIFICANT CHANGE UP (ref 1–3.3)
LYMPHOCYTES # BLD AUTO: 15.1 % — SIGNIFICANT CHANGE UP (ref 13–44)
MAGNESIUM SERPL-MCNC: 2.2 MG/DL — SIGNIFICANT CHANGE UP (ref 1.6–2.6)
MCHC RBC-ENTMCNC: 29.7 PG — SIGNIFICANT CHANGE UP (ref 27–34)
MCHC RBC-ENTMCNC: 31.5 GM/DL — LOW (ref 32–36)
MCV RBC AUTO: 94.2 FL — SIGNIFICANT CHANGE UP (ref 80–100)
MONOCYTES # BLD AUTO: 0.8 K/UL — SIGNIFICANT CHANGE UP (ref 0–0.9)
MONOCYTES NFR BLD AUTO: 7.5 % — SIGNIFICANT CHANGE UP (ref 2–14)
NEUTROPHILS # BLD AUTO: 8.6 K/UL — HIGH (ref 1.8–7.4)
NEUTROPHILS NFR BLD AUTO: 77.1 % — HIGH (ref 43–77)
PHOSPHATE SERPL-MCNC: 2.4 MG/DL — LOW (ref 2.5–4.5)
PLATELET # BLD AUTO: 340 K/UL — SIGNIFICANT CHANGE UP (ref 150–400)
POTASSIUM SERPL-MCNC: 3.9 MMOL/L — SIGNIFICANT CHANGE UP (ref 3.5–5.3)
POTASSIUM SERPL-SCNC: 3.9 MMOL/L — SIGNIFICANT CHANGE UP (ref 3.5–5.3)
PROT SERPL-MCNC: 6.6 G/DL — SIGNIFICANT CHANGE UP (ref 6–8.3)
RBC # BLD: 3.91 M/UL — LOW (ref 4.2–5.8)
RBC # FLD: 14 % — SIGNIFICANT CHANGE UP (ref 10.3–14.5)
SODIUM SERPL-SCNC: 141 MMOL/L — SIGNIFICANT CHANGE UP (ref 135–145)
WBC # BLD: 11.2 K/UL — HIGH (ref 3.8–10.5)
WBC # FLD AUTO: 11.2 K/UL — HIGH (ref 3.8–10.5)

## 2019-07-26 PROCEDURE — 70551 MRI BRAIN STEM W/O DYE: CPT | Mod: 26

## 2019-07-26 PROCEDURE — 71045 X-RAY EXAM CHEST 1 VIEW: CPT | Mod: 26

## 2019-07-26 PROCEDURE — 99233 SBSQ HOSP IP/OBS HIGH 50: CPT

## 2019-07-26 RX ORDER — POTASSIUM PHOSPHATE, MONOBASIC POTASSIUM PHOSPHATE, DIBASIC 236; 224 MG/ML; MG/ML
15 INJECTION, SOLUTION INTRAVENOUS ONCE
Refills: 0 | Status: COMPLETED | OUTPATIENT
Start: 2019-07-26 | End: 2019-07-26

## 2019-07-26 RX ADMIN — HEPARIN SODIUM 5000 UNIT(S): 5000 INJECTION INTRAVENOUS; SUBCUTANEOUS at 18:30

## 2019-07-26 RX ADMIN — HEPARIN SODIUM 5000 UNIT(S): 5000 INJECTION INTRAVENOUS; SUBCUTANEOUS at 05:29

## 2019-07-26 RX ADMIN — Medication 1 PATCH: at 19:00

## 2019-07-26 RX ADMIN — CLOPIDOGREL BISULFATE 75 MILLIGRAM(S): 75 TABLET, FILM COATED ORAL at 11:57

## 2019-07-26 RX ADMIN — Medication 1 PATCH: at 15:32

## 2019-07-26 RX ADMIN — Medication 1 PUFF(S): at 15:32

## 2019-07-26 RX ADMIN — POTASSIUM PHOSPHATE, MONOBASIC POTASSIUM PHOSPHATE, DIBASIC 62.5 MILLIMOLE(S): 236; 224 INJECTION, SOLUTION INTRAVENOUS at 06:18

## 2019-07-26 RX ADMIN — Medication 1: at 09:01

## 2019-07-26 RX ADMIN — Medication 1 PUFF(S): at 06:04

## 2019-07-26 RX ADMIN — Medication 1 PUFF(S): at 00:52

## 2019-07-26 RX ADMIN — Medication 81 MILLIGRAM(S): at 11:57

## 2019-07-26 RX ADMIN — Medication 1 PATCH: at 12:33

## 2019-07-26 NOTE — DISCHARGE NOTE NURSING/CASE MANAGEMENT/SOCIAL WORK - NSDCPEPTSTRK_GEN_ALL_CORE
Prescribed medications/Risk factors for stroke/Stroke education booklet/Stroke warning signs and symptoms/Need for follow up after discharge/Signs and symptoms of stroke/Stroke support groups for patients, families, and friends/Call 911 for stroke

## 2019-07-26 NOTE — SWALLOW BEDSIDE ASSESSMENT ADULT - PHARYNGEAL PHASE
Delayed cough post oral intake/+ audible swallows/Delayed pharyngeal swallow/Wet vocal quality post oral intake/Decreased laryngeal elevation

## 2019-07-26 NOTE — SWALLOW BEDSIDE ASSESSMENT ADULT - SLP PERTINENT HISTORY OF CURRENT PROBLEM
68 y/o M w/ PMH of DM, HTN with poor medication compliance and PAD was admitted to CCU (7/5/19) from [facility] s/p L popliteal bypass for cardiogenic shock management w/ NSTEMI and a course complicated by hypoxemic respiratory failure secondary to suspected aspiration PNA 2/2 episode of vomiting while intubated; now in septic shock. Known hx of poor medication compliance. Vascular surgery consulted (7/6/19) and following for no dopplerable signals and ordered an urgent lower extremity arterial duplex. Patient regained biphasic Doppler pulse when given Heparin infusion as per Cardiology (7/6/19). Patient self-extubated (7/8/19). Podiatry consulted (7/9/19) for dry gangrene, being treated with betadine. Patient transferred from CCU to general medicine (7/9/19). Patient self-removed NGT (7/9/19), B/L mittens placed. Patient remains on antibiotics for aspiration PNA. Pt with VINAY likely in the setting of cardiogenic/septic shock. (Continued)

## 2019-07-26 NOTE — SWALLOW BEDSIDE ASSESSMENT ADULT - COMMENTS
(Continued) MBS completed 7/11/19 noting: Pt presents with an oropharyngeal dysphagia superimposed upon an altered mental status. The oral stage of swallow is characterized by oral holding of food/liquid material, poor bolus formation/control, piecemeal deglutition, premature spillover to the oropharynx on thin puree, honey thick and nectar thick liquids and to the hypopharynx on thick puree and thin liquid, delayed pharyngeal swallows and laryngeal penetration with retrieval on thin liquid. Although aspiration not evident on exam, would suggest a conservative approach to feeding given current cognitive status.  Disorders: reduced lingual strength/ROM/Rate of motion, reduced BOT to posterior pharyngeal wall contact, delay in trigger of the swallow reflex, reduced supraglottic sensation 7/17 last bedside swallow evaluation clinically recommended upgrade of diet to Dysphagia 2, thin liquids 7/20 PA chart note mentioned patient presented with increased agitation and confusion, became combative and put on constant observation. 7/21 Patient noted to have resolved agitation and confusion 7/22 patient underwent left BKA procedure 7/24  RRT called 2/2 change in breathing, appearing to use accessory muscle when breathing, not following commands, not responding to name, altered from baseline- also noted with expiratory wheeze and dyspnea. 7/24 MICU consulted; however not recommended at this time. 7/24 Chart Note-Transfer Not Resident noted concerning for shock liver in the setting of acute decompensated HF 7/24 Pt transferred to CCU and central line inserted. As of 7/25 WBC elevated 12.0  7/25: Re-evaluation of swallow attempted. Pt unable to participate 2/2 lethargy.   7/25: Neuro consulted for AMS.  Per report: Patient unable to stay awake for exam more than a minute, and when awake would not cooperate with exam or interview.  Continued below:

## 2019-07-26 NOTE — PROGRESS NOTE ADULT - ASSESSMENT
************************************INCOMPLETE NOTE**************************      69M w/ PMH of poor med compliance, DM2, HTN, PAD transferred from [facility] to [facility] CCU for NSTEMI c/b cardiogenic shock; acute respiratory failure with hypoxia and septic shock secondary to suspected aspiration pneumonia requiring intubation (now extubated) then transferred to floors S/p Left BKA on 7/22/19, with RRT 7/24 for shock state with elevated LFTs and lactate readmission to CCU, with resolution of shock state, noted to have possible pontine infarction on CTH, neuro following, pending brain MR.     Acute decompensated HF    - TTE at OSH EF 15%; Repeat TTE with EF 20-25%, moderately dilated left atrium, increased left atrial pressures, moderately dilated LV, global hypokinesis  - CXR with pulm edema during RRT, initially dry on exam, received hydration   -s/p       Pleural effusion  - Patient initially requiring intubation for aspiration PNA at OSH; Had RRT on 7/24 for unresponsiveness, dyspnea and wheezing; CXR revealed b/l moderate pleural effusion; Admitted to CCU with shock liver; Repeat CXR demonstrating clear lungs since CCU admission  - Does not have a septic picture at this point; blood cx/sputum cx NGTD, observing off abx  - Stable on 2L NC, protecting airway  - Infectious etiology now less likely given resolution of leukocytosis, normal lactate, afebrile  - S/p Vanc and zosyn in CCU however now monitoring off abx    NSTEMI   Medically managed, TTE described as above  - Patient with acute rise in high sensitivity Trops likely 2/2 demand ischemia with negative delta   - LHC deferred due to concern for medication non compliance   - c/w DAPT: ASA, Plavix  - Statin held in setting of transaminitis, will consider restarting as LFTs downtrending  - BB held due to concern for ADHF     Pontine Infarction  - CTH revealing area of possible pontine infarction  - Neuro consulted, recommending MR brain  - F/u MR brain pending    PAD   - Patient s/p L BKA    - VA duplex of b/l LE: 07/06: R popliteal artery with severe flow-limiting stenosis, L pop and bypass graft are occluded   - f/u vascular surg for further management      Transaminitis  - patient initially with evidence of shock liver (elevated LFTs and lactate)  - - LFTs downtrended   - Trend CMP daily  - Lactate WNL  - Follow up ammonia levels   - CT A/P ordered to r/o ischemia: No acute intra-abdominal or pelvic pathology. Moderate bilateral pleural effusions.     VINAY on CKD  Resolved; likely ATN in setting of cardiogenic shock  - Trend BMP daily   - monitor I/Os, electrolytes  - lisinopril held in setting of VINAY; will consider restarting tomorrow    - Avoid nephrotoxins      Diabetes Mellitus  - Last A1c 10  - c/w lantus 6 units (Previously on Lantus 20u prior to BKA)  - Monitor FS  - C/w ISS     Anemia  - No gross signs of bleeding  - Trend CBC daily    DVT:  Diet: Dysphagia  2  S/S eval: currently on dysphagia 2 diet ************************************INCOMPLETE NOTE**************************      69M w/ PMH of poor med compliance, DM2, HTN, PAD transferred from [facility] to [facility] CCU for NSTEMI c/b cardiogenic shock; acute respiratory failure with hypoxia and septic shock secondary to suspected aspiration pneumonia requiring intubation (now extubated) then transferred to floors S/p Left BKA on 7/22/19, with RRT 7/24 for shock state with elevated LFTs and lactate readmission to CCU, with resolution of shock state, noted to have possible pontine infarction on CTH, neuro following, pending brain MR.     Acute decompensated HF  - No JVD or LE on exam; lungs appear to be clear however poor inspiratory effort  - TTE at OSH EF 15%; Repeat TTE with EF 20-25%, moderately dilated left atrium, increased left atrial pressures, moderately dilated LV, global hypokinesis  - CXR with pulm edema during RRT, initially dry on exam, received hydration   -s/p Furosemide 40mg on 7/24  - f.u cardio recs regarding BB and lasix  - daily weights  - Monitor I/O strict      Pleural effusion  - Patient initially requiring intubation for aspiration PNA at OSH; Had RRT on 7/24 for unresponsiveness, dyspnea and wheezing; CXR revealed b/l moderate pleural effusion; Admitted to CCU with shock liver; Repeat CXR demonstrating clear lungs since CCU admission  - Does not have a septic picture at this point; blood cx/sputum cx NGTD, observing off abx  - Stable on 2L NC, protecting airway  - Infectious etiology now less likely given resolution of leukocytosis, normal lactate, afebrile  - S/p Vanc and zosyn in CCU however now monitoring off abx    NSTEMI   Medically managed, TTE described as above  - Patient with acute rise in high sensitivity Trops likely 2/2 demand ischemia with negative delta   - LHC deferred due to concern for medication non compliance   - c/w DAPT: ASA, Plavix  - Statin held in setting of transaminitis, will consider restarting as LFTs downtrending  - BB held due to concern for ADHF     Pontine Infarction  - CTH revealing area of possible pontine infarction  - Neuro consulted, recommending MR brain  - F/u MR brain pending    PAD   - Patient s/p L BKA    - VA duplex of b/l LE: 07/06: R popliteal artery with severe flow-limiting stenosis, L pop and bypass graft are occluded   - f/u vascular surg for further management      Transaminitis  - patient initially with evidence of shock liver (elevated LFTs and lactate)  - - LFTs downtrended   - Trend CMP daily  - Lactate WNL  - Follow up ammonia levels   - CT A/P ordered to r/o ischemia: No acute intra-abdominal or pelvic pathology. Moderate bilateral pleural effusions.     VINAY on CKD  Resolved; likely ATN in setting of cardiogenic shock  - Trend BMP daily   - monitor I/Os, electrolytes  - lisinopril held in setting of VINAY; will consider restarting tomorrow    - Avoid nephrotoxins      Diabetes Mellitus  - Last A1c 10  - c/w lantus 6 units (Previously on Lantus 20u prior to BKA)  - Monitor FS  - C/w ISS     Anemia  - No gross signs of bleeding  - Trend CBC daily    DVT:  Diet: Dysphagia  2  S/S eval: currently on dysphagia 2 diet 69M w/ PMH of poor med compliance, DM2, HTN, PAD transferred from [facility] to [facility] CCU for NSTEMI c/b cardiogenic shock; acute respiratory failure with hypoxia and septic shock secondary to suspected aspiration pneumonia requiring intubation (now extubated) then transferred to floors S/p Left BKA on 7/22/19, with RRT 7/24 for shock state with elevated LFTs and lactate readmission to CCU, with resolution of shock state, noted to have possible pontine infarction on CTH, neuro following, pending brain MR.     Acute decompensated HF  - No JVD or LE on exam; lungs appear to be clear however poor inspiratory effort  - TTE at OSH EF 15%; Repeat TTE with EF 20-25%, moderately dilated left atrium, increased left atrial pressures, moderately dilated LV, global hypokinesis  - CXR with pulm edema during RRT, initially dry on exam, received hydration   -s/p Furosemide 40mg on 7/24  - f.u cardio recs regarding BB and lasix  - daily weights  - Monitor I/O strict      Pleural effusion  - Patient initially requiring intubation for aspiration PNA at OSH; Had RRT on 7/24 for unresponsiveness, dyspnea and wheezing; CXR revealed b/l moderate pleural effusion; Admitted to CCU with shock liver; Repeat CXR demonstrating clear lungs since CCU admission  - Does not have a septic picture at this point; blood cx/sputum cx NGTD, observing off abx  - Stable on 2L NC, protecting airway  - Infectious etiology now less likely given resolution of leukocytosis, normal lactate, afebrile  - S/p Vanc and zosyn in CCU however now monitoring off abx    NSTEMI   Medically managed, TTE described as above  - Patient with acute rise in high sensitivity Trops likely 2/2 demand ischemia with negative delta   - LHC deferred due to concern for medication non compliance   - c/w DAPT: ASA, Plavix  - Statin held in setting of transaminitis, will consider restarting as LFTs downtrending  - BB held due to concern for ADHF     Pontine Infarction  - CTH revealing area of possible pontine infarction  - Neuro consulted, recommending MR brain  - F/u MR brain pending    PAD   - Patient s/p L BKA    - VA duplex of b/l LE: 07/06: R popliteal artery with severe flow-limiting stenosis, L pop and bypass graft are occluded   - Vascular rec: Continue ASA and Plavix; dressing with prevena. Immobilizer for 48 hours. remove bypass staples prior to discharge.   - f/u vascular surg for further management      Transaminitis  - patient initially with evidence of shock liver (elevated LFTs and lactate)  - - LFTs downtrended   - Trend CMP daily  - Lactate WNL  - Follow up ammonia levels   - CT A/P ordered to r/o ischemia: No acute intra-abdominal or pelvic pathology. Moderate bilateral pleural effusions.     VINAY on CKD  Resolved; likely ATN in setting of cardiogenic shock  - Trend BMP daily   - monitor I/Os, electrolytes  - lisinopril held in setting of VINAY; will consider restarting tomorrow    - Avoid nephrotoxins      Diabetes Mellitus  - Last A1c 10  - c/w lantus 6 units (Previously on Lantus 20u prior to BKA)  - Monitor FS  - C/w ISS     Anemia  - No gross signs of bleeding  - Trend CBC daily    DVT:  Diet: Dysphagia  2  S/S eval: currently on dysphagia 2 diet 69M w/ PMH of poor med compliance, DM2, HTN, PAD transferred from [facility] to [facility] CCU for NSTEMI c/b cardiogenic shock; acute respiratory failure with hypoxia and septic shock secondary to suspected aspiration pneumonia requiring intubation (now extubated) then transferred to floors S/p Left BKA on 7/22/19, with RRT 7/24 for shock state with elevated LFTs and lactate readmission to CCU, with resolution of shock state, noted to have possible pontine infarction on CTH, neuro following, pending brain MR.     Acute decompensated HF  - No JVD or LE on exam; lungs appear to be clear however poor inspiratory effort  - TTE at OSH EF 15%; Repeat TTE with EF 20-25%, moderately dilated left atrium, increased left atrial pressures, moderately dilated LV, global hypokinesis  - CXR with pulm edema during RRT, initially dry on exam, received hydration   -s/p Furosemide 40mg on 7/24  - f.u cardio recs regarding BB and lasix  - daily weights  - Monitor I/O strict  - Monitor on tele      Pleural effusion  - Patient initially requiring intubation for aspiration PNA at OSH; Had RRT on 7/24 for unresponsiveness, dyspnea and wheezing; CXR revealed b/l moderate pleural effusion; Admitted to CCU with shock liver; Repeat CXR demonstrating clear lungs since CCU admission  - Does not have a septic picture at this point; blood cx/sputum cx NGTD, observing off abx  - Stable on 2L NC, protecting airway  - Infectious etiology now less likely given resolution of leukocytosis, normal lactate, afebrile  - S/p Vanc and zosyn in CCU however now monitoring off abx    NSTEMI   Medically managed, TTE described as above  - Patient with acute rise in high sensitivity Trops likely 2/2 demand ischemia with negative delta   - LHC deferred due to concern for medication non compliance   - c/w DAPT: ASA, Plavix  - Statin held in setting of transaminitis, will consider restarting as LFTs downtrending  - BB held due to concern for ADHF   - f/u cardio recs  - Monitor on tele    Pontine Infarction  - CTH revealing area of possible pontine infarction  - Neuro consulted, recommending MR brain  - F/u MR brain pending    PAD   - Patient s/p L BKA    - VA duplex of b/l LE: 07/06: R popliteal artery with severe flow-limiting stenosis, L pop and bypass graft are occluded   - Vascular rec: Continue ASA and Plavix; dressing with prevena. Immobilizer for 48 hours. remove bypass staples prior to discharge.   - f/u vascular surg for further management      Transaminitis  - patient initially with evidence of shock liver (elevated LFTs and lactate)  - - LFTs downtrended   - Trend CMP daily  - Lactate WNL  - Follow up ammonia levels   - CT A/P ordered to r/o ischemia: No acute intra-abdominal or pelvic pathology. Moderate bilateral pleural effusions.     VINAY on CKD  Resolved; likely ATN in setting of cardiogenic shock  - Trend BMP daily   - monitor I/Os, electrolytes  - lisinopril held in setting of VINAY; will consider restarting tomorrow    - Avoid nephrotoxins      Diabetes Mellitus  - Last A1c 10  - c/w lantus 6 units (Previously on Lantus 20u prior to BKA)  - Monitor FS  - C/w ISS     Anemia  - No gross signs of bleeding  - Trend CBC daily    DVT:  Diet: Dysphagia  2  S/S eval: currently on dysphagia 2 diet 69M w/ PMH of poor med compliance, DM2, HTN, PADs/p Left BKA 7/22 transferred from [facility] to [facility] CCU for NSTEMI c/b cardiogenic shock; acute respiratory failure with hypoxia and septic shock secondary to suspected aspiration pneumonia requiring intubation (now extubated) then transferred to floors with RRT 7/24 for shock state with elevated LFTs and lactate readmission to CCU, with resolution of shock state, noted to have possible pontine infarction on CTH, neuro following, pending brain MR.     Acute decompensated HF  - As per chart review - appears to be new CHF; Patient not on BB as outpatient  - No JVD or LE on exam; lungs appear to be clear however poor inspiratory effort  - TTE at OSH EF 15%; Repeat TTE with EF 20-25%, moderately dilated left atrium, increased left atrial pressures, moderately dilated LV, global hypokinesis  - CXR with pulm edema during RRT, initially dry on exam, received hydration   -s/p Furosemide 40mg on 7/24  - f.u cardio recs regarding BB and lasix  - daily weights  - Monitor I/O strict  - Monitor on tele      Pleural effusion  - Patient initially requiring intubation for aspiration PNA at OSH; Had RRT on 7/24 for unresponsiveness, dyspnea and wheezing; CXR revealed b/l moderate pleural effusion; Admitted to CCU with shock liver; Repeat CXR demonstrating clear lungs since CCU admission  - Does not have a septic picture at this point; blood cx/sputum cx NGTD, observing off abx  - Stable on 2L NC, protecting airway  - Infectious etiology now less likely given resolution of leukocytosis, normal lactate, afebrile  - S/p Vanc and zosyn on 7/25 x1 in CCU however now monitoring off abx    NSTEMI   Medically managed, TTE described as above  - Patient with acute rise in high sensitivity Trops likely 2/2 demand ischemia with negative delta   - LHC deferred due to concern for medication non compliance   - c/w DAPT: ASA, Plavix  - Holding statin in setting of elevated LFTs, however will add LFTs to today's labs and if significantly improved will restart statin  - BB held due to concern for ADHF   - f/u cardio recs  - Monitor on tele    Pontine Infarction  - CTH revealing area of possible pontine infarction  - Neuro consulted, recommending MR brain  - F/u MR brain pending  - Holding statin in setting of elevated LFTs, however will add LFTs to today's labs and if significantly improved will restart statin    PAD   - Patient s/p L BKA    - VA duplex of b/l LE: 07/06: R popliteal artery with severe flow-limiting stenosis, L pop and bypass graft are occluded   - Vascular rec: Continue ASA and Plavix; dressing with prevena. Immobilizer for 48 hours. remove bypass staples prior to discharge.   - f/u vascular surg for further management      Transaminitis  - patient initially with evidence of shock liver (elevated LFTs and lactate)  - - LFTs downtrended   - Trend CMP daily  - Lactate WNL  - Follow up ammonia levels   - CT A/P ordered to r/o ischemia: No acute intra-abdominal or pelvic pathology. Moderate bilateral pleural effusions.     VINAY on CKD  Resolved; likely ATN in setting of cardiogenic shock  - Trend BMP daily   - monitor I/Os, electrolytes  - lisinopril held in setting of VINAY; will consider restarting tomorrow    - Avoid nephrotoxins      Diabetes Mellitus  - Last A1c 10  - c/w lantus 6 units (Previously on Lantus 20u prior to BKA)  - Monitor FS  - C/w ISS     Anemia  - No gross signs of bleeding  - Trend CBC daily    DVT: hep sc q8  Diet: Dysphagia  2, CC/DASH  S/S eval: currently on dysphagia 2 diet 69M w/ PMH of poor med compliance, DM2, HTN, PADs/p Left BKA 7/22 transferred from [facility] to [facility] CCU for NSTEMI c/b cardiogenic shock; acute respiratory failure with hypoxia and septic shock secondary to suspected aspiration pneumonia requiring intubation (now extubated) then transferred to floors with RRT 7/24 for shock state with elevated LFTs and lactate readmission to CCU, with resolution of shock state, noted to have possible pontine infarction on CTH, neuro following, pending brain MR.     Acute decompensated HF  - As per chart review - appears to be new CHF; Patient not on BB as outpatient  - No JVD or LE on exam; lungs appear to be clear however poor inspiratory effort  - TTE at OSH EF 15%; Repeat TTE with EF 20-25%, moderately dilated left atrium, increased left atrial pressures, moderately dilated LV, global hypokinesis  - CXR with pulm edema during RRT, initially dry on exam, received hydration   -s/p Furosemide 40mg on 7/24  - f.u cardio recs regarding BB and lasix  - daily weights  - Monitor I/O strict  - Monitor on tele      Pleural effusion  - Patient initially requiring intubation for aspiration PNA at OSH; Had RRT on 7/24 for unresponsiveness, dyspnea and wheezing; CXR revealed b/l moderate pleural effusion; Admitted to CCU with shock liver; Repeat CXR demonstrating clear lungs since CCU admission  - Does not have a septic picture at this point; blood cx/sputum cx NGTD, observing off abx  - Stable on 2L NC, protecting airway  - Infectious etiology now less likely given resolution of leukocytosis, normal lactate, afebrile  - S/p Vanc and zosyn on 7/25 x1 in CCU and zosyn x2 days 7/13, no current s/s of infection monitor off abx    NSTEMI   Medically managed, TTE described as above  - Patient with acute rise in high sensitivity Trops likely 2/2 demand ischemia with negative delta   - LHC deferred due to concern for medication non compliance   - c/w DAPT: ASA, Plavix  - Holding statin in setting of elevated LFTs, however will add LFTs to today's labs and if significantly improved will restart statin  - BB held due to concern for ADHF   - f/u cardio recs  - Monitor on tele    Pontine Infarction  - CTH revealing area of possible pontine infarction  - Neuro consulted, recommending MR brain  - F/u MR brain pending      PAD   - Patient s/p L BKA    - VA duplex of b/l LE: 07/06: R popliteal artery with severe flow-limiting stenosis, L pop and bypass graft are occluded   - Vascular rec: Continue ASA and Plavix; dressing with prevena. Immobilizer for 48 hours. remove bypass staples prior to discharge.   - f/u vascular surg for further management      Transaminitis  - patient initially with evidence of shock liver (elevated LFTs and lactate)  - - LFTs downtrended   - Trend CMP daily  - Lactate WNL  - Follow up ammonia levels   - CT A/P ordered to r/o ischemia: No acute intra-abdominal or pelvic pathology. Moderate bilateral pleural effusions.     VINAY on CKD  Resolved; likely ATN in setting of cardiogenic shock  - Trend BMP daily   - monitor I/Os, electrolytes  - lisinopril held in setting of VINAY; will consider restarting tomorrow    - Avoid nephrotoxins      Diabetes Mellitus  - Last A1c 10  - c/w lantus 6 units (Previously on Lantus 20u prior to BKA)  - Monitor FS  - C/w ISS     Anemia  - No gross signs of bleeding  - Trend CBC daily    DVT: hep sc q8  Diet: Dysphagia  2, CC/DASH  S/S eval: currently on dysphagia 2 diet

## 2019-07-26 NOTE — CHART NOTE - NSCHARTNOTEFT_GEN_A_CORE
This is a 69 year old male hx of HTN, T2DM, PVD initially presented to [facility] hospital for a Left popliteal bypass (based on documentation appears to have been performed as later documentation makes note of occluded graft), with hospital course c/b hypoxic respiratory failure 2/2 aspiration PNA (patient was intubated at [facility]) and transferred here for an NSTEMI and cardiogenic shock as a direct admit to the CCU. ECHO at OSH with EF Of 15% with focal hypokinesis. Patient was medically managed in the CCU with tentative plan for LHC once patient was more clinically stable. 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 In setting of procedure lasix was held. RRT was called on 7/24 for  unresponsiveness with dyspnea and wheezing appreciated. CXR remarkable for pulmonary edema and labs with an elevated lactate and elevated LFTS to suggest ischemic hepatitis without obvious cause given no evidence of hypotensive episodes. MICU was consulted and patient deemed to be a CCU candidate -- transferred for Acute decompensated HF. Lactate had resolved without acute intervention. Neurology was consulted for waxing and waning mental status with CT showing questionable evolving infarct at the level of the barbara. Neurology consulted with recommendation for MRI brain.     Physical Exam:     For Follow up:  [ ] Neurology recommendations: patient scheduled for MRI brain     Constantino Burnett  PGY3  [phone] This is a 69 year old male hx of HTN, T2DM, PVD initially presented to [facility] hospital for a Left popliteal bypass (based on documentation appears to have been performed as later documentation makes note of occluded graft), with hospital course c/b hypoxic respiratory failure 2/2 aspiration PNA (patient was intubated at [facility]) and transferred here for an NSTEMI and cardiogenic shock as a direct admit to the CCU. ECHO at OSH with EF Of 15% with focal hypokinesis. Patient was medically managed in the CCU with tentative plan for LHC once patient was more clinically stable. 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 In setting of procedure lasix was held. RRT was called on 7/24 for  unresponsiveness with dyspnea and wheezing appreciated. CXR remarkable for pulmonary edema and labs with an elevated lactate and elevated LFTS to suggest ischemic hepatitis without obvious cause given no evidence of hypotensive episodes. MICU was consulted and patient deemed to be a CCU candidate -- transferred for Acute decompensated HF. Lactate had resolved without acute intervention. Neurology was consulted for waxing and waning mental status with CT showing questionable evolving infarct at the level of the barbara. Neurology consulted with recommendation for MRI brain.     Physical Exam:   NSR 70-80s   General: elderly male, calm cooperative AAO to himself  Cardiac: RRR, normal s1 and s2, systolic murmur in LUSB  Lungs: CTAB anteriorly and diminished at both bases   Abdomen:s soft, nondistended bowel sounds present. non TTP  Extremities: L-BKA in cast, RLE no edema, palpable DP pulse    < from: CT Head No Cont (07.24.19 @ 23:23) >    FINDINGS:    VENTRICLES AND SULCI: Age-appropriate involutional changes  INTRA-AXIAL: Microvascular ischemic changes involving the periventricular   and subcortical white matter. Small focal region of lucency in the   subcortical white matter on the left consistent with a small focal area   of ischemia.. Questionable small focal area of lucency in the brainstem   not clearly identified previously and may represent an evolving infarct   in this region (2, 11). Alternatively this may represent beam hardening   artifact  EXTRA-AXIAL:  No mass or collection is seen.  VISUALIZED SINUSES:  Clear.  VISUALIZED MASTOIDS:  Clear.  CALVARIUM:  Normal.  MISCELLANEOUS:  None.    IMPRESSION: Age-appropriate involutional change and microvascular   ischemic disease. Questionable evolving infarct at the level of the   brainstem specifically the barbara. Cannot entirely exclude evolving infarct   in this region though this may represent beam hardening artifact.   Correlation with MR recommended. No gross change compared with prior   7/24/2019.    < end of copied text >    < from: CT Abdomen and Pelvis No Cont (07.24.19 @ 23:32) >    FINDINGS:    Evaluation of solid organs are limited without intravenous contrast. Exam   is also limited due to motion and streak artifacts.    LOWER CHEST: Moderate bilateral pleural effusions with passive   atelectasis.    LIVER: Within normal limits.  BILE DUCTS: Normal caliber.  GALLBLADDER: Within normal limits.  SPLEEN: Within normal limits.  PANCREAS: Within normal limits.  ADRENALS: Mild thickening of the left adrenal gland.  KIDNEYS/URETERS: Within normal limits.    BLADDER: Layering focal hyperdensity in the dependent portion is   suggestive of layering calculi.  REPRODUCTIVE ORGANS: Prostate is enlarged.    BOWEL: No bowel obstruction. Appendix is normal.  PERITONEUM: No ascites.  VESSELS: Mild atherosclerotic changes.  RETROPERITONEUM: No lymphadenopathy.    ABDOMINAL WALL: Within normal limits.  BONES: Degenerative changes with large protruding anterior osteophytes at   the level of L3-L4.    IMPRESSION:     Limited noncontrast study. No acute intra-abdominal or pelvic pathology.    Moderate bilateral pleural effusions.    < end of copied text >    For Follow up:  [ ] Neurology recommendations: patient scheduled for MRI brain   [ ] Follow up ammonia levels   - EF 20-25% currently not on diuretics, follow cardiology recommendations however right now does not look volume overloaded on RA. LHC deferred as per documentation 2/2 concern for noncompliance with medication. on DAPT however holding statin 2/2 elevated LFTS, with consideration to restart   [ ] F/U vascular surgery recs : Duplex revealing severe R-popliteal artery stenosis   [ ] S/S eval: currently on dysphagia 2 diet       Constantino Burnett  PGY3  [phone]

## 2019-07-26 NOTE — SWALLOW BEDSIDE ASSESSMENT ADULT - ORAL PHASE
Decreased anterior-posterior movement of the bolus/closing eyes with material in oral cavity, reduced awareness for material midline on tongue, + manipulation of material given tactile stimulation/Delayed oral transit time

## 2019-07-26 NOTE — PROGRESS NOTE ADULT - SUBJECTIVE AND OBJECTIVE BOX
====================  CCU MIDNIGHT ROUNDS  ====================    JIM PAREDES  [id number]  Patient is a 69y old  Male who presents with a chief complaint of NSTEMI (25 Jul 2019 15:42)      ====================  SUMMARY:  ====================    69M w/ PMH of poor med compliance, DM type 2, HTN, PAD NUMC, transferred to [facility] CCU for NSTEMI c/b cardiogenic shock; acute respiratory failure with hypoxia and septic shock secondary to suspected aspiration pneumonia requiring intubation (now extubated) and downgraded from CCU. 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 Course further complicated by RRT for AMS/cardiogenic shock in the setting of transaminitis possible shock liver, elevated lactate, transferred to the CCU for further management. Now with resolution of shock state; transaminitis resolving, CTH with possible ischemic stroke; MRI pending.     ====================  NEW EVENTS:  ====================    CTH with possible ischemic etiology, MRI pending     ====================  VITALS (Last 12 hrs):  ====================    T(C): 36.7 (07-25-19 @ 22:00), Max: 37.1 (07-25-19 @ 16:00)  T(F): 98 (07-25-19 @ 22:00), Max: 98.8 (07-25-19 @ 16:00)  HR: 84 (07-26-19 @ 00:00) (82 - 90)  BP: 99/60 (07-26-19 @ 00:00) (90/54 - 110/67)  BP(mean): 68 (07-26-19 @ 00:00) (66 - 82)  RR: 15 (07-26-19 @ 00:00) (11 - 24)  SpO2: 97% (07-26-19 @ 00:00) (94% - 100%)  CVP(mm Hg): 1 (07-25-19 @ 16:00) (1 - 3)    I&O's Summary    24 Jul 2019 07:01  -  25 Jul 2019 07:00  --------------------------------------------------------  IN: 650 mL / OUT: 485 mL / NET: 165 mL    25 Jul 2019 07:01  -  26 Jul 2019 00:15  --------------------------------------------------------  IN: 360 mL / OUT: 805 mL / NET: -445 mL    ====================  NEW LABS:  ====================                        11.0   12.0  )-----------( 326      ( 25 Jul 2019 04:45 )             33.9     07-25    141  |  105  |  39<H>  ----------------------------<  224<H>  3.9   |  25  |  1.25    Ca    8.0<L>      25 Jul 2019 16:20  Phos  2.5     07-25  Mg     2.2     07-25    TPro  6.4  /  Alb  2.3<L>  /  TBili  0.6  /  DBili  x   /  AST  346<H>  /  ALT  514<H>  /  AlkPhos  152<H>  07-25    PT/INR - ( 25 Jul 2019 04:45 )   PT: 24.3 sec;   INR: 2.07 ratio    PTT - ( 24 Jul 2019 22:38 )  PTT:31.4 sec    ABG - ( 25 Jul 2019 02:31 )  pH, Arterial: 7.57  pH, Blood: x     /  pCO2: 27    /  pO2: 134   / HCO3: 25    / Base Excess: 3.5   /  SaO2: 99        ====================  PLAN:    #Neuro   - per chart review intermittent agitation requiring haldol/seroquel but held due to prolonged QTc   - CTH: Age-appropriate involutional change and microvascular ischemic disease. Questionable evolving infarct at the level of the brainstem specifically the barbara. Cannot entirely exclude evolving infarct in this region though this may represent beam hardening artifact.   Correlation with MR pending.   - neuro following     #Pulm   - Concern for asp pna/pneumonitis, does not have a septic picture at this point  follow blood Cx/sputum Cx, observing off abx   - Stable on 2L NC, protecting airway    #Cardiac   1. Acute decompensated HF  - JVD and rales appreciate on PE   - CXR with pulm edema during RRT, initially dry on exam, received hydration   - TTE with EF 20-25%, moderately dilated left atrium, increased left atrial pressures, moderately dilated LV, global hypokinesis    2. NSTEMI   Medically managed, TTE described as above  - Acute rise in HsTrop likely 2/2 demand ischemia rather than plaque rupture, neg delta, will defer trending troponin   - LHC deferred due to concern for medication non compliance   - c/w DAPT: ASA, Plavix  - Statin held in setting of transaminitis, will consider starting tomorrow  - BB held due to concern for ADHF     3. PAD s/p L BKA plan   - [facility] duplex of b/l LE: 07/06: R popliteal artery with severe flow-limiting stenosis, L pop and bypass graft are occluded   - f/u vascular surg for futher recs     #GI Transaminitis w/elevated lactate concern for shock liver   - LFTs currently downtrending   - Trend LFTs   - f/u CT A/P r/o ischemia: No acute intra-abdominal or pelvic pathology. Moderate bilateral pleural effusions.     #Renal VINAY on CKD, likely ATN in the setting of cardiogenic shock- improving   - Trend sCR   - monitor I/Os, electrolytes  - lisinopril held in setting of VINAY    - Avoid nephrotoxins    #ID  - Plueral effusion vs RLL infiltrate seen on initial CXR  - CXR post CCU admission with clear lungs  - Initially with leukocytosis concern for septic etiology now resolved   - Afebrile, initially with lactate of 7/leukocytosis, that have both now resolved   - dose of Vanc/Zosyn given overnight, observing off abx  - f/u sepsis workup including: Bcx/sputum cx     #Endo DM, A1C 10  - On lantus 6u, was on lantus 20 units prior to BKA, lowered due to concern of poor PO intake  - Will continued FS and increase Lantus and s/s coverage prn     #Heme: Anemia, no overt source of bleeding  - Trend CBC    ====================  -     Teddy Ashford, Internal Medicine R  [phone] ====================  CCU MIDNIGHT ROUNDS  ====================    JIM PAREDES  [id number]  Patient is a 69y old  Male who presents with a chief complaint of NSTEMI (25 Jul 2019 15:42)      ====================  SUMMARY:  ====================    69M w/ PMH of poor med compliance, DM type 2, HTN, PAD NUMC, transferred to [facility] CCU for NSTEMI c/b cardiogenic shock; acute respiratory failure with hypoxia and septic shock secondary to suspected aspiration pneumonia requiring intubation (now extubated) and downgraded from CCU. 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 Course further complicated by RRT for AMS/cardiogenic shock in the setting of transaminitis possible shock liver, elevated lactate, transferred to the CCU for further management. Now with resolution of shock state; transaminitis resolving, CTH with possible ischemic stroke; MRI pending.     ====================  NEW EVENTS:  ====================    CTH with possible ischemic etiology, MRI pending     ====================  VITALS (Last 12 hrs):  ====================    T(C): 36.7 (07-25-19 @ 22:00), Max: 37.1 (07-25-19 @ 16:00)  T(F): 98 (07-25-19 @ 22:00), Max: 98.8 (07-25-19 @ 16:00)  HR: 84 (07-26-19 @ 00:00) (82 - 90)  BP: 99/60 (07-26-19 @ 00:00) (90/54 - 110/67)  BP(mean): 68 (07-26-19 @ 00:00) (66 - 82)  RR: 15 (07-26-19 @ 00:00) (11 - 24)  SpO2: 97% (07-26-19 @ 00:00) (94% - 100%)  CVP(mm Hg): 1 (07-25-19 @ 16:00) (1 - 3)    I&O's Summary    24 Jul 2019 07:01  -  25 Jul 2019 07:00  --------------------------------------------------------  IN: 650 mL / OUT: 485 mL / NET: 165 mL    25 Jul 2019 07:01  -  26 Jul 2019 00:15  --------------------------------------------------------  IN: 360 mL / OUT: 805 mL / NET: -445 mL    ====================  NEW LABS:  ====================                        11.0   12.0  )-----------( 326      ( 25 Jul 2019 04:45 )             33.9     07-25    141  |  105  |  39<H>  ----------------------------<  224<H>  3.9   |  25  |  1.25    Ca    8.0<L>      25 Jul 2019 16:20  Phos  2.5     07-25  Mg     2.2     07-25    TPro  6.4  /  Alb  2.3<L>  /  TBili  0.6  /  DBili  x   /  AST  346<H>  /  ALT  514<H>  /  AlkPhos  152<H>  07-25    PT/INR - ( 25 Jul 2019 04:45 )   PT: 24.3 sec;   INR: 2.07 ratio    PTT - ( 24 Jul 2019 22:38 )  PTT:31.4 sec    ABG - ( 25 Jul 2019 02:31 )  pH, Arterial: 7.57  pH, Blood: x     /  pCO2: 27    /  pO2: 134   / HCO3: 25    / Base Excess: 3.5   /  SaO2: 99        ====================  PLAN:    #Neuro   - per chart review intermittent agitation requiring haldol/seroquel but held due to prolonged QTc   - CTH: Age-appropriate involutional change and microvascular ischemic disease. Questionable evolving infarct at the level of the brainstem specifically the barbara. Cannot entirely exclude evolving infarct in this region though this may represent beam hardening artifact.   Correlation with MR pending.   - neuro following     #Pulm   - Concern for asp pna/pneumonitis, does not have a septic picture at this point  follow blood Cx/sputum Cx, observing off abx   - Stable on 2L NC, protecting airway    #Cardiac   1. Acute decompensated HF  - JVD and rales appreciate on PE   - CXR with pulm edema during RRT, initially dry on exam, received hydration   - TTE with EF 20-25%, moderately dilated left atrium, increased left atrial pressures, moderately dilated LV, global hypokinesis    2. NSTEMI   Medically managed, TTE described as above  - Acute rise in HsTrop likely 2/2 demand ischemia rather than plaque rupture, neg delta, will defer trending troponin   - LHC deferred due to concern for medication non compliance   - c/w DAPT: ASA, Plavix  - Statin held in setting of transaminitis, will consider starting tomorrow  - BB held due to concern for ADHF     3. PAD s/p L BKA plan   - [facility] duplex of b/l LE: 07/06: R popliteal artery with severe flow-limiting stenosis, L pop and bypass graft are occluded   - f/u vascular surg for futher recs     #GI Transaminitis w/elevated lactate concern for shock liver   - LFTs currently downtrending   - Trend LFTs   - f/u CT A/P r/o ischemia: No acute intra-abdominal or pelvic pathology. Moderate bilateral pleural effusions.     #Renal VINAY on CKD, likely ATN in the setting of cardiogenic shock- improving   - Trend sCR   - monitor I/Os, electrolytes  - lisinopril held in setting of VINAY    - Avoid nephrotoxins    #ID  - Plueral effusion vs RLL infiltrate seen on initial CXR  - CXR post CCU admission with clear lungs  - Initially with leukocytosis concern for septic etiology now resolved   - Afebrile, initially with lactate of 7/leukocytosis, that have both now resolved   - dose of Vanc/Zosyn given yesterday, observing off abx  - f/u sepsis workup including: Bcx/sputum cx     #Endo DM, A1C 10  - On lantus 6u, was on lantus 20 units prior to BKA, lowered due to concern of poor PO intake  - Will continued FS and increase Lantus and s/s coverage prn     #Heme: Anemia, no overt source of bleeding  - Trend CBC    ====================  -     Teddy Ashford, Internal Medicine R  [phone]

## 2019-07-26 NOTE — DISCHARGE NOTE NURSING/CASE MANAGEMENT/SOCIAL WORK - NSDCDPATPORTLINK_GEN_ALL_CORE
You can access the [facility] Patient Portal, offered by [facility], by registering with the following website: [email or web address]

## 2019-07-26 NOTE — SWALLOW BEDSIDE ASSESSMENT ADULT - SWALLOW EVAL: DIAGNOSIS
Pt seen for re-evaluation of swallow. Pt presenting with change in status from previous exam on 7/17/19. Current cognitive status negatively impacting results of evaluation. Pt unable to maintain sufficient alertness throughout exam despite frequent re-direction to task. Pt now presents with an oropharyngeal dysphagia characterized reduced orientation to feeding, reduced awareness for food/liquid material in oral cavity, delay/effortful swallows and s/s suggestive of laryngeal penetration/aspiration on honey thick liquid.

## 2019-07-26 NOTE — PROGRESS NOTE ADULT - SUBJECTIVE AND OBJECTIVE BOX
PATIENT:  JIM PAREDES  [id number]    CHIEF COMPLAINT:  Patient is a 69y old  Male who presents with a chief complaint of NSTEMI (2019 00:14)      INTERVAL HISTORYOVERNIGHT EVENTS:      REVIEW OF SYSTEMS:    Constitutional:     [ ] negative [ ] fevers [ ] chills [ ] weight loss [ ] weight gain  HEENT:                  [ ] negative [ ] dry eyes [ ] eye irritation [ ] postnasal drip [ ] nasal congestion  CV:                         [ ] negative  [ ] chest pain [ ] orthopnea [ ] palpitations [ ] murmur  Resp:                     [ ] negative [ ] cough [ ] shortness of breath [ ] dyspnea [ ] wheezing [ ] sputum [ ] hemoptysis  GI:                          [ ] negative [ ] nausea [ ] vomiting [ ] diarrhea [ ] constipation [ ] abd pain [ ] dysphagia   :                        [ ] negative [ ] dysuria [ ] nocturia [ ] hematuria [ ] increased urinary frequency  Musculoskeletal: [ ] negative [ ] back pain [ ] myalgias [ ] arthralgias [ ] fracture  Skin:                       [ ] negative [ ] rash [ ] itch  Neurological:        [ ] negative [ ] headache [ ] dizziness [ ] syncope [ ] weakness [ ] numbness  Psychiatric:           [ ] negative [ ] anxiety [ ] depression  Endocrine:            [ ] negative [ ] diabetes [ ] thyroid problem  Heme/Lymph:      [ ] negative [ ] anemia [ ] bleeding problem  Allergic/Immune: [ ] negative [ ] itchy eyes [ ] nasal discharge [ ] hives [ ] angioedema    [ ] All other systems negative  [ ] Unable to assess ROS because ________.    MEDICATIONS:  MEDICATIONS  (STANDING):  aspirin enteric coated 81 milliGRAM(s) Oral daily  clopidogrel Tablet 75 milliGRAM(s) Oral daily  dextrose 5%. 1000 milliLiter(s) (50 mL/Hr) IV Continuous <Continuous>  dextrose 50% Injectable 12.5 Gram(s) IV Push once  dextrose 50% Injectable 25 Gram(s) IV Push once  dextrose 50% Injectable 25 Gram(s) IV Push once  heparin  Injectable 5000 Unit(s) SubCutaneous every 12 hours  insulin glargine Injectable (LANTUS) 6 Unit(s) SubCutaneous at bedtime  insulin lispro (HumaLOG) corrective regimen sliding scale   SubCutaneous three times a day before meals  insulin lispro (HumaLOG) corrective regimen sliding scale   SubCutaneous at bedtime  ipratropium 17 MICROgram(s) HFA Inhaler 1 Puff(s) Inhalation every 6 hours  nicotine -   7 mG/24Hr(s) Patch 1 patch Transdermal daily    MEDICATIONS  (PRN):  dextrose 40% Gel 15 Gram(s) Oral once PRN Blood Glucose LESS THAN 70 milliGRAM(s)/deciliter  glucagon  Injectable 1 milliGRAM(s) IntraMuscular once PRN Glucose LESS THAN 70 milligrams/deciliter  melatonin 3 milliGRAM(s) Oral at bedtime PRN Insomnia  oxyCODONE    IR 10 milliGRAM(s) Oral every 4 hours PRN Severe Pain (7 - 10)  oxyCODONE    IR 5 milliGRAM(s) Oral every 4 hours PRN Moderate Pain (4 - 6)      ALLERGIES:  Allergies    No Known Allergies    Intolerances        OBJECTIVE:  ICU Vital Signs Last 24 Hrs  T(C): 36.9 (2019 05:00), Max: 37.1 (2019 16:00)  T(F): 98.4 (2019 05:00), Max: 98.8 (2019 16:00)  HR: 86 (2019 06:06) (80 - 90)  BP: 113/68 (2019 06:00) (88/64 - 113/69)  BP(mean): 84 (2019 06:00) (62 - 84)  ABP: --  ABP(mean): --  RR: 19 (2019 06:00) (10 - 24)  SpO2: 97% (2019 06:06) (92% - 100%)      Adult Advanced Hemodynamics Last 24 Hrs  CVP(mm Hg): 1 (2019 16:00) (1 - 3)  CVP(cm H2O): --  CO: --  CI: --  PA: --  PA(mean): --  PCWP: --  SVR: --  SVRI: --  PVR: --  PVRI: --  CAPILLARY BLOOD GLUCOSE      POCT Blood Glucose.: 181 mg/dL (2019 22:15)  POCT Blood Glucose.: 221 mg/dL (2019 17:23)  POCT Blood Glucose.: 255 mg/dL (2019 12:27)  POCT Blood Glucose.: 228 mg/dL (2019 09:16)    CAPILLARY BLOOD GLUCOSE      POCT Blood Glucose.: 181 mg/dL (2019 22:15)    I&O's Summary    2019 07:01  -  2019 07:00  --------------------------------------------------------  IN: 722.5 mL / OUT: 1220 mL / NET: -497.5 mL      Daily     Daily Weight in k.9 (2019 05:00)    PHYSICAL EXAMINATION:  General: WN/WD NAD  HEENT: PERRLA, EOMI, moist mucous membranes  Neurology: A&Ox3, nonfocal, ALBERTO x 4  Respiratory: CTA B/L, normal respiratory effort, no wheezes, crackles, rales  CV: RRR, S1S2, no murmurs, rubs or gallops  Abdominal: Soft, NT, ND +BS, Last BM  Extremities: No edema, + peripheral pulses  Incisions:   Tubes:    LABS:  ABG - ( 2019 02:31 )  pH, Arterial: 7.57  pH, Blood: x     /  pCO2: 27    /  pO2: 134   / HCO3: 25    / Base Excess: 3.5   /  SaO2: 99                                      11.6   11.2  )-----------( 340      ( 2019 05:02 )             36.8     07-26    141  |  106  |  33<H>  ----------------------------<  176<H>  3.9   |  24  |  1.07    Ca    8.5      2019 05:02  Phos  2.4     -  Mg     2.2         TPro  6.4  /  Alb  2.3<L>  /  TBili  0.6  /  DBili  x   /  AST  346<H>  /  ALT  514<H>  /  AlkPhos  152<H>      LIVER FUNCTIONS - ( 2019 16:20 )  Alb: 2.3 g/dL / Pro: 6.4 g/dL / ALK PHOS: 152 U/L / ALT: 514 U/L / AST: 346 U/L / GGT: x           PT/INR - ( 2019 04:45 )   PT: 24.3 sec;   INR: 2.07 ratio         PTT - ( 2019 22:38 )  PTT:31.4 sec            TELEMETRY:     EKG:     IMAGING:

## 2019-07-26 NOTE — PROGRESS NOTE ADULT - SUBJECTIVE AND OBJECTIVE BOX
ACCEPT NOTE: [facility] MEDICINE      CC: Patient is a 69y old  Male who presents with a chief complaint of NSTEMI (2019 00:14)    69 year old male hx of HTN, T2DM, PVD initially presented to [facility] hospital for a Left popliteal bypass (based on documentation appears to have been performed as later documentation makes note of occluded graft), with hospital course c/b hypoxic respiratory failure 2/2 aspiration PNA (patient was intubated at [facility]) and transferred here for an NSTEMI and cardiogenic shock as a direct admit to the CCU. ECHO at OSH with EF Of 15% with focal hypokinesis. Patient was medically managed in the CCU with tentative plan for LHC once patient was more clinically stable. Hospital course on the floors was predominantly remarkable for agitation and patient had undergone L BKA on . In setting of procedure lasix was held. RRT was called on  for  unresponsiveness with dyspnea and wheezing appreciated. CXR remarkable for pulmonary edema and labs with an elevated lactate and elevated LFTS to suggest ischemic hepatitis without obvious cause given no evidence of hypotensive episodes. MICU was consulted and patient deemed to be a CCU candidate -- transferred for Acute decompensated HF. Lactate had resolved without acute intervention. Neurology was consulted for waxing and waning mental status with CT showing questionable evolving infarct at the level of the barbara. Neurology consulted with recommendation for MRI brain.             Allergies    No Known Allergies    Intolerances    	    PAST MEDICAL & SURGICAL HISTORY:  Hyperlipidemia  Hypertension  Diabetes  H/O mastoidectomy      FAMILY HISTORY:  FHx: diabetes mellitus: In all siblings(2 sisters and 4 brothers)      SOCIAL HISTORY:  1 PPD smoker for 30-40 years, has transitioned to a few cigarettes per day about 2 years ago. Pt has been drinking since he was a teenager; he drinks multiple beers per day, though the nephew was not able to quantify how many.     MEDICATIONS:  aspirin enteric coated 81 milliGRAM(s) Oral daily  clopidogrel Tablet 75 milliGRAM(s) Oral daily  heparin  Injectable 5000 Unit(s) SubCutaneous every 12 hours      ipratropium 17 MICROgram(s) HFA Inhaler 1 Puff(s) Inhalation every 6 hours    melatonin 3 milliGRAM(s) Oral at bedtime PRN  oxyCODONE    IR 10 milliGRAM(s) Oral every 4 hours PRN  oxyCODONE    IR 5 milliGRAM(s) Oral every 4 hours PRN      dextrose 40% Gel 15 Gram(s) Oral once PRN  dextrose 50% Injectable 12.5 Gram(s) IV Push once  dextrose 50% Injectable 25 Gram(s) IV Push once  dextrose 50% Injectable 25 Gram(s) IV Push once  glucagon  Injectable 1 milliGRAM(s) IntraMuscular once PRN  insulin glargine Injectable (LANTUS) 6 Unit(s) SubCutaneous at bedtime  insulin lispro (HumaLOG) corrective regimen sliding scale   SubCutaneous three times a day before meals  insulin lispro (HumaLOG) corrective regimen sliding scale   SubCutaneous at bedtime    dextrose 5%. 1000 milliLiter(s) IV Continuous <Continuous>      PHYSICAL EXAM:  T(C): 36.8 (19 @ 07:34), Max: 37.1 (19 @ 16:00)  HR: 84 (19 @ 07:34) (80 - 90)  BP: 110/69 (19 @ 07:34) (88/64 - 113/69)  RR: 20 (19 @ 07:34) (10 - 24)  SpO2: 98% (19 @ 07:34) (94% - 100%)  Wt(kg): --  Daily     Daily Weight in k.9 (2019 05:00)  I&O's Summary    2019 07:01  -  2019 07:00  --------------------------------------------------------  IN: 722.5 mL / OUT: 1220 mL / NET: -497.5 mL          Daily       Daily Weight in k.9 (2019 05:00)       Appearance: NAD, well-developed	  HEENT:   Normal oral mucosa, PERRL, EOMI	  Neck: No JVD, no LAD, supple, trachea in midline  Cardiovascular: Normal S1 S2, No JVD, No murmurs  Respiratory: Lungs clear to auscultation, no wheezing, rhonchi  Gastrointestinal:  Soft, Non-tender, nondistended, + BS  Skin: No rashes, No ecchymoses, No cyanosis	  MSK/Extremities: Normal range of motion, 5/5 Muscle strength bilaterally. No clubbing, cyanosis or edema  Vascular: Peripheral pulses palpable 2+ bilaterally  Neurologic: Non-focal, CN II-XII intact  Psychiatry: A & O x 3, Mood & affect appropriate    LABS:	 	                        11.6   11.2  )-----------( 340      ( 2019 05:02 )             36.8         141  |  106  |  33<H>  ----------------------------<  176<H>  3.9   |  24  |  1.07      141  |  105  |  39<H>  ----------------------------<  224<H>  3.9   |  25  |  1.25    Ca    8.5      2019 05:02  Ca    8.0<L>      2019 16:20  Phos  2.4       Phos  2.5       Mg     2.2       Mg     2.2         TPro  6.4  /  Alb  2.3<L>  /  TBili  0.6  /  DBili  x   /  AST  346<H>  /  ALT  514<H>  /  AlkPhos  152<H>    TPro  6.2  /  Alb  2.3<L>  /  TBili  0.7  /  DBili  0.2  /  AST  657<H>  /  ALT  588<H>  /  AlkPhos  153<H>          FS: CAPILLARY BLOOD GLUCOSE      POCT Blood Glucose.: 160 mg/dL (2019 08:53)  POCT Blood Glucose.: 181 mg/dL (2019 22:15)  POCT Blood Glucose.: 221 mg/dL (2019 17:23)  POCT Blood Glucose.: 255 mg/dL (2019 12:27)    BCX/UCX: .Blood  19   No growth to date.  --  --      .Blood  19   No growth to date.  --  --      .Sputum  19   Normal Respiratory Hayley present  --    Moderate polymorphonuclear leukocytes per low power field  No Squamous epithelial cells per low power field  No organisms seen per oil power field      .Blood  19   No growth at 5 days.  --  --        CT Head No Cont (19 @ 23:23) >    FINDINGS:    VENTRICLES AND SULCI: Age-appropriate involutional changes  INTRA-AXIAL: Microvascular ischemic changes involving the periventricular   and subcortical white matter. Small focal region of lucency in the   subcortical white matter on the left consistent with a small focal area   of ischemia.. Questionable small focal area of lucency in the brainstem   not clearly identified previously and may represent an evolving infarct   in this region (2, 11). Alternatively this may represent beam hardening   artifact  EXTRA-AXIAL:  No mass or collection is seen.  VISUALIZED SINUSES:  Clear.  VISUALIZED MASTOIDS:  Clear.  CALVARIUM:  Normal.  MISCELLANEOUS:  None.    IMPRESSION: Age-appropriate involutional change and microvascular   ischemic disease. Questionable evolving infarct at the level of the   brainstem specifically the barbara. Cannot entirely exclude evolving infarct   in this region though this may represent beam hardening artifact.   Correlation with MR recommended. No gross change compared with prior   2019.        CT Abdomen and Pelvis No Cont (19 @ 23:32) >    FINDINGS:    Evaluation of solid organs are limited without intravenous contrast. Exam   is also limited due to motion and streak artifacts.    LOWER CHEST: Moderate bilateral pleural effusions with passive   atelectasis.    LIVER: Within normal limits.  BILE DUCTS: Normal caliber.  GALLBLADDER: Within normal limits.  SPLEEN: Within normal limits.  PANCREAS: Within normal limits.  ADRENALS: Mild thickening of the left adrenal gland.  KIDNEYS/URETERS: Within normal limits.    BLADDER: Layering focal hyperdensity in the dependent portion is   suggestive of layering calculi.  REPRODUCTIVE ORGANS: Prostate is enlarged.    BOWEL: No bowel obstruction. Appendix is normal.  PERITONEUM: No ascites.  VESSELS: Mild atherosclerotic changes.  RETROPERITONEUM: No lymphadenopathy.    ABDOMINAL WALL: Within normal limits.  BONES: Degenerative changes with large protruding anterior osteophytes at   the level of L3-L4.    IMPRESSION:     Limited noncontrast study. No acute intra-abdominal or pelvic pathology.    Moderate bilateral pleural effusions. ACCEPT NOTE: [facility] MEDICINE      CC: Patient is a 69y old  Male who presents with a chief complaint of NSTEMI (2019 00:14)    69 year old male hx of HTN, T2DM, PVD initially presented to [facility] hospital for a Left popliteal bypass (based on documentation appears to have been performed as later documentation makes note of occluded graft), with hospital course c/b hypoxic respiratory failure 2/2 aspiration PNA (patient was intubated at [facility]) and transferred here for an NSTEMI and cardiogenic shock as a direct admit to the CCU. ECHO at OSH with EF Of 15% with focal hypokinesis. Patient was medically managed in the CCU with tentative plan for LHC once patient was more clinically stable. Hospital course on the floors was predominantly remarkable for agitation and patient had undergone L BKA on . In setting of procedure lasix was held. RRT was called on  for  unresponsiveness with dyspnea and wheezing appreciated. CXR remarkable for pulmonary edema and labs with an elevated lactate and elevated LFTS to suggest ischemic hepatitis without obvious cause given no evidence of hypotensive episodes. MICU was consulted and patient deemed to be a CCU candidate -- transferred for Acute decompensated HF. Lactate had resolved without acute intervention. Neurology was consulted for waxing and waning mental status with CT showing questionable evolving infarct at the level of the barbara. Neurology consulted with recommendation for MRI brain.             Allergies    No Known Allergies    Intolerances    	    PAST MEDICAL & SURGICAL HISTORY:  Hyperlipidemia  Hypertension  Diabetes  H/O mastoidectomy      FAMILY HISTORY:  FHx: diabetes mellitus: In all siblings(2 sisters and 4 brothers)      SOCIAL HISTORY:  1 PPD smoker for 30-40 years, has transitioned to a few cigarettes per day about 2 years ago. Pt has been drinking since he was a teenager; he drinks multiple beers per day, though the nephew was not able to quantify how many.     MEDICATIONS:  aspirin enteric coated 81 milliGRAM(s) Oral daily  clopidogrel Tablet 75 milliGRAM(s) Oral daily  heparin  Injectable 5000 Unit(s) SubCutaneous every 12 hours      ipratropium 17 MICROgram(s) HFA Inhaler 1 Puff(s) Inhalation every 6 hours    melatonin 3 milliGRAM(s) Oral at bedtime PRN  oxyCODONE    IR 10 milliGRAM(s) Oral every 4 hours PRN  oxyCODONE    IR 5 milliGRAM(s) Oral every 4 hours PRN      dextrose 40% Gel 15 Gram(s) Oral once PRN  dextrose 50% Injectable 12.5 Gram(s) IV Push once  dextrose 50% Injectable 25 Gram(s) IV Push once  dextrose 50% Injectable 25 Gram(s) IV Push once  glucagon  Injectable 1 milliGRAM(s) IntraMuscular once PRN  insulin glargine Injectable (LANTUS) 6 Unit(s) SubCutaneous at bedtime  insulin lispro (HumaLOG) corrective regimen sliding scale   SubCutaneous three times a day before meals  insulin lispro (HumaLOG) corrective regimen sliding scale   SubCutaneous at bedtime    dextrose 5%. 1000 milliLiter(s) IV Continuous <Continuous>      PHYSICAL EXAM:  T(C): 36.8 (19 @ 07:34), Max: 37.1 (19 @ 16:00)  HR: 84 (19 @ 07:34) (80 - 90)  BP: 110/69 (19 @ 07:34) (88/64 - 113/69)  RR: 20 (19 @ 07:34) (10 - 24)  SpO2: 98% (19 @ 07:34) (94% - 100%)  Wt(kg): --  Daily     Daily Weight in k.9 (2019 05:00)  I&O's Summary    2019 07:01  -  2019 07:00  --------------------------------------------------------  IN: 722.5 mL / OUT: 1220 mL / NET: -497.5 mL          Daily       Daily Weight in k.9 (2019 05:00)         Physical Exam:            LABS:	 	                        11.6   11.2  )-----------( 340      ( 2019 05:02 )             36.8         141  |  106  |  33<H>  ----------------------------<  176<H>  3.9   |  24  |  1.07      141  |  105  |  39<H>  ----------------------------<  224<H>  3.9   |  25  |  1.25    Ca    8.5      2019 05:02  Ca    8.0<L>      2019 16:20  Phos  2.4       Phos  2.5       Mg     2.2       Mg     2.2         TPro  6.4  /  Alb  2.3<L>  /  TBili  0.6  /  DBili  x   /  AST  346<H>  /  ALT  514<H>  /  AlkPhos  152<H>    TPro  6.2  /  Alb  2.3<L>  /  TBili  0.7  /  DBili  0.2  /  AST  657<H>  /  ALT  588<H>  /  AlkPhos  153<H>          FS: CAPILLARY BLOOD GLUCOSE      POCT Blood Glucose.: 160 mg/dL (2019 08:53)  POCT Blood Glucose.: 181 mg/dL (2019 22:15)  POCT Blood Glucose.: 221 mg/dL (2019 17:23)  POCT Blood Glucose.: 255 mg/dL (2019 12:27)    BCX/UCX: .Blood  19   No growth to date.  --  --      .Blood  19   No growth to date.  --  --      .Sputum  19   Normal Respiratory Hayley present  --    Moderate polymorphonuclear leukocytes per low power field  No Squamous epithelial cells per low power field  No organisms seen per oil power field      .Blood  19   No growth at 5 days.  --  --        CT Head No Cont (19 @ 23:23) >    FINDINGS:    VENTRICLES AND SULCI: Age-appropriate involutional changes  INTRA-AXIAL: Microvascular ischemic changes involving the periventricular   and subcortical white matter. Small focal region of lucency in the   subcortical white matter on the left consistent with a small focal area   of ischemia.. Questionable small focal area of lucency in the brainstem   not clearly identified previously and may represent an evolving infarct   in this region (2, 11). Alternatively this may represent beam hardening   artifact  EXTRA-AXIAL:  No mass or collection is seen.  VISUALIZED SINUSES:  Clear.  VISUALIZED MASTOIDS:  Clear.  CALVARIUM:  Normal.  MISCELLANEOUS:  None.    IMPRESSION: Age-appropriate involutional change and microvascular   ischemic disease. Questionable evolving infarct at the level of the   brainstem specifically the barbara. Cannot entirely exclude evolving infarct   in this region though this may represent beam hardening artifact.   Correlation with MR recommended. No gross change compared with prior   2019.        CT Abdomen and Pelvis No Cont (19 @ 23:32) >    FINDINGS:    Evaluation of solid organs are limited without intravenous contrast. Exam   is also limited due to motion and streak artifacts.    LOWER CHEST: Moderate bilateral pleural effusions with passive   atelectasis.    LIVER: Within normal limits.  BILE DUCTS: Normal caliber.  GALLBLADDER: Within normal limits.  SPLEEN: Within normal limits.  PANCREAS: Within normal limits.  ADRENALS: Mild thickening of the left adrenal gland.  KIDNEYS/URETERS: Within normal limits.    BLADDER: Layering focal hyperdensity in the dependent portion is   suggestive of layering calculi.  REPRODUCTIVE ORGANS: Prostate is enlarged.    BOWEL: No bowel obstruction. Appendix is normal.  PERITONEUM: No ascites.  VESSELS: Mild atherosclerotic changes.  RETROPERITONEUM: No lymphadenopathy.    ABDOMINAL WALL: Within normal limits.  BONES: Degenerative changes with large protruding anterior osteophytes at   the level of L3-L4.    IMPRESSION:     Limited noncontrast study. No acute intra-abdominal or pelvic pathology.    Moderate bilateral pleural effusions. ACCEPT NOTE: [facility] MEDICINE      CC: Patient is a 69y old  Male who presents with a chief complaint of NSTEMI (2019 00:14)    69 year old male hx of HTN, T2DM, PVD initially presented to [facility] hospital for a Left popliteal bypass (based on documentation appears to have been performed as later documentation makes note of occluded graft), with hospital course c/b hypoxic respiratory failure 2/2 aspiration PNA (patient was intubated at [facility]) and transferred here for an NSTEMI and cardiogenic shock as a direct admit to the CCU. ECHO at OSH with EF Of 15% with focal hypokinesis. Patient was medically managed in the CCU with tentative plan for LHC once patient was more clinically stable. Hospital course on the floors was predominantly remarkable for agitation and patient had undergone L BKA on . In setting of procedure lasix was held. RRT was called on  for  unresponsiveness with dyspnea and wheezing appreciated. CXR remarkable for pulmonary edema and labs with an elevated lactate and elevated LFTS to suggest ischemic hepatitis without obvious cause given no evidence of hypotensive episodes. MICU was consulted and patient deemed to be a CCU candidate -- transferred for Acute decompensated HF. Lactate had resolved without acute intervention. Neurology was consulted for waxing and waning mental status with CT showing questionable evolving infarct at the level of the barbara. Neurology consulted with recommendation for MRI brain.               REVIEW OF SYSTEM:    Constitutional: No fever, chills, fatigue  Neuro: No headache, numbness, weakness  Resp: No cough, wheezing, shortness of breath  CVS: No chest pain, palpitations, leg swelling  GI: No abdominal pain, nausea, vomiting, diarrhea   : No dysuria, frequency, incontinence  Skin: No itching, burning, rashes, or lesions   Msk: No joint pain or swelling  Endo: No hot or cold intolerance  Psych: No depression, anxiety, mood swings            Allergies    No Known Allergies    Intolerances    	    PAST MEDICAL & SURGICAL HISTORY:  Hyperlipidemia  Hypertension  Diabetes  H/O mastoidectomy      FAMILY HISTORY:  FHx: diabetes mellitus: In all siblings(2 sisters and 4 brothers)      SOCIAL HISTORY:  1 PPD smoker for 30-40 years, has transitioned to a few cigarettes per day about 2 years ago. Pt has been drinking since he was a teenager; he drinks multiple beers per day, though the nephew was not able to quantify how many.     MEDICATIONS:  aspirin enteric coated 81 milliGRAM(s) Oral daily  clopidogrel Tablet 75 milliGRAM(s) Oral daily  heparin  Injectable 5000 Unit(s) SubCutaneous every 12 hours      ipratropium 17 MICROgram(s) HFA Inhaler 1 Puff(s) Inhalation every 6 hours    melatonin 3 milliGRAM(s) Oral at bedtime PRN  oxyCODONE    IR 10 milliGRAM(s) Oral every 4 hours PRN  oxyCODONE    IR 5 milliGRAM(s) Oral every 4 hours PRN      dextrose 40% Gel 15 Gram(s) Oral once PRN  dextrose 50% Injectable 12.5 Gram(s) IV Push once  dextrose 50% Injectable 25 Gram(s) IV Push once  dextrose 50% Injectable 25 Gram(s) IV Push once  glucagon  Injectable 1 milliGRAM(s) IntraMuscular once PRN  insulin glargine Injectable (LANTUS) 6 Unit(s) SubCutaneous at bedtime  insulin lispro (HumaLOG) corrective regimen sliding scale   SubCutaneous three times a day before meals  insulin lispro (HumaLOG) corrective regimen sliding scale   SubCutaneous at bedtime    dextrose 5%. 1000 milliLiter(s) IV Continuous <Continuous>      PHYSICAL EXAM:  T(C): 36.8 (19 @ 07:34), Max: 37.1 (19 @ 16:00)  HR: 84 (19 @ 07:34) (80 - 90)  BP: 110/69 (19 @ 07:34) (88/64 - 113/69)  RR: 20 (19 @ 07:34) (10 - 24)  SpO2: 98% (19 @ 07:34) (94% - 100%)  Wt(kg): --  Daily     Daily Weight in k.9 (2019 05:00)  I&O's Summary    2019 07:01  -  2019 07:00  --------------------------------------------------------  IN: 722.5 mL / OUT: 1220 mL / NET: -497.5 mL          Daily       Daily Weight in k.9 (2019 05:00)         Physical Exam:  Vital Signs Last 24 Hrs  T(C): 36.8 (2019 07:34), Max: 37.1 (2019 16:00)  T(F): 98.2 (2019 07:34), Max: 98.8 (2019 16:00)  HR: 84 (2019 07:34) (80 - 90)  BP: 110/69 (2019 07:34) (90/54 - 113/69)  BP(mean): 84 (2019 06:00) (66 - 84)  RR: 20 (2019 07:34) (10 - 24)  SpO2: 98% (2019 07:34) (94% - 100%)    GENERAL: elderly appearing male, no acute distress, falling asleep during exam  HEAD:  Atraumatic, Normocephalic  EYES: EOMI, PERRLA, conjunctiva and sclera clear  Mouth: MMM, no lesions  NECK: Supple, no appreciable masses; s/p RIJ removal - site c/d/i.   Lung: normal work of breathing, poor inspiratory effort, lungs appear to be clear anteriorly and posteriorly without notable crackles however very poor inspiratory effot  Chest: S1&S2+, rrr, no murmurs appreciated  ABDOMEN: bs+, soft, nt, nd, no appreciable masses  : No south catheter, no CVA tenderness  EXTREMITIES:  radial pulse present RLE no pitting edema RLEE; +Left BKA wrapped with ace bandage and in brace  Neuro: A&Ox1-2 (knows name and facility), moves all four extremities; not cooperating with motor strength exam unable to fully assess   SKIN: warm and dry, no visible rashes          LABS:	 	                        11.6   11.2  )-----------( 340      ( 2019 05:02 )             36.8     07-26    141  |  106  |  33<H>  ----------------------------<  176<H>  3.9   |  24  |  1.07  07-25    141  |  105  |  39<H>  ----------------------------<  224<H>  3.9   |  25  |  1.25    Ca    8.5      2019 05:02  Ca    8.0<L>      2019 16:20  Phos  2.4       Phos  2.5       Mg     2.2       Mg     2.2         TPro  6.4  /  Alb  2.3<L>  /  TBili  0.6  /  DBili  x   /  AST  346<H>  /  ALT  514<H>  /  AlkPhos  152<H>    TPro  6.2  /  Alb  2.3<L>  /  TBili  0.7  /  DBili  0.2  /  AST  657<H>  /  ALT  588<H>  /  AlkPhos  153<H>          FS: CAPILLARY BLOOD GLUCOSE      POCT Blood Glucose.: 160 mg/dL (2019 08:53)  POCT Blood Glucose.: 181 mg/dL (2019 22:15)  POCT Blood Glucose.: 221 mg/dL (2019 17:23)  POCT Blood Glucose.: 255 mg/dL (2019 12:27)    BCX/UCX: .Blood  19   No growth to date.  --  --      .Blood  19   No growth to date.  --  --      .Sputum  19   Normal Respiratory Hayley present  --    Moderate polymorphonuclear leukocytes per low power field  No Squamous epithelial cells per low power field  No organisms seen per oil power field      .Blood  19   No growth at 5 days.  --  --        CT Head No Cont (19 @ 23:23) >    FINDINGS:    VENTRICLES AND SULCI: Age-appropriate involutional changes  INTRA-AXIAL: Microvascular ischemic changes involving the periventricular   and subcortical white matter. Small focal region of lucency in the   subcortical white matter on the left consistent with a small focal area   of ischemia.. Questionable small focal area of lucency in the brainstem   not clearly identified previously and may represent an evolving infarct   in this region (2, 11). Alternatively this may represent beam hardening   artifact  EXTRA-AXIAL:  No mass or collection is seen.  VISUALIZED SINUSES:  Clear.  VISUALIZED MASTOIDS:  Clear.  CALVARIUM:  Normal.  MISCELLANEOUS:  None.    IMPRESSION: Age-appropriate involutional change and microvascular   ischemic disease. Questionable evolving infarct at the level of the   brainstem specifically the barbara. Cannot entirely exclude evolving infarct   in this region though this may represent beam hardening artifact.   Correlation with MR recommended. No gross change compared with prior   2019.        CT Abdomen and Pelvis No Cont (19 @ 23:32) >    FINDINGS:    Evaluation of solid organs are limited without intravenous contrast. Exam   is also limited due to motion and streak artifacts.    LOWER CHEST: Moderate bilateral pleural effusions with passive   atelectasis.    LIVER: Within normal limits.  BILE DUCTS: Normal caliber.  GALLBLADDER: Within normal limits.  SPLEEN: Within normal limits.  PANCREAS: Within normal limits.  ADRENALS: Mild thickening of the left adrenal gland.  KIDNEYS/URETERS: Within normal limits.    BLADDER: Layering focal hyperdensity in the dependent portion is   suggestive of layering calculi.  REPRODUCTIVE ORGANS: Prostate is enlarged.    BOWEL: No bowel obstruction. Appendix is normal.  PERITONEUM: No ascites.  VESSELS: Mild atherosclerotic changes.  RETROPERITONEUM: No lymphadenopathy.    ABDOMINAL WALL: Within normal limits.  BONES: Degenerative changes with large protruding anterior osteophytes at   the level of L3-L4.    IMPRESSION:     Limited noncontrast study. No acute intra-abdominal or pelvic pathology.    Moderate bilateral pleural effusions. ACCEPT NOTE: [facility] MEDICINE      CC: Patient is a 69y old  Male who presents with a chief complaint of NSTEMI (2019 00:14)    69 year old male hx of HTN, T2DM, PVD initially presented to [facility] hospital for a Left popliteal bypass (based on documentation appears to have been performed as later documentation makes note of occluded graft), with hospital course c/b hypoxic respiratory failure 2/2 aspiration PNA (patient was intubated at [facility]) and transferred here for an NSTEMI and cardiogenic shock as a direct admit to the CCU. ECHO at OSH with EF Of 15% with focal hypokinesis. Patient was medically managed in the CCU with tentative plan for LHC once patient was more clinically stable. Hospital course on the floors was predominantly remarkable for agitation and patient had undergone L BKA on . In setting of procedure lasix was held. RRT was called on  for  unresponsiveness with dyspnea and wheezing appreciated. CXR remarkable for pulmonary edema and labs with an elevated lactate and elevated LFTS to suggest ischemic hepatitis without obvious cause given no evidence of hypotensive episodes. MICU was consulted and patient deemed to be a CCU candidate -- transferred for Acute decompensated HF. Lactate had resolved without acute intervention. Neurology was consulted for waxing and waning mental status with CT showing questionable evolving infarct at the level of the barbara. Neurology consulted with recommendation for MRI brain.               REVIEW OF SYSTEM:  Unable to assess review of systems at this time due to patient with AMS likely 2/2 CVA       Allergies    No Known Allergies    Intolerances    	    PAST MEDICAL & SURGICAL HISTORY:  Hyperlipidemia  Hypertension  Diabetes  H/O mastoidectomy  BKA, left      FAMILY HISTORY:  FHx: diabetes mellitus: In all siblings(2 sisters and 4 brothers)      SOCIAL HISTORY:  1 PPD smoker for 30-40 years, has transitioned to a few cigarettes per day about 2 years ago. Pt has been drinking since he was a teenager; he drinks multiple beers per day, though the nephew was not able to quantify how many.     MEDICATIONS:  aspirin enteric coated 81 milliGRAM(s) Oral daily  clopidogrel Tablet 75 milliGRAM(s) Oral daily  heparin  Injectable 5000 Unit(s) SubCutaneous every 12 hours      ipratropium 17 MICROgram(s) HFA Inhaler 1 Puff(s) Inhalation every 6 hours    melatonin 3 milliGRAM(s) Oral at bedtime PRN  oxyCODONE    IR 10 milliGRAM(s) Oral every 4 hours PRN  oxyCODONE    IR 5 milliGRAM(s) Oral every 4 hours PRN      dextrose 40% Gel 15 Gram(s) Oral once PRN  dextrose 50% Injectable 12.5 Gram(s) IV Push once  dextrose 50% Injectable 25 Gram(s) IV Push once  dextrose 50% Injectable 25 Gram(s) IV Push once  glucagon  Injectable 1 milliGRAM(s) IntraMuscular once PRN  insulin glargine Injectable (LANTUS) 6 Unit(s) SubCutaneous at bedtime  insulin lispro (HumaLOG) corrective regimen sliding scale   SubCutaneous three times a day before meals  insulin lispro (HumaLOG) corrective regimen sliding scale   SubCutaneous at bedtime    dextrose 5%. 1000 milliLiter(s) IV Continuous <Continuous>      PHYSICAL EXAM:  T(C): 36.8 (19 @ 07:34), Max: 37.1 (19 @ 16:00)  HR: 84 (19 @ 07:34) (80 - 90)  BP: 110/69 (19 @ 07:34) (88/64 - 113/69)  RR: 20 (19 @ 07:34) (10 - 24)  SpO2: 98% (19 @ 07:34) (94% - 100%)  Wt(kg): --  Daily     Daily Weight in k.9 (2019 05:00)  I&O's Summary    2019 07:01  -  2019 07:00  --------------------------------------------------------  IN: 722.5 mL / OUT: 1220 mL / NET: -497.5 mL          Daily       Daily Weight in k.9 (2019 05:00)         Physical Exam:    GENERAL: elderly appearing male, no acute distress, falling asleep during exam  HEAD:  Atraumatic, Normocephalic  EYES: EOMI grossly, unable to fully assess PERRLA, conjunctiva and sclera clear  Mouth: MMM, no lesions  NECK: Supple, no appreciable masses; s/p RIJ removal - site c/d/i. no hematoma.   Lung: normal work of breathing, poor inspiratory effort, lungs appear to be clear anteriorly and posteriorly without notable crackles however very poor inspiratory effort  Chest: S1&S2+, rrr, no murmurs appreciated  ABDOMEN: bs+, soft, nt, nd, no appreciable masses  : No south catheter, no CVA tenderness  EXTREMITIES:  radial pulse present RLE no pitting edema RLE; +Left BKA wrapped with ace bandage and in brace  Neuro: A&Ox1 moves all four extremities; not cooperating with motor strength exam unable to fully assess   SKIN: warm and dry, no visible rashes          LABS:	 	                        11.6   11.2  )-----------( 340      ( 2019 05:02 )             36.8     -    141  |  106  |  33<H>  ----------------------------<  176<H>  3.9   |  24  |  1.07      141  |  105  |  39<H>  ----------------------------<  224<H>  3.9   |  25  |  1.25    Ca    8.5      2019 05:02  Ca    8.0<L>      2019 16:20  Phos  2.4       Phos  2.5       Mg     2.2       Mg     2.2         TPro  6.4  /  Alb  2.3<L>  /  TBili  0.6  /  DBili  x   /  AST  346<H>  /  ALT  514<H>  /  AlkPhos  152<H>    TPro  6.2  /  Alb  2.3<L>  /  TBili  0.7  /  DBili  0.2  /  AST  657<H>  /  ALT  588<H>  /  AlkPhos  153<H>          FS: CAPILLARY BLOOD GLUCOSE      POCT Blood Glucose.: 160 mg/dL (2019 08:53)  POCT Blood Glucose.: 181 mg/dL (2019 22:15)  POCT Blood Glucose.: 221 mg/dL (2019 17:23)  POCT Blood Glucose.: 255 mg/dL (2019 12:27)    BCX/UCX: .Blood  19   No growth to date.  --  --      .Blood  19   No growth to date.  --  --      .Sputum  19   Normal Respiratory Hayley present  --    Moderate polymorphonuclear leukocytes per low power field  No Squamous epithelial cells per low power field  No organisms seen per oil power field      .Blood  19   No growth at 5 days.  --  --        CT Head No Cont (19 @ 23:23) >    FINDINGS:    VENTRICLES AND SULCI: Age-appropriate involutional changes  INTRA-AXIAL: Microvascular ischemic changes involving the periventricular   and subcortical white matter. Small focal region of lucency in the   subcortical white matter on the left consistent with a small focal area   of ischemia.. Questionable small focal area of lucency in the brainstem   not clearly identified previously and may represent an evolving infarct   in this region (2, 11). Alternatively this may represent beam hardening   artifact  EXTRA-AXIAL:  No mass or collection is seen.  VISUALIZED SINUSES:  Clear.  VISUALIZED MASTOIDS:  Clear.  CALVARIUM:  Normal.  MISCELLANEOUS:  None.    IMPRESSION: Age-appropriate involutional change and microvascular   ischemic disease. Questionable evolving infarct at the level of the   brainstem specifically the barbara. Cannot entirely exclude evolving infarct   in this region though this may represent beam hardening artifact.   Correlation with MR recommended. No gross change compared with prior   2019.        CT Abdomen and Pelvis No Cont (19 @ 23:32) >    FINDINGS:    Evaluation of solid organs are limited without intravenous contrast. Exam   is also limited due to motion and streak artifacts.    LOWER CHEST: Moderate bilateral pleural effusions with passive   atelectasis.    LIVER: Within normal limits.  BILE DUCTS: Normal caliber.  GALLBLADDER: Within normal limits.  SPLEEN: Within normal limits.  PANCREAS: Within normal limits.  ADRENALS: Mild thickening of the left adrenal gland.  KIDNEYS/URETERS: Within normal limits.    BLADDER: Layering focal hyperdensity in the dependent portion is   suggestive of layering calculi.  REPRODUCTIVE ORGANS: Prostate is enlarged.    BOWEL: No bowel obstruction. Appendix is normal.  PERITONEUM: No ascites.  VESSELS: Mild atherosclerotic changes.  RETROPERITONEUM: No lymphadenopathy.    ABDOMINAL WALL: Within normal limits.  BONES: Degenerative changes with large protruding anterior osteophytes at   the level of L3-L4.    IMPRESSION:     Limited noncontrast study. No acute intra-abdominal or pelvic pathology.    Moderate bilateral pleural effusions.

## 2019-07-26 NOTE — CHART NOTE - NSCHARTNOTEFT_GEN_A_CORE
CCU Transfer Note    Transfer from: CCU    Transfer to: (  ) Medicine    (  ) Telemetry    (  ) RCU                               (  ) Palliative    (  ) Stroke Unit    (  ) MICU    (  ) __________________    Accepting Physician:    Signout given to:     HPI / CCU COURSE:    68 y/o M with a PMH of HTN, DM, PAD who is transferred to [facility] for management of cardiogenic shock and NSTEMI. As per sign out, the pt was admitted for L popliteal bypass to [facility]. During his time there, he was refusing medications  and was found to be progressively more dyspneic. He had an echo done there which showed that he has an EF of <15% with global hypokinesis; he received 20mg IV lasix for fluid overload. His respiratory status continued to worsen and the pt was intubated for airway protection. The pt vomited during this time and there was a concern for aspiration. The pt did not complain of CP during the time prior to intubation. As per nephew, the pt has not been complaining of CP, SOB prior to admission to the hospital and has been able to carry out his ADLs.   Initially transferred from CCU to medical floors, pt's HC largely unremarkable but was notable to continued disorientation and periodic agitation. He was awaiting consent for BKA, which was finally done on 7/22. Post-op course was largely unremarkable, however pt noted to have incr WOB and wheezing last night, and suddenly developed AMS and RRT was called. W/u at that time remarkable for pulm edema on CXR and pt was given furosemide IV. Subsequent labs notable for elev lactate and liver tests cardiogenic shock in the setting of transaminitis possible shock liver, elevated lactate, transferred to the CCU for further management. Now with resolution of shock state; lactate cleared transaminitis resolving, CTH with possible brainstem/pontine ischemic infarct; MRI pending.     Vital Signs Last 24 Hrs  T(C): 36.7 (25 Jul 2019 22:00), Max: 37.1 (25 Jul 2019 16:00)  T(F): 98 (25 Jul 2019 22:00), Max: 98.8 (25 Jul 2019 16:00)  HR: 88 (26 Jul 2019 04:00) (80 - 90)  BP: 112/63 (26 Jul 2019 04:00) (84/54 - 113/69)  BP(mean): 74 (26 Jul 2019 04:00) (62 - 84)  RR: 11 (26 Jul 2019 04:00) (10 - 24)  SpO2: 87% (26 Jul 2019 04:00) (87% - 100%)    I&O's Summary    24 Jul 2019 07:01  -  25 Jul 2019 07:00  --------------------------------------------------------  IN: 650 mL / OUT: 485 mL / NET: 165 mL    25 Jul 2019 07:01  -  26 Jul 2019 04:58  --------------------------------------------------------  IN: 420 mL / OUT: 1220 mL / NET: -800 mL    Physical Exam:     Vital Signs Last 24 Hrs  T(C): 36.7 (25 Jul 2019 22:00), Max: 37.1 (25 Jul 2019 16:00)  T(F): 98 (25 Jul 2019 22:00), Max: 98.8 (25 Jul 2019 16:00)  HR: 88 (26 Jul 2019 04:00) (80 - 90)  BP: 112/63 (26 Jul 2019 04:00) (84/54 - 113/69)  BP(mean): 74 (26 Jul 2019 04:00) (62 - 84)  RR: 11 (26 Jul 2019 04:00) (10 - 24)  SpO2: 87% (26 Jul 2019 04:00) (87% - 100%)  I&O's Summary    24 Jul 2019 07:01  -  25 Jul 2019 07:00  --------------------------------------------------------  IN: 650 mL / OUT: 485 mL / NET: 165 mL    25 Jul 2019 07:01  -  26 Jul 2019 04:59  --------------------------------------------------------  IN: 420 mL / OUT: 1220 mL / NET: -800 mL    PHYSICAL EXAM:  GENERAL: NAD, lethargic but arousable   HEAD:  Atraumatic, Normocephalic  EYES: EOMI, conjunctiva and sclera clear  ENMT: No tonsillar erythema, exudates, or enlargement; dry mucous membranes, poor dentition  NECK: Supple, No JVD  NERVOUS SYSTEM: does not respond to questioning, but alert 5/5 muscle strength b/l UE, sensation grossly intact   PSYCHIATRIC: Appropriate affect and mood  CHEST/LUNG: Clear to auscultation bilaterally; No rales, rhonchi, wheezing, or rubs  HEART: Regular rate and rhythm; No murmurs, rubs, or gallops. No LE edema  ABDOMEN: Soft, Nontender, Nondistended; Bowel sounds present  EXTREMITIES:  2+ Peripheral Pulses, No clubbing, cyanosis  SKIN: No rashes or lesions    LABS:                         11.0   12.0  )-----------( 326      ( 25 Jul 2019 04:45 )             33.9       07-25    141  |  105  |  39<H>  ----------------------------<  224<H>  3.9   |  25  |  1.25    Ca    8.0<L>      25 Jul 2019 16:20  Phos  2.5     07-25  Mg     2.2     07-25    TPro  6.4  /  Alb  2.3<L>  /  TBili  0.6  /  DBili  x   /  AST  346<H>  /  ALT  514<H>  /  AlkPhos  152<H>  07-25  PT/INR - ( 25 Jul 2019 04:45 )   PT: 24.3 sec;   INR: 2.07 ratio      PTT - ( 24 Jul 2019 22:38 )  PTT:31.4 sec  ABG - ( 25 Jul 2019 02:31 )  pH, Arterial: 7.57  pH, Blood: x     /  pCO2: 27    /  pO2: 134   / HCO3: 25    / Base Excess: 3.5   /  SaO2: 99        Telemetry: NSR, PVCs     Imaging:  < from: CT Head No Cont (07.24.19 @ 23:23) >    IMPRESSION: Age-appropriate involutional change and microvascular   ischemic disease. Questionable evolving infarct at the level of the   brainstem specifically the barbara. Cannot entirely exclude evolving infarct   in this region though this may represent beam hardening artifact.   Correlation with MR recommended. No gross change compared with prior   7/24/2019.    < end of copied text >    < from: CT Abdomen and Pelvis No Cont (07.24.19 @ 23:32) >    IMPRESSION:     Limited noncontrast study. No acute intra-abdominal or pelvic pathology.    Moderate bilateral pleural effusions.    < end of copied text >    ASSESSMENT & PLAN:   A/P:  #Neuro   - per chart review intermittent agitation requiring haldol/seroquel but held due to prolonged QTc   - CTH: Age-appropriate involutional change and microvascular ischemic disease. Questionable evolving infarct at the level of the brainstem specifically the barbara. Cannot entirely exclude evolving infarct in this region though this may represent beam hardening artifact.   Correlation with MR pending.   - neuro following     #Pulm   - Concern for asp pna/pneumonitis, does not have a septic picture at this point  follow blood Cx/sputum Cx, observing off abx   - Stable on 2L NC, protecting airway    #Cardiac   1. Acute decompensated HF  - JVD and rales appreciate on PE   - CXR with pulm edema during RRT, initially dry on exam, received hydration   - TTE with EF 20-25%, moderately dilated left atrium, increased left atrial pressures, moderately dilated LV, global hypokinesis    2. NSTEMI   Medically managed, TTE described as above  - Acute rise in HsTrop likely 2/2 demand ischemia rather than plaque rupture, neg delta, will defer trending troponin   - LHC deferred due to concern for medication non compliance   - c/w DAPT: ASA, Plavix  - Statin held in setting of transaminitis, will consider starting tomorrow  - BB held due to concern for ADHF     3. PAD s/p L BKA plan   - [facility] duplex of b/l LE: 07/06: R popliteal artery with severe flow-limiting stenosis, L pop and bypass graft are occluded   - f/u vascular surg for futher recs     #GI Transaminitis w/elevated lactate concern for shock liver   - LFTs currently downtrending   - Trend LFTs   - f/u CT A/P r/o ischemia: No acute intra-abdominal or pelvic pathology. Moderate bilateral pleural effusions.     #Renal VINAY on CKD, likely ATN in the setting of cardiogenic shock- improving   - Trend sCR   - monitor I/Os, electrolytes  - lisinopril held in setting of VINAY    - Avoid nephrotoxins    #ID  - Plueral effusion vs RLL infiltrate seen on initial CXR  - CXR post CCU admission with clear lungs  - Initially with leukocytosis concern for septic etiology now resolved   - Afebrile, initially with lactate of 7/leukocytosis, that have both now resolved   - dose of Vanc/Zosyn given yesterday, observing off abx  - f/u sepsis workup including: Bcx/sputum cx NGTD     #Endo DM, A1C 10  - On lantus 6u, was on lantus 20 units prior to BKA, lowered due to concern of poor PO intake  - Will continued FS and increase Lantus and s/s coverage prn     #Heme: Anemia, no overt source of bleeding  - Trend CBC    FOR FOLLOW UP:  [ ] AFP, GI recs   [ ] MRI brain, Neuro recs   [ ] Trend LFTs CCU Transfer Note    Transfer from: CCU    Transfer to: (  ) Medicine    ( x ) Telemetry    (  ) RCU                               (  ) Palliative    (  ) Stroke Unit    (  ) MICU    (  ) __________________    Accepting Physician:    Signout given to:     HPI / CCU COURSE:    70 y/o M with a PMH of HTN, DM, PAD who is transferred to [facility] for management of cardiogenic shock and NSTEMI. As per sign out, the pt was admitted for L popliteal bypass to [facility]. During his time there, he was refusing medications  and was found to be progressively more dyspneic. He had an echo done there which showed that he has an EF of <15% with global hypokinesis; he received 20mg IV lasix for fluid overload. His respiratory status continued to worsen and the pt was intubated for airway protection. The pt vomited during this time and there was a concern for aspiration. The pt did not complain of CP during the time prior to intubation. As per nephew, the pt has not been complaining of CP, SOB prior to admission to the hospital and has been able to carry out his ADLs.   Initially transferred from CCU to medical floors, pt's HC largely unremarkable but was notable to continued disorientation and periodic agitation. He was awaiting consent for BKA, which was finally done on 7/22. Post-op course was largely unremarkable, however pt noted to have incr WOB and wheezing last night, and suddenly developed AMS and RRT was called. W/u at that time remarkable for pulm edema on CXR and pt was given furosemide IV. Subsequent labs notable for elev lactate and liver tests cardiogenic shock in the setting of transaminitis possible shock liver, elevated lactate, transferred to the CCU for further management. Now with resolution of shock state; lactate cleared transaminitis resolving, CTH with possible brainstem/pontine ischemic infarct; MRI pending.     Vital Signs Last 24 Hrs  T(C): 36.7 (25 Jul 2019 22:00), Max: 37.1 (25 Jul 2019 16:00)  T(F): 98 (25 Jul 2019 22:00), Max: 98.8 (25 Jul 2019 16:00)  HR: 88 (26 Jul 2019 04:00) (80 - 90)  BP: 112/63 (26 Jul 2019 04:00) (84/54 - 113/69)  BP(mean): 74 (26 Jul 2019 04:00) (62 - 84)  RR: 11 (26 Jul 2019 04:00) (10 - 24)  SpO2: 87% (26 Jul 2019 04:00) (87% - 100%)    I&O's Summary    24 Jul 2019 07:01  -  25 Jul 2019 07:00  --------------------------------------------------------  IN: 650 mL / OUT: 485 mL / NET: 165 mL    25 Jul 2019 07:01  -  26 Jul 2019 04:58  --------------------------------------------------------  IN: 420 mL / OUT: 1220 mL / NET: -800 mL    Physical Exam:     Vital Signs Last 24 Hrs  T(C): 36.7 (25 Jul 2019 22:00), Max: 37.1 (25 Jul 2019 16:00)  T(F): 98 (25 Jul 2019 22:00), Max: 98.8 (25 Jul 2019 16:00)  HR: 88 (26 Jul 2019 04:00) (80 - 90)  BP: 112/63 (26 Jul 2019 04:00) (84/54 - 113/69)  BP(mean): 74 (26 Jul 2019 04:00) (62 - 84)  RR: 11 (26 Jul 2019 04:00) (10 - 24)  SpO2: 87% (26 Jul 2019 04:00) (87% - 100%)  I&O's Summary    24 Jul 2019 07:01  -  25 Jul 2019 07:00  --------------------------------------------------------  IN: 650 mL / OUT: 485 mL / NET: 165 mL    25 Jul 2019 07:01  -  26 Jul 2019 04:59  --------------------------------------------------------  IN: 420 mL / OUT: 1220 mL / NET: -800 mL    PHYSICAL EXAM:  GENERAL: NAD, lethargic but arousable   HEAD:  Atraumatic, Normocephalic  EYES: EOMI, conjunctiva and sclera clear  ENMT: No tonsillar erythema, exudates, or enlargement; dry mucous membranes, poor dentition  NECK: Supple, No JVD  NERVOUS SYSTEM: does not respond to questioning, but alert 5/5 muscle strength b/l UE, sensation grossly intact   PSYCHIATRIC: Appropriate affect and mood  CHEST/LUNG: Clear to auscultation bilaterally; No rales, rhonchi, wheezing, or rubs  HEART: Regular rate and rhythm; No murmurs, rubs, or gallops. No LE edema  ABDOMEN: Soft, Nontender, Nondistended; Bowel sounds present  EXTREMITIES:  2+ Peripheral Pulses, No clubbing, cyanosis  SKIN: No rashes or lesions    LABS:                         11.0   12.0  )-----------( 326      ( 25 Jul 2019 04:45 )             33.9       07-25    141  |  105  |  39<H>  ----------------------------<  224<H>  3.9   |  25  |  1.25    Ca    8.0<L>      25 Jul 2019 16:20  Phos  2.5     07-25  Mg     2.2     07-25    TPro  6.4  /  Alb  2.3<L>  /  TBili  0.6  /  DBili  x   /  AST  346<H>  /  ALT  514<H>  /  AlkPhos  152<H>  07-25  PT/INR - ( 25 Jul 2019 04:45 )   PT: 24.3 sec;   INR: 2.07 ratio      PTT - ( 24 Jul 2019 22:38 )  PTT:31.4 sec  ABG - ( 25 Jul 2019 02:31 )  pH, Arterial: 7.57  pH, Blood: x     /  pCO2: 27    /  pO2: 134   / HCO3: 25    / Base Excess: 3.5   /  SaO2: 99        Telemetry: NSR, PVCs     Imaging:  < from: CT Head No Cont (07.24.19 @ 23:23) >    IMPRESSION: Age-appropriate involutional change and microvascular   ischemic disease. Questionable evolving infarct at the level of the   brainstem specifically the barbara. Cannot entirely exclude evolving infarct   in this region though this may represent beam hardening artifact.   Correlation with MR recommended. No gross change compared with prior   7/24/2019.    < end of copied text >    < from: CT Abdomen and Pelvis No Cont (07.24.19 @ 23:32) >    IMPRESSION:     Limited noncontrast study. No acute intra-abdominal or pelvic pathology.    Moderate bilateral pleural effusions.    < end of copied text >    ASSESSMENT & PLAN:   A/P:  #Neuro   - per chart review intermittent agitation requiring haldol/seroquel but held due to prolonged QTc   - CTH: Age-appropriate involutional change and microvascular ischemic disease. Questionable evolving infarct at the level of the brainstem specifically the barbara. Cannot entirely exclude evolving infarct in this region though this may represent beam hardening artifact.   Correlation with MR pending.   - neuro following     #Pulm   - Concern for asp pna/pneumonitis, does not have a septic picture at this point  follow blood Cx/sputum Cx, observing off abx   - Stable on 2L NC, protecting airway    #Cardiac   1. Acute decompensated HF  - JVD and rales appreciate on PE   - CXR with pulm edema during RRT, initially dry on exam, received hydration   - TTE with EF 20-25%, moderately dilated left atrium, increased left atrial pressures, moderately dilated LV, global hypokinesis    2. NSTEMI   Medically managed, TTE described as above  - Acute rise in HsTrop likely 2/2 demand ischemia rather than plaque rupture, neg delta, will defer trending troponin   - LHC deferred due to concern for medication non compliance   - c/w DAPT: ASA, Plavix  - Statin held in setting of transaminitis, will consider starting tomorrow  - BB held due to concern for ADHF     3. PAD s/p L BKA plan   - [facility] duplex of b/l LE: 07/06: R popliteal artery with severe flow-limiting stenosis, L pop and bypass graft are occluded   - f/u vascular surg for futher recs     #GI Transaminitis w/elevated lactate concern for shock liver   - LFTs currently downtrending   - Trend LFTs   - f/u CT A/P r/o ischemia: No acute intra-abdominal or pelvic pathology. Moderate bilateral pleural effusions.     #Renal VINAY on CKD, likely ATN in the setting of cardiogenic shock- improving   - Trend sCR   - monitor I/Os, electrolytes  - lisinopril held in setting of VINAY    - Avoid nephrotoxins    #ID  - Plueral effusion vs RLL infiltrate seen on initial CXR  - CXR post CCU admission with clear lungs  - Initially with leukocytosis concern for septic etiology now resolved   - Afebrile, initially with lactate of 7/leukocytosis, that have both now resolved   - dose of Vanc/Zosyn given yesterday, observing off abx  - f/u sepsis workup including: Bcx/sputum cx NGTD     #Endo DM, A1C 10  - On lantus 6u, was on lantus 20 units prior to BKA, lowered due to concern of poor PO intake  - Will continued FS and increase Lantus and s/s coverage prn     #Heme: Anemia, no overt source of bleeding  - Trend CBC    FOR FOLLOW UP:  [ ] MRI brain, Neuro recs   [ ] Trend LFTs  [ ] Cardiology f/u for medical management of NSTEMI CCU Transfer Note    Transfer from: CCU    Transfer to: (  ) Medicine    ( x ) Telemetry    (  ) RCU                               (  ) Palliative    (  ) Stroke Unit    (  ) MICU    (  ) __________________    Accepting Physician:    Signout given to:     HPI / CCU COURSE:    68 y/o M with a PMH of HTN, DM, PAD who is transferred to [facility] for management of cardiogenic shock and NSTEMI. As per sign out, the pt was admitted for L popliteal bypass to [facility]. During his time there, he was refusing medications  and was found to be progressively more dyspneic. He had an echo done there which showed that he has an EF of <15% with global hypokinesis; he received 20mg IV lasix for fluid overload. His respiratory status continued to worsen and the pt was intubated for airway protection. The pt vomited during this time and there was a concern for aspiration. The pt did not complain of CP during the time prior to intubation. As per nephew, the pt has not been complaining of CP, SOB prior to admission to the hospital and has been able to carry out his ADLs.     Initially transferred from CCU to medical floors, pt's HC largely unremarkable but was notable to continued disorientation and periodic agitation. He was awaiting consent for BKA, which was finally done on 7/22. Post-op course was largely unremarkable, however pt noted to have incr WOB and wheezing last night, and suddenly developed AMS and RRT was called. W/u at that time remarkable for pulm edema on CXR and pt was given furosemide IV. Subsequent labs notable for elev lactate and liver tests cardiogenic shock in the setting of transaminitis possible shock liver, elevated lactate, transferred to the CCU for further management. Now with resolution of shock state; lactate cleared transaminitis resolving, CTH with possible brainstem/pontine ischemic infarct; MRI pending. Central line removed, more alert, now with waxing and waning mental status; unable to assess orientation no agitation.     Vital Signs Last 24 Hrs  T(C): 36.7 (25 Jul 2019 22:00), Max: 37.1 (25 Jul 2019 16:00)  T(F): 98 (25 Jul 2019 22:00), Max: 98.8 (25 Jul 2019 16:00)  HR: 88 (26 Jul 2019 04:00) (80 - 90)  BP: 112/63 (26 Jul 2019 04:00) (84/54 - 113/69)  BP(mean): 74 (26 Jul 2019 04:00) (62 - 84)  RR: 11 (26 Jul 2019 04:00) (10 - 24)  SpO2: 87% (26 Jul 2019 04:00) (87% - 100%)    I&O's Summary    24 Jul 2019 07:01  -  25 Jul 2019 07:00  --------------------------------------------------------  IN: 650 mL / OUT: 485 mL / NET: 165 mL    25 Jul 2019 07:01  -  26 Jul 2019 04:58  --------------------------------------------------------  IN: 420 mL / OUT: 1220 mL / NET: -800 mL    Physical Exam:     Vital Signs Last 24 Hrs  T(C): 36.7 (25 Jul 2019 22:00), Max: 37.1 (25 Jul 2019 16:00)  T(F): 98 (25 Jul 2019 22:00), Max: 98.8 (25 Jul 2019 16:00)  HR: 88 (26 Jul 2019 04:00) (80 - 90)  BP: 112/63 (26 Jul 2019 04:00) (84/54 - 113/69)  BP(mean): 74 (26 Jul 2019 04:00) (62 - 84)  RR: 11 (26 Jul 2019 04:00) (10 - 24)  SpO2: 87% (26 Jul 2019 04:00) (87% - 100%)  I&O's Summary    24 Jul 2019 07:01  -  25 Jul 2019 07:00  --------------------------------------------------------  IN: 650 mL / OUT: 485 mL / NET: 165 mL    25 Jul 2019 07:01  -  26 Jul 2019 04:59  --------------------------------------------------------  IN: 420 mL / OUT: 1220 mL / NET: -800 mL    PHYSICAL EXAM:  GENERAL: NAD, lethargic but arousable   HEAD:  Atraumatic, Normocephalic  EYES: EOMI, conjunctiva and sclera clear  ENMT: No tonsillar erythema, exudates, or enlargement; dry mucous membranes, poor dentition  NECK: Supple, No JVD  NERVOUS SYSTEM: does not respond to questioning, but alert 5/5 muscle strength b/l UE, sensation grossly intact   PSYCHIATRIC: Appropriate affect and mood  CHEST/LUNG: Clear to auscultation bilaterally; No rales, rhonchi, wheezing, or rubs  HEART: Regular rate and rhythm; No murmurs, rubs, or gallops. No LE edema  ABDOMEN: Soft, Nontender, Nondistended; Bowel sounds present  EXTREMITIES:  2+ Peripheral Pulses, No clubbing, cyanosis  SKIN: No rashes or lesions    LABS:                         11.0   12.0  )-----------( 326      ( 25 Jul 2019 04:45 )             33.9       07-25    141  |  105  |  39<H>  ----------------------------<  224<H>  3.9   |  25  |  1.25    Ca    8.0<L>      25 Jul 2019 16:20  Phos  2.5     07-25  Mg     2.2     07-25    TPro  6.4  /  Alb  2.3<L>  /  TBili  0.6  /  DBili  x   /  AST  346<H>  /  ALT  514<H>  /  AlkPhos  152<H>  07-25  PT/INR - ( 25 Jul 2019 04:45 )   PT: 24.3 sec;   INR: 2.07 ratio      PTT - ( 24 Jul 2019 22:38 )  PTT:31.4 sec  ABG - ( 25 Jul 2019 02:31 )  pH, Arterial: 7.57  pH, Blood: x     /  pCO2: 27    /  pO2: 134   / HCO3: 25    / Base Excess: 3.5   /  SaO2: 99        Telemetry: NSR, PVCs     Imaging:  < from: CT Head No Cont (07.24.19 @ 23:23) >    IMPRESSION: Age-appropriate involutional change and microvascular   ischemic disease. Questionable evolving infarct at the level of the   brainstem specifically the barbara. Cannot entirely exclude evolving infarct   in this region though this may represent beam hardening artifact.   Correlation with MR recommended. No gross change compared with prior   7/24/2019.    < end of copied text >    < from: CT Abdomen and Pelvis No Cont (07.24.19 @ 23:32) >    IMPRESSION:     Limited noncontrast study. No acute intra-abdominal or pelvic pathology.    Moderate bilateral pleural effusions.    < end of copied text >    ASSESSMENT & PLAN:    69M w/ PMH of poor med compliance, DM type 2, HTN, PAD NUMC, transferred to [facility] CCU for NSTEMI c/b cardiogenic shock; acute respiratory failure with hypoxia and septic shock secondary to suspected aspiration pneumonia requiring intubation (now extubated) and downgraded from CCU. 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 Was transferred to CCU for possible shock as lactic acid increased however improved in CCU.  CT scan with concern for possible pontine stroke.  Neuro consulted, MRI pending    #Neuro   - per chart review intermittent agitation requiring haldol/seroquel but held due to prolonged QTc; Depakote also held in setting of transaminitis   - CTH: Age-appropriate involutional change and microvascular ischemic disease. Questionable evolving infarct at the level of the brainstem specifically the barbara. Cannot entirely exclude evolving infarct in this region though this may represent beam hardening artifact.   Correlation with MR pending.   - needs S/S eval given change in mental status   - neuro following     #Pulm   - Concern for asp pna/pneumonitis, does not have a septic picture at this point  follow blood Cx/sputum Cx, observing off abx   - Stable on 2L NC, protecting airway    #Cardiac   1. Acute decompensated HF  - JVD and rales appreciate on PE   - CXR with pulm edema during RRT, initially dry on exam, received hydration   - TTE with EF 20-25%, moderately dilated left atrium, increased left atrial pressures, moderately dilated LV, global hypokinesis    2. NSTEMI   Medically managed, TTE described as above  - Acute rise in HsTrop likely 2/2 demand ischemia rather than plaque rupture, neg delta, will defer trending troponin   - LHC deferred due to concern for medication non compliance   - c/w DAPT: ASA, Plavix  - Statin held in setting of transaminitis, will consider starting tomorrow  - BB held due to concern for ADHF     3. PAD s/p L BKA plan   - VA duplex of b/l LE: 07/06: R popliteal artery with severe flow-limiting stenosis, L pop and bypass graft are occluded   - f/u vascular surg for futher recs     #GI Transaminitis w/elevated lactate concern for shock liver   - LFTs currently downtrending   - Trend LFTs   - f/u CT A/P r/o ischemia: No acute intra-abdominal or pelvic pathology. Moderate bilateral pleural effusions.     #Renal VINAY on CKD, likely ATN in the setting of cardiogenic shock- improving   - Trend sCR   - monitor I/Os, electrolytes  - lisinopril held in setting of VINAY    - Avoid nephrotoxins    #ID  - Plueral effusion vs RLL infiltrate seen on initial CXR  - CXR post CCU admission with clear lungs  - Initially with leukocytosis concern for septic etiology now resolved   - Afebrile, initially with lactate of 7/leukocytosis, that have both now resolved   - dose of Vanc/Zosyn given yesterday, observing off abx  - f/u sepsis workup including: Bcx/sputum cx NGTD     #Endo DM, A1C 10  - On lantus 6u, was on lantus 20 units prior to BKA, lowered due to concern of poor PO intake  - Will continued FS and increase Lantus and s/s coverage prn     #Heme: Anemia, no overt source of bleeding  - Trend CBC    FOR FOLLOW UP:  [ ] MRI brain, Neuro recs   [ ] Trend LFTs  [ ] Cardiology f/u for medical management of NSTEMI  [ ] S/S eval

## 2019-07-26 NOTE — PROGRESS NOTE ADULT - ASSESSMENT
69M w/ PMH of poor med compliance, DM type 2, HTN, PAD NUMC, transferred to [facility] CCU for NSTEMI c/b cardiogenic shock; acute respiratory failure with hypoxia and septic shock secondary to suspected aspiration pneumonia requiring intubation (now extubated) and downgraded from CCU. 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 Was transferred to CCU for possible shock as lactic acid increased however improved in CCU.  CT scan with concern for possible pontine stroke.  Neuro consulted, MRI pending    #Neuro   - per chart review intermittent agitation requiring haldol/seroquel but held due to prolonged QTc   - CTH: Age-appropriate involutional change and microvascular ischemic disease. Questionable evolving infarct at the level of the brainstem specifically the barbara. Cannot entirely exclude evolving infarct in this region though this may represent beam hardening artifact.   Correlation with MR pending.   - neuro following     #Pulm   - Concern for asp pna/pneumonitis, does not have a septic picture at this point  follow blood Cx/sputum Cx, observing off abx   - Stable on 2L NC, protecting airway    #Cardiac   1. Acute decompensated HF  - JVD and rales appreciate on PE   - CXR with pulm edema during RRT, initially dry on exam, received hydration   - TTE with EF 20-25%, moderately dilated left atrium, increased left atrial pressures, moderately dilated LV, global hypokinesis    2. NSTEMI   Medically managed, TTE described as above  - Acute rise in HsTrop likely 2/2 demand ischemia rather than plaque rupture, neg delta, will defer trending troponin   - LHC deferred due to concern for medication non compliance   - c/w DAPT: ASA, Plavix  - Statin held in setting of transaminitis, will consider starting today  - BB held due to concern for ADHF     3. PAD s/p L BKA plan   - VA duplex of b/l LE: 07/06: R popliteal artery with severe flow-limiting stenosis, L pop and bypass graft are occluded   - f/u vascular surg for futher recs     #GI Transaminitis w/elevated lactate concern for shock liver   - LFTs currently downtrending   - Trend LFTs   - f/u CT A/P r/o ischemia: No acute intra-abdominal or pelvic pathology. Moderate bilateral pleural effusions.     #Renal VINAY on CKD, likely ATN in the setting of cardiogenic shock- improving   - Trend sCR   - monitor I/Os, electrolytes  - lisinopril held in setting of VINAY    - Avoid nephrotoxins    #ID  - Plueral effusion vs RLL infiltrate seen on initial CXR  - CXR post CCU admission with clear lungs  - Initially with leukocytosis concern for septic etiology now resolved   - Afebrile, initially with lactate of 7/leukocytosis, that have both now resolved   - dose of Vanc/Zosyn given 7/25 observing off abx  - f/u sepsis workup including: Bcx/sputum cx     #Endo DM, A1C 10  - On lantus 6u, was on lantus 20 units prior to BKA, lowered due to concern of poor PO intake  - Will continued FS and increase Lantus and s/s coverage prn     #Heme: Anemia, no overt source of bleeding  - Trend CBC

## 2019-07-27 LAB
ALBUMIN SERPL ELPH-MCNC: 2.5 G/DL — LOW (ref 3.3–5)
ALP SERPL-CCNC: 147 U/L — HIGH (ref 40–120)
ALT FLD-CCNC: 586 U/L — HIGH (ref 10–45)
AMMONIA BLD-MCNC: 32 UMOL/L — SIGNIFICANT CHANGE UP (ref 11–55)
ANION GAP SERPL CALC-SCNC: 14 MMOL/L — SIGNIFICANT CHANGE UP (ref 5–17)
AST SERPL-CCNC: 450 U/L — HIGH (ref 10–40)
BASOPHILS # BLD AUTO: 0 K/UL — SIGNIFICANT CHANGE UP (ref 0–0.2)
BASOPHILS NFR BLD AUTO: 0.2 % — SIGNIFICANT CHANGE UP (ref 0–2)
BILIRUB SERPL-MCNC: 0.7 MG/DL — SIGNIFICANT CHANGE UP (ref 0.2–1.2)
BUN SERPL-MCNC: 26 MG/DL — HIGH (ref 7–23)
CALCIUM SERPL-MCNC: 8.6 MG/DL — SIGNIFICANT CHANGE UP (ref 8.4–10.5)
CHLORIDE SERPL-SCNC: 108 MMOL/L — SIGNIFICANT CHANGE UP (ref 96–108)
CO2 SERPL-SCNC: 23 MMOL/L — SIGNIFICANT CHANGE UP (ref 22–31)
CREAT SERPL-MCNC: 0.87 MG/DL — SIGNIFICANT CHANGE UP (ref 0.5–1.3)
EOSINOPHIL # BLD AUTO: 0 K/UL — SIGNIFICANT CHANGE UP (ref 0–0.5)
EOSINOPHIL NFR BLD AUTO: 0.1 % — SIGNIFICANT CHANGE UP (ref 0–6)
GLUCOSE BLDC GLUCOMTR-MCNC: 150 MG/DL — HIGH (ref 70–99)
GLUCOSE BLDC GLUCOMTR-MCNC: 164 MG/DL — HIGH (ref 70–99)
GLUCOSE BLDC GLUCOMTR-MCNC: 197 MG/DL — HIGH (ref 70–99)
GLUCOSE BLDC GLUCOMTR-MCNC: 228 MG/DL — HIGH (ref 70–99)
GLUCOSE BLDC GLUCOMTR-MCNC: 236 MG/DL — HIGH (ref 70–99)
GLUCOSE BLDC GLUCOMTR-MCNC: 303 MG/DL — HIGH (ref 70–99)
GLUCOSE SERPL-MCNC: 146 MG/DL — HIGH (ref 70–99)
HCT VFR BLD CALC: 34.8 % — LOW (ref 39–50)
HGB BLD-MCNC: 11 G/DL — LOW (ref 13–17)
LYMPHOCYTES # BLD AUTO: 1.8 K/UL — SIGNIFICANT CHANGE UP (ref 1–3.3)
LYMPHOCYTES # BLD AUTO: 17.2 % — SIGNIFICANT CHANGE UP (ref 13–44)
MAGNESIUM SERPL-MCNC: 2.2 MG/DL — SIGNIFICANT CHANGE UP (ref 1.6–2.6)
MCHC RBC-ENTMCNC: 29.9 PG — SIGNIFICANT CHANGE UP (ref 27–34)
MCHC RBC-ENTMCNC: 31.7 GM/DL — LOW (ref 32–36)
MCV RBC AUTO: 94.4 FL — SIGNIFICANT CHANGE UP (ref 80–100)
MONOCYTES # BLD AUTO: 0.9 K/UL — SIGNIFICANT CHANGE UP (ref 0–0.9)
MONOCYTES NFR BLD AUTO: 8.6 % — SIGNIFICANT CHANGE UP (ref 2–14)
NEUTROPHILS # BLD AUTO: 7.8 K/UL — HIGH (ref 1.8–7.4)
NEUTROPHILS NFR BLD AUTO: 73.9 % — SIGNIFICANT CHANGE UP (ref 43–77)
PHOSPHATE SERPL-MCNC: 2.8 MG/DL — SIGNIFICANT CHANGE UP (ref 2.5–4.5)
PLATELET # BLD AUTO: 310 K/UL — SIGNIFICANT CHANGE UP (ref 150–400)
POTASSIUM SERPL-MCNC: 4.1 MMOL/L — SIGNIFICANT CHANGE UP (ref 3.5–5.3)
POTASSIUM SERPL-SCNC: 4.1 MMOL/L — SIGNIFICANT CHANGE UP (ref 3.5–5.3)
PROT SERPL-MCNC: 6.9 G/DL — SIGNIFICANT CHANGE UP (ref 6–8.3)
RBC # BLD: 3.68 M/UL — LOW (ref 4.2–5.8)
RBC # FLD: 13.7 % — SIGNIFICANT CHANGE UP (ref 10.3–14.5)
SODIUM SERPL-SCNC: 145 MMOL/L — SIGNIFICANT CHANGE UP (ref 135–145)
WBC # BLD: 10.6 K/UL — HIGH (ref 3.8–10.5)
WBC # FLD AUTO: 10.6 K/UL — HIGH (ref 3.8–10.5)

## 2019-07-27 PROCEDURE — 99233 SBSQ HOSP IP/OBS HIGH 50: CPT

## 2019-07-27 PROCEDURE — 70549 MR ANGIOGRAPH NECK W/O&W/DYE: CPT | Mod: 26

## 2019-07-27 RX ADMIN — HEPARIN SODIUM 5000 UNIT(S): 5000 INJECTION INTRAVENOUS; SUBCUTANEOUS at 05:08

## 2019-07-27 RX ADMIN — Medication 1: at 13:09

## 2019-07-27 RX ADMIN — Medication 1 PUFF(S): at 13:09

## 2019-07-27 RX ADMIN — INSULIN GLARGINE 6 UNIT(S): 100 INJECTION, SOLUTION SUBCUTANEOUS at 00:34

## 2019-07-27 RX ADMIN — Medication 1 PATCH: at 20:06

## 2019-07-27 RX ADMIN — CLOPIDOGREL BISULFATE 75 MILLIGRAM(S): 75 TABLET, FILM COATED ORAL at 09:56

## 2019-07-27 RX ADMIN — INSULIN GLARGINE 6 UNIT(S): 100 INJECTION, SOLUTION SUBCUTANEOUS at 22:53

## 2019-07-27 RX ADMIN — Medication 81 MILLIGRAM(S): at 09:56

## 2019-07-27 RX ADMIN — Medication 1 PUFF(S): at 17:56

## 2019-07-27 RX ADMIN — Medication 2: at 18:39

## 2019-07-27 RX ADMIN — Medication 1 PATCH: at 09:56

## 2019-07-27 RX ADMIN — Medication 2: at 22:53

## 2019-07-27 RX ADMIN — HEPARIN SODIUM 5000 UNIT(S): 5000 INJECTION INTRAVENOUS; SUBCUTANEOUS at 17:57

## 2019-07-27 RX ADMIN — Medication 1 PUFF(S): at 05:08

## 2019-07-27 RX ADMIN — Medication 2: at 09:26

## 2019-07-27 NOTE — PROGRESS NOTE ADULT - ASSESSMENT
A/P: 69y Male with Hx of LE gangrene due to PVD s/p left BKA 7/22    - Physical therapy: OOB with assistance.   - Continue ASA and Plavix  - Immobilizer every night. Remove bypass staples prior to discharge. Do not remove BKA staples.     Vascular Surgery   [phone]

## 2019-07-27 NOTE — PROGRESS NOTE ADULT - SUBJECTIVE AND OBJECTIVE BOX
VASCULAR SURGERY DAILY PROGRESS NOTE:       Interval: No acute events overnight.     SUBJECTIVE:     Limited by mental status.      OBJECTIVE:    MEDICATIONS  (STANDING):  aspirin enteric coated 81 milliGRAM(s) Oral daily  clopidogrel Tablet 75 milliGRAM(s) Oral daily  dextrose 5%. 1000 milliLiter(s) (50 mL/Hr) IV Continuous <Continuous>  dextrose 50% Injectable 12.5 Gram(s) IV Push once  dextrose 50% Injectable 25 Gram(s) IV Push once  dextrose 50% Injectable 25 Gram(s) IV Push once  heparin  Injectable 5000 Unit(s) SubCutaneous every 12 hours  insulin glargine Injectable (LANTUS) 6 Unit(s) SubCutaneous at bedtime  insulin lispro (HumaLOG) corrective regimen sliding scale   SubCutaneous three times a day before meals  insulin lispro (HumaLOG) corrective regimen sliding scale   SubCutaneous at bedtime  ipratropium 17 MICROgram(s) HFA Inhaler 1 Puff(s) Inhalation every 6 hours  nicotine -   7 mG/24Hr(s) Patch 1 patch Transdermal daily    MEDICATIONS  (PRN):  dextrose 40% Gel 15 Gram(s) Oral once PRN Blood Glucose LESS THAN 70 milliGRAM(s)/deciliter  glucagon  Injectable 1 milliGRAM(s) IntraMuscular once PRN Glucose LESS THAN 70 milligrams/deciliter  melatonin 3 milliGRAM(s) Oral at bedtime PRN Insomnia  oxyCODONE    IR 10 milliGRAM(s) Oral every 4 hours PRN Severe Pain (7 - 10)  oxyCODONE    IR 5 milliGRAM(s) Oral every 4 hours PRN Moderate Pain (4 - 6)      Vital Signs Last 24 Hrs  T(C): 36.4 (2019 04:32), Max: 36.4 (2019 14:01)  T(F): 97.6 (2019 04:32), Max: 97.6 (2019 04:32)  HR: 87 (2019 04:32) (84 - 87)  BP: 112/65 (2019 04:32) (112/65 - 133/86)  BP(mean): --  RR: 18 (2019 04:32) (18 - 19)  SpO2: 95% (2019 04:32) (95% - 97%)      I&O's Detail    2019 07:01  -  2019 07:00  --------------------------------------------------------  IN:  Total IN: 0 mL    OUT:    Incontinent per Condom Catheter: 700 mL    Nasoenteral Tube: 170 mL  Total OUT: 870 mL    Total NET: -870 mL            Daily     Daily Weight in k.2 (2019 03:07)          LABS:                        11.0   10.6  )-----------( 310      ( 2019 07:03 )             34.8         145  |  108  |  26<H>  ----------------------------<  146<H>  4.1   |  23  |  0.87    Ca    8.6      2019 07:03  Phos  2.8       Mg     2.2         TPro  6.9  /  Alb  2.5<L>  /  TBili  0.7  /  DBili  x   /  AST  450<H>  /  ALT  586<H>  /  AlkPhos  147<H>        PHYSICAL EXAM:  Constitutional: NAD  ENMT: normal facies, symmetric  Respiratory: Normal respiratory effort   Extremities: LLE BKA site dressing c/d/i, dressing changed. Preveena removed. Medial side of BKA site with mild drainage, otherwise site appears clean and dry, no erythema or swelling.

## 2019-07-27 NOTE — PROGRESS NOTE ADULT - ASSESSMENT
69M w/ PMH of poor med compliance, DM2, HTN, PADs/p Left BKA 7/22 transferred from [facility] to [facility] CCU for NSTEMI c/b cardiogenic shock; acute respiratory failure with hypoxia and septic shock secondary to suspected aspiration pneumonia requiring intubation (now extubated) then transferred to floors with RRT 7/24 for shock state with elevated LFTs and lactate readmission to CCU, with resolution of shock state, noted to have possible pontine infarction on CTH, neuro following, pending brain MR.     Acute decompensated HF  - As per chart review - appears to be new CHF; Patient not on BB as outpatient  - No JVD or LE on exam; lungs appear to be clear however poor inspiratory effort  - TTE at OSH EF 15%; Repeat TTE with EF 20-25%, moderately dilated left atrium, increased left atrial pressures, moderately dilated LV, global hypokinesis  - CXR with pulm edema during RRT, initially dry on exam, received hydration   -s/p Furosemide 40mg on 7/24  - f.u cardio recs regarding BB and lasix  - daily weights  - Monitor I/O strict  - Monitor on tele  - lying flat without SOB currently      Pleural effusion  - Patient initially requiring intubation for aspiration PNA at OSH; Had RRT on 7/24 for unresponsiveness, dyspnea and wheezing; CXR revealed b/l moderate pleural effusion; Admitted to CCU with shock liver; Repeat CXR demonstrating clear lungs since CCU admission  - Does not have a septic picture at this point; blood cx/sputum cx NGTD, observing off abx  - Stable on 2L NC, protecting airway  - Infectious etiology now less likely given resolution of leukocytosis, normal lactate, afebrile  - S/p Vanc and zosyn on 7/25 x1 in CCU and zosyn x2 days 7/13, no current s/s of infection monitor off abx    NSTEMI   Medically managed, TTE described as above  - Patient with acute rise in high sensitivity Trops likely 2/2 demand ischemia with negative delta   - LHC deferred due to concern for medication non compliance   - c/w DAPT: ASA, Plavix  - Holding statin in setting of elevated LFTs, however will add LFTs to today's labs and if significantly improved will restart statin  - BB held due to concern for ADHF   - f/u cardio recs  - Monitor on tele    Pontine Infarction  - CTH revealing area of possible pontine infarction  - Neuro consulted, recommending MR brain  - F/u MR brain pending      PAD   - Patient s/p L BKA    - VA duplex of b/l LE: 07/06: R popliteal artery with severe flow-limiting stenosis, L pop and bypass graft are occluded   - Vascular rec: Continue ASA and Plavix; dressing with prevena. Immobilizer for 48 hours. remove bypass staples prior to discharge.   - f/u vascular surg for further management      Transaminitis  - patient initially with evidence of shock liver (elevated LFTs and lactate)  - - LFTs downtrended   - Trend CMP daily  - Lactate WNL  - Follow up ammonia levels   - CT A/P ordered to r/o ischemia: No acute intra-abdominal or pelvic pathology. Moderate bilateral pleural effusions.  - avoid hepatotoxics     VINAY on CKD  Resolved; likely ATN in setting of cardiogenic shock  - Trend BMP daily   - monitor I/Os, electrolytes  - lisinopril held in setting of VINAY; may resume  - Avoid nephrotoxins      Diabetes Mellitus  - Last A1c 10  - c/w lantus 6 units (Previously on Lantus 20u prior to BKA)  - Monitor FS  - C/w ISS     Anemia  - No gross signs of bleeding  - Trend CBC daily    DVT: hep sc q8  Diet: NPO with NGT feeds  will d/w family/GI about PEG next week    poor overall prognosis.

## 2019-07-27 NOTE — PROGRESS NOTE ADULT - SUBJECTIVE AND OBJECTIVE BOX
Patient is a 69y old  Male who presents with a chief complaint of NSTEMI (26 Jul 2019 09:38)      INTERVAL History of Present Illness/OVERNIGHT EVENTS: failed swallow eval  NGT feeds    MEDICATIONS  (STANDING):  aspirin enteric coated 81 milliGRAM(s) Oral daily  clopidogrel Tablet 75 milliGRAM(s) Oral daily  dextrose 5%. 1000 milliLiter(s) (50 mL/Hr) IV Continuous <Continuous>  dextrose 50% Injectable 12.5 Gram(s) IV Push once  dextrose 50% Injectable 25 Gram(s) IV Push once  dextrose 50% Injectable 25 Gram(s) IV Push once  heparin  Injectable 5000 Unit(s) SubCutaneous every 12 hours  insulin glargine Injectable (LANTUS) 6 Unit(s) SubCutaneous at bedtime  insulin lispro (HumaLOG) corrective regimen sliding scale   SubCutaneous three times a day before meals  insulin lispro (HumaLOG) corrective regimen sliding scale   SubCutaneous at bedtime  ipratropium 17 MICROgram(s) HFA Inhaler 1 Puff(s) Inhalation every 6 hours  nicotine -   7 mG/24Hr(s) Patch 1 patch Transdermal daily    MEDICATIONS  (PRN):  dextrose 40% Gel 15 Gram(s) Oral once PRN Blood Glucose LESS THAN 70 milliGRAM(s)/deciliter  glucagon  Injectable 1 milliGRAM(s) IntraMuscular once PRN Glucose LESS THAN 70 milligrams/deciliter  melatonin 3 milliGRAM(s) Oral at bedtime PRN Insomnia  oxyCODONE    IR 10 milliGRAM(s) Oral every 4 hours PRN Severe Pain (7 - 10)  oxyCODONE    IR 5 milliGRAM(s) Oral every 4 hours PRN Moderate Pain (4 - 6)      Allergies    No Known Allergies    Intolerances        REVIEW OF SYSTEMS:  Negative unless otherwise specified above.    Vital Signs Last 24 Hrs  T(C): 36.4 (27 Jul 2019 04:32), Max: 36.4 (26 Jul 2019 14:01)  T(F): 97.6 (27 Jul 2019 04:32), Max: 97.6 (27 Jul 2019 04:32)  HR: 87 (27 Jul 2019 04:32) (84 - 87)  BP: 112/65 (27 Jul 2019 04:32) (112/65 - 133/86)  BP(mean): --  RR: 18 (27 Jul 2019 04:32) (18 - 19)  SpO2: 95% (27 Jul 2019 04:32) (95% - 97%)        PHYSICAL EXAM:  GENERAL: No apparent distress, appears stated age  HEAD:  Atraumatic, Normocephalic  EYES: Conjunctiva and sclera clear, no discharge  ENMT: Moist mucous membranes, NGT  NECK: Supple, no JVD  CHEST/LUNG: Clear to auscultation bilaterally, no wheeze or rales  HEART: Regular rate and rhythm, no murmurs, rubs or gallops  ABDOMEN: Soft, Nontender, Nondistended; Bowel sounds present  EXTREMITIES:  left BKA dressing, thigh staples  SKIN: No rash or new discoloration  NERVOUS SYSTEM:  Alert & Oriented*1; Bilateral Lower extremity mobile      LABS:                        11.0   10.6  )-----------( 310      ( 27 Jul 2019 07:03 )             34.8     27 Jul 2019 07:03    145    |  108    |  26     ----------------------------<  146    4.1     |  23     |  0.87     Ca    8.6        27 Jul 2019 07:03  Phos  2.8       27 Jul 2019 07:03  Mg     2.2       27 Jul 2019 07:03    TPro  6.9    /  Alb  2.5    /  TBili  0.7    /  DBili  x      /  AST  450    /  ALT  586    /  AlkPhos  147    27 Jul 2019 07:03        CAPILLARY BLOOD GLUCOSE      POCT Blood Glucose.: 197 mg/dL (27 Jul 2019 12:43)  POCT Blood Glucose.: 228 mg/dL (27 Jul 2019 08:59)  POCT Blood Glucose.: 164 mg/dL (27 Jul 2019 06:19)  POCT Blood Glucose.: 150 mg/dL (27 Jul 2019 00:32)  POCT Blood Glucose.: 139 mg/dL (26 Jul 2019 22:05)  POCT Blood Glucose.: 128 mg/dL (26 Jul 2019 18:17)      RADIOLOGY & ADDITIONAL TESTS:    Images reviewed personally    Consultant Notes Reviewed and Care Discussed with relevant Consultants/Other Providers.

## 2019-07-28 LAB
ALBUMIN SERPL ELPH-MCNC: 2.7 G/DL — LOW (ref 3.3–5)
ALP SERPL-CCNC: 171 U/L — HIGH (ref 40–120)
ALT FLD-CCNC: 608 U/L — HIGH (ref 10–45)
ANION GAP SERPL CALC-SCNC: 11 MMOL/L — SIGNIFICANT CHANGE UP (ref 5–17)
AST SERPL-CCNC: 370 U/L — HIGH (ref 10–40)
BILIRUB SERPL-MCNC: 0.7 MG/DL — SIGNIFICANT CHANGE UP (ref 0.2–1.2)
BUN SERPL-MCNC: 22 MG/DL — SIGNIFICANT CHANGE UP (ref 7–23)
CALCIUM SERPL-MCNC: 8.8 MG/DL — SIGNIFICANT CHANGE UP (ref 8.4–10.5)
CHLORIDE SERPL-SCNC: 108 MMOL/L — SIGNIFICANT CHANGE UP (ref 96–108)
CO2 SERPL-SCNC: 24 MMOL/L — SIGNIFICANT CHANGE UP (ref 22–31)
CREAT SERPL-MCNC: 0.79 MG/DL — SIGNIFICANT CHANGE UP (ref 0.5–1.3)
GLUCOSE BLDC GLUCOMTR-MCNC: 220 MG/DL — HIGH (ref 70–99)
GLUCOSE BLDC GLUCOMTR-MCNC: 228 MG/DL — HIGH (ref 70–99)
GLUCOSE BLDC GLUCOMTR-MCNC: 231 MG/DL — HIGH (ref 70–99)
GLUCOSE BLDC GLUCOMTR-MCNC: 268 MG/DL — HIGH (ref 70–99)
GLUCOSE BLDC GLUCOMTR-MCNC: 273 MG/DL — HIGH (ref 70–99)
GLUCOSE BLDC GLUCOMTR-MCNC: 344 MG/DL — HIGH (ref 70–99)
GLUCOSE SERPL-MCNC: 297 MG/DL — HIGH (ref 70–99)
HCT VFR BLD CALC: 34.8 % — LOW (ref 39–50)
HGB BLD-MCNC: 11 G/DL — LOW (ref 13–17)
MCHC RBC-ENTMCNC: 29.7 PG — SIGNIFICANT CHANGE UP (ref 27–34)
MCHC RBC-ENTMCNC: 31.5 GM/DL — LOW (ref 32–36)
MCV RBC AUTO: 94.4 FL — SIGNIFICANT CHANGE UP (ref 80–100)
PLATELET # BLD AUTO: 289 K/UL — SIGNIFICANT CHANGE UP (ref 150–400)
POTASSIUM SERPL-MCNC: 4 MMOL/L — SIGNIFICANT CHANGE UP (ref 3.5–5.3)
POTASSIUM SERPL-SCNC: 4 MMOL/L — SIGNIFICANT CHANGE UP (ref 3.5–5.3)
PROT SERPL-MCNC: 7.2 G/DL — SIGNIFICANT CHANGE UP (ref 6–8.3)
RBC # BLD: 3.69 M/UL — LOW (ref 4.2–5.8)
RBC # FLD: 13.8 % — SIGNIFICANT CHANGE UP (ref 10.3–14.5)
SODIUM SERPL-SCNC: 143 MMOL/L — SIGNIFICANT CHANGE UP (ref 135–145)
WBC # BLD: 10.1 K/UL — SIGNIFICANT CHANGE UP (ref 3.8–10.5)
WBC # FLD AUTO: 10.1 K/UL — SIGNIFICANT CHANGE UP (ref 3.8–10.5)

## 2019-07-28 PROCEDURE — 99233 SBSQ HOSP IP/OBS HIGH 50: CPT

## 2019-07-28 RX ORDER — INSULIN LISPRO 100/ML
VIAL (ML) SUBCUTANEOUS EVERY 6 HOURS
Refills: 0 | Status: DISCONTINUED | OUTPATIENT
Start: 2019-07-28 | End: 2019-07-31

## 2019-07-28 RX ORDER — ASPIRIN/CALCIUM CARB/MAGNESIUM 324 MG
81 TABLET ORAL DAILY
Refills: 0 | Status: DISCONTINUED | OUTPATIENT
Start: 2019-07-28 | End: 2019-08-07

## 2019-07-28 RX ORDER — INSULIN GLARGINE 100 [IU]/ML
8 INJECTION, SOLUTION SUBCUTANEOUS AT BEDTIME
Refills: 0 | Status: DISCONTINUED | OUTPATIENT
Start: 2019-07-28 | End: 2019-07-29

## 2019-07-28 RX ORDER — CLOPIDOGREL BISULFATE 75 MG/1
75 TABLET, FILM COATED ORAL DAILY
Refills: 0 | Status: DISCONTINUED | OUTPATIENT
Start: 2019-07-28 | End: 2019-07-31

## 2019-07-28 RX ADMIN — Medication 1 PATCH: at 09:09

## 2019-07-28 RX ADMIN — Medication 4: at 18:36

## 2019-07-28 RX ADMIN — CLOPIDOGREL BISULFATE 75 MILLIGRAM(S): 75 TABLET, FILM COATED ORAL at 18:36

## 2019-07-28 RX ADMIN — INSULIN GLARGINE 8 UNIT(S): 100 INJECTION, SOLUTION SUBCUTANEOUS at 21:37

## 2019-07-28 RX ADMIN — Medication 1 PUFF(S): at 06:36

## 2019-07-28 RX ADMIN — Medication 1 PUFF(S): at 18:36

## 2019-07-28 RX ADMIN — HEPARIN SODIUM 5000 UNIT(S): 5000 INJECTION INTRAVENOUS; SUBCUTANEOUS at 06:36

## 2019-07-28 RX ADMIN — Medication 1 PUFF(S): at 13:43

## 2019-07-28 RX ADMIN — Medication 4: at 09:39

## 2019-07-28 RX ADMIN — Medication 1 PUFF(S): at 00:55

## 2019-07-28 RX ADMIN — Medication 1 PATCH: at 19:00

## 2019-07-28 RX ADMIN — Medication 81 MILLIGRAM(S): at 18:36

## 2019-07-28 RX ADMIN — Medication 6: at 14:03

## 2019-07-28 RX ADMIN — HEPARIN SODIUM 5000 UNIT(S): 5000 INJECTION INTRAVENOUS; SUBCUTANEOUS at 18:36

## 2019-07-28 NOTE — PROGRESS NOTE ADULT - SUBJECTIVE AND OBJECTIVE BOX
Patient is a 69y old  Male who presents with a chief complaint of NSTEMI (27 Jul 2019 13:32)      INTERVAL History of Present Illness/OVERNIGHT EVENTS: stable clinically.  elevated glucose - insulin titrated    MEDICATIONS  (STANDING):  aspirin  chewable 81 milliGRAM(s) Enteral Tube daily  clopidogrel Tablet 75 milliGRAM(s) Oral daily  dextrose 5%. 1000 milliLiter(s) (50 mL/Hr) IV Continuous <Continuous>  dextrose 50% Injectable 12.5 Gram(s) IV Push once  dextrose 50% Injectable 25 Gram(s) IV Push once  dextrose 50% Injectable 25 Gram(s) IV Push once  heparin  Injectable 5000 Unit(s) SubCutaneous every 12 hours  insulin glargine Injectable (LANTUS) 8 Unit(s) SubCutaneous at bedtime  insulin lispro (HumaLOG) corrective regimen sliding scale   SubCutaneous every 6 hours  ipratropium 17 MICROgram(s) HFA Inhaler 1 Puff(s) Inhalation every 6 hours  nicotine -   7 mG/24Hr(s) Patch 1 patch Transdermal daily    MEDICATIONS  (PRN):  dextrose 40% Gel 15 Gram(s) Oral once PRN Blood Glucose LESS THAN 70 milliGRAM(s)/deciliter  glucagon  Injectable 1 milliGRAM(s) IntraMuscular once PRN Glucose LESS THAN 70 milligrams/deciliter  melatonin 3 milliGRAM(s) Oral at bedtime PRN Insomnia  oxyCODONE    IR 10 milliGRAM(s) Oral every 4 hours PRN Severe Pain (7 - 10)  oxyCODONE    IR 5 milliGRAM(s) Oral every 4 hours PRN Moderate Pain (4 - 6)      Allergies    No Known Allergies    Intolerances        REVIEW OF SYSTEMS:  Negative unless otherwise specified above.    Vital Signs Last 24 Hrs  T(C): 36.9 (28 Jul 2019 13:55), Max: 36.9 (27 Jul 2019 20:44)  T(F): 98.4 (28 Jul 2019 13:55), Max: 98.4 (27 Jul 2019 20:44)  HR: 85 (28 Jul 2019 13:55) (85 - 97)  BP: 115/71 (28 Jul 2019 13:55) (113/73 - 123/717)  BP(mean): --  RR: 18 (28 Jul 2019 13:55) (18 - 18)  SpO2: 98% (28 Jul 2019 13:55) (95% - 98%)        PHYSICAL EXAM:  GENERAL: No apparent distress, appears stated age  HEAD:  Atraumatic, Normocephalic  EYES: Conjunctiva and sclera clear, no discharge  ENMT: Moist mucous membranes, NGT  NECK: Supple, no JVD  CHEST/LUNG: Clear to auscultation bilaterally, no wheeze or rales  HEART: Regular rate and rhythm, no murmurs, rubs or gallops  ABDOMEN: Soft, Nontender, Nondistended; Bowel sounds present  EXTREMITIES:  left BKA dressing, thigh staples  SKIN: No rash or new discoloration  NERVOUS SYSTEM:  Alert & Oriented*1; Bilateral Lower extremity mobile      LABS:                        11.0   10.1  )-----------( 289      ( 28 Jul 2019 06:27 )             34.8     28 Jul 2019 06:27    143    |  108    |  22     ----------------------------<  297    4.0     |  24     |  0.79     Ca    8.8        28 Jul 2019 06:27    TPro  7.2    /  Alb  2.7    /  TBili  0.7    /  DBili  x      /  AST  370    /  ALT  608    /  AlkPhos  171    28 Jul 2019 06:27        CAPILLARY BLOOD GLUCOSE      POCT Blood Glucose.: 273 mg/dL (28 Jul 2019 13:24)  POCT Blood Glucose.: 344 mg/dL (28 Jul 2019 08:42)  POCT Blood Glucose.: 268 mg/dL (28 Jul 2019 05:48)  POCT Blood Glucose.: 303 mg/dL (27 Jul 2019 22:45)  POCT Blood Glucose.: 236 mg/dL (27 Jul 2019 18:02)      RADIOLOGY & ADDITIONAL TESTS:    Images reviewed personally    Consultant Notes Reviewed and Care Discussed with relevant Consultants/Other Providers.

## 2019-07-28 NOTE — PROGRESS NOTE ADULT - ASSESSMENT
69M w/ PMH of poor med compliance, DM2, HTN, PADs/p Left BKA 7/22 transferred from [facility] to [facility] CCU for NSTEMI c/b cardiogenic shock; acute respiratory failure with hypoxia and septic shock secondary to suspected aspiration pneumonia requiring intubation (now extubated) then transferred to floors with RRT 7/24 for shock state with elevated LFTs and lactate readmission to CCU, with resolution of shock state, noted to have pontine infarction on brain MR.     Acute decompensated HF  - As per chart review - appears to be new CHF; Patient not on BB as outpatient  - No JVD or LE on exam; lungs appear to be clear however poor inspiratory effort  - TTE at OSH EF 15%; Repeat TTE with EF 20-25%, moderately dilated left atrium, increased left atrial pressures, moderately dilated LV, global hypokinesis  - CXR with pulm edema during RRT, initially dry on exam, received hydration   -s/p Furosemide 40mg on 7/24  - f.u cardio recs regarding BB and lasix  - daily weights  - Monitor I/O strict  - Monitor on tele  - lying flat without SOB currently      Pleural effusion  - Patient initially requiring intubation for aspiration PNA at OSH; Had RRT on 7/24 for unresponsiveness, dyspnea and wheezing; CXR revealed b/l moderate pleural effusion; Admitted to CCU with shock liver; Repeat CXR demonstrating clear lungs since CCU admission  - Does not have a septic picture at this point; blood cx/sputum cx NGTD, observing off abx  - Stable on 2L NC, protecting airway  - Infectious etiology now less likely given resolution of leukocytosis, normal lactate, afebrile  - S/p Vanc and zosyn on 7/25 x1 in CCU and zosyn x2 days 7/13, no current s/s of infection monitor off abx    NSTEMI   Medically managed, TTE described as above  - Patient with acute rise in high sensitivity Trops likely 2/2 demand ischemia with negative delta   - LHC deferred due to concern for medication non compliance   - c/w DAPT: ASA, Plavix  - Holding statin in setting of elevated LFTs, however will add LFTs to today's labs and if significantly improved will restart statin  - BB held due to concern for ADHF   - f/u cardio recs  - Monitor on tele    Pontine Infarction  - CTH revealing area of possible pontine infarction  - Neuro consulted, recommending MR brain  - F/u neuro recs      PAD   - Patient s/p L BKA    - VA duplex of b/l LE: 07/06: R popliteal artery with severe flow-limiting stenosis, L pop and bypass graft are occluded   - Vascular rec: Continue ASA and Plavix; dressing with prevena. Immobilizer for 48 hours. remove bypass staples prior to discharge.   - f/u vascular surg for further management      Transaminitis  - patient initially with evidence of shock liver (elevated LFTs and lactate)  - LFTs downtrended   - Trend CMP daily  - CT A/P ordered to r/o ischemia: No acute intra-abdominal or pelvic pathology. Moderate bilateral pleural effusions.  - avoid hepatotoxics     VINAY on CKD  Resolved; likely ATN in setting of cardiogenic shock  - Trend BMP daily   - monitor I/Os, electrolytes  - lisinopril held in setting of VINAY; may resume  - Avoid nephrotoxins      Diabetes Mellitus  - Last A1c 10  - c/w lantus 8 units (Previously on Lantus 20u prior to BKA)  - Monitor FS  - C/w ISS moderate    Anemia  - No gross signs of bleeding  - Trend CBC daily    DVT: hep sc q8  Diet: NPO with NGT feeds  will d/w family/GI about PEG next week    poor overall prognosis.    d/w RN at bedside plan of care

## 2019-07-29 DIAGNOSIS — I10 ESSENTIAL (PRIMARY) HYPERTENSION: ICD-10-CM

## 2019-07-29 DIAGNOSIS — Z29.9 ENCOUNTER FOR PROPHYLACTIC MEASURES, UNSPECIFIED: ICD-10-CM

## 2019-07-29 DIAGNOSIS — E11.9 TYPE 2 DIABETES MELLITUS WITHOUT COMPLICATIONS: ICD-10-CM

## 2019-07-29 DIAGNOSIS — I73.9 PERIPHERAL VASCULAR DISEASE, UNSPECIFIED: ICD-10-CM

## 2019-07-29 LAB
ANION GAP SERPL CALC-SCNC: 12 MMOL/L — SIGNIFICANT CHANGE UP (ref 5–17)
BUN SERPL-MCNC: 19 MG/DL — SIGNIFICANT CHANGE UP (ref 7–23)
CALCIUM SERPL-MCNC: 9.2 MG/DL — SIGNIFICANT CHANGE UP (ref 8.4–10.5)
CHLORIDE SERPL-SCNC: 107 MMOL/L — SIGNIFICANT CHANGE UP (ref 96–108)
CO2 SERPL-SCNC: 25 MMOL/L — SIGNIFICANT CHANGE UP (ref 22–31)
CREAT SERPL-MCNC: 0.8 MG/DL — SIGNIFICANT CHANGE UP (ref 0.5–1.3)
GLUCOSE BLDC GLUCOMTR-MCNC: 214 MG/DL — HIGH (ref 70–99)
GLUCOSE BLDC GLUCOMTR-MCNC: 233 MG/DL — HIGH (ref 70–99)
GLUCOSE BLDC GLUCOMTR-MCNC: 246 MG/DL — HIGH (ref 70–99)
GLUCOSE SERPL-MCNC: 232 MG/DL — HIGH (ref 70–99)
POTASSIUM SERPL-MCNC: 3.8 MMOL/L — SIGNIFICANT CHANGE UP (ref 3.5–5.3)
POTASSIUM SERPL-SCNC: 3.8 MMOL/L — SIGNIFICANT CHANGE UP (ref 3.5–5.3)
SODIUM SERPL-SCNC: 144 MMOL/L — SIGNIFICANT CHANGE UP (ref 135–145)

## 2019-07-29 PROCEDURE — 99233 SBSQ HOSP IP/OBS HIGH 50: CPT | Mod: GC

## 2019-07-29 PROCEDURE — 99233 SBSQ HOSP IP/OBS HIGH 50: CPT

## 2019-07-29 RX ORDER — INSULIN GLARGINE 100 [IU]/ML
10 INJECTION, SOLUTION SUBCUTANEOUS AT BEDTIME
Refills: 0 | Status: DISCONTINUED | OUTPATIENT
Start: 2019-07-29 | End: 2019-07-29

## 2019-07-29 RX ORDER — INSULIN GLARGINE 100 [IU]/ML
14 INJECTION, SOLUTION SUBCUTANEOUS AT BEDTIME
Refills: 0 | Status: DISCONTINUED | OUTPATIENT
Start: 2019-07-29 | End: 2019-07-30

## 2019-07-29 RX ADMIN — CLOPIDOGREL BISULFATE 75 MILLIGRAM(S): 75 TABLET, FILM COATED ORAL at 13:51

## 2019-07-29 RX ADMIN — HEPARIN SODIUM 5000 UNIT(S): 5000 INJECTION INTRAVENOUS; SUBCUTANEOUS at 05:43

## 2019-07-29 RX ADMIN — Medication 1 PATCH: at 21:10

## 2019-07-29 RX ADMIN — Medication 1 PUFF(S): at 13:51

## 2019-07-29 RX ADMIN — Medication 4: at 05:43

## 2019-07-29 RX ADMIN — HEPARIN SODIUM 5000 UNIT(S): 5000 INJECTION INTRAVENOUS; SUBCUTANEOUS at 18:19

## 2019-07-29 RX ADMIN — Medication 1 PUFF(S): at 00:10

## 2019-07-29 RX ADMIN — Medication 81 MILLIGRAM(S): at 13:50

## 2019-07-29 RX ADMIN — Medication 4: at 00:00

## 2019-07-29 RX ADMIN — Medication 4: at 13:05

## 2019-07-29 RX ADMIN — Medication 4: at 18:19

## 2019-07-29 RX ADMIN — Medication 1 PUFF(S): at 05:44

## 2019-07-29 RX ADMIN — Medication 1 PUFF(S): at 18:19

## 2019-07-29 RX ADMIN — Medication 1 PATCH: at 13:51

## 2019-07-29 NOTE — SWALLOW BEDSIDE ASSESSMENT ADULT - ASR SWALLOW ASPIRATION MONITOR
change of breathing pattern/cough/gurgly voice/pneumonia/throat clearing/fever/upper respiratory infection
gurgly voice/pneumonia/change of breathing pattern/cough/fever/throat clearing/upper respiratory infection
TBD

## 2019-07-29 NOTE — PROGRESS NOTE ADULT - PROBLEM SELECTOR PLAN 5
hga1c 10  hyperglycemia  monitor FS   increase lantus - may need to change to nph if remains uncontrolled

## 2019-07-29 NOTE — PROGRESS NOTE ADULT - PROBLEM SELECTOR PLAN 8
Resolved; likely ATN in setting of cardiogenic shock  - Trend BMP daily   - monitor I/Os, electrolytes  initially home lisinopril held now being started on losartan - monitor cr  - Avoid nephrotoxins

## 2019-07-29 NOTE — PROGRESS NOTE ADULT - PROBLEM SELECTOR PLAN 3
initially started on vancomycin/zosyn  Monitoring off abx. No signs of sepsis at this point in time.   Satting well on room air at this time.  s+s f/u appreciated - plan for MBS tomorrow

## 2019-07-29 NOTE — SWALLOW BEDSIDE ASSESSMENT ADULT - SWALLOW EVAL: DIAGNOSIS
Pt presents with an oropharyngeal dysphagia characterized by delayed oral transit time and delayed pharyngeal swallows, slight change in vocal quality post swallow on honey thick liquids. R/O laryngeal penetration/aspiration. Improved alertness, attention to feeding task and participation evident from evaluation on 7/26/19.

## 2019-07-29 NOTE — PROGRESS NOTE ADULT - SUBJECTIVE AND OBJECTIVE BOX
SUBJECTIVE:    Patient seen and examined, ace dressing removed and Pieter applied today by vascular surgery.     OBJECTIVE:     ** VITAL SIGNS / I&O's **    T(C): 37 (07-29-19 @ 16:32), Max: 37 (07-29-19 @ 12:31)  T(F): 98.6 (07-29-19 @ 16:32), Max: 98.6 (07-29-19 @ 12:31)  HR: 90 (07-29-19 @ 16:32) (85 - 111)  BP: 111/71 (07-29-19 @ 16:32) (111/71 - 142/89)  RR: 18 (07-29-19 @ 16:32) (18 - 18)  SpO2: 100% (07-29-19 @ 16:32) (95% - 100%)      28 Jul 2019 07:01  -  29 Jul 2019 07:00  --------------------------------------------------------  IN:  Total IN: 0 mL    OUT:    Incontinent per Condom Catheter: 550 mL    Nasoenteral Tube: 600 mL  Total OUT: 1150 mL    Total NET: -1150 mL      29 Jul 2019 07:01  -  29 Jul 2019 20:19  --------------------------------------------------------  IN:  Total IN: 0 mL    OUT:    Incontinent per Condom Catheter: 250 mL  Total OUT: 250 mL    Total NET: -250 mL          ** PHYSICAL EXAM **    General: NAD   Musculoskeletal: L BKA stump with staples in place, well healing incisions, no surrounding erythema, no collections, Pieter dressing applied for comfort.     ** LABS **       144    |  107    |  19     ----------------------------<  232        ( 29 Jul 2019 07:10 )  3.8     |  25     |  0.80     Ca    9.2        ( 29 Jul 2019 07:10 )          CAPILLARY BLOOD GLUCOSE

## 2019-07-29 NOTE — PROGRESS NOTE ADULT - PROBLEM SELECTOR PLAN 1
Hst up to 3000s - down trended  no ischemic evaluation done - recommended for medical management per cardiology   c/w asa, plavix, statin  continue to monitor on telemetry

## 2019-07-29 NOTE — SWALLOW BEDSIDE ASSESSMENT ADULT - ASR SWALLOW DENTITION
edentulous, does not have dentures

## 2019-07-29 NOTE — SWALLOW BEDSIDE ASSESSMENT ADULT - SWALLOW EVAL: FUNCTIONAL LEVEL AT TIME OF EVAL
Continued: MRA Neck 7/27: Impression: 1. Small focus of infarct in the postcentral gyrus on the left which is chronic in appearance but new compared with 4/23/2015  2. Subtly diminished caliber of the left ICA relative to the right. At the level of the intracranial circulation suspicion of someatherosclerotic compromise to the cavernous carotid on the left.

## 2019-07-29 NOTE — PROGRESS NOTE ADULT - ASSESSMENT
69M w/ PMH of poor med compliance, DM2, HTN, PADs/p Left BKA 7/22 transferred from [facility] to [facility] CCU for NSTEMI c/b cardiogenic shock; acute respiratory failure with hypoxia and septic shock secondary to suspected aspiration pneumonia requiring intubation (now extubated) then transferred to floors with RRT 7/24 for shock state with elevated LFTs and lactate readmission to CCU, with resolution of shock state, noted to have pontine infarction on brain MR.     Acute decompensated HF  - As per chart review - appears to be new CHF; Patient not on BB as outpatient  - No JVD or LE on exam; lungs appear to be clear however poor inspiratory effort  - TTE at OSH EF 15%; Repeat TTE with EF 20-25%, moderately dilated left atrium, increased left atrial pressures, moderately dilated LV, global hypokinesis  - CXR with pulm edema during RRT, initially dry on exam, received hydration   -s/p Furosemide 40mg on 7/24  - f.u cardio recs regarding BB and lasix  - daily weights  - Monitor I/O strict  - Monitor on tele  - lying flat without SOB currently      Pleural effusion  - Patient initially requiring intubation for aspiration PNA at OSH; Had RRT on 7/24 for unresponsiveness, dyspnea and wheezing; CXR revealed b/l moderate pleural effusion; Admitted to CCU with shock liver; Repeat CXR demonstrating clear lungs since CCU admission  - Does not have a septic picture at this point; blood cx/sputum cx NGTD, observing off abx  - Stable on 2L NC, protecting airway  - Infectious etiology now less likely given resolution of leukocytosis, normal lactate, afebrile  - S/p Vanc and zosyn on 7/25 x1 in CCU and zosyn x2 days 7/13, no current s/s of infection monitor off abx    NSTEMI   Medically managed, TTE described as above  - Patient with acute rise in high sensitivity Trops likely 2/2 demand ischemia with negative delta   - LHC deferred due to concern for medication non compliance   - c/w DAPT: ASA, Plavix  - Holding statin in setting of elevated LFTs, however will add LFTs to today's labs and if significantly improved will restart statin  - BB held due to concern for ADHF   - f/u cardio recs  - Monitor on tele    Pontine Infarction  - CTH revealing area of possible pontine infarction  - Neuro consulted, recommending MR brain  - F/u neuro recs      PAD   - Patient s/p L BKA    - VA duplex of b/l LE: 07/06: R popliteal artery with severe flow-limiting stenosis, L pop and bypass graft are occluded   - Vascular rec: Continue ASA and Plavix; dressing with prevena. Immobilizer for 48 hours. remove bypass staples prior to discharge.   - f/u vascular surg for further management      Transaminitis  - patient initially with evidence of shock liver (elevated LFTs and lactate)  - LFTs downtrended   - Trend CMP daily  - CT A/P ordered to r/o ischemia: No acute intra-abdominal or pelvic pathology. Moderate bilateral pleural effusions.  - avoid hepatotoxics     VINAY on CKD  Resolved; likely ATN in setting of cardiogenic shock  - Trend BMP daily   - monitor I/Os, electrolytes  - lisinopril held in setting of VINAY; may resume  - Avoid nephrotoxins      Diabetes Mellitus  - Last A1c 10  - c/w lantus 8 units (Previously on Lantus 20u prior to BKA)  - Monitor FS  - C/w ISS moderate    Anemia  - No gross signs of bleeding  - Trend CBC daily    DVT: hep sc q8  Diet: NPO with NGT feeds  will d/w family/GI about PEG next week    poor overall prognosis.    d/w RN at bedside plan of care 69M w/ PMH of poor med compliance, DM2, HTN, PADs/p Left BKA 7/22 transferred from [facility] to [facility] CCU for NSTEMI c/b cardiogenic shock; acute respiratory failure with hypoxia and septic shock secondary to suspected aspiration pneumonia requiring intubation (now extubated) then transferred to floors with RRT 7/24 for shock state with elevated LFTs and lactate readmission to CCU, with resolution of shock state, concern for acute pontine stroke on ct not seen on MRI brain.

## 2019-07-29 NOTE — SWALLOW BEDSIDE ASSESSMENT ADULT - SWALLOW EVAL: PROGNOSIS
Continued: CT Head 7/25: IMPRESSION: Age-appropriate involutional change and microvascular ischemic disease. Questionable evolving infarct at the level of the brainstem specifically the barbara. Cannot entirely exclude evolving infarct in this region though this may represent beam hardening artifact. Correlation with MR recommended. No gross change compared with prior 7/24/19.
good
CT Head 7/25: IMPRESSION: Age-appropriate involutional change and microvascular ischemic disease. Questionable evolving infarct at the level of the brainstem specifically the barbara. Cannot entirely exclude evolving infarct in this region though this may represent beam hardening artifact. Correlation with MR recommended. No gross change compared with prior 7/24/19.
good

## 2019-07-29 NOTE — SWALLOW BEDSIDE ASSESSMENT ADULT - SPECIFY REASON(S)
to subjectively assess the swallow mechanism and assess for potential diet upgrade
to subjectively assess the swallow mechanism and r/o dysphagia
to subjectively reassess the swallow mechanism r/o dysphagia
to subjectively assess swallow mechanism and r/o dysphagia
to subjectively re-assess the swallow mechanism 2/2 change in status.

## 2019-07-29 NOTE — PROGRESS NOTE ADULT - ASSESSMENT
69 year old male hx of HTN, T2DM, PVD initially presented to [facility] for a Left popliteal bypass (based on documentation appears to have been performed as later documentation makes note of occluded graft), with hospital course c/b hypoxic respiratory failure 2/2 aspiration PNA (patient was intubated at [facility]) and transferred here for an NSTEMI and cardiogenic shock as a direct admit to the CCU. ECHO at OSH with EF Of 15% with focal hypokinesis. Patient was medically managed in the CCU.    #NSTEMI in the setting of CAD.   - No ischemic eval performed yet.  - However, in the setting of severe PAD, pt likely has CAD as well.   - He has been medically treated for an NSTEMI.   - C/w ASA and plavix at this time.   - Pt would benefit from Atorvastatin however, it is being held in the setting of elevated LFTs.   - Recheck LFTs and consider starting statin.   - Start Losartan.   - No plan for cath or stress at this time.     #HFrEF likely in the setting of ICM  - Pt with reduced EF with some segmentality.   - At this time pt is still on O2, and has rales and exp rhonchi on exam.   - Would start IV diuresis for a few days. Lasix IV 40 daily, first dose today.   - Would not start BB at this time.   - Check daily weights and strict Is and Os.   - Start Losartan 25 mg daily. First dose today.   - recheck Coags. Check daily lactate.     #PVD  - C/w ASA and Plavix   - Pt would benefit from statin.     Terri Baeza MD  Cardiology Fellow - PGY 5  Text or Call: [phone]  For all New Consults and Questions:  [email or web address]   Login: [email or web address]

## 2019-07-29 NOTE — SWALLOW BEDSIDE ASSESSMENT ADULT - MODE OF PRESENTATION
1/2 teaspoon, 3/4 teaspoon 1/ teaspoon/spoon/fed by clinician spoon/fed by clinician/1/2 teaspoon, 1 teaspoon

## 2019-07-29 NOTE — PROGRESS NOTE ADULT - SUBJECTIVE AND OBJECTIVE BOX
Patient is a 69y old  Male who presents with a chief complaint of NSTEMI (29 Jul 2019 07:34)        SUBJECTIVE / OVERNIGHT EVENTS: no acute complaints.       MEDICATIONS  (STANDING):  aspirin  chewable 81 milliGRAM(s) Enteral Tube daily  clopidogrel Tablet 75 milliGRAM(s) Oral daily  dextrose 5%. 1000 milliLiter(s) (50 mL/Hr) IV Continuous <Continuous>  dextrose 50% Injectable 12.5 Gram(s) IV Push once  dextrose 50% Injectable 25 Gram(s) IV Push once  dextrose 50% Injectable 25 Gram(s) IV Push once  heparin  Injectable 5000 Unit(s) SubCutaneous every 12 hours  insulin glargine Injectable (LANTUS) 8 Unit(s) SubCutaneous at bedtime  insulin lispro (HumaLOG) corrective regimen sliding scale   SubCutaneous every 6 hours  ipratropium 17 MICROgram(s) HFA Inhaler 1 Puff(s) Inhalation every 6 hours  nicotine -   7 mG/24Hr(s) Patch 1 patch Transdermal daily    MEDICATIONS  (PRN):  dextrose 40% Gel 15 Gram(s) Oral once PRN Blood Glucose LESS THAN 70 milliGRAM(s)/deciliter  glucagon  Injectable 1 milliGRAM(s) IntraMuscular once PRN Glucose LESS THAN 70 milligrams/deciliter  melatonin 3 milliGRAM(s) Oral at bedtime PRN Insomnia  oxyCODONE    IR 10 milliGRAM(s) Oral every 4 hours PRN Severe Pain (7 - 10)  oxyCODONE    IR 5 milliGRAM(s) Oral every 4 hours PRN Moderate Pain (4 - 6)      Vital Signs Last 24 Hrs  T(C): 37 (29 Jul 2019 12:31), Max: 37 (29 Jul 2019 12:31)  T(F): 98.6 (29 Jul 2019 12:31), Max: 98.6 (29 Jul 2019 12:31)  HR: 105 (29 Jul 2019 12:31) (85 - 111)  BP: 142/89 (29 Jul 2019 12:31) (128/83 - 142/89)  BP(mean): --  RR: 18 (29 Jul 2019 12:31) (18 - 18)  SpO2: 95% (29 Jul 2019 12:31) (95% - 99%)  CAPILLARY BLOOD GLUCOSE      POCT Blood Glucose.: 246 mg/dL (29 Jul 2019 12:22)  POCT Blood Glucose.: 214 mg/dL (29 Jul 2019 05:36)  POCT Blood Glucose.: 220 mg/dL (28 Jul 2019 23:43)  POCT Blood Glucose.: 228 mg/dL (28 Jul 2019 21:31)  POCT Blood Glucose.: 231 mg/dL (28 Jul 2019 17:56)    I&O's Summary    28 Jul 2019 07:01  -  29 Jul 2019 07:00  --------------------------------------------------------  IN: 0 mL / OUT: 1150 mL / NET: -1150 mL    29 Jul 2019 07:01  -  29 Jul 2019 14:30  --------------------------------------------------------  IN: 0 mL / OUT: 250 mL / NET: -250 mL        PHYSICAL EXAM:  GENERAL: NAD  HEAD:  Atraumatic, Normocephalic  EYES: conjunctiva and sclera clear  Nose: +NGT  NECK: No JVD  CHEST/LUNG: CTA b/l  HEART: S1 S2 RRR  ABDOMEN: +BS Soft, NT/ND  EXTREMITIES:  left BKA dressing with ace wrap  SKIN: No rash or new discoloration  NERVOUS SYSTEM:  Alert & Orientedx1; moving all extremities    LABS:                        11.0   10.1  )-----------( 289      ( 28 Jul 2019 06:27 )             34.8     07-29    144  |  107  |  19  ----------------------------<  232<H>  3.8   |  25  |  0.80    Ca    9.2      29 Jul 2019 07:10    TPro  7.2  /  Alb  2.7<L>  /  TBili  0.7  /  DBili  x   /  AST  370<H>  /  ALT  608<H>  /  AlkPhos  171<H>  07-28              RADIOLOGY & ADDITIONAL TESTS:    Imaging Personally Reviewed:  Consultant(s) Notes Reviewed:    Care Discussed with Consultants/Other Providers: d/w Swallow therapist Gayatri Mayfield regarding hospital course and previous evaluations Patient is a 69y old  Male who presents with a chief complaint of NSTEMI (29 Jul 2019 07:34)        SUBJECTIVE / OVERNIGHT EVENTS: no acute complaints.       MEDICATIONS  (STANDING):  aspirin  chewable 81 milliGRAM(s) Enteral Tube daily  clopidogrel Tablet 75 milliGRAM(s) Oral daily  dextrose 5%. 1000 milliLiter(s) (50 mL/Hr) IV Continuous <Continuous>  dextrose 50% Injectable 12.5 Gram(s) IV Push once  dextrose 50% Injectable 25 Gram(s) IV Push once  dextrose 50% Injectable 25 Gram(s) IV Push once  heparin  Injectable 5000 Unit(s) SubCutaneous every 12 hours  insulin glargine Injectable (LANTUS) 8 Unit(s) SubCutaneous at bedtime  insulin lispro (HumaLOG) corrective regimen sliding scale   SubCutaneous every 6 hours  ipratropium 17 MICROgram(s) HFA Inhaler 1 Puff(s) Inhalation every 6 hours  nicotine -   7 mG/24Hr(s) Patch 1 patch Transdermal daily    MEDICATIONS  (PRN):  dextrose 40% Gel 15 Gram(s) Oral once PRN Blood Glucose LESS THAN 70 milliGRAM(s)/deciliter  glucagon  Injectable 1 milliGRAM(s) IntraMuscular once PRN Glucose LESS THAN 70 milligrams/deciliter  melatonin 3 milliGRAM(s) Oral at bedtime PRN Insomnia  oxyCODONE    IR 10 milliGRAM(s) Oral every 4 hours PRN Severe Pain (7 - 10)  oxyCODONE    IR 5 milliGRAM(s) Oral every 4 hours PRN Moderate Pain (4 - 6)      Vital Signs Last 24 Hrs  T(C): 37 (29 Jul 2019 12:31), Max: 37 (29 Jul 2019 12:31)  T(F): 98.6 (29 Jul 2019 12:31), Max: 98.6 (29 Jul 2019 12:31)  HR: 105 (29 Jul 2019 12:31) (85 - 111)  BP: 142/89 (29 Jul 2019 12:31) (128/83 - 142/89)  BP(mean): --  RR: 18 (29 Jul 2019 12:31) (18 - 18)  SpO2: 95% (29 Jul 2019 12:31) (95% - 99%)  CAPILLARY BLOOD GLUCOSE      POCT Blood Glucose.: 246 mg/dL (29 Jul 2019 12:22)  POCT Blood Glucose.: 214 mg/dL (29 Jul 2019 05:36)  POCT Blood Glucose.: 220 mg/dL (28 Jul 2019 23:43)  POCT Blood Glucose.: 228 mg/dL (28 Jul 2019 21:31)  POCT Blood Glucose.: 231 mg/dL (28 Jul 2019 17:56)    I&O's Summary    28 Jul 2019 07:01  -  29 Jul 2019 07:00  --------------------------------------------------------  IN: 0 mL / OUT: 1150 mL / NET: -1150 mL    29 Jul 2019 07:01  -  29 Jul 2019 14:30  --------------------------------------------------------  IN: 0 mL / OUT: 250 mL / NET: -250 mL        PHYSICAL EXAM:  GENERAL: NAD  HEAD:  Atraumatic, Normocephalic  EYES: conjunctiva and sclera clear  Nose: +NGT  NECK: No JVD  CHEST/LUNG: CTA b/l  HEART: S1 S2 RRR  ABDOMEN: +BS Soft, NT/ND  EXTREMITIES:  left BKA dressing with ace wrap  SKIN: No rash or new discoloration  NERVOUS SYSTEM:  Alert & Orientedx1; moving all extremities    LABS:                        11.0   10.1  )-----------( 289      ( 28 Jul 2019 06:27 )             34.8     07-29    144  |  107  |  19  ----------------------------<  232<H>  3.8   |  25  |  0.80    Ca    9.2      29 Jul 2019 07:10    TPro  7.2  /  Alb  2.7<L>  /  TBili  0.7  /  DBili  x   /  AST  370<H>  /  ALT  608<H>  /  AlkPhos  171<H>  07-28              RADIOLOGY & ADDITIONAL TESTS:    Imaging Personally Reviewed:   Consultant(s) Notes Reviewed:    Care Discussed with Consultants/Other Providers: d/w Swallow therapist Gayatri Mayfield regarding hospital course and previous evaluations

## 2019-07-29 NOTE — PROGRESS NOTE ADULT - ASSESSMENT
PENDED NOTE    70 y/o M with a PMH of HTN, DM, PAD who is transferred to [facility] for management of cardiogenic shock and NSTEMI. As per sign out, the pt was admitted for L popliteal bypass to [facility]. During his time there, he was refusing medications  and was found to be progressively more dyspneic. He had an echo done there which showed that he has an EF of <15% with global hypokinesis.  Neuro consulted for AMS.  Neurology following up for AMS  Chronic stroke on MRI unlikely etiology of current AMS, likely due to multifactorial factors    Plan  [] consider routine EEG  [] continue ASA, plavix 68 y/o M with a PMH of HTN, DM, PAD who is transferred to [facility] for management of cardiogenic shock and NSTEMI. As per sign out, the pt was admitted for L popliteal bypass to [facility]. During his time there, he was refusing medications  and was found to be progressively more dyspneic. He had an echo done there which showed that he has an EF of <15% with global hypokinesis.  Neuro consulted for AMS.  Neurology following up for AMS  Chronic stroke on MRI unlikely etiology of current AMS, likely due to multifactorial factors    Plan  [] consider routine EEG  [] continue ASA, plavix  [] rest of management per primary team

## 2019-07-29 NOTE — SWALLOW BEDSIDE ASSESSMENT ADULT - PHARYNGEAL PHASE
+effortful swallows/Delayed pharyngeal swallow + effortful swallows, slight change in vocal quality (wet)  noted on 1 teaspoon/Delayed pharyngeal swallow

## 2019-07-29 NOTE — SWALLOW BEDSIDE ASSESSMENT ADULT - SWALLOW EVAL: CRITERIA FOR SKILLED INTERVENTION MET
pending results of MBS
demonstrates skilled criteria for swallowing intervention

## 2019-07-29 NOTE — PROGRESS NOTE ADULT - SUBJECTIVE AND OBJECTIVE BOX
Interval History:  MRI brain showed Small focus of infarct in the postcentral gyrus on the left which appears chronic  Stroke neuro following up on persistent AMS    PAST MEDICAL & SURGICAL HISTORY:  Hyperlipidemia  Hypertension  Diabetes  H/O mastoidectomy    MEDICATIONS  (STANDING):  aspirin  chewable 81 milliGRAM(s) Enteral Tube daily  clopidogrel Tablet 75 milliGRAM(s) Oral daily  dextrose 5%. 1000 milliLiter(s) (50 mL/Hr) IV Continuous <Continuous>  dextrose 50% Injectable 12.5 Gram(s) IV Push once  dextrose 50% Injectable 25 Gram(s) IV Push once  dextrose 50% Injectable 25 Gram(s) IV Push once  heparin  Injectable 5000 Unit(s) SubCutaneous every 12 hours  insulin glargine Injectable (LANTUS) 14 Unit(s) SubCutaneous at bedtime  insulin lispro (HumaLOG) corrective regimen sliding scale   SubCutaneous every 6 hours  ipratropium 17 MICROgram(s) HFA Inhaler 1 Puff(s) Inhalation every 6 hours  nicotine -   7 mG/24Hr(s) Patch 1 patch Transdermal daily    MEDICATIONS  (PRN):  dextrose 40% Gel 15 Gram(s) Oral once PRN Blood Glucose LESS THAN 70 milliGRAM(s)/deciliter  glucagon  Injectable 1 milliGRAM(s) IntraMuscular once PRN Glucose LESS THAN 70 milligrams/deciliter  melatonin 3 milliGRAM(s) Oral at bedtime PRN Insomnia  oxyCODONE    IR 10 milliGRAM(s) Oral every 4 hours PRN Severe Pain (7 - 10)  oxyCODONE    IR 5 milliGRAM(s) Oral every 4 hours PRN Moderate Pain (4 - 6)     Vital Signs Last 24 Hrs  T(C): 37 (29 Jul 2019 16:32), Max: 37 (29 Jul 2019 12:31)  T(F): 98.6 (29 Jul 2019 16:32), Max: 98.6 (29 Jul 2019 12:31)  HR: 90 (29 Jul 2019 16:32) (85 - 111)  BP: 111/71 (29 Jul 2019 16:32) (111/71 - 142/89)  BP(mean): --  RR: 18 (29 Jul 2019 16:32) (18 - 18)  SpO2: 100% (29 Jul 2019 16:32) (95% - 100%)    **PATIENT WILL BE EVALUATED WITH TEAM ON 7/30.  NEUROLOGICAL EXAM:  MS: AAOX3, fluent, attends b/l; recent and remote memory intact; normal attention, language and fund of knowledge.   CN: VFF, EOMI, PERRL, no SHAKIRA, no APD,  V1-3 intact, no facial asymmetry, t/p midline, SCM/trap intact.  Eyes-Fundi: no papilledema.  Motor: Strength: 5/5 4x. Tone: normal. Bulk: normal. DTR 2+ symm.  Plantar flex b/l. Sensation: intact to LT/PP/Vibration/Position/Temperature 4x.   Coordination: intact 4x.   Gait:  Romberg negative, pull test negative; walks with narrow base, pivots in 2 steps.    Labs:   cbc                      11.0   10.1  )-----------( 289      ( 28 Jul 2019 06:27 )             34.8     [id number]    144  |  107  |  19  ----------------------------<  232<H>  3.8   |  25  |  0.80    Ca    9.2      29 Jul 2019 07:10    TPro  7.2  /  Alb  2.7<L>  /  TBili  0.7  /  DBili  x   /  AST  370<H>  /  ALT  608<H>  /  AlkPhos  171<H>  07-28 Interval History:  MRI brain showed Small focus of infarct in the postcentral gyrus on the left which appears chronic  Stroke neuro following up on persistent AMS    PAST MEDICAL & SURGICAL HISTORY:  Hyperlipidemia  Hypertension  Diabetes  H/O mastoidectomy    MEDICATIONS  (STANDING):  aspirin  chewable 81 milliGRAM(s) Enteral Tube daily  clopidogrel Tablet 75 milliGRAM(s) Oral daily  dextrose 5%. 1000 milliLiter(s) (50 mL/Hr) IV Continuous <Continuous>  dextrose 50% Injectable 12.5 Gram(s) IV Push once  dextrose 50% Injectable 25 Gram(s) IV Push once  dextrose 50% Injectable 25 Gram(s) IV Push once  heparin  Injectable 5000 Unit(s) SubCutaneous every 12 hours  insulin glargine Injectable (LANTUS) 14 Unit(s) SubCutaneous at bedtime  insulin lispro (HumaLOG) corrective regimen sliding scale   SubCutaneous every 6 hours  ipratropium 17 MICROgram(s) HFA Inhaler 1 Puff(s) Inhalation every 6 hours  nicotine -   7 mG/24Hr(s) Patch 1 patch Transdermal daily    MEDICATIONS  (PRN):  dextrose 40% Gel 15 Gram(s) Oral once PRN Blood Glucose LESS THAN 70 milliGRAM(s)/deciliter  glucagon  Injectable 1 milliGRAM(s) IntraMuscular once PRN Glucose LESS THAN 70 milligrams/deciliter  melatonin 3 milliGRAM(s) Oral at bedtime PRN Insomnia  oxyCODONE    IR 10 milliGRAM(s) Oral every 4 hours PRN Severe Pain (7 - 10)  oxyCODONE    IR 5 milliGRAM(s) Oral every 4 hours PRN Moderate Pain (4 - 6)     Vital Signs Last 24 Hrs  T(C): 37 (29 Jul 2019 16:32), Max: 37 (29 Jul 2019 12:31)  T(F): 98.6 (29 Jul 2019 16:32), Max: 98.6 (29 Jul 2019 12:31)  HR: 90 (29 Jul 2019 16:32) (85 - 111)  BP: 111/71 (29 Jul 2019 16:32) (111/71 - 142/89)  BP(mean): --  RR: 18 (29 Jul 2019 16:32) (18 - 18)  SpO2: 100% (29 Jul 2019 16:32) (95% - 100%)    NEUROLOGICAL EXAM:  MS: AOx0, not participating with exam  CN: grossly no facial droop, eyes are midline  Motor: grossly AG, L BKA.   Coordination: cannot assess  Gait:  deferred    Labs:   cbc                      11.0   10.1  )-----------( 289      ( 28 Jul 2019 06:27 )             34.8     [id number]    144  |  107  |  19  ----------------------------<  232<H>  3.8   |  25  |  0.80    Ca    9.2      29 Jul 2019 07:10    TPro  7.2  /  Alb  2.7<L>  /  TBili  0.7  /  DBili  x   /  AST  370<H>  /  ALT  608<H>  /  AlkPhos  171<H>  07-28

## 2019-07-29 NOTE — PROGRESS NOTE ADULT - PROBLEM SELECTOR PLAN 2
appears to be new CHF - s/p NSTEMI/cardiogenic shock.   s/p diuresis now appears euvolemic on exam  TTE at OSH EF 15%; Repeat TTE with EF 20-25%, moderately dilated left atrium, increased left atrial pressures, moderately dilated LV, global hypokinesis  monitor volume status closely for need for further lasix  will start losartan 25mg qd  bb held in setting of acute heart failure - ?cards f/u regarding starting low dose bb   - daily weights  - Monitor I/O strict  - continue Monitor on tele - occasional PVCs

## 2019-07-29 NOTE — PROGRESS NOTE ADULT - SUBJECTIVE AND OBJECTIVE BOX
Patient seen and examined at bedside.    Overnight Events: No acute events overnight.       REVIEW OF SYSTEMS:  Constitutional:     [ ] negative [ ] fevers [ ] chills [ ] weight loss [ ] weight gain  HEENT:                  [ ] negative [ ] dry eyes [ ] eye irritation [ ] postnasal drip [ ] nasal congestion  CV:                         [ ] negative  [ ] chest pain [ ] orthopnea [ ] palpitations [ ] murmur  Resp:                     [ ] negative [ ] cough [ ] shortness of breath [ ] dyspnea [ ] wheezing [ ] sputum [ ]hemoptysis  GI:                          [ ] negative [ ] nausea [ ] vomiting [ ] diarrhea [ ] constipation [ ] abd pain [ ] dysphagia   :                        [ ] negative [ ] dysuria [ ] nocturia [ ] hematuria [ ] increased urinary frequency  Musculoskeletal: [ ] negative [ ] back pain [ ] myalgias [ ] arthralgias [ ] fracture  Skin:                       [ ] negative [ ] rash [ ] itch  Neurological:        [ ] negative [ ] headache [ ] dizziness [ ] syncope [ ] weakness [ ] numbness  Psychiatric:           [ ] negative [ ] anxiety [ ] depression  Endocrine:            [ ] negative [ ] diabetes [ ] thyroid problem  Heme/Lymph:      [ ] negative [ ] anemia [ ] bleeding problem  Allergic/Immune: [ ] negative [ ] itchy eyes [ ] nasal discharge [ ] hives [ ] angioedema    [x ] All other systems negative  [ ] Unable to assess ROS due to    Current Meds:  aspirin  chewable 81 milliGRAM(s) Enteral Tube daily  clopidogrel Tablet 75 milliGRAM(s) Oral daily  dextrose 40% Gel 15 Gram(s) Oral once PRN  dextrose 5%. 1000 milliLiter(s) IV Continuous <Continuous>  dextrose 50% Injectable 12.5 Gram(s) IV Push once  dextrose 50% Injectable 25 Gram(s) IV Push once  dextrose 50% Injectable 25 Gram(s) IV Push once  glucagon  Injectable 1 milliGRAM(s) IntraMuscular once PRN  heparin  Injectable 5000 Unit(s) SubCutaneous every 12 hours  insulin glargine Injectable (LANTUS) 8 Unit(s) SubCutaneous at bedtime  insulin lispro (HumaLOG) corrective regimen sliding scale   SubCutaneous every 6 hours  ipratropium 17 MICROgram(s) HFA Inhaler 1 Puff(s) Inhalation every 6 hours  melatonin 3 milliGRAM(s) Oral at bedtime PRN  nicotine -   7 mG/24Hr(s) Patch 1 patch Transdermal daily  oxyCODONE    IR 10 milliGRAM(s) Oral every 4 hours PRN  oxyCODONE    IR 5 milliGRAM(s) Oral every 4 hours PRN      PAST MEDICAL & SURGICAL HISTORY:  Hyperlipidemia  Hypertension  Diabetes  H/O mastoidectomy      Vitals:  T(F): 98.2 (07-29), Max: 98.4 (07-28)  HR: 92 (07-29) (85 - 94)  BP: 129/80 (07-29) (115/71 - 129/80)  RR: 18 (07-29)  SpO2: 96% (07-29)  I&O's Summary    28 Jul 2019 07:01  -  29 Jul 2019 07:00  --------------------------------------------------------  IN: 0 mL / OUT: 1150 mL / NET: -1150 mL        Physical Exam:  Appearance: No acute distress; well appearing, NGT in place.   Eyes: PERRL, EOMI, pink conjunctiva  HENT: Normal oral mucosa  Cardiovascular: RRR, S1, S2, no murmurs, rubs, or gallops; no edema; no JVD  Respiratory: rales and rhonchi on exam.   Gastrointestinal: soft, non-tender, non-distended with normal bowel sounds  Musculoskeletal: BKA  Neurologic: Non-focal  Lymphatic: No lymphadenopathy  Psychiatry: AAOx3, mood & affect appropriate  Skin: No rashes, ecchymoses, or cyanosis                          11.0   10.1  )-----------( 289      ( 28 Jul 2019 06:27 )             34.8     07-28    143  |  108  |  22  ----------------------------<  297<H>  4.0   |  24  |  0.79    Ca    8.8      28 Jul 2019 06:27    TPro  7.2  /  Alb  2.7<L>  /  TBili  0.7  /  DBili  x   /  AST  370<H>  /  ALT  608<H>  /  AlkPhos  171<H>  07-28                  New ECG(s): Personally reviewed    Echo:  < from: Transthoracic Echocardiogram (07.06.19 @ 09:48) >  ------------------------------------------------------------------------  Conclusions:  1. Mild mitral regurgitation.  2. Moderately dilated left atrium.  LA volume index = 47  cc/m2.  The interatrial setpum is bowed towards the right  suggesting elevated left atrial pressure.  3. The left ventricle is moderately dilated.  4. There is global hypokineiss with minor regional  variation.  The LVEF about 20-25%.  5. Normal right ventricular size and function.  6. Normal tricuspid valve. No tricuspid regurgitation.  7. There is no pericardial effusion.  8. There is a left pleural effusion.  There are no prior studies available for comparison.    < end of copied text >          Interpretation of Telemetry: Sinus 80 - 90

## 2019-07-29 NOTE — SWALLOW BEDSIDE ASSESSMENT ADULT - SWALLOW EVAL: ORAL MUSCULATURE
unable to assess due to poor participation/comprehension
anomalies present
limited 2/2 reduced command following
limited 2/2 reduced command following
anomalies present

## 2019-07-29 NOTE — SWALLOW BEDSIDE ASSESSMENT ADULT - ORAL PREPARATORY PHASE
reduced orientation to feeding/Reduced oral grading
Will continue Vancomycin IV for now  Repeat blood cultures  Do TTE  possible source cellulitis  Follow up repeat cultures and sensitivities
Reduced oral grading
Within functional limits
Within functional limits

## 2019-07-29 NOTE — PROGRESS NOTE ADULT - ASSESSMENT
A/P: 69y Male with Hx of LE gangrene due to PVD s/p left BKA 7/22    - Physical therapy: OOB with assistance.   - Continue ASA and Plavix  - Immobilizer every night. Remove bypass staples prior to discharge. Do not remove BKA staples.   - Ace wrap removed, BKA stump healing well, may keep BKA stump open to air or abd pads/Pieter dressing for comfort     Vascular Surgery   [phone]

## 2019-07-29 NOTE — SWALLOW BEDSIDE ASSESSMENT ADULT - SWALLOW EVAL: PATIENT/FAMILY GOALS STATEMENT
"I want a hamburger and raspberry ice tea"
"I want to drink cold water"
"I WANT STRAWBERRY ICE TEA".
"I want to eat"

## 2019-07-29 NOTE — SWALLOW BEDSIDE ASSESSMENT ADULT - SLP GENERAL OBSERVATIONS
Pt found in bed. Sleeping upon encounter. Awakens briefly to tactile stimulation. Mumbling at times. Not following directions. Closing eyes when speaking. RR=20. 93% O2.
Patient seen in bed, repositioned to 90 degrees, in good spirits, denied pain. A+Ox1 (self). Patient with known Hx of agitation; however, patient calm and cooperative at time of evaluation. B/L mittens had been removed. Patient able to follow simple directions utilized for this evaluation. Observed with improved orientation and concentration during PO trials compared to previous evaluations.
Pt found in bed, sleeping upon encounter. Pt awakens briefly with verbal stimulation however requires constant re-direction to task 2/2 lethargy. Pt asking for "water and "ice tea". Following simple 1-step commands ~50% with delayed response time. A&O to name.
Pt found in bed. + KFT, +bilateral mittens, + nasal cannula, + Left BKA Pt A&O x1 to name. Cooperative for evaluation. Follows simple 1-step directives given verbal cues ~75%.
Patient seen in bed at 90 degrees, A+Ox1 (self), in good spirits, denied pain. Patient was confused re: situation/environment. Patient followed simple directions 50% of the time. + 3L O2 via NC not currently in nose; nursing aware and assessed. Pt tolerating room air, saturating around 97-98% and left of NC for evaluation. Patient currently on enhanced supervision for fluctuating agitation.

## 2019-07-29 NOTE — SWALLOW BEDSIDE ASSESSMENT ADULT - ASR SWALLOW RECOMMEND DIAG
VFSS/MBS/Pt seen in radiology for Modified Barium Swallow Study. Please see recommendations below and report in document section. D/W Dr. Marshall and NP Manuela. Order for mbs placed and exam scheduled for 7/30.
VFSS/MBS/scheduled 7/11/19
defer at this time

## 2019-07-29 NOTE — PROGRESS NOTE ADULT - PROBLEM SELECTOR PLAN 6
s/p recent bypass at [facility] presented with ischemic limb  -VA duplex of b/l LE: 07/06: On the right, there is a severe flow-limiting stenosis of the popliteal artery and the trifurcation arteries are occluded in the calf.  On the left, the bypass graft is occluded, the popliteal artery is occluded and the trifurcation arteries are occluded.  - s/p Left BKA on 7/22/19  - Per vascular bypass staples to be taken out prior to discharge to [facility] next week. not to remove BKA staples  - c/w DAPT, statin  vascular surg f/u

## 2019-07-29 NOTE — SWALLOW BEDSIDE ASSESSMENT ADULT - SLP PERTINENT HISTORY OF CURRENT PROBLEM
70 y/o M w/ PMH of DM, HTN with poor medication compliance and PAD was admitted to CCU (7/5/19) from [facility] s/p L popliteal bypass for cardiogenic shock management w/ NSTEMI and a course complicated by hypoxemic respiratory failure secondary to suspected aspiration PNA 2/2 episode of vomiting while intubated; now in septic shock. Known hx of poor medication compliance. Vascular surgery consulted (7/6/19) and following for no dopplerable signals and ordered an urgent lower extremity arterial duplex. Patient regained biphasic Doppler pulse when given Heparin infusion as per Cardiology (7/6/19). Patient self-extubated (7/8/19). Podiatry consulted (7/9/19) for dry gangrene, being treated with betadine. Patient transferred from CCU to general medicine (7/9/19). Patient self-removed NGT (7/9/19), B/L mittens placed. Patient remains on antibiotics for aspiration PNA. Pt with VINAY likely in the setting of cardiogenic/septic shock. (Continued)

## 2019-07-29 NOTE — PROGRESS NOTE ADULT - PROBLEM SELECTOR PLAN 4
due to shock liver from cardiogenic shock   LFTs trending down  holding lipitor and depakote (hepatotoxic meds).  CT Abdomen and US abd - liver wnl

## 2019-07-29 NOTE — SWALLOW BEDSIDE ASSESSMENT ADULT - SWALLOW EVAL: RECOMMENDED DIET
Continue NPO, with non-oral nutrition/hydration/medications pending results of modified barium swallow.
dysphagia 1, nectar thick liquids
Dysphagia 2, thin liquids
NPO, with non-oral nutrition/hydration/medications.

## 2019-07-29 NOTE — SWALLOW BEDSIDE ASSESSMENT ADULT - SWALLOW EVAL: SECRETION MANAGEMENT
no overt difficulties managing secretions
adequately managing secretions
no overt difficulties managing secretions

## 2019-07-30 LAB
ANION GAP SERPL CALC-SCNC: 13 MMOL/L — SIGNIFICANT CHANGE UP (ref 5–17)
BUN SERPL-MCNC: 24 MG/DL — HIGH (ref 7–23)
CALCIUM SERPL-MCNC: 9.3 MG/DL — SIGNIFICANT CHANGE UP (ref 8.4–10.5)
CHLORIDE SERPL-SCNC: 110 MMOL/L — HIGH (ref 96–108)
CO2 SERPL-SCNC: 24 MMOL/L — SIGNIFICANT CHANGE UP (ref 22–31)
CREAT SERPL-MCNC: 0.85 MG/DL — SIGNIFICANT CHANGE UP (ref 0.5–1.3)
CULTURE RESULTS: SIGNIFICANT CHANGE UP
CULTURE RESULTS: SIGNIFICANT CHANGE UP
GLUCOSE BLDC GLUCOMTR-MCNC: 214 MG/DL — HIGH (ref 70–99)
GLUCOSE BLDC GLUCOMTR-MCNC: 224 MG/DL — HIGH (ref 70–99)
GLUCOSE BLDC GLUCOMTR-MCNC: 275 MG/DL — HIGH (ref 70–99)
GLUCOSE BLDC GLUCOMTR-MCNC: 284 MG/DL — HIGH (ref 70–99)
GLUCOSE BLDC GLUCOMTR-MCNC: 304 MG/DL — HIGH (ref 70–99)
GLUCOSE SERPL-MCNC: 300 MG/DL — HIGH (ref 70–99)
POTASSIUM SERPL-MCNC: 4.2 MMOL/L — SIGNIFICANT CHANGE UP (ref 3.5–5.3)
POTASSIUM SERPL-SCNC: 4.2 MMOL/L — SIGNIFICANT CHANGE UP (ref 3.5–5.3)
SODIUM SERPL-SCNC: 147 MMOL/L — HIGH (ref 135–145)
SPECIMEN SOURCE: SIGNIFICANT CHANGE UP
SPECIMEN SOURCE: SIGNIFICANT CHANGE UP

## 2019-07-30 PROCEDURE — 74230 X-RAY XM SWLNG FUNCJ C+: CPT | Mod: 26

## 2019-07-30 PROCEDURE — 99233 SBSQ HOSP IP/OBS HIGH 50: CPT

## 2019-07-30 PROCEDURE — 95816 EEG AWAKE AND DROWSY: CPT | Mod: 26

## 2019-07-30 PROCEDURE — 99233 SBSQ HOSP IP/OBS HIGH 50: CPT | Mod: GC

## 2019-07-30 RX ORDER — INSULIN GLARGINE 100 [IU]/ML
22 INJECTION, SOLUTION SUBCUTANEOUS AT BEDTIME
Refills: 0 | Status: DISCONTINUED | OUTPATIENT
Start: 2019-07-30 | End: 2019-07-31

## 2019-07-30 RX ORDER — LOSARTAN POTASSIUM 100 MG/1
25 TABLET, FILM COATED ORAL DAILY
Refills: 0 | Status: DISCONTINUED | OUTPATIENT
Start: 2019-07-31 | End: 2019-08-07

## 2019-07-30 RX ADMIN — Medication 1 PATCH: at 11:35

## 2019-07-30 RX ADMIN — CLOPIDOGREL BISULFATE 75 MILLIGRAM(S): 75 TABLET, FILM COATED ORAL at 11:35

## 2019-07-30 RX ADMIN — Medication 1 PUFF(S): at 05:17

## 2019-07-30 RX ADMIN — Medication 6: at 06:30

## 2019-07-30 RX ADMIN — INSULIN GLARGINE 22 UNIT(S): 100 INJECTION, SOLUTION SUBCUTANEOUS at 22:30

## 2019-07-30 RX ADMIN — INSULIN GLARGINE 14 UNIT(S): 100 INJECTION, SOLUTION SUBCUTANEOUS at 00:17

## 2019-07-30 RX ADMIN — Medication 1 PATCH: at 08:11

## 2019-07-30 RX ADMIN — Medication 1 PATCH: at 20:06

## 2019-07-30 RX ADMIN — Medication 1 PATCH: at 13:37

## 2019-07-30 RX ADMIN — Medication 6: at 11:49

## 2019-07-30 RX ADMIN — HEPARIN SODIUM 5000 UNIT(S): 5000 INJECTION INTRAVENOUS; SUBCUTANEOUS at 17:37

## 2019-07-30 RX ADMIN — Medication 8: at 00:16

## 2019-07-30 RX ADMIN — Medication 1 PUFF(S): at 17:50

## 2019-07-30 RX ADMIN — HEPARIN SODIUM 5000 UNIT(S): 5000 INJECTION INTRAVENOUS; SUBCUTANEOUS at 05:17

## 2019-07-30 RX ADMIN — Medication 1 PUFF(S): at 00:12

## 2019-07-30 RX ADMIN — Medication 4: at 17:50

## 2019-07-30 RX ADMIN — Medication 81 MILLIGRAM(S): at 11:35

## 2019-07-30 RX ADMIN — Medication 1 PUFF(S): at 11:36

## 2019-07-30 NOTE — PROGRESS NOTE ADULT - SUBJECTIVE AND OBJECTIVE BOX
Patient seen and examined at bedside.    Overnight Events: Pt states that he feels fine he just wants to eat and drink juice.       REVIEW OF SYSTEMS:  Constitutional:     [ ] negative [ ] fevers [ ] chills [ ] weight loss [ ] weight gain  HEENT:                  [ ] negative [ ] dry eyes [ ] eye irritation [ ] postnasal drip [ ] nasal congestion  CV:                         [ ] negative  [ ] chest pain [ ] orthopnea [ ] palpitations [ ] murmur  Resp:                     [ ] negative [ ] cough [ ] shortness of breath [ ] dyspnea [ ] wheezing [ ] sputum [ ]hemoptysis  GI:                          [ ] negative [ ] nausea [ ] vomiting [ ] diarrhea [ ] constipation [ ] abd pain [ ] dysphagia   :                        [ ] negative [ ] dysuria [ ] nocturia [ ] hematuria [ ] increased urinary frequency  Musculoskeletal: [ ] negative [ ] back pain [ ] myalgias [ ] arthralgias [ ] fracture  Skin:                       [ ] negative [ ] rash [ ] itch  Neurological:        [ ] negative [ ] headache [ ] dizziness [ ] syncope [ ] weakness [ ] numbness  Psychiatric:           [ ] negative [ ] anxiety [ ] depression  Endocrine:            [ ] negative [ ] diabetes [ ] thyroid problem  Heme/Lymph:      [ ] negative [ ] anemia [ ] bleeding problem  Allergic/Immune: [ ] negative [ ] itchy eyes [ ] nasal discharge [ ] hives [ ] angioedema    [ ] All other systems negative  [ x] Unable to assess ROS due to AMS    Current Meds:  aspirin  chewable 81 milliGRAM(s) Enteral Tube daily  clopidogrel Tablet 75 milliGRAM(s) Oral daily  dextrose 40% Gel 15 Gram(s) Oral once PRN  dextrose 5%. 1000 milliLiter(s) IV Continuous <Continuous>  dextrose 50% Injectable 12.5 Gram(s) IV Push once  dextrose 50% Injectable 25 Gram(s) IV Push once  dextrose 50% Injectable 25 Gram(s) IV Push once  glucagon  Injectable 1 milliGRAM(s) IntraMuscular once PRN  heparin  Injectable 5000 Unit(s) SubCutaneous every 12 hours  insulin glargine Injectable (LANTUS) 14 Unit(s) SubCutaneous at bedtime  insulin lispro (HumaLOG) corrective regimen sliding scale   SubCutaneous every 6 hours  ipratropium 17 MICROgram(s) HFA Inhaler 1 Puff(s) Inhalation every 6 hours  melatonin 3 milliGRAM(s) Oral at bedtime PRN  nicotine -   7 mG/24Hr(s) Patch 1 patch Transdermal daily      PAST MEDICAL & SURGICAL HISTORY:  Hyperlipidemia  Hypertension  Diabetes  H/O mastoidectomy      Vitals:  T(F): 98.2 (07-30), Max: 98.9 (07-29)  HR: 80 (07-30) (80 - 111)  BP: 121/79 (07-30) (108/68 - 142/89)  RR: 18 (07-30)  SpO2: 96% (07-30)  I&O's Summary    28 Jul 2019 07:01  -  29 Jul 2019 07:00  --------------------------------------------------------  IN: 0 mL / OUT: 1150 mL / NET: -1150 mL    29 Jul 2019 07:01  -  30 Jul 2019 06:51  --------------------------------------------------------  IN: 0 mL / OUT: 250 mL / NET: -250 mL        Physical Exam:  Appearance: No acute distress; well appearing  Eyes: PERRL, EOMI, pink conjunctiva  HENT: Normal oral mucosa  Cardiovascular: RRR, S1, S2, no murmurs, rubs, or gallops; no edema; no JVD  Respiratory: decreased BS at the bases.   Gastrointestinal: soft, non-tender, non-distended with normal bowel sounds  Musculoskeletal: Left BKA  Neurologic: Non-focal  Lymphatic: No lymphadenopathy  Psychiatry: AAOx1, mood & affect appropriate  Skin: No rashes, ecchymoses, or cyanosis      07-29    144  |  107  |  19  ----------------------------<  232<H>  3.8   |  25  |  0.80    Ca    9.2      29 Jul 2019 07:10            New ECG(s): Personally reviewed    Echo:  < from: Transthoracic Echocardiogram (07.06.19 @ 09:48) >  Conclusions:  1. Mild mitral regurgitation.  2. Moderately dilated left atrium.  LA volume index = 47  cc/m2.  The interatrial setpum is bowed towards the right  suggesting elevated left atrial pressure.  3. The left ventricle is moderately dilated.  4. There is global hypokineiss with minor regional  variation.  The LVEF about 20-25%.  5. Normal right ventricular size and function.  6. Normal tricuspid valve. No tricuspid regurgitation.  7. There is no pericardial effusion.  8. There is a left pleural effusion.  There are no prior studies available for comparison.    < end of copied text >    Interpretation of Telemetry: Sinus 80 - 100

## 2019-07-30 NOTE — PROGRESS NOTE ADULT - ASSESSMENT
69M w/ PMH of poor med compliance, DM2, HTN, PADs/p Left BKA 7/22 transferred from [facility] to [facility] CCU for NSTEMI c/b cardiogenic shock; acute respiratory failure with hypoxia and septic shock secondary to suspected aspiration pneumonia requiring intubation (now extubated) then transferred to floors with RRT 7/24 for shock state with elevated LFTs and lactate readmission to CCU, with resolution of shock state, concern for acute pontine stroke on ct not seen on MRI brain.

## 2019-07-30 NOTE — SWALLOW VFSS/MBS ASSESSMENT ADULT - SLP GENERAL OBSERVATIONS
Patient arrived to radiology secured in LULU chair, confused with +NGT +b/l mittens on room air. Patient arrived to radiology secured in LULU chair, confused with +NGT +b/l mittens on room air. Patient was unable to follow directions, self feed, or maintain adequate attention to verbal/tactile cues during this evaluation. Fluctuating lethargy throughout evaluation.

## 2019-07-30 NOTE — SWALLOW VFSS/MBS ASSESSMENT ADULT - ORAL PHASE COMMENTS
delayed initiation of tongue motion; minimal to no lingual movement which was disorganized when initiated; oral phase delayed inconsistently between 9.6-13.7 seconds; reduced bolus control with spill along the base of the tongue into the valleculae and then to the pyriform sinuses where the swallow was initiated. delayed oral prep/AP transit 15.7 seconds characterized by repetitive/disorganized lingual motion and delayed initiation of lingual movement

## 2019-07-30 NOTE — PROGRESS NOTE ADULT - PROBLEM SELECTOR PLAN 3
initially started on vancomycin/zosyn  Monitoring off abx. No signs of sepsis at this point in time.   Satting well on room air at this time.  s+s f/u appreciated - d/w Tran - concern for aspiration as patient not following commands well

## 2019-07-30 NOTE — PROGRESS NOTE ADULT - ASSESSMENT
69 year old male hx of HTN, T2DM, PVD initially presented to [facility] for a Left popliteal bypass (based on documentation appears to have been performed as later documentation makes note of occluded graft), with hospital course c/b hypoxic respiratory failure 2/2 aspiration PNA (patient was intubated at [facility]) and transferred here for an NSTEMI and cardiogenic shock as a direct admit to the CCU. ECHO at OSH with EF Of 15% with focal hypokinesis. Patient was medically managed in the CCU.    #NSTEMI in the setting of CAD.   - No ischemic eval performed.  - However, in the setting of severe PAD, pt likely has CAD as well.   - He has been medically treated for an NSTEMI.   - C/w ASA and plavix for one year.   - Pt would benefit from Atorvastatin however, it is being held in the setting of elevated LFTs.   - Recheck LFTs and consider starting statin.   - Start Losartan.   - No plan for cath or stress at this time.     #HFrEF likely in the setting of ICM  - Pt with reduced EF with some segmentality.   - At this time pt is still on and off O2.  - Would start IV diuresis for a few days. Lasix IV 40 daily, first dose today.   - Would not start BB at this time.   - Check daily weights and strict Is and Os.   - Start Losartan 25 mg daily. First dose today.   - recheck Coags. Check daily lactate.     #PVD  - C/w ASA and Plavix   - Pt would benefit from statin.     Terri Baeza MD  Cardiology Fellow - PGY 5  Text or Call: [phone]  For all New Consults and Questions:  [email or web address]   Login: [employer]

## 2019-07-30 NOTE — SWALLOW VFSS/MBS ASSESSMENT ADULT - PHARYNGEAL PHASE COMMENTS
Pharyngeal swallow initiation triggered at the level of the valleculae; moderate residue remained post primary swallow along the base of tongue, and connecting the epiglottis to the posterior pharyngeal wall/NGT, as well as mild reside superior to the UES. It is suspect that the moderate residue along the base of tongue moved and collected in the valleculae off of fluoroscopy, then visualized during subsequent trial to cue a repeat swallow to clear residue; patient unable to elicit swallow to clear residue despite 1/4 tsp honey thick liquid as a liquid wash or when given verbal/tactile cues to swallow delayed pharyngeal swallow initiation at 7.7 seconds; reduced hyolaryngeal elevation with reduced airway closure; trace residue along the base of tongue, in the valleculae, along the aryepiglottic folds and posterior pharyngeal wall- unable to clear given cues for repeat swallow; mild penetration along the laryngeal side of the epiglottis without sensation or retrieval delayed pharyngeal swallow initiation at 7.7 seconds; reduced hyolaryngeal elevation with reduced airway closure; trace residue along the base of tongue, in the valleculae, along the aryepiglottic folds and posterior pharyngeal wall- unable to clear given cues for repeat swallow; mild penetration during the swallow along the laryngeal side of the epiglottis without sensation or retrieval Pharyngeal swallow initiation triggered at the level of the valleculae; moderate residue remained post primary swallow along the base of tongue, and connecting the epiglottis to the posterior pharyngeal wall/NGT, as well as mild reside superior to the UES. Increased residue noted in the valleculae when fluoroscopy resumed to visualize a cued repeat swallow suspect 2/2 passive spillover from oral cavity/base of tongue.; patient unable to elicit swallow to clear residue despite 1/4 tsp honey thick liquid as a liquid wash or when given verbal/tactile cues to swallow. Exam terminated at this time.    Unchanged residue noted along the laryngeal side of the epiglottic with unknown origin 2/2 swallow triggered off fluoroscopy; unable to determine if it was new penetration vs residual penetration from previous trial of thin puree

## 2019-07-30 NOTE — SWALLOW VFSS/MBS ASSESSMENT ADULT - ORAL PHASE
Residue in oral cavity/Uncontrolled bolus / spillover in lambert-pharynx/Delayed oral transit time/Reduced anterior - posterior transport Uncontrolled bolus / spillover in lambert-pharynx/Residue in oral cavity/Delayed oral transit time/Reduced anterior - posterior transport

## 2019-07-30 NOTE — PROGRESS NOTE ADULT - SUBJECTIVE AND OBJECTIVE BOX
Patient is a 69y old  Male who presents with a chief complaint of NSTEMI (30 Jul 2019 06:51)        SUBJECTIVE / OVERNIGHT EVENTS:      MEDICATIONS  (STANDING):  aspirin  chewable 81 milliGRAM(s) Enteral Tube daily  clopidogrel Tablet 75 milliGRAM(s) Oral daily  dextrose 5%. 1000 milliLiter(s) (50 mL/Hr) IV Continuous <Continuous>  dextrose 50% Injectable 12.5 Gram(s) IV Push once  dextrose 50% Injectable 25 Gram(s) IV Push once  dextrose 50% Injectable 25 Gram(s) IV Push once  heparin  Injectable 5000 Unit(s) SubCutaneous every 12 hours  insulin glargine Injectable (LANTUS) 22 Unit(s) SubCutaneous at bedtime  insulin lispro (HumaLOG) corrective regimen sliding scale   SubCutaneous every 6 hours  ipratropium 17 MICROgram(s) HFA Inhaler 1 Puff(s) Inhalation every 6 hours  nicotine -   7 mG/24Hr(s) Patch 1 patch Transdermal daily    MEDICATIONS  (PRN):  dextrose 40% Gel 15 Gram(s) Oral once PRN Blood Glucose LESS THAN 70 milliGRAM(s)/deciliter  glucagon  Injectable 1 milliGRAM(s) IntraMuscular once PRN Glucose LESS THAN 70 milligrams/deciliter  melatonin 3 milliGRAM(s) Oral at bedtime PRN Insomnia      Vital Signs Last 24 Hrs  T(C): 36.3 (30 Jul 2019 14:59), Max: 37.2 (29 Jul 2019 21:30)  T(F): 97.3 (30 Jul 2019 14:59), Max: 98.9 (29 Jul 2019 21:30)  HR: 88 (30 Jul 2019 14:59) (80 - 94)  BP: 114/71 (30 Jul 2019 14:59) (108/68 - 121/79)  BP(mean): --  RR: 18 (30 Jul 2019 14:59) (18 - 18)  SpO2: 98% (30 Jul 2019 14:59) (96% - 100%)  CAPILLARY BLOOD GLUCOSE      POCT Blood Glucose.: 284 mg/dL (30 Jul 2019 11:45)  POCT Blood Glucose.: 275 mg/dL (30 Jul 2019 06:25)  POCT Blood Glucose.: 304 mg/dL (30 Jul 2019 00:03)  POCT Blood Glucose.: 233 mg/dL (29 Jul 2019 18:05)    I&O's Summary    29 Jul 2019 07:01  -  30 Jul 2019 07:00  --------------------------------------------------------  IN: 0 mL / OUT: 250 mL / NET: -250 mL        PHYSICAL EXAM:  GENERAL: NAD  HEAD:  Atraumatic, Normocephalic  EYES: conjunctiva and sclera clear  Nose: +NGT  NECK: No JVD  CHEST/LUNG: CTA b/l  HEART: S1 S2 RRR  ABDOMEN: +BS Soft, NT/ND  EXTREMITIES:  no LE edema  SKIN: staples along stump and medial thigh - healing - no erythema  NERVOUS SYSTEM:  Alert & Orientedx1; moving all extremities    LABS:    07-30    147<H>  |  110<H>  |  24<H>  ----------------------------<  300<H>  4.2   |  24  |  0.85    Ca    9.3      30 Jul 2019 07:10                RADIOLOGY & ADDITIONAL TESTS:    Imaging Personally Reviewed: MBS reviewed - There is evidence of pharyngeal penetration. No aspiration.  Consultant(s) Notes Reviewed:    Care Discussed with Consultants/Other Providers: d/w Swallow therapist - Tran regarding MBS results

## 2019-07-30 NOTE — PROGRESS NOTE ADULT - PROBLEM SELECTOR PLAN 6
s/p recent bypass at [facility] presented with ischemic limb  -VA duplex of b/l LE: 07/06: On the right, there is a severe flow-limiting stenosis of the popliteal artery and the trifurcation arteries are occluded in the calf.  On the left, the bypass graft is occluded, the popliteal artery is occluded and the trifurcation arteries are occluded.  - s/p Left BKA on 7/22/19  - Per vascular bypass staples to be taken out prior to discharge to [facility] next week. not to remove BKA staples  - c/w DAPT, statin  vascular surg f/u

## 2019-07-31 DIAGNOSIS — E78.5 HYPERLIPIDEMIA, UNSPECIFIED: ICD-10-CM

## 2019-07-31 LAB
ALBUMIN SERPL ELPH-MCNC: 2.4 G/DL — LOW (ref 3.3–5)
ALP SERPL-CCNC: 140 U/L — HIGH (ref 40–120)
ALT FLD-CCNC: 220 U/L — HIGH (ref 10–45)
ANION GAP SERPL CALC-SCNC: 12 MMOL/L — SIGNIFICANT CHANGE UP (ref 5–17)
AST SERPL-CCNC: 35 U/L — SIGNIFICANT CHANGE UP (ref 10–40)
BASOPHILS # BLD AUTO: 0 K/UL — SIGNIFICANT CHANGE UP (ref 0–0.2)
BASOPHILS NFR BLD AUTO: 0 % — SIGNIFICANT CHANGE UP (ref 0–2)
BILIRUB SERPL-MCNC: 0.5 MG/DL — SIGNIFICANT CHANGE UP (ref 0.2–1.2)
BUN SERPL-MCNC: 24 MG/DL — HIGH (ref 7–23)
CALCIUM SERPL-MCNC: 8.9 MG/DL — SIGNIFICANT CHANGE UP (ref 8.4–10.5)
CHLORIDE SERPL-SCNC: 113 MMOL/L — HIGH (ref 96–108)
CO2 SERPL-SCNC: 23 MMOL/L — SIGNIFICANT CHANGE UP (ref 22–31)
CREAT SERPL-MCNC: 0.8 MG/DL — SIGNIFICANT CHANGE UP (ref 0.5–1.3)
EOSINOPHIL # BLD AUTO: 0 K/UL — SIGNIFICANT CHANGE UP (ref 0–0.5)
EOSINOPHIL NFR BLD AUTO: 0.4 % — SIGNIFICANT CHANGE UP (ref 0–6)
GLUCOSE BLDC GLUCOMTR-MCNC: 179 MG/DL — HIGH (ref 70–99)
GLUCOSE BLDC GLUCOMTR-MCNC: 194 MG/DL — HIGH (ref 70–99)
GLUCOSE BLDC GLUCOMTR-MCNC: 199 MG/DL — HIGH (ref 70–99)
GLUCOSE BLDC GLUCOMTR-MCNC: 209 MG/DL — HIGH (ref 70–99)
GLUCOSE SERPL-MCNC: 225 MG/DL — HIGH (ref 70–99)
HCT VFR BLD CALC: 34.2 % — LOW (ref 39–50)
HGB BLD-MCNC: 10.8 G/DL — LOW (ref 13–17)
LYMPHOCYTES # BLD AUTO: 1.7 K/UL — SIGNIFICANT CHANGE UP (ref 1–3.3)
LYMPHOCYTES # BLD AUTO: 17.5 % — SIGNIFICANT CHANGE UP (ref 13–44)
MAGNESIUM SERPL-MCNC: 2 MG/DL — SIGNIFICANT CHANGE UP (ref 1.6–2.6)
MCHC RBC-ENTMCNC: 29.5 PG — SIGNIFICANT CHANGE UP (ref 27–34)
MCHC RBC-ENTMCNC: 31.6 GM/DL — LOW (ref 32–36)
MCV RBC AUTO: 93.6 FL — SIGNIFICANT CHANGE UP (ref 80–100)
MONOCYTES # BLD AUTO: 0.6 K/UL — SIGNIFICANT CHANGE UP (ref 0–0.9)
MONOCYTES NFR BLD AUTO: 6.5 % — SIGNIFICANT CHANGE UP (ref 2–14)
NEUTROPHILS # BLD AUTO: 7.2 K/UL — SIGNIFICANT CHANGE UP (ref 1.8–7.4)
NEUTROPHILS NFR BLD AUTO: 75.5 % — SIGNIFICANT CHANGE UP (ref 43–77)
PHOSPHATE SERPL-MCNC: 3.5 MG/DL — SIGNIFICANT CHANGE UP (ref 2.5–4.5)
PLATELET # BLD AUTO: 276 K/UL — SIGNIFICANT CHANGE UP (ref 150–400)
POTASSIUM SERPL-MCNC: 3.9 MMOL/L — SIGNIFICANT CHANGE UP (ref 3.5–5.3)
POTASSIUM SERPL-SCNC: 3.9 MMOL/L — SIGNIFICANT CHANGE UP (ref 3.5–5.3)
PROT SERPL-MCNC: 6.7 G/DL — SIGNIFICANT CHANGE UP (ref 6–8.3)
RBC # BLD: 3.66 M/UL — LOW (ref 4.2–5.8)
RBC # FLD: 14 % — SIGNIFICANT CHANGE UP (ref 10.3–14.5)
SODIUM SERPL-SCNC: 148 MMOL/L — HIGH (ref 135–145)
WBC # BLD: 9.5 K/UL — SIGNIFICANT CHANGE UP (ref 3.8–10.5)
WBC # FLD AUTO: 9.5 K/UL — SIGNIFICANT CHANGE UP (ref 3.8–10.5)

## 2019-07-31 PROCEDURE — 99223 1ST HOSP IP/OBS HIGH 75: CPT | Mod: GC

## 2019-07-31 PROCEDURE — 99232 SBSQ HOSP IP/OBS MODERATE 35: CPT

## 2019-07-31 PROCEDURE — 99223 1ST HOSP IP/OBS HIGH 75: CPT

## 2019-07-31 PROCEDURE — 99233 SBSQ HOSP IP/OBS HIGH 50: CPT | Mod: GC

## 2019-07-31 RX ORDER — METOPROLOL TARTRATE 50 MG
12.5 TABLET ORAL
Refills: 0 | Status: DISCONTINUED | OUTPATIENT
Start: 2019-07-31 | End: 2019-08-07

## 2019-07-31 RX ORDER — METOPROLOL TARTRATE 50 MG
25 TABLET ORAL DAILY
Refills: 0 | Status: DISCONTINUED | OUTPATIENT
Start: 2019-07-31 | End: 2019-07-31

## 2019-07-31 RX ORDER — INSULIN LISPRO 100/ML
VIAL (ML) SUBCUTANEOUS EVERY 6 HOURS
Refills: 0 | Status: DISCONTINUED | OUTPATIENT
Start: 2019-07-31 | End: 2019-08-03

## 2019-07-31 RX ORDER — HUMAN INSULIN 100 [IU]/ML
9 INJECTION, SUSPENSION SUBCUTANEOUS EVERY 6 HOURS
Refills: 0 | Status: DISCONTINUED | OUTPATIENT
Start: 2019-07-31 | End: 2019-08-01

## 2019-07-31 RX ADMIN — Medication 1 PATCH: at 07:05

## 2019-07-31 RX ADMIN — Medication 4: at 06:37

## 2019-07-31 RX ADMIN — HEPARIN SODIUM 5000 UNIT(S): 5000 INJECTION INTRAVENOUS; SUBCUTANEOUS at 05:21

## 2019-07-31 RX ADMIN — Medication 12.5 MILLIGRAM(S): at 17:13

## 2019-07-31 RX ADMIN — Medication 1 PUFF(S): at 00:08

## 2019-07-31 RX ADMIN — HEPARIN SODIUM 5000 UNIT(S): 5000 INJECTION INTRAVENOUS; SUBCUTANEOUS at 17:13

## 2019-07-31 RX ADMIN — Medication 2: at 13:05

## 2019-07-31 RX ADMIN — Medication 1 PATCH: at 19:45

## 2019-07-31 RX ADMIN — Medication 2: at 18:12

## 2019-07-31 RX ADMIN — Medication 2: at 00:08

## 2019-07-31 RX ADMIN — Medication 1 PUFF(S): at 13:06

## 2019-07-31 RX ADMIN — Medication 81 MILLIGRAM(S): at 17:13

## 2019-07-31 RX ADMIN — LOSARTAN POTASSIUM 25 MILLIGRAM(S): 100 TABLET, FILM COATED ORAL at 05:21

## 2019-07-31 RX ADMIN — Medication 1 PATCH: at 13:06

## 2019-07-31 RX ADMIN — Medication 1 PUFF(S): at 05:21

## 2019-07-31 RX ADMIN — Medication 1 PATCH: at 13:07

## 2019-07-31 NOTE — PROGRESS NOTE ADULT - PROBLEM SELECTOR PROBLEM 3
Subjective:      Eladio Kendrick is a 12 y.o. male here with patient and mother. Patient brought in for blisters (on right butt check spreading to more, drainage)      History of Present Illness:  Eladio is a 13 yo male established patient presenting for evaluation of buttocks rash x 6 days.  Rash is spreading and has drainage.  Denies fever.          Review of Systems   Constitutional: Negative for activity change, appetite change and fever.   Skin: Positive for rash.       Objective:     Physical Exam   Constitutional: He appears well-developed and well-nourished. No distress.   Neurological: He is alert.   Skin: Skin is warm and dry. Rash noted.   Right buttocks: multiple blisters with overlying yellow crusting       Assessment:        1. Impetigo         Plan:   Eladio was seen today for blisters.    Diagnoses and all orders for this visit:    Impetigo  -     sulfamethoxazole-trimethoprim 800-160mg (BACTRIM DS) 800-160 mg Tab; Take 1 tablet by mouth every 12 (twelve) hours.  -     mupirocin (BACTROBAN) 2 % ointment; Apply to affected area 2 times daily      Patient will follow-up in clinic in 48 hours if symptoms are not improving, sooner if worsening.      Carolyn Penny MD     Aspiration pneumonia

## 2019-07-31 NOTE — CONSULT NOTE ADULT - CONSULT REQUESTED BY NAME
Dr. Kwame De La Cruz
Dr. Marshall
Jillian Arriaga
Julius
Medicine/Dr. De La Cruz
medicine
primary
Dr. Marshall

## 2019-07-31 NOTE — CONSULT NOTE ADULT - PROBLEM SELECTOR RECOMMENDATION 9
Diabetes Education and Nutrition Eval  Now on 24 hour tube feeds. Last dose of Lantus last night. 24 hour insulin requirements around 34 units.  Start NPH 9 units q6  Low correction scale q6  Goal glucose 100-180  Outpt. endo follow-up  Outpt. optho, podiatry, nephrology  Plan to d/c on insulin depending on feeding modality.

## 2019-07-31 NOTE — CONSULT NOTE ADULT - ASSESSMENT
68 y/o M w/ uncontrolled Type 2 DM w/ hyperglycemia A1c 10%, HTN, HLD, tx with cardiogenic shock now on 24 hour tube feeds (high risk patient with high level decision-making).

## 2019-07-31 NOTE — PROGRESS NOTE ADULT - PROBLEM SELECTOR PLAN 3
initially started on vancomycin/zosyn  Monitoring off abx. No signs of sepsis at this point in time.   Satting well on room air at this time.  s+s f/u appreciated - d/w Tran - concern for aspiration as patient not following commands well initially started on vancomycin/zosyn  Monitoring off abx. No signs of sepsis at this point in time.   Satting well on room air at this time.  s+s f/u appreciated - d/w Tran - concern for aspiration as patient not following commands well  GI consult for PEG

## 2019-07-31 NOTE — CHART NOTE - NSCHARTNOTEFT_GEN_A_CORE
Pt scheduled for PEG early next week; Discussed with cardiology Dr Borrero regarding stopping plavix for procedure. Cardiology approved holding  plavix for PEG. Pt with no recent cardiac stents. Attending notified.

## 2019-07-31 NOTE — CONSULT NOTE ADULT - SUBJECTIVE AND OBJECTIVE BOX
HPI:  The pt is a 68 y/o M with a PMH of HTN, DM, PAD who is transferred to [facility] for management of cardiogenic shock and NSTEMI. He was admitted for L popliteal bypass to [facility]. During his time there, he was refusing medications  and was found to be progressively more dyspneic. He had an echo done there which showed that he has an EF of <15% with global hypokinesis; he received 20mg IV lasix for fluid overload. His respiratory status continued to worsen and the pt was intubated for airway protection. The pt vomited during this time and there was a concern for aspiration. The pt did not complain of CP during the time prior to intubation. As per nephew, the pt has not been complaining of CP, SOB prior to admission to the hospital and has been able to carry out his ADLs. As per the nephew, pt has not been compliant with his medications as an outpatient; the nephew knows that he had amlodipine prescribed but doesn't know what medications the the pt takes for his diabetes.  As per chart from [facility], pt has been prescribed to take amlodipine 10 daily and metformin 500 BID at home. As per med rec from 2015, pt has been taking lantus 20 mg daily at home in addition to metformin.       PAST MEDICAL & SURGICAL HISTORY:  Hyperlipidemia  Hypertension  Diabetes  H/O mastoidectomy      FAMILY HISTORY:  FHx: diabetes mellitus: In all siblings(2 sisters and 4 brothers)      Social History: Unable to obtain at this time    Outpatient Medications: Unable to obtain at this time    MEDICATIONS  (STANDING):  aspirin  chewable 81 milliGRAM(s) Enteral Tube daily  dextrose 5%. 1000 milliLiter(s) (50 mL/Hr) IV Continuous <Continuous>  dextrose 50% Injectable 12.5 Gram(s) IV Push once  dextrose 50% Injectable 25 Gram(s) IV Push once  dextrose 50% Injectable 25 Gram(s) IV Push once  heparin  Injectable 5000 Unit(s) SubCutaneous every 12 hours  insulin glargine Injectable (LANTUS) 22 Unit(s) SubCutaneous at bedtime  insulin lispro (HumaLOG) corrective regimen sliding scale   SubCutaneous every 6 hours  ipratropium 17 MICROgram(s) HFA Inhaler 1 Puff(s) Inhalation every 6 hours  losartan 25 milliGRAM(s) Oral daily  metoprolol tartrate 12.5 milliGRAM(s) Enteral Tube two times a day  nicotine -   7 mG/24Hr(s) Patch 1 patch Transdermal daily    MEDICATIONS  (PRN):  dextrose 40% Gel 15 Gram(s) Oral once PRN Blood Glucose LESS THAN 70 milliGRAM(s)/deciliter  glucagon  Injectable 1 milliGRAM(s) IntraMuscular once PRN Glucose LESS THAN 70 milligrams/deciliter  melatonin 3 milliGRAM(s) Oral at bedtime PRN Insomnia      Allergies    No Known Allergies    Intolerances      Review of Systems: Unable to obtain at this time        PHYSICAL EXAM:  VITALS: T(C): 36.9 (07-31-19 @ 04:26)  T(F): 98.5 (07-31-19 @ 04:26), Max: 98.5 (07-31-19 @ 04:26)  HR: 88 (07-31-19 @ 17:11) (75 - 101)  BP: 126/81 (07-31-19 @ 17:11) (110/71 - 142/73)  RR:  (18 - 18)  SpO2:  (98% - 99%)  Wt(kg): --  GENERAL: NAD at this time  EYES: No proptosis, EOMI  HEENT:  Atraumatic, Normocephalic, +NGT   THYROID: cannot fully assess at this time  RESPIRATORY: full excursion, non-labored  CARDIOVASCULAR: Regular rhythm; +systolic murmurs; no peripheral edema  GI: Soft, nontender, non distended, normal bowel sounds  SKIN: Dry, intact, No rashes or lesions  NEURO: does not follow commands,   PSYCH: Alert and oriented x 0, flat affect  CUSHING'S SIGNS: no striae      POCT Blood Glucose.: 194 mg/dL (07-31-19 @ 18:09)  POCT Blood Glucose.: 179 mg/dL (07-31-19 @ 12:40)  POCT Blood Glucose.: 209 mg/dL (07-31-19 @ 06:25)  POCT Blood Glucose.: 199 mg/dL (07-30-19 @ 23:54)  POCT Blood Glucose.: 214 mg/dL (07-30-19 @ 21:54)  POCT Blood Glucose.: 224 mg/dL (07-30-19 @ 17:39)  POCT Blood Glucose.: 284 mg/dL (07-30-19 @ 11:45)  POCT Blood Glucose.: 275 mg/dL (07-30-19 @ 06:25)  POCT Blood Glucose.: 304 mg/dL (07-30-19 @ 00:03)  POCT Blood Glucose.: 233 mg/dL (07-29-19 @ 18:05)  POCT Blood Glucose.: 246 mg/dL (07-29-19 @ 12:22)  POCT Blood Glucose.: 214 mg/dL (07-29-19 @ 05:36)  POCT Blood Glucose.: 220 mg/dL (07-28-19 @ 23:43)  POCT Blood Glucose.: 228 mg/dL (07-28-19 @ 21:31)                              10.8   9.5   )-----------( 276      ( 31 Jul 2019 06:46 )             34.2       07-31    148<H>  |  113<H>  |  24<H>  ----------------------------<  225<H>  3.9   |  23  |  0.80    EGFR if : 106  EGFR if non : 91    Ca    8.9      07-31  Mg     2.0     07-31  Phos  3.5     07-31    TPro  6.7  /  Alb  2.4<L>  /  TBili  0.5  /  DBili  x   /  AST  35  /  ALT  220<H>  /  AlkPhos  140<H>  07-31    Thyroid Function Tests:  07-10 @ 13:02 TSH 3.57 FreeT4 -- T3 -- Anti TPO -- Anti Thyroglobulin Ab -- TSI --  07-06 @ 04:56 TSH 0.94 FreeT4 -- T3 -- Anti TPO -- Anti Thyroglobulin Ab -- TSI --      Hemoglobin A1C, Whole Blood: 10.0 % <H> [4.0 - 5.6] (07-06-19 @ 04:54)      07-06 Chol 106 LDL 55 HDL 37<L> Trig 72  Radiology:

## 2019-07-31 NOTE — CONSULT NOTE ADULT - CONSULT REQUESTED DATE/TIME
31-Jul-2019 11:06
06-Jul-2019 09:17
09-Jul-2019 14:00
12-Jul-2019 09:20
24-Jul-2019 15:56
24-Jul-2019 18:56
25-Jul-2019 15:59
31-Jul-2019 16:36

## 2019-07-31 NOTE — PROGRESS NOTE ADULT - ASSESSMENT
69 year old male hx of HTN, T2DM, PVD initially presented to [facility] for a Left popliteal bypass (based on documentation appears to have been performed as later documentation makes note of occluded graft), with hospital course c/b hypoxic respiratory failure 2/2 aspiration PNA (patient was intubated at [facility]) and transferred here for an NSTEMI and cardiogenic shock as a direct admit to the CCU. ECHO at OSH with EF Of 15% with focal hypokinesis. Patient was medically managed in the CCU.    #NSTEMI in the setting of CAD.   - No ischemic eval performed.  - However, in the setting of severe PAD, pt likely has CAD as well.   - He has been medically treated for an NSTEMI.   - C/w ASA and plavix for one year.   - Pt would benefit from Atorvastatin   - C/w Losartan.   - No plan for cath or stress at this time.     #HFrEF likely in the setting of ICM  - Pt with reduced EF with some segmentality.   - At this time pt is still on and off O2.  - Would not start BB at this time.   - Check daily weights and strict Is and Os.   - c/w losartan   - Start metoprolol 12.5 BID today.     #PVD  - C/w ASA and Plavix    Terri Baeza MD  Cardiology Fellow - PGY 5  Text or Call: [phone]  For all New Consults and Questions:  [email or web address]   Login: [email or web address] 69 year old male hx of HTN, T2DM, PVD initially presented to [facility] for a Left popliteal bypass (based on documentation appears to have been performed as later documentation makes note of occluded graft), with hospital course c/b hypoxic respiratory failure 2/2 aspiration PNA (patient was intubated at [facility]) and transferred here for an NSTEMI and cardiogenic shock as a direct admit to the CCU. ECHO at OSH with EF Of 15% with focal hypokinesis. Patient was medically managed in the CCU.    #NSTEMI in the setting of CAD.   - No ischemic eval performed.  - However, in the setting of severe PAD, pt likely has CAD as well.   - He has been medically treated for an NSTEMI.   - C/w ASA and plavix for one year.   - Pt would benefit from Atorvastatin   - C/w Losartan.   - No plan for cath or stress at this time.     #HFrEF likely in the setting of ICM  - Pt with reduced EF with some segmentality.   - At this time pt is still on and off O2.  - Can start Metoprolol ER 25 daily.   - Check daily weights and strict Is and Os.   - c/w losartan     #PVD  - C/w ASA and Plavix    Terri Baeza MD  Cardiology Fellow - PGY 5  Text or Call: [phone]  For all New Consults and Questions:  [email or web address]   Login: [email or web address]

## 2019-07-31 NOTE — PROGRESS NOTE ADULT - ASSESSMENT
69M w/ PMH of poor med compliance, DM2, HTN, PADs/p Left BKA 7/22 transferred from [facility] to [facility] CCU for NSTEMI c/b cardiogenic shock; acute respiratory failure with hypoxia and septic shock secondary to suspected aspiration pneumonia requiring intubation (now extubated) then transferred to floors with RRT 7/24 for shock state with elevated LFTs and lactate readmission to CCU, with resolution of shock state, concern for acute pontine stroke on ct not seen on MRI brain.

## 2019-07-31 NOTE — CHART NOTE - NSCHARTNOTEFT_GEN_A_CORE
GI follow up    Spoke to patient's nephew Fer over the phone.   Discussed the procedure, risks and benefits and alternatives of PEG.   He is in agreement for the PEG and would like us to hold the Plavix for 5 days prior to the procedure.     Recommendation  - hold Plavix starting tomorrow 8/1  - okay to continue ASA 81mg   - plan for EGD and PEG placement on 8/5 or 8/6 (based on schedule)      Mireya Felix, PGY-6  GI fellow  B- [id number]/ [phone]  Please call GI fellow on call after 5pm and on weekends

## 2019-07-31 NOTE — PROGRESS NOTE ADULT - PROBLEM SELECTOR PLAN 1
Hst up to 3000s - down trended  no ischemic evaluation done - recommended for medical management per cardiology   c/w asa, plavix, statin  continue to monitor on telemetry Hst up to 3000s - down trended  no ischemic evaluation done - recommended for medical management per cardiology   c/w asa, statin  continue to monitor on telemetry  cards f/u regarding holding plavix for PEG placement

## 2019-07-31 NOTE — PROGRESS NOTE ADULT - PROBLEM SELECTOR PLAN 2
appears to be new CHF - s/p NSTEMI/cardiogenic shock.   s/p diuresis now appears euvolemic on exam  TTE at OSH EF 15%; Repeat TTE with EF 20-25%, moderately dilated left atrium, increased left atrial pressures, moderately dilated LV, global hypokinesis  monitor volume status closely for need for further lasix  will start losartan 25mg qd  bb held in setting of acute heart failure - ?cards f/u regarding starting low dose bb   - daily weights  - Monitor I/O strict  - continue Monitor on tele - occasional PVCs appears to be new CHF - s/p NSTEMI/cardiogenic shock.   s/p diuresis now appears euvolemic on exam  TTE at OSH EF 15%; Repeat TTE with EF 20-25%, moderately dilated left atrium, increased left atrial pressures, moderately dilated LV, global hypokinesis  monitor volume status closely for need for further lasix  c/w losartan 25mg qd  bb held in setting of acute heart failure - ?cards f/u regarding starting low dose bb   - daily weights  - Monitor I/O strict  - continue Monitor on tele - occasional PVCs

## 2019-07-31 NOTE — PROGRESS NOTE ADULT - PROBLEM SELECTOR PLAN 5
hga1c 10  hyperglycemia  monitor FS   increase lantus - if still uncontrolled with call Endocrine hga1c 10  hyperglycemia  monitor FS   FS uncontrolled -Endocrine consult

## 2019-07-31 NOTE — PROGRESS NOTE ADULT - PROBLEM SELECTOR PLAN 6
s/p recent bypass at [facility] presented with ischemic limb  -VA duplex of b/l LE: 07/06: On the right, there is a severe flow-limiting stenosis of the popliteal artery and the trifurcation arteries are occluded in the calf.  On the left, the bypass graft is occluded, the popliteal artery is occluded and the trifurcation arteries are occluded.  - s/p Left BKA on 7/22/19  - Per vascular bypass staples to be taken out prior to discharge to [facility] next week. not to remove BKA staples  - c/w DAPT, statin  vascular surg f/u

## 2019-07-31 NOTE — PROGRESS NOTE ADULT - SUBJECTIVE AND OBJECTIVE BOX
Patient is a 69y old  Male who presents with a chief complaint of NSTEMI (31 Jul 2019 11:06)        SUBJECTIVE / OVERNIGHT EVENTS:      MEDICATIONS  (STANDING):  aspirin  chewable 81 milliGRAM(s) Enteral Tube daily  dextrose 5%. 1000 milliLiter(s) (50 mL/Hr) IV Continuous <Continuous>  dextrose 50% Injectable 12.5 Gram(s) IV Push once  dextrose 50% Injectable 25 Gram(s) IV Push once  dextrose 50% Injectable 25 Gram(s) IV Push once  heparin  Injectable 5000 Unit(s) SubCutaneous every 12 hours  insulin glargine Injectable (LANTUS) 22 Unit(s) SubCutaneous at bedtime  insulin lispro (HumaLOG) corrective regimen sliding scale   SubCutaneous every 6 hours  ipratropium 17 MICROgram(s) HFA Inhaler 1 Puff(s) Inhalation every 6 hours  losartan 25 milliGRAM(s) Oral daily  metoprolol tartrate 12.5 milliGRAM(s) Enteral Tube two times a day  nicotine -   7 mG/24Hr(s) Patch 1 patch Transdermal daily    MEDICATIONS  (PRN):  dextrose 40% Gel 15 Gram(s) Oral once PRN Blood Glucose LESS THAN 70 milliGRAM(s)/deciliter  glucagon  Injectable 1 milliGRAM(s) IntraMuscular once PRN Glucose LESS THAN 70 milligrams/deciliter  melatonin 3 milliGRAM(s) Oral at bedtime PRN Insomnia      Vital Signs Last 24 Hrs  T(C): 36.9 (31 Jul 2019 04:26), Max: 36.9 (31 Jul 2019 04:26)  T(F): 98.5 (31 Jul 2019 04:26), Max: 98.5 (31 Jul 2019 04:26)  HR: 75 (31 Jul 2019 12:44) (75 - 101)  BP: 142/73 (31 Jul 2019 12:44) (110/71 - 142/73)  BP(mean): --  RR: 18 (31 Jul 2019 12:44) (18 - 18)  SpO2: 98% (31 Jul 2019 12:44) (98% - 99%)  CAPILLARY BLOOD GLUCOSE      POCT Blood Glucose.: 179 mg/dL (31 Jul 2019 12:40)  POCT Blood Glucose.: 209 mg/dL (31 Jul 2019 06:25)  POCT Blood Glucose.: 199 mg/dL (30 Jul 2019 23:54)  POCT Blood Glucose.: 214 mg/dL (30 Jul 2019 21:54)  POCT Blood Glucose.: 224 mg/dL (30 Jul 2019 17:39)    I&O's Summary    30 Jul 2019 07:01  -  31 Jul 2019 07:00  --------------------------------------------------------  IN: 700 mL / OUT: 0 mL / NET: 700 mL    31 Jul 2019 07:01  -  31 Jul 2019 17:03  --------------------------------------------------------  IN: 200 mL / OUT: 0 mL / NET: 200 mL        PHYSICAL EXAM:  GENERAL: NAD  HEAD:  Atraumatic, Normocephalic  EYES: conjunctiva and sclera clear  Nose: +NGT  NECK: No JVD  CHEST/LUNG: CTA b/l  HEART: S1 S2 RRR  ABDOMEN: +BS Soft, NT/ND  EXTREMITIES:  no LE edema  SKIN: staples along stump and medial thigh - healing - no erythema  NERVOUS SYSTEM:  Alert & Orientedx1; moving all extremities    LABS:                        10.8   9.5   )-----------( 276      ( 31 Jul 2019 06:46 )             34.2     07-31    148<H>  |  113<H>  |  24<H>  ----------------------------<  225<H>  3.9   |  23  |  0.80    Ca    8.9      31 Jul 2019 06:46  Phos  3.5     07-31  Mg     2.0     07-31    TPro  6.7  /  Alb  2.4<L>  /  TBili  0.5  /  DBili  x   /  AST  35  /  ALT  220<H>  /  AlkPhos  140<H>  07-31              RADIOLOGY & ADDITIONAL TESTS:    Imaging Personally Reviewed:  Consultant(s) Notes Reviewed:    Care Discussed with Consultants/Other Providers: Patient is a 69y old  Male who presents with a chief complaint of NSTEMI (31 Jul 2019 11:06)        SUBJECTIVE / OVERNIGHT EVENTS: no acute complaints. more awake today      MEDICATIONS  (STANDING):  aspirin  chewable 81 milliGRAM(s) Enteral Tube daily  dextrose 5%. 1000 milliLiter(s) (50 mL/Hr) IV Continuous <Continuous>  dextrose 50% Injectable 12.5 Gram(s) IV Push once  dextrose 50% Injectable 25 Gram(s) IV Push once  dextrose 50% Injectable 25 Gram(s) IV Push once  heparin  Injectable 5000 Unit(s) SubCutaneous every 12 hours  insulin glargine Injectable (LANTUS) 22 Unit(s) SubCutaneous at bedtime  insulin lispro (HumaLOG) corrective regimen sliding scale   SubCutaneous every 6 hours  ipratropium 17 MICROgram(s) HFA Inhaler 1 Puff(s) Inhalation every 6 hours  losartan 25 milliGRAM(s) Oral daily  metoprolol tartrate 12.5 milliGRAM(s) Enteral Tube two times a day  nicotine -   7 mG/24Hr(s) Patch 1 patch Transdermal daily    MEDICATIONS  (PRN):  dextrose 40% Gel 15 Gram(s) Oral once PRN Blood Glucose LESS THAN 70 milliGRAM(s)/deciliter  glucagon  Injectable 1 milliGRAM(s) IntraMuscular once PRN Glucose LESS THAN 70 milligrams/deciliter  melatonin 3 milliGRAM(s) Oral at bedtime PRN Insomnia      Vital Signs Last 24 Hrs  T(C): 36.9 (31 Jul 2019 04:26), Max: 36.9 (31 Jul 2019 04:26)  T(F): 98.5 (31 Jul 2019 04:26), Max: 98.5 (31 Jul 2019 04:26)  HR: 75 (31 Jul 2019 12:44) (75 - 101)  BP: 142/73 (31 Jul 2019 12:44) (110/71 - 142/73)  BP(mean): --  RR: 18 (31 Jul 2019 12:44) (18 - 18)  SpO2: 98% (31 Jul 2019 12:44) (98% - 99%)  CAPILLARY BLOOD GLUCOSE      POCT Blood Glucose.: 179 mg/dL (31 Jul 2019 12:40)  POCT Blood Glucose.: 209 mg/dL (31 Jul 2019 06:25)  POCT Blood Glucose.: 199 mg/dL (30 Jul 2019 23:54)  POCT Blood Glucose.: 214 mg/dL (30 Jul 2019 21:54)  POCT Blood Glucose.: 224 mg/dL (30 Jul 2019 17:39)    I&O's Summary    30 Jul 2019 07:01  -  31 Jul 2019 07:00  --------------------------------------------------------  IN: 700 mL / OUT: 0 mL / NET: 700 mL    31 Jul 2019 07:01  -  31 Jul 2019 17:03  --------------------------------------------------------  IN: 200 mL / OUT: 0 mL / NET: 200 mL        PHYSICAL EXAM:  GENERAL: NAD, more awake  HEAD:  Atraumatic, Normocephalic  EYES: conjunctiva and sclera clear  Nose: +NGT  NECK: No JVD  CHEST/LUNG: CTA b/l  HEART: S1 S2 RRR  ABDOMEN: +BS Soft, NT/ND  EXTREMITIES:  no LE edema  SKIN: staples along stump and medial thigh - healing - no erythema  NERVOUS SYSTEM:  Alert & Orientedx1; moving all extremities, following some commands    LABS:                        10.8   9.5   )-----------( 276      ( 31 Jul 2019 06:46 )             34.2     07-31    148<H>  |  113<H>  |  24<H>  ----------------------------<  225<H>  3.9   |  23  |  0.80    Ca    8.9      31 Jul 2019 06:46  Phos  3.5     07-31  Mg     2.0     07-31    TPro  6.7  /  Alb  2.4<L>  /  TBili  0.5  /  DBili  x   /  AST  35  /  ALT  220<H>  /  AlkPhos  140<H>  07-31              RADIOLOGY & ADDITIONAL TESTS:    Imaging Personally Reviewed:  Consultant(s) Notes Reviewed:    Care Discussed with Consultants/Other Providers:

## 2019-07-31 NOTE — CONSULT NOTE ADULT - ASSESSMENT
69 year old male with M2, HTN, PAD s/p Left BKA on 7/22, here for NSTEMI c/b cardiogenic shock, acute respiratory failure with hypoxia and septic shock secondary to suspected aspiration pneumonia requiring intubation (now extubated) then transferred to floors. He also has acute pontine stroke.     IMPRESSION  - Dysphagia after acute stroke, pending PEG tube placement     RECOMMENDATION    - trend CBC, CMP, INR  - will plan for PEG tube placement, pending discussion with family  - hold tube feeds after midnight tonight   - supportive care as per primary team      Mireya Felix, PGY-6  GI fellow  B- [id number]/ [phone]  Please call GI fellow on call after 5pm and on weekends 69 year old male with M2, HTN, PAD s/p Left BKA on 7/22, here for NSTEMI c/b cardiogenic shock, acute respiratory failure with hypoxia and septic shock secondary to suspected aspiration pneumonia requiring intubation (now extubated) then transferred to floors. He also has acute pontine stroke.     IMPRESSION  - Dysphagia after acute stroke, pending PEG tube placement     RECOMMENDATION    - trend CBC, CMP, INR  - will plan for PEG tube placement, pending discussion with family; risks inherent in pEG placement including bleeding, infection, perforation, and PEG migration/dislodgement  - Patient is on Plavix - would clarify with neurology/vascular if it can be held. If it can be held, then it should be held for 5 days prior to PEG placement. If it cannot be held, we can consider performing PEG with Plavix, but family would have to understand the increased risks of bleeding.  - supportive care as per primary team      Mireya Felix, PGY-6  GI fellow  B- [id number]/ [phone]  Please call GI fellow on call after 5pm and on weekends

## 2019-07-31 NOTE — CONSULT NOTE ADULT - SUBJECTIVE AND OBJECTIVE BOX
Chief Complaint:  Patient is a 69y old  Male who presents with a chief complaint of NSTEMI (2019 07:28)      HPI:  69 year old male with M2, HTN, PAD s/p Left BKA on , here for NSTEMI c/b cardiogenic shock, acute respiratory failure with hypoxia and septic shock secondary to suspected aspiration pneumonia requiring intubation (now extubated) then transferred to floors. He also has acute pontine stroke.     GI consulted for dysphagia and placement of PEG tube, in the setting of prolonged hospitalisation and a stroke. Speech and swallow saw the patient on 2019 and diagnosed him with oropharyngeal dysphagia.     Allergies:  No Known Allergies      Home Medications:   Patient Currently Takes Medications as of 2015 11:51 documented in Structured Notes  · 	metFORMIN 500 mg oral tablet: 1 tab(s) orally 2 times a day (with meals)after one week take two tablets twice a day for a total of 1000 mg twice daily  · 	insulin glargine 100 units/mL subcutaneous solution: 20 unit(s) subcutaneous once a day (at bedtime)  · 	ofloxacin 0.3% otic solution: 5 drop(s) to each affected ear 3 times a day  · 	acetaminophen-oxyCODONE 325 mg-5 mg oral tablet: 2 tab(s) orally every 6 hours, As needed, Severe Pain  · 	Ceftin 500 mg oral tablet: 1 tab(s) orally 2 times a day      Hospital Medications:  aspirin  chewable 81 milliGRAM(s) Enteral Tube daily  clopidogrel Tablet 75 milliGRAM(s) Oral daily  dextrose 40% Gel 15 Gram(s) Oral once PRN  dextrose 5%. 1000 milliLiter(s) IV Continuous <Continuous>  dextrose 50% Injectable 12.5 Gram(s) IV Push once  dextrose 50% Injectable 25 Gram(s) IV Push once  dextrose 50% Injectable 25 Gram(s) IV Push once  glucagon  Injectable 1 milliGRAM(s) IntraMuscular once PRN  heparin  Injectable 5000 Unit(s) SubCutaneous every 12 hours  insulin glargine Injectable (LANTUS) 22 Unit(s) SubCutaneous at bedtime  insulin lispro (HumaLOG) corrective regimen sliding scale   SubCutaneous every 6 hours  ipratropium 17 MICROgram(s) HFA Inhaler 1 Puff(s) Inhalation every 6 hours  losartan 25 milliGRAM(s) Oral daily  melatonin 3 milliGRAM(s) Oral at bedtime PRN  nicotine -   7 mG/24Hr(s) Patch 1 patch Transdermal daily      PMHX/PSHX:  Hyperlipidemia  Hypertension  Diabetes  No pertinent past medical history  H/O mastoidectomy  No significant past surgical history  No significant past surgical history      Family history:  FHx: diabetes mellitus  No pertinent family history in first degree relatives      PHYSICAL EXAM:     GENERAL:  Appears stated age  HEENT:  NC/AT, NG tube+  CHEST:  Full & symmetric excursion  HEART:  Regular rhythm, S1, S2  ABDOMEN:  Soft  EXTREMITIES:  no edema  SKIN:  No rash  NEURO:  sleepy     Vital Signs:  Vital Signs Last 24 Hrs  T(C): 36.9 (2019 04:26), Max: 36.9 (2019 04:26)  T(F): 98.5 (2019 04:26), Max: 98.5 (2019 04:26)  HR: 90 (2019 04:26) (88 - 101)  BP: 110/71 (2019 04:26) (110/71 - 137/85)  BP(mean): --  RR: 18 (2019 04:26) (18 - 18)  SpO2: 98% (2019 04:26) (98% - 99%)  Daily     Daily Weight in k.6 (2019 04:26)    LABS:                        10.8   9.5   )-----------( 276      ( 2019 06:46 )             34.2         148<H>  |  113<H>  |  24<H>  ----------------------------<  225<H>  3.9   |  23  |  0.80    Ca    8.9      2019 06:46  Phos  3.5       Mg     2.0         TPro  6.7  /  Alb  2.4<L>  /  TBili  0.5  /  DBili  x   /  AST  35  /  ALT  220<H>  /  AlkPhos  140<H>      LIVER FUNCTIONS - ( 2019 06:46 )  Alb: 2.4 g/dL / Pro: 6.7 g/dL / ALK PHOS: 140 U/L / ALT: 220 U/L / AST: 35 U/L / GGT: x             Imaging:    < from: CT Abdomen and Pelvis No Cont (19 @ 23:32) >    PROCEDURE DATE:  2019    INTERPRETATION:  CLINICAL INFORMATION: Acute elevation in LFTs. NSTEMI. Status post BKA.  COMPARISON: None.    PROCEDURE:   CT of the Abdomen and Pelvis was performed without intravenous contrast.   Intravenous contrast: None.  Oral contrast: None.  Sagittal and coronal reformats were performed.    FINDINGS:  Evaluation of solid organs are limited without intravenous contrast. Exam is also limited due to motion and streak artifacts.    LOWER CHEST: Moderate bilateral pleural effusions with passive atelectasis.    LIVER: Within normal limits.  BILE DUCTS: Normal caliber.  GALLBLADDER: Within normal limits.  SPLEEN: Within normal limits.  PANCREAS: Within normal limits.  ADRENALS: Mild thickening of the left adrenal gland.  KIDNEYS/URETERS: Within normal limits.    BLADDER: Layering focal hyperdensity in the dependent portion is suggestive of layering calculi.  REPRODUCTIVE ORGANS: Prostate is enlarged.    BOWEL: No bowel obstruction. Appendix is normal.  PERITONEUM: No ascites.  VESSELS: Mild atherosclerotic changes.  RETROPERITONEUM: No lymphadenopathy.    ABDOMINAL WALL: Within normal limits.  BONES: Degenerative changes with large protruding anterior osteophytes at the level of L3-L4.    IMPRESSION:     Limited noncontrast study. No acute intra-abdominal or pelvic pathology.  Moderate bilateral pleural effusions.  < end of copied text > Chief Complaint:  Patient is a 69y old  Male who presents with a chief complaint of NSTEMI (2019 07:28)      HPI:  69 year old male with M2, HTN, PAD s/p Left BKA on , here for NSTEMI c/b cardiogenic shock, acute respiratory failure with hypoxia and septic shock secondary to suspected aspiration pneumonia requiring intubation (now extubated) then transferred to floors. He also has acute pontine stroke and had left BKA for gangrene on .    GI consulted for dysphagia and placement of PEG tube, in the setting of prolonged hospitalisation and a stroke. Speech and swallow saw the patient on 2019 and diagnosed him with oropharyngeal dysphagia. Patient is non-verbal and cannot provide history.    Allergies:  No Known Allergies      Home Medications:   Patient Currently Takes Medications as of 2015 11:51 documented in Structured Notes  · 	metFORMIN 500 mg oral tablet: 1 tab(s) orally 2 times a day (with meals)after one week take two tablets twice a day for a total of 1000 mg twice daily  · 	insulin glargine 100 units/mL subcutaneous solution: 20 unit(s) subcutaneous once a day (at bedtime)  · 	ofloxacin 0.3% otic solution: 5 drop(s) to each affected ear 3 times a day  · 	acetaminophen-oxyCODONE 325 mg-5 mg oral tablet: 2 tab(s) orally every 6 hours, As needed, Severe Pain  · 	Ceftin 500 mg oral tablet: 1 tab(s) orally 2 times a day      Hospital Medications:  aspirin  chewable 81 milliGRAM(s) Enteral Tube daily  clopidogrel Tablet 75 milliGRAM(s) Oral daily  dextrose 40% Gel 15 Gram(s) Oral once PRN  dextrose 5%. 1000 milliLiter(s) IV Continuous <Continuous>  dextrose 50% Injectable 12.5 Gram(s) IV Push once  dextrose 50% Injectable 25 Gram(s) IV Push once  dextrose 50% Injectable 25 Gram(s) IV Push once  glucagon  Injectable 1 milliGRAM(s) IntraMuscular once PRN  heparin  Injectable 5000 Unit(s) SubCutaneous every 12 hours  insulin glargine Injectable (LANTUS) 22 Unit(s) SubCutaneous at bedtime  insulin lispro (HumaLOG) corrective regimen sliding scale   SubCutaneous every 6 hours  ipratropium 17 MICROgram(s) HFA Inhaler 1 Puff(s) Inhalation every 6 hours  losartan 25 milliGRAM(s) Oral daily  melatonin 3 milliGRAM(s) Oral at bedtime PRN  nicotine -   7 mG/24Hr(s) Patch 1 patch Transdermal daily      PMHX/PSHX:  Hyperlipidemia  Hypertension  Diabetes  No pertinent past medical history  H/O mastoidectomy  No significant past surgical history  No significant past surgical history      Family history:  FHx: diabetes mellitus  No pertinent family history in first degree relatives    ROS: Unable to obtain due to mental status      PHYSICAL EXAM:     GENERAL:  Appears stated age  HEENT:  NC/AT, NG tube+  CHEST:  Full & symmetric excursion  HEART:  Regular rhythm, S1, S2  ABDOMEN:  Soft  EXTREMITIES:  no edema; in restraints; s/p left BKA  SKIN:  No rash  NEURO:  sleepy, non-verbal    Vital Signs:  Vital Signs Last 24 Hrs  T(C): 36.9 (2019 04:26), Max: 36.9 (2019 04:26)  T(F): 98.5 (2019 04:26), Max: 98.5 (2019 04:26)  HR: 90 (2019 04:26) (88 - 101)  BP: 110/71 (2019 04:26) (110/71 - 137/85)  BP(mean): --  RR: 18 (2019 04:26) (18 - 18)  SpO2: 98% (2019 04:26) (98% - 99%)  Daily     Daily Weight in k.6 (2019 04:26)    LABS:                        10.8   9.5   )-----------( 276      ( 2019 06:46 )             34.2         148<H>  |  113<H>  |  24<H>  ----------------------------<  225<H>  3.9   |  23  |  0.80    Ca    8.9      2019 06:46  Phos  3.5       Mg     2.0         TPro  6.7  /  Alb  2.4<L>  /  TBili  0.5  /  DBili  x   /  AST  35  /  ALT  220<H>  /  AlkPhos  140<H>      LIVER FUNCTIONS - ( 2019 06:46 )  Alb: 2.4 g/dL / Pro: 6.7 g/dL / ALK PHOS: 140 U/L / ALT: 220 U/L / AST: 35 U/L / GGT: x             Imaging:    < from: CT Abdomen and Pelvis No Cont (19 @ 23:32) >    PROCEDURE DATE:  2019    INTERPRETATION:  CLINICAL INFORMATION: Acute elevation in LFTs. NSTEMI. Status post BKA.  COMPARISON: None.    PROCEDURE:   CT of the Abdomen and Pelvis was performed without intravenous contrast.   Intravenous contrast: None.  Oral contrast: None.  Sagittal and coronal reformats were performed.    FINDINGS:  Evaluation of solid organs are limited without intravenous contrast. Exam is also limited due to motion and streak artifacts.    LOWER CHEST: Moderate bilateral pleural effusions with passive atelectasis.    LIVER: Within normal limits.  BILE DUCTS: Normal caliber.  GALLBLADDER: Within normal limits.  SPLEEN: Within normal limits.  PANCREAS: Within normal limits.  ADRENALS: Mild thickening of the left adrenal gland.  KIDNEYS/URETERS: Within normal limits.    BLADDER: Layering focal hyperdensity in the dependent portion is suggestive of layering calculi.  REPRODUCTIVE ORGANS: Prostate is enlarged.    BOWEL: No bowel obstruction. Appendix is normal.  PERITONEUM: No ascites.  VESSELS: Mild atherosclerotic changes.  RETROPERITONEUM: No lymphadenopathy.    ABDOMINAL WALL: Within normal limits.  BONES: Degenerative changes with large protruding anterior osteophytes at the level of L3-L4.    IMPRESSION:     Limited noncontrast study. No acute intra-abdominal or pelvic pathology.  Moderate bilateral pleural effusions.  < end of copied text >

## 2019-07-31 NOTE — PROGRESS NOTE ADULT - SUBJECTIVE AND OBJECTIVE BOX
Patient seen and examined at bedside.    Overnight Events: No acute events overnight. Pt still confused.       REVIEW OF SYSTEMS:  Constitutional:     [ ] negative [ ] fevers [ ] chills [ ] weight loss [ ] weight gain  HEENT:                  [ ] negative [ ] dry eyes [ ] eye irritation [ ] postnasal drip [ ] nasal congestion  CV:                         [ ] negative  [ ] chest pain [ ] orthopnea [ ] palpitations [ ] murmur  Resp:                     [ ] negative [ ] cough [ ] shortness of breath [ ] dyspnea [ ] wheezing [ ] sputum [ ]hemoptysis  GI:                          [ ] negative [ ] nausea [ ] vomiting [ ] diarrhea [ ] constipation [ ] abd pain [ ] dysphagia   :                        [ ] negative [ ] dysuria [ ] nocturia [ ] hematuria [ ] increased urinary frequency  Musculoskeletal: [ ] negative [ ] back pain [ ] myalgias [ ] arthralgias [ ] fracture  Skin:                       [ ] negative [ ] rash [ ] itch  Neurological:        [ ] negative [ ] headache [ ] dizziness [ ] syncope [ ] weakness [ ] numbness  Psychiatric:           [ ] negative [ ] anxiety [ ] depression  Endocrine:            [ ] negative [ ] diabetes [ ] thyroid problem  Heme/Lymph:      [ ] negative [ ] anemia [ ] bleeding problem  Allergic/Immune: [ ] negative [ ] itchy eyes [ ] nasal discharge [ ] hives [ ] angioedema    [ ] All other systems negative  [x ] Unable to assess ROS due to AMS    Current Meds:  aspirin  chewable 81 milliGRAM(s) Enteral Tube daily  clopidogrel Tablet 75 milliGRAM(s) Oral daily  dextrose 40% Gel 15 Gram(s) Oral once PRN  dextrose 5%. 1000 milliLiter(s) IV Continuous <Continuous>  dextrose 50% Injectable 12.5 Gram(s) IV Push once  dextrose 50% Injectable 25 Gram(s) IV Push once  dextrose 50% Injectable 25 Gram(s) IV Push once  glucagon  Injectable 1 milliGRAM(s) IntraMuscular once PRN  heparin  Injectable 5000 Unit(s) SubCutaneous every 12 hours  insulin glargine Injectable (LANTUS) 22 Unit(s) SubCutaneous at bedtime  insulin lispro (HumaLOG) corrective regimen sliding scale   SubCutaneous every 6 hours  ipratropium 17 MICROgram(s) HFA Inhaler 1 Puff(s) Inhalation every 6 hours  losartan 25 milliGRAM(s) Oral daily  melatonin 3 milliGRAM(s) Oral at bedtime PRN  nicotine -   7 mG/24Hr(s) Patch 1 patch Transdermal daily      PAST MEDICAL & SURGICAL HISTORY:  Hyperlipidemia  Hypertension  Diabetes  H/O mastoidectomy      Vitals:  T(F): 98.5 (07-31), Max: 98.5 (07-31)  HR: 90 (07-31) (88 - 101)  BP: 110/71 (07-31) (110/71 - 137/85)  RR: 18 (07-31)  SpO2: 98% (07-31)  I&O's Summary    30 Jul 2019 07:01  -  31 Jul 2019 07:00  --------------------------------------------------------  IN: 100 mL / OUT: 0 mL / NET: 100 mL        Physical Exam:  Appearance: No acute distress; well appearing  Eyes: PERRL, EOMI, pink conjunctiva  HENT: Normal oral mucosa. NGT in place.   Cardiovascular: RRR, S1, S2, no murmurs, rubs, or gallops; no edema; no JVD  Respiratory: Clear to auscultation bilaterally  Gastrointestinal: soft, non-tender, non-distended with normal bowel sounds  Musculoskeletal: BKA  Neurologic: Non-focal  Lymphatic: No lymphadenopathy  Psychiatry: AAOx3, mood & affect appropriate  Skin: No rashes, ecchymoses, or cyanosis                          10.8   9.5   )-----------( 276      ( 31 Jul 2019 06:46 )             34.2     07-31    148<H>  |  113<H>  |  24<H>  ----------------------------<  225<H>  3.9   |  23  |  0.80    Ca    8.9      31 Jul 2019 06:46  Phos  3.5     07-31  Mg     2.0     07-31    TPro  6.7  /  Alb  2.4<L>  /  TBili  0.5  /  DBili  x   /  AST  35  /  ALT  220<H>  /  AlkPhos  140<H>  07-31                  New ECG(s): Personally reviewed    Echo:  < from: Transthoracic Echocardiogram (07.06.19 @ 09:48) >  Conclusions:  1. Mild mitral regurgitation.  2. Moderately dilated left atrium.  LA volume index = 47  cc/m2.  The interatrial setpum is bowed towards the right  suggesting elevated left atrial pressure.  3. The left ventricle is moderately dilated.  4. There is global hypokineiss with minor regional  variation.  The LVEF about 20-25%.  5. Normal right ventricular size and function.  6. Normal tricuspid valve. No tricuspid regurgitation.  7. There is no pericardial effusion.  8. There is a left pleural effusion.  There are no prior studies available for comparison.    < end of copied text >      Interpretation of Telemetry: Sinus 90s to 100s

## 2019-07-31 NOTE — CONSULT NOTE ADULT - CONSULT REASON
AMS
Elevated lactate
Evaluate Rehabilitation Needs
Pulm edema
Uncontrolled Type 2 DM w/ hyperglycemia
dry gangrene
s/p fem to pop bypass
PEG placement

## 2019-07-31 NOTE — PROGRESS NOTE ADULT - NSHPATTENDINGPLANDISCUSS_GEN_ALL_CORE
Dr. Garcai
Dr. Shahid
To reach Cardiology Attending call during weekdays Spectra [phone] or Fellow .
To reach Cardiology Attending call during weekdays Spectra [phone] or Fellow .
To reach Cardiology Attending call during weekdays Spectra [phone] or Fellow .
Dr. Ennis
Card, Vascular Sx
Vascular surgery resident and Medicine NP
MIYA Frias

## 2019-07-31 NOTE — EEG REPORT - NS EEG TEXT BOX
[facility]  Report of Routine EEG with Video    [facility]: [address], [address] [place] [address], Phone: [phone]  [facility]: [address], [place] [address], Phone: [phone]  Office: [address] [facility], [facility] [address], [place] [address], Phone: [phone]    Patient Name: Erik Olivares    Age: 69 year  : 1950  Patient ID: -, MRN #: MR# [id number], Location: [address] [place] [address]  Referring Physician: -  EEG #: [id number]    Study Date: 2019	    Technical Information:					  On Instrument: -  Placement and Labeling of Electrodes:  The EEG was performed utilizing 20 channels referential EEG connections (coronal over temporal over parasagittal montage) using all standard 10-20 electrode placements with EKG.  Recording was at a sampling rate of 256 samples per second per channel.  Time synchronized digital video recording was done simultaneously with EEG recording.  A low light infrared camera was used for low light recording.  Mart and seizure detection algorithms were utilized.    History:  Routine EEG:  COR: Patient awake and confused/uncooperative  No Hv: due to patient age  No Photic: due to machine not equipped  68y/o male  P/w AMS  H/o HTN, DM, PAD, HLD    -    Pertinent Medication	  No seizure medications 	    Study Interpretation:    FINDINGS: The background was continuous, spontaneously variable and reactive. Low amplitude frontal beta was noted in wakefulness.    Entire recording captured drowsy state only; no posterior dominant rhythm seen.    Background Slowing:    Diffuse theta and polymorphic delta slowing.    Focal Slowing:   None were present.    Sleep Background:  Drowsiness was characterized by fragmentation, attenuation, and slowing of the background activity.    Stage II sleep transients were not recorded.    Other Non-Epileptiform Findings:  None were present.    Interictal Epileptiform Activity:   None were present.    Events:  Clinical events: None recorded.  Seizures: None recorded.    Activation Procedures:   Hyperventilation was not performed.    Photic stimulation was not performed.     Artifacts:  Intermittent myogenic and movement artifacts were noted.    ECG:  The heart rate on single channel ECG was predominantly between 70-80 BPM.    EEG Summary/Classification:  Abnormal EEG in the awake and drowsy state-    - Moderate generalized slowing.     EEG Impression/Clinical Correlate:    Abnormal EEG study.  1. Moderate nonspecific diffuse or multifocal cerebral dysfunction.   2. No epileptiform pattern or seizure seen.    ________________________________________    Tyson Shine MD  Pediatric Neurology Resident, [facility]    Veronique Goyal MD  Attending Physician, [facility]    Prelim Report [facility]  Report of Routine EEG with Video    [facility]: [address], [address] [place] [address], Phone: [phone]  [facility]: [address] [place] [address], [place] [address], Phone: [phone]  Office: [address] [facility], [facility] [address], [place] [address], Phone: [phone]    Patient Name: Erik Olivares    Age: 69 year  : 1950  Patient ID: -, MRN #: MR# [id number], Location: [address] [place] [address]  Referring Physician: -  EEG #: [id number]    Study Date: 2019	    Technical Information:					  On Instrument: -  Placement and Labeling of Electrodes:  The EEG was performed utilizing 20 channels referential EEG connections (coronal over temporal over parasagittal montage) using all standard 10-20 electrode placements with EKG.  Recording was at a sampling rate of 256 samples per second per channel.  Time synchronized digital video recording was done simultaneously with EEG recording.  A low light infrared camera was used for low light recording.  Mart and seizure detection algorithms were utilized.    History:  68y/o male  P/w AMS  H/o HTN, DM, PAD, HLD      Pertinent Medication	  No seizure medications 	    Study Interpretation:    FINDINGS: The background was continuous, spontaneously variable and reactive. There was a symmetric, well-formed 7-8 Hz posterior dominant rhythm seen at 30uV.    Background Slowing:  Diffuse theta and polymorphic delta slowing.    Focal Slowing:   None were present.    Sleep Background:  Drowsiness was characterized by fragmentation, attenuation, and slowing of the background activity.    Stage II sleep transients were not recorded.    Other Non-Epileptiform Findings:  None were present.    Interictal Epileptiform Activity:   None were present.    Events:  Clinical events: None recorded.  Seizures: None recorded.    Activation Procedures:   Hyperventilation was not performed.    Photic stimulation was not performed.     Artifacts:  Intermittent myogenic and movement artifacts were noted.    ECG:  The heart rate on single channel ECG was predominantly between 70-80 BPM.    EEG Summary/Classification:  Abnormal EEG in the awake and drowsy states.  - Mild to moderate generalized slowing.     EEG Impression/Clinical Correlate:  Abnormal EEG study.  1. Mild to moderate nonspecific diffuse or multifocal cerebral dysfunction.   2. No epileptiform pattern or seizure seen.    ________________________________________    Tyson Shine MD  Pediatric Neurology Resident, [facility]    Veronique Goyal MD  Attending Physician, [facility]

## 2019-07-31 NOTE — CONSULT NOTE ADULT - ATTENDING COMMENTS
VASCULAR NEUROLOGY ATTENDING  The patient is seen and examined the history and imaging are reviewed. I agree with the resident note unless otherwise noted. Patient moving all extremities but without coherent contents of consciousness. Unclear if patient has dementia or if this represents an aphasia. There is limited information available regarding the patients baseline functional status. Would obtain MRI/A brain. Further management dependant on MRI findings and additional information regarding baseline.
Patient seen and examined, agree with above. 69M PMH HTN, DM2, PVD, chronic systolic heart failure, recent vascular bypass in LLE, NSTEMI, cardiogenic shock, s/p L BKA on 7/22, encephalopathy, now with lactic acidosis, VINAY and acute transaminitis. He had an RRT for resp distress overnight and was given Lasix. There is concern for ischemic hepatitis but there is no obvious cause. His SBPs have been 90-100s and there are no significant hypotensive episodes recorded, his SpO2 has been stable and there are no episodes of A.fib (he is on tele). His HD status is stable at this time. Please check CT head and abd/pelvis. Keep NPO for now. Avoid hepatotoxic meds. Check ammonia level, trend LFTs and lactate and consider hepatology consult. He is not in any resp distress and is not a candidate for BiPAP at this time given poor mental status. Vascular evaluated L BKA stump and it is ok, he has cold right foot at baseline. There is no need for MICU care at this time, reconsult prn. CC time: 35mins
Patient seen and examined. Agree with above. Patient with documented failed VFSS, in the setting of recent intubation, sepsis, stroke. Would need to clarify Plavix status, whether it can be held before proceeding to PEG.
As above  PAD  LE's threatened  Not a candidate for periph vasc intervention due to overall cond  Rec:   Support  AC  Foot care/offload  Will fu

## 2019-08-01 LAB
ANION GAP SERPL CALC-SCNC: 12 MMOL/L — SIGNIFICANT CHANGE UP (ref 5–17)
BUN SERPL-MCNC: 24 MG/DL — HIGH (ref 7–23)
CALCIUM SERPL-MCNC: 9.2 MG/DL — SIGNIFICANT CHANGE UP (ref 8.4–10.5)
CHLORIDE SERPL-SCNC: 111 MMOL/L — HIGH (ref 96–108)
CO2 SERPL-SCNC: 26 MMOL/L — SIGNIFICANT CHANGE UP (ref 22–31)
CREAT SERPL-MCNC: 0.88 MG/DL — SIGNIFICANT CHANGE UP (ref 0.5–1.3)
GLUCOSE BLDC GLUCOMTR-MCNC: 147 MG/DL — HIGH (ref 70–99)
GLUCOSE BLDC GLUCOMTR-MCNC: 147 MG/DL — HIGH (ref 70–99)
GLUCOSE BLDC GLUCOMTR-MCNC: 181 MG/DL — HIGH (ref 70–99)
GLUCOSE BLDC GLUCOMTR-MCNC: 83 MG/DL — SIGNIFICANT CHANGE UP (ref 70–99)
GLUCOSE BLDC GLUCOMTR-MCNC: 96 MG/DL — SIGNIFICANT CHANGE UP (ref 70–99)
GLUCOSE SERPL-MCNC: 162 MG/DL — HIGH (ref 70–99)
POTASSIUM SERPL-MCNC: 3.7 MMOL/L — SIGNIFICANT CHANGE UP (ref 3.5–5.3)
POTASSIUM SERPL-SCNC: 3.7 MMOL/L — SIGNIFICANT CHANGE UP (ref 3.5–5.3)
SODIUM SERPL-SCNC: 149 MMOL/L — HIGH (ref 135–145)

## 2019-08-01 PROCEDURE — 71045 X-RAY EXAM CHEST 1 VIEW: CPT | Mod: 26

## 2019-08-01 PROCEDURE — 99232 SBSQ HOSP IP/OBS MODERATE 35: CPT

## 2019-08-01 RX ORDER — HUMAN INSULIN 100 [IU]/ML
7 INJECTION, SUSPENSION SUBCUTANEOUS EVERY 6 HOURS
Refills: 0 | Status: DISCONTINUED | OUTPATIENT
Start: 2019-08-01 | End: 2019-08-05

## 2019-08-01 RX ADMIN — HUMAN INSULIN 9 UNIT(S): 100 INJECTION, SUSPENSION SUBCUTANEOUS at 12:58

## 2019-08-01 RX ADMIN — Medication 1 PATCH: at 14:37

## 2019-08-01 RX ADMIN — Medication 1 PUFF(S): at 00:23

## 2019-08-01 RX ADMIN — HUMAN INSULIN 9 UNIT(S): 100 INJECTION, SUSPENSION SUBCUTANEOUS at 00:23

## 2019-08-01 RX ADMIN — Medication 1 PATCH: at 12:58

## 2019-08-01 RX ADMIN — LOSARTAN POTASSIUM 25 MILLIGRAM(S): 100 TABLET, FILM COATED ORAL at 05:30

## 2019-08-01 RX ADMIN — Medication 12.5 MILLIGRAM(S): at 05:30

## 2019-08-01 RX ADMIN — HEPARIN SODIUM 5000 UNIT(S): 5000 INJECTION INTRAVENOUS; SUBCUTANEOUS at 18:21

## 2019-08-01 RX ADMIN — Medication 1 PUFF(S): at 18:21

## 2019-08-01 RX ADMIN — HUMAN INSULIN 9 UNIT(S): 100 INJECTION, SUSPENSION SUBCUTANEOUS at 18:20

## 2019-08-01 RX ADMIN — HUMAN INSULIN 9 UNIT(S): 100 INJECTION, SUSPENSION SUBCUTANEOUS at 06:40

## 2019-08-01 RX ADMIN — Medication 1 PUFF(S): at 05:30

## 2019-08-01 RX ADMIN — Medication 12.5 MILLIGRAM(S): at 21:00

## 2019-08-01 RX ADMIN — Medication 3 MILLIGRAM(S): at 21:00

## 2019-08-01 RX ADMIN — HEPARIN SODIUM 5000 UNIT(S): 5000 INJECTION INTRAVENOUS; SUBCUTANEOUS at 05:30

## 2019-08-01 RX ADMIN — Medication 1 PATCH: at 18:43

## 2019-08-01 RX ADMIN — Medication 1 PUFF(S): at 12:58

## 2019-08-01 RX ADMIN — Medication 1: at 00:24

## 2019-08-01 NOTE — PROGRESS NOTE ADULT - PROBLEM SELECTOR PLAN 6
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  On the left, the bypass graft is occluded, the popliteal artery is occluded and the trifurcation arteries are occluded.  - s/p Left BKA on 7/22/19  - Per vascular bypass staples to be taken out prior to discharge to [facility] next week. not to remove BKA staples  - c/w DAPT, statin  vascular surg f/u

## 2019-08-01 NOTE — PROGRESS NOTE ADULT - PROBLEM SELECTOR PLAN 1
Hst up to 3000s - down trended  no ischemic evaluation done - recommended for medical management per cardiology   c/w asa, statin  continue to monitor on telemetry  cards f/u regarding holding plavix for PEG placement

## 2019-08-01 NOTE — PROGRESS NOTE ADULT - PROBLEM SELECTOR PLAN 3
initially started on vancomycin/zosyn  Monitoring off abx. No signs of sepsis at this point in time.   Satting well on room air at this time.  s+s f/u appreciated - d/w Tran - concern for aspiration as patient not following commands well  GI consult for PEG

## 2019-08-01 NOTE — PROGRESS NOTE ADULT - PROBLEM SELECTOR PLAN 1
Goal glucose 100-180  Continue with NPH 9 units q6 and low correction q6  Please notify us if tube feeds discontinued or changed.  Will adjust further as needed.  Plan to d/c on insulin doses and type to be determined based on feeding modality at the time of discharge.

## 2019-08-01 NOTE — PROGRESS NOTE ADULT - SUBJECTIVE AND OBJECTIVE BOX
Patient is a 69y old  Male who presents with a chief complaint of NSTEMI (01 Aug 2019 14:03)        SUBJECTIVE / OVERNIGHT EVENTS: no acute complaints. mildly agitated this morning      MEDICATIONS  (STANDING):  aspirin  chewable 81 milliGRAM(s) Enteral Tube daily  dextrose 5%. 1000 milliLiter(s) (50 mL/Hr) IV Continuous <Continuous>  dextrose 50% Injectable 12.5 Gram(s) IV Push once  dextrose 50% Injectable 25 Gram(s) IV Push once  dextrose 50% Injectable 25 Gram(s) IV Push once  heparin  Injectable 5000 Unit(s) SubCutaneous every 12 hours  insulin lispro (HumaLOG) corrective regimen sliding scale   SubCutaneous every 6 hours  insulin NPH human recombinant 9 Unit(s) SubCutaneous every 6 hours  ipratropium 17 MICROgram(s) HFA Inhaler 1 Puff(s) Inhalation every 6 hours  losartan 25 milliGRAM(s) Oral daily  metoprolol tartrate 12.5 milliGRAM(s) Enteral Tube two times a day  nicotine -   7 mG/24Hr(s) Patch 1 patch Transdermal daily    MEDICATIONS  (PRN):  dextrose 40% Gel 15 Gram(s) Oral once PRN Blood Glucose LESS THAN 70 milliGRAM(s)/deciliter  glucagon  Injectable 1 milliGRAM(s) IntraMuscular once PRN Glucose LESS THAN 70 milligrams/deciliter  melatonin 3 milliGRAM(s) Oral at bedtime PRN Insomnia      Vital Signs Last 24 Hrs  T(C): 36.6 (01 Aug 2019 12:05), Max: 36.8 (01 Aug 2019 00:22)  T(F): 97.9 (01 Aug 2019 12:05), Max: 98.2 (01 Aug 2019 00:22)  HR: 79 (01 Aug 2019 12:05) (79 - 92)  BP: 122/77 (01 Aug 2019 12:05) (122/77 - 137/88)  BP(mean): --  RR: 20 (01 Aug 2019 12:05) (18 - 20)  SpO2: 98% (01 Aug 2019 12:05) (97% - 99%)  CAPILLARY BLOOD GLUCOSE      POCT Blood Glucose.: 147 mg/dL (01 Aug 2019 12:49)  POCT Blood Glucose.: 147 mg/dL (01 Aug 2019 06:26)  POCT Blood Glucose.: 181 mg/dL (31 Jul 2019 23:58)  POCT Blood Glucose.: 194 mg/dL (31 Jul 2019 18:09)    I&O's Summary    31 Jul 2019 07:01  -  01 Aug 2019 07:00  --------------------------------------------------------  IN: 1980 mL / OUT: 0 mL / NET: 1980 mL    PHYSICAL EXAM:  GENERAL: NAD, more awake  HEAD:  Atraumatic, Normocephalic  EYES: conjunctiva and sclera clear  Nose: +NGT  NECK: No JVD  CHEST/LUNG: CTA b/l  HEART: S1 S2 RRR  ABDOMEN: +BS Soft, NT/ND  EXTREMITIES:  no LE edema, mittens on hands  SKIN: staples along stump and medial thigh - healing - no erythema  NERVOUS SYSTEM:  Alert & Orientedx1; moving all extremities, following some commands      LABS:                        10.8   9.5   )-----------( 276      ( 31 Jul 2019 06:46 )             34.2     08-01    149<H>  |  111<H>  |  24<H>  ----------------------------<  162<H>  3.7   |  26  |  0.88    Ca    9.2      01 Aug 2019 06:32  Phos  3.5     07-31  Mg     2.0     07-31    TPro  6.7  /  Alb  2.4<L>  /  TBili  0.5  /  DBili  x   /  AST  35  /  ALT  220<H>  /  AlkPhos  140<H>  07-31              RADIOLOGY & ADDITIONAL TESTS:    Imaging Personally Reviewed:  Consultant(s) Notes Reviewed:    Care Discussed with Consultants/Other Providers:

## 2019-08-01 NOTE — PROGRESS NOTE ADULT - ASSESSMENT
69M w/ PMH of poor med compliance, DM2, HTN, PADs/p Left BKA 7/22 transferred from [facility] to [facility] CCU for NSTEMI c/b cardiogenic shock; acute respiratory failure with hypoxia and septic shock secondary to suspected aspiration pneumonia requiring intubation (now extubated) then transferred to floors with RRT 7/24 for shock state with elevated LFTs and lactate readmission to CCU, with resolution of shock state, concern for acute pontine stroke on ct not seen on MRI brain. Hospital course c/b dysphagia failed MBS with plan for PEG.

## 2019-08-01 NOTE — PROGRESS NOTE ADULT - SUBJECTIVE AND OBJECTIVE BOX
Chief Complaint: Evaluating this 68 y/o M for uncontrolled Type 2 DM w/ hyperglycemia      Interval History: Remains on tube feeds. Glucose values at goal today. Denies any current complaints. More alert than yesterday.     MEDICATIONS  (STANDING):  aspirin  chewable 81 milliGRAM(s) Enteral Tube daily  dextrose 5%. 1000 milliLiter(s) (50 mL/Hr) IV Continuous <Continuous>  dextrose 50% Injectable 12.5 Gram(s) IV Push once  dextrose 50% Injectable 25 Gram(s) IV Push once  dextrose 50% Injectable 25 Gram(s) IV Push once  heparin  Injectable 5000 Unit(s) SubCutaneous every 12 hours  insulin lispro (HumaLOG) corrective regimen sliding scale   SubCutaneous every 6 hours  insulin NPH human recombinant 9 Unit(s) SubCutaneous every 6 hours  ipratropium 17 MICROgram(s) HFA Inhaler 1 Puff(s) Inhalation every 6 hours  losartan 25 milliGRAM(s) Oral daily  metoprolol tartrate 12.5 milliGRAM(s) Enteral Tube two times a day  nicotine -   7 mG/24Hr(s) Patch 1 patch Transdermal daily    MEDICATIONS  (PRN):  dextrose 40% Gel 15 Gram(s) Oral once PRN Blood Glucose LESS THAN 70 milliGRAM(s)/deciliter  glucagon  Injectable 1 milliGRAM(s) IntraMuscular once PRN Glucose LESS THAN 70 milligrams/deciliter  melatonin 3 milliGRAM(s) Oral at bedtime PRN Insomnia      Allergies    No Known Allergies    Intolerances      Review of Systems:  Constitutional: No fever  Eyes: No blurry vision  Cardiovascular: No chest pain  Respiratory: No SOB  GI: No abdominal pain, No nausea, No vomiting  Endocrine: as noted in HPI    All other negative      PHYSICAL EXAM:  VITALS: T(C): 36.6 (08-01-19 @ 12:05)  T(F): 97.9 (08-01-19 @ 12:05), Max: 98.2 (08-01-19 @ 00:22)  HR: 79 (08-01-19 @ 12:05) (79 - 92)  BP: 122/77 (08-01-19 @ 12:05) (122/77 - 137/88)  RR:  (18 - 20)  SpO2:  (97% - 99%)  Wt(kg): --  GENERAL: NAD at this time  EYES: EOMI, No proptosis  HEENT:  Atraumatic, Normocephalic,   RESPIRATORY: full excursion, non labored  CARDIOVASCULAR: Regular rhythm; normal S1/S2, no peripheral edema  GI: Soft, nontender, non distended, normal bowel sounds  SKIN: Warm and dry  PSYCH: normal affect, normal mood      POCT Blood Glucose.: 147 mg/dL (08-01-19 @ 12:49)  POCT Blood Glucose.: 147 mg/dL (08-01-19 @ 06:26)  POCT Blood Glucose.: 181 mg/dL (07-31-19 @ 23:58)  POCT Blood Glucose.: 194 mg/dL (07-31-19 @ 18:09)  POCT Blood Glucose.: 179 mg/dL (07-31-19 @ 12:40)  POCT Blood Glucose.: 209 mg/dL (07-31-19 @ 06:25)  POCT Blood Glucose.: 199 mg/dL (07-30-19 @ 23:54)  POCT Blood Glucose.: 214 mg/dL (07-30-19 @ 21:54)  POCT Blood Glucose.: 224 mg/dL (07-30-19 @ 17:39)  POCT Blood Glucose.: 284 mg/dL (07-30-19 @ 11:45)  POCT Blood Glucose.: 275 mg/dL (07-30-19 @ 06:25)  POCT Blood Glucose.: 304 mg/dL (07-30-19 @ 00:03)  POCT Blood Glucose.: 233 mg/dL (07-29-19 @ 18:05)        08-01    149<H>  |  111<H>  |  24<H>  ----------------------------<  162<H>  3.7   |  26  |  0.88    EGFR if : 102  EGFR if non : 88    Ca    9.2      08-01  Mg     2.0     07-31  Phos  3.5     07-31    TPro  6.7  /  Alb  2.4<L>  /  TBili  0.5  /  DBili  x   /  AST  35  /  ALT  220<H>  /  AlkPhos  140<H>  07-31        Thyroid Function Tests:  07-10 @ 13:02 TSH 3.57 FreeT4 -- T3 -- Anti TPO -- Anti Thyroglobulin Ab -- TSI --  07-06 @ 04:56 TSH 0.94 FreeT4 -- T3 -- Anti TPO -- Anti Thyroglobulin Ab -- TSI --      Hemoglobin A1C, Whole Blood: 10.0 % <H> [4.0 - 5.6] (07-06-19 @ 04:54)

## 2019-08-01 NOTE — PROGRESS NOTE ADULT - PROBLEM SELECTOR PLAN 2
appears to be new CHF - s/p NSTEMI/cardiogenic shock.   s/p diuresis now appears euvolemic on exam  TTE at OSH EF 15%; Repeat TTE with EF 20-25%, moderately dilated left atrium, increased left atrial pressures, moderately dilated LV, global hypokinesis  monitor volume status closely for need for further lasix  c/w losartan 25mg qd  bb held in setting of acute heart failure - ?cards f/u regarding starting low dose bb   - daily weights  - Monitor I/O strict  - continue Monitor on tele - occasional PVCs

## 2019-08-02 LAB
ANION GAP SERPL CALC-SCNC: 12 MMOL/L — SIGNIFICANT CHANGE UP (ref 5–17)
BUN SERPL-MCNC: 24 MG/DL — HIGH (ref 7–23)
CALCIUM SERPL-MCNC: 9.3 MG/DL — SIGNIFICANT CHANGE UP (ref 8.4–10.5)
CHLORIDE SERPL-SCNC: 112 MMOL/L — HIGH (ref 96–108)
CO2 SERPL-SCNC: 24 MMOL/L — SIGNIFICANT CHANGE UP (ref 22–31)
CREAT SERPL-MCNC: 0.92 MG/DL — SIGNIFICANT CHANGE UP (ref 0.5–1.3)
GLUCOSE BLDC GLUCOMTR-MCNC: 116 MG/DL — HIGH (ref 70–99)
GLUCOSE BLDC GLUCOMTR-MCNC: 157 MG/DL — HIGH (ref 70–99)
GLUCOSE BLDC GLUCOMTR-MCNC: 92 MG/DL — SIGNIFICANT CHANGE UP (ref 70–99)
GLUCOSE BLDC GLUCOMTR-MCNC: 96 MG/DL — SIGNIFICANT CHANGE UP (ref 70–99)
GLUCOSE SERPL-MCNC: 117 MG/DL — HIGH (ref 70–99)
HCT VFR BLD CALC: 41.4 % — SIGNIFICANT CHANGE UP (ref 39–50)
HGB BLD-MCNC: 12.4 G/DL — LOW (ref 13–17)
MCHC RBC-ENTMCNC: 28.7 PG — SIGNIFICANT CHANGE UP (ref 27–34)
MCHC RBC-ENTMCNC: 30 GM/DL — LOW (ref 32–36)
MCV RBC AUTO: 95.8 FL — SIGNIFICANT CHANGE UP (ref 80–100)
PLATELET # BLD AUTO: 262 K/UL — SIGNIFICANT CHANGE UP (ref 150–400)
POTASSIUM SERPL-MCNC: 3.8 MMOL/L — SIGNIFICANT CHANGE UP (ref 3.5–5.3)
POTASSIUM SERPL-SCNC: 3.8 MMOL/L — SIGNIFICANT CHANGE UP (ref 3.5–5.3)
RBC # BLD: 4.32 M/UL — SIGNIFICANT CHANGE UP (ref 4.2–5.8)
RBC # FLD: 14.4 % — SIGNIFICANT CHANGE UP (ref 10.3–14.5)
SODIUM SERPL-SCNC: 148 MMOL/L — HIGH (ref 135–145)
WBC # BLD: 7.4 K/UL — SIGNIFICANT CHANGE UP (ref 3.8–10.5)
WBC # FLD AUTO: 7.4 K/UL — SIGNIFICANT CHANGE UP (ref 3.8–10.5)

## 2019-08-02 PROCEDURE — 99232 SBSQ HOSP IP/OBS MODERATE 35: CPT

## 2019-08-02 PROCEDURE — 71045 X-RAY EXAM CHEST 1 VIEW: CPT | Mod: 26

## 2019-08-02 RX ADMIN — Medication 1 PUFF(S): at 05:38

## 2019-08-02 RX ADMIN — LOSARTAN POTASSIUM 25 MILLIGRAM(S): 100 TABLET, FILM COATED ORAL at 05:38

## 2019-08-02 RX ADMIN — Medication 81 MILLIGRAM(S): at 13:45

## 2019-08-02 RX ADMIN — Medication 1 PATCH: at 09:19

## 2019-08-02 RX ADMIN — Medication 1 PUFF(S): at 00:15

## 2019-08-02 RX ADMIN — HEPARIN SODIUM 5000 UNIT(S): 5000 INJECTION INTRAVENOUS; SUBCUTANEOUS at 17:20

## 2019-08-02 RX ADMIN — Medication 12.5 MILLIGRAM(S): at 05:38

## 2019-08-02 RX ADMIN — Medication 1 PATCH: at 17:06

## 2019-08-02 RX ADMIN — HEPARIN SODIUM 5000 UNIT(S): 5000 INJECTION INTRAVENOUS; SUBCUTANEOUS at 05:38

## 2019-08-02 RX ADMIN — HUMAN INSULIN 7 UNIT(S): 100 INJECTION, SUSPENSION SUBCUTANEOUS at 17:51

## 2019-08-02 RX ADMIN — HUMAN INSULIN 7 UNIT(S): 100 INJECTION, SUSPENSION SUBCUTANEOUS at 13:39

## 2019-08-02 RX ADMIN — Medication 12.5 MILLIGRAM(S): at 17:20

## 2019-08-02 RX ADMIN — HUMAN INSULIN 7 UNIT(S): 100 INJECTION, SUSPENSION SUBCUTANEOUS at 06:34

## 2019-08-02 RX ADMIN — Medication 1 PUFF(S): at 13:40

## 2019-08-02 RX ADMIN — Medication 1 PATCH: at 19:13

## 2019-08-02 RX ADMIN — HUMAN INSULIN 7 UNIT(S): 100 INJECTION, SUSPENSION SUBCUTANEOUS at 00:15

## 2019-08-02 RX ADMIN — Medication 1: at 17:53

## 2019-08-02 RX ADMIN — Medication 1 PATCH: at 13:40

## 2019-08-02 RX ADMIN — Medication 1 PUFF(S): at 17:20

## 2019-08-02 NOTE — PROGRESS NOTE ADULT - PROBLEM SELECTOR PLAN 1
Goal glucose 100-180  Continue with NPH 7 units q6 and low correction q6  Please notify us if tube feeds discontinued or changed.  Will adjust further as needed.  Plan to d/c on insulin doses and type to be determined based on feeding modality at the time of discharge.

## 2019-08-02 NOTE — PROGRESS NOTE ADULT - PROBLEM SELECTOR PLAN 2
appears to be new CHF - s/p NSTEMI/cardiogenic shock.   s/p diuresis now appears euvolemic on exam  TTE at OSH EF 15%; Repeat TTE with EF 20-25%, moderately dilated left atrium, increased left atrial pressures, moderately dilated LV, global hypokinesis  monitor volume status closely for need for further lasix  c/w losartan 25mg qd  bb held in setting of acute heart failure - ?cards f/u regarding starting low dose bb   - daily weights  - Monitor I/O strict  - continue Monitor on tele - occasional PVCs appears to be new CHF - s/p NSTEMI/cardiogenic shock.   s/p diuresis now appears euvolemic on exam  TTE at OSH EF 15%; Repeat TTE with EF 20-25%, moderately dilated left atrium, increased left atrial pressures, moderately dilated LV, global hypokinesis  monitor volume status closely for need for further lasix  c/w losartan 25mg qd  bb held in setting of acute heart failure - ?cards f/u regarding starting low dose bb   - daily weights  - Monitor I/O strict  - no events - d/c telemetry

## 2019-08-02 NOTE — PROGRESS NOTE ADULT - PROBLEM SELECTOR PLAN 5
hga1c 10  hyperglycemia  monitor FS   FS uncontrolled -Endocrine consult hga1c 10  hyperglycemia  monitor FS   FS uncontrolled -Endocrine recommended-NPH Q6   lowered dose due to low FS - awaiting endocrine f/u

## 2019-08-02 NOTE — PROGRESS NOTE ADULT - SUBJECTIVE AND OBJECTIVE BOX
Chief Complaint: Evaluating this 68 y/o M for uncontrolled Type 2 DM w/ hyperglycemia      Interval History: Denies any current complaints. Remains on tube feeds.     MEDICATIONS  (STANDING):  aspirin  chewable 81 milliGRAM(s) Enteral Tube daily  dextrose 5%. 1000 milliLiter(s) (50 mL/Hr) IV Continuous <Continuous>  dextrose 50% Injectable 12.5 Gram(s) IV Push once  dextrose 50% Injectable 25 Gram(s) IV Push once  dextrose 50% Injectable 25 Gram(s) IV Push once  heparin  Injectable 5000 Unit(s) SubCutaneous every 12 hours  insulin lispro (HumaLOG) corrective regimen sliding scale   SubCutaneous every 6 hours  insulin NPH human recombinant 7 Unit(s) SubCutaneous every 6 hours  ipratropium 17 MICROgram(s) HFA Inhaler 1 Puff(s) Inhalation every 6 hours  losartan 25 milliGRAM(s) Oral daily  metoprolol tartrate 12.5 milliGRAM(s) Enteral Tube two times a day  nicotine -   7 mG/24Hr(s) Patch 1 patch Transdermal daily    MEDICATIONS  (PRN):  dextrose 40% Gel 15 Gram(s) Oral once PRN Blood Glucose LESS THAN 70 milliGRAM(s)/deciliter  glucagon  Injectable 1 milliGRAM(s) IntraMuscular once PRN Glucose LESS THAN 70 milligrams/deciliter  melatonin 3 milliGRAM(s) Oral at bedtime PRN Insomnia      Allergies    No Known Allergies    Intolerances      Review of Systems:  Constitutional: No fever  Eyes: No blurry vision  Cardiovascular: No chest pain  Respiratory: No SOB  GI: No abdominal pain, No nausea, No vomiting  Endocrine: as noted in HPI    All other negative      PHYSICAL EXAM:  VITALS: T(C): 36.4 (08-02-19 @ 13:31)  T(F): 97.5 (08-02-19 @ 13:31), Max: 97.7 (08-01-19 @ 20:39)  HR: 81 (08-02-19 @ 13:31) (73 - 82)  BP: 120/77 (08-02-19 @ 13:31) (120/75 - 133/83)  RR:  (18 - 20)  SpO2:  (97% - 100%)  Wt(kg): --  GENERAL: NAD at this time  EYES: EOMI, No proptosis  HEENT:  Atraumatic, Normocephalic, +NGT  RESPIRATORY:  full excursion, non labored  CARDIOVASCULAR: Regular rhythm; normal S1/S2, no peripheral edema  GI: Soft, nontender, non distended, normal bowel sounds  SKIN: Warm and dry  PSYCH: normal affect, normal mood      POCT Blood Glucose.: 116 mg/dL (08-02-19 @ 13:21)  POCT Blood Glucose.: 96 mg/dL (08-02-19 @ 06:31)  POCT Blood Glucose.: 92 mg/dL (08-02-19 @ 00:08)  POCT Blood Glucose.: 83 mg/dL (08-01-19 @ 21:47)  POCT Blood Glucose.: 96 mg/dL (08-01-19 @ 18:15)  POCT Blood Glucose.: 147 mg/dL (08-01-19 @ 12:49)  POCT Blood Glucose.: 147 mg/dL (08-01-19 @ 06:26)  POCT Blood Glucose.: 181 mg/dL (07-31-19 @ 23:58)  POCT Blood Glucose.: 194 mg/dL (07-31-19 @ 18:09)  POCT Blood Glucose.: 179 mg/dL (07-31-19 @ 12:40)  POCT Blood Glucose.: 209 mg/dL (07-31-19 @ 06:25)  POCT Blood Glucose.: 199 mg/dL (07-30-19 @ 23:54)  POCT Blood Glucose.: 214 mg/dL (07-30-19 @ 21:54)  POCT Blood Glucose.: 224 mg/dL (07-30-19 @ 17:39)        08-02    148<H>  |  112<H>  |  24<H>  ----------------------------<  117<H>  3.8   |  24  |  0.92    EGFR if : 98  EGFR if non : 85    Ca    9.3      08-02  Mg     2.0     07-31  Phos  3.5     07-31    TPro  6.7  /  Alb  2.4<L>  /  TBili  0.5  /  DBili  x   /  AST  35  /  ALT  220<H>  /  AlkPhos  140<H>  07-31        Thyroid Function Tests:  07-10 @ 13:02 TSH 3.57 FreeT4 -- T3 -- Anti TPO -- Anti Thyroglobulin Ab -- TSI --  07-06 @ 04:56 TSH 0.94 FreeT4 -- T3 -- Anti TPO -- Anti Thyroglobulin Ab -- TSI --      Hemoglobin A1C, Whole Blood: 10.0 % <H> [4.0 - 5.6] (07-06-19 @ 04:54)

## 2019-08-02 NOTE — PROGRESS NOTE ADULT - PROBLEM SELECTOR PLAN 1
Hst up to 3000s - down trended  no ischemic evaluation done - recommended for medical management per cardiology   c/w asa, statin  continue to monitor on telemetry  cards f/u regarding holding plavix for PEG placement Hst up to 3000s - down trended  no ischemic evaluation done - recommended for medical management per cardiology   c/w asa, statin  continue to monitor on telemetry  cards f/u appreciated - okay to hold plavix

## 2019-08-02 NOTE — CHART NOTE - NSCHARTNOTEFT_GEN_A_CORE
Nutrition Follow Up Note  Patient seen for: follow up    · Reason for Admission	NSTEMI  69M w/ PMH of poor med compliance, DM2, HTN, PADs/p Left BKA  transferred from [facility] to [facility] CCU for NSTEMI c/b cardiogenic shock; acute respiratory failure with hypoxia and septic shock secondary to suspected aspiration pneumonia requiring intubation (now extubated) then transferred to floors with RRT  for shock state with elevated LFTs and lactate readmission to CCU, with resolution of shock state, concern for acute pontine stroke on ct not seen on MRI brain. Hospital course c/b dysphagia failed MBS with plan for PEG.     plan for rehab upon discharge    Source: pt, nurse, chart    Diet : ENTERAL, NPO/ENTERAL    Patient reports: pt asking if RD can go to the store and get him some munchies, nurse reports that pt is at his goal.          Enteral /Parenteral Nutrition: pt receiving Glucerna 1.2 @ 50ml/hr x 24hr.   provides Glucerna 1.2 @ 50ml/hr x 24hr provides  1440kcal/72Grams protein and 966ml free water. provides 19kcal/kg and 1.0grams protein/kg. based on current weight of 74.1kg.       Daily Weight in k (), Weight in k.3 (), Weight in k.6 (), Weight in k (), Weight in k.7 (), Weight in k.9 (), Weight in k.2 ()  % Weight Change:8% loss since previous assess on     Pertinent Medications: MEDICATIONS  (STANDING):  aspirin  chewable 81 milliGRAM(s) Enteral Tube daily  dextrose 5%. 1000 milliLiter(s) (50 mL/Hr) IV Continuous <Continuous>  dextrose 50% Injectable 12.5 Gram(s) IV Push once  dextrose 50% Injectable 25 Gram(s) IV Push once  dextrose 50% Injectable 25 Gram(s) IV Push once  heparin  Injectable 5000 Unit(s) SubCutaneous every 12 hours  insulin lispro (HumaLOG) corrective regimen sliding scale   SubCutaneous every 6 hours  insulin NPH human recombinant 7 Unit(s) SubCutaneous every 6 hours  ipratropium 17 MICROgram(s) HFA Inhaler 1 Puff(s) Inhalation every 6 hours  losartan 25 milliGRAM(s) Oral daily  metoprolol tartrate 12.5 milliGRAM(s) Enteral Tube two times a day  nicotine -   7 mG/24Hr(s) Patch 1 patch Transdermal daily    MEDICATIONS  (PRN):  dextrose 40% Gel 15 Gram(s) Oral once PRN Blood Glucose LESS THAN 70 milliGRAM(s)/deciliter  glucagon  Injectable 1 milliGRAM(s) IntraMuscular once PRN Glucose LESS THAN 70 milligrams/deciliter  melatonin 3 milliGRAM(s) Oral at bedtime PRN Insomnia    Pertinent Labs:  @ 10:57: Na 148<H>, BUN 24<H>, Cr 0.92, <H>, K+ 3.8  Finger Sticks:  POCT Blood Glucose.: 96 mg/dL ( @ 06:31)  POCT Blood Glucose.: 92 mg/dL ( @ 00:08)  POCT Blood Glucose.: 83 mg/dL ( @ 21:47)  POCT Blood Glucose.: 96 mg/dL ( @ 18:15)  POCT Blood Glucose.: 147 mg/dL ( @ 12:49)      Skin per nursing documentation: no pressure injuries  Edema: L BKA    Estimated Needs:   [ ] no change since previous assessment  [x ] recalculated:     Previous Nutrition Diagnosis: Inadequate oral intake  Nutrition Diagnosis is: Resolving , being addressed tube feeds    New Nutrition Diagnosis: Severe malnutrition in the context of chronic illness  Nutrition Diagnosis is: Resolving, being addressed with tube feeds     Interventions: recalculated tube feeds based on pt current weight     Recommend  1) Increase tube feeds to Glucerna 1.2 by 10 ml's until goal rate of 70ml/hr is achieved. provides 2016kcal and 101 grams protein. provides 27kcal/kg and 1.4 gram protein/kg. based on current weight of 74.1kg    Monitoring and Evaluation:     Continue to monitor Nutritional intake, Tolerance to diet prescription, weights, labs, skin integrity    RD remains available upon request and will follow up per protocol  Trish Shahid MA, RD, CDN #[phone] Nutrition Follow Up Note  Patient seen for: follow up    · Reason for Admission	NSTEMI  69M w/ PMH of poor med compliance, DM2, HTN, PADs/p Left BKA  transferred from [facility] to [facility] CCU for NSTEMI c/b cardiogenic shock; acute respiratory failure with hypoxia and septic shock secondary to suspected aspiration pneumonia requiring intubation (now extubated) then transferred to floors with RRT  for shock state with elevated LFTs and lactate readmission to CCU, with resolution of shock state, concern for acute pontine stroke on ct not seen on MRI brain. Hospital course c/b dysphagia failed MBS with plan for PEG.     plan for rehab upon discharge    Source: pt, nurse, chart    Diet : ENTERAL, NPO/ENTERAL    Patient reports: pt asking if RD can go to the store and get him some munchies, nurse reports that pt is at his goal.          Enteral /Parenteral Nutrition: pt receiving Glucerna 1.2 @ 50ml/hr x 24hr.   provides Glucerna 1.2 @ 50ml/hr x 24hr provides  1440kcal/72Grams protein and 966ml free water. provides 19kcal/kg and 1.0grams protein/kg. based on current weight of 74.1kg.       Daily Weight in k (), Weight in k.3 (), Weight in k.6 (), Weight in k (), Weight in k.7 (), Weight in k.9 (), Weight in k.2 ()  % Weight Change:8% loss since previous assess on     Pertinent Medications: MEDICATIONS  (STANDING):  aspirin  chewable 81 milliGRAM(s) Enteral Tube daily  dextrose 5%. 1000 milliLiter(s) (50 mL/Hr) IV Continuous <Continuous>  dextrose 50% Injectable 12.5 Gram(s) IV Push once  dextrose 50% Injectable 25 Gram(s) IV Push once  dextrose 50% Injectable 25 Gram(s) IV Push once  heparin  Injectable 5000 Unit(s) SubCutaneous every 12 hours  insulin lispro (HumaLOG) corrective regimen sliding scale   SubCutaneous every 6 hours  insulin NPH human recombinant 7 Unit(s) SubCutaneous every 6 hours  ipratropium 17 MICROgram(s) HFA Inhaler 1 Puff(s) Inhalation every 6 hours  losartan 25 milliGRAM(s) Oral daily  metoprolol tartrate 12.5 milliGRAM(s) Enteral Tube two times a day  nicotine -   7 mG/24Hr(s) Patch 1 patch Transdermal daily    MEDICATIONS  (PRN):  dextrose 40% Gel 15 Gram(s) Oral once PRN Blood Glucose LESS THAN 70 milliGRAM(s)/deciliter  glucagon  Injectable 1 milliGRAM(s) IntraMuscular once PRN Glucose LESS THAN 70 milligrams/deciliter  melatonin 3 milliGRAM(s) Oral at bedtime PRN Insomnia    Pertinent Labs:  @ 10:57: Na 148<H>, BUN 24<H>, Cr 0.92, <H>, K+ 3.8  Finger Sticks:  POCT Blood Glucose.: 96 mg/dL ( @ 06:31)  POCT Blood Glucose.: 92 mg/dL ( @ 00:08)  POCT Blood Glucose.: 83 mg/dL ( @ 21:47)  POCT Blood Glucose.: 96 mg/dL ( @ 18:15)  POCT Blood Glucose.: 147 mg/dL ( @ 12:49)      Skin per nursing documentation: no pressure injuries  Edema: L BKA    Estimated Needs:   [ ] no change since previous assessment  [x ] recalculated:     Previous Nutrition Diagnosis: Inadequate oral intake  Nutrition Diagnosis is: Resolving , being addressed tube feeds    New Nutrition Diagnosis: Severe malnutrition in the context of chronic illness  Nutrition Diagnosis is: Resolving, being addressed with tube feeds     Interventions: recalculated tube feeds based on pt current weight     Recommend  1) Increase tube feeds to Glucerna 1.2 by 10 ml's every 4 hours until goal rate of 70ml/hr is achieved. provides 2016kcal and 101 grams protein. provides 27kcal/kg and 1.4 gram protein/kg. based on current weight of 74.1kg    Monitoring and Evaluation:     Continue to monitor Nutritional intake, Tolerance to diet prescription, weights, labs, skin integrity    RD remains available upon request and will follow up per protocol  Trish Shahid MA, RD, CDN #[phone]

## 2019-08-02 NOTE — PROCEDURE NOTE - NSINDICATIONS_GEN_A_CORE
feeding tube replacement
monitoring purposes/arterial puncture to obtain ABG's/blood sampling/critical patient
venous access/critical illness/hemodynamic monitoring/volume resuscitation
hemodynamic monitoring/critical illness

## 2019-08-02 NOTE — PROGRESS NOTE ADULT - PROBLEM SELECTOR PROBLEM 9
Transaminitis
Prophylactic measure
Transaminitis
Prophylactic measure
Prophylactic measure
Transaminitis
Uncontrolled type 2 diabetes mellitus with hyperglycemia
Transaminitis
Transaminitis

## 2019-08-02 NOTE — PROGRESS NOTE ADULT - SUBJECTIVE AND OBJECTIVE BOX
JIM [id number]  69y  Male  Non-ST elevation myocardial infarction (NSTEMI)  FHx: diabetes mellitus  No pertinent family history in first degree relatives  Handoff  MEWS Score  Hyperlipidemia  Hypertension  Diabetes  No pertinent past medical history  Gangrene due to peripheral vascular disease  Gangrene due to peripheral vascular disease  Vascular disease, peripheral  Hyperlipidemia, unspecified hyperlipidemia type  Prophylactic measure  Essential hypertension  PAD (peripheral artery disease)  Type 2 diabetes mellitus without complication, unspecified whether long term insulin use  Delirium  Anemia  Acute respiratory failure with hypoxia  Acute systolic heart failure  Agitation  HTN (hypertension)  Transaminitis  VINAY (acute kidney injury)  Uncontrolled type 2 diabetes mellitus with hyperglycemia  Aspiration pneumonia  Ischemia of left lower extremity  NSTEMI (non-ST elevated myocardial infarction)  Shock  Below knee amputation  H/O mastoidectomy  No significant past surgical history  No significant past surgical history  NON-ST ELEVATION (NSTEMI) MYOC    aspirin  chewable 81 milliGRAM(s) Enteral Tube daily  dextrose 40% Gel 15 Gram(s) Oral once PRN  dextrose 5%. 1000 milliLiter(s) IV Continuous <Continuous>  dextrose 50% Injectable 12.5 Gram(s) IV Push once  dextrose 50% Injectable 25 Gram(s) IV Push once  dextrose 50% Injectable 25 Gram(s) IV Push once  glucagon  Injectable 1 milliGRAM(s) IntraMuscular once PRN  heparin  Injectable 5000 Unit(s) SubCutaneous every 12 hours  insulin lispro (HumaLOG) corrective regimen sliding scale   SubCutaneous every 6 hours  insulin NPH human recombinant 7 Unit(s) SubCutaneous every 6 hours  ipratropium 17 MICROgram(s) HFA Inhaler 1 Puff(s) Inhalation every 6 hours  losartan 25 milliGRAM(s) Oral daily  melatonin 3 milliGRAM(s) Oral at bedtime PRN  metoprolol tartrate 12.5 milliGRAM(s) Enteral Tube two times a day  nicotine -   7 mG/24Hr(s) Patch 1 patch Transdermal daily  No Known Allergies    CBC Full  -  ( 02 Aug 2019 10:57 )  WBC Count : 7.4 K/uL  RBC Count : 4.32 M/uL  Hemoglobin : 12.4 g/dL  Hematocrit : 41.4 %  Platelet Count - Automated : 262 K/uL  Mean Cell Volume : 95.8 fl  Mean Cell Hemoglobin : 28.7 pg  Mean Cell Hemoglobin Concentration : 30.0 gm/dL  Auto Neutrophil # : x  Auto Lymphocyte # : x  Auto Monocyte # : x  Auto Eosinophil # : x  Auto Basophil # : x  Auto Neutrophil % : x  Auto Lymphocyte % : x  Auto Monocyte % : x  Auto Eosinophil % : x  Auto Basophil % : x    Patient visited at bedside resting comfortably in bed with Left leg amputation and distal right hallux gangrene. exfoliating skin distal right hallux with central gangrenous necrotic tissue distal right big toe. No signs of cellulitis noted with no signs of drainage or ulceration. DP and PT non-palpable pedal pulse right foot. Patient is at hig risk for right hallux amputation but area appears stable at this time Recommend betadine to distal right hallux to keep area clean and dry  Podiatry to follow 1x/week reconsult podiatry sooner if any signs of acute infection or cellulitis

## 2019-08-02 NOTE — PROGRESS NOTE ADULT - PROBLEM SELECTOR PLAN 9
transaminitis  likely in the setting of cardiogenic/septic shock;   -currently trending down, monitor cmps
transaminitis likely in the setting of cardiogenic/septic shock.  -trending down, monitor CMP.
transaminitis likely in the setting of cardiogenic/septic shock.  -trending down, monitor CMP once a week.  - Pt is also on a statin
dvt proph - HSQ
transaminitis likely in the setting of cardiogenic/septic shock.  -trending down, monitor CMP once a week.
HgA1c 10.0  - Lower Lantus 6 units qHS for now until he begins to eat better. Was getting 20 units qHS before the BKA.  - Once he starts to eat better, will resume Humalog 3 units TID with meals  - monitor FS  - Hypoglycemia protocol
dvt proph - HSQ
dvt proph - HSQ
transaminitis  likely in the setting of cardiogenic/septic shock;   -currently trending down, monitor cmps
transaminitis  likely in the setting of cardiogenic/septic shock;   -currently trending down, monitor cmps
transaminitis likely in the setting of cardiogenic/septic shock.  -trending down, monitor CMP once a week.
transaminitis likely in the setting of cardiogenic/septic shock.  -trending down, monitor CMP once a week.  - Pt is also on a statin
transaminitis likely in the setting of cardiogenic/septic shock.  -trending down, monitor CMP once a week.  - Pt is also on a statin
transaminitis likely in the setting of cardiogenic/septic shock.  -trending down, monitor CMP.
transaminitis likely in the setting of cardiogenic/septic shock.  -trending down, monitor cmps.  -Next LFTs tomorrow.
transaminitis likely in the setting of cardiogenic/septic shock.  -trending down, monitor cmps.  -Next LFTs tomorrow.
transaminitis  likely in the setting of cardiogenic/septic shock;   -currently trending down, monitor cmps

## 2019-08-02 NOTE — PROGRESS NOTE ADULT - PROBLEM SELECTOR PLAN 6
s/p recent bypass at [facility] presented with ischemic limb  -VA duplex of b/l LE: 07/06: On the right, there is a severe flow-limiting stenosis of the popliteal artery and the trifurcation arteries are occluded in the calf.  On the left, the bypass graft is occluded, the popliteal artery is occluded and the trifurcation arteries are occluded.  - s/p Left BKA on 7/22/19  - Per vascular bypass staples to be taken out prior to discharge to [facility] next week. not to remove BKA staples  - c/w DAPT, statin  vascular surg f/u

## 2019-08-02 NOTE — PROGRESS NOTE ADULT - SUBJECTIVE AND OBJECTIVE BOX
Patient is a 69y old  Male who presents with a chief complaint of NSTEMI (01 Aug 2019 16:19)        SUBJECTIVE / OVERNIGHT EVENTS:      MEDICATIONS  (STANDING):  aspirin  chewable 81 milliGRAM(s) Enteral Tube daily  dextrose 5%. 1000 milliLiter(s) (50 mL/Hr) IV Continuous <Continuous>  dextrose 50% Injectable 12.5 Gram(s) IV Push once  dextrose 50% Injectable 25 Gram(s) IV Push once  dextrose 50% Injectable 25 Gram(s) IV Push once  heparin  Injectable 5000 Unit(s) SubCutaneous every 12 hours  insulin lispro (HumaLOG) corrective regimen sliding scale   SubCutaneous every 6 hours  insulin NPH human recombinant 7 Unit(s) SubCutaneous every 6 hours  ipratropium 17 MICROgram(s) HFA Inhaler 1 Puff(s) Inhalation every 6 hours  losartan 25 milliGRAM(s) Oral daily  metoprolol tartrate 12.5 milliGRAM(s) Enteral Tube two times a day  nicotine -   7 mG/24Hr(s) Patch 1 patch Transdermal daily    MEDICATIONS  (PRN):  dextrose 40% Gel 15 Gram(s) Oral once PRN Blood Glucose LESS THAN 70 milliGRAM(s)/deciliter  glucagon  Injectable 1 milliGRAM(s) IntraMuscular once PRN Glucose LESS THAN 70 milligrams/deciliter  melatonin 3 milliGRAM(s) Oral at bedtime PRN Insomnia      Vital Signs Last 24 Hrs  T(C): 36.1 (02 Aug 2019 05:39), Max: 36.6 (01 Aug 2019 12:05)  T(F): 97 (02 Aug 2019 05:39), Max: 97.9 (01 Aug 2019 12:05)  HR: 73 (02 Aug 2019 05:39) (73 - 82)  BP: 120/75 (02 Aug 2019 05:39) (120/75 - 133/83)  BP(mean): --  RR: 18 (02 Aug 2019 05:39) (18 - 20)  SpO2: 98% (02 Aug 2019 05:39) (98% - 100%)  CAPILLARY BLOOD GLUCOSE      POCT Blood Glucose.: 96 mg/dL (02 Aug 2019 06:31)  POCT Blood Glucose.: 92 mg/dL (02 Aug 2019 00:08)  POCT Blood Glucose.: 83 mg/dL (01 Aug 2019 21:47)  POCT Blood Glucose.: 96 mg/dL (01 Aug 2019 18:15)  POCT Blood Glucose.: 147 mg/dL (01 Aug 2019 12:49)    I&O's Summary    01 Aug 2019 07:01  -  02 Aug 2019 07:00  --------------------------------------------------------  IN: 1080 mL / OUT: 1 mL / NET: 1079 mL        PHYSICAL EXAM:  GENERAL: NAD, more awake  HEAD:  Atraumatic, Normocephalic  EYES: conjunctiva and sclera clear  Nose: +NGT  NECK: No JVD  CHEST/LUNG: CTA b/l  HEART: S1 S2 RRR  ABDOMEN: +BS Soft, NT/ND  EXTREMITIES:  no LE edema, mittens on hands  SKIN: staples along stump and medial thigh - healing - no erythema  NERVOUS SYSTEM:  Alert & Orientedx1; moving all extremities, following some commands    LABS:    08-01    149<H>  |  111<H>  |  24<H>  ----------------------------<  162<H>  3.7   |  26  |  0.88    Ca    9.2      01 Aug 2019 06:32                RADIOLOGY & ADDITIONAL TESTS:    Imaging Personally Reviewed:  Consultant(s) Notes Reviewed:    Care Discussed with Consultants/Other Providers: Patient is a 69y old  Male who presents with a chief complaint of NSTEMI (01 Aug 2019 16:19)        SUBJECTIVE / OVERNIGHT EVENTS: no acute complaints      MEDICATIONS  (STANDING):  aspirin  chewable 81 milliGRAM(s) Enteral Tube daily  dextrose 5%. 1000 milliLiter(s) (50 mL/Hr) IV Continuous <Continuous>  dextrose 50% Injectable 12.5 Gram(s) IV Push once  dextrose 50% Injectable 25 Gram(s) IV Push once  dextrose 50% Injectable 25 Gram(s) IV Push once  heparin  Injectable 5000 Unit(s) SubCutaneous every 12 hours  insulin lispro (HumaLOG) corrective regimen sliding scale   SubCutaneous every 6 hours  insulin NPH human recombinant 7 Unit(s) SubCutaneous every 6 hours  ipratropium 17 MICROgram(s) HFA Inhaler 1 Puff(s) Inhalation every 6 hours  losartan 25 milliGRAM(s) Oral daily  metoprolol tartrate 12.5 milliGRAM(s) Enteral Tube two times a day  nicotine -   7 mG/24Hr(s) Patch 1 patch Transdermal daily    MEDICATIONS  (PRN):  dextrose 40% Gel 15 Gram(s) Oral once PRN Blood Glucose LESS THAN 70 milliGRAM(s)/deciliter  glucagon  Injectable 1 milliGRAM(s) IntraMuscular once PRN Glucose LESS THAN 70 milligrams/deciliter  melatonin 3 milliGRAM(s) Oral at bedtime PRN Insomnia      Vital Signs Last 24 Hrs  T(C): 36.1 (02 Aug 2019 05:39), Max: 36.6 (01 Aug 2019 12:05)  T(F): 97 (02 Aug 2019 05:39), Max: 97.9 (01 Aug 2019 12:05)  HR: 73 (02 Aug 2019 05:39) (73 - 82)  BP: 120/75 (02 Aug 2019 05:39) (120/75 - 133/83)  BP(mean): --  RR: 18 (02 Aug 2019 05:39) (18 - 20)  SpO2: 98% (02 Aug 2019 05:39) (98% - 100%)  CAPILLARY BLOOD GLUCOSE      POCT Blood Glucose.: 96 mg/dL (02 Aug 2019 06:31)  POCT Blood Glucose.: 92 mg/dL (02 Aug 2019 00:08)  POCT Blood Glucose.: 83 mg/dL (01 Aug 2019 21:47)  POCT Blood Glucose.: 96 mg/dL (01 Aug 2019 18:15)  POCT Blood Glucose.: 147 mg/dL (01 Aug 2019 12:49)    I&O's Summary    01 Aug 2019 07:01  -  02 Aug 2019 07:00  --------------------------------------------------------  IN: 1080 mL / OUT: 1 mL / NET: 1079 mL        PHYSICAL EXAM:  GENERAL: NAD, more awake  HEAD:  Atraumatic, Normocephalic  EYES: conjunctiva and sclera clear  Nose: +NGT  NECK: No JVD  CHEST/LUNG: CTA b/l  HEART: S1 S2 RRR  ABDOMEN: +BS Soft, NT/ND  EXTREMITIES:  no LE edema, mittens on hands  SKIN: staples along stump and medial thigh - healing - no erythema  NERVOUS SYSTEM:  Alert & Orientedx1; moving all extremities, following some commands    LABS:    08-01    149<H>  |  111<H>  |  24<H>  ----------------------------<  162<H>  3.7   |  26  |  0.88    Ca    9.2      01 Aug 2019 06:32                RADIOLOGY & ADDITIONAL TESTS:    Imaging Personally Reviewed:  Consultant(s) Notes Reviewed:    Care Discussed with Consultants/Other Providers:

## 2019-08-02 NOTE — PROCEDURE NOTE - NSINFORMCONSENT_GEN_A_CORE
Benefits, risks, and possible complications of procedure explained to patient/caregiver who verbalized understanding and gave verbal consent.
This was an emergent procedure.
Benefits, risks, and possible complications of procedure explained to patient/caregiver who verbalized understanding and gave verbal consent.
This was an emergent procedure.

## 2019-08-02 NOTE — PROGRESS NOTE ADULT - ASSESSMENT
69M w/ PMH of poor med compliance, DM2, HTN, PADs/p Left BKA 7/22 transferred from [facility] to [facility] CCU for NSTEMI c/b cardiogenic shock; acute respiratory failure with hypoxia and septic shock secondary to suspected aspiration pneumonia requiring intubation (now extubated) then transferred to floors with RRT 7/24 for shock state with elevated LFTs and lactate readmission to CCU, with resolution of shock state, concern for acute pontine stroke on ct not seen on MRI brain. Hospital course c/b dysphagia failed MBS with plan for PEG. 69M w/ PMH of poor med compliance, DM2, HTN, PADs/p Left BKA 7/22 transferred from [facility] to [facility] CCU for NSTEMI c/b cardiogenic shock; acute respiratory failure with hypoxia and septic shock secondary to suspected aspiration pneumonia requiring intubation (now extubated) then transferred to floors with RRT 7/24 for shock state with elevated LFTs and lactate readmission to CCU, with resolution of shock state, concern for acute pontine stroke on ct not seen on MRI brain. Hospital course c/b dysphagia failed MBS with plan for PEG 8/5 or 8/6

## 2019-08-03 LAB
ANION GAP SERPL CALC-SCNC: 11 MMOL/L — SIGNIFICANT CHANGE UP (ref 5–17)
BUN SERPL-MCNC: 23 MG/DL — SIGNIFICANT CHANGE UP (ref 7–23)
CALCIUM SERPL-MCNC: 9.3 MG/DL — SIGNIFICANT CHANGE UP (ref 8.4–10.5)
CHLORIDE SERPL-SCNC: 111 MMOL/L — HIGH (ref 96–108)
CO2 SERPL-SCNC: 22 MMOL/L — SIGNIFICANT CHANGE UP (ref 22–31)
CREAT SERPL-MCNC: 0.88 MG/DL — SIGNIFICANT CHANGE UP (ref 0.5–1.3)
GLUCOSE BLDC GLUCOMTR-MCNC: 123 MG/DL — HIGH (ref 70–99)
GLUCOSE BLDC GLUCOMTR-MCNC: 158 MG/DL — HIGH (ref 70–99)
GLUCOSE BLDC GLUCOMTR-MCNC: 168 MG/DL — HIGH (ref 70–99)
GLUCOSE BLDC GLUCOMTR-MCNC: 178 MG/DL — HIGH (ref 70–99)
GLUCOSE BLDC GLUCOMTR-MCNC: 76 MG/DL — SIGNIFICANT CHANGE UP (ref 70–99)
GLUCOSE BLDC GLUCOMTR-MCNC: 98 MG/DL — SIGNIFICANT CHANGE UP (ref 70–99)
GLUCOSE SERPL-MCNC: 134 MG/DL — HIGH (ref 70–99)
POTASSIUM SERPL-MCNC: 3.9 MMOL/L — SIGNIFICANT CHANGE UP (ref 3.5–5.3)
POTASSIUM SERPL-SCNC: 3.9 MMOL/L — SIGNIFICANT CHANGE UP (ref 3.5–5.3)
SODIUM SERPL-SCNC: 144 MMOL/L — SIGNIFICANT CHANGE UP (ref 135–145)

## 2019-08-03 PROCEDURE — 99233 SBSQ HOSP IP/OBS HIGH 50: CPT

## 2019-08-03 RX ORDER — INSULIN LISPRO 100/ML
VIAL (ML) SUBCUTANEOUS EVERY 6 HOURS
Refills: 0 | Status: DISCONTINUED | OUTPATIENT
Start: 2019-08-03 | End: 2019-08-05

## 2019-08-03 RX ADMIN — Medication 1 PATCH: at 18:04

## 2019-08-03 RX ADMIN — Medication 81 MILLIGRAM(S): at 11:16

## 2019-08-03 RX ADMIN — Medication 1 PUFF(S): at 18:02

## 2019-08-03 RX ADMIN — Medication 1 PATCH: at 12:01

## 2019-08-03 RX ADMIN — Medication 1 PUFF(S): at 11:15

## 2019-08-03 RX ADMIN — HUMAN INSULIN 7 UNIT(S): 100 INJECTION, SUSPENSION SUBCUTANEOUS at 11:59

## 2019-08-03 RX ADMIN — Medication 12.5 MILLIGRAM(S): at 05:25

## 2019-08-03 RX ADMIN — Medication 3 MILLIGRAM(S): at 21:39

## 2019-08-03 RX ADMIN — Medication 1 PATCH: at 11:16

## 2019-08-03 RX ADMIN — Medication 1 PUFF(S): at 00:37

## 2019-08-03 RX ADMIN — HUMAN INSULIN 7 UNIT(S): 100 INJECTION, SUSPENSION SUBCUTANEOUS at 06:08

## 2019-08-03 RX ADMIN — HUMAN INSULIN 7 UNIT(S): 100 INJECTION, SUSPENSION SUBCUTANEOUS at 18:01

## 2019-08-03 RX ADMIN — LOSARTAN POTASSIUM 25 MILLIGRAM(S): 100 TABLET, FILM COATED ORAL at 05:25

## 2019-08-03 RX ADMIN — HEPARIN SODIUM 5000 UNIT(S): 5000 INJECTION INTRAVENOUS; SUBCUTANEOUS at 05:25

## 2019-08-03 RX ADMIN — HEPARIN SODIUM 5000 UNIT(S): 5000 INJECTION INTRAVENOUS; SUBCUTANEOUS at 18:03

## 2019-08-03 RX ADMIN — HUMAN INSULIN 7 UNIT(S): 100 INJECTION, SUSPENSION SUBCUTANEOUS at 00:36

## 2019-08-03 RX ADMIN — Medication 1 PUFF(S): at 05:25

## 2019-08-03 RX ADMIN — Medication 1 PATCH: at 07:12

## 2019-08-03 RX ADMIN — Medication 12.5 MILLIGRAM(S): at 18:03

## 2019-08-03 NOTE — PROGRESS NOTE ADULT - PROBLEM SELECTOR PLAN 3
- no need for antibiotics, initially started on vancomycin/zosyn and monitoring off abx  - No signs of sepsis at this time   - saturating well on room air at this time  - speech to re eval  - GI consult for PEG--> aug 5/6 if repeat MBS fails

## 2019-08-03 NOTE — PROGRESS NOTE ADULT - PROBLEM SELECTOR PLAN 5
- fs controlled  - fs achs   - hba1c 10  - per endocrinology, c/w NPH 7 units q6 and low correction q6 (correction starts at ) given hypoglycemia from just 1 u of insulin at 150s FS.  - appreciate endocrine consult

## 2019-08-03 NOTE — PROGRESS NOTE ADULT - PROBLEM SELECTOR PLAN 6
- s/p recent bypass at [facility] presented with ischemic limb  - VA duplex of b/l LE: 07/06: On the right, there is a severe flow-limiting stenosis of the popliteal artery and the trifurcation arteries are occluded in the calf.  On the left, the bypass graft is occluded, the popliteal artery is occluded and the trifurcation arteries are occluded.  - s/p Left BKA on 7/22/19  - Per vascular bypass staples to be taken out prior to discharge to [facility] next week. not to remove BKA staples  - c/w DAPT, statin  - vascular surgery follow up

## 2019-08-03 NOTE — PROGRESS NOTE ADULT - SUBJECTIVE AND OBJECTIVE BOX
Chief Complaint: T2DM on tubefeed     History: No complaints.      MEDICATIONS  (STANDING):  aspirin  chewable 81 milliGRAM(s) Enteral Tube daily  dextrose 5%. 1000 milliLiter(s) (50 mL/Hr) IV Continuous <Continuous>  dextrose 50% Injectable 12.5 Gram(s) IV Push once  dextrose 50% Injectable 25 Gram(s) IV Push once  dextrose 50% Injectable 25 Gram(s) IV Push once  heparin  Injectable 5000 Unit(s) SubCutaneous every 12 hours  insulin lispro (HumaLOG) corrective regimen sliding scale   SubCutaneous every 6 hours  insulin NPH human recombinant 7 Unit(s) SubCutaneous every 6 hours  ipratropium 17 MICROgram(s) HFA Inhaler 1 Puff(s) Inhalation every 6 hours  losartan 25 milliGRAM(s) Oral daily  metoprolol tartrate 12.5 milliGRAM(s) Enteral Tube two times a day  nicotine -   7 mG/24Hr(s) Patch 1 patch Transdermal daily    MEDICATIONS  (PRN):  dextrose 40% Gel 15 Gram(s) Oral once PRN Blood Glucose LESS THAN 70 milliGRAM(s)/deciliter  glucagon  Injectable 1 milliGRAM(s) IntraMuscular once PRN Glucose LESS THAN 70 milligrams/deciliter  melatonin 3 milliGRAM(s) Oral at bedtime PRN Insomnia      Allergies    No Known Allergies    Intolerances        PHYSICAL EXAM:  VITALS: T(C): 36.6 (08-03-19 @ 13:00)  T(F): 97.9 (08-03-19 @ 13:00), Max: 98 (08-02-19 @ 20:18)  HR: 78 (08-03-19 @ 13:00) (78 - 81)  BP: 115/68 (08-03-19 @ 13:00) (115/68 - 125/77)  RR:  (18 - 18)  SpO2:  (97% - 99%)  Wt(kg): --  GENERAL: NAD, well-groomed, well-developed  RESPIRATORY: Clear to auscultation bilaterally; No rales, rhonchi, wheezing, or rubs  CARDIOVASCULAR: Regular rate and rhythm; No murmurs  NEURO: AOx1, moves all extremities spontaenuously   PSYCH:  reactive affect, euthymic mood      POCT Blood Glucose.: 178 mg/dL (08-03-19 @ 11:57)  POCT Blood Glucose.: 123 mg/dL (08-03-19 @ 05:44)  POCT Blood Glucose.: 98 mg/dL (08-03-19 @ 00:23)  POCT Blood Glucose.: 76 mg/dL (08-03-19 @ 00:21)  POCT Blood Glucose.: 157 mg/dL (08-02-19 @ 17:42)H+1  POCT Blood Glucose.: 116 mg/dL (08-02-19 @ 13:21)  POCT Blood Glucose.: 96 mg/dL (08-02-19 @ 06:31)  POCT Blood Glucose.: 92 mg/dL (08-02-19 @ 00:08)NPH 7u q6  POCT Blood Glucose.: 83 mg/dL (08-01-19 @ 21:47)  POCT Blood Glucose.: 96 mg/dL (08-01-19 @ 18:15)  POCT Blood Glucose.: 147 mg/dL (08-01-19 @ 12:49)  POCT Blood Glucose.: 147 mg/dL (08-01-19 @ 06:26)  POCT Blood Glucose.: 181 mg/dL (07-31-19 @ 23:58)  POCT Blood Glucose.: 194 mg/dL (07-31-19 @ 18:09)      08-03    144  |  111<H>  |  23  ----------------------------<  134<H>  3.9   |  22  |  0.88    EGFR if : 102  EGFR if non : 88    Ca    9.3      08-03            Thyroid Function Tests:  07-10 @ 13:02 TSH 3.57 FreeT4 -- T3 -- Anti TPO -- Anti Thyroglobulin Ab -- TSI --  07-06 @ 04:56 TSH 0.94 FreeT4 -- T3 -- Anti TPO -- Anti Thyroglobulin Ab -- TSI --      Hemoglobin A1C, Whole Blood: 10.0 % <H> [4.0 - 5.6] (07-06-19 @ 04:54)

## 2019-08-03 NOTE — PROGRESS NOTE ADULT - PROBLEM SELECTOR PLAN 4
- improving; due to shock liver from cardiogenic shock   - LFTs trending down  - holding lipitor and depakote (hepatotoxic meds) for now  - CT Abdomen and US abd - liver wnl

## 2019-08-03 NOTE — PROGRESS NOTE ADULT - PROBLEM SELECTOR PLAN 1
Goal glucose 100-180  - Continue with NPH 7 units q6 and low correction q6 (correction starts at ) given hypoglycemia from just 1 u of insulin at 150s FS.   Please notify us if tube feeds discontinued or changed.  Will adjust further as needed.    Plan to d/c on insulin doses and type to be determined based on feeding modality at the time of discharge.

## 2019-08-03 NOTE — PROGRESS NOTE ADULT - PROBLEM SELECTOR PLAN 1
- per cardiology, no ischemic evaluation done - recommended for medical management  - c/w asa, statin  - cards f/u appreciated - okay to hold plavix (for planned PEG)

## 2019-08-03 NOTE — PROGRESS NOTE ADULT - PROBLEM SELECTOR PLAN 2
- new CHF - s/p NSTEMI/cardiogenic shock, now s/p diuresis now appears euvolemic on exam  - TTE at OSH with EF 15%; Repeat TTE with EF 20-25%, moderately dilated left atrium, increased left atrial pressures, moderately dilated LV, global hypokinesis  - monitor volume status closely for need for further lasix  - c/w losartan 25mg qd  - will likely resume beta blocker in the am, if bp allows   - daily weights, strict ins and outs

## 2019-08-03 NOTE — PROGRESS NOTE ADULT - SUBJECTIVE AND OBJECTIVE BOX
Patient is a 69y old  Male who presents with a chief complaint of NSTEMI (01 Aug 2019 16:19)    Per nurse at bedside, patient does have significant improvement in energy and mental status. He was able to show some signs of comprehension-- following all commands.     MEDICATIONS  (STANDING):  aspirin  chewable 81 milliGRAM(s) Enteral Tube daily  dextrose 5%. 1000 milliLiter(s) (50 mL/Hr) IV Continuous <Continuous>  dextrose 50% Injectable 12.5 Gram(s) IV Push once  dextrose 50% Injectable 25 Gram(s) IV Push once  dextrose 50% Injectable 25 Gram(s) IV Push once  heparin  Injectable 5000 Unit(s) SubCutaneous every 12 hours  insulin lispro (HumaLOG) corrective regimen sliding scale   SubCutaneous every 6 hours  insulin NPH human recombinant 7 Unit(s) SubCutaneous every 6 hours  ipratropium 17 MICROgram(s) HFA Inhaler 1 Puff(s) Inhalation every 6 hours  losartan 25 milliGRAM(s) Oral daily  metoprolol tartrate 12.5 milliGRAM(s) Enteral Tube two times a day  nicotine -   7 mG/24Hr(s) Patch 1 patch Transdermal daily    MEDICATIONS  (PRN):  dextrose 40% Gel 15 Gram(s) Oral once PRN Blood Glucose LESS THAN 70 milliGRAM(s)/deciliter  glucagon  Injectable 1 milliGRAM(s) IntraMuscular once PRN Glucose LESS THAN 70 milligrams/deciliter  melatonin 3 milliGRAM(s) Oral at bedtime PRN Insomnia    Vital Signs Last 24 Hrs  T(C): 36.6 (03 Aug 2019 13:00), Max: 36.7 (02 Aug 2019 20:18)  T(F): 97.9 (03 Aug 2019 13:00), Max: 98 (02 Aug 2019 20:18)  HR: 78 (03 Aug 2019 13:00) (78 - 80)  BP: 115/68 (03 Aug 2019 13:00) (115/68 - 125/77)  BP(mean): --  RR: 18 (03 Aug 2019 13:00) (18 - 18)  SpO2: 99% (03 Aug 2019 13:00) (97% - 99%)    PHYSICAL EXAM:  GENERAL: NAD, awake, alert, following commands  HEAD:  Atraumatic, Normocephalic  EYES: conjunctiva and sclera clear  Nose: +NGT  NECK: No JVD  CHEST/LUNG: CTA b/l  HEART: S1 S2 RRR  ABDOMEN: +BS Soft, NT/ND  EXTREMITIES:  no LE edema, mittens on hands  SKIN: staples along stump and medial thigh - healing - no erythema  NERVOUS SYSTEM:  Alert & Orientedx1; moving all extremities, following some commands

## 2019-08-03 NOTE — PROGRESS NOTE ADULT - ASSESSMENT
69 year old male with PMH of poor med compliance, DM2, HTN, PAD s/p Left BKA 7/22 transferred from [facility] to [facility] CCU for NSTEMI complicated by cardiogenic shock and acute respiratory failure with hypoxia and septic shock secondary to suspected aspiration pneumonia requiring intubation (now extubated) then transferred to floors with RRT 7/24 for shock with transaminitis and elevated lactate readmission to CCU, with resolution of shock. Now wit concern for acute pontine stroke on CT not seen on MRI brain. Hospital course c/b dysphagia failed MBS with plan for PEG 8/5 or 8/6 however per nursing mental status much improved therefore repeat eval with repeat MBS ordered.

## 2019-08-04 LAB
ANION GAP SERPL CALC-SCNC: 10 MMOL/L — SIGNIFICANT CHANGE UP (ref 5–17)
BUN SERPL-MCNC: 21 MG/DL — SIGNIFICANT CHANGE UP (ref 7–23)
CALCIUM SERPL-MCNC: 9.1 MG/DL — SIGNIFICANT CHANGE UP (ref 8.4–10.5)
CHLORIDE SERPL-SCNC: 105 MMOL/L — SIGNIFICANT CHANGE UP (ref 96–108)
CO2 SERPL-SCNC: 24 MMOL/L — SIGNIFICANT CHANGE UP (ref 22–31)
CREAT SERPL-MCNC: 0.76 MG/DL — SIGNIFICANT CHANGE UP (ref 0.5–1.3)
GLUCOSE BLDC GLUCOMTR-MCNC: 147 MG/DL — HIGH (ref 70–99)
GLUCOSE BLDC GLUCOMTR-MCNC: 159 MG/DL — HIGH (ref 70–99)
GLUCOSE BLDC GLUCOMTR-MCNC: 189 MG/DL — HIGH (ref 70–99)
GLUCOSE SERPL-MCNC: 162 MG/DL — HIGH (ref 70–99)
HCT VFR BLD CALC: 38.8 % — LOW (ref 39–50)
HGB BLD-MCNC: 12.4 G/DL — LOW (ref 13–17)
MCHC RBC-ENTMCNC: 30.8 PG — SIGNIFICANT CHANGE UP (ref 27–34)
MCHC RBC-ENTMCNC: 32.1 GM/DL — SIGNIFICANT CHANGE UP (ref 32–36)
MCV RBC AUTO: 96 FL — SIGNIFICANT CHANGE UP (ref 80–100)
PLATELET # BLD AUTO: 187 K/UL — SIGNIFICANT CHANGE UP (ref 150–400)
POTASSIUM SERPL-MCNC: 5.3 MMOL/L — SIGNIFICANT CHANGE UP (ref 3.5–5.3)
POTASSIUM SERPL-SCNC: 5.3 MMOL/L — SIGNIFICANT CHANGE UP (ref 3.5–5.3)
RBC # BLD: 4.04 M/UL — LOW (ref 4.2–5.8)
RBC # FLD: 14.5 % — SIGNIFICANT CHANGE UP (ref 10.3–14.5)
SODIUM SERPL-SCNC: 139 MMOL/L — SIGNIFICANT CHANGE UP (ref 135–145)
WBC # BLD: 8.3 K/UL — SIGNIFICANT CHANGE UP (ref 3.8–10.5)
WBC # FLD AUTO: 8.3 K/UL — SIGNIFICANT CHANGE UP (ref 3.8–10.5)

## 2019-08-04 PROCEDURE — 99232 SBSQ HOSP IP/OBS MODERATE 35: CPT

## 2019-08-04 RX ADMIN — HEPARIN SODIUM 5000 UNIT(S): 5000 INJECTION INTRAVENOUS; SUBCUTANEOUS at 05:24

## 2019-08-04 RX ADMIN — Medication 1 PUFF(S): at 05:24

## 2019-08-04 RX ADMIN — HUMAN INSULIN 7 UNIT(S): 100 INJECTION, SUSPENSION SUBCUTANEOUS at 00:12

## 2019-08-04 RX ADMIN — Medication 3 MILLIGRAM(S): at 21:12

## 2019-08-04 RX ADMIN — HEPARIN SODIUM 5000 UNIT(S): 5000 INJECTION INTRAVENOUS; SUBCUTANEOUS at 18:04

## 2019-08-04 RX ADMIN — LOSARTAN POTASSIUM 25 MILLIGRAM(S): 100 TABLET, FILM COATED ORAL at 05:24

## 2019-08-04 RX ADMIN — Medication 12.5 MILLIGRAM(S): at 05:24

## 2019-08-04 RX ADMIN — Medication 1 PATCH: at 18:04

## 2019-08-04 RX ADMIN — Medication 1 PATCH: at 12:17

## 2019-08-04 RX ADMIN — Medication 12.5 MILLIGRAM(S): at 18:04

## 2019-08-04 RX ADMIN — Medication 1 PATCH: at 12:22

## 2019-08-04 RX ADMIN — HUMAN INSULIN 7 UNIT(S): 100 INJECTION, SUSPENSION SUBCUTANEOUS at 06:09

## 2019-08-04 RX ADMIN — Medication 1 PUFF(S): at 12:18

## 2019-08-04 RX ADMIN — HUMAN INSULIN 7 UNIT(S): 100 INJECTION, SUSPENSION SUBCUTANEOUS at 18:03

## 2019-08-04 RX ADMIN — Medication 1 PUFF(S): at 17:59

## 2019-08-04 RX ADMIN — HUMAN INSULIN 7 UNIT(S): 100 INJECTION, SUSPENSION SUBCUTANEOUS at 12:17

## 2019-08-04 RX ADMIN — Medication 1 PATCH: at 07:14

## 2019-08-04 RX ADMIN — Medication 1 PUFF(S): at 00:12

## 2019-08-04 RX ADMIN — Medication 81 MILLIGRAM(S): at 17:59

## 2019-08-04 NOTE — PROGRESS NOTE ADULT - PROBLEM SELECTOR PLAN 6
- s/p recent bypass at [facility] presented with ischemic limb  - VA duplex of b/l LE: 07/06: On the right, there is a severe flow-limiting stenosis of the popliteal artery and the trifurcation arteries are occluded in the calf.  On the left, the bypass graft is occluded, the popliteal artery is occluded and the trifurcation arteries are occluded.  - s/p Left BKA on 7/22/19  - Per vascular bypass staples to be taken out prior to discharge to [facility] next week. not to remove BKA staples  - c/w DAPT, statin  - vascular surgery follow up

## 2019-08-04 NOTE — PROGRESS NOTE ADULT - PROBLEM SELECTOR PLAN 8
- dvt proph - c/w HSQ    communication: discussed with floor NP and floor nurse and niece and nephew at bedside

## 2019-08-04 NOTE — PROGRESS NOTE ADULT - ASSESSMENT
69 year old male with PMH of poor med compliance, DM2, HTN, PAD s/p Left BKA 7/22 transferred from [facility] to [facility] CCU for NSTEMI complicated by cardiogenic shock and acute respiratory failure with hypoxia and septic shock secondary to suspected aspiration pneumonia requiring intubation (now extubated) then transferred to floors with RRT 7/24 for shock with transaminitis and elevated lactate readmission to CCU, with resolution of shock. Now wit concern for acute pontine stroke on CT not seen on MRI brain. Hospital course c/b dysphagia failed MBS with plan for PEG 8/5 or 8/6 however per nursing mental status much improved therefore repeat eval with repeat MBS ordered.

## 2019-08-04 NOTE — PROGRESS NOTE ADULT - PROBLEM SELECTOR PLAN 3
- no need for antibiotics, initially started on vancomycin/zosyn and monitoring off abx  - No signs of sepsis at this time   - saturating well on room air at this time  - speech to re MARK san in the am  - GI consult for PEG--> aug 5/6 if repeat MBS fails

## 2019-08-04 NOTE — PROGRESS NOTE ADULT - PROBLEM SELECTOR PLAN 2
- new CHF - s/p NSTEMI/cardiogenic shock, now s/p diuresis now appears euvolemic on exam  - TTE at OSH with EF 15%; Repeat TTE with EF 20-25%, moderately dilated left atrium, increased left atrial pressures, moderately dilated LV, global hypokinesis  - monitor volume status closely for need for further lasix  - c/w losartan 25mg qd  - c/w metoprolol 12.5 mg bid  - daily weights, strict ins and outs

## 2019-08-04 NOTE — PROGRESS NOTE ADULT - SUBJECTIVE AND OBJECTIVE BOX
Patient is a 69y old  Male who presents with a chief complaint of NSTEMI (01 Aug 2019 16:19)    Per nurse at bedside, patient does have significant improvement in energy and mental status. He was able to show some signs of comprehension-- following all commands.     MEDICATIONS  (STANDING):  aspirin  chewable 81 milliGRAM(s) Enteral Tube daily  dextrose 5%. 1000 milliLiter(s) (50 mL/Hr) IV Continuous <Continuous>  dextrose 50% Injectable 12.5 Gram(s) IV Push once  dextrose 50% Injectable 25 Gram(s) IV Push once  dextrose 50% Injectable 25 Gram(s) IV Push once  heparin  Injectable 5000 Unit(s) SubCutaneous every 12 hours  insulin lispro (HumaLOG) corrective regimen sliding scale   SubCutaneous every 6 hours  insulin NPH human recombinant 7 Unit(s) SubCutaneous every 6 hours  ipratropium 17 MICROgram(s) HFA Inhaler 1 Puff(s) Inhalation every 6 hours  losartan 25 milliGRAM(s) Oral daily  metoprolol tartrate 12.5 milliGRAM(s) Enteral Tube two times a day  nicotine -   7 mG/24Hr(s) Patch 1 patch Transdermal daily    MEDICATIONS  (PRN):  dextrose 40% Gel 15 Gram(s) Oral once PRN Blood Glucose LESS THAN 70 milliGRAM(s)/deciliter  glucagon  Injectable 1 milliGRAM(s) IntraMuscular once PRN Glucose LESS THAN 70 milligrams/deciliter  melatonin 3 milliGRAM(s) Oral at bedtime PRN Insomnia    Vital Signs Last 24 Hrs  T(C): 36.8 (04 Aug 2019 14:30), Max: 36.8 (04 Aug 2019 14:30)  T(F): 98.2 (04 Aug 2019 14:30), Max: 98.2 (04 Aug 2019 14:30)  HR: 86 (04 Aug 2019 14:30) (78 - 86)  BP: 132/86 (04 Aug 2019 14:30) (114/80 - 132/86)  BP(mean): --  RR: 18 (04 Aug 2019 14:30) (18 - 18)  SpO2: 96% (04 Aug 2019 14:30) (96% - 98%)    PHYSICAL EXAM:  GENERAL: NAD, awake, alert, following commands  HEAD:  Atraumatic, Normocephalic  EYES: conjunctiva and sclera clear  Nose: +NGT  NECK: No JVD  CHEST/LUNG: CTA b/l  HEART: S1 S2 RRR  ABDOMEN: +BS Soft, NT/ND  EXTREMITIES:  no LE edema, mittens on hands  SKIN: staples along stump and medial thigh - healing - no erythema  NERVOUS SYSTEM:  Alert & Orientedx1; moving all extremities, following some commands

## 2019-08-05 LAB
ANION GAP SERPL CALC-SCNC: 11 MMOL/L — SIGNIFICANT CHANGE UP (ref 5–17)
APTT BLD: 31.6 SEC — SIGNIFICANT CHANGE UP (ref 27.5–36.3)
BUN SERPL-MCNC: 19 MG/DL — SIGNIFICANT CHANGE UP (ref 7–23)
CALCIUM SERPL-MCNC: 8.4 MG/DL — SIGNIFICANT CHANGE UP (ref 8.4–10.5)
CHLORIDE SERPL-SCNC: 102 MMOL/L — SIGNIFICANT CHANGE UP (ref 96–108)
CO2 SERPL-SCNC: 22 MMOL/L — SIGNIFICANT CHANGE UP (ref 22–31)
CREAT SERPL-MCNC: 0.7 MG/DL — SIGNIFICANT CHANGE UP (ref 0.5–1.3)
GLUCOSE BLDC GLUCOMTR-MCNC: 109 MG/DL — HIGH (ref 70–99)
GLUCOSE BLDC GLUCOMTR-MCNC: 120 MG/DL — HIGH (ref 70–99)
GLUCOSE BLDC GLUCOMTR-MCNC: 132 MG/DL — HIGH (ref 70–99)
GLUCOSE BLDC GLUCOMTR-MCNC: 145 MG/DL — HIGH (ref 70–99)
GLUCOSE BLDC GLUCOMTR-MCNC: 151 MG/DL — HIGH (ref 70–99)
GLUCOSE BLDC GLUCOMTR-MCNC: 178 MG/DL — HIGH (ref 70–99)
GLUCOSE SERPL-MCNC: 121 MG/DL — HIGH (ref 70–99)
INR BLD: 1.18 RATIO — HIGH (ref 0.88–1.16)
POTASSIUM SERPL-MCNC: 4.6 MMOL/L — SIGNIFICANT CHANGE UP (ref 3.5–5.3)
POTASSIUM SERPL-SCNC: 4.6 MMOL/L — SIGNIFICANT CHANGE UP (ref 3.5–5.3)
PROTHROM AB SERPL-ACNC: 13.5 SEC — HIGH (ref 10–12.9)
SODIUM SERPL-SCNC: 135 MMOL/L — SIGNIFICANT CHANGE UP (ref 135–145)

## 2019-08-05 PROCEDURE — 74230 X-RAY XM SWLNG FUNCJ C+: CPT | Mod: 26

## 2019-08-05 PROCEDURE — 99233 SBSQ HOSP IP/OBS HIGH 50: CPT

## 2019-08-05 PROCEDURE — 99232 SBSQ HOSP IP/OBS MODERATE 35: CPT

## 2019-08-05 PROCEDURE — 99232 SBSQ HOSP IP/OBS MODERATE 35: CPT | Mod: GC

## 2019-08-05 RX ORDER — INSULIN GLARGINE 100 [IU]/ML
14 INJECTION, SOLUTION SUBCUTANEOUS AT BEDTIME
Refills: 0 | Status: DISCONTINUED | OUTPATIENT
Start: 2019-08-05 | End: 2019-08-07

## 2019-08-05 RX ORDER — INSULIN LISPRO 100/ML
VIAL (ML) SUBCUTANEOUS
Refills: 0 | Status: DISCONTINUED | OUTPATIENT
Start: 2019-08-05 | End: 2019-08-07

## 2019-08-05 RX ADMIN — Medication 1 PUFF(S): at 05:47

## 2019-08-05 RX ADMIN — Medication 81 MILLIGRAM(S): at 13:57

## 2019-08-05 RX ADMIN — Medication 1 PUFF(S): at 00:15

## 2019-08-05 RX ADMIN — HEPARIN SODIUM 5000 UNIT(S): 5000 INJECTION INTRAVENOUS; SUBCUTANEOUS at 17:51

## 2019-08-05 RX ADMIN — Medication 12.5 MILLIGRAM(S): at 05:46

## 2019-08-05 RX ADMIN — LOSARTAN POTASSIUM 25 MILLIGRAM(S): 100 TABLET, FILM COATED ORAL at 05:46

## 2019-08-05 RX ADMIN — Medication 1 PATCH: at 13:58

## 2019-08-05 RX ADMIN — INSULIN GLARGINE 14 UNIT(S): 100 INJECTION, SOLUTION SUBCUTANEOUS at 21:13

## 2019-08-05 RX ADMIN — Medication 1 PUFF(S): at 13:58

## 2019-08-05 RX ADMIN — Medication 12.5 MILLIGRAM(S): at 17:23

## 2019-08-05 RX ADMIN — Medication 1 PATCH: at 13:57

## 2019-08-05 RX ADMIN — Medication 1 PATCH: at 07:07

## 2019-08-05 RX ADMIN — Medication 1 PUFF(S): at 17:51

## 2019-08-05 NOTE — PROGRESS NOTE ADULT - PROBLEM SELECTOR PLAN 8
- dvt proph - c/w HSQ    communication: discussed with floor NP and floor nurse and niece and nephew at bedside - dvt proph - c/w HSQ

## 2019-08-05 NOTE — SWALLOW VFSS/MBS ASSESSMENT ADULT - ORAL PREPARATORY PHASE
reduced bolus formation Poor bolus formation difficulty generating sufficient negative intra-oral pressure for use of straw/Poor bolus formation

## 2019-08-05 NOTE — SWALLOW VFSS/MBS ASSESSMENT ADULT - SLP PERTINENT HISTORY OF CURRENT PROBLEM
70 y/o M w/ PMH of DM, HTN with poor medication compliance and PAD was admitted to CCU (7/5/19) from [facility] s/p L popliteal bypass for cardiogenic shock management w/ NSTEMI and a course complicated by hypoxemic respiratory failure secondary to suspected aspiration PNA 2/2 episode of vomiting while intubated; now in septic shock. Known hx of poor medication compliance. Vascular surgery consulted (7/6/19) and following for no dopplerable signals and ordered an urgent lower extremity arterial duplex. Patient regained biphasic Doppler pulse when given Heparin infusion as per Cardiology (7/6/19). Patient self-extubated (7/8/19). Podiatry consulted (7/9/19) for dry gangrene, being treated with betadine. Patient transferred from CCU to general medicine (7/9/19). Patient self-removed NGT (7/9/19), B/L mittens placed. Patient remains on antibiotics for aspiration PNA. Pt with VINAY likely in the setting of cardiogenic/septic shock. (Continued)
68 y/o M w/ PMH of DM, HTN with poor medication compliance and PAD was admitted to CCU (7/5/19) from [facility] s/p L popliteal bypass for cardiogenic shock management w/ NSTEMI and a course complicated by hypoxemic respiratory failure secondary to suspected aspiration PNA 2/2 episode of vomiting while intubated; now in septic shock. Known hx of poor medication compliance. Vascular surgery consulted (7/6/19) and following for no dopplerable signals and ordered an urgent lower extremity arterial duplex. Patient regained biphasic Doppler pulse when given Heparin infusion as per Cardiology (7/6/19). Patient self-extubated (7/8/19). Podiatry consulted (7/9/19) for dry gangrene, being treated with betadine. Patient transferred from CCU to general medicine (7/9/19). Patient self-removed NGT (7/9/19), B/L mittens placed. Patient remains on antibiotics for aspiration PNA. Pt with VINAY likely in the setting of cardiogenic/septic shock.
68 y/o M w/ PMH of DM, HTN with poor medication compliance and PAD was admitted to CCU (7/5/19) from [facility] s/p L popliteal bypass for cardiogenic shock management w/ NSTEMI and a course complicated by hypoxemic respiratory failure secondary to suspected aspiration PNA 2/2 episode of vomiting while intubated; now in septic shock. Known hx of poor medication compliance. Vascular surgery consulted (7/6/19) and following for no dopplerable signals and ordered an urgent lower extremity arterial duplex. Patient regained biphasic Doppler pulse when given Heparin infusion as per Cardiology (7/6/19). Patient self-extubated (7/8/19). Podiatry consulted (7/9/19) for dry gangrene, being treated with betadine. Patient transferred from CCU to general medicine (7/9/19). Patient self-removed NGT (7/9/19), B/L mittens placed. Patient remains on antibiotics for aspiration PNA. Pt with VINAY likely in the setting of cardiogenic/septic shock. (Continued)

## 2019-08-05 NOTE — PROGRESS NOTE ADULT - PROBLEM SELECTOR PLAN 1
-Test BG q6h if NPO/TFs. ac and hs if starting POs.  -Continue NPH  7 units q6h if TFs restarted at same rate/formula. -Test BG q6h if NPO/TFs. ac and hs if starting POs.  -Continue NPH  7 units q6h if TFs restarted at same rate/formula.  -Please contact endo team if PO is started for further insulin recs.   -Plan discussed with pt's team.  Contact info: [phone] (24/7). pager [phone]

## 2019-08-05 NOTE — SWALLOW VFSS/MBS ASSESSMENT ADULT - COMMENTS
(Continued) MBS completed 7/11/19 noting: Pt presents with an oropharyngeal dysphagia superimposed upon an altered mental status. The oral stage of swallow is characterized by oral holding of food/liquid material, poor bolus formation/control, piecemeal deglutition, premature spillover to the oropharynx on thin puree, honey thick and nectar thick liquids and to the hypopharynx on thick puree and thin liquid, delayed pharyngeal swallows and laryngeal penetration with retrieval on thin liquid. Although aspiration not evident on exam, would suggest a conservative approach to feeding given current cognitive status.  Disorders: reduced lingual strength/ROM/Rate of motion, reduced BOT to posterior pharyngeal wall contact, delay in trigger of the swallow reflex, reduced supraglottic sensation 7/17 last bedside swallow evaluation clinically recommended upgrade of diet to Dysphagia 2, thin liquids 7/20 PA chart note mentioned patient presented with increased agitation and confusion, became combative and put on constant observation. 7/21 Patient noted to have resolved agitation and confusion 7/22 patient underwent left BKA procedure 7/24  RRT called 2/2 change in breathing, appearing to use accessory muscle when breathing, not following commands, not responding to name, altered from baseline- also noted with expiratory wheeze and dyspnea. 7/24 MICU consulted; however not recommended at this time. 7/24 Chart Note-Transfer Not Resident noted concerning for shock liver in the setting of acute decompensated HF 7/24 Pt transferred to CCU and central line inserted. As of 7/25 WBC elevated 12.0  7/25: Re-evaluation of swallow attempted. Pt unable to participate 2/2 lethargy.   7/25: Neuro consulted for AMS.  Per report: Patient unable to stay awake for exam more than a minute, and when awake would not cooperate with exam or interview.  Continued below:
See results tab for imaging.  Pt w/ fevers and leukocytosis w/ R lung opacity on CXR and evidence of end organ damage with transaminitis and elevated lactate w/ hemodynamic instability requiring pressor support suggestive of septic shock; currently resolved
(Continued) MBS completed 7/11/19 noting: Pt presents with an oropharyngeal dysphagia superimposed upon an altered mental status. The oral stage of swallow is characterized by oral holding of food/liquid material, poor bolus formation/control, piecemeal deglutition, premature spillover to the oropharynx on thin puree, honey thick and nectar thick liquids and to the hypopharynx on thick puree and thin liquid, delayed pharyngeal swallows and laryngeal penetration with retrieval on thin liquid. Although aspiration not evident on exam, would suggest a conservative approach to feeding given current cognitive status.  7/17 last bedside swallow evaluation clinically recommended upgrade of diet to Dysphagia 2, thin liquids 7/20 PA chart note mentioned patient presented with increased agitation and confusion, became combative and put on constant observation. 7/21 Patient noted to have resolved agitation and confusion 7/22 patient underwent left BKA procedure 7/24  RRT called 2/2 change in breathing, appearing to use accessory muscle when breathing, not following commands, not responding to name, altered from baseline- also noted with expiratory wheeze and dyspnea. 7/24 MICU consulted; however not recommended at this time. 7/24 Chart Note-Transfer Not Resident noted concerning for shock liver in the setting of acute decompensated HF. 7/25: Re-evaluation of swallow attempted. Pt unable to participate 2/2 lethargy.   7/25: Neuro consulted for AMS.  MBS completed 7/30. Recommendations made for NPO. See report for details.

## 2019-08-05 NOTE — SWALLOW VFSS/MBS ASSESSMENT ADULT - DIAGNOSTIC IMPRESSIONS
Pt presents with an oropharyngeal dysphagia superimposed upon an altered mental status. The oral stage of swallow is characterized by oral holding of food/liquid material, poor bolus formation/control, piecemeal deglutition, premature spillover to the oropharynx on thin puree, honey thick and nectar thick liquids and to the hypopharynx on thick puree and thin liquid, delayed pharyngeal swallows and laryngeal penetration with retrieval on thin liquid. Although aspiration not evident on exam, would suggest a conservative approach to feeding given current cognitive status.   Disorders: reduced lingual strength/ROM/Rate of motion, reduced BOT to posterior pharyngeal wall contact, delay in trigger of the swallow reflex, reduced supraglottic sensation
Pt presents with an oropharyngeal dysphagia characterized by reduced bolus formation/control, spillover to the pharynx on all textures administered, delayed pharyngeal swallows and trace-mild post swallow residue in the pharynx. No aspiration evident on exam. Chewables not administered 2/2 edentulous state and altered mental status.   Disorders: reduced lingual strength/ROM/Rate of motion, reduced BOT to posterior pharyngeal wall contact, delay in trigger of the swallow reflex,
Patient presents with oropharyngeal dysphagia superimposed upon severe cognitive deficits. Oral stage characterized by prolonged oral holding/delayed lingual pumping, and reduced oral bolus control. Pharyngeal stage is characterized by inconsistent/absent pharyngeal swallow initiation despite moderate residue post primary swallow across all trialed consistencies, as well as silent laryngeal penetration during thin puree which is not retrieved. Patient is not able to employ timely/effective strategies at this time; ability to consistently follow directives negatively impacted by severe cognitive deficits.    Disorders: reduced lingual strength/ROM/coordination, reduced BOT to posterior pharyngeal wall contact, delay in trigger of the swallow reflex, reduced hyo-laryngeal elevation, reduced laryngeal closure, reduced pharyngeal contractility, reduced supraglottic sensation

## 2019-08-05 NOTE — PROGRESS NOTE ADULT - PROBLEM SELECTOR PLAN 1
- per cardiology, no ischemic evaluation done - recommended for medical management  - c/w asa, statin  - cards f/u appreciated - okay to hold plavix (for planned PEG) - per cardiology, no ischemic evaluation done - recommended for medical management  - c/w asa, statin  d/w Cards fellow- no need for plavix, continue only aspirin

## 2019-08-05 NOTE — SWALLOW VFSS/MBS ASSESSMENT ADULT - SPECIFY REASON(S)
to objectively assess the swallow mechanism and r/o dysphagia
to objectively assess the swallow mechanism r/o aspiration
to objectively assess the swallow mechanism

## 2019-08-05 NOTE — PROGRESS NOTE ADULT - SUBJECTIVE AND OBJECTIVE BOX
Patient is a 69y old  Male who presents with a chief complaint of NSTEMI (05 Aug 2019 14:46)        SUBJECTIVE / OVERNIGHT EVENTS: no acute complaints      MEDICATIONS  (STANDING):  aspirin  chewable 81 milliGRAM(s) Enteral Tube daily  dextrose 5%. 1000 milliLiter(s) (50 mL/Hr) IV Continuous <Continuous>  dextrose 50% Injectable 12.5 Gram(s) IV Push once  dextrose 50% Injectable 25 Gram(s) IV Push once  dextrose 50% Injectable 25 Gram(s) IV Push once  heparin  Injectable 5000 Unit(s) SubCutaneous every 12 hours  insulin lispro (HumaLOG) corrective regimen sliding scale   SubCutaneous every 6 hours  ipratropium 17 MICROgram(s) HFA Inhaler 1 Puff(s) Inhalation every 6 hours  losartan 25 milliGRAM(s) Oral daily  metoprolol tartrate 12.5 milliGRAM(s) Enteral Tube two times a day  nicotine -   7 mG/24Hr(s) Patch 1 patch Transdermal daily    MEDICATIONS  (PRN):  dextrose 40% Gel 15 Gram(s) Oral once PRN Blood Glucose LESS THAN 70 milliGRAM(s)/deciliter  glucagon  Injectable 1 milliGRAM(s) IntraMuscular once PRN Glucose LESS THAN 70 milligrams/deciliter  melatonin 3 milliGRAM(s) Oral at bedtime PRN Insomnia      Vital Signs Last 24 Hrs  T(C): 36.4 (05 Aug 2019 12:47), Max: 36.4 (04 Aug 2019 23:53)  T(F): 97.6 (05 Aug 2019 12:47), Max: 97.6 (04 Aug 2019 23:53)  HR: 79 (05 Aug 2019 12:47) (76 - 79)  BP: 131/80 (05 Aug 2019 12:47) (118/74 - 131/80)  BP(mean): --  RR: 18 (05 Aug 2019 12:47) (18 - 18)  SpO2: 97% (05 Aug 2019 12:47) (95% - 97%)  CAPILLARY BLOOD GLUCOSE      POCT Blood Glucose.: 109 mg/dL (05 Aug 2019 13:04)  POCT Blood Glucose.: 120 mg/dL (05 Aug 2019 05:57)  POCT Blood Glucose.: 178 mg/dL (04 Aug 2019 23:52)  POCT Blood Glucose.: 189 mg/dL (04 Aug 2019 18:00)    I&O's Summary    04 Aug 2019 07:01  -  05 Aug 2019 07:00  --------------------------------------------------------  IN: 350 mL / OUT: 0 mL / NET: 350 mL        PHYSICAL EXAM:  GENERAL: NAD, awake, alert, following commands  HEAD:  Atraumatic, Normocephalic  EYES: conjunctiva and sclera clear  Nose: +NGT  NECK: No JVD  CHEST/LUNG: CTA b/l  HEART: S1 S2 RRR  ABDOMEN: +BS Soft, NT/ND  EXTREMITIES:  no LE edema, mittens on hands  SKIN: staples along stump and medial thigh - healing - no erythema  NERVOUS SYSTEM:  Alert & Orientedx1; moving all extremities, following some commands    LABS:                        12.4   8.3   )-----------( 187      ( 04 Aug 2019 10:38 )             38.8     08-05    135  |  102  |  19  ----------------------------<  121<H>  4.6   |  22  |  0.70    Ca    8.4      05 Aug 2019 06:11                RADIOLOGY & ADDITIONAL TESTS:    Imaging Personally Reviewed: MBS reviewed -   There is evidence of pharyngeal penetration without evidence of   aspiration.  Consultant(s) Notes Reviewed:    Care Discussed with Consultants/Other Providers: d/w Swallow therapist Tran regarding MBS

## 2019-08-05 NOTE — PROGRESS NOTE ADULT - ASSESSMENT
69 year old male with M2, HTN, PAD s/p Left BKA on 7/22, here for NSTEMI c/b cardiogenic shock, acute respiratory failure with hypoxia and septic shock secondary to suspected aspiration pneumonia requiring intubation (now extubated) then transferred to floors. He also has acute pontine stroke.     IMPRESSION  - Dysphagia after acute stroke, now improving, initially had failed speech and swallow but now passed on 8/5 and they recommend a specific diet    RECOMMENDATION    - trend CBC, CMP, INR  - no plan for PEG tube placement, recommend diet and per swallow team (note on 8/5/2019)  - supportive care as per primary team    Please call us back as needed     Mireya Felix, PGY-6  GI fellow  B- [phone]/ [phone]  Please call GI fellow on call after 5pm and on weekends

## 2019-08-05 NOTE — PROGRESS NOTE ADULT - ASSESSMENT
68 y/o M w/h/o uncontrolled T2DM/HTN/PAD. Transferred from OSH for treatment of cardiogenic shock/ NSTEMI. Per staff pt was tolerating TFs of Glucerna 1.2 at 70cc/hr but has been NPO since MN for MBS. Saw pt prior to test. Glycemic control has been at goal while on NPH/Humalog insulin regimen. However, need to wait for MBS results and reassess PO status and insulin regimen. No hypoglycemia.

## 2019-08-05 NOTE — SWALLOW VFSS/MBS ASSESSMENT ADULT - SLP GENERAL OBSERVATIONS
Pt arrived in radiology secure in LULU chair. +B/L alycia, + KFT. Pt A&O to name. Pt able to follow simple 1-step commands for evaluation purposes. Cooperative throughout exam. Pt arrived in radiology secure in LULU chair. +B/L alycia, + KFT. Pt A&O to name. Pt able to follow simple 1-step commands for evaluation purposes given assist. Re-direction required at times. Increased response time required. Cooperative throughout exam.

## 2019-08-05 NOTE — PROGRESS NOTE ADULT - SUBJECTIVE AND OBJECTIVE BOX
Chief Complaint:  Patient is a 69y old  Male who presents with a chief complaint of NSTEMI (05 Aug 2019 14:46)      Interval Events:   - had a speech and swallow study done today    HPI: from consult note   69 year old male with M2, HTN, PAD s/p Left BKA on 7/22, here for NSTEMI c/b cardiogenic shock, acute respiratory failure with hypoxia and septic shock secondary to suspected aspiration pneumonia requiring intubation (now extubated) then transferred to floors. He also has acute pontine stroke and had left BKA for gangrene on 7/22.    GI consulted for dysphagia and placement of PEG tube, in the setting of prolonged hospitalisation and a stroke. Speech and swallow saw the patient on 7/30/2019 and diagnosed him with oropharyngeal dysphagia. Patient is non-verbal and cannot provide history.        Allergies:  No Known Allergies      Hospital Medications:  aspirin  chewable 81 milliGRAM(s) Enteral Tube daily  dextrose 40% Gel 15 Gram(s) Oral once PRN  dextrose 5%. 1000 milliLiter(s) IV Continuous <Continuous>  dextrose 50% Injectable 12.5 Gram(s) IV Push once  dextrose 50% Injectable 25 Gram(s) IV Push once  dextrose 50% Injectable 25 Gram(s) IV Push once  glucagon  Injectable 1 milliGRAM(s) IntraMuscular once PRN  heparin  Injectable 5000 Unit(s) SubCutaneous every 12 hours  insulin lispro (HumaLOG) corrective regimen sliding scale   SubCutaneous every 6 hours  ipratropium 17 MICROgram(s) HFA Inhaler 1 Puff(s) Inhalation every 6 hours  losartan 25 milliGRAM(s) Oral daily  melatonin 3 milliGRAM(s) Oral at bedtime PRN  metoprolol tartrate 12.5 milliGRAM(s) Enteral Tube two times a day  nicotine -   7 mG/24Hr(s) Patch 1 patch Transdermal daily      PMHX/PSHX:  Hyperlipidemia  Hypertension  Diabetes  No pertinent past medical history  H/O mastoidectomy  No significant past surgical history  No significant past surgical history      Family history:  FHx: diabetes mellitus  No pertinent family history in first degree relatives      PHYSICAL EXAM:     GENERAL:  Appears stated age  HEENT:  NC/AT, NG tube+  CHEST:  Full & symmetric excursion  HEART:  Regular rhythm, S1, S2  ABDOMEN:  Soft, non-tender, non-distended  EXTREMITIES:  no edema  SKIN:  No rash  NEURO:  Alert    Vital Signs:  Vital Signs Last 24 Hrs  T(C): 36.4 (05 Aug 2019 12:47), Max: 36.4 (04 Aug 2019 23:53)  T(F): 97.6 (05 Aug 2019 12:47), Max: 97.6 (04 Aug 2019 23:53)  HR: 79 (05 Aug 2019 12:47) (76 - 79)  BP: 131/80 (05 Aug 2019 12:47) (118/74 - 131/80)  BP(mean): --  RR: 18 (05 Aug 2019 12:47) (18 - 18)  SpO2: 97% (05 Aug 2019 12:47) (95% - 97%)  Daily     Daily     LABS:                        12.4   8.3   )-----------( 187      ( 04 Aug 2019 10:38 )             38.8     08-05    135  |  102  |  19  ----------------------------<  121<H>  4.6   |  22  |  0.70    Ca    8.4      05 Aug 2019 06:11        PT/INR - ( 05 Aug 2019 15:38 )   PT: 13.5 sec;   INR: 1.18 ratio         PTT - ( 05 Aug 2019 15:38 )  PTT:31.6 sec        Imaging:

## 2019-08-05 NOTE — PROGRESS NOTE ADULT - PROBLEM SELECTOR PLAN 6
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  On the left, the bypass graft is occluded, the popliteal artery is occluded and the trifurcation arteries are occluded.  - s/p Left BKA on 7/22/19  - Per vascular bypass staples to be taken out prior to discharge to [facility] [place] next week. not to remove BKA staples  - c/w DAPT, statin  - vascular surgery follow up

## 2019-08-05 NOTE — SWALLOW VFSS/MBS ASSESSMENT ADULT - ORAL PREP COMMENTS
episodic oral holding of material
Oral prep negatively impacted by reduced orientation to the feeding process; unproductive movement of jaw in chewing-like pattern with minimal lingual pumping movement of bolus resulting in little to no manipulation of the bolus

## 2019-08-05 NOTE — PROGRESS NOTE ADULT - ASSESSMENT
69 year old male with PMH of poor med compliance, DM2, HTN, PAD s/p Left BKA 7/22 transferred from [facility] to [facility] CCU for NSTEMI complicated by cardiogenic shock and acute respiratory failure with hypoxia and septic shock secondary to suspected aspiration pneumonia requiring intubation (now extubated) then transferred to floors with RRT 7/24 for shock with transaminitis and elevated lactate readmission to CCU, with resolution of shock. Now wit concern for acute pontine stroke on CT not seen on MRI brain. Hospital course c/b dysphagia failed MBS with plan for PEG 8/5 or 8/6 however per nursing mental status much improved therefore repeat eval with repeat MBS ordered. 69 year old male with PMH of poor med compliance, DM2, HTN, PAD s/p Left BKA 7/22 transferred from [facility] to [facility] CCU for NSTEMI complicated by cardiogenic shock and acute respiratory failure with hypoxia and septic shock secondary to suspected aspiration pneumonia requiring intubation (now extubated) then transferred to floors with RRT 7/24 for shock with transaminitis and elevated lactate readmission to CCU, with resolution of shock. Now wit concern for acute pontine stroke on CT not seen on MRI brain. Hospital course c/b dysphagia now passed MBS with improved mental status.

## 2019-08-05 NOTE — PROGRESS NOTE ADULT - SUBJECTIVE AND OBJECTIVE BOX
DIABETES FOLLOW UP NOTE: Saw pt earlier today  INTERVAL HX: 70 y/o M w/h/o uncontrolled T2DM/HTN/PAD. Transferred from OSH for treatment of cardiogenic shock/ NSTEMI. Per staff pt was tolerating TFs of Glucerna 1.2 at 70cc/hr but has been NPO since MN for MBS. Saw pt prior to test. Pt alert able to answer simple questions. Stated he is hungry and wants to eat food. Denies any SOB/CP. Glycemic control has been at goal while on NPH/Humalog insulin regimen. NPH held since MN for NPO status with BG levels at goal.      Review of Systems:  Cardiovascular: No chest pain, palpitations  Respiratory: No SOB, no cough  GI: No nausea, vomiting, abdominal pain  Endocrine: no polyuria, polydipsia or S&Sx of hypoglycemia    Allergies    No Known Allergies    Intolerances      MEDICATIONS:  insulin lispro (HumaLOG) corrective regimen sliding scale   SubCutaneous every 6 hours  insulin NPH human recombinant 7 Unit(s) SubCutaneous every 6 hours      PHYSICAL EXAM:  VITALS: T(C): 36.3 (08-05-19 @ 04:57)  T(F): 97.4 (08-05-19 @ 04:57), Max: 98.2 (08-04-19 @ 14:30)  HR: 76 (08-05-19 @ 04:57) (76 - 86)  BP: 118/74 (08-05-19 @ 04:57) (118/74 - 132/86)  RR:  (18 - 18)  SpO2:  (95% - 96%)  Wt(kg): --  GENERAL: in NAD  Abdomen: Soft, nontender, non distended  Extremities: Warm, no edema in all 4 exts. Noted chronic vascular changes in LEs with some darker discoloration and dryness of skin.  NEURO: Alert and able to answer simple questions. Takes time to answer.    LABS:  POCT Blood Glucose.: 120 mg/dL (08-05-19 @ 05:57)  POCT Blood Glucose.: 178 mg/dL (08-04-19 @ 23:52)  POCT Blood Glucose.: 189 mg/dL (08-04-19 @ 18:00)  POCT Blood Glucose.: 159 mg/dL (08-04-19 @ 12:10)  POCT Blood Glucose.: 147 mg/dL (08-04-19 @ 05:54)  POCT Blood Glucose.: 168 mg/dL (08-03-19 @ 23:47)  POCT Blood Glucose.: 158 mg/dL (08-03-19 @ 17:44)  POCT Blood Glucose.: 178 mg/dL (08-03-19 @ 11:57)  POCT Blood Glucose.: 123 mg/dL (08-03-19 @ 05:44)  POCT Blood Glucose.: 98 mg/dL (08-03-19 @ 00:23)  POCT Blood Glucose.: 76 mg/dL (08-03-19 @ 00:21)  POCT Blood Glucose.: 157 mg/dL (08-02-19 @ 17:42)  POCT Blood Glucose.: 116 mg/dL (08-02-19 @ 13:21)                            12.4   8.3   )-----------( 187      ( 04 Aug 2019 10:38 )             38.8       08-05    135  |  102  |  19  ----------------------------<  121<H>  4.6   |  22  |  0.70    EGFR if : 112  EGFR if non : 96    Ca    8.4      08-05        Thyroid Function Tests:  07-10 @ 13:02 TSH 3.57 FreeT4 -- T3 -- Anti TPO -- Anti Thyroglobulin Ab -- TSI --      Hemoglobin A1C, Whole Blood: 10.0 % <H> [4.0 - 5.6] (07-06-19 @ 04:54) DIABETES FOLLOW UP NOTE: Saw pt earlier today  INTERVAL HX: 68 y/o M w/h/o uncontrolled T2DM/HTN/PAD. Transferred from OSH for treatment of cardiogenic shock/ NSTEMI. Per staff pt was tolerating TFs of Glucerna 1.2 at 70cc/hr but has been NPO since MN for MBS. Saw pt prior to test. Pt alert able to answer simple questions. Stated he is hungry and wants to eat food. Denies any SOB/CP. Glycemic control has been at goal while on NPH/Humalog insulin regimen. NPH held since MN for NPO status with BG levels at goal.      Review of Systems:  Cardiovascular: No chest pain, palpitations  Respiratory: No SOB, no cough  GI: No nausea, vomiting, abdominal pain  Endocrine: no polyuria, polydipsia or S&Sx of hypoglycemia    Allergies    No Known Allergies    Intolerances      MEDICATIONS:  insulin lispro (HumaLOG) corrective regimen sliding scale   SubCutaneous every 6 hours  insulin NPH human recombinant 7 Unit(s) SubCutaneous every 6 hours      PHYSICAL EXAM:  VITALS: T(C): 36.3 (08-05-19 @ 04:57)  T(F): 97.4 (08-05-19 @ 04:57), Max: 98.2 (08-04-19 @ 14:30)  HR: 76 (08-05-19 @ 04:57) (76 - 86)  BP: 118/74 (08-05-19 @ 04:57) (118/74 - 132/86)  RR:  (18 - 18)  SpO2:  (95% - 96%)  Wt(kg): --  GENERAL: in NAD  Abdomen: Soft, nontender, non distended  Extremities: Warm, no edema in all 4 exts. L BKA with staples D&I. Noted chronic vascular changes in LEs with some darker discoloration and dryness of skin.  NEURO: Alert and able to answer simple questions. Takes time to answer.    LABS:  POCT Blood Glucose.: 120 mg/dL (08-05-19 @ 05:57)  POCT Blood Glucose.: 178 mg/dL (08-04-19 @ 23:52)  POCT Blood Glucose.: 189 mg/dL (08-04-19 @ 18:00)  POCT Blood Glucose.: 159 mg/dL (08-04-19 @ 12:10)  POCT Blood Glucose.: 147 mg/dL (08-04-19 @ 05:54)  POCT Blood Glucose.: 168 mg/dL (08-03-19 @ 23:47)  POCT Blood Glucose.: 158 mg/dL (08-03-19 @ 17:44)  POCT Blood Glucose.: 178 mg/dL (08-03-19 @ 11:57)  POCT Blood Glucose.: 123 mg/dL (08-03-19 @ 05:44)  POCT Blood Glucose.: 98 mg/dL (08-03-19 @ 00:23)  POCT Blood Glucose.: 76 mg/dL (08-03-19 @ 00:21)  POCT Blood Glucose.: 157 mg/dL (08-02-19 @ 17:42)  POCT Blood Glucose.: 116 mg/dL (08-02-19 @ 13:21)                            12.4   8.3   )-----------( 187      ( 04 Aug 2019 10:38 )             38.8       08-05    135  |  102  |  19  ----------------------------<  121<H>  4.6   |  22  |  0.70    EGFR if : 112  EGFR if non : 96    Ca    8.4      08-05        Thyroid Function Tests:  07-10 @ 13:02 TSH 3.57 FreeT4 -- T3 -- Anti TPO -- Anti Thyroglobulin Ab -- TSI --      Hemoglobin A1C, Whole Blood: 10.0 % <H> [4.0 - 5.6] (07-06-19 @ 04:54)

## 2019-08-05 NOTE — SWALLOW VFSS/MBS ASSESSMENT ADULT - ORAL PHASE
Uncontrolled bolus / spillover in lambert-pharynx/Uncontrolled bolus / spillover in hypopharynx/variable spillover, moderate to valleculae and mild ot the AE folds (head of bolus almost to the pyriform sinus). trace-mild oral cavity residue./Delayed oral transit time/Residue in oral cavity/Incomplete tongue to palate contact/Reduced anterior - posterior transport Delayed oral transit time/Residue in oral cavity/Uncontrolled bolus / spillover in hypopharynx/Reduced anterior - posterior transport/Incomplete tongue to palate contact/Uncontrolled bolus / spillover in lambert-pharynx/moderate spillover to the valleculae and moderate to the pyriform sinus, trace-mild oral cavity residue moderate spillover to the valleculae and trace/mild to the  AE folds/Delayed oral transit time/Incomplete tongue to palate contact/Uncontrolled bolus / spillover in lambert-pharynx/Uncontrolled bolus / spillover in hypopharynx moderate-max to the valleculae, trace-mild to the AE folds, x1 episode head of bolus reaches pyriform sinus/Incomplete tongue to palate contact/Uncontrolled bolus / spillover in lambert-pharynx/Uncontrolled bolus / spillover in hypopharynx Uncontrolled bolus / spillover in hypopharynx/variable spillover of material, head of bolus reaches pyriform sinus with increased amount of intake (i.e., straw)./Incomplete tongue to palate contact/Uncontrolled bolus / spillover in lambert-pharynx

## 2019-08-05 NOTE — SWALLOW VFSS/MBS ASSESSMENT ADULT - RECOMMENDED CONSISTENCY
Dysphagia 1 and nectar thick liquids
Puree and thin liquids. 100% supervision with all PO intake. Small, SINGLE SIPS ONLY when drinking. Medication in applesauce. Maintain upright posture during meals and after eating/drinking for 30 minutes. Maintain good oral hygiene
NPO, with non-oral nutrition/hydration/medications.

## 2019-08-05 NOTE — SWALLOW VFSS/MBS ASSESSMENT ADULT - NS SWALLOW VFSS REC ASPIR MON
change of breathing pattern/Monitor for s/s aspiration/laryngeal penetration. If noted:  D/C p.o. intake, provide non-oral nutrition/hydration/meds, and contact this service @ [phone]/armando voice/throat clearing/upper respiratory infection/cough/fever/pneumonia
gurgly voice/pneumonia/throat clearing/upper respiratory infection/cough/fever/Monitor for s/s aspiration/laryngeal penetration. If noted:  D/C p.o. intake, provide non-oral nutrition/hydration/meds, and contact this service @ [phone]/change of breathing pattern
change of breathing pattern/fever/cough/gurgly voice/throat clearing/pneumonia/upper respiratory infection

## 2019-08-05 NOTE — SWALLOW VFSS/MBS ASSESSMENT ADULT - MODE OF PRESENTATION
fed by clinician/spoon spoon/fed by clinician fed by clinician/spoon/cup cup/spoon/fed by clinician/straw straw/fed by clinician/spoon/cup

## 2019-08-05 NOTE — SWALLOW VFSS/MBS ASSESSMENT ADULT - ADDITIONAL INFORMATION
+KFT  +Non-obstructive cervical osteophytes C4-C5, C5-C6
+NGT; non-obstructive cervical osteophyte at C4-C5
+ non-obstructive cervical osteophytes

## 2019-08-06 ENCOUNTER — TRANSCRIPTION ENCOUNTER (OUTPATIENT)
Age: 69
End: 2019-08-06

## 2019-08-06 LAB
GLUCOSE BLDC GLUCOMTR-MCNC: 145 MG/DL — HIGH (ref 70–99)
GLUCOSE BLDC GLUCOMTR-MCNC: 166 MG/DL — HIGH (ref 70–99)
GLUCOSE BLDC GLUCOMTR-MCNC: 171 MG/DL — HIGH (ref 70–99)
GLUCOSE BLDC GLUCOMTR-MCNC: 175 MG/DL — HIGH (ref 70–99)
GLUCOSE BLDC GLUCOMTR-MCNC: 211 MG/DL — HIGH (ref 70–99)

## 2019-08-06 PROCEDURE — 99232 SBSQ HOSP IP/OBS MODERATE 35: CPT

## 2019-08-06 RX ORDER — INSULIN LISPRO 100/ML
VIAL (ML) SUBCUTANEOUS AT BEDTIME
Refills: 0 | Status: DISCONTINUED | OUTPATIENT
Start: 2019-08-06 | End: 2019-08-07

## 2019-08-06 RX ADMIN — Medication 1 PUFF(S): at 17:12

## 2019-08-06 RX ADMIN — Medication 1 PATCH: at 12:02

## 2019-08-06 RX ADMIN — HEPARIN SODIUM 5000 UNIT(S): 5000 INJECTION INTRAVENOUS; SUBCUTANEOUS at 05:39

## 2019-08-06 RX ADMIN — Medication 1 PATCH: at 11:58

## 2019-08-06 RX ADMIN — Medication 1 PUFF(S): at 01:24

## 2019-08-06 RX ADMIN — Medication 12.5 MILLIGRAM(S): at 17:11

## 2019-08-06 RX ADMIN — HEPARIN SODIUM 5000 UNIT(S): 5000 INJECTION INTRAVENOUS; SUBCUTANEOUS at 17:12

## 2019-08-06 RX ADMIN — Medication 12.5 MILLIGRAM(S): at 05:39

## 2019-08-06 RX ADMIN — Medication 1 PUFF(S): at 12:00

## 2019-08-06 RX ADMIN — Medication 1 PATCH: at 08:20

## 2019-08-06 RX ADMIN — Medication 81 MILLIGRAM(S): at 11:58

## 2019-08-06 RX ADMIN — LOSARTAN POTASSIUM 25 MILLIGRAM(S): 100 TABLET, FILM COATED ORAL at 05:39

## 2019-08-06 RX ADMIN — Medication 1 PUFF(S): at 05:40

## 2019-08-06 RX ADMIN — INSULIN GLARGINE 14 UNIT(S): 100 INJECTION, SOLUTION SUBCUTANEOUS at 22:01

## 2019-08-06 RX ADMIN — Medication 1 PATCH: at 19:10

## 2019-08-06 NOTE — PROGRESS NOTE ADULT - ASSESSMENT
69 year old male with PMH of poor med compliance, DM2, HTN, PAD s/p Left BKA 7/22 transferred from [facility] to [facility] CCU for NSTEMI complicated by cardiogenic shock and acute respiratory failure with hypoxia and septic shock secondary to suspected aspiration pneumonia requiring intubation (now extubated) then transferred to floors with RRT 7/24 for shock with transaminitis and elevated lactate readmission to CCU, with resolution of shock. Now wit concern for acute pontine stroke on CT not seen on MRI brain. Hospital course c/b dysphagia now passed MBS with improved mental status.

## 2019-08-06 NOTE — PROGRESS NOTE ADULT - PROBLEM SELECTOR PLAN 3
- no need for antibiotics, initially started on vancomycin/zosyn and monitoring off abx  - No signs of sepsis at this time   - saturating well on room air at this time  MBS reviewed with swallow therapist - puree diet with thin liquids. aspiration precautions.

## 2019-08-06 NOTE — DISCHARGE NOTE PROVIDER - HOSPITAL COURSE
To be done. 69M h/o poor med compliance, DM2, HTN, PAD s/p Left BKA 7/22 transferred from [facility] to [facility] CCU for NSTEMI complicated by cardiogenic shock and acute respiratory failure with hypoxia. Patient followed by house cardiology and recommended for medical management (no cath done). TTE with EF 20-25% with moderately dilated left atrium, increased left atrial pressures, moderately dilated LV, global hypokinesis. patient to continue aspirin, statin, losartan and metoprolol. no need to continue plavix per cardiology. 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 Vascular f/u called for removal of staples from bypass surgery and recommendations regarding f/u for BKA staples. pt developed septic shock secondary to suspected aspiration pneumonia requiring intubation later extubated then transferred to floors. patient was treated with iv vancomycin and zosyn briefly and monitored off abx without further signs and symptoms of infection saturating well on room air. patient with transaminitis later trended down and elevated lactate due to shock and had readmission to CCU. With resolution of shock, transferred again to floors. There was a concern for pontine stroke possibly seen on CT head that was not seen on MRI brain. Hospital course complicated further by dysphagia initially requiring NGT feeds but later passed MBS with improved mental status placed on puree diet. Followed by Endocrine for DM management. patient evaluated by PT and recommended for KERI. patient to follow up with PMD, cardiology, Vascular, and endocrine.

## 2019-08-06 NOTE — DISCHARGE NOTE PROVIDER - PROVIDER TOKENS
FREE:[LAST:[Medical Clinic],PHONE:[phone]],FAX:[(   )    -],ADDRESS:[[address] [place] [address]],FOLLOWUP:[1 week]],FREE:[LAST:[Endocrine Clinic],PHONE:[phone]],FAX:[(   )    -],FOLLOWUP:[1 week]] PROVIDER:[TOKEN:[[id number]],FOLLOWUP:[2 weeks]],FREE:[LAST:[Medical Clinic],PHONE:[phone]:[(   )    -],ADDRESS:[[address] [place] [address]],FOLLOWUP:[1 week]],FREE:[LAST:[Endocrine Clinic],PHONE:[phone]],FAX:[(   )    -],FOLLOWUP:[1 week]],PROVIDER:[TOKEN:[[id number]],FOLLOWUP:[1 week]]

## 2019-08-06 NOTE — PROGRESS NOTE ADULT - SUBJECTIVE AND OBJECTIVE BOX
Patient is a 69y old  Male who presents with a chief complaint of NSTEMI (06 Aug 2019 11:28)        SUBJECTIVE / OVERNIGHT EVENTS:       MEDICATIONS  (STANDING):  aspirin  chewable 81 milliGRAM(s) Enteral Tube daily  dextrose 5%. 1000 milliLiter(s) (50 mL/Hr) IV Continuous <Continuous>  dextrose 50% Injectable 12.5 Gram(s) IV Push once  dextrose 50% Injectable 25 Gram(s) IV Push once  dextrose 50% Injectable 25 Gram(s) IV Push once  heparin  Injectable 5000 Unit(s) SubCutaneous every 12 hours  insulin glargine Injectable (LANTUS) 14 Unit(s) SubCutaneous at bedtime  insulin lispro (HumaLOG) corrective regimen sliding scale   SubCutaneous three times a day before meals  insulin lispro (HumaLOG) corrective regimen sliding scale   SubCutaneous at bedtime  ipratropium 17 MICROgram(s) HFA Inhaler 1 Puff(s) Inhalation every 6 hours  losartan 25 milliGRAM(s) Oral daily  metoprolol tartrate 12.5 milliGRAM(s) Enteral Tube two times a day  nicotine -   7 mG/24Hr(s) Patch 1 patch Transdermal daily    MEDICATIONS  (PRN):  dextrose 40% Gel 15 Gram(s) Oral once PRN Blood Glucose LESS THAN 70 milliGRAM(s)/deciliter  glucagon  Injectable 1 milliGRAM(s) IntraMuscular once PRN Glucose LESS THAN 70 milligrams/deciliter  melatonin 3 milliGRAM(s) Oral at bedtime PRN Insomnia      Vital Signs Last 24 Hrs  T(C): 36.4 (06 Aug 2019 12:17), Max: 36.6 (06 Aug 2019 04:44)  T(F): 97.5 (06 Aug 2019 12:17), Max: 97.8 (06 Aug 2019 04:44)  HR: 80 (06 Aug 2019 12:17) (71 - 83)  BP: 123/81 (06 Aug 2019 12:17) (107/82 - 123/81)  BP(mean): --  RR: 18 (06 Aug 2019 12:17) (18 - 18)  SpO2: 100% (06 Aug 2019 12:17) (95% - 100%)  CAPILLARY BLOOD GLUCOSE      POCT Blood Glucose.: 175 mg/dL (06 Aug 2019 12:50)  POCT Blood Glucose.: 171 mg/dL (06 Aug 2019 09:09)  POCT Blood Glucose.: 145 mg/dL (06 Aug 2019 01:31)  POCT Blood Glucose.: 145 mg/dL (05 Aug 2019 21:12)  POCT Blood Glucose.: 151 mg/dL (05 Aug 2019 17:51)    I&O's Summary    05 Aug 2019 07:01  -  06 Aug 2019 07:00  --------------------------------------------------------  IN: 240 mL / OUT: 0 mL / NET: 240 mL    06 Aug 2019 07:01  -  06 Aug 2019 16:01  --------------------------------------------------------  IN: 120 mL / OUT: 0 mL / NET: 120 mL        PHYSICAL EXAM:  GENERAL: NAD, awake, alert, following commands  HEAD:  Atraumatic, Normocephalic  EYES: conjunctiva and sclera clear  Nose: +NGT  NECK: No JVD  CHEST/LUNG: CTA b/l  HEART: S1 S2 RRR  ABDOMEN: +BS Soft, NT/ND  EXTREMITIES:  no LE edema, mittens on hands  SKIN: staples along stump and medial thigh - healing - no erythema  NERVOUS SYSTEM:  Alert & Orientedx1; moving all extremities, following some commands    LABS:    08-05    135  |  102  |  19  ----------------------------<  121<H>  4.6   |  22  |  0.70    Ca    8.4      05 Aug 2019 06:11      PT/INR - ( 05 Aug 2019 15:38 )   PT: 13.5 sec;   INR: 1.18 ratio         PTT - ( 05 Aug 2019 15:38 )  PTT:31.6 sec          RADIOLOGY & ADDITIONAL TESTS:    Imaging Personally Reviewed:  Consultant(s) Notes Reviewed:    Care Discussed with Consultants/Other Providers: Patient is a 69y old  Male who presents with a chief complaint of NSTEMI (06 Aug 2019 11:28)        SUBJECTIVE / OVERNIGHT EVENTS: no acute complaints      MEDICATIONS  (STANDING):  aspirin  chewable 81 milliGRAM(s) Enteral Tube daily  dextrose 5%. 1000 milliLiter(s) (50 mL/Hr) IV Continuous <Continuous>  dextrose 50% Injectable 12.5 Gram(s) IV Push once  dextrose 50% Injectable 25 Gram(s) IV Push once  dextrose 50% Injectable 25 Gram(s) IV Push once  heparin  Injectable 5000 Unit(s) SubCutaneous every 12 hours  insulin glargine Injectable (LANTUS) 14 Unit(s) SubCutaneous at bedtime  insulin lispro (HumaLOG) corrective regimen sliding scale   SubCutaneous three times a day before meals  insulin lispro (HumaLOG) corrective regimen sliding scale   SubCutaneous at bedtime  ipratropium 17 MICROgram(s) HFA Inhaler 1 Puff(s) Inhalation every 6 hours  losartan 25 milliGRAM(s) Oral daily  metoprolol tartrate 12.5 milliGRAM(s) Enteral Tube two times a day  nicotine -   7 mG/24Hr(s) Patch 1 patch Transdermal daily    MEDICATIONS  (PRN):  dextrose 40% Gel 15 Gram(s) Oral once PRN Blood Glucose LESS THAN 70 milliGRAM(s)/deciliter  glucagon  Injectable 1 milliGRAM(s) IntraMuscular once PRN Glucose LESS THAN 70 milligrams/deciliter  melatonin 3 milliGRAM(s) Oral at bedtime PRN Insomnia      Vital Signs Last 24 Hrs  T(C): 36.4 (06 Aug 2019 12:17), Max: 36.6 (06 Aug 2019 04:44)  T(F): 97.5 (06 Aug 2019 12:17), Max: 97.8 (06 Aug 2019 04:44)  HR: 80 (06 Aug 2019 12:17) (71 - 83)  BP: 123/81 (06 Aug 2019 12:17) (107/82 - 123/81)  BP(mean): --  RR: 18 (06 Aug 2019 12:17) (18 - 18)  SpO2: 100% (06 Aug 2019 12:17) (95% - 100%)  CAPILLARY BLOOD GLUCOSE      POCT Blood Glucose.: 175 mg/dL (06 Aug 2019 12:50)  POCT Blood Glucose.: 171 mg/dL (06 Aug 2019 09:09)  POCT Blood Glucose.: 145 mg/dL (06 Aug 2019 01:31)  POCT Blood Glucose.: 145 mg/dL (05 Aug 2019 21:12)  POCT Blood Glucose.: 151 mg/dL (05 Aug 2019 17:51)    I&O's Summary    05 Aug 2019 07:01  -  06 Aug 2019 07:00  --------------------------------------------------------  IN: 240 mL / OUT: 0 mL / NET: 240 mL    06 Aug 2019 07:01  -  06 Aug 2019 16:01  --------------------------------------------------------  IN: 120 mL / OUT: 0 mL / NET: 120 mL        PHYSICAL EXAM:  GENERAL: NAD, more awake and alert today, following commands  HEAD:  Atraumatic, Normocephalic  EYES: conjunctiva and sclera clear  Nose: +NGT  NECK: No JVD  CHEST/LUNG: CTA b/l  HEART: S1 S2 RRR  ABDOMEN: +BS Soft, NT/ND  EXTREMITIES:  no LE edema, mittens on hands  SKIN: staples along stump and medial thigh - healing - no erythema  NERVOUS SYSTEM:  Alert & Orientedx1; moving all extremities, following some commands    LABS:    08-05    135  |  102  |  19  ----------------------------<  121<H>  4.6   |  22  |  0.70    Ca    8.4      05 Aug 2019 06:11      PT/INR - ( 05 Aug 2019 15:38 )   PT: 13.5 sec;   INR: 1.18 ratio         PTT - ( 05 Aug 2019 15:38 )  PTT:31.6 sec          RADIOLOGY & ADDITIONAL TESTS:    Imaging Personally Reviewed:  Consultant(s) Notes Reviewed:    Care Discussed with Consultants/Other Providers:

## 2019-08-06 NOTE — PROGRESS NOTE ADULT - PROBLEM SELECTOR PLAN 5
- fs controlled  - fs achs   - hba1c 10  - per endocrinology, c/w NPH 7 units q6 and low correction q6 (correction starts at ) given hypoglycemia from just 1 u of insulin at 150s FS.  - appreciate endocrine consult - fs controlled  - fs achs   - hba1c 10  - per endocrinology, changed to Lantus now on diet

## 2019-08-06 NOTE — DISCHARGE NOTE PROVIDER - NSDCCPCAREPLAN_GEN_ALL_CORE_FT
PRINCIPAL DISCHARGE DIAGNOSIS  Diagnosis: NSTEMI (non-ST elevated myocardial infarction)  Assessment and Plan of Treatment: NSTEMI (non-ST elevated myocardial infarction)      SECONDARY DISCHARGE DIAGNOSES  Diagnosis: Acute respiratory failure with hypoxia  Assessment and Plan of Treatment: Acute respiratory failure with hypoxia    Diagnosis: Shock  Assessment and Plan of Treatment: Shock    Diagnosis: Acute systolic heart failure  Assessment and Plan of Treatment: Acute systolic heart failure    Diagnosis: PAD (peripheral artery disease)  Assessment and Plan of Treatment: PAD (peripheral artery disease)    Diagnosis: Type 2 diabetes mellitus without complication, unspecified whether long term insulin use  Assessment and Plan of Treatment: Type 2 diabetes mellitus without complication, unspecified whether long term insulin use    Diagnosis: VINAY (acute kidney injury)  Assessment and Plan of Treatment: VINAY (acute kidney injury)    Diagnosis: Transaminitis  Assessment and Plan of Treatment: Transaminitis    Diagnosis: Delirium  Assessment and Plan of Treatment: PRINCIPAL DISCHARGE DIAGNOSIS  Diagnosis: NSTEMI (non-ST elevated myocardial infarction)  Assessment and Plan of Treatment: NSTEMI (non-ST elevated myocardial infarction)  Call your doctor if you have unusual chest pain, pressure, or discomfort, shortness of breath, nausea, vomiting, burping, heartburn, tingling upper body parts, sweating, cold, clammy sking, racing heartbeat  Call 911 if you think you are having a heart attack  Take all cardiac medications as prescribed - notify your doctor if you have any side effects  Follow cardiac diet - avoid fatty & fried foods, don't eat too much red meat, eat lots of fruits & vegetables, dairy products should be low fat  Lose weight if you are overweight  Become more active with walking, gardening, or any other activity that gets you to move        SECONDARY DISCHARGE DIAGNOSES  Diagnosis: Acute respiratory failure with hypoxia  Assessment and Plan of Treatment: Acute respiratory failure with hypoxia  Continue present medication regimen.   Follow up with PMD.    Diagnosis: Shock  Assessment and Plan of Treatment: Shock  Resolved.   Continue present medication regimen.    Diagnosis: Acute systolic heart failure  Assessment and Plan of Treatment: Acute systolic heart failure  Weigh yourself daily.  If you gain 3lbs in 3 days, or 5lbs in a week call your Health Care Provider.  Do not eat or drink foods containing more than 2000mg of salt (sodium) in your diet every day.  Call your Health Care Provider if you have any swelling or increased swelling in your feet, ankles, and/or stomach.  Take all of your medication as directed.  If you become dizzy call your Health Care Provider.      Diagnosis: PAD (peripheral artery disease)  Assessment and Plan of Treatment: PAD (peripheral artery disease)  s/p left BKA  Staple of left BKA to be removed in 2 weeks.    Diagnosis: Type 2 diabetes mellitus without complication, unspecified whether long term insulin use  Assessment and Plan of Treatment: Type 2 diabetes mellitus without complication, unspecified whether long term insulin use  HgA1C this admission.  Make sure you get your HgA1c checked every three months.  If you take oral diabetes medications, check your blood glucose two times a day.  If you take insulin, check your blood glucose before meals and at bedtime.  It's important not to skip any meals.  Keep a log of your blood glucose results and always take it with you to your doctor appointments.  Keep a list of your current medications including injectables and over the counter medications and bring this medication list with you to all your doctor appointments.  If you have not seen your ophthalmologist this year call for appointment.  Check your feet daily for redness, sores, or openings. Do not self treat. If no improvement in two days call your primary care physician for an appointment.  Low blood sugar (hypoglycemia) is a blood sugar below 70mg/dl. Check your blood sugar if you feel signs/symptoms of hypoglycemia. If your blood sugar is below 70 take 15 grams of carbohydrates (ex 4 oz of apple juice, 3-4 glucose tablets, or 4-6 oz of regular soda) wait 15 minutes and repeat blood sugar to make sure it comes up above 70.  If your blood sugar is above 70 and you are due for a meal, have a meal.  If you are not due for a meal have a snack.  This snack helps keeps your blood sugar at a safe range.      Diagnosis: VINAY (acute kidney injury)  Assessment and Plan of Treatment: VINAY (acute kidney injury)    Diagnosis: Transaminitis  Assessment and Plan of Treatment: Transaminitis    Diagnosis: Delirium  Assessment and Plan of Treatment:

## 2019-08-06 NOTE — PROGRESS NOTE ADULT - PROBLEM SELECTOR PLAN 1
- per cardiology, no ischemic evaluation done - recommended for medical management  - c/w asa, statin  d/w Cards fellow- no need for plavix, continue only aspirin

## 2019-08-06 NOTE — PROGRESS NOTE ADULT - ATTENDING COMMENTS
69 year old man DM type 2, HTN, PAD, NUMC, transfer to [facility] CCU for NSTEMI, cardiogenic shock; acute respiratory failure with hypoxia and septic shock secondary to suspected aspiration pneumonia requiring intubation, subsequently extubated and transferred to telemetry. 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 Shock improved in CCU. CT scan with concern for possible pontine stroke. Need statin and ARB.
69 year old man DM type 2, HTN, PAD, NUMC, transfer to [facility] CCU for NSTEMI, cardiogenic shock; acute respiratory failure with hypoxia and septic shock secondary to suspected aspiration pneumonia requiring intubation, subsequently extubated and transferred to telemetry. 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 Shock improved in CCU. CT scan with concern for possible pontine stroke.
Dr. MAU [facility]  [phone]
Dysphagia  MBS without aspiration  NO PEG at this time  Diet as recommended   Call back as needed
Patient is seen and examined with fellow, NP and the CCU house-staff. I agree with the history, physical and the assessment and plan.  s/p self extubation  will d/c the swan  on DAPT  tolerating low dose BB
Patient seen and examined. Agree with assessment and plan as outlined above. Continue present medical therapy. Given mental status will have to defer cath. Will discuss with vascular options.
69 year old man DM type 2, HTN, PAD, NUMC, transfer to [facility] CCU for NSTEMI, cardiogenic shock; acute respiratory failure with hypoxia and septic shock secondary to suspected aspiration pneumonia requiring intubation, subsequently extubated and transferred to telemetry. 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 Shock improved in CCU. CT scan with concern for possible pontine stroke. Need statin and ARB. Likely requires discharge to Rehab.     Cardiac issues currently stable and no further changes in management anticipated. Please call if we can be of further assistance.
Patient seen and examined. Agree with assessment and plan as outlined above. Continue present medications. Will need LHC when optimized.
Patient transferred to CCU for signs of shock (lactic acidosis), found to be in distributive shock.  Volume resuscitation.  HFrEF noted.
Discussed with vascular surgery - plans for BKA
Unclear if any HCP or surrogate decision maker.,
CHF: Pt is euvolemic, holding lasix and BB (pt new HF not on BB outpt), f/u Cardio rec   VINAY: improving, if Cr remains stable, restart lisinopril tomorrow   NSTEMI: elevated Trops likely 2/2 demand ischemia, LHC deferred per cards as above, Holding statin in setting of elevated LFTs, however will add LFTs to today's labs and if significantly improved will restart statin  CVA: Pontine Infarction, f/u MRI brain and Neuro rec
Patient was seen and examined with CCU team. I agree with findings and plan. Right heart today to assist in fluid management.
s/p vascular surgery c/b cardiogenic shock.  Intubated.  vascular consulted on patient but would consult franklin pak for pci for limb salvage
discussed hospitalization and plan of care with next of kin - Fer  Dr. M. Luke  Medicine Hospitalist  [phone]
Linda Juan   Hospitalist   
Dr. MAU GabrielCent[place]ist  [phone]
Dr. MAU [facility]UC [facility] Centerist  [phone]
Dr. MAU GabrielCommunity [facility] Center[facility]  [phone]
Dr. [facility]Ak[facility] Hospital[facility]  [phone]
Dr. MAU GabrielMetro[facility] Heights [facility] Center[facility]  [phone]
Discussed with vascular surgery - plans for BKA
Discussed with vascular surgery - plans for BKA
Linda Juan   Hospitalist   
Linda Juan   Hospitalist   
Unclear if any HCP or surrogate decision maker.,

## 2019-08-06 NOTE — PROGRESS NOTE ADULT - PROBLEM SELECTOR PLAN 6
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  On the left, the bypass graft is occluded, the popliteal artery is occluded and the trifurcation arteries are occluded.  - s/p Left BKA on 7/22/19  - Per vascular bypass staples to be taken out prior to discharge to [facility] next week. not to remove BKA staples  - c/w DAPT, statin  - vascular surgery follow up - s/p recent bypass at [facility] presented with ischemic limb  - VA duplex of b/l LE: 07/06: On the right, there is a severe flow-limiting stenosis of the popliteal artery and the trifurcation arteries are occluded in the calf.  On the left, the bypass graft is occluded, the popliteal artery is occluded and the trifurcation arteries are occluded.  - s/p Left BKA on 7/22/19  - Per vascular bypass staples to be taken out prior to discharge to [facility]. Vascular recalled.   - c/w DAPT, statin

## 2019-08-06 NOTE — PROGRESS NOTE ADULT - SUBJECTIVE AND OBJECTIVE BOX
DIABETES FOLLOW UP NOTE: Saw pt earlier today  INTERVAL HX: 70 y/o M w/h/o uncontrolled T2DM/HTN/PAD. Transferred from OSH for treatment of cardiogenic shock/ NSTEMI. States feeling better. no N/V/D or and pain/CP/SOB. Per staff and pt tolerating POs but eating very small amounts of food. Had yogurt for breakfast >pt states he gets full fast.  Glycemic control has been at goal while on present insulin regimen. No hypoglycemia      Review of Systems:  General: As above  Cardiovascular: No chest pain, palpitations  Respiratory: No SOB, no cough  GI: No nausea, vomiting, abdominal pain  Endocrine: no polyuria, polydipsia or S&Sx of hypoglycemia    Allergies    No Known Allergies    Intolerances      MEDICATIONS:  insulin glargine Injectable (LANTUS) 14 Unit(s) SubCutaneous at bedtime  insulin lispro (HumaLOG) corrective regimen sliding scale   SubCutaneous three times a day before meals    PHYSICAL EXAM:  Vital Signs Last 24 Hrs  T(C): 36.6 (08-06-19 @ 04:44), Max: 36.6 (08-06-19 @ 04:44)  T(F): 97.8 (08-06-19 @ 04:44), Max: 97.8 (08-06-19 @ 04:44)  HR: 83 (08-06-19 @ 04:44) (71 - 83)  BP: 122/77 (08-06-19 @ 04:44) (107/82 - 131/80)  BP(mean): --  RR: 18 (08-06-19 @ 04:44) (18 - 18)  SpO2: 98% (08-06-19 @ 04:44) (95% - 98%)  GENERAL: Male laying in bed  in NAD  Abdomen: Soft, nontender, non distended  Extremities: Warm, no edema in all 4 exts. L BKA stump with staples D&I. Noted chronic vascular changes in LEs with some darker discoloration and dryness of skin.  NEURO: Alert and able to ANSWER questions. More talkative today.    LABS:  POCT Blood Glucose.: 171 mg/dL (08-06-19 @ 09:09)  POCT Blood Glucose.: 145 mg/dL (08-06-19 @ 01:31)  POCT Blood Glucose.: 145 mg/dL (08-05-19 @ 21:12)  POCT Blood Glucose.: 151 mg/dL (08-05-19 @ 17:51)  POCT Blood Glucose.: 132 mg/dL (08-05-19 @ 15:40)  POCT Blood Glucose.: 109 mg/dL (08-05-19 @ 13:04)  POCT Blood Glucose.: 120 mg/dL (08-05-19 @ 05:57)  POCT Blood Glucose.: 178 mg/dL (08-04-19 @ 23:52)  POCT Blood Glucose.: 189 mg/dL (08-04-19 @ 18:00)  POCT Blood Glucose.: 159 mg/dL (08-04-19 @ 12:10)                          12.4   8.3   )-----------( 187      ( 04 Aug 2019 10:38 )             38.8    08-05    135  |  102  |  19  ----------------------------<  121<H>  4.6   |  22  |  0.70    Ca    8.4      05 Aug 2019 06:11      EGFR if : 112  EGFR if non : 96    Ca    8.4      08-05        Thyroid Function Tests:  07-10 @ 13:02 TSH 3.57 FreeT4 -- T3 -- Anti TPO -- Anti Thyroglobulin Ab -- TSI --      Hemoglobin A1C, Whole Blood: 10.0 % <H> [4.0 - 5.6] (07-06-19 @ 04:54)

## 2019-08-06 NOTE — DISCHARGE NOTE PROVIDER - NSDCFUADDAPPT_GEN_ALL_CORE_FT
follow up cardiology appointment made on 8/15 at 8am - [address] [place] [address]. Cardiology office.

## 2019-08-06 NOTE — DISCHARGE NOTE PROVIDER - CARE PROVIDERS DIRECT ADDRESSES
,DirectAddress_Unknown,DirectAddress_Unknown ,DirectAddress_Unknown,DirectAddress_Unknown,DirectAddress_Unknown,[email or web address]

## 2019-08-06 NOTE — PROGRESS NOTE ADULT - ASSESSMENT
68 y/o M w/h/o uncontrolled T2DM/HTN/PAD. Transferred from OSH for treatment of cardiogenic shock/ NSTEMI. Pt tolerating POs but not eating enough yet. Glycemic control remains at goal while on present insulin regimen. No hypoglycemia. Will follow up and add premeal Humalog is needed.  Asked pt about DM meds he was taking PTA but pt is unable to remember > states having insulin> can't recall name or dose. States someone was giving it to him but can't elaborate. Pt will need to be discharged on basal insulin but is not able to manage own care at this time. Likely will go to rehab.

## 2019-08-07 VITALS
RESPIRATION RATE: 21 BRPM | DIASTOLIC BLOOD PRESSURE: 77 MMHG | OXYGEN SATURATION: 100 % | SYSTOLIC BLOOD PRESSURE: 125 MMHG | TEMPERATURE: 98 F | HEART RATE: 82 BPM

## 2019-08-07 LAB
GLUCOSE BLDC GLUCOMTR-MCNC: 197 MG/DL — HIGH (ref 70–99)
GLUCOSE BLDC GLUCOMTR-MCNC: 246 MG/DL — HIGH (ref 70–99)

## 2019-08-07 PROCEDURE — 83880 ASSAY OF NATRIURETIC PEPTIDE: CPT

## 2019-08-07 PROCEDURE — 80048 BASIC METABOLIC PNL TOTAL CA: CPT

## 2019-08-07 PROCEDURE — 97535 SELF CARE MNGMENT TRAINING: CPT

## 2019-08-07 PROCEDURE — 85014 HEMATOCRIT: CPT

## 2019-08-07 PROCEDURE — 82962 GLUCOSE BLOOD TEST: CPT

## 2019-08-07 PROCEDURE — 88307 TISSUE EXAM BY PATHOLOGIST: CPT

## 2019-08-07 PROCEDURE — 94640 AIRWAY INHALATION TREATMENT: CPT

## 2019-08-07 PROCEDURE — 82435 ASSAY OF BLOOD CHLORIDE: CPT

## 2019-08-07 PROCEDURE — 83540 ASSAY OF IRON: CPT

## 2019-08-07 PROCEDURE — 82570 ASSAY OF URINE CREATININE: CPT

## 2019-08-07 PROCEDURE — 80076 HEPATIC FUNCTION PANEL: CPT

## 2019-08-07 PROCEDURE — 84145 PROCALCITONIN (PCT): CPT

## 2019-08-07 PROCEDURE — 93005 ELECTROCARDIOGRAM TRACING: CPT

## 2019-08-07 PROCEDURE — 86850 RBC ANTIBODY SCREEN: CPT

## 2019-08-07 PROCEDURE — 73630 X-RAY EXAM OF FOOT: CPT

## 2019-08-07 PROCEDURE — 80061 LIPID PANEL: CPT

## 2019-08-07 PROCEDURE — 84484 ASSAY OF TROPONIN QUANT: CPT

## 2019-08-07 PROCEDURE — 97162 PT EVAL MOD COMPLEX 30 MIN: CPT

## 2019-08-07 PROCEDURE — 83010 ASSAY OF HAPTOGLOBIN QUANT: CPT

## 2019-08-07 PROCEDURE — 92611 MOTION FLUOROSCOPY/SWALLOW: CPT

## 2019-08-07 PROCEDURE — 82803 BLOOD GASES ANY COMBINATION: CPT

## 2019-08-07 PROCEDURE — 93925 LOWER EXTREMITY STUDY: CPT

## 2019-08-07 PROCEDURE — 87798 DETECT AGENT NOS DNA AMP: CPT

## 2019-08-07 PROCEDURE — 97164 PT RE-EVAL EST PLAN CARE: CPT

## 2019-08-07 PROCEDURE — 82607 VITAMIN B-12: CPT

## 2019-08-07 PROCEDURE — 99239 HOSP IP/OBS DSCHRG MGMT >30: CPT

## 2019-08-07 PROCEDURE — 36430 TRANSFUSION BLD/BLD COMPNT: CPT

## 2019-08-07 PROCEDURE — 70551 MRI BRAIN STEM W/O DYE: CPT

## 2019-08-07 PROCEDURE — 85610 PROTHROMBIN TIME: CPT

## 2019-08-07 PROCEDURE — 70544 MR ANGIOGRAPHY HEAD W/O DYE: CPT

## 2019-08-07 PROCEDURE — 82565 ASSAY OF CREATININE: CPT

## 2019-08-07 PROCEDURE — 97168 OT RE-EVAL EST PLAN CARE: CPT

## 2019-08-07 PROCEDURE — 88311 DECALCIFY TISSUE: CPT

## 2019-08-07 PROCEDURE — 95816 EEG AWAKE AND DROWSY: CPT

## 2019-08-07 PROCEDURE — 87070 CULTURE OTHR SPECIMN AEROBIC: CPT

## 2019-08-07 PROCEDURE — 86901 BLOOD TYPING SEROLOGIC RH(D): CPT

## 2019-08-07 PROCEDURE — 74230 X-RAY XM SWLNG FUNCJ C+: CPT

## 2019-08-07 PROCEDURE — P9016: CPT

## 2019-08-07 PROCEDURE — 83735 ASSAY OF MAGNESIUM: CPT

## 2019-08-07 PROCEDURE — 97110 THERAPEUTIC EXERCISES: CPT

## 2019-08-07 PROCEDURE — 86803 HEPATITIS C AB TEST: CPT

## 2019-08-07 PROCEDURE — 84295 ASSAY OF SERUM SODIUM: CPT

## 2019-08-07 PROCEDURE — 99232 SBSQ HOSP IP/OBS MODERATE 35: CPT

## 2019-08-07 PROCEDURE — 87040 BLOOD CULTURE FOR BACTERIA: CPT

## 2019-08-07 PROCEDURE — 82553 CREATINE MB FRACTION: CPT

## 2019-08-07 PROCEDURE — 85730 THROMBOPLASTIN TIME PARTIAL: CPT

## 2019-08-07 PROCEDURE — 97530 THERAPEUTIC ACTIVITIES: CPT

## 2019-08-07 PROCEDURE — 86900 BLOOD TYPING SEROLOGIC ABO: CPT

## 2019-08-07 PROCEDURE — 94003 VENT MGMT INPAT SUBQ DAY: CPT

## 2019-08-07 PROCEDURE — 76700 US EXAM ABDOM COMPLETE: CPT

## 2019-08-07 PROCEDURE — 84100 ASSAY OF PHOSPHORUS: CPT

## 2019-08-07 PROCEDURE — 87581 M.PNEUMON DNA AMP PROBE: CPT

## 2019-08-07 PROCEDURE — 80053 COMPREHEN METABOLIC PANEL: CPT

## 2019-08-07 PROCEDURE — 83550 IRON BINDING TEST: CPT

## 2019-08-07 PROCEDURE — 87486 CHLMYD PNEUM DNA AMP PROBE: CPT

## 2019-08-07 PROCEDURE — 82728 ASSAY OF FERRITIN: CPT

## 2019-08-07 PROCEDURE — 84540 ASSAY OF URINE/UREA-N: CPT

## 2019-08-07 PROCEDURE — 70450 CT HEAD/BRAIN W/O DYE: CPT

## 2019-08-07 PROCEDURE — 85027 COMPLETE CBC AUTOMATED: CPT

## 2019-08-07 PROCEDURE — 80202 ASSAY OF VANCOMYCIN: CPT

## 2019-08-07 PROCEDURE — 71045 X-RAY EXAM CHEST 1 VIEW: CPT

## 2019-08-07 PROCEDURE — 81001 URINALYSIS AUTO W/SCOPE: CPT

## 2019-08-07 PROCEDURE — 70549 MR ANGIOGRAPH NECK W/O&W/DYE: CPT

## 2019-08-07 PROCEDURE — 92610 EVALUATE SWALLOWING FUNCTION: CPT

## 2019-08-07 PROCEDURE — 83605 ASSAY OF LACTIC ACID: CPT

## 2019-08-07 PROCEDURE — 83615 LACTATE (LD) (LDH) ENZYME: CPT

## 2019-08-07 PROCEDURE — 97116 GAIT TRAINING THERAPY: CPT

## 2019-08-07 PROCEDURE — 86923 COMPATIBILITY TEST ELECTRIC: CPT

## 2019-08-07 PROCEDURE — G0515: CPT

## 2019-08-07 PROCEDURE — 93306 TTE W/DOPPLER COMPLETE: CPT

## 2019-08-07 PROCEDURE — 87449 NOS EACH ORGANISM AG IA: CPT

## 2019-08-07 PROCEDURE — 83036 HEMOGLOBIN GLYCOSYLATED A1C: CPT

## 2019-08-07 PROCEDURE — 82947 ASSAY GLUCOSE BLOOD QUANT: CPT

## 2019-08-07 PROCEDURE — A9585: CPT

## 2019-08-07 PROCEDURE — 82550 ASSAY OF CK (CPK): CPT

## 2019-08-07 PROCEDURE — 87633 RESP VIRUS 12-25 TARGETS: CPT

## 2019-08-07 PROCEDURE — 74176 CT ABD & PELVIS W/O CONTRAST: CPT

## 2019-08-07 PROCEDURE — 97166 OT EVAL MOD COMPLEX 45 MIN: CPT

## 2019-08-07 PROCEDURE — 80164 ASSAY DIPROPYLACETIC ACD TOT: CPT

## 2019-08-07 PROCEDURE — 82746 ASSAY OF FOLIC ACID SERUM: CPT

## 2019-08-07 PROCEDURE — 82330 ASSAY OF CALCIUM: CPT

## 2019-08-07 PROCEDURE — 82140 ASSAY OF AMMONIA: CPT

## 2019-08-07 PROCEDURE — C1769: CPT

## 2019-08-07 PROCEDURE — 84132 ASSAY OF SERUM POTASSIUM: CPT

## 2019-08-07 PROCEDURE — 84443 ASSAY THYROID STIM HORMONE: CPT

## 2019-08-07 RX ORDER — LOSARTAN POTASSIUM 100 MG/1
1 TABLET, FILM COATED ORAL
Qty: 0 | Refills: 0 | DISCHARGE
Start: 2019-08-07

## 2019-08-07 RX ORDER — ASPIRIN/CALCIUM CARB/MAGNESIUM 324 MG
1 TABLET ORAL
Qty: 0 | Refills: 0 | DISCHARGE
Start: 2019-08-07

## 2019-08-07 RX ORDER — IPRATROPIUM BROMIDE 0.2 MG/ML
1 SOLUTION, NON-ORAL INHALATION
Qty: 0 | Refills: 0 | DISCHARGE
Start: 2019-08-07

## 2019-08-07 RX ORDER — LANOLIN ALCOHOL/MO/W.PET/CERES
1 CREAM (GRAM) TOPICAL
Qty: 0 | Refills: 0 | DISCHARGE
Start: 2019-08-07

## 2019-08-07 RX ORDER — INSULIN LISPRO 100/ML
2 VIAL (ML) SUBCUTANEOUS
Refills: 0 | Status: DISCONTINUED | OUTPATIENT
Start: 2019-08-07 | End: 2019-08-07

## 2019-08-07 RX ORDER — NICOTINE POLACRILEX 2 MG
1 GUM BUCCAL
Qty: 0 | Refills: 0 | DISCHARGE
Start: 2019-08-07

## 2019-08-07 RX ADMIN — Medication 1: at 13:15

## 2019-08-07 RX ADMIN — Medication 1 PUFF(S): at 05:12

## 2019-08-07 RX ADMIN — Medication 2 UNIT(S): at 13:15

## 2019-08-07 RX ADMIN — LOSARTAN POTASSIUM 25 MILLIGRAM(S): 100 TABLET, FILM COATED ORAL at 05:14

## 2019-08-07 RX ADMIN — HEPARIN SODIUM 5000 UNIT(S): 5000 INJECTION INTRAVENOUS; SUBCUTANEOUS at 05:13

## 2019-08-07 RX ADMIN — Medication 1 PUFF(S): at 11:43

## 2019-08-07 RX ADMIN — Medication 81 MILLIGRAM(S): at 11:39

## 2019-08-07 RX ADMIN — Medication 1 PATCH: at 11:40

## 2019-08-07 RX ADMIN — Medication 1 PATCH: at 07:00

## 2019-08-07 RX ADMIN — Medication 12.5 MILLIGRAM(S): at 05:14

## 2019-08-07 NOTE — PROGRESS NOTE ADULT - SUBJECTIVE AND OBJECTIVE BOX
VASCULAR SURGERY PROGRESS NOTE     69yMale    SUBJECTIVE:  No acute events overnight. Patient denies any complaints.   --------------------------------------------------------------------------------------------------  OBJECTIVE:     Physical Exam:  General: AAOx3, NAD, lying comfortably in bed  HEENT: NC/AT  Respiratory: nonlabored breathing  Abdomen: non-distended, soft, non-tender  Extremities: left BKA, bypass and BKA incisions healing appropriately, staples removed from bypass incisions; warm and well perfused, cap refill < 2    --------------------------------------------------------------------------------------------------  Vital Signs:    Vital Signs Last 24 Hrs  T(C): 36.6 (07 Aug 2019 12:18), Max: 36.9 (07 Aug 2019 04:56)  T(F): 97.8 (07 Aug 2019 12:18), Max: 98.4 (07 Aug 2019 04:56)  HR: 82 (07 Aug 2019 12:18) (79 - 82)  BP: 125/77 (07 Aug 2019 12:18) (117/74 - 137/89)  BP(mean): --  RR: 21 (07 Aug 2019 12:18) (18 - 21)  SpO2: 100% (07 Aug 2019 12:18) (96% - 100%)      --------------------------------------------------------------------------------------------------  Inputs/Outputs:    06 Aug 2019 07:01  -  07 Aug 2019 07:00  --------------------------------------------------------  IN:    Oral Fluid: 480 mL  Total IN: 480 mL    OUT:  Total OUT: 0 mL    Total NET: 480 mL    07 Aug 2019 07:01  -  07 Aug 2019 13:34  --------------------------------------------------------  IN:    Oral Fluid: 240 mL  Total IN: 240 mL    OUT:  Total OUT: 0 mL    Total NET: 240 mL    --------------------------------------------------------------------------------------------------  Laboratories:    PT/INR - ( 05 Aug 2019 15:38 )   PT: 13.5 sec;   INR: 1.18 ratio    PTT - ( 05 Aug 2019 15:38 )  PTT:31.6 sec  POCT Blood Glucose.: 246 mg/dL (07 Aug 2019 12:55)  POCT Blood Glucose.: 197 mg/dL (07 Aug 2019 08:42)  POCT Blood Glucose.: 211 mg/dL (06 Aug 2019 21:43)  POCT Blood Glucose.: 166 mg/dL (06 Aug 2019 17:41)    --------------------------------------------------------------------------------------------------    Medications:  MEDICATIONS  (STANDING):  aspirin  chewable 81 milliGRAM(s) Enteral Tube daily  dextrose 5%. 1000 milliLiter(s) (50 mL/Hr) IV Continuous <Continuous>  dextrose 50% Injectable 12.5 Gram(s) IV Push once  dextrose 50% Injectable 25 Gram(s) IV Push once  dextrose 50% Injectable 25 Gram(s) IV Push once  heparin  Injectable 5000 Unit(s) SubCutaneous every 12 hours  insulin glargine Injectable (LANTUS) 14 Unit(s) SubCutaneous at bedtime  insulin lispro (HumaLOG) corrective regimen sliding scale   SubCutaneous three times a day before meals  insulin lispro (HumaLOG) corrective regimen sliding scale   SubCutaneous at bedtime  insulin lispro Injectable (HumaLOG) 2 Unit(s) SubCutaneous three times a day with meals  ipratropium 17 MICROgram(s) HFA Inhaler 1 Puff(s) Inhalation every 6 hours  losartan 25 milliGRAM(s) Oral daily  metoprolol tartrate 12.5 milliGRAM(s) Enteral Tube two times a day  nicotine -   7 mG/24Hr(s) Patch 1 patch Transdermal daily    MEDICATIONS  (PRN):  dextrose 40% Gel 15 Gram(s) Oral once PRN Blood Glucose LESS THAN 70 milliGRAM(s)/deciliter  glucagon  Injectable 1 milliGRAM(s) IntraMuscular once PRN Glucose LESS THAN 70 milligrams/deciliter  melatonin 3 milliGRAM(s) Oral at bedtime PRN Insomnia

## 2019-08-07 NOTE — PROGRESS NOTE ADULT - ASSESSMENT
ASSESSMENT:   69y Male with h/o of LE gangrene due to PVD s/p left BKA 7/22    PLAN:   - staples removed from bypass incision sites  - staples for BKA incisions should remain until outpt office follow up   - F/u with Dr. Barriga in the office in 2 weeks   - Call with questions. Thank you    Vascular Surgery   [phone]

## 2019-08-07 NOTE — PROGRESS NOTE ADULT - PROVIDER SPECIALTY LIST ADULT
CCU
Cardiology
Endocrinology
Gastroenterology
Hospitalist
Internal Medicine
Neurology
Podiatry
Vascular Surgery
Hospitalist
Vascular Surgery
CCU
Cardiology
CCU
Endocrinology
Endocrinology
Hospitalist

## 2019-08-07 NOTE — PROGRESS NOTE ADULT - PROBLEM SELECTOR PROBLEM 1
Ischemia of left lower extremity
NSTEMI (non-ST elevated myocardial infarction)
NSTEMI (non-ST elevated myocardial infarction)
Uncontrolled type 2 diabetes mellitus with hyperglycemia
Uncontrolled type 2 diabetes mellitus with hyperglycemia
Ischemia of left lower extremity
NSTEMI (non-ST elevated myocardial infarction)
Uncontrolled type 2 diabetes mellitus with hyperglycemia
VINYA (acute kidney injury)
NSTEMI (non-ST elevated myocardial infarction)
NSTEMI (non-ST elevated myocardial infarction)
Shock
Shock
NSTEMI (non-ST elevated myocardial infarction)
Ischemia of left lower extremity
NSTEMI (non-ST elevated myocardial infarction)

## 2019-08-07 NOTE — PROGRESS NOTE ADULT - REASON FOR ADMISSION
NSTEMI
gangrene due to PVD
NSTEMI

## 2019-08-07 NOTE — CHART NOTE - NSCHARTNOTEFT_GEN_A_CORE
patient seen and examined. no acute complaints  PE: Lungs cta b/l, Axo x1+, moving all extremities, no LE edema   Vascular called again for staple removal and follow up recommendations prior to discharge.     69M h/o poor med compliance, DM2, HTN, PAD s/p Left BKA 7/22 transferred from [facility] to [facility] CCU for NSTEMI complicated by cardiogenic shock and acute respiratory failure with hypoxia. Patient followed by house cardiology and recommended for medical management (no cath done). TTE with EF 20-25% with moderately dilated left atrium, increased left atrial pressures, moderately dilated LV, global hypokinesis. patient to continue aspirin, statin, losartan and metoprolol. no need to continue plavix per cardiology. patient found to have ischemic limb (recent bypass at [facility]) found to have severe flow limiting stenosis of the popliteal artery and the trifurcation arteries are occluded in the calf on the right and bypass graft is occluded, the popliteal artery is occluded and the trifurcation arteries are occluded on the left s/p Left BKA on 7/22/19. Vascular f/u called for removal of staples from bypass surgery and recommendations regarding f/u for BKA staples. pt developed septic shock secondary to suspected aspiration pneumonia requiring intubation later extubated then transferred to floors. patient was treated with iv vancomycin and zosyn briefly and monitored off abx without further signs and symptoms of infection saturating well on room air. patient with transaminitis later trended down and elevated lactate due to shock and had readmission to CCU. With resolution of shock, transferred again to floors. There was a concern for pontine stroke possibly seen on CT head that was not seen on MRI brain. Hospital course complicated further by dysphagia initially requiring NGT feeds but later passed MBS with improved mental status placed on puree diet. Followed by Endocrine for DM management. patient evaluated by PT and recommended for KERI. patient to follow up with PMD, cardiology, Vascular, and endocrine.    discharge time - 46 minutes  Dr. M. Luke  Medicine Hospitalist  [phone]

## 2019-08-07 NOTE — PROGRESS NOTE ADULT - PROBLEM SELECTOR PLAN 1
DISCHARGE:  -Test BG ac and hs   -Lantus 15 units q hs   -Add Humalog 2 units ac meals  -Metformin 1000mg po bid  -Might need further insulin adjustments at rehab.  -Discontinue Humalog low dose correction scale ac/hs at time of discharge  -Plan discussed with team.  Contact info: [phone] (24/7). pager [phone]

## 2019-08-07 NOTE — PROGRESS NOTE ADULT - ASSESSMENT
68 y/o M w/h/o uncontrolled T2DM/HTN/PAD. Transferred from OSH for treatment of cardiogenic shock/ NSTEMI. Pt tolerating POs with improved PO intake and BG levels going up. Also ready to be discharged to rehab today (Pt states he is going home). Pt not able to care for self at this time. Will add pre meal Humalog.

## 2019-08-07 NOTE — PROGRESS NOTE ADULT - SUBJECTIVE AND OBJECTIVE BOX
DIABETES FOLLOW UP NOTE: Saw pt earlier today  INTERVAL HX: 70 y/o M w/h/o uncontrolled T2DM/HTN/PAD. Transferred from OSH for treatment of cardiogenic shock/ NSTEMI. States feeling better. no N/V/D or and pain/CP/SOB. Per staff pt improving PO intake with BG going up to high 100s to 200s in last 24 hours. Going to rehab today. No hypoglycemia      Review of Systems:  General: As above  Cardiovascular: No chest pain, palpitations  Respiratory: No SOB, no cough  GI: No nausea, vomiting, abdominal pain  Endocrine: no polyuria, polydipsia or S&Sx of hypoglycemia    Allergies    No Known Allergies    Intolerances      MEDICATIONS:  insulin glargine Injectable (LANTUS) 14 Unit(s) SubCutaneous at bedtime  insulin lispro (HumaLOG) corrective regimen sliding scale   SubCutaneous three times a day before meals  insulin lispro (HumaLOG) corrective regimen sliding scale   SubCutaneous at bedtime  nicotine -   7 mG/24Hr(s) Patch 1 patch Transdermal daily      PHYSICAL EXAM:  Vital Signs Last 24 Hrs  T(C): 36.6 (08-07-19 @ 12:18), Max: 36.9 (08-07-19 @ 04:56)  T(F): 97.8 (08-07-19 @ 12:18), Max: 98.4 (08-07-19 @ 04:56)  HR: 82 (08-07-19 @ 12:18) (79 - 82)  BP: 125/77 (08-07-19 @ 12:18) (117/74 - 137/89)  BP(mean): --  RR: 21 (08-07-19 @ 12:18) (18 - 21)  SpO2: 100% (08-07-19 @ 12:18) (96% - 100%)  GENERAL: Male laying in bed in NAD  Abdomen: Soft, nontender, non distended  Extremities: Warm, no edema in all 4 exts. L BKA stump with staples D&I. Noted chronic vascular changes in LEs with some darker discoloration and dryness of skin.  NEURO: Alert and able to answer questions. Pt insisted he is going home and not rehab.    LABS:  POCT Blood Glucose.: 246 mg/dL (08-07-19 @ 12:55)  POCT Blood Glucose.: 197 mg/dL (08-07-19 @ 08:42)  POCT Blood Glucose.: 211 mg/dL (08-06-19 @ 21:43)  POCT Blood Glucose.: 166 mg/dL (08-06-19 @ 17:41)  POCT Blood Glucose.: 175 mg/dL (08-06-19 @ 12:50)  POCT Blood Glucose.: 171 mg/dL (08-06-19 @ 09:09)  POCT Blood Glucose.: 145 mg/dL (08-06-19 @ 01:31)  POCT Blood Glucose.: 145 mg/dL (08-05-19 @ 21:12)  POCT Blood Glucose.: 151 mg/dL (08-05-19 @ 17:51)  POCT Blood Glucose.: 132 mg/dL (08-05-19 @ 15:40)                          12.4   8.3   )-----------( 187      ( 04 Aug 2019 10:38 )             38.8      08-05    135  |  102  |  19  ----------------------------<  121<H>  4.6   |  22  |  0.70    Ca    8.4      05 Aug 2019 06:11      EGFR if : 112  EGFR if non : 96    Ca    8.4      08-05        Thyroid Function Tests:  07-10 @ 13:02 TSH 3.57 FreeT4 -- T3 -- Anti TPO -- Anti Thyroglobulin Ab -- TSI --      Hemoglobin A1C, Whole Blood: 10.0 % <H> [4.0 - 5.6] (07-06-19 @ 04:54)

## 2019-08-12 DIAGNOSIS — I50.21 ACUTE SYSTOLIC (CONGESTIVE) HEART FAILURE: ICD-10-CM

## 2019-08-12 DIAGNOSIS — Z89.512 ACQUIRED ABSENCE OF LEFT LEG BELOW KNEE: ICD-10-CM

## 2019-08-12 DIAGNOSIS — E11.9 TYPE 2 DIABETES MELLITUS W/OUT COMPLICATIONS: ICD-10-CM

## 2019-08-12 DIAGNOSIS — E78.5 HYPERLIPIDEMIA, UNSPECIFIED: ICD-10-CM

## 2019-08-12 DIAGNOSIS — N17.9 ACUTE KIDNEY FAILURE, UNSPECIFIED: ICD-10-CM

## 2019-08-12 DIAGNOSIS — Z87.01 PERSONAL HISTORY OF PNEUMONIA (RECURRENT): ICD-10-CM

## 2019-08-12 DIAGNOSIS — I21.4 NON-ST ELEVATION (NSTEMI) MYOCARDIAL INFARCTION: ICD-10-CM

## 2019-08-12 DIAGNOSIS — I73.9 PERIPHERAL VASCULAR DISEASE, UNSPECIFIED: ICD-10-CM

## 2019-08-12 DIAGNOSIS — I10 ESSENTIAL (PRIMARY) HYPERTENSION: ICD-10-CM

## 2019-08-12 RX ORDER — NICOTINE 21-14-7MG
21-14-7 KIT TRANSDERMAL
Refills: 0 | Status: ACTIVE | COMMUNITY

## 2019-08-12 RX ORDER — ASPIRIN ENTERIC COATED TABLETS 81 MG 81 MG/1
81 TABLET, DELAYED RELEASE ORAL
Refills: 6 | Status: ACTIVE | COMMUNITY

## 2019-08-12 RX ORDER — GLUCOSAMINE HCL/CHONDROITIN SU 500-400 MG
3 CAPSULE ORAL
Refills: 0 | Status: ACTIVE | COMMUNITY

## 2019-08-12 RX ORDER — INSULIN GLARGINE 100 [IU]/ML
100 INJECTION, SOLUTION SUBCUTANEOUS
Refills: 0 | Status: ACTIVE | COMMUNITY

## 2019-08-12 RX ORDER — IPRATROPIUM BROMIDE AND ALBUTEROL SULFATE 2.5; .5 MG/3ML; MG/3ML
0.5-2.5 (3) SOLUTION RESPIRATORY (INHALATION)
Refills: 0 | Status: ACTIVE | COMMUNITY

## 2019-08-12 RX ORDER — INSULIN LISPRO 100 [IU]/ML
100 INJECTION, SOLUTION INTRAVENOUS; SUBCUTANEOUS
Refills: 0 | Status: ACTIVE | COMMUNITY

## 2019-08-12 RX ORDER — OFLOXACIN OTIC 3 MG/ML
0.3 SOLUTION AURICULAR (OTIC)
Refills: 0 | Status: ACTIVE | COMMUNITY

## 2019-08-12 RX ORDER — METFORMIN HYDROCHLORIDE 500 MG/1
500 TABLET, COATED ORAL TWICE DAILY
Refills: 0 | Status: ACTIVE | COMMUNITY

## 2019-08-12 RX ORDER — LOSARTAN POTASSIUM 25 MG/1
25 TABLET, FILM COATED ORAL DAILY
Qty: 30 | Refills: 5 | Status: ACTIVE | COMMUNITY

## 2019-08-12 RX ORDER — METOPROLOL TARTRATE 25 MG/1
25 TABLET, FILM COATED ORAL TWICE DAILY
Refills: 0 | Status: ACTIVE | COMMUNITY

## 2019-08-15 ENCOUNTER — APPOINTMENT (OUTPATIENT)
Dept: CARDIOLOGY | Facility: CLINIC | Age: 69
End: 2019-08-15

## 2019-08-15 DIAGNOSIS — Z89.519 ACQUIRED ABSENCE OF UNSPECIFIED LEG BELOW KNEE: ICD-10-CM

## 2019-09-02 PROBLEM — Z89.512 HISTORY OF LEFT BELOW KNEE AMPUTATION: Status: ACTIVE | Noted: 2019-08-12

## 2019-09-17 ENCOUNTER — INPATIENT (INPATIENT)
Facility: HOSPITAL | Age: 69
LOS: 19 days | Discharge: HOME | DRG: 474 | End: 2019-10-07
Attending: INTERNAL MEDICINE | Admitting: INTERNAL MEDICINE
Payer: MEDICARE

## 2019-09-17 VITALS
OXYGEN SATURATION: 96 % | HEIGHT: 65 IN | SYSTOLIC BLOOD PRESSURE: 128 MMHG | TEMPERATURE: 98 F | HEART RATE: 75 BPM | DIASTOLIC BLOOD PRESSURE: 83 MMHG | WEIGHT: 169.98 LBS | RESPIRATION RATE: 18 BRPM

## 2019-09-17 DIAGNOSIS — Z90.89 ACQUIRED ABSENCE OF OTHER ORGANS: Chronic | ICD-10-CM

## 2019-09-17 DIAGNOSIS — M86.9 OSTEOMYELITIS, UNSPECIFIED: ICD-10-CM

## 2019-09-17 LAB
ALBUMIN SERPL ELPH-MCNC: 3.1 G/DL — LOW (ref 3.3–5)
ALP SERPL-CCNC: 148 U/L — HIGH (ref 40–120)
ALT FLD-CCNC: 25 U/L — SIGNIFICANT CHANGE UP (ref 10–45)
ANION GAP SERPL CALC-SCNC: 13 MMOL/L — SIGNIFICANT CHANGE UP (ref 5–17)
AST SERPL-CCNC: 13 U/L — SIGNIFICANT CHANGE UP (ref 10–40)
BASOPHILS # BLD AUTO: 0.1 K/UL — SIGNIFICANT CHANGE UP (ref 0–0.2)
BASOPHILS NFR BLD AUTO: 1.5 % — SIGNIFICANT CHANGE UP (ref 0–2)
BILIRUB SERPL-MCNC: 0.4 MG/DL — SIGNIFICANT CHANGE UP (ref 0.2–1.2)
BUN SERPL-MCNC: 21 MG/DL — SIGNIFICANT CHANGE UP (ref 7–23)
CALCIUM SERPL-MCNC: 9.4 MG/DL — SIGNIFICANT CHANGE UP (ref 8.4–10.5)
CHLORIDE SERPL-SCNC: 104 MMOL/L — SIGNIFICANT CHANGE UP (ref 96–108)
CO2 SERPL-SCNC: 21 MMOL/L — LOW (ref 22–31)
CREAT SERPL-MCNC: 0.71 MG/DL — SIGNIFICANT CHANGE UP (ref 0.5–1.3)
CRP SERPL-MCNC: 10.46 MG/DL — HIGH (ref 0–0.4)
EOSINOPHIL # BLD AUTO: 0 K/UL — SIGNIFICANT CHANGE UP (ref 0–0.5)
EOSINOPHIL NFR BLD AUTO: 0.3 % — SIGNIFICANT CHANGE UP (ref 0–6)
ERYTHROCYTE [SEDIMENTATION RATE] IN BLOOD: 84 MM/HR — HIGH (ref 0–20)
GAS PNL BLDV: SIGNIFICANT CHANGE UP
GLUCOSE BLDC GLUCOMTR-MCNC: 236 MG/DL — HIGH (ref 70–99)
GLUCOSE SERPL-MCNC: 125 MG/DL — HIGH (ref 70–99)
HCT VFR BLD CALC: 36.4 % — LOW (ref 39–50)
HGB BLD-MCNC: 11 G/DL — LOW (ref 13–17)
LYMPHOCYTES # BLD AUTO: 1.9 K/UL — SIGNIFICANT CHANGE UP (ref 1–3.3)
LYMPHOCYTES # BLD AUTO: 22.3 % — SIGNIFICANT CHANGE UP (ref 13–44)
MCHC RBC-ENTMCNC: 28 PG — SIGNIFICANT CHANGE UP (ref 27–34)
MCHC RBC-ENTMCNC: 30.2 GM/DL — LOW (ref 32–36)
MCV RBC AUTO: 92.8 FL — SIGNIFICANT CHANGE UP (ref 80–100)
MONOCYTES # BLD AUTO: 0.8 K/UL — SIGNIFICANT CHANGE UP (ref 0–0.9)
MONOCYTES NFR BLD AUTO: 8.8 % — SIGNIFICANT CHANGE UP (ref 2–14)
NEUTROPHILS # BLD AUTO: 5.7 K/UL — SIGNIFICANT CHANGE UP (ref 1.8–7.4)
NEUTROPHILS NFR BLD AUTO: 67 % — SIGNIFICANT CHANGE UP (ref 43–77)
PLATELET # BLD AUTO: 265 K/UL — SIGNIFICANT CHANGE UP (ref 150–400)
POTASSIUM SERPL-MCNC: 4.8 MMOL/L — SIGNIFICANT CHANGE UP (ref 3.5–5.3)
POTASSIUM SERPL-SCNC: 4.8 MMOL/L — SIGNIFICANT CHANGE UP (ref 3.5–5.3)
PROT SERPL-MCNC: 7.8 G/DL — SIGNIFICANT CHANGE UP (ref 6–8.3)
RBC # BLD: 3.92 M/UL — LOW (ref 4.2–5.8)
RBC # FLD: 14.1 % — SIGNIFICANT CHANGE UP (ref 10.3–14.5)
SODIUM SERPL-SCNC: 138 MMOL/L — SIGNIFICANT CHANGE UP (ref 135–145)
WBC # BLD: 8.6 K/UL — SIGNIFICANT CHANGE UP (ref 3.8–10.5)
WBC # FLD AUTO: 8.6 K/UL — SIGNIFICANT CHANGE UP (ref 3.8–10.5)

## 2019-09-17 PROCEDURE — 73701 CT LOWER EXTREMITY W/DYE: CPT | Mod: 26,LT

## 2019-09-17 PROCEDURE — 99232 SBSQ HOSP IP/OBS MODERATE 35: CPT

## 2019-09-17 PROCEDURE — 73590 X-RAY EXAM OF LOWER LEG: CPT | Mod: 26,LT

## 2019-09-17 PROCEDURE — 99285 EMERGENCY DEPT VISIT HI MDM: CPT | Mod: GC

## 2019-09-17 RX ORDER — INSULIN GLARGINE 100 [IU]/ML
15 INJECTION, SOLUTION SUBCUTANEOUS AT BEDTIME
Refills: 0 | Status: DISCONTINUED | OUTPATIENT
Start: 2019-09-17 | End: 2019-09-23

## 2019-09-17 RX ORDER — CEFTRIAXONE 500 MG/1
2000 INJECTION, POWDER, FOR SOLUTION INTRAMUSCULAR; INTRAVENOUS ONCE
Refills: 0 | Status: COMPLETED | OUTPATIENT
Start: 2019-09-17 | End: 2019-09-17

## 2019-09-17 RX ORDER — SODIUM CHLORIDE 9 MG/ML
1000 INJECTION INTRAMUSCULAR; INTRAVENOUS; SUBCUTANEOUS ONCE
Refills: 0 | Status: COMPLETED | OUTPATIENT
Start: 2019-09-17 | End: 2019-09-17

## 2019-09-17 RX ORDER — ASPIRIN/CALCIUM CARB/MAGNESIUM 324 MG
81 TABLET ORAL DAILY
Refills: 0 | Status: DISCONTINUED | OUTPATIENT
Start: 2019-09-17 | End: 2019-10-07

## 2019-09-17 RX ORDER — VANCOMYCIN HCL 1 G
1000 VIAL (EA) INTRAVENOUS ONCE
Refills: 0 | Status: COMPLETED | OUTPATIENT
Start: 2019-09-17 | End: 2019-09-17

## 2019-09-17 RX ORDER — METOPROLOL TARTRATE 50 MG
25 TABLET ORAL DAILY
Refills: 0 | Status: DISCONTINUED | OUTPATIENT
Start: 2019-09-17 | End: 2019-10-01

## 2019-09-17 RX ORDER — HEPARIN SODIUM 5000 [USP'U]/ML
5000 INJECTION INTRAVENOUS; SUBCUTANEOUS EVERY 12 HOURS
Refills: 0 | Status: DISCONTINUED | OUTPATIENT
Start: 2019-09-17 | End: 2019-10-07

## 2019-09-17 RX ORDER — LOSARTAN POTASSIUM 100 MG/1
25 TABLET, FILM COATED ORAL DAILY
Refills: 0 | Status: DISCONTINUED | OUTPATIENT
Start: 2019-09-17 | End: 2019-09-26

## 2019-09-17 RX ADMIN — Medication 250 MILLIGRAM(S): at 18:06

## 2019-09-17 RX ADMIN — CEFTRIAXONE 100 MILLIGRAM(S): 500 INJECTION, POWDER, FOR SOLUTION INTRAMUSCULAR; INTRAVENOUS at 19:21

## 2019-09-17 RX ADMIN — SODIUM CHLORIDE 1000 MILLILITER(S): 9 INJECTION INTRAMUSCULAR; INTRAVENOUS; SUBCUTANEOUS at 16:51

## 2019-09-17 RX ADMIN — INSULIN GLARGINE 15 UNIT(S): 100 INJECTION, SOLUTION SUBCUTANEOUS at 22:22

## 2019-09-17 NOTE — ED PROVIDER NOTE - PROGRESS NOTE DETAILS
Katlyn Kelley PGY2  vascular recommends local wound care vs wound vac, recommend CT to further evaluate. Recommend medicine admission if wound vac needed Katlyn Kelley PGY2  vascular looked at CT, state no abscess and rec local wound care vs wound vac. elevated esr, xray shows possible osteo, consulted ortho for further evaluation. Katlyn Kelley PGY2  ortho rec MR tib fib. on abx. admitted to dr. fagan

## 2019-09-17 NOTE — ED PROVIDER NOTE - OBJECTIVE STATEMENT
70 yo male with pmhx of DM, PAD s/p BKA, MI, BIBEMS from facility for wound evaluation. Patient recently received BKA in July 2019 in [facility], where he was discharged to rehab. Was sent from facility today for concerns of improper wound healing, drainage, and bleeding. Patient otherwise had no complaints, but endorses LLE pain near wound site.    Denies fevers, cough, LOC, CP, SOB

## 2019-09-17 NOTE — ED ADULT NURSE REASSESSMENT NOTE - NS ED NURSE REASSESS COMMENT FT1
pt seen by surgery pending admission for wound vac to left BKA wound pt given nutrional tray pending disposition denies any complaints just wants food

## 2019-09-17 NOTE — CONSULT NOTE ADULT - ASSESSMENT
Erik is a very pleasant 69 Year-Old Gentleman presenting for KERI with concern for L BKA wound dehiscence. Erik is a very pleasant 69 Year-Old Gentleman presenting for KERI with concern for L BKA wound dehiscence.     - Recommend obtaining LLE CT with IV contrast to evaluate for infection vs abscess.   - Based on CT findings, will recommend local wound care vs admission for placement of wound vac.   - Seen with Vascular Fellow, and discussed with Attending.     Vascular Surgery Pager #[phone]

## 2019-09-17 NOTE — ED PROVIDER NOTE - SKIN, MLM
BKA wound site dehisced, with clear drainage, subcutaneous tissue present. no overlying erythema or crepitus noted

## 2019-09-17 NOTE — H&P ADULT - NSHPLABSRESULTS_GEN_ALL_CORE
LABS:                        11.0   8.6   )-----------( 265      ( 17 Sep 2019 15:19 )             36.4     09-17    138  |  104  |  21  ----------------------------<  125<H>  4.8   |  21<L>  |  0.71    Ca    9.4      17 Sep 2019 14:47    TPro  7.8  /  Alb  3.1<L>  /  TBili  0.4  /  DBili  x   /  AST  13  /  ALT  25  /  AlkPhos  148<H>  09-17            RADIOLOGY & ADDITIONAL TESTS:

## 2019-09-17 NOTE — CONSULT NOTE ADULT - SUBJECTIVE AND OBJECTIVE BOX
Orthopaedic Surgery Consult Note    Patient is a 69y with wound dehiscence of left BKA. Patient recently received BKA in July 2019 in [facility], [facility], where he was discharged to rehab. Was sent from facility today for concerns of improper wound healing, drainage, and bleeding. Patient otherwise had no complaints, but endorses LLE pain near wound site. Patient cannot articulate how long this has been open but staples removed 3 weeks ago. No other orthopedic complaints.     HPI:      PAST MEDICAL & SURGICAL HISTORY:  Hyperlipidemia  Hypertension  Diabetes  H/O mastoidectomy    [] No significant past history as reviewed with the patient and family    FAMILY HISTORY:  FHx: diabetes mellitus: In all siblings(2 sisters and 4 brothers)    [] Family history not pertinent as reviewed with the patient and family    SOCIAL HISTORY:    MEDICATIONS  (STANDING):  cefTRIAXone   IVPB 2000 milliGRAM(s) IV Intermittent Once    MEDICATIONS  (PRN):    Allergies    No Known Allergies    Intolerances        REVIEW OF SYSTEMS  [x] All review of systems negative except for those marked.  Systemic:	[] Fever		[] Chills		[] Night sweats		[] Fatigue	[] Other  [] Cardiovascular:  [] Pulmonary:  [] Renal/Urologic:  [] Gastrointestinal:  [] Metabolic:  [] Neurologic:  [] Hematologic:  [] ENT:  [] Ophthalmologic:  [] Musculoskeletal: see HPI    Vital Signs Last 24 Hrs  T(C): 36.4 (17 Sep 2019 14:23), Max: 36.7 (17 Sep 2019 13:33)  T(F): 97.6 (17 Sep 2019 14:23), Max: 98 (17 Sep 2019 13:33)  HR: 75 (17 Sep 2019 14:23) (75 - 75)  BP: 128/83 (17 Sep 2019 13:33) (128/83 - 128/83)  BP(mean): --  RR: 20 (17 Sep 2019 14:23) (18 - 20)  SpO2: 100% (17 Sep 2019 14:23) (96% - 100%)      PHYSICAL EXAM:  NAD  LLE: no hip or knee TTP, able to SLR  dehisced wound at base of BKA with exposed soft tissue                            11.0   8.6   )-----------( 265      ( 17 Sep 2019 15:19 )             36.4     09-17    138  |  104  |  21  ----------------------------<  125<H>  4.8   |  21<L>  |  0.71    Ca    9.4      17 Sep 2019 14:47    TPro  7.8  /  Alb  3.1<L>  /  TBili  0.4  /  DBili  x   /  AST  13  /  ALT  25  /  AlkPhos  148<H>  09-17      IMAGING STUDIES: bone infarct in tibia    69y Male with left BKA wound dehiscence   - Follow-up vascular in regards to OR vs floor/wound care management  - obtain wound care consult  - can obtain MR for further osteo assessment  - FU ESR/CRP  - will discuss with attending

## 2019-09-17 NOTE — H&P ADULT - HISTORY OF PRESENT ILLNESS
70 yo male PMH DM(II), PAD s/p left BKA and prior left leg fem-pop, BIBEMS from facility for wound evaluation. Patient recently received left BKA in July 2019 in [facility], where he was discharged to rehab. Was sent from facility today for concerns of improper wound healing, drainage, and bleeding. Patient otherwise had no complaints, but endorses LLE pain near wound site.    Denies fevers, cough, LOC, CP, SOB.

## 2019-09-17 NOTE — H&P ADULT - ASSESSMENT
68 yo male PMH DM(II), PAD s/p left BKA and prior left leg fem-pop, BIBEMS from facility for wound evaluation. Patient recently received left BKA in July 2019 in [facility], where he was discharged to rehab. Was sent from facility today for concerns of improper wound healing, drainage, and bleeding. Patient otherwise had no complaints, but endorses LLE pain near wound site.    Vascular: Ortho and Vascular notes reviewed. Agree MRI of left BKA site will answer if infection present. Elevated ESR of 84 and elevated   CRP noted on arrival. Continue ASA 81 mg/day. Add Lipitor 10 mg/day. Lipid panel in AM. Will hold ABX for now pending MRI results.     Endo: Hold humolog in hospital. Add Lantus 15 units at night. A1C in AM.     CV: Contine Cozaar 25 mg/day and Toprol XL 25 mg/day. BP stable 114/76.

## 2019-09-17 NOTE — ED PROVIDER NOTE - ATTENDING CONTRIBUTION TO CARE
69M, pmh DM2, HTN, PAD s/p Left BKA 7/22 ([facility]), d/c'd from [facility] 40 days ago s/p admissions for NSTEMI, presents from nursing home with LLE wound dehiscence. Pt extremely poor historian, unable to provide additional information. Per paperwork, pt has worsening dehiscence of wound which is why sent to hospital. Pt denies f/c, cough, congestion, n/v/d, abd pain, cp, sob, or any other complaints.    PE: NAD, NCAT, MMM, Trachea midline, Normal conjunctiva, lungs CTAB, S1/S2 RRR, Normal perfusion, 2+ radial pulses bilat, Abdomen Soft, NTND, No rebound/guarding. LLE s/p BKA, with significant wound dehiscence and separation of wound edge ~2 cm, no purulent or foul smelling d/c.    Wound dehiscence to LLE BKA site. Pt appears well. Afebrile and VS appropriate. Without clear signs of infection. Obtain labs, XR, consult vascular surgery to jaswinder. - Shahid Manuel MD

## 2019-09-17 NOTE — ED PROVIDER NOTE - CLINICAL SUMMARY MEDICAL DECISION MAKING FREE TEXT BOX
68 yo male with DM PAD s/p L BKA presenting with LLE wound evaluation. Suggestive of wound dehiscence due to improper wound healing. Will evaluate with cbc cmp vbg esr crp, xray tib fib, and will have vascular evaluate wound.

## 2019-09-17 NOTE — CONSULT NOTE ADULT - SUBJECTIVE AND OBJECTIVE BOX
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 Hospital course also notable for concern for pontine stroke possibly seen on CT head, dysphagia initially requiring NGT feeds but later passed MBS with improved mental status placed on puree diet. He was discharged to [facility], now re-presenting to ED with lower extremity wound.       Vascular Surgery consulted for wound evaluation of L BKA.     PAST MEDICAL & SURGICAL HISTORY:  Hyperlipidemia  Hypertension  Diabetes  H/O mastoidectomy      MEDICATIONS  (STANDING):      ___________________________________________  REVIEW OF SYSTEMS:  As stated in History of Present Illness, otherwise non-contributory.   ___________________________________________  PHYSICAL EXAM:  Vital Signs Last 24 Hrs  T(C): 36.4 (17 Sep 2019 14:23), Max: 36.7 (17 Sep 2019 13:33)  T(F): 97.6 (17 Sep 2019 14:23), Max: 98 (17 Sep 2019 13:33)  HR: 75 (17 Sep 2019 14:23) (75 - 75)  BP: 128/83 (17 Sep 2019 13:33) (128/83 - 128/83)  BP(mean): --  RR: 20 (17 Sep 2019 14:23) (18 - 20)  SpO2: 100% (17 Sep 2019 14:23) (96% - 100%)CAPILLARY BLOOD GLUCOSE      POCT Blood Glucose.: 116 mg/dL (17 Sep 2019 14:41)    I&O's Detail      General: Alert and Oriented x3, No acute distress.  Respiratory: Breathing non-labored.  Abdomen: Soft, nontender, nondistended. No rebound, no guarding, No palpable organomegaly or masses.  Extremities: Moves all four.   ____________________________________________  LABS:    09-17    138  |  104  |  21  ----------------------------<  125<H>  4.8   |  21<L>  |  0.71    Ca    9.4      17 Sep 2019 14:47    TPro  7.8  /  Alb  3.1<L>  /  TBili  0.4  /  DBili  x   /  AST  13  /  ALT  25  /  AlkPhos  148<H>  09-17    LIVER FUNCTIONS - ( 17 Sep 2019 14:47 )  Alb: 3.1 g/dL / Pro: 7.8 g/dL / ALK PHOS: 148 U/L / ALT: 25 U/L / AST: 13 U/L / GGT: x                   ____________________________________________  RADIOLOGY: [facility] Vascular Surgical Consultant Evaluation    HPI:  69M h/o poor med compliance, DM2, HTN, PAD s/p LLE fem-pop bypass with vein graft , found to have 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 Hospital course also notable for concern for pontine stroke possibly seen on CT head, dysphagia initially requiring NGT feeds but later passed MBS with improved mental status placed on puree diet. He was discharged to [facility], now re-presenting to ED with lower extremity wound.     Vascular Surgery consulted for wound evaluation of L MARCELA. Patient states that his leg wound has been present for unknown period of time. He states that his staples were removed approximately three weeks ago.     PAST MEDICAL & SURGICAL HISTORY:  Hyperlipidemia  Hypertension  Diabetes  H/O mastoidectomy      MEDICATIONS  (STANDING):      ___________________________________________  REVIEW OF SYSTEMS:  As stated in History of Present Illness, otherwise non-contributory.   ___________________________________________  PHYSICAL EXAM:  Vital Signs Last 24 Hrs  T(C): 36.4 (17 Sep 2019 14:23), Max: 36.7 (17 Sep 2019 13:33)  T(F): 97.6 (17 Sep 2019 14:23), Max: 98 (17 Sep 2019 13:33)  HR: 75 (17 Sep 2019 14:23) (75 - 75)  BP: 128/83 (17 Sep 2019 13:33) (128/83 - 128/83)  BP(mean): --  RR: 20 (17 Sep 2019 14:23) (18 - 20)  SpO2: 100% (17 Sep 2019 14:23) (96% - 100%)CAPILLARY BLOOD GLUCOSE      POCT Blood Glucose.: 116 mg/dL (17 Sep 2019 14:41)    I&O's Detail      General: NAD.   Respiratory: Breathing non-labored.  Extremities: LLE prior BKA staple site dehisced, with muscle and some fibropurulent exudate.   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 Hospital course also notable for concern for pontine stroke possibly seen on CT head, dysphagia initially requiring NGT feeds but later passed MBS with improved mental status placed on puree diet. He was discharged to [facility], now re-presenting to ED with lower extremity wound.     Vascular Surgery consulted for wound evaluation of L MARCELA. Patient states that his leg wound has been present for unknown period of time. He states that his staples were removed approximately three weeks ago. No fevers, chills, has had some pain in the LLE, constant.     PAST MEDICAL & SURGICAL HISTORY:  Hyperlipidemia  Hypertension  Diabetes  H/O mastoidectomy      MEDICATIONS  (STANDING):      ___________________________________________  REVIEW OF SYSTEMS:  As stated in History of Present Illness, otherwise non-contributory.   ___________________________________________  PHYSICAL EXAM:  Vital Signs Last 24 Hrs  T(C): 36.4 (17 Sep 2019 14:23), Max: 36.7 (17 Sep 2019 13:33)  T(F): 97.6 (17 Sep 2019 14:23), Max: 98 (17 Sep 2019 13:33)  HR: 75 (17 Sep 2019 14:23) (75 - 75)  BP: 128/83 (17 Sep 2019 13:33) (128/83 - 128/83)  BP(mean): --  RR: 20 (17 Sep 2019 14:23) (18 - 20)  SpO2: 100% (17 Sep 2019 14:23) (96% - 100%)CAPILLARY BLOOD GLUCOSE      POCT Blood Glucose.: 116 mg/dL (17 Sep 2019 14:41)    I&O's Detail      General: NAD.   Respiratory: Breathing non-labored.  Extremities: B/l femoral pulses palpable, b/l popliteal signals, R DP signal present. LLE prior BKA staple site dehisced, with muscle and some fibropurulent exudate. Able to move extremity,   ____________________________________________  LABS:    09-17    138  |  104  |  21  ----------------------------<  125<H>  4.8   |  21<L>  |  0.71    Ca    9.4      17 Sep 2019 14:47    TPro  7.8  /  Alb  3.1<L>  /  TBili  0.4  /  DBili  x   /  AST  13  /  ALT  25  /  AlkPhos  148<H>  09-17    LIVER FUNCTIONS - ( 17 Sep 2019 14:47 )  Alb: 3.1 g/dL / Pro: 7.8 g/dL / ALK PHOS: 148 U/L / ALT: 25 U/L / AST: 13 U/L / GGT: x [facility] Vascular Surgical Consultant Evaluation    HPI:  69M h/o poor med compliance, DM2, HTN, PAD s/p LLE fem-pop bypass with vein graft , found to have severe flow limiting stenosis of the popliteal artery and occluded bypass graft is occluded, the popliteal artery is occluded and the trifurcation arteries are occluded on the left s/p Left BKA on 7/22/19 [facility] CCU for NSTEMI complicated by cardiogenic shock and acute respiratory failure with hypoxia requiring intubation and subsequent extubation. Hospital course also notable for concern for pontine stroke possibly seen on CT head, dysphagia initially requiring NGT feeds but later passed MBS with improved mental status placed on puree diet. He was discharged to [facility], now re-presenting to ED with lower extremity wound.     Vascular Surgery consulted for wound evaluation of L MARCELA. Patient states that his leg wound has been present for unknown period of time. He states that his staples were removed approximately three weeks ago. No fevers, chills, has had some pain in the LLE, constant.     PAST MEDICAL & SURGICAL HISTORY:  Hyperlipidemia  Hypertension  Diabetes  H/O mastoidectomy      MEDICATIONS  (STANDING):      ___________________________________________  REVIEW OF SYSTEMS:  As stated in History of Present Illness, otherwise non-contributory.   ___________________________________________  PHYSICAL EXAM:  Vital Signs Last 24 Hrs  T(C): 36.4 (17 Sep 2019 14:23), Max: 36.7 (17 Sep 2019 13:33)  T(F): 97.6 (17 Sep 2019 14:23), Max: 98 (17 Sep 2019 13:33)  HR: 75 (17 Sep 2019 14:23) (75 - 75)  BP: 128/83 (17 Sep 2019 13:33) (128/83 - 128/83)  BP(mean): --  RR: 20 (17 Sep 2019 14:23) (18 - 20)  SpO2: 100% (17 Sep 2019 14:23) (96% - 100%)CAPILLARY BLOOD GLUCOSE      POCT Blood Glucose.: 116 mg/dL (17 Sep 2019 14:41)    I&O's Detail      General: NAD.   Respiratory: Breathing non-labored.  Extremities: B/l femoral pulses palpable, b/l popliteal signals, R DP signal present. LLE prior BKA staple site dehisced, with muscle and some fibropurulent exudate. Able to move extremity,   ____________________________________________  LABS:    09-17    138  |  104  |  21  ----------------------------<  125<H>  4.8   |  21<L>  |  0.71    Ca    9.4      17 Sep 2019 14:47    TPro  7.8  /  Alb  3.1<L>  /  TBili  0.4  /  DBili  x   /  AST  13  /  ALT  25  /  AlkPhos  148<H>  09-17    LIVER FUNCTIONS - ( 17 Sep 2019 14:47 )  Alb: 3.1 g/dL / Pro: 7.8 g/dL / ALK PHOS: 148 U/L / ALT: 25 U/L / AST: 13 U/L / GGT: x

## 2019-09-17 NOTE — ED ADULT NURSE NOTE - TEMPLATE
[facility]  [address]  [place] [address]  Phone: [phone]  Fax: [phone]                  Lynne Webb   2017 2:30 PM   Procedure visit    Description:  Female : 1979   Provider:  GEETHA, WOMEN'S ULTRASOUND   Department:  [facility]           Diagnoses this Visit        Comments    SAB (spontaneous )                To Do List           Goals (5 Years of Data)     None      [facility] On Call     [facility] On Call Nurse Care Line -  Assistance  Registered nurses in the [facility] On Call Center provide clinical advisement, health education, appointment booking, and other advisory services.  Call for this free service at [phone].             Medications           Message regarding Medications     Verify the changes and/or additions to your medication regime listed below are the same as discussed with your clinician today.  If any of these changes or additions are incorrect, please notify your healthcare provider.             Verify that the below list of medications is an accurate representation of the medications you are currently taking.  If none reported, the list may be blank. If incorrect, please contact your healthcare provider. Carry this list with you in case of emergency.           Current Medications     ibuprofen (ADVIL,MOTRIN) 600 MG tablet Take 1 tablet (600 mg total) by mouth every 6 (six) hours as needed for Pain.    oxycodone-acetaminophen (ROXICET) 5-325 mg per tablet Take 1 tablet by mouth every 6 (six) hours as needed for Pain.    PNV #15-iron fum,ps-folic acid (FOLIVANE-OB) 85-1 mg Cap Take 1 tablet by mouth once daily.           Clinical Reference Information           Prenatal Vitals     Enc. Date GA Prenatal Vitals Prenatal Pulse Pain Level Urine Albumin/Glucose Edema Presentation Dilation/Effacement/Station    17  120/76 / 46.3 kg (102 lb 2.9 oz)           17  118/72 / 43.3 kg (95 lb 9.1 oz)  /  /  "Absent  3 Negative / Negative          Height: 5' 1" (1.549 m)       Your Vitals Were     Last Period                   12/11/2016 (Approximate)           Allergies as of 2/22/2017     Shellfish Containing Products      Immunizations Administered on Date of Encounter - 2/22/2017     None      Orders Placed During Today's Visit      Normal Orders This Visit    US OB/GYN Procedure ([facility]) - Extended List       Language Assistance Services     ATTENTION: Language assistance services are available, free of charge. Please call [phone].      ATENCIÓN: Si akilla stef, tiene a narvaez disposición servicios gratuitos de asistencia lingüística. Llame al [phone].     LINDA Ý: N?u b?n nói Ti?ng Vi?t, có các d?ch v? h? tr? ngôn ng? mi?n phí dành cho b?n. G?i s? [phone].         [facility] complies with applicable Federal civil rights laws and does not discriminate on the basis of race, color, national origin, age, disability, or sex.        " Wound Check/Suture Removal

## 2019-09-17 NOTE — H&P ADULT - NSHPPHYSICALEXAM_GEN_ALL_CORE
PHYSICAL EXAMINATION:  Vital Signs Last 24 Hrs  T(C): 36.8 (17 Sep 2019 19:30), Max: 36.8 (17 Sep 2019 19:30)  T(F): 98.3 (17 Sep 2019 19:30), Max: 98.3 (17 Sep 2019 19:30)  HR: 101 (17 Sep 2019 19:30) (75 - 101)  BP: 114/77 (17 Sep 2019 19:30) (114/77 - 128/83)  BP(mean): --  RR: 20 (17 Sep 2019 19:30) (18 - 20)  SpO2: 99% (17 Sep 2019 19:30) (96% - 100%)  CAPILLARY BLOOD GLUCOSE      POCT Blood Glucose.: 116 mg/dL (17 Sep 2019 14:41)      GENERAL: NAD, well-groomed, well-developed  HEAD:  atraumatic, normocephalic  EYES: sclera anicteric  ENMT: mucous membranes moist  NECK: supple, No JVD  CHEST/LUNG: clear to auscultation bilaterally; no rales, rhonchi, or wheezing b/l  HEART: normal S1, S2  ABDOMEN: BS+, soft, ND, NT   EXTREMITIES:  pulses palpable; no clubbing, cyanosis, or edema b/l LEs  NEURO: awake, alert, interactive; moves all extremities  SKIN: no rashes or lesions PHYSICAL EXAMINATION:  Vital Signs Last 24 Hrs  T(C): 36.8 (17 Sep 2019 19:30), Max: 36.8 (17 Sep 2019 19:30)  T(F): 98.3 (17 Sep 2019 19:30), Max: 98.3 (17 Sep 2019 19:30)  HR: 101 (17 Sep 2019 19:30) (75 - 101)  BP: 114/77 (17 Sep 2019 19:30) (114/77 - 128/83)  BP(mean): --  RR: 20 (17 Sep 2019 19:30) (18 - 20)  SpO2: 99% (17 Sep 2019 19:30) (96% - 100%)  CAPILLARY BLOOD GLUCOSE      POCT Blood Glucose.: 116 mg/dL (17 Sep 2019 14:41)      GENERAL: NAD, seen on 3 Miller, stable, comfortable, asleep  HEAD:  atraumatic, normocephalic  EYES: sclera anicteric  ENMT: mucous membranes moist  NECK: supple, No JVD  CHEST/LUNG: clear to auscultation bilaterally; no rales, rhonchi, or wheezing b/l  HEART: normal S1, S2  ABDOMEN: BS+, soft, ND, NT   EXTREMITIES:  pulses palpable; no clubbing, left BKA site dressed  NEURO: awake, alert, interactive; moves all extremities  SKIN: no rashes or lesions

## 2019-09-17 NOTE — ED ADULT NURSE NOTE - OBJECTIVE STATEMENT
70 yo M aaox4 BIB EMS for wound check. Recent LBKA within last mth. Pt reports wound not healing well. Told  to come to ed by his physician. wound appears not to be healing. No Foul smelling. + serosanguinous fluid   noted coming from wound. Denies fever chills. Provider at bedside. Pt denies any distress requesting food.

## 2019-09-18 LAB
CHOLEST SERPL-MCNC: 125 MG/DL — SIGNIFICANT CHANGE UP (ref 10–199)
GLUCOSE BLDC GLUCOMTR-MCNC: 188 MG/DL — HIGH (ref 70–99)
GLUCOSE BLDC GLUCOMTR-MCNC: 194 MG/DL — HIGH (ref 70–99)
GLUCOSE BLDC GLUCOMTR-MCNC: 196 MG/DL — HIGH (ref 70–99)
GLUCOSE BLDC GLUCOMTR-MCNC: 236 MG/DL — HIGH (ref 70–99)
GLUCOSE BLDC GLUCOMTR-MCNC: 276 MG/DL — HIGH (ref 70–99)
HBA1C BLD-MCNC: 6.9 % — HIGH (ref 4–5.6)
HDLC SERPL-MCNC: 39 MG/DL — LOW
LIPID PNL WITH DIRECT LDL SERPL: 74 MG/DL — SIGNIFICANT CHANGE UP
TOTAL CHOLESTEROL/HDL RATIO MEASUREMENT: 3.2 RATIO — LOW (ref 3.4–9.6)
TRIGL SERPL-MCNC: 61 MG/DL — SIGNIFICANT CHANGE UP (ref 10–149)

## 2019-09-18 RX ORDER — INSULIN LISPRO 100/ML
VIAL (ML) SUBCUTANEOUS
Refills: 0 | Status: DISCONTINUED | OUTPATIENT
Start: 2019-09-18 | End: 2019-09-26

## 2019-09-18 RX ORDER — DEXTROSE 50 % IN WATER 50 %
12.5 SYRINGE (ML) INTRAVENOUS ONCE
Refills: 0 | Status: DISCONTINUED | OUTPATIENT
Start: 2019-09-18 | End: 2019-10-07

## 2019-09-18 RX ORDER — INSULIN LISPRO 100/ML
VIAL (ML) SUBCUTANEOUS AT BEDTIME
Refills: 0 | Status: DISCONTINUED | OUTPATIENT
Start: 2019-09-18 | End: 2019-10-07

## 2019-09-18 RX ORDER — DEXTROSE 50 % IN WATER 50 %
25 SYRINGE (ML) INTRAVENOUS ONCE
Refills: 0 | Status: DISCONTINUED | OUTPATIENT
Start: 2019-09-18 | End: 2019-10-07

## 2019-09-18 RX ORDER — GLUCAGON INJECTION, SOLUTION 0.5 MG/.1ML
1 INJECTION, SOLUTION SUBCUTANEOUS ONCE
Refills: 0 | Status: DISCONTINUED | OUTPATIENT
Start: 2019-09-18 | End: 2019-10-07

## 2019-09-18 RX ORDER — DEXTROSE 50 % IN WATER 50 %
15 SYRINGE (ML) INTRAVENOUS ONCE
Refills: 0 | Status: DISCONTINUED | OUTPATIENT
Start: 2019-09-18 | End: 2019-10-07

## 2019-09-18 RX ORDER — ATORVASTATIN CALCIUM 80 MG/1
10 TABLET, FILM COATED ORAL AT BEDTIME
Refills: 0 | Status: DISCONTINUED | OUTPATIENT
Start: 2019-09-18 | End: 2019-10-07

## 2019-09-18 RX ORDER — INSULIN LISPRO 100/ML
2 VIAL (ML) SUBCUTANEOUS
Refills: 0 | Status: DISCONTINUED | OUTPATIENT
Start: 2019-09-18 | End: 2019-09-24

## 2019-09-18 RX ORDER — SODIUM CHLORIDE 9 MG/ML
1000 INJECTION, SOLUTION INTRAVENOUS
Refills: 0 | Status: DISCONTINUED | OUTPATIENT
Start: 2019-09-18 | End: 2019-10-07

## 2019-09-18 RX ADMIN — Medication 25 MILLIGRAM(S): at 05:48

## 2019-09-18 RX ADMIN — HEPARIN SODIUM 5000 UNIT(S): 5000 INJECTION INTRAVENOUS; SUBCUTANEOUS at 17:50

## 2019-09-18 RX ADMIN — Medication 2 UNIT(S): at 17:51

## 2019-09-18 RX ADMIN — Medication 1: at 17:50

## 2019-09-18 RX ADMIN — INSULIN GLARGINE 15 UNIT(S): 100 INJECTION, SOLUTION SUBCUTANEOUS at 22:11

## 2019-09-18 RX ADMIN — LOSARTAN POTASSIUM 25 MILLIGRAM(S): 100 TABLET, FILM COATED ORAL at 05:48

## 2019-09-18 RX ADMIN — Medication 81 MILLIGRAM(S): at 11:43

## 2019-09-18 RX ADMIN — ATORVASTATIN CALCIUM 10 MILLIGRAM(S): 80 TABLET, FILM COATED ORAL at 22:11

## 2019-09-18 RX ADMIN — HEPARIN SODIUM 5000 UNIT(S): 5000 INJECTION INTRAVENOUS; SUBCUTANEOUS at 05:48

## 2019-09-18 RX ADMIN — Medication 1: at 13:45

## 2019-09-18 NOTE — PHYSICAL THERAPY INITIAL EVALUATION ADULT - PERTINENT HX OF CURRENT PROBLEM, REHAB EVAL
70 yo male PMH DM(II), PAD s/p left BKA and prior left leg fem-pop, BIBEMS from facility for wound evaluation. Patient recently received left BKA in July 2019 in [facility], where he was discharged to rehab. Was sent from facility today for concerns of improper wound healing, drainage, and bleeding. 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 68 yo male PMH DM(II), PAD s/p left BKA and prior left leg fem-pop, BIBEMS from facility for wound evaluation. Patient recently received left BKA in July 2019 in [facility], where he was discharged to rehab. Was sent from facility today for concerns of improper wound healing, drainage, and bleeding. 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 pending MRI to rule out OM

## 2019-09-18 NOTE — PHYSICAL THERAPY INITIAL EVALUATION ADULT - ADDITIONAL COMMENTS
Pt is a poor historian; as per chart pt was independent prior to amputation, pt admitted from rehab, required assist for all functional mobility

## 2019-09-18 NOTE — PHYSICAL THERAPY INITIAL EVALUATION ADULT - MODALITIES TREATMENT COMMENTS
patient seen for VAC application to L surgical incision s/p BKA, preet session well, wound received C/D/I, measured: 18.0cm x 6.0cm x2.5cm with undermining from 11:00 to 1:00 with greatest depth of 2/5 at 12:00. no erythema, no purulent, no malodor, cleansed with NS, cavilon to periwound, adaptic touch to bone for protection, filled with black foam, tracked to anterior stump with good seal reached at 125mmHg continuous,

## 2019-09-18 NOTE — PROGRESS NOTE ADULT - ASSESSMENT
Erik is a very pleasant 69 Year-Old Gentleman presenting for [facility] with concern for L BKA wound dehiscence, concern for osteomyelitis.     - F/u official CT read, no fluid collections appreciated.   - F/u MRI to evaluate for osteo (ordered).   - Will consult PT for placement of wound vac over LLE BKA.   - Discussed with Attending Surgeon.      Vascular Surgery Pager #[phone]

## 2019-09-18 NOTE — PROGRESS NOTE ADULT - ASSESSMENT
68 yo male PMH DM(II), PAD s/p left BKA and prior left leg fem-pop, BIBEMS from facility for wound evaluation. Patient recently received left BKA in July 2019 in [facility], where he was discharged to rehab. Was sent from facility today for concerns of improper wound healing, drainage, and bleeding. Patient otherwise had no complaints, but endorses LLE pain near wound site.    Vascular: Ortho and Vascular notes reviewed. Agree MRI of left BKA with IV contrast to r/o site infection. Elevated ESR of 84 and elevated CRP noted on arrival. Continue ASA 81 mg/day. Add Lipitor 10 mg/day. Lipid panel in AM. Will hold ABX for now pending MRI results.     Endo: Add Humolog 2 units bid to Lantus 15 units at night. A1C in AM. =230.      CV: Contine Cozaar 25 mg/day and Toprol XL 25 mg/day. BP stable 114/76.

## 2019-09-18 NOTE — PROGRESS NOTE ADULT - SUBJECTIVE AND OBJECTIVE BOX
INTERVAL HPI/OVERNIGHT EVENTS:  Pt seen and examined at bedside.     Allergies/Intolerance: No Known Allergies      MEDICATIONS  (STANDING):  aspirin enteric coated 81 milliGRAM(s) Oral daily  heparin  Injectable 5000 Unit(s) SubCutaneous every 12 hours  insulin glargine Injectable (LANTUS) 15 Unit(s) SubCutaneous at bedtime  losartan 25 milliGRAM(s) Oral daily  metoprolol succinate ER 25 milliGRAM(s) Oral daily    MEDICATIONS  (PRN):        ROS: all systems reviewed and wnl      PHYSICAL EXAMINATION:  Vital Signs Last 24 Hrs  T(C): 36.7 (18 Sep 2019 04:22), Max: 36.8 (17 Sep 2019 19:30)  T(F): 98 (18 Sep 2019 04:22), Max: 98.3 (17 Sep 2019 19:30)  HR: 74 (18 Sep 2019 04:22) (74 - 101)  BP: 127/63 (18 Sep 2019 04:22) (114/77 - 128/83)  BP(mean): --  RR: 18 (18 Sep 2019 04:22) (18 - 20)  SpO2: 99% (18 Sep 2019 04:22) (96% - 100%)  CAPILLARY BLOOD GLUCOSE      POCT Blood Glucose.: 194 mg/dL (18 Sep 2019 08:16)  POCT Blood Glucose.: 236 mg/dL (17 Sep 2019 22:18)  POCT Blood Glucose.: 116 mg/dL (17 Sep 2019 14:41)      09-17 @ 07:01  -  09-18 @ 07:00  --------------------------------------------------------  IN: 240 mL / OUT: 0 mL / NET: 240 mL        GENERAL:   NECK: supple, No JVD  CHEST/LUNG: clear to auscultation bilaterally; no rales, rhonchi, or wheezing b/l  HEART: normal S1, S2  ABDOMEN: BS+, soft, ND, NT   EXTREMITIES:  pulses palpable; no clubbing, cyanosis, or edema b/l LEs  SKIN: no rashes or lesions      LABS:                        11.0   8.6   )-----------( 265      ( 17 Sep 2019 15:19 )             36.4     09-17    138  |  104  |  21  ----------------------------<  125<H>  4.8   |  21<L>  |  0.71    Ca    9.4      17 Sep 2019 14:47    TPro  7.8  /  Alb  3.1<L>  /  TBili  0.4  /  DBili  x   /  AST  13  /  ALT  25  /  AlkPhos  148<H>  09-17 INTERVAL HPI/OVERNIGHT EVENTS:  Pt seen and examined at bedside.     Allergies/Intolerance: No Known Allergies      MEDICATIONS  (STANDING):  aspirin enteric coated 81 milliGRAM(s) Oral daily  heparin  Injectable 5000 Unit(s) SubCutaneous every 12 hours  insulin glargine Injectable (LANTUS) 15 Unit(s) SubCutaneous at bedtime  losartan 25 milliGRAM(s) Oral daily  metoprolol succinate ER 25 milliGRAM(s) Oral daily    MEDICATIONS  (PRN):        ROS: all systems reviewed and wnl      PHYSICAL EXAMINATION:  Vital Signs Last 24 Hrs  T(C): 36.7 (18 Sep 2019 04:22), Max: 36.8 (17 Sep 2019 19:30)  T(F): 98 (18 Sep 2019 04:22), Max: 98.3 (17 Sep 2019 19:30)  HR: 74 (18 Sep 2019 04:22) (74 - 101)  BP: 127/63 (18 Sep 2019 04:22) (114/77 - 128/83)  BP(mean): --  RR: 18 (18 Sep 2019 04:22) (18 - 20)  SpO2: 99% (18 Sep 2019 04:22) (96% - 100%)  CAPILLARY BLOOD GLUCOSE      POCT Blood Glucose.: 194 mg/dL (18 Sep 2019 08:16)  POCT Blood Glucose.: 236 mg/dL (17 Sep 2019 22:18)  POCT Blood Glucose.: 116 mg/dL (17 Sep 2019 14:41)      09-17 @ 07:01  -  09-18 @ 07:00  --------------------------------------------------------  IN: 240 mL / OUT: 0 mL / NET: 240 mL        GENERAL: stable in bed, alert, NAD, comfortable  NECK: supple, No JVD  CHEST/LUNG: clear to auscultation bilaterally; no rales, rhonchi, or wheezing b/l  HEART: normal S1, S2  ABDOMEN: BS+, soft, ND, NT   EXTREMITIES:  pulses palpable; no clubbing, cyanosis, left BKA site fully dressed.   SKIN: no rashes or lesions      LABS:                        11.0   8.6   )-----------( 265      ( 17 Sep 2019 15:19 )             36.4     09-17    138  |  104  |  21  ----------------------------<  125<H>  4.8   |  21<L>  |  0.71    Ca    9.4      17 Sep 2019 14:47    TPro  7.8  /  Alb  3.1<L>  /  TBili  0.4  /  DBili  x   /  AST  13  /  ALT  25  /  AlkPhos  148<H>  09-17

## 2019-09-18 NOTE — PROGRESS NOTE ADULT - SUBJECTIVE AND OBJECTIVE BOX
Vascular Surgery Progress Note     Subjective/24hour Events:   Patient seen and examined.   Tolerated dressing change this AM.   Remains afebrile, hemodynamically stable.     Vital Signs:  Vital Signs Last 24 Hrs  T(C): 36.7 (18 Sep 2019 04:22), Max: 36.8 (17 Sep 2019 19:30)  T(F): 98 (18 Sep 2019 04:22), Max: 98.3 (17 Sep 2019 19:30)  HR: 74 (18 Sep 2019 04:22) (74 - 101)  BP: 127/63 (18 Sep 2019 04:22) (114/77 - 128/83)  BP(mean): --  RR: 18 (18 Sep 2019 04:22) (18 - 20)  SpO2: 99% (18 Sep 2019 04:22) (96% - 100%)    CAPILLARY BLOOD GLUCOSE      POCT Blood Glucose.: 236 mg/dL (17 Sep 2019 22:18)  POCT Blood Glucose.: 116 mg/dL (17 Sep 2019 14:41)      I&O's Detail    17 Sep 2019 07:01  -  18 Sep 2019 07:00  --------------------------------------------------------  IN:    Oral Fluid: 240 mL  Total IN: 240 mL    OUT:  Total OUT: 0 mL    Total NET: 240 mL          MEDICATIONS  (STANDING):  aspirin enteric coated 81 milliGRAM(s) Oral daily  heparin  Injectable 5000 Unit(s) SubCutaneous every 12 hours  insulin glargine Injectable (LANTUS) 15 Unit(s) SubCutaneous at bedtime  losartan 25 milliGRAM(s) Oral daily  metoprolol succinate ER 25 milliGRAM(s) Oral daily    MEDICATIONS  (PRN):      Physical Exam:    General: NAD.   Respiratory: Breathing non-labored.  Extremities: B/l femoral pulses palpable, b/l popliteal signals, R DP signal present. LLE prior BKA staple site dehisced, with muscle and some fibropurulent exudate. Limited movement of extremity,         Labs:    09-17    138  |  104  |  21  ----------------------------<  125<H>  4.8   |  21<L>  |  0.71    Ca    9.4      17 Sep 2019 14:47    TPro  7.8  /  Alb  3.1<L>  /  TBili  0.4  /  DBili  x   /  AST  13  /  ALT  25  /  AlkPhos  148<H>  09-17    LIVER FUNCTIONS - ( 17 Sep 2019 14:47 )  Alb: 3.1 g/dL / Pro: 7.8 g/dL / ALK PHOS: 148 U/L / ALT: 25 U/L / AST: 13 U/L / GGT: x                                 11.0   8.6   )-----------( 265      ( 17 Sep 2019 15:19 )             36.4         Imaging:  < from: Xray Tibia + Fibula 2 Views, Left (09.17.19 @ 15:46) >  INTERPRETATION:  Clinical information: Status post amputation, evaluate   for infection, gas.    2 views of the left knee are without comparison.    Impression: Serpentine sclerosis within the distal femur and proximal   tibia is consistent with bone infarcts. There is an amputation at the   proximal tibial and fibular shafts. There is mild irregularity at the   distal remaining margin of theproximal fibular shaft and mild periosteal   reaction at the distal remaining tibia medially which are nonspecific but   suggestive of osteomyelitis in the clinical setting of infection. Soft   tissue swelling and air are consistent with soft tissue infection.   Surgical clips are noted. MRI can be performed for further evaluation if   clinically warranted.

## 2019-09-18 NOTE — CHART NOTE - NSCHARTNOTEFT_GEN_A_CORE
Ortho PA Note        Received call from Radiology to inform MRI to r/o osteomyelitis of L Tib/Fib  would be better with IV contrast.   Spoke with Medicine NP (MsTang Yisel Burt) to make aware of above and reviewed   BUN/Cr from yesterday: 21/0.71.   Informed Ms. Burt I would be changing order to MRI of L Tib/Fib WITH IV contrast.        OSMAR Rutledge  Orthopedic Surgery  [phone]

## 2019-09-19 LAB
GLUCOSE BLDC GLUCOMTR-MCNC: 123 MG/DL — HIGH (ref 70–99)
GLUCOSE BLDC GLUCOMTR-MCNC: 134 MG/DL — HIGH (ref 70–99)
GLUCOSE BLDC GLUCOMTR-MCNC: 188 MG/DL — HIGH (ref 70–99)
GLUCOSE BLDC GLUCOMTR-MCNC: 350 MG/DL — HIGH (ref 70–99)
HCT VFR BLD CALC: 36.8 % — LOW (ref 39–50)
HGB BLD-MCNC: 11.3 G/DL — LOW (ref 13–17)
MCHC RBC-ENTMCNC: 27.9 PG — SIGNIFICANT CHANGE UP (ref 27–34)
MCHC RBC-ENTMCNC: 30.7 GM/DL — LOW (ref 32–36)
MCV RBC AUTO: 90.9 FL — SIGNIFICANT CHANGE UP (ref 80–100)
PLATELET # BLD AUTO: 299 K/UL — SIGNIFICANT CHANGE UP (ref 150–400)
RBC # BLD: 4.05 M/UL — LOW (ref 4.2–5.8)
RBC # FLD: 14.9 % — HIGH (ref 10.3–14.5)
WBC # BLD: 11.6 K/UL — HIGH (ref 3.8–10.5)
WBC # FLD AUTO: 11.6 K/UL — HIGH (ref 3.8–10.5)

## 2019-09-19 PROCEDURE — 73590 X-RAY EXAM OF LOWER LEG: CPT | Mod: 26,RT

## 2019-09-19 PROCEDURE — 73630 X-RAY EXAM OF FOOT: CPT | Mod: 26,RT

## 2019-09-19 RX ADMIN — LOSARTAN POTASSIUM 25 MILLIGRAM(S): 100 TABLET, FILM COATED ORAL at 05:37

## 2019-09-19 RX ADMIN — Medication 81 MILLIGRAM(S): at 12:02

## 2019-09-19 RX ADMIN — Medication 2 UNIT(S): at 17:51

## 2019-09-19 RX ADMIN — Medication 4: at 12:08

## 2019-09-19 RX ADMIN — Medication 1: at 08:37

## 2019-09-19 RX ADMIN — HEPARIN SODIUM 5000 UNIT(S): 5000 INJECTION INTRAVENOUS; SUBCUTANEOUS at 17:51

## 2019-09-19 RX ADMIN — ATORVASTATIN CALCIUM 10 MILLIGRAM(S): 80 TABLET, FILM COATED ORAL at 22:03

## 2019-09-19 RX ADMIN — Medication 2 UNIT(S): at 08:37

## 2019-09-19 RX ADMIN — Medication 25 MILLIGRAM(S): at 05:37

## 2019-09-19 RX ADMIN — INSULIN GLARGINE 15 UNIT(S): 100 INJECTION, SOLUTION SUBCUTANEOUS at 22:11

## 2019-09-19 RX ADMIN — HEPARIN SODIUM 5000 UNIT(S): 5000 INJECTION INTRAVENOUS; SUBCUTANEOUS at 05:37

## 2019-09-19 NOTE — CONSULT NOTE ADULT - SUBJECTIVE AND OBJECTIVE BOX
Podiatry pager #: [facility] [phone]/ [phone]    Patient is a 69y old  Male who presents with a chief complaint of r/o left BKA site infection (19 Sep 2019 08:06)      HPI:  70 yo male PMH DM(II), PAD s/p left BKA and prior left leg fem-pop, BIBEMS from facility for wound evaluation. Patient recently received left BKA in July 2019 in [facility], where he was discharged to rehab. Was sent from facility today for concerns of improper wound healing, drainage, and bleeding. Patient otherwise had no complaints, but endorses LLE pain near wound site.    Denies fevers, cough, LOC, CP, SOB. (17 Sep 2019 19:58)      PAST MEDICAL & SURGICAL HISTORY:  Hyperlipidemia  Hypertension  Diabetes  H/O mastoidectomy      MEDICATIONS  (STANDING):  aspirin enteric coated 81 milliGRAM(s) Oral daily  atorvastatin 10 milliGRAM(s) Oral at bedtime  dextrose 5%. 1000 milliLiter(s) (50 mL/Hr) IV Continuous <Continuous>  dextrose 50% Injectable 12.5 Gram(s) IV Push once  dextrose 50% Injectable 25 Gram(s) IV Push once  dextrose 50% Injectable 25 Gram(s) IV Push once  heparin  Injectable 5000 Unit(s) SubCutaneous every 12 hours  insulin glargine Injectable (LANTUS) 15 Unit(s) SubCutaneous at bedtime  insulin lispro (HumaLOG) corrective regimen sliding scale   SubCutaneous three times a day before meals  insulin lispro (HumaLOG) corrective regimen sliding scale   SubCutaneous at bedtime  insulin lispro Injectable (HumaLOG) 2 Unit(s) SubCutaneous two times a day before meals  losartan 25 milliGRAM(s) Oral daily  metoprolol succinate ER 25 milliGRAM(s) Oral daily    MEDICATIONS  (PRN):  dextrose 40% Gel 15 Gram(s) Oral once PRN Blood Glucose LESS THAN 70 milliGRAM(s)/deciliter  glucagon  Injectable 1 milliGRAM(s) IntraMuscular once PRN Glucose LESS THAN 70 milligrams/deciliter      Allergies    No Known Allergies    Intolerances        VITALS:    Vital Signs Last 24 Hrs  T(C): 36.8 (19 Sep 2019 12:35), Max: 36.8 (19 Sep 2019 12:35)  T(F): 98.2 (19 Sep 2019 12:35), Max: 98.2 (19 Sep 2019 12:35)  HR: 106 (19 Sep 2019 12:35) (97 - 106)  BP: 124/85 (19 Sep 2019 12:35) (119/77 - 135/92)  BP(mean): --  RR: 18 (19 Sep 2019 12:35) (18 - 18)  SpO2: 90% (19 Sep 2019 08:01) (90% - 99%)    LABS:                          11.3   11.60 )-----------( 299      ( 19 Sep 2019 13:03 )             36.8               CAPILLARY BLOOD GLUCOSE      POCT Blood Glucose.: 350 mg/dL (19 Sep 2019 12:04)  POCT Blood Glucose.: 188 mg/dL (19 Sep 2019 08:27)  POCT Blood Glucose.: 236 mg/dL (18 Sep 2019 21:49)  POCT Blood Glucose.: 188 mg/dL (18 Sep 2019 17:28)          LOWER EXTREMITY PHYSICAL EXAM:    Vasular: DP/PT 0/4, R, absent cap refill R, Temperature gradient cool R   Neuro: Epicritic sensation diminished to the level of digits, B/L.  Musculoskeletal/Ortho: s/p left BKA   Skin: right foot erythema and edema noted with ischemic pain from the foot to the calf. Right hallux and 5th toe dry with superimposed wet gangrene, gangrenous changes to the 2nd to 4th toe with wet conversion as well. Large ischemic blister noted to the right dorsal lateral foot. Right foot is malodorous.   Etiology: ischemic with infection     RADIOLOGY & ADDITIONAL STUDIES:

## 2019-09-19 NOTE — CONSULT NOTE ADULT - ASSESSMENT
70 yo diabetic, PAD, s/p L BKA 7/22, returns from rehab with poorly healing wound, drainage, but no fever, no pain, WBC/diff normal in ED.  Afebrile since arrival.  CT nonspecific skin thickening, edema, but no collection or osteo.  VAC applied which typically would imply a clean wound.  With pt afebrile, normal WBC/diff, no gross erythema noted, doubt cellulitic component.  No osteo on CT, which should provide adequate screening sensitivity in this setting.    Plan:  Local wound care, VAC, should suffice  Observe off antibiotics.   Agree we can defer MRI in this setting  Follow temps and CBC/diff

## 2019-09-19 NOTE — PROGRESS NOTE ADULT - SUBJECTIVE AND OBJECTIVE BOX
INTERVAL HPI/OVERNIGHT EVENTS:  Pt seen and examined at bedside.     Allergies/Intolerance: No Known Allergies      MEDICATIONS  (STANDING):  aspirin enteric coated 81 milliGRAM(s) Oral daily  atorvastatin 10 milliGRAM(s) Oral at bedtime  dextrose 5%. 1000 milliLiter(s) (50 mL/Hr) IV Continuous <Continuous>  dextrose 50% Injectable 12.5 Gram(s) IV Push once  dextrose 50% Injectable 25 Gram(s) IV Push once  dextrose 50% Injectable 25 Gram(s) IV Push once  heparin  Injectable 5000 Unit(s) SubCutaneous every 12 hours  insulin glargine Injectable (LANTUS) 15 Unit(s) SubCutaneous at bedtime  insulin lispro (HumaLOG) corrective regimen sliding scale   SubCutaneous three times a day before meals  insulin lispro (HumaLOG) corrective regimen sliding scale   SubCutaneous at bedtime  insulin lispro Injectable (HumaLOG) 2 Unit(s) SubCutaneous two times a day before meals  losartan 25 milliGRAM(s) Oral daily  metoprolol succinate ER 25 milliGRAM(s) Oral daily    MEDICATIONS  (PRN):  dextrose 40% Gel 15 Gram(s) Oral once PRN Blood Glucose LESS THAN 70 milliGRAM(s)/deciliter  glucagon  Injectable 1 milliGRAM(s) IntraMuscular once PRN Glucose LESS THAN 70 milligrams/deciliter        ROS: all systems reviewed and wnl      PHYSICAL EXAMINATION:  Vital Signs Last 24 Hrs  T(C): 36.7 (19 Sep 2019 08:01), Max: 36.7 (19 Sep 2019 04:22)  T(F): 98 (19 Sep 2019 08:01), Max: 98 (19 Sep 2019 04:22)  HR: 102 (19 Sep 2019 08:01) (92 - 102)  BP: 119/77 (19 Sep 2019 08:01) (119/77 - 138/75)  BP(mean): --  RR: 18 (19 Sep 2019 08:01) (18 - 18)  SpO2: 90% (19 Sep 2019 08:01) (90% - 100%)  CAPILLARY BLOOD GLUCOSE      POCT Blood Glucose.: 236 mg/dL (18 Sep 2019 21:49)  POCT Blood Glucose.: 188 mg/dL (18 Sep 2019 17:28)  POCT Blood Glucose.: 196 mg/dL (18 Sep 2019 13:39)  POCT Blood Glucose.: 276 mg/dL (18 Sep 2019 12:21)  POCT Blood Glucose.: 194 mg/dL (18 Sep 2019 08:16)      09-18 @ 07:01  -  09-19 @ 07:00  --------------------------------------------------------  IN: 790 mL / OUT: 0 mL / NET: 790 mL        GENERAL:   NECK: supple, No JVD  CHEST/LUNG: clear to auscultation bilaterally; no rales, rhonchi, or wheezing b/l  HEART: normal S1, S2  ABDOMEN: BS+, soft, ND, NT   EXTREMITIES:  pulses palpable; no clubbing, cyanosis, or edema b/l LEs  SKIN: no rashes or lesions      LABS:                        11.0   8.6   )-----------( 265      ( 17 Sep 2019 15:19 )             36.4     09-17    138  |  104  |  21  ----------------------------<  125<H>  4.8   |  21<L>  |  0.71    Ca    9.4      17 Sep 2019 14:47    TPro  7.8  /  Alb  3.1<L>  /  TBili  0.4  /  DBili  x   /  AST  13  /  ALT  25  /  AlkPhos  148<H>  09-17 INTERVAL HPI/OVERNIGHT EVENTS:  Pt seen and examined at bedside.     Allergies/Intolerance: No Known Allergies      MEDICATIONS  (STANDING):  aspirin enteric coated 81 milliGRAM(s) Oral daily  atorvastatin 10 milliGRAM(s) Oral at bedtime  dextrose 5%. 1000 milliLiter(s) (50 mL/Hr) IV Continuous <Continuous>  dextrose 50% Injectable 12.5 Gram(s) IV Push once  dextrose 50% Injectable 25 Gram(s) IV Push once  dextrose 50% Injectable 25 Gram(s) IV Push once  heparin  Injectable 5000 Unit(s) SubCutaneous every 12 hours  insulin glargine Injectable (LANTUS) 15 Unit(s) SubCutaneous at bedtime  insulin lispro (HumaLOG) corrective regimen sliding scale   SubCutaneous three times a day before meals  insulin lispro (HumaLOG) corrective regimen sliding scale   SubCutaneous at bedtime  insulin lispro Injectable (HumaLOG) 2 Unit(s) SubCutaneous two times a day before meals  losartan 25 milliGRAM(s) Oral daily  metoprolol succinate ER 25 milliGRAM(s) Oral daily    MEDICATIONS  (PRN):  dextrose 40% Gel 15 Gram(s) Oral once PRN Blood Glucose LESS THAN 70 milliGRAM(s)/deciliter  glucagon  Injectable 1 milliGRAM(s) IntraMuscular once PRN Glucose LESS THAN 70 milligrams/deciliter        ROS: all systems reviewed and wnl      PHYSICAL EXAMINATION:  Vital Signs Last 24 Hrs  T(C): 36.7 (19 Sep 2019 08:01), Max: 36.7 (19 Sep 2019 04:22)  T(F): 98 (19 Sep 2019 08:01), Max: 98 (19 Sep 2019 04:22)  HR: 102 (19 Sep 2019 08:01) (92 - 102)  BP: 119/77 (19 Sep 2019 08:01) (119/77 - 138/75)  BP(mean): --  RR: 18 (19 Sep 2019 08:01) (18 - 18)  SpO2: 90% (19 Sep 2019 08:01) (90% - 100%)  CAPILLARY BLOOD GLUCOSE      POCT Blood Glucose.: 236 mg/dL (18 Sep 2019 21:49)  POCT Blood Glucose.: 188 mg/dL (18 Sep 2019 17:28)  POCT Blood Glucose.: 196 mg/dL (18 Sep 2019 13:39)  POCT Blood Glucose.: 276 mg/dL (18 Sep 2019 12:21)  POCT Blood Glucose.: 194 mg/dL (18 Sep 2019 08:16)      09-18 @ 07:01  -  09-19 @ 07:00  --------------------------------------------------------  IN: 790 mL / OUT: 0 mL / NET: 790 mL        GENERAL: stable in bed, comfortable, afebrile, no SOB  NECK: supple, No JVD  CHEST/LUNG: clear to auscultation bilaterally; no rales, rhonchi, or wheezing b/l  HEART: normal S1, S2  ABDOMEN: BS+, soft, ND, NT   EXTREMITIES:  pulses palpable; no clubbing, cyanosis, left BKA site dressed.   SKIN: no rashes or lesions      LABS:                        11.0   8.6   )-----------( 265      ( 17 Sep 2019 15:19 )             36.4     09-17    138  |  104  |  21  ----------------------------<  125<H>  4.8   |  21<L>  |  0.71    Ca    9.4      17 Sep 2019 14:47    TPro  7.8  /  Alb  3.1<L>  /  TBili  0.4  /  DBili  x   /  AST  13  /  ALT  25  /  AlkPhos  148<H>  09-17

## 2019-09-19 NOTE — CHART NOTE - NSCHARTNOTEFT_GEN_A_CORE
continue wound VAC for now to assist in wound healing - possible skin graft at a later date    Haydee Norris PA-C [id number] Vascular

## 2019-09-19 NOTE — PROGRESS NOTE ADULT - ASSESSMENT
70 yo male PMH DM(II), PAD s/p left BKA and prior left leg fem-pop, BIBEMS from facility for wound evaluation. Patient recently received left BKA in July 2019 in [facility], where he was discharged to rehab. Was sent from facility today for concerns of improper wound healing, drainage, and bleeding. Patient otherwise had no complaints, but endorses LLE pain near wound site.    Vascular: Ortho and Vascular notes reviewed. Hold on MRI of left BKA with IV contrast to r/o site infection. CT of left limb shows no abscess or OM. Elevated ESR of 84 and elevated CRP noted on arrival. Continue ASA 81 mg/day. Add Lipitor 10 mg/day. Lipid panel in AM. VAC dressing placed in left foot. ID consulted today regarding ABX for left leg cellulitis.      Endo: Add Humolog 2 units bid to Lantus 15 units at night. A1C in AM. BS stable 188 -- 236.      CV: Contine Cozaar 25 mg/day and Toprol XL 25 mg/day. BP stable 114/76.     Possible discharge to rehab tomorrow with VAC dressing in left leg.

## 2019-09-19 NOTE — CONSULT NOTE ADULT - ASSESSMENT
70 yo M presents with right foot wet with dry gangrene   - pt was seen and evaluated   - WBC 11.6, ESR 84  - right foot is malodorous, multiple digits dry with superimposed wet gangrene, ischemic blister to the right dorsal foot and ischemic pain from the foot to the calf   - ischemic blister was lanced with 15 blade, reveled dermis underneath   - wound culture taken   - ordered STAT RLE Xray   - Patient is not ambulatory, the right foot is not salvageable, communicated with vascular for evaluation for BKA/AKA  - will follow up with vascular plan  - see with attending

## 2019-09-19 NOTE — CONSULT NOTE ADULT - SUBJECTIVE AND OBJECTIVE BOX
HPI:   Patient is a 69y male with DM, PAD- prior L fem-pop, and L BKA July 22, sent to Rehab.  He also has ischemic changes, dry gangrene R foot.  He returned 9/17 with dehisced L BKA wound, drainage, bleeding.  He notes mild stump pain, VAC now in place.  No recent fever or chills,  No resp/GI/ Sxs.    REVIEW OF SYSTEMS:  All other review of systems negative (Comprehensive ROS)    PAST MEDICAL & SURGICAL HISTORY:  Hyperlipidemia  Hypertension  Diabetes  H/O mastoidectomy      Allergies  No Known Allergies    Antimicrobials off  Vanco and CTX x 1 given in ED    Other Medications:  aspirin enteric coated 81 milliGRAM(s) Oral daily  atorvastatin 10 milliGRAM(s) Oral at bedtime  dextrose 40% Gel 15 Gram(s) Oral once PRN  dextrose 5%. 1000 milliLiter(s) IV Continuous <Continuous>  dextrose 50% Injectable 12.5 Gram(s) IV Push once  dextrose 50% Injectable 25 Gram(s) IV Push once  dextrose 50% Injectable 25 Gram(s) IV Push once  glucagon  Injectable 1 milliGRAM(s) IntraMuscular once PRN  heparin  Injectable 5000 Unit(s) SubCutaneous every 12 hours  insulin glargine Injectable (LANTUS) 15 Unit(s) SubCutaneous at bedtime  insulin lispro (HumaLOG) corrective regimen sliding scale   SubCutaneous three times a day before meals  insulin lispro (HumaLOG) corrective regimen sliding scale   SubCutaneous at bedtime  insulin lispro Injectable (HumaLOG) 2 Unit(s) SubCutaneous two times a day before meals  losartan 25 milliGRAM(s) Oral daily  metoprolol succinate ER 25 milliGRAM(s) Oral daily      FAMILY HISTORY:  FHx: diabetes mellitus: In all siblings(2 sisters and 4 brothers)      SOCIAL HISTORY:  Smoking: no    ETOH: no      Single     T(F): 98.2 (09-19-19 @ 12:35), Max: 98.2 (09-19-19 @ 12:35)  HR: 106 (09-19-19 @ 12:35)  BP: 124/85 (09-19-19 @ 12:35)  RR: 18 (09-19-19 @ 12:35)  SpO2: 90% (09-19-19 @ 08:01)  Wt(kg): --    PHYSICAL EXAM:  General: alert, no acute distress  Eyes:  anicteric, no conjunctival injection, no discharge  Oropharynx: no lesions or injection 	  Neck: supple, without adenopathy  Lungs: clear to auscultation  Heart: regular rate and rhythm; no murmur, rubs or gallops  Abdomen: soft, nondistended, nontender, without mass or organomegaly  Skin: no rash  Extremities: L BKA dressing/VAC in place, no surrounding erythema; R foot dressings in place, dry gangrened toes; mild R leg edema  Neurologic: alert, moves all extremities    LAB RESULTS:                        11.3   11.60 )-----------( 299      ( 19 Sep 2019 13:03 )             36.8     MICROBIOLOGY:  RECENT CULTURES:  09-17 @ 22:21 .Blood     No growth to date.    RADIOLOGY REVIEWED:  CT Lower Extremity w/ IV Cont, Left (09.17.19 @ 17:53) >  1.  Skin defect at the distal anterior stump with adjacent soft tissue   swelling. Circumferential edema and skin thickening about the thigh.   Nonspecific but can be seen with cellulitis.  2.Moderate confluent edema deep to the myofascial plane of the left   vastus lateralis muscle. May reflect phlegmon versus early abscess   formation. No definite drainable fluid collection.  3.  No CT evidence for osteomyelitis.  4.  Distal femoral proximal tibial bone infarctions.

## 2019-09-20 LAB
ANION GAP SERPL CALC-SCNC: 15 MMOL/L — SIGNIFICANT CHANGE UP (ref 5–17)
ANION GAP SERPL CALC-SCNC: 19 MMOL/L — HIGH (ref 5–17)
BUN SERPL-MCNC: 34 MG/DL — HIGH (ref 7–23)
BUN SERPL-MCNC: 34 MG/DL — HIGH (ref 7–23)
CALCIUM SERPL-MCNC: 9.5 MG/DL — SIGNIFICANT CHANGE UP (ref 8.4–10.5)
CALCIUM SERPL-MCNC: 9.8 MG/DL — SIGNIFICANT CHANGE UP (ref 8.4–10.5)
CHLORIDE SERPL-SCNC: 101 MMOL/L — SIGNIFICANT CHANGE UP (ref 96–108)
CHLORIDE SERPL-SCNC: 99 MMOL/L — SIGNIFICANT CHANGE UP (ref 96–108)
CO2 SERPL-SCNC: 18 MMOL/L — LOW (ref 22–31)
CO2 SERPL-SCNC: 23 MMOL/L — SIGNIFICANT CHANGE UP (ref 22–31)
CREAT SERPL-MCNC: 1.07 MG/DL — SIGNIFICANT CHANGE UP (ref 0.5–1.3)
CREAT SERPL-MCNC: 1.14 MG/DL — SIGNIFICANT CHANGE UP (ref 0.5–1.3)
GAS PNL BLDA: SIGNIFICANT CHANGE UP
GLUCOSE BLDC GLUCOMTR-MCNC: 103 MG/DL — HIGH (ref 70–99)
GLUCOSE BLDC GLUCOMTR-MCNC: 151 MG/DL — HIGH (ref 70–99)
GLUCOSE BLDC GLUCOMTR-MCNC: 158 MG/DL — HIGH (ref 70–99)
GLUCOSE BLDC GLUCOMTR-MCNC: 172 MG/DL — HIGH (ref 70–99)
GLUCOSE BLDC GLUCOMTR-MCNC: 173 MG/DL — HIGH (ref 70–99)
GLUCOSE BLDC GLUCOMTR-MCNC: 184 MG/DL — HIGH (ref 70–99)
GLUCOSE SERPL-MCNC: 113 MG/DL — HIGH (ref 70–99)
GLUCOSE SERPL-MCNC: 164 MG/DL — HIGH (ref 70–99)
HCT VFR BLD CALC: 33.2 % — LOW (ref 39–50)
HCT VFR BLD CALC: 33.8 % — LOW (ref 39–50)
HGB BLD-MCNC: 10.2 G/DL — LOW (ref 13–17)
HGB BLD-MCNC: 10.8 G/DL — LOW (ref 13–17)
LACTATE SERPL-SCNC: 2.1 MMOL/L — HIGH (ref 0.7–2)
MCHC RBC-ENTMCNC: 28.2 PG — SIGNIFICANT CHANGE UP (ref 27–34)
MCHC RBC-ENTMCNC: 29.5 PG — SIGNIFICANT CHANGE UP (ref 27–34)
MCHC RBC-ENTMCNC: 30.7 GM/DL — LOW (ref 32–36)
MCHC RBC-ENTMCNC: 32.1 GM/DL — SIGNIFICANT CHANGE UP (ref 32–36)
MCV RBC AUTO: 91.7 FL — SIGNIFICANT CHANGE UP (ref 80–100)
MCV RBC AUTO: 91.9 FL — SIGNIFICANT CHANGE UP (ref 80–100)
PLATELET # BLD AUTO: 283 K/UL — SIGNIFICANT CHANGE UP (ref 150–400)
PLATELET # BLD AUTO: 297 K/UL — SIGNIFICANT CHANGE UP (ref 150–400)
POTASSIUM SERPL-MCNC: 4.1 MMOL/L — SIGNIFICANT CHANGE UP (ref 3.5–5.3)
POTASSIUM SERPL-MCNC: 4.3 MMOL/L — SIGNIFICANT CHANGE UP (ref 3.5–5.3)
POTASSIUM SERPL-SCNC: 4.1 MMOL/L — SIGNIFICANT CHANGE UP (ref 3.5–5.3)
POTASSIUM SERPL-SCNC: 4.3 MMOL/L — SIGNIFICANT CHANGE UP (ref 3.5–5.3)
RBC # BLD: 3.62 M/UL — LOW (ref 4.2–5.8)
RBC # BLD: 3.67 M/UL — LOW (ref 4.2–5.8)
RBC # FLD: 14.4 % — SIGNIFICANT CHANGE UP (ref 10.3–14.5)
RBC # FLD: 14.8 % — HIGH (ref 10.3–14.5)
SODIUM SERPL-SCNC: 137 MMOL/L — SIGNIFICANT CHANGE UP (ref 135–145)
SODIUM SERPL-SCNC: 138 MMOL/L — SIGNIFICANT CHANGE UP (ref 135–145)
WBC # BLD: 11.6 K/UL — HIGH (ref 3.8–10.5)
WBC # BLD: 13 K/UL — HIGH (ref 3.8–10.5)
WBC # FLD AUTO: 11.6 K/UL — HIGH (ref 3.8–10.5)
WBC # FLD AUTO: 13 K/UL — HIGH (ref 3.8–10.5)

## 2019-09-20 PROCEDURE — 71045 X-RAY EXAM CHEST 1 VIEW: CPT | Mod: 26

## 2019-09-20 PROCEDURE — 99232 SBSQ HOSP IP/OBS MODERATE 35: CPT | Mod: 24

## 2019-09-20 RX ORDER — VANCOMYCIN HCL 1 G
1000 VIAL (EA) INTRAVENOUS ONCE
Refills: 0 | Status: COMPLETED | OUTPATIENT
Start: 2019-09-20 | End: 2019-09-20

## 2019-09-20 RX ORDER — DIGOXIN 250 MCG
0.12 TABLET ORAL DAILY
Refills: 0 | Status: DISCONTINUED | OUTPATIENT
Start: 2019-09-20 | End: 2019-10-07

## 2019-09-20 RX ORDER — PIPERACILLIN AND TAZOBACTAM 4; .5 G/20ML; G/20ML
3.38 INJECTION, POWDER, LYOPHILIZED, FOR SOLUTION INTRAVENOUS ONCE
Refills: 0 | Status: COMPLETED | OUTPATIENT
Start: 2019-09-20 | End: 2019-09-20

## 2019-09-20 RX ORDER — IPRATROPIUM/ALBUTEROL SULFATE 18-103MCG
3 AEROSOL WITH ADAPTER (GRAM) INHALATION ONCE
Refills: 0 | Status: COMPLETED | OUTPATIENT
Start: 2019-09-20 | End: 2019-09-20

## 2019-09-20 RX ORDER — VANCOMYCIN HCL 1 G
1000 VIAL (EA) INTRAVENOUS EVERY 12 HOURS
Refills: 0 | Status: DISCONTINUED | OUTPATIENT
Start: 2019-09-20 | End: 2019-09-27

## 2019-09-20 RX ORDER — FUROSEMIDE 40 MG
40 TABLET ORAL DAILY
Refills: 0 | Status: DISCONTINUED | OUTPATIENT
Start: 2019-09-20 | End: 2019-09-25

## 2019-09-20 RX ORDER — VANCOMYCIN HCL 1 G
VIAL (EA) INTRAVENOUS
Refills: 0 | Status: DISCONTINUED | OUTPATIENT
Start: 2019-09-20 | End: 2019-09-27

## 2019-09-20 RX ORDER — FUROSEMIDE 40 MG
40 TABLET ORAL ONCE
Refills: 0 | Status: COMPLETED | OUTPATIENT
Start: 2019-09-20 | End: 2019-09-20

## 2019-09-20 RX ORDER — ACETAMINOPHEN 500 MG
1000 TABLET ORAL ONCE
Refills: 0 | Status: COMPLETED | OUTPATIENT
Start: 2019-09-20 | End: 2019-09-20

## 2019-09-20 RX ORDER — PIPERACILLIN AND TAZOBACTAM 4; .5 G/20ML; G/20ML
3.38 INJECTION, POWDER, LYOPHILIZED, FOR SOLUTION INTRAVENOUS EVERY 8 HOURS
Refills: 0 | Status: COMPLETED | OUTPATIENT
Start: 2019-09-20 | End: 2019-09-27

## 2019-09-20 RX ADMIN — Medication 1: at 13:26

## 2019-09-20 RX ADMIN — Medication 400 MILLIGRAM(S): at 02:18

## 2019-09-20 RX ADMIN — Medication 40 MILLIGRAM(S): at 03:04

## 2019-09-20 RX ADMIN — Medication 3 MILLILITER(S): at 01:43

## 2019-09-20 RX ADMIN — Medication 250 MILLIGRAM(S): at 03:05

## 2019-09-20 RX ADMIN — Medication 40 MILLIGRAM(S): at 09:57

## 2019-09-20 RX ADMIN — Medication 0.12 MILLIGRAM(S): at 13:49

## 2019-09-20 RX ADMIN — Medication 1000 MILLIGRAM(S): at 03:00

## 2019-09-20 RX ADMIN — Medication 81 MILLIGRAM(S): at 13:49

## 2019-09-20 RX ADMIN — Medication 2 UNIT(S): at 09:57

## 2019-09-20 RX ADMIN — HEPARIN SODIUM 5000 UNIT(S): 5000 INJECTION INTRAVENOUS; SUBCUTANEOUS at 05:21

## 2019-09-20 RX ADMIN — Medication 250 MILLIGRAM(S): at 18:04

## 2019-09-20 RX ADMIN — PIPERACILLIN AND TAZOBACTAM 25 GRAM(S): 4; .5 INJECTION, POWDER, LYOPHILIZED, FOR SOLUTION INTRAVENOUS at 21:40

## 2019-09-20 RX ADMIN — Medication 2 UNIT(S): at 17:57

## 2019-09-20 RX ADMIN — INSULIN GLARGINE 15 UNIT(S): 100 INJECTION, SOLUTION SUBCUTANEOUS at 22:34

## 2019-09-20 RX ADMIN — PIPERACILLIN AND TAZOBACTAM 200 GRAM(S): 4; .5 INJECTION, POWDER, LYOPHILIZED, FOR SOLUTION INTRAVENOUS at 05:22

## 2019-09-20 RX ADMIN — Medication 25 MILLIGRAM(S): at 05:21

## 2019-09-20 RX ADMIN — Medication 1: at 17:58

## 2019-09-20 RX ADMIN — PIPERACILLIN AND TAZOBACTAM 25 GRAM(S): 4; .5 INJECTION, POWDER, LYOPHILIZED, FOR SOLUTION INTRAVENOUS at 13:49

## 2019-09-20 RX ADMIN — ATORVASTATIN CALCIUM 10 MILLIGRAM(S): 80 TABLET, FILM COATED ORAL at 21:40

## 2019-09-20 RX ADMIN — HEPARIN SODIUM 5000 UNIT(S): 5000 INJECTION INTRAVENOUS; SUBCUTANEOUS at 17:58

## 2019-09-20 RX ADMIN — LOSARTAN POTASSIUM 25 MILLIGRAM(S): 100 TABLET, FILM COATED ORAL at 05:21

## 2019-09-20 RX ADMIN — Medication 1: at 09:57

## 2019-09-20 NOTE — PROGRESS NOTE ADULT - ASSESSMENT
68 yo diabetic, PAD, s/p L BKA 7/22, returns from rehab with poorly healing wound, drainage- afebrile, WBC/diff normal in ED.    CT nonspecific skin thickening, edema, but no collection or osteo.    Febrile overnight, WBC increased, Vanco and Zosyn started  R foot vs L BKA stump source  Bld Cxs negative  Wound Cx pending    Plan:  Local wound care as per Vascular and Podiatry  Plans for R foot amp noted  Await further Cx data  Follow temps and CBC/diff

## 2019-09-20 NOTE — CHART NOTE - NSCHARTNOTEFT_GEN_A_CORE
Medicine PA Note    Name: JIM PAREDES  MRN: [id number]  Age: 69y Gender: Male    CC: Increased WOB / Fever    Called by RN to evaluate patient for increased work of breathing. Patient was seen and examined by me at the bedside. Patient non-toxic in appearance. Patient denies dyspnea. Patient denies the presence of chest pain, palpitations, cough, diaphoresis, dizziness/lightheadedness, N/V, HA, and/or visual changes.     Vital Signs Last 24 Hrs  T(C): 38.7 (20 Sep 2019 02:17), Max: 38.7 (20 Sep 2019 02:17)  T(F): 101.6 (20 Sep 2019 02:17), Max: 101.6 (20 Sep 2019 02:17)  HR: 108 (20 Sep 2019 02:17) (80 - 108)  BP: 146/97 (20 Sep 2019 02:17) (119/77 - 146/97)  RR: 18 (20 Sep 2019 02:17) (18 - 18)  SpO2: 98% (20 Sep 2019 02:17) (89% - 99%)             ABG - ( 20 Sep 2019 01:48 )  pH, Arterial: 7.46  pH, Blood: x     /  pCO2: 27    /  pO2: 76    / HCO3: 19    / Base Excess: -3.7  /  SaO2: 95        Radiology: None available from this admission.     PHYSICAL EXAM:  GENERAL: A well-developed AA male in no acute distress. Non-toxic appearing. Speaking in full sentences.  HEAD:  Atraumatic and normocephalic  EYES: EOMI, PERRLA, conjunctiva and sclera clear  CHEST/LUNG: (+) decreased BS BL L>R (+) expiratory wheezing, (+) accessory muscle use. No rhonchi or rales. Chest expansion symmetrical.  HEART: Regular rate and rhythm. No murmurs, rubs, or gallops. No palpable thrill.  ABDOMEN: Soft, nontender, and nondistended. Bowel sounds present in all 4 quadrants.  EXTREMITIES:  2+ peripheral pulses. No clubbing, cyanosis, or edema.  PSYCH: AAOx3. Appropriate/Flat affect.  NEUROLOGY: No focal deficits    ASSESSMENT/PLAN:   70 yo male PMH DM(II), PAD s/p left BKA and prior left leg fem-pop, BIBEMS from facility for wound evaluation. Patient recently received left BKA in July 2019 in [facility], where he was discharged to rehab. Was sent from facility today for concerns of improper wound healing, drainage, and bleeding. Patient otherwise had no complaints, but endorses LLE pain near wound site. Patient now has a wound VAC to the E. Patient now presents with a fever and increased WOB.     #Increased WOB w/ Fever r/o PNA  - Rectal temperature 101.6F > Tylenol IV given  - ABG ordered  - Placed on BiPAP  - Urgent CXR ordered > pending results  - BCx ordered  - Vanco/Zosyn ordered  - 500 cc NS bolus given  - Repeat Lactate in AM    Will continue to monitor closely and endorse to primary team in AM.     Jc Ferreira PA-C  Department of Medicine   [facility] Medicine PA Note    Name: JIM PAREDES  MRN: [id number]  Age: 69y Gender: Male    CC: Increased WOB / Fever    Called by RN to evaluate patient for increased work of breathing. Patient was seen and examined by me at the bedside. Patient non-toxic in appearance. Patient denies dyspnea. Patient denies the presence of chest pain, palpitations, cough, diaphoresis, dizziness/lightheadedness, N/V, HA, and/or visual changes.     Vital Signs Last 24 Hrs  T(C): 38.7 (20 Sep 2019 02:17), Max: 38.7 (20 Sep 2019 02:17)  T(F): 101.6 (20 Sep 2019 02:17), Max: 101.6 (20 Sep 2019 02:17)  HR: 108 (20 Sep 2019 02:17) (80 - 108)  BP: 146/97 (20 Sep 2019 02:17) (119/77 - 146/97)  RR: 18 (20 Sep 2019 02:17) (18 - 18)  SpO2: 98% (20 Sep 2019 02:17) (89% - 99%)             ABG - ( 20 Sep 2019 01:48 )  pH, Arterial: 7.46  pH, Blood: x     /  pCO2: 27    /  pO2: 76    / HCO3: 19    / Base Excess: -3.7  /  SaO2: 95        Radiology: None available from this admission.     PHYSICAL EXAM:  GENERAL: A well-developed AA male in no acute distress. Non-toxic appearing. Speaking in full sentences.  HEAD:  Atraumatic and normocephalic  EYES: EOMI, PERRLA, conjunctiva and sclera clear  CHEST/LUNG: (+) decreased BS BL L>R (+) expiratory wheezing, (+) accessory muscle use. No rhonchi or rales. Chest expansion symmetrical.  HEART: Regular rate and rhythm. No murmurs, rubs, or gallops. No palpable thrill.  ABDOMEN: Soft, nontender, and nondistended. Bowel sounds present in all 4 quadrants.  EXTREMITIES:  (+) L BKA w/ wound vac. L LE wrapped and in zFlo boot.  PSYCH: AAOx3. Appropriate/Flat affect.  NEUROLOGY: No focal deficits    ASSESSMENT/PLAN:   70 yo male PMH DM(II), PAD s/p left BKA and prior left leg fem-pop, BIBEMS from facility for wound evaluation. Patient recently received left BKA in July 2019 in [facility], where he was discharged to rehab. Was sent from facility today for concerns of improper wound healing, drainage, and bleeding. Patient otherwise had no complaints, but endorses LLE pain near wound site. Patient now has a wound VAC to the E. Patient now presents with a fever and increased WOB.     #Increased WOB w/ Fever r/o PNA  - Rectal temperature 101.6F > Tylenol IV given  - ABG, BMP, and CBC ordered  - Placed on BiPAP  - 40mg Iv Lasix given  - Urgent CXR ordered > prelim BL pleural effusions > pending official read  - Vanco/Zosyn ordered  - 500 cc NS bolus given  - Repeat Lactate in AM    Will continue to monitor closely and endorse to primary team in AM.     Jc Ferreira PA-C  Department of Medicine   [facility] Medicine PA Note    Name: JIM PAREDES  MRN: [id number]  Age: 69y Gender: Male    CC: Increased WOB / Fever    Called by RN to evaluate patient for increased work of breathing. Patient was seen and examined by me at the bedside. Patient non-toxic in appearance. Patient denies dyspnea. Patient denies the presence of chest pain, palpitations, cough, diaphoresis, dizziness/lightheadedness, N/V, HA, and/or visual changes.     Vital Signs Last 24 Hrs  T(C): 38.7 (20 Sep 2019 02:17), Max: 38.7 (20 Sep 2019 02:17)  T(F): 101.6 (20 Sep 2019 02:17), Max: 101.6 (20 Sep 2019 02:17)  HR: 108 (20 Sep 2019 02:17) (80 - 108)  BP: 146/97 (20 Sep 2019 02:17) (119/77 - 146/97)  RR: 18 (20 Sep 2019 02:17) (18 - 18)  SpO2: 98% (20 Sep 2019 02:17) (89% - 99%)             ABG - ( 20 Sep 2019 01:48 )  pH, Arterial: 7.46  pH, Blood: x     /  pCO2: 27    /  pO2: 76    / HCO3: 19    / Base Excess: -3.7  /  SaO2: 95        Radiology: None available from this admission.     PHYSICAL EXAM:  GENERAL: A well-developed AA male in no acute distress. Non-toxic appearing. Speaking in full sentences.  EYES: EOMI, PERRLA, conjunctiva and sclera clear  CHEST/LUNG: (+) decreased BS BL L>R (+) expiratory wheezing, (+) accessory muscle use. No rhonchi or rales. Chest expansion symmetrical.  HEART: Regular rate and rhythm. No murmurs, rubs, or gallops. No palpable thrill.  ABDOMEN: Soft, nontender, and nondistended. Bowel sounds present in all 4 quadrants.  : (+) condom cath   EXTREMITIES:  (+) L BKA w/ wound vac. L LE wrapped and in zFlo boot.  PSYCH: AAOx2. Appropriate affect.  NEUROLOGY: No focal deficits    ASSESSMENT/PLAN:   68 yo male PMH DM(II), PAD s/p left BKA and prior left leg fem-pop, BIBEMS from facility for wound evaluation. Patient recently received left BKA in July 2019 in [facility], where he was discharged to rehab. Was sent from facility today for concerns of improper wound healing, drainage, and bleeding. Patient otherwise had no complaints, but endorses LLE pain near wound site. Patient now has a wound VAC to the E. Patient now presents with a fever and increased WOB.     #Increased WOB w/ Fever r/o PNA  - Rectal temperature 101.6F > Tylenol IV given  - ABG, BMP, and CBC ordered  - Placed on BiPAP  - 40mg Iv Lasix given  - Urgent CXR ordered > prelim BL pleural effusions > pending official read  - Vanco/Zosyn ordered  - 500 cc NS bolus given  - Repeat Lactate in AM    Will continue to monitor closely and endorse to primary team in AM.     Jc Ferreira PA-C  Department of Medicine   [facility]

## 2019-09-20 NOTE — PROGRESS NOTE ADULT - SUBJECTIVE AND OBJECTIVE BOX
INTERVAL HPI/OVERNIGHT EVENTS:  Pt seen and examined at bedside.     Allergies/Intolerance: No Known Allergies      MEDICATIONS  (STANDING):  aspirin enteric coated 81 milliGRAM(s) Oral daily  atorvastatin 10 milliGRAM(s) Oral at bedtime  dextrose 5%. 1000 milliLiter(s) (50 mL/Hr) IV Continuous <Continuous>  dextrose 50% Injectable 12.5 Gram(s) IV Push once  dextrose 50% Injectable 25 Gram(s) IV Push once  dextrose 50% Injectable 25 Gram(s) IV Push once  heparin  Injectable 5000 Unit(s) SubCutaneous every 12 hours  insulin glargine Injectable (LANTUS) 15 Unit(s) SubCutaneous at bedtime  insulin lispro (HumaLOG) corrective regimen sliding scale   SubCutaneous three times a day before meals  insulin lispro (HumaLOG) corrective regimen sliding scale   SubCutaneous at bedtime  insulin lispro Injectable (HumaLOG) 2 Unit(s) SubCutaneous two times a day before meals  losartan 25 milliGRAM(s) Oral daily  metoprolol succinate ER 25 milliGRAM(s) Oral daily  piperacillin/tazobactam IVPB.. 3.375 Gram(s) IV Intermittent every 8 hours  vancomycin  IVPB      vancomycin  IVPB 1000 milliGRAM(s) IV Intermittent every 12 hours    MEDICATIONS  (PRN):  dextrose 40% Gel 15 Gram(s) Oral once PRN Blood Glucose LESS THAN 70 milliGRAM(s)/deciliter  glucagon  Injectable 1 milliGRAM(s) IntraMuscular once PRN Glucose LESS THAN 70 milligrams/deciliter        ROS: all systems reviewed and wnl      PHYSICAL EXAMINATION:  Vital Signs Last 24 Hrs  T(C): 37.9 (20 Sep 2019 05:16), Max: 38.7 (20 Sep 2019 02:17)  T(F): 100.2 (20 Sep 2019 05:16), Max: 101.6 (20 Sep 2019 02:17)  HR: 86 (20 Sep 2019 06:50) (80 - 108)  BP: 145/86 (20 Sep 2019 05:16) (124/85 - 146/97)  BP(mean): --  RR: 18 (20 Sep 2019 05:16) (18 - 18)  SpO2: 99% (20 Sep 2019 06:50) (89% - 100%)  CAPILLARY BLOOD GLUCOSE      POCT Blood Glucose.: 173 mg/dL (20 Sep 2019 08:31)  POCT Blood Glucose.: 103 mg/dL (20 Sep 2019 01:35)  POCT Blood Glucose.: 134 mg/dL (19 Sep 2019 22:04)  POCT Blood Glucose.: 123 mg/dL (19 Sep 2019 17:32)  POCT Blood Glucose.: 350 mg/dL (19 Sep 2019 12:04)      09-19 @ 07:01  -  09-20 @ 07:00  --------------------------------------------------------  IN: 690 mL / OUT: 150 mL / NET: 540 mL        GENERAL:   NECK: supple, No JVD  CHEST/LUNG: clear to auscultation bilaterally; no rales, rhonchi, or wheezing b/l  HEART: normal S1, S2  ABDOMEN: BS+, soft, ND, NT   EXTREMITIES:  pulses palpable; no clubbing, cyanosis, or edema b/l LEs  SKIN: no rashes or lesions      LABS:                        10.8   11.6  )-----------( 297      ( 20 Sep 2019 01:55 )             33.8     09-20    138  |  101  |  34<H>  ----------------------------<  113<H>  4.3   |  18<L>  |  1.07    Ca    9.8      20 Sep 2019 01:55 INTERVAL HPI/OVERNIGHT EVENTS:  Pt seen and examined at bedside.     Allergies/Intolerance: No Known Allergies      MEDICATIONS  (STANDING):  aspirin enteric coated 81 milliGRAM(s) Oral daily  atorvastatin 10 milliGRAM(s) Oral at bedtime  dextrose 5%. 1000 milliLiter(s) (50 mL/Hr) IV Continuous <Continuous>  dextrose 50% Injectable 12.5 Gram(s) IV Push once  dextrose 50% Injectable 25 Gram(s) IV Push once  dextrose 50% Injectable 25 Gram(s) IV Push once  heparin  Injectable 5000 Unit(s) SubCutaneous every 12 hours  insulin glargine Injectable (LANTUS) 15 Unit(s) SubCutaneous at bedtime  insulin lispro (HumaLOG) corrective regimen sliding scale   SubCutaneous three times a day before meals  insulin lispro (HumaLOG) corrective regimen sliding scale   SubCutaneous at bedtime  insulin lispro Injectable (HumaLOG) 2 Unit(s) SubCutaneous two times a day before meals  losartan 25 milliGRAM(s) Oral daily  metoprolol succinate ER 25 milliGRAM(s) Oral daily  piperacillin/tazobactam IVPB.. 3.375 Gram(s) IV Intermittent every 8 hours  vancomycin  IVPB      vancomycin  IVPB 1000 milliGRAM(s) IV Intermittent every 12 hours    MEDICATIONS  (PRN):  dextrose 40% Gel 15 Gram(s) Oral once PRN Blood Glucose LESS THAN 70 milliGRAM(s)/deciliter  glucagon  Injectable 1 milliGRAM(s) IntraMuscular once PRN Glucose LESS THAN 70 milligrams/deciliter        ROS: all systems reviewed and wnl      PHYSICAL EXAMINATION:  Vital Signs Last 24 Hrs  T(C): 37.9 (20 Sep 2019 05:16), Max: 38.7 (20 Sep 2019 02:17)  T(F): 100.2 (20 Sep 2019 05:16), Max: 101.6 (20 Sep 2019 02:17)  HR: 86 (20 Sep 2019 06:50) (80 - 108)  BP: 145/86 (20 Sep 2019 05:16) (124/85 - 146/97)  BP(mean): --  RR: 18 (20 Sep 2019 05:16) (18 - 18)  SpO2: 99% (20 Sep 2019 06:50) (89% - 100%)  CAPILLARY BLOOD GLUCOSE      POCT Blood Glucose.: 173 mg/dL (20 Sep 2019 08:31)  POCT Blood Glucose.: 103 mg/dL (20 Sep 2019 01:35)  POCT Blood Glucose.: 134 mg/dL (19 Sep 2019 22:04)  POCT Blood Glucose.: 123 mg/dL (19 Sep 2019 17:32)  POCT Blood Glucose.: 350 mg/dL (19 Sep 2019 12:04)      09-19 @ 07:01  -  09-20 @ 07:00  --------------------------------------------------------  IN: 690 mL / OUT: 150 mL / NET: 540 mL        GENERAL: comfortable, better after lasix, eating breakfast  NECK: supple, No JVD  CHEST/LUNG: clear to auscultation bilaterally; no rales, rhonchi, or wheezing b/l  HEART: normal S1, S2  ABDOMEN: BS+, soft, ND, NT   EXTREMITIES:  pulses palpable; no clubbing, cyanosis, or edema b/l LEs  SKIN: no rashes or lesions      LABS:                        10.8   11.6  )-----------( 297      ( 20 Sep 2019 01:55 )             33.8     09-20    138  |  101  |  34<H>  ----------------------------<  113<H>  4.3   |  18<L>  |  1.07    Ca    9.8      20 Sep 2019 01:55

## 2019-09-20 NOTE — PROGRESS NOTE ADULT - ASSESSMENT
68 yo male PMH DM(II), PAD s/p left BKA and prior left leg fem-pop, BIBEMS from facility for wound evaluation. Patient recently received left BKA in July 2019 in [facility], where he was discharged to rehab. Was sent from facility today for concerns of improper wound healing, drainage, and bleeding. Patient otherwise had no complaints, but endorses LLE pain near wound site.    Vascular: Ortho and Vascular notes reviewed. Hold on MRI of left BKA with IV contrast to r/o site infection. CT of left limb shows no abscess or OM. Elevated ESR of 84 and elevated CRP noted on arrival. Continue ASA 81 mg/day. Add Lipitor 10 mg/day. Lipid panel in AM. VAC dressing placed in left foot. ID       Endo: Add Humolog 2 units bid to Lantus 15 units at night. A1C in AM. BS stable 188 -- 236.      CV: Contine Cozaar 25 mg/day and Toprol XL 25 mg/day. BP stable 114/76. Added Lasix 40 mg IVP daily and Digoxin .125 mg/day. Fletcher placed.     Possible discharge to rehab after weekend. VAC dressing in left leg.

## 2019-09-20 NOTE — PROGRESS NOTE ADULT - SUBJECTIVE AND OBJECTIVE BOX
Podiatry pager #: [facility] [phone]/ [phone]    Patient is a 69y old  Male who presents with a chief complaint of r/o left BKA site infection (20 Sep 2019 13:35)       INTERVAL HPI/OVERNIGHT EVENTS:  Patient seen and evaluated at bedside.  Pt is resting comfortable in NAD. Denies N/V/F/C.      Allergies    No Known Allergies    Intolerances        Vital Signs Last 24 Hrs  T(C): 36.7 (20 Sep 2019 12:00), Max: 38.7 (20 Sep 2019 02:17)  T(F): 98 (20 Sep 2019 12:00), Max: 101.6 (20 Sep 2019 02:17)  HR: 79 (20 Sep 2019 16:38) (79 - 108)  BP: 96/60 (20 Sep 2019 12:00) (96/60 - 146/97)  BP(mean): --  RR: 20 (20 Sep 2019 16:38) (18 - 20)  SpO2: 98% (20 Sep 2019 16:38) (89% - 100%)    LABS:                        10.2   13.00 )-----------( 283      ( 20 Sep 2019 14:12 )             33.2     09-20    137  |  99  |  34<H>  ----------------------------<  164<H>  4.1   |  23  |  1.14    Ca    9.5      20 Sep 2019 10:58          CAPILLARY BLOOD GLUCOSE      POCT Blood Glucose.: 172 mg/dL (20 Sep 2019 12:27)  POCT Blood Glucose.: 158 mg/dL (20 Sep 2019 09:53)  POCT Blood Glucose.: 173 mg/dL (20 Sep 2019 08:31)  POCT Blood Glucose.: 103 mg/dL (20 Sep 2019 01:35)  POCT Blood Glucose.: 134 mg/dL (19 Sep 2019 22:04)  POCT Blood Glucose.: 123 mg/dL (19 Sep 2019 17:32)      Lower Extremity Physical Exam:    Vasular: DP/PT 0/4, R, absent cap refill R, Temperature gradient cool R   Neuro: Epicritic sensation diminished to the level of digits, B/L.  Musculoskeletal/Ortho: s/p left BKA   Skin: right foot erythema and edema noted with ischemic pain from the foot to the calf. Right hallux and 5th toe dry with superimposed wet gangrene, gangrenous changes to the 2nd to 4th toe with wet conversion as well. Large ischemic blister noted to the right dorsal lateral foot. Right foot is malodorous.   Etiology: ischemic with infection     RADIOLOGY & ADDITIONAL TESTS:

## 2019-09-20 NOTE — PROGRESS NOTE ADULT - ASSESSMENT
68 yo M presents with right foot wet with dry gangrene   - pt was seen and evaluated   - WBC 11.6, ESR 84  - right foot is malodorous, multiple digits dry with superimposed wet gangrene, ischemic blister to the right dorsal foot and ischemic pain from the foot to the calf   - dorsal foot wound to subQ  - wound culture taken   - Patient is not ambulatory, the right foot is not salvageable  - vascular planning for BKA/AKA  - will follow up with vascular plan  - see with attending 68 yo M presents with right foot wet with dry gangrene   - pt was seen and evaluated   - WBC 11.6, ESR 84  - right foot is malodorous, multiple digits dry with superimposed wet gangrene, ischemic blister to the right dorsal foot and ischemic pain from the foot to the calf   - dorsal foot wound to subQ  - wound culture taken   - Patient is not ambulatory, the right foot is not salvageable  - vascular planning for BKA/AKA  - podiatry will sign off, please reconsult as needed   - see with attending

## 2019-09-20 NOTE — PROGRESS NOTE ADULT - SUBJECTIVE AND OBJECTIVE BOX
Surgery Progress Note     Subjective/24hour Events:   Patient seen and examined. Wound vac removed by PT and left with wet and dry's. Remains afebrile, hemodynamically stable.     Vital Signs:  Vital Signs Last 24 Hrs  T(C): 36.7 (20 Sep 2019 12:00), Max: 38.7 (20 Sep 2019 02:17)  T(F): 98 (20 Sep 2019 12:00), Max: 101.6 (20 Sep 2019 02:17)  HR: 80 (20 Sep 2019 12:00) (80 - 108)  BP: 96/60 (20 Sep 2019 12:00) (96/60 - 146/97)  BP(mean): --  RR: 18 (20 Sep 2019 12:00) (18 - 20)  SpO2: 100% (20 Sep 2019 12:00) (89% - 100%)    CAPILLARY BLOOD GLUCOSE      POCT Blood Glucose.: 172 mg/dL (20 Sep 2019 12:27)  POCT Blood Glucose.: 158 mg/dL (20 Sep 2019 09:53)  POCT Blood Glucose.: 173 mg/dL (20 Sep 2019 08:31)  POCT Blood Glucose.: 103 mg/dL (20 Sep 2019 01:35)  POCT Blood Glucose.: 134 mg/dL (19 Sep 2019 22:04)  POCT Blood Glucose.: 123 mg/dL (19 Sep 2019 17:32)      I&O's Detail    19 Sep 2019 07:01  -  20 Sep 2019 07:00  --------------------------------------------------------  IN:    IV PiggyBack: 350 mL    Oral Fluid: 340 mL  Total IN: 690 mL    OUT:    Incontinent per Condom Catheter: 150 mL  Total OUT: 150 mL    Total NET: 540 mL      20 Sep 2019 07:01  -  20 Sep 2019 13:35  --------------------------------------------------------  IN:    Oral Fluid: 240 mL  Total IN: 240 mL    OUT:    Incontinent per Condom Catheter: 250 mL  Total OUT: 250 mL    Total NET: -10 mL          MEDICATIONS  (STANDING):  aspirin enteric coated 81 milliGRAM(s) Oral daily  atorvastatin 10 milliGRAM(s) Oral at bedtime  dextrose 5%. 1000 milliLiter(s) (50 mL/Hr) IV Continuous <Continuous>  dextrose 50% Injectable 12.5 Gram(s) IV Push once  dextrose 50% Injectable 25 Gram(s) IV Push once  dextrose 50% Injectable 25 Gram(s) IV Push once  digoxin     Tablet 0.125 milliGRAM(s) Oral daily  furosemide   Injectable 40 milliGRAM(s) IV Push daily  heparin  Injectable 5000 Unit(s) SubCutaneous every 12 hours  insulin glargine Injectable (LANTUS) 15 Unit(s) SubCutaneous at bedtime  insulin lispro (HumaLOG) corrective regimen sliding scale   SubCutaneous three times a day before meals  insulin lispro (HumaLOG) corrective regimen sliding scale   SubCutaneous at bedtime  insulin lispro Injectable (HumaLOG) 2 Unit(s) SubCutaneous two times a day before meals  losartan 25 milliGRAM(s) Oral daily  metoprolol succinate ER 25 milliGRAM(s) Oral daily  piperacillin/tazobactam IVPB.. 3.375 Gram(s) IV Intermittent every 8 hours  vancomycin  IVPB      vancomycin  IVPB 1000 milliGRAM(s) IV Intermittent every 12 hours    MEDICATIONS  (PRN):  dextrose 40% Gel 15 Gram(s) Oral once PRN Blood Glucose LESS THAN 70 milliGRAM(s)/deciliter  glucagon  Injectable 1 milliGRAM(s) IntraMuscular once PRN Glucose LESS THAN 70 milligrams/deciliter      Physical Exam:  General: NAD.   Respiratory: Breathing non-labored.  Extremities: B/l femoral pulses palpable, b/l popliteal signals, R DP signal present. Dorsum of left foot has 2x2 inch wound, non-crepitant, non-malodorous. LLE prior BKA staple site dehisced, with muscle and some fibropurulent exudate, highly malodorous.     Labs:    09-20    137  |  99  |  34<H>  ----------------------------<  164<H>  4.1   |  23  |  1.14    Ca    9.5      20 Sep 2019 10:58                              10.8   11.6  )-----------( 297      ( 20 Sep 2019 01:55 )             33.8         Imaging:

## 2019-09-20 NOTE — PROGRESS NOTE ADULT - SUBJECTIVE AND OBJECTIVE BOX
CC: f/u for L BKA stump wound     Patient reports still mild L stump pain; as noted, fever, Vanco and Zosyn started; plans for R BKA vs AKA noted    REVIEW OF SYSTEMS:  All other review of systems negative (Comprehensive ROS)    Antimicrobials Day # 1   piperacillin/tazobactam IVPB.. 3.375 Gram(s) IV Intermittent every 8 hours  vancomycin  IVPB      vancomycin  IVPB 1000 milliGRAM(s) IV Intermittent every 12 hours    Other Medications Reviewed    T(F): 98 (09-20-19 @ 12:00), Max: 101.6 (09-20-19 @ 02:17)  HR: 79 (09-20-19 @ 16:38)  BP: 96/60 (09-20-19 @ 12:00)  RR: 20 (09-20-19 @ 16:38)  SpO2: 98% (09-20-19 @ 16:38)  Wt(kg): --    PHYSICAL EXAM:  General: alert, no acute distress  Eyes:  anicteric, no conjunctival injection, no discharge  Oropharynx: no lesions or injection 	  Neck: supple, without adenopathy  Lungs: diminished bases  Heart: regular rate and rhythm; no murmur, rubs or gallops  Abdomen: soft, nondistended, nontender, without mass or organomegaly  Skin: no rash  Extremities: L BKA dressing in place, VAC removed, no surrounding erythema; R foot dressings in place, gangrene toes; mild R leg edema  Neurologic: alert, moves all extremities    LAB RESULTS:                        10.2   13.00 )-----------( 283      ( 20 Sep 2019 14:12 )             33.2     09-20    137  |  99  |  34<H>  ----------------------------<  164<H>  4.1   |  23  |  1.14    Ca    9.5      20 Sep 2019 10:58    MICROBIOLOGY:  RECENT CULTURES:  09-17 @ 22:21 .Blood     No growth to date.    Wound Cx collected    RADIOLOGY REVIEWED:  Xray Chest 1 View- PORTABLE-Urgent (09.20.19 @ 02:37) >  New bilateral pleural effusions and pulmonary edema    CT Lower Extremity w/ IV Cont, Left (09.17.19 @ 17:53) >  1.  Skin defect at the distal anterior stump with adjacent soft tissue   swelling. Circumferential edema and skin thickening about the thigh.   Nonspecific but can be seen with cellulitis.  2.Moderate confluent edema deep to the myofascial plane of the left   vastus lateralis muscle. May reflect phlegmon versus early abscess   formation. No definite drainable fluid collection.  3.  No CT evidence for osteomyelitis.  4.  Distal femoral proximal tibial bone infarctions.

## 2019-09-20 NOTE — PROGRESS NOTE ADULT - ATTENDING COMMENTS
Patient febrile overnight.   Left BKA wound vac removed. Wound with odor. Will plan for daily wet to dry dressing with Dakin's. Will continue to monitor. When clean, possible skin graft to wound.   Right foot not salvageable. Given non ambulatory status, likely needs above the knee amputation.

## 2019-09-20 NOTE — PROGRESS NOTE ADULT - ASSESSMENT
A/P: 69 Year-Old male with L BKA wound dehiscence and concern for osteomyelitis of right foot  - Right foot not salvageable per discussion with podiatry   - Planning right BKA vs AKA  - F/u MRI to evaluate for osteo (ordered).   - Continue wet and dry's of dehisced left BKA stump   - Discussed with Attending Surgeon.      Vascular Surgery Pager #[phone]

## 2019-09-21 LAB
ANION GAP SERPL CALC-SCNC: 11 MMOL/L — SIGNIFICANT CHANGE UP (ref 5–17)
BASOPHILS # BLD AUTO: 0.01 K/UL — SIGNIFICANT CHANGE UP (ref 0–0.2)
BASOPHILS NFR BLD AUTO: 0.1 % — SIGNIFICANT CHANGE UP (ref 0–2)
BUN SERPL-MCNC: 29 MG/DL — HIGH (ref 7–23)
CALCIUM SERPL-MCNC: 8.9 MG/DL — SIGNIFICANT CHANGE UP (ref 8.4–10.5)
CHLORIDE SERPL-SCNC: 103 MMOL/L — SIGNIFICANT CHANGE UP (ref 96–108)
CO2 SERPL-SCNC: 25 MMOL/L — SIGNIFICANT CHANGE UP (ref 22–31)
CREAT SERPL-MCNC: 0.95 MG/DL — SIGNIFICANT CHANGE UP (ref 0.5–1.3)
EOSINOPHIL # BLD AUTO: 0.01 K/UL — SIGNIFICANT CHANGE UP (ref 0–0.5)
EOSINOPHIL NFR BLD AUTO: 0.1 % — SIGNIFICANT CHANGE UP (ref 0–6)
GLUCOSE BLDC GLUCOMTR-MCNC: 195 MG/DL — HIGH (ref 70–99)
GLUCOSE BLDC GLUCOMTR-MCNC: 223 MG/DL — HIGH (ref 70–99)
GLUCOSE BLDC GLUCOMTR-MCNC: 232 MG/DL — HIGH (ref 70–99)
GLUCOSE BLDC GLUCOMTR-MCNC: 245 MG/DL — HIGH (ref 70–99)
GLUCOSE SERPL-MCNC: 179 MG/DL — HIGH (ref 70–99)
HCT VFR BLD CALC: 33.3 % — LOW (ref 39–50)
HGB BLD-MCNC: 10.4 G/DL — LOW (ref 13–17)
IMM GRANULOCYTES NFR BLD AUTO: 0.5 % — SIGNIFICANT CHANGE UP (ref 0–1.5)
LACTATE SERPL-SCNC: 1.7 MMOL/L — SIGNIFICANT CHANGE UP (ref 0.7–2)
LYMPHOCYTES # BLD AUTO: 1.37 K/UL — SIGNIFICANT CHANGE UP (ref 1–3.3)
LYMPHOCYTES # BLD AUTO: 13 % — SIGNIFICANT CHANGE UP (ref 13–44)
MCHC RBC-ENTMCNC: 28.3 PG — SIGNIFICANT CHANGE UP (ref 27–34)
MCHC RBC-ENTMCNC: 31.2 GM/DL — LOW (ref 32–36)
MCV RBC AUTO: 90.5 FL — SIGNIFICANT CHANGE UP (ref 80–100)
MONOCYTES # BLD AUTO: 0.7 K/UL — SIGNIFICANT CHANGE UP (ref 0–0.9)
MONOCYTES NFR BLD AUTO: 6.6 % — SIGNIFICANT CHANGE UP (ref 2–14)
NEUTROPHILS # BLD AUTO: 8.42 K/UL — HIGH (ref 1.8–7.4)
NEUTROPHILS NFR BLD AUTO: 79.7 % — HIGH (ref 43–77)
PLATELET # BLD AUTO: 277 K/UL — SIGNIFICANT CHANGE UP (ref 150–400)
POTASSIUM SERPL-MCNC: 3.3 MMOL/L — LOW (ref 3.5–5.3)
POTASSIUM SERPL-SCNC: 3.3 MMOL/L — LOW (ref 3.5–5.3)
RBC # BLD: 3.68 M/UL — LOW (ref 4.2–5.8)
RBC # FLD: 14.9 % — HIGH (ref 10.3–14.5)
SODIUM SERPL-SCNC: 139 MMOL/L — SIGNIFICANT CHANGE UP (ref 135–145)
VANCOMYCIN TROUGH SERPL-MCNC: 10.6 UG/ML — SIGNIFICANT CHANGE UP (ref 10–20)
WBC # BLD: 10.56 K/UL — HIGH (ref 3.8–10.5)
WBC # FLD AUTO: 10.56 K/UL — HIGH (ref 3.8–10.5)

## 2019-09-21 RX ORDER — SODIUM HYPOCHLORITE 0.125 %
1 SOLUTION, NON-ORAL MISCELLANEOUS ONCE
Refills: 0 | Status: COMPLETED | OUTPATIENT
Start: 2019-09-21 | End: 2019-09-22

## 2019-09-21 RX ORDER — INFLUENZA VIRUS VACCINE 15; 15; 15; 15 UG/.5ML; UG/.5ML; UG/.5ML; UG/.5ML
0.5 SUSPENSION INTRAMUSCULAR ONCE
Refills: 0 | Status: DISCONTINUED | OUTPATIENT
Start: 2019-09-21 | End: 2019-10-07

## 2019-09-21 RX ORDER — POTASSIUM CHLORIDE 20 MEQ
20 PACKET (EA) ORAL ONCE
Refills: 0 | Status: COMPLETED | OUTPATIENT
Start: 2019-09-21 | End: 2019-09-21

## 2019-09-21 RX ADMIN — Medication 2: at 18:02

## 2019-09-21 RX ADMIN — PIPERACILLIN AND TAZOBACTAM 25 GRAM(S): 4; .5 INJECTION, POWDER, LYOPHILIZED, FOR SOLUTION INTRAVENOUS at 12:43

## 2019-09-21 RX ADMIN — Medication 81 MILLIGRAM(S): at 12:42

## 2019-09-21 RX ADMIN — Medication 0.12 MILLIGRAM(S): at 05:14

## 2019-09-21 RX ADMIN — Medication 25 MILLIGRAM(S): at 05:13

## 2019-09-21 RX ADMIN — Medication 250 MILLIGRAM(S): at 18:01

## 2019-09-21 RX ADMIN — PIPERACILLIN AND TAZOBACTAM 25 GRAM(S): 4; .5 INJECTION, POWDER, LYOPHILIZED, FOR SOLUTION INTRAVENOUS at 05:14

## 2019-09-21 RX ADMIN — Medication 20 MILLIEQUIVALENT(S): at 12:42

## 2019-09-21 RX ADMIN — Medication 250 MILLIGRAM(S): at 06:51

## 2019-09-21 RX ADMIN — Medication 2 UNIT(S): at 18:01

## 2019-09-21 RX ADMIN — Medication 2: at 09:11

## 2019-09-21 RX ADMIN — Medication 40 MILLIGRAM(S): at 05:13

## 2019-09-21 RX ADMIN — INSULIN GLARGINE 15 UNIT(S): 100 INJECTION, SOLUTION SUBCUTANEOUS at 21:59

## 2019-09-21 RX ADMIN — LOSARTAN POTASSIUM 25 MILLIGRAM(S): 100 TABLET, FILM COATED ORAL at 05:13

## 2019-09-21 RX ADMIN — Medication 2: at 12:42

## 2019-09-21 RX ADMIN — PIPERACILLIN AND TAZOBACTAM 25 GRAM(S): 4; .5 INJECTION, POWDER, LYOPHILIZED, FOR SOLUTION INTRAVENOUS at 21:32

## 2019-09-21 RX ADMIN — HEPARIN SODIUM 5000 UNIT(S): 5000 INJECTION INTRAVENOUS; SUBCUTANEOUS at 05:13

## 2019-09-21 RX ADMIN — HEPARIN SODIUM 5000 UNIT(S): 5000 INJECTION INTRAVENOUS; SUBCUTANEOUS at 18:01

## 2019-09-21 RX ADMIN — Medication 2 UNIT(S): at 09:11

## 2019-09-21 RX ADMIN — ATORVASTATIN CALCIUM 10 MILLIGRAM(S): 80 TABLET, FILM COATED ORAL at 21:32

## 2019-09-21 NOTE — PROGRESS NOTE ADULT - ASSESSMENT
70 yo diabetic, PAD, s/p L BKA 7/22, returns from rehab with poorly healing wound, drainage- afebrile, WBC/diff normal in ED.    CT nonspecific skin thickening, edema, but no collection or osteo.    Febrile overnight 9/19, WBC increased, Vanco and Zosyn started 9/20  R foot vs L BKA stump source  Bld Cxs negative thus far  Wound Cx pending  Now afebrile, more alert, WBC lower    Plan:  Continue empiric Vanco (trough 10.6) and Zosyn for now  Local wound care as per Vascular and Podiatry  Plans for R foot amp noted  Await further Cx data  Follow temps and CBC/diff

## 2019-09-21 NOTE — PROVIDER CONTACT NOTE (OTHER) - ASSESSMENT
order to place south. attempted to place south, but unable to place catheter. small urethral opening. pt in pain when attempting to insert

## 2019-09-21 NOTE — PROGRESS NOTE ADULT - SUBJECTIVE AND OBJECTIVE BOX
CC: f/u for L BKA stump wound, R foot gangrene    Patient reports feeling better today, no pain at present, more alert    REVIEW OF SYSTEMS:  All other review of systems negative (Comprehensive ROS)    Antimicrobials Day # 2   piperacillin/tazobactam IVPB.. 3.375 Gram(s) IV Intermittent every 8 hours  vancomycin  IVPB      vancomycin  IVPB 1000 milliGRAM(s) IV Intermittent every 12 hours    Other Medications Reviewed    Vital Signs Last 24 Hrs  T(F): 97.8 (21 Sep 2019 10:15), Max: 98 (21 Sep 2019 05:11)  HR: 75 (21 Sep 2019 10:15) (70 - 88)  BP: 107/62 (21 Sep 2019 10:15) (107/62 - 113/79)  BP(mean): --  RR: 18 (21 Sep 2019 10:15) (18 - 20)  SpO2: 98% (21 Sep 2019 10:15) (97% - 100%)    PHYSICAL EXAM:  General: alert, no acute distress  Eyes:  anicteric, no conjunctival injection, no discharge  Oropharynx: no lesions or injection 	  Neck: supple, without adenopathy  Lungs: diminished bases  Heart: regular rate and rhythm; no murmur, rubs or gallops  Abdomen: soft, nondistended, nontender, without mass or organomegaly  Skin: no rash  Extremities: L BKA dressing in place, no surrounding erythema; R foot dressings in place, gangrene toes; mild R leg edema  Neurologic: alert, moves all extremities    LAB RESULTS:                        10.4   10.56 )-----------( 277      ( 21 Sep 2019 10:50 )             33.3   09-21    139  |  103  |  29<H>  ----------------------------<  179<H>  3.3<L>   |  25  |  0.95    Ca    8.9      21 Sep 2019 05:28      MICROBIOLOGY:  RECENT CULTURES:  Culture - Blood in AM (09.20.19 @ 14:22)    Specimen Source: .Blood    Culture Results:   No growth to date.    Culture - Abscess with Gram Stain (09.19.19 @ 17:27)    Specimen Source: .Abscess left foot wound    Culture Results:   Culture in progress    09-17 @ 22:21 .Blood     No growth to date.    RADIOLOGY REVIEWED:  Xray Chest 1 View- PORTABLE-Urgent (09.20.19 @ 02:37) >  New bilateral pleural effusions and pulmonary edema    CT Lower Extremity w/ IV Cont, Left (09.17.19 @ 17:53) >  1.  Skin defect at the distal anterior stump with adjacent soft tissue   swelling. Circumferential edema and skin thickening about the thigh.   Nonspecific but can be seen with cellulitis.  2.Moderate confluent edema deep to the myofascial plane of the left   vastus lateralis muscle. May reflect phlegmon versus early abscess   formation. No definite drainable fluid collection.  3.  No CT evidence for osteomyelitis.  4.  Distal femoral proximal tibial bone infarctions.

## 2019-09-21 NOTE — PROGRESS NOTE ADULT - SUBJECTIVE AND OBJECTIVE BOX
afebrile    REVIEW OF SYSTEMS:  GEN: no fever,    no chills  RESP: no SOB,   no cough  CVS: no chest pain,   no palpitations  GI: no abdominal pain,   no nausea,   no vomiting,   no constipation,   no diarrhea  : no dysuria,   no frequency  NEURO: no headache,   no dizziness  PSYCH: no depression,   not anxious  Derm : no rash    MEDICATIONS  (STANDING):  aspirin enteric coated 81 milliGRAM(s) Oral daily  atorvastatin 10 milliGRAM(s) Oral at bedtime  dextrose 5%. 1000 milliLiter(s) (50 mL/Hr) IV Continuous <Continuous>  dextrose 50% Injectable 12.5 Gram(s) IV Push once  dextrose 50% Injectable 25 Gram(s) IV Push once  dextrose 50% Injectable 25 Gram(s) IV Push once  digoxin     Tablet 0.125 milliGRAM(s) Oral daily  furosemide   Injectable 40 milliGRAM(s) IV Push daily  heparin  Injectable 5000 Unit(s) SubCutaneous every 12 hours  influenza   Vaccine 0.5 milliLiter(s) IntraMuscular once  insulin glargine Injectable (LANTUS) 15 Unit(s) SubCutaneous at bedtime  insulin lispro (HumaLOG) corrective regimen sliding scale   SubCutaneous three times a day before meals  insulin lispro (HumaLOG) corrective regimen sliding scale   SubCutaneous at bedtime  insulin lispro Injectable (HumaLOG) 2 Unit(s) SubCutaneous two times a day before meals  losartan 25 milliGRAM(s) Oral daily  metoprolol succinate ER 25 milliGRAM(s) Oral daily  piperacillin/tazobactam IVPB.. 3.375 Gram(s) IV Intermittent every 8 hours  vancomycin  IVPB      vancomycin  IVPB 1000 milliGRAM(s) IV Intermittent every 12 hours    MEDICATIONS  (PRN):  dextrose 40% Gel 15 Gram(s) Oral once PRN Blood Glucose LESS THAN 70 milliGRAM(s)/deciliter  glucagon  Injectable 1 milliGRAM(s) IntraMuscular once PRN Glucose LESS THAN 70 milligrams/deciliter      Vital Signs Last 24 Hrs  T(C): 36.7 (21 Sep 2019 05:11), Max: 36.7 (20 Sep 2019 12:00)  T(F): 98 (21 Sep 2019 05:11), Max: 98 (20 Sep 2019 12:00)  HR: 87 (21 Sep 2019 05:48) (70 - 88)  BP: 112/75 (21 Sep 2019 05:11) (96/60 - 113/79)  BP(mean): --  RR: 18 (21 Sep 2019 05:11) (18 - 20)  SpO2: 97% (21 Sep 2019 05:48) (97% - 100%)  CAPILLARY BLOOD GLUCOSE      POCT Blood Glucose.: 184 mg/dL (20 Sep 2019 22:20)  POCT Blood Glucose.: 151 mg/dL (20 Sep 2019 17:14)  POCT Blood Glucose.: 172 mg/dL (20 Sep 2019 12:27)  POCT Blood Glucose.: 158 mg/dL (20 Sep 2019 09:53)  POCT Blood Glucose.: 173 mg/dL (20 Sep 2019 08:31)    I&O's Summary    20 Sep 2019 07:01  -  21 Sep 2019 07:00  --------------------------------------------------------  IN: 710 mL / OUT: 500 mL / NET: 210 mL        PHYSICAL EXAM:  HEAD:  Atraumatic, Normocephalic  NECK: Supple, No   JVD  CHEST/LUNG:   no     rales,     no,    rhonchi  HEART: Regular rate and rhythm;         murmur  ABDOMEN: Soft, Nontender, ;   EXTREMITIES:   right bka.  stumpinfection  NEUROLOGY:  alert    LABS:                        10.2   13.00 )-----------( 283      ( 20 Sep 2019 14:12 )             33.2     09-21    139  |  103  |  29<H>  ----------------------------<  179<H>  3.3<L>   |  25  |  0.95    Ca    8.9      21 Sep 2019 05:28              Lactate, Blood: 1.7 mmol/L (09-21 @ 05:41)  Lactate, Blood: 2.1 mmol/L (09-20 @ 11:01)    ABG - ( 20 Sep 2019 01:48 )  pH, Arterial: 7.46  pH, Blood: x     /  pCO2: 27    /  pO2: 76    / HCO3: 19    / Base Excess: -3.7  /  SaO2: 95                  Hemoglobin A1C, Whole Blood: 6.9 % (09-18 @ 11:08)    Thyroid Stimulating Hormone, Serum: 3.57 uIU/mL (07-10 @ 13:02)          Consultant(s) Notes Reviewed:      Care Discussed with Consultants/Other Providers: afebrile    REVIEW OF SYSTEMS:  GEN: no fever,    no chills  RESP: no SOB,   no cough  CVS: no chest pain,   no palpitations  GI: no abdominal pain,   no nausea,   no vomiting,   no constipation,   no diarrhea  : no dysuria,   no frequency  NEURO: no headache,   no dizziness  PSYCH: no depression,   not anxious  Derm : no rash    MEDICATIONS  (STANDING):  aspirin enteric coated 81 milliGRAM(s) Oral daily  atorvastatin 10 milliGRAM(s) Oral at bedtime  dextrose 5%. 1000 milliLiter(s) (50 mL/Hr) IV Continuous <Continuous>  dextrose 50% Injectable 12.5 Gram(s) IV Push once  dextrose 50% Injectable 25 Gram(s) IV Push once  dextrose 50% Injectable 25 Gram(s) IV Push once  digoxin     Tablet 0.125 milliGRAM(s) Oral daily  furosemide   Injectable 40 milliGRAM(s) IV Push daily  heparin  Injectable 5000 Unit(s) SubCutaneous every 12 hours  influenza   Vaccine 0.5 milliLiter(s) IntraMuscular once  insulin glargine Injectable (LANTUS) 15 Unit(s) SubCutaneous at bedtime  insulin lispro (HumaLOG) corrective regimen sliding scale   SubCutaneous three times a day before meals  insulin lispro (HumaLOG) corrective regimen sliding scale   SubCutaneous at bedtime  insulin lispro Injectable (HumaLOG) 2 Unit(s) SubCutaneous two times a day before meals  losartan 25 milliGRAM(s) Oral daily  metoprolol succinate ER 25 milliGRAM(s) Oral daily  piperacillin/tazobactam IVPB.. 3.375 Gram(s) IV Intermittent every 8 hours  vancomycin  IVPB      vancomycin  IVPB 1000 milliGRAM(s) IV Intermittent every 12 hours    MEDICATIONS  (PRN):  dextrose 40% Gel 15 Gram(s) Oral once PRN Blood Glucose LESS THAN 70 milliGRAM(s)/deciliter  glucagon  Injectable 1 milliGRAM(s) IntraMuscular once PRN Glucose LESS THAN 70 milligrams/deciliter      Vital Signs Last 24 Hrs  T(C): 36.7 (21 Sep 2019 05:11), Max: 36.7 (20 Sep 2019 12:00)  T(F): 98 (21 Sep 2019 05:11), Max: 98 (20 Sep 2019 12:00)  HR: 87 (21 Sep 2019 05:48) (70 - 88)  BP: 112/75 (21 Sep 2019 05:11) (96/60 - 113/79)  BP(mean): --  RR: 18 (21 Sep 2019 05:11) (18 - 20)  SpO2: 97% (21 Sep 2019 05:48) (97% - 100%)  CAPILLARY BLOOD GLUCOSE      POCT Blood Glucose.: 184 mg/dL (20 Sep 2019 22:20)  POCT Blood Glucose.: 151 mg/dL (20 Sep 2019 17:14)  POCT Blood Glucose.: 172 mg/dL (20 Sep 2019 12:27)  POCT Blood Glucose.: 158 mg/dL (20 Sep 2019 09:53)  POCT Blood Glucose.: 173 mg/dL (20 Sep 2019 08:31)    I&O's Summary    20 Sep 2019 07:01  -  21 Sep 2019 07:00  --------------------------------------------------------  IN: 710 mL / OUT: 500 mL / NET: 210 mL        PHYSICAL EXAM:  HEAD:  Atraumatic, Normocephalic  NECK: Supple, No   JVD  CHEST/LUNG:   no     rales,     no,    rhonchi  HEART: Regular rate and rhythm;         murmur  ABDOMEN: Soft, Nontender, ;   EXTREMITIES:   right   foot.  wet gangrene/blister/ left   stump infection  NEUROLOGY:  alert    LABS:                        10.2   13.00 )-----------( 283      ( 20 Sep 2019 14:12 )             33.2     09-21    139  |  103  |  29<H>  ----------------------------<  179<H>  3.3<L>   |  25  |  0.95    Ca    8.9      21 Sep 2019 05:28              Lactate, Blood: 1.7 mmol/L (09-21 @ 05:41)  Lactate, Blood: 2.1 mmol/L (09-20 @ 11:01)    ABG - ( 20 Sep 2019 01:48 )  pH, Arterial: 7.46  pH, Blood: x     /  pCO2: 27    /  pO2: 76    / HCO3: 19    / Base Excess: -3.7  /  SaO2: 95                  Hemoglobin A1C, Whole Blood: 6.9 % (09-18 @ 11:08)    Thyroid Stimulating Hormone, Serum: 3.57 uIU/mL (07-10 @ 13:02)          Consultant(s) Notes Reviewed:      Care Discussed with Consultants/Other Providers: afebrile.  no sob    REVIEW OF SYSTEMS:  GEN: no fever,    no chills  RESP: no SOB,   no cough  CVS: no chest pain,   no palpitations  GI: no abdominal pain,   no nausea,   no vomiting,   no constipation,   no diarrhea  : no dysuria,   no frequency  NEURO: no headache,   no dizziness  PSYCH: no depression,   not anxious  Derm : no rash    MEDICATIONS  (STANDING):  aspirin enteric coated 81 milliGRAM(s) Oral daily  atorvastatin 10 milliGRAM(s) Oral at bedtime  dextrose 5%. 1000 milliLiter(s) (50 mL/Hr) IV Continuous <Continuous>  dextrose 50% Injectable 12.5 Gram(s) IV Push once  dextrose 50% Injectable 25 Gram(s) IV Push once  dextrose 50% Injectable 25 Gram(s) IV Push once  digoxin     Tablet 0.125 milliGRAM(s) Oral daily  furosemide   Injectable 40 milliGRAM(s) IV Push daily  heparin  Injectable 5000 Unit(s) SubCutaneous every 12 hours  influenza   Vaccine 0.5 milliLiter(s) IntraMuscular once  insulin glargine Injectable (LANTUS) 15 Unit(s) SubCutaneous at bedtime  insulin lispro (HumaLOG) corrective regimen sliding scale   SubCutaneous three times a day before meals  insulin lispro (HumaLOG) corrective regimen sliding scale   SubCutaneous at bedtime  insulin lispro Injectable (HumaLOG) 2 Unit(s) SubCutaneous two times a day before meals  losartan 25 milliGRAM(s) Oral daily  metoprolol succinate ER 25 milliGRAM(s) Oral daily  piperacillin/tazobactam IVPB.. 3.375 Gram(s) IV Intermittent every 8 hours  vancomycin  IVPB      vancomycin  IVPB 1000 milliGRAM(s) IV Intermittent every 12 hours    MEDICATIONS  (PRN):  dextrose 40% Gel 15 Gram(s) Oral once PRN Blood Glucose LESS THAN 70 milliGRAM(s)/deciliter  glucagon  Injectable 1 milliGRAM(s) IntraMuscular once PRN Glucose LESS THAN 70 milligrams/deciliter      Vital Signs Last 24 Hrs  T(C): 36.7 (21 Sep 2019 05:11), Max: 36.7 (20 Sep 2019 12:00)  T(F): 98 (21 Sep 2019 05:11), Max: 98 (20 Sep 2019 12:00)  HR: 87 (21 Sep 2019 05:48) (70 - 88)  BP: 112/75 (21 Sep 2019 05:11) (96/60 - 113/79)  BP(mean): --  RR: 18 (21 Sep 2019 05:11) (18 - 20)  SpO2: 97% (21 Sep 2019 05:48) (97% - 100%)  CAPILLARY BLOOD GLUCOSE      POCT Blood Glucose.: 184 mg/dL (20 Sep 2019 22:20)  POCT Blood Glucose.: 151 mg/dL (20 Sep 2019 17:14)  POCT Blood Glucose.: 172 mg/dL (20 Sep 2019 12:27)  POCT Blood Glucose.: 158 mg/dL (20 Sep 2019 09:53)  POCT Blood Glucose.: 173 mg/dL (20 Sep 2019 08:31)    I&O's Summary    20 Sep 2019 07:01  -  21 Sep 2019 07:00  --------------------------------------------------------  IN: 710 mL / OUT: 500 mL / NET: 210 mL        PHYSICAL EXAM:  HEAD:  Atraumatic, Normocephalic  NECK: Supple, No   JVD  CHEST/LUNG:   no     rales,     no,    rhonchi  HEART: Regular rate and rhythm;         murmur  ABDOMEN: Soft, Nontender, ;   EXTREMITIES:   right   foot.  wet gangrene/blister/ left   stump infection  NEUROLOGY:  alert    LABS:                        10.2   13.00 )-----------( 283      ( 20 Sep 2019 14:12 )             33.2     09-21    139  |  103  |  29<H>  ----------------------------<  179<H>  3.3<L>   |  25  |  0.95    Ca    8.9      21 Sep 2019 05:28              Lactate, Blood: 1.7 mmol/L (09-21 @ 05:41)  Lactate, Blood: 2.1 mmol/L (09-20 @ 11:01)    ABG - ( 20 Sep 2019 01:48 )  pH, Arterial: 7.46  pH, Blood: x     /  pCO2: 27    /  pO2: 76    / HCO3: 19    / Base Excess: -3.7  /  SaO2: 95                  Hemoglobin A1C, Whole Blood: 6.9 % (09-18 @ 11:08)    Thyroid Stimulating Hormone, Serum: 3.57 uIU/mL (07-10 @ 13:02)          Consultant(s) Notes Reviewed:      Care Discussed with Consultants/Other Providers:
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
## 2019-09-21 NOTE — PROGRESS NOTE ADULT - ASSESSMENT
A/P: 69 Year-Old male with L BKA wound dehiscence and concern for osteomyelitis of right foot  - Right foot not salvageable per discussion with podiatry   - Planning right BKA vs AKA, poss. intervention LLE  - F/u MRI to evaluate for osteo (ordered).   - Continue wet and dry's of dehisced left BKA stump, will need Dakins solution for future dressing changes     Vascular Surgery Pager #[phone]

## 2019-09-21 NOTE — PROGRESS NOTE ADULT - ASSESSMENT
69 Year-Old male with L BKA wound dehiscence and   infected right foot\    		  68 yo male     PMH,    DM(II),    HTN. CVA,   HLD,  Cardiomyopathy,   PAD s/p left BKA and prior left leg fem-pop,      BIBEMS from facility for wound evaluation.   s/p   left BKA in July 2019 in [facility]   CT of left limb shows no abscess or OM.   Elevated ESR of 84 and elevated CRP noted on arrival.    ASA , Lipitor    VAC dressing placed in left foot.     Right foot not salvageable per  podiatry  and vascular   plan, is   right BKA vs AKA    MRI to evaluate for osteo    Continue wet and dry  dressing,  of dehisced left BKA stump      DM,   on  Lantus  / humalog   on    Cozaar m Toprol XL , Lasix , Digoxin   acute  systolic  chf/  on iv lasix  ha foirella south        < from: Transthoracic Echocardiogram (07.06.19 @ 09:48) >  onclusions:  1. Mild mitral regurgitation.  2. Moderately dilated left atrium.  LA volume index = 47  cc/m2.  The interatrial setpum is bowed towards the right  suggesting elevated left atrial pressure.  3. The left ventricle is moderately dilated.  4. There is global hypokineiss with minor regional  variation.  The LVEF about 20-25%.  5. Normal right ventricular size and function.  6. Normal tricuspid valve. No tricuspid regurgitation.  7. There is no pericardial effusion.  8. There is a left pleural effusion.  < end of copied text > 69 Year-Old male with L BKA wound dehiscence and   infected right foot\    		  68 yo male     PMH,    DM(II),    HTN. CVA,   HLD,  Cardiomyopathy,   PAD s/p left BKA and prior left leg fem-pop,      BIBEMS from facility for wound evaluation.   s/p   left BKA in July 2019 in [facility]   CT of left limb shows no abscess or OM.   Elevated ESR of 84 and elevated CRP noted on arrival.   VAC dressing placed in left foot.     Right foot not salvageable per  podiatry  and vascular   plan, is   right BKA vs AKA    MRI to evaluate for osteo    Continue wet and dry  dressing,  of dehisced left BKA stump   on zosyn/ vanco     DM,   on  Lantus  / humalog   on    Cozaar m Toprol XL , Lasix , Digoxin/ asa, lipitor   acute  systolic  chf/  on iv lasix  ha fiorella south        < from: Transthoracic Echocardiogram (07.06.19 @ 09:48) >  onclusions:  1. Mild mitral regurgitation.  2. Moderately dilated left atrium.  LA volume index = 47  cc/m2.  The interatrial setpum is bowed towards the right  suggesting elevated left atrial pressure.  3. The left ventricle is moderately dilated.  4. There is global hypokineiss with minor regional  variation.  The LVEF about 20-25%.  5. Normal right ventricular size and function.  6. Normal tricuspid valve. No tricuspid regurgitation.  7. There is no pericardial effusion.  8. There is a left pleural effusion.  < end of copied text >

## 2019-09-21 NOTE — PROGRESS NOTE ADULT - SUBJECTIVE AND OBJECTIVE BOX
Surgery Progress Note     Subjective/24hour Events:   Patient seen and examined. Remains afebrile, hemodynamically stable.       Objective:  Vital Signs Last 24 Hrs  T(C): 36.6 (21 Sep 2019 10:15), Max: 36.7 (21 Sep 2019 05:11)  T(F): 97.8 (21 Sep 2019 10:15), Max: 98 (21 Sep 2019 05:11)  HR: 75 (21 Sep 2019 10:15) (70 - 88)  BP: 107/62 (21 Sep 2019 10:15) (107/62 - 113/79)  BP(mean): --  RR: 18 (21 Sep 2019 10:15) (18 - 20)  SpO2: 98% (21 Sep 2019 10:15) (97% - 100%)    I&O's Detail    20 Sep 2019 07:01  -  21 Sep 2019 07:00  --------------------------------------------------------  IN:    IV PiggyBack: 350 mL    Oral Fluid: 360 mL  Total IN: 710 mL    OUT:    Incontinent per Condom Catheter: 350 mL    Indwelling Catheter - Urethral: 150 mL  Total OUT: 500 mL    Total NET: 210 mL          MEDICATIONS  (STANDING):  aspirin enteric coated 81 milliGRAM(s) Oral daily  atorvastatin 10 milliGRAM(s) Oral at bedtime  Dakins Solution - Full Strength 1 Application(s) Topical once  dextrose 5%. 1000 milliLiter(s) (50 mL/Hr) IV Continuous <Continuous>  dextrose 50% Injectable 12.5 Gram(s) IV Push once  dextrose 50% Injectable 25 Gram(s) IV Push once  dextrose 50% Injectable 25 Gram(s) IV Push once  digoxin     Tablet 0.125 milliGRAM(s) Oral daily  furosemide   Injectable 40 milliGRAM(s) IV Push daily  heparin  Injectable 5000 Unit(s) SubCutaneous every 12 hours  influenza   Vaccine 0.5 milliLiter(s) IntraMuscular once  insulin glargine Injectable (LANTUS) 15 Unit(s) SubCutaneous at bedtime  insulin lispro (HumaLOG) corrective regimen sliding scale   SubCutaneous three times a day before meals  insulin lispro (HumaLOG) corrective regimen sliding scale   SubCutaneous at bedtime  insulin lispro Injectable (HumaLOG) 2 Unit(s) SubCutaneous two times a day before meals  losartan 25 milliGRAM(s) Oral daily  metoprolol succinate ER 25 milliGRAM(s) Oral daily  piperacillin/tazobactam IVPB.. 3.375 Gram(s) IV Intermittent every 8 hours  potassium chloride    Tablet ER 20 milliEquivalent(s) Oral once  vancomycin  IVPB      vancomycin  IVPB 1000 milliGRAM(s) IV Intermittent every 12 hours    MEDICATIONS  (PRN):  dextrose 40% Gel 15 Gram(s) Oral once PRN Blood Glucose LESS THAN 70 milliGRAM(s)/deciliter  glucagon  Injectable 1 milliGRAM(s) IntraMuscular once PRN Glucose LESS THAN 70 milligrams/deciliter                            10.4   10.56 )-----------( 277      ( 21 Sep 2019 10:50 )             33.3       09-21    139  |  103  |  29<H>  ----------------------------<  179<H>  3.3<L>   |  25  |  0.95    Ca    8.9      21 Sep 2019 05:28          Physical Exam:  General: NAD.   Respiratory: Breathing non-labored.  Extremities: B/l femoral pulses palpable, b/l popliteal signals, R DP signal present. Dorsum of left foot has 2x2 inch wound, non-crepitant, non-malodorous. LLE prior BKA staple site dehisced, with muscle and some fibropurulent exudate, highly malodorous, purulent and appears infected.

## 2019-09-21 NOTE — PROCEDURE NOTE - NSURITECHNIQUE_GU_A_CORE
Sterile gloves were worn for the duration of the procedure/A sterile drape was used to cover all adjacent areas/The catheter was appropriately lubricated/The collection bag is below the level of the patient and urinary bladder/Proper hand hygiene was performed/The site was cleaned with soap/water and sterile solution (betadine)/The catheter was secured with a securement device (e.g. StatLock)/All applicable medical record documentation is completed

## 2019-09-21 NOTE — PROCEDURE NOTE - ADDITIONAL PROCEDURE DETAILS
Called for pt with urethral stenosis. 12f silicone placed easily and aseptically without event. 300cc of light yellow urine drained. TOV by primary team

## 2019-09-22 LAB
-  AMIKACIN: SIGNIFICANT CHANGE UP
-  AZTREONAM: SIGNIFICANT CHANGE UP
-  CEFEPIME: SIGNIFICANT CHANGE UP
-  CEFTAZIDIME: SIGNIFICANT CHANGE UP
-  CIPROFLOXACIN: SIGNIFICANT CHANGE UP
-  GENTAMICIN: SIGNIFICANT CHANGE UP
-  IMIPENEM: SIGNIFICANT CHANGE UP
-  LEVOFLOXACIN: SIGNIFICANT CHANGE UP
-  MEROPENEM: SIGNIFICANT CHANGE UP
-  PIPERACILLIN/TAZOBACTAM: SIGNIFICANT CHANGE UP
-  TOBRAMYCIN: SIGNIFICANT CHANGE UP
ANION GAP SERPL CALC-SCNC: 13 MMOL/L — SIGNIFICANT CHANGE UP (ref 5–17)
BUN SERPL-MCNC: 26 MG/DL — HIGH (ref 7–23)
CALCIUM SERPL-MCNC: 8.7 MG/DL — SIGNIFICANT CHANGE UP (ref 8.4–10.5)
CHLORIDE SERPL-SCNC: 102 MMOL/L — SIGNIFICANT CHANGE UP (ref 96–108)
CO2 SERPL-SCNC: 24 MMOL/L — SIGNIFICANT CHANGE UP (ref 22–31)
CREAT SERPL-MCNC: 0.86 MG/DL — SIGNIFICANT CHANGE UP (ref 0.5–1.3)
CULTURE RESULTS: SIGNIFICANT CHANGE UP
GLUCOSE BLDC GLUCOMTR-MCNC: 130 MG/DL — HIGH (ref 70–99)
GLUCOSE BLDC GLUCOMTR-MCNC: 189 MG/DL — HIGH (ref 70–99)
GLUCOSE BLDC GLUCOMTR-MCNC: 208 MG/DL — HIGH (ref 70–99)
GLUCOSE BLDC GLUCOMTR-MCNC: 213 MG/DL — HIGH (ref 70–99)
GLUCOSE SERPL-MCNC: 119 MG/DL — HIGH (ref 70–99)
HCT VFR BLD CALC: 34.3 % — LOW (ref 39–50)
HGB BLD-MCNC: 10.4 G/DL — LOW (ref 13–17)
MCHC RBC-ENTMCNC: 27.4 PG — SIGNIFICANT CHANGE UP (ref 27–34)
MCHC RBC-ENTMCNC: 30.3 GM/DL — LOW (ref 32–36)
MCV RBC AUTO: 90.5 FL — SIGNIFICANT CHANGE UP (ref 80–100)
METHOD TYPE: SIGNIFICANT CHANGE UP
ORGANISM # SPEC MICROSCOPIC CNT: SIGNIFICANT CHANGE UP
ORGANISM # SPEC MICROSCOPIC CNT: SIGNIFICANT CHANGE UP
PLATELET # BLD AUTO: 275 K/UL — SIGNIFICANT CHANGE UP (ref 150–400)
POTASSIUM SERPL-MCNC: 3.5 MMOL/L — SIGNIFICANT CHANGE UP (ref 3.5–5.3)
POTASSIUM SERPL-SCNC: 3.5 MMOL/L — SIGNIFICANT CHANGE UP (ref 3.5–5.3)
RBC # BLD: 3.79 M/UL — LOW (ref 4.2–5.8)
RBC # FLD: 14.8 % — HIGH (ref 10.3–14.5)
SODIUM SERPL-SCNC: 139 MMOL/L — SIGNIFICANT CHANGE UP (ref 135–145)
SPECIMEN SOURCE: SIGNIFICANT CHANGE UP
WBC # BLD: 10.49 K/UL — SIGNIFICANT CHANGE UP (ref 3.8–10.5)
WBC # FLD AUTO: 10.49 K/UL — SIGNIFICANT CHANGE UP (ref 3.8–10.5)

## 2019-09-22 RX ADMIN — ATORVASTATIN CALCIUM 10 MILLIGRAM(S): 80 TABLET, FILM COATED ORAL at 21:04

## 2019-09-22 RX ADMIN — Medication 1 APPLICATION(S): at 08:34

## 2019-09-22 RX ADMIN — Medication 81 MILLIGRAM(S): at 13:03

## 2019-09-22 RX ADMIN — Medication 40 MILLIGRAM(S): at 05:22

## 2019-09-22 RX ADMIN — Medication 0.12 MILLIGRAM(S): at 05:23

## 2019-09-22 RX ADMIN — Medication 2 UNIT(S): at 17:45

## 2019-09-22 RX ADMIN — Medication 250 MILLIGRAM(S): at 21:04

## 2019-09-22 RX ADMIN — PIPERACILLIN AND TAZOBACTAM 25 GRAM(S): 4; .5 INJECTION, POWDER, LYOPHILIZED, FOR SOLUTION INTRAVENOUS at 16:59

## 2019-09-22 RX ADMIN — Medication 2: at 17:45

## 2019-09-22 RX ADMIN — INSULIN GLARGINE 15 UNIT(S): 100 INJECTION, SOLUTION SUBCUTANEOUS at 21:51

## 2019-09-22 RX ADMIN — HEPARIN SODIUM 5000 UNIT(S): 5000 INJECTION INTRAVENOUS; SUBCUTANEOUS at 05:23

## 2019-09-22 RX ADMIN — Medication 2 UNIT(S): at 08:39

## 2019-09-22 RX ADMIN — Medication 25 MILLIGRAM(S): at 05:23

## 2019-09-22 RX ADMIN — PIPERACILLIN AND TAZOBACTAM 25 GRAM(S): 4; .5 INJECTION, POWDER, LYOPHILIZED, FOR SOLUTION INTRAVENOUS at 08:39

## 2019-09-22 RX ADMIN — Medication 250 MILLIGRAM(S): at 05:22

## 2019-09-22 RX ADMIN — HEPARIN SODIUM 5000 UNIT(S): 5000 INJECTION INTRAVENOUS; SUBCUTANEOUS at 17:45

## 2019-09-22 RX ADMIN — PIPERACILLIN AND TAZOBACTAM 25 GRAM(S): 4; .5 INJECTION, POWDER, LYOPHILIZED, FOR SOLUTION INTRAVENOUS at 23:24

## 2019-09-22 RX ADMIN — Medication 1: at 13:03

## 2019-09-22 RX ADMIN — LOSARTAN POTASSIUM 25 MILLIGRAM(S): 100 TABLET, FILM COATED ORAL at 05:23

## 2019-09-22 NOTE — PROGRESS NOTE ADULT - ASSESSMENT
69 Year-Old male with L BKA wound dehiscence and   infected right foot\    		  70 yo male     PMH,    DM(II),    HTN. CVA,   HLD,  Cardiomyopathy,   PAD s/p left BKA and prior left leg fem-pop,      BIBEMS from facility for wound evaluation.   s/p   left BKA in July 2019 in [facility]   CT of left limb shows no abscess or OM.   Elevated ESR of 84 and elevated CRP noted on arrival.   VAC dressing placed in left foot.     Right foot not salvageable per  podiatry  and vascular   plan, is   right BKA vs AKA    MRI to evaluate for osteo    Continue wet and dry  dressing,  of dehisced left BKA stump   on zosyn/ vanco     DM,   on  Lantus  / humalog   on    Cozaar m Toprol XL , Lasix , Digoxin/ asa, lipitor   acute  systolic  chf/  on iv lasix/ not in fluid  overload  at present  has  south   awaiting vascular intrevention       < from: Transthoracic Echocardiogram (07.06.19 @ 09:48) >  onclusions:  1. Mild mitral regurgitation.  2. Moderately dilated left atrium.  LA volume index = 47  cc/m2.  The interatrial setpum is bowed towards the right  suggesting elevated left atrial pressure.  3. The left ventricle is moderately dilated.  4. There is global hypokineiss with minor regional  variation.  The LVEF about 20-25%.  5. Normal right ventricular size and function.  6. Normal tricuspid valve. No tricuspid regurgitation.  7. There is no pericardial effusion.  8. There is a left pleural effusion.  < end of copied text >

## 2019-09-22 NOTE — PROGRESS NOTE ADULT - ASSESSMENT
A/P: 69 Year-Old male with L BKA wound dehiscence and concern for osteomyelitis of right foot    - Right foot not salvageable per discussion with podiatry   - Planning right AKA, poss. intervention LLE  - F/u MRI to evaluate for osteo (ordered).   - Continue wet and dry's of dehisced left BKA stump, will need Dakins solution for daily dressing changes     Vascular Surgery Pager #[phone]

## 2019-09-22 NOTE — PROGRESS NOTE ADULT - SUBJECTIVE AND OBJECTIVE BOX
CC: f/u for L BKA stump wound, R foot gangrene    Patient reports minimal L BKA stump pain, remains more alert    REVIEW OF SYSTEMS:  All other review of systems negative (Comprehensive ROS)    Antimicrobials Day # 3   piperacillin/tazobactam IVPB.. 3.375 Gram(s) IV Intermittent every 8 hours  vancomycin  IVPB      vancomycin  IVPB 1000 milliGRAM(s) IV Intermittent every 12 hours    Other Medications Reviewed    Vital Signs Last 24 Hrs  T(F): 97.8 (22 Sep 2019 09:39), Max: 99.3 (21 Sep 2019 19:28)  HR: 65 (22 Sep 2019 09:39) (65 - 75)  BP: 111/63 (22 Sep 2019 09:39) (107/66 - 115/71)  BP(mean): --  RR: 18 (22 Sep 2019 09:39) (18 - 18)  SpO2: 100% (22 Sep 2019 09:39) (98% - 100%)    PHYSICAL EXAM:  General: alert, no acute distress  Eyes:  anicteric, no conjunctival injection, no discharge  Oropharynx: no lesions or injection 	  Neck: supple, without adenopathy  Lungs: diminished bases  Heart: regular rate and rhythm; no murmur, rubs or gallops  Abdomen: soft, nondistended, nontender, without mass or organomegaly  Skin: no rash  Extremities: L BKA dressing in place, no surrounding erythema; R foot dressings in place, gangrene toes; mild R leg edema  Neurologic: alert, moves all extremities    LAB RESULTS:                        10.4   10.49 )-----------( 275      ( 22 Sep 2019 09:34 )             34.3   09-22    139  |  102  |  26<H>  ----------------------------<  119<H>  3.5   |  24  |  0.86    Ca    8.7      22 Sep 2019 07:10    Vancomycin Level, Trough -Pre 4th Dose, order if dosed q6/8/12h (09.21.19 @ 05:28)    Vancomycin Level, Trough: 10.6    MICROBIOLOGY:  RECENT CULTURES:  Culture - Blood in AM (09.20.19 @ 14:22)    Specimen Source: .Blood    Culture Results:   No growth to date.    Culture - Abscess with Gram Stain (09.19.19 @ 17:27)    Specimen Source: .Abscess left foot wound    Culture Results:   Culture yields >4 types of aerobic and/or anaerobic bacteria  Call client services within 7 days if further workup is clinically  indicated.  Culture includes  Numerous Pseudomonas aeruginosa  Susceptibility to follow.      09-17 @ 22:21 .Blood     No growth to date.    RADIOLOGY REVIEWED:  Xray Chest 1 View- PORTABLE-Urgent (09.20.19 @ 02:37) >  New bilateral pleural effusions and pulmonary edema    CT Lower Extremity w/ IV Cont, Left (09.17.19 @ 17:53) >  1.  Skin defect at the distal anterior stump with adjacent soft tissue   swelling. Circumferential edema and skin thickening about the thigh.   Nonspecific but can be seen with cellulitis.  2.Moderate confluent edema deep to the myofascial plane of the left   vastus lateralis muscle. May reflect phlegmon versus early abscess   formation. No definite drainable fluid collection.  3.  No CT evidence for osteomyelitis.  4.  Distal femoral proximal tibial bone infarctions.

## 2019-09-22 NOTE — PROGRESS NOTE ADULT - ASSESSMENT
68 yo diabetic, PAD, s/p L BKA 7/22, returns from rehab 9/17 with poorly healing wound, drainage- afebrile, WBC/diff normal in ED.    CT nonspecific skin thickening, edema, but no collection or osteo      Febrile overnight 9/19, WBC increased, Vanco and Zosyn started 9/20  R foot vs L BKA stump source  Bld Cxs negative thus far  Wound Cx mult organisms, including pseudomonas as above  Afebrile, alert, WBC lower    Plan:  Continue empiric Vanco (trough 10.6) and Zosyn for now  Local wound care as per Vascular and Podiatry  Plans for R foot amp noted  Await further Cx data- hope to d/c Vanco  Follow temps and CBC/diff

## 2019-09-22 NOTE — PROGRESS NOTE ADULT - SUBJECTIVE AND OBJECTIVE BOX
Surgery Progress Note     Subjective/24hour Events:   Patient seen and examined. Remains afebrile, hemodynamically stable.       Objective:  Vital Signs Last 24 Hrs  T(C): 36.6 (22 Sep 2019 09:39), Max: 37.4 (21 Sep 2019 19:28)  T(F): 97.8 (22 Sep 2019 09:39), Max: 99.3 (21 Sep 2019 19:28)  HR: 65 (22 Sep 2019 09:39) (65 - 75)  BP: 111/63 (22 Sep 2019 09:39) (107/66 - 115/71)  BP(mean): --  RR: 18 (22 Sep 2019 09:39) (18 - 18)  SpO2: 100% (22 Sep 2019 09:39) (98% - 100%)    I&O's Detail    21 Sep 2019 07:01  -  22 Sep 2019 07:00  --------------------------------------------------------  IN:    IV PiggyBack: 350 mL    Oral Fluid: 1150 mL  Total IN: 1500 mL    OUT:    Indwelling Catheter - Urethral: 1500 mL  Total OUT: 1500 mL    Total NET: 0 mL      22 Sep 2019 07:01  -  22 Sep 2019 12:56  --------------------------------------------------------  IN:    Oral Fluid: 180 mL  Total IN: 180 mL    OUT:    Indwelling Catheter - Urethral: 850 mL  Total OUT: 850 mL    Total NET: -670 mL          MEDICATIONS  (STANDING):  aspirin enteric coated 81 milliGRAM(s) Oral daily  atorvastatin 10 milliGRAM(s) Oral at bedtime  dextrose 5%. 1000 milliLiter(s) (50 mL/Hr) IV Continuous <Continuous>  dextrose 50% Injectable 12.5 Gram(s) IV Push once  dextrose 50% Injectable 25 Gram(s) IV Push once  dextrose 50% Injectable 25 Gram(s) IV Push once  digoxin     Tablet 0.125 milliGRAM(s) Oral daily  furosemide   Injectable 40 milliGRAM(s) IV Push daily  heparin  Injectable 5000 Unit(s) SubCutaneous every 12 hours  influenza   Vaccine 0.5 milliLiter(s) IntraMuscular once  insulin glargine Injectable (LANTUS) 15 Unit(s) SubCutaneous at bedtime  insulin lispro (HumaLOG) corrective regimen sliding scale   SubCutaneous three times a day before meals  insulin lispro (HumaLOG) corrective regimen sliding scale   SubCutaneous at bedtime  insulin lispro Injectable (HumaLOG) 2 Unit(s) SubCutaneous two times a day before meals  losartan 25 milliGRAM(s) Oral daily  metoprolol succinate ER 25 milliGRAM(s) Oral daily  piperacillin/tazobactam IVPB.. 3.375 Gram(s) IV Intermittent every 8 hours  vancomycin  IVPB      vancomycin  IVPB 1000 milliGRAM(s) IV Intermittent every 12 hours    MEDICATIONS  (PRN):  dextrose 40% Gel 15 Gram(s) Oral once PRN Blood Glucose LESS THAN 70 milliGRAM(s)/deciliter  glucagon  Injectable 1 milliGRAM(s) IntraMuscular once PRN Glucose LESS THAN 70 milligrams/deciliter                            10.4   10.49 )-----------( 275      ( 22 Sep 2019 09:34 )             34.3       09-22    139  |  102  |  26<H>  ----------------------------<  119<H>  3.5   |  24  |  0.86    Ca    8.7      22 Sep 2019 07:10          Physical Exam:  General: NAD.   Respiratory: Breathing non-labored.  Extremities: B/l femoral pulses palpable, b/l popliteal signals, R DP signal present. Dorsum of right foot has 2x2 inch wound, non-crepitant, non-malodorous. LLE prior BKA staple site dehisced, with muscle and some fibropurulent exudate, highly malodorous, purulent and appears infected. Surgery Progress Note     Subjective/24hour Events:   Patient seen and examined. Remains afebrile, hemodynamically stable. No acute events o/n, Pain controlled.      Objective:  Vital Signs Last 24 Hrs  T(C): 36.6 (22 Sep 2019 09:39), Max: 37.4 (21 Sep 2019 19:28)  T(F): 97.8 (22 Sep 2019 09:39), Max: 99.3 (21 Sep 2019 19:28)  HR: 65 (22 Sep 2019 09:39) (65 - 75)  BP: 111/63 (22 Sep 2019 09:39) (107/66 - 115/71)  BP(mean): --  RR: 18 (22 Sep 2019 09:39) (18 - 18)  SpO2: 100% (22 Sep 2019 09:39) (98% - 100%)    I&O's Detail    21 Sep 2019 07:01  -  22 Sep 2019 07:00  --------------------------------------------------------  IN:    IV PiggyBack: 350 mL    Oral Fluid: 1150 mL  Total IN: 1500 mL    OUT:    Indwelling Catheter - Urethral: 1500 mL  Total OUT: 1500 mL    Total NET: 0 mL      22 Sep 2019 07:01  -  22 Sep 2019 13:17  --------------------------------------------------------  IN:    Oral Fluid: 180 mL  Total IN: 180 mL    OUT:    Indwelling Catheter - Urethral: 850 mL  Total OUT: 850 mL    Total NET: -670 mL          MEDICATIONS  (STANDING):  aspirin enteric coated 81 milliGRAM(s) Oral daily  atorvastatin 10 milliGRAM(s) Oral at bedtime  dextrose 5%. 1000 milliLiter(s) (50 mL/Hr) IV Continuous <Continuous>  dextrose 50% Injectable 12.5 Gram(s) IV Push once  dextrose 50% Injectable 25 Gram(s) IV Push once  dextrose 50% Injectable 25 Gram(s) IV Push once  digoxin     Tablet 0.125 milliGRAM(s) Oral daily  furosemide   Injectable 40 milliGRAM(s) IV Push daily  heparin  Injectable 5000 Unit(s) SubCutaneous every 12 hours  influenza   Vaccine 0.5 milliLiter(s) IntraMuscular once  insulin glargine Injectable (LANTUS) 15 Unit(s) SubCutaneous at bedtime  insulin lispro (HumaLOG) corrective regimen sliding scale   SubCutaneous three times a day before meals  insulin lispro (HumaLOG) corrective regimen sliding scale   SubCutaneous at bedtime  insulin lispro Injectable (HumaLOG) 2 Unit(s) SubCutaneous two times a day before meals  losartan 25 milliGRAM(s) Oral daily  metoprolol succinate ER 25 milliGRAM(s) Oral daily  piperacillin/tazobactam IVPB.. 3.375 Gram(s) IV Intermittent every 8 hours  vancomycin  IVPB      vancomycin  IVPB 1000 milliGRAM(s) IV Intermittent every 12 hours    MEDICATIONS  (PRN):  dextrose 40% Gel 15 Gram(s) Oral once PRN Blood Glucose LESS THAN 70 milliGRAM(s)/deciliter  glucagon  Injectable 1 milliGRAM(s) IntraMuscular once PRN Glucose LESS THAN 70 milligrams/deciliter                            10.4   10.49 )-----------( 275      ( 22 Sep 2019 09:34 )             34.3       09-22    139  |  102  |  26<H>  ----------------------------<  119<H>  3.5   |  24  |  0.86    Ca    8.7      22 Sep 2019 07:10        Physical Exam:  General: NAD.   Respiratory: Breathing non-labored.  Extremities: B/l femoral pulses palpable, b/l popliteal signals, R DP signal present. Dorsum of right foot has 2x2 inch wound, non-crepitant, non-malodorous. LLE prior BKA staple site dehisced, with muscle and some fibropurulent exudate visible, highly malodorous, purulent and appears infected. Dressing changed w/ Dakins solution wet to dry, wrapped in kerlix.

## 2019-09-22 NOTE — PROGRESS NOTE ADULT - SUBJECTIVE AND OBJECTIVE BOX
no  compalints  REVIEW OF SYSTEMS:  GEN: no fever,    no chills  RESP: no SOB,   no cough  CVS: no chest pain,   no palpitations  GI: no abdominal pain,   no nausea,   no vomiting,   no constipation,   no diarrhea  : no dysuria,   no frequency  NEURO: no headache,   no dizziness  PSYCH: no depression,   not anxious  Derm : no rash    MEDICATIONS  (STANDING):  aspirin enteric coated 81 milliGRAM(s) Oral daily  atorvastatin 10 milliGRAM(s) Oral at bedtime  dextrose 5%. 1000 milliLiter(s) (50 mL/Hr) IV Continuous <Continuous>  dextrose 50% Injectable 12.5 Gram(s) IV Push once  dextrose 50% Injectable 25 Gram(s) IV Push once  dextrose 50% Injectable 25 Gram(s) IV Push once  digoxin     Tablet 0.125 milliGRAM(s) Oral daily  furosemide   Injectable 40 milliGRAM(s) IV Push daily  heparin  Injectable 5000 Unit(s) SubCutaneous every 12 hours  influenza   Vaccine 0.5 milliLiter(s) IntraMuscular once  insulin glargine Injectable (LANTUS) 15 Unit(s) SubCutaneous at bedtime  insulin lispro (HumaLOG) corrective regimen sliding scale   SubCutaneous three times a day before meals  insulin lispro (HumaLOG) corrective regimen sliding scale   SubCutaneous at bedtime  insulin lispro Injectable (HumaLOG) 2 Unit(s) SubCutaneous two times a day before meals  losartan 25 milliGRAM(s) Oral daily  metoprolol succinate ER 25 milliGRAM(s) Oral daily  piperacillin/tazobactam IVPB.. 3.375 Gram(s) IV Intermittent every 8 hours  vancomycin  IVPB      vancomycin  IVPB 1000 milliGRAM(s) IV Intermittent every 12 hours    MEDICATIONS  (PRN):  dextrose 40% Gel 15 Gram(s) Oral once PRN Blood Glucose LESS THAN 70 milliGRAM(s)/deciliter  glucagon  Injectable 1 milliGRAM(s) IntraMuscular once PRN Glucose LESS THAN 70 milligrams/deciliter      Vital Signs Last 24 Hrs  T(C): 37.1 (22 Sep 2019 05:19), Max: 37.4 (21 Sep 2019 19:28)  T(F): 98.8 (22 Sep 2019 05:19), Max: 99.3 (21 Sep 2019 19:28)  HR: 75 (22 Sep 2019 05:19) (74 - 75)  BP: 115/71 (22 Sep 2019 05:19) (107/62 - 115/71)  BP(mean): --  RR: 18 (22 Sep 2019 05:19) (18 - 18)  SpO2: 98% (22 Sep 2019 05:19) (98% - 99%)  CAPILLARY BLOOD GLUCOSE      POCT Blood Glucose.: 130 mg/dL (22 Sep 2019 08:21)  POCT Blood Glucose.: 195 mg/dL (21 Sep 2019 21:40)  POCT Blood Glucose.: 245 mg/dL (21 Sep 2019 17:19)  POCT Blood Glucose.: 232 mg/dL (21 Sep 2019 12:27)    I&O's Summary    21 Sep 2019 07:01  -  22 Sep 2019 07:00  --------------------------------------------------------  IN: 1500 mL / OUT: 1500 mL / NET: 0 mL        PHYSICAL EXAM:  HEAD:  Atraumatic, Normocephalic  NECK: Supple, No   JVD  CHEST/LUNG:   no     rales,     no,    rhonchi  HEART: Regular rate and rhythm;         murmur  ABDOMEN: Soft, Nontender, ;   EXTREMITIES:    dressing. foot/  foul odor     NEUROLOGY:  alert    LABS:                        10.4   10.56 )-----------( 277      ( 21 Sep 2019 10:50 )             33.3     09-22    139  |  102  |  26<H>  ----------------------------<  119<H>  3.5   |  24  |  0.86    Ca    8.7      22 Sep 2019 07:10              Lactate, Blood: 1.7 mmol/L (09-21 @ 05:41)  Lactate, Blood: 2.1 mmol/L (09-20 @ 11:01)        Hemoglobin A1C, Whole Blood: 6.9 % (09-18 @ 11:08)    Thyroid Stimulating Hormone, Serum: 3.57 uIU/mL (07-10 @ 13:02)          Consultant(s) Notes Reviewed:      Care Discussed with Consultants/Other Providers:

## 2019-09-23 ENCOUNTER — TRANSCRIPTION ENCOUNTER (OUTPATIENT)
Age: 69
End: 2019-09-23

## 2019-09-23 LAB
ANION GAP SERPL CALC-SCNC: 9 MMOL/L — SIGNIFICANT CHANGE UP (ref 5–17)
BUN SERPL-MCNC: 16 MG/DL — SIGNIFICANT CHANGE UP (ref 7–23)
CALCIUM SERPL-MCNC: 8.9 MG/DL — SIGNIFICANT CHANGE UP (ref 8.4–10.5)
CHLORIDE SERPL-SCNC: 98 MMOL/L — SIGNIFICANT CHANGE UP (ref 96–108)
CO2 SERPL-SCNC: 29 MMOL/L — SIGNIFICANT CHANGE UP (ref 22–31)
CREAT SERPL-MCNC: 0.88 MG/DL — SIGNIFICANT CHANGE UP (ref 0.5–1.3)
GLUCOSE BLDC GLUCOMTR-MCNC: 163 MG/DL — HIGH (ref 70–99)
GLUCOSE BLDC GLUCOMTR-MCNC: 213 MG/DL — HIGH (ref 70–99)
GLUCOSE BLDC GLUCOMTR-MCNC: 214 MG/DL — HIGH (ref 70–99)
GLUCOSE BLDC GLUCOMTR-MCNC: 251 MG/DL — HIGH (ref 70–99)
GLUCOSE SERPL-MCNC: 164 MG/DL — HIGH (ref 70–99)
HCT VFR BLD CALC: 35.9 % — LOW (ref 39–50)
HGB BLD-MCNC: 10.9 G/DL — LOW (ref 13–17)
MCHC RBC-ENTMCNC: 27 PG — SIGNIFICANT CHANGE UP (ref 27–34)
MCHC RBC-ENTMCNC: 30.4 GM/DL — LOW (ref 32–36)
MCV RBC AUTO: 89.1 FL — SIGNIFICANT CHANGE UP (ref 80–100)
PLATELET # BLD AUTO: 318 K/UL — SIGNIFICANT CHANGE UP (ref 150–400)
POTASSIUM SERPL-MCNC: 3.3 MMOL/L — LOW (ref 3.5–5.3)
POTASSIUM SERPL-SCNC: 3.3 MMOL/L — LOW (ref 3.5–5.3)
RBC # BLD: 4.03 M/UL — LOW (ref 4.2–5.8)
RBC # FLD: 14.7 % — HIGH (ref 10.3–14.5)
SODIUM SERPL-SCNC: 136 MMOL/L — SIGNIFICANT CHANGE UP (ref 135–145)
WBC # BLD: 9.86 K/UL — SIGNIFICANT CHANGE UP (ref 3.8–10.5)
WBC # FLD AUTO: 9.86 K/UL — SIGNIFICANT CHANGE UP (ref 3.8–10.5)

## 2019-09-23 RX ORDER — INSULIN GLARGINE 100 [IU]/ML
18 INJECTION, SOLUTION SUBCUTANEOUS AT BEDTIME
Refills: 0 | Status: DISCONTINUED | OUTPATIENT
Start: 2019-09-23 | End: 2019-09-23

## 2019-09-23 RX ORDER — POTASSIUM CHLORIDE 20 MEQ
20 PACKET (EA) ORAL DAILY
Refills: 0 | Status: DISCONTINUED | OUTPATIENT
Start: 2019-09-23 | End: 2019-09-25

## 2019-09-23 RX ORDER — INSULIN GLARGINE 100 [IU]/ML
10 INJECTION, SOLUTION SUBCUTANEOUS AT BEDTIME
Refills: 0 | Status: DISCONTINUED | OUTPATIENT
Start: 2019-09-23 | End: 2019-09-24

## 2019-09-23 RX ADMIN — ATORVASTATIN CALCIUM 10 MILLIGRAM(S): 80 TABLET, FILM COATED ORAL at 21:45

## 2019-09-23 RX ADMIN — Medication 250 MILLIGRAM(S): at 08:35

## 2019-09-23 RX ADMIN — Medication 20 MILLIEQUIVALENT(S): at 17:10

## 2019-09-23 RX ADMIN — Medication 2 UNIT(S): at 08:34

## 2019-09-23 RX ADMIN — PIPERACILLIN AND TAZOBACTAM 25 GRAM(S): 4; .5 INJECTION, POWDER, LYOPHILIZED, FOR SOLUTION INTRAVENOUS at 17:09

## 2019-09-23 RX ADMIN — Medication 0.12 MILLIGRAM(S): at 05:57

## 2019-09-23 RX ADMIN — Medication 2: at 13:06

## 2019-09-23 RX ADMIN — INSULIN GLARGINE 10 UNIT(S): 100 INJECTION, SOLUTION SUBCUTANEOUS at 22:52

## 2019-09-23 RX ADMIN — Medication 40 MILLIGRAM(S): at 05:57

## 2019-09-23 RX ADMIN — Medication 1: at 08:34

## 2019-09-23 RX ADMIN — HEPARIN SODIUM 5000 UNIT(S): 5000 INJECTION INTRAVENOUS; SUBCUTANEOUS at 05:57

## 2019-09-23 RX ADMIN — Medication 250 MILLIGRAM(S): at 19:59

## 2019-09-23 RX ADMIN — Medication 25 MILLIGRAM(S): at 05:57

## 2019-09-23 RX ADMIN — PIPERACILLIN AND TAZOBACTAM 25 GRAM(S): 4; .5 INJECTION, POWDER, LYOPHILIZED, FOR SOLUTION INTRAVENOUS at 09:45

## 2019-09-23 RX ADMIN — LOSARTAN POTASSIUM 25 MILLIGRAM(S): 100 TABLET, FILM COATED ORAL at 05:57

## 2019-09-23 RX ADMIN — Medication 2 UNIT(S): at 17:19

## 2019-09-23 RX ADMIN — PIPERACILLIN AND TAZOBACTAM 25 GRAM(S): 4; .5 INJECTION, POWDER, LYOPHILIZED, FOR SOLUTION INTRAVENOUS at 23:48

## 2019-09-23 RX ADMIN — Medication 1: at 22:52

## 2019-09-23 RX ADMIN — HEPARIN SODIUM 5000 UNIT(S): 5000 INJECTION INTRAVENOUS; SUBCUTANEOUS at 17:18

## 2019-09-23 RX ADMIN — Medication 81 MILLIGRAM(S): at 13:06

## 2019-09-23 RX ADMIN — Medication 2: at 17:19

## 2019-09-23 NOTE — PROGRESS NOTE ADULT - ASSESSMENT
68 yo diabetic, PAD, s/p L BKA 7/22, returns from rehab 9/17 with poorly healing wound, drainage- afebrile, WBC/diff normal in ED.    CT nonspecific skin thickening, edema, but no collection or osteo      Febrile 9/19, leukocytosis, Vanco and Zosyn started 9/20  R foot vs L BKA stump source  Bld Cxs negative   Wound Cx mult organisms, including pseudomonas as above  Afebrile and leukocytosis now resolved    Plan:  Continue empiric Vanco and Zosyn for now  Local wound care as per Vascular and Podiatry  Plans for R AKA and possible LLE stump intervention as per vascular  MRI ordered as per vascular  Monitor WBC and fever trend

## 2019-09-23 NOTE — PROGRESS NOTE ADULT - SUBJECTIVE AND OBJECTIVE BOX
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 Hospital course also notable for concern for pontine stroke possibly seen on CT head, dysphagia initially requiring NGT feeds but later passed MBS with improved mental status placed on puree diet. He was discharged to [facility], now re-presenting to ED with lower extremity wound.   Vascular Surgery consulted for wound evaluation of L MARCELA. Patient states that his leg wound has been present for unknown period of time. He states that his staples were removed approximately three weeks ago. No fevers, chills, has had some pain in the LLE, constant.     Interval / 24 Hour Events:  No acute events, patient in no acute distress. C/o LLE Stump pain during dressing change.     ** VITAL SIGNS / I&O's **    T(C): 37.1 (09-23-19 @ 05:52), Max: 37.1 (09-22-19 @ 19:39)  T(F): 98.8 (09-23-19 @ 05:52), Max: 98.8 (09-22-19 @ 19:39)  HR: 92 (09-23-19 @ 05:52) (81 - 92)  BP: 120/79 (09-23-19 @ 05:52) (114/773 - 120/79)  RR: 18 (09-23-19 @ 05:52) (18 - 18)  SpO2: 95% (09-23-19 @ 05:52) (95% - 95%)      22 Sep 2019 07:01  -  23 Sep 2019 07:00  --------------------------------------------------------  IN:    IV PiggyBack: 350 mL    Oral Fluid: 780 mL  Total IN: 1130 mL    OUT:    Indwelling Catheter - Urethral: 1800 mL  Total OUT: 1800 mL    Total NET: -670 mL          ** PHYSICAL EXAM **  Gen: well-developed, in no acute distress   Resp: Nonlabored, CTABL, no wheezes, ronchi, or rales. Normal respiratory effort.   CVS: Normotensive, RRR, no M/R/G   Ext: B/l femoral pulses palpable, b/l popliteal signals, R DP signal present. Dorsum of right foot has 3x6cm wound, dry, non-crepitant, no discharge. LLE prior BKA staple site dehisced, with muscle and mild fibrinous exudate visible, malodorous, with developing granulation tissue. Dressing changed w/ Dakins solution wet to dry, wrapped in Kerlix      ** LABS **       136    |  98     |  16     ----------------------------<  164        ( 23 Sep 2019 10:27 )  3.3     |  29     |  0.88     Ca    8.9        ( 23 Sep 2019 10:27 )

## 2019-09-23 NOTE — PROGRESS NOTE ADULT - ASSESSMENT
A/P: 69 Year-Old male with L BKA wound dehiscence and concern for osteomyelitis of right foot    - Right foot not salvageable per discussion with podiatry   - Planning right AKA, poss. intervention LLE for stump dehiscence (Not amenable for skin grafting at this time)       - F/u MRI to evaluate for osteo (ordered).   - Continue wet and dry's of dehisced left BKA stump, will need Dakins solution for daily dressing changes     Omar Angel, PGY 2  Pager [phone]

## 2019-09-23 NOTE — PROGRESS NOTE ADULT - SUBJECTIVE AND OBJECTIVE BOX
CC: f/u for L BKA stump wound, R foot gangrene    Patient reports minimal L BKA stump pain, remains more alert    REVIEW OF SYSTEMS:  All other review of systems negative (Comprehensive ROS)    Antimicrobials Day # 4    piperacillin/tazobactam IVPB.. 3.375 Gram(s) IV Intermittent every 8 hours  vancomycin  IVPB      vancomycin  IVPB 1000 milliGRAM(s) IV Intermittent every 12 hours      Other Medications Reviewed    Vital Signs Last 24 Hrs  T(C): 37.1 (23 Sep 2019 05:52), Max: 37.1 (22 Sep 2019 19:39)  T(F): 98.8 (23 Sep 2019 05:52), Max: 98.8 (22 Sep 2019 19:39)  HR: 92 (23 Sep 2019 05:52) (81 - 92)  BP: 120/79 (23 Sep 2019 05:52) (114/773 - 120/79)  BP(mean): --  RR: 18 (23 Sep 2019 05:52) (18 - 18)  SpO2: 95% (23 Sep 2019 05:52) (95% - 95%)    PHYSICAL EXAM:  General: alert, no acute distress  Eyes:  anicteric, no conjunctival injection, no discharge  Oropharynx: no lesions or injection 	  Neck: supple, without adenopathy  Lungs: diminished bases  Heart: regular rate and rhythm; no murmur, rubs or gallops  Abdomen: soft, nondistended, nontender, without mass or organomegaly  Skin: no rash  Extremities: L BKA dressing in place, no surrounding erythema; R foot dressings in place, gangrene toes; mild R leg edema  Neurologic: alert, moves all extremities    LAB RESULTS:                                   10.4   10.49 )-----------( 275      ( 22 Sep 2019 09:34 )             34.3   09-23    136  |  98  |  16  ----------------------------<  164<H>  3.3<L>   |  29  |  0.88    Ca    8.9      23 Sep 2019 10:27      Vancomycin Level, Trough: 10.6        MICROBIOLOGY:  RECENT CULTURES:  Culture - Blood in AM (09.20.19 @ 14:22)    Specimen Source: .Blood    Culture Results:   No growth to date.    Culture - Abscess with Gram Stain (09.19.19 @ 17:27)    Specimen Source: .Abscess left foot wound    Culture Results:   Culture yields >4 types of aerobic and/or anaerobic bacteria  Call client services within 7 days if further workup is clinically  indicated.  Culture includes  Numerous Pseudomonas aeruginosa  Susceptibility to follow.      09-17 @ 22:21 .Blood     No growth to date.    RADIOLOGY REVIEWED:  Xray Chest 1 View- PORTABLE-Urgent (09.20.19 @ 02:37) >  New bilateral pleural effusions and pulmonary edema    CT Lower Extremity w/ IV Cont, Left (09.17.19 @ 17:53) >  1.  Skin defect at the distal anterior stump with adjacent soft tissue   swelling. Circumferential edema and skin thickening about the thigh.   Nonspecific but can be seen with cellulitis.  2.Moderate confluent edema deep to the myofascial plane of the left   vastus lateralis muscle. May reflect phlegmon versus early abscess   formation. No definite drainable fluid collection.  3.  No CT evidence for osteomyelitis.  4.  Distal femoral proximal tibial bone infarctions.

## 2019-09-23 NOTE — CHART NOTE - NSCHARTNOTEFT_GEN_A_CORE
Surgery Preop Note    Patient is a 69y old male who presents with a chief complaint of BKA site infection (23 Sep 2019 11:51)    Diagnosis: BKA site infection  Procedure: debridement, possible AKA  Surgeon: Dr. Barriga                          10.9   9.86  )-----------( 318      ( 23 Sep 2019 13:24 )             35.9     09-23    136  |  98  |  16  ----------------------------<  164<H>  3.3<L>   |  29  |  0.88    Ca    8.9      23 Sep 2019 10:27            Chest X RAY  EKG     MEDICATIONS  (STANDING):  aspirin enteric coated 81 milliGRAM(s) Oral daily  atorvastatin 10 milliGRAM(s) Oral at bedtime  dextrose 5%. 1000 milliLiter(s) (50 mL/Hr) IV Continuous <Continuous>  dextrose 50% Injectable 12.5 Gram(s) IV Push once  dextrose 50% Injectable 25 Gram(s) IV Push once  dextrose 50% Injectable 25 Gram(s) IV Push once  digoxin     Tablet 0.125 milliGRAM(s) Oral daily  furosemide   Injectable 40 milliGRAM(s) IV Push daily  heparin  Injectable 5000 Unit(s) SubCutaneous every 12 hours  influenza   Vaccine 0.5 milliLiter(s) IntraMuscular once  insulin glargine Injectable (LANTUS) 18 Unit(s) SubCutaneous at bedtime  insulin lispro (HumaLOG) corrective regimen sliding scale   SubCutaneous three times a day before meals  insulin lispro (HumaLOG) corrective regimen sliding scale   SubCutaneous at bedtime  insulin lispro Injectable (HumaLOG) 2 Unit(s) SubCutaneous two times a day before meals  losartan 25 milliGRAM(s) Oral daily  metoprolol succinate ER 25 milliGRAM(s) Oral daily  piperacillin/tazobactam IVPB.. 3.375 Gram(s) IV Intermittent every 8 hours  potassium chloride    Tablet ER 20 milliEquivalent(s) Oral daily  vancomycin  IVPB      vancomycin  IVPB 1000 milliGRAM(s) IV Intermittent every 12 hours    MEDICATIONS  (PRN):  dextrose 40% Gel 15 Gram(s) Oral once PRN Blood Glucose LESS THAN 70 milliGRAM(s)/deciliter  glucagon  Injectable 1 milliGRAM(s) IntraMuscular once PRN Glucose LESS THAN 70 milligrams/deciliter      Assessment & Plan:  69y Male with BKA infection  NPO after midnight, IVF  Pain Control  DVT prophylaxis

## 2019-09-23 NOTE — PROGRESS NOTE ADULT - ASSESSMENT
69 Year-Old male with L BKA wound dehiscence and   infected right foot\    		  70 yo male     PMH,    DM(II),    HTN. CVA,   HLD,  Cardiomyopathy,   PAD s/p left BKA and prior left leg fem-pop,      BIBEMS from facility for wound evaluation.   s/p   left BKA in July 2019 in [facility]   CT of left limb shows no abscess or OM.   Elevated ESR of 84 and elevated CRP noted on arrival.   VAC dressing placed in left foot.     Right foot not salvageable per  podiatry  and vascular   plan, is   right BKA vs AKA    MRI to evaluate for osteo    Continue wet and dry  dressing,  of dehisced left BKA stump   on zosyn/ vanco     DM,   on  Lantus  / humalog   on    Cozaar m Toprol XL , Lasix , Digoxin/ asa, lipitor   acute  systolic  chf/  on iv lasix/ not in fluid  overload  at present  has  south   awaiting vascular intrevention/  pt stable  lantus increased       < from: Transthoracic Echocardiogram (07.06.19 @ 09:48) >  onclusions:  1. Mild mitral regurgitation.  2. Moderately dilated left atrium.  LA volume index = 47  cc/m2.  The interatrial setpum is bowed towards the right  suggesting elevated left atrial pressure.  3. The left ventricle is moderately dilated.  4. There is global hypokineiss with minor regional  variation.  The LVEF about 20-25%.  5. Normal right ventricular size and function.  6. Normal tricuspid valve. No tricuspid regurgitation.  7. There is no pericardial effusion.  8. There is a left pleural effusion.  < end of copied text >

## 2019-09-23 NOTE — PROGRESS NOTE ADULT - SUBJECTIVE AND OBJECTIVE BOX
no  compalints    REVIEW OF SYSTEMS:  GEN: no fever,    no chills  RESP: no SOB,   no cough  CVS: no chest pain,   no palpitations  GI: no abdominal pain,   no nausea,   no vomiting,   no constipation,   no diarrhea  : no dysuria,   no frequency  NEURO: no headache,   no dizziness  PSYCH: no depression,   not anxious  Derm : no rash    MEDICATIONS  (STANDING):  aspirin enteric coated 81 milliGRAM(s) Oral daily  atorvastatin 10 milliGRAM(s) Oral at bedtime  dextrose 5%. 1000 milliLiter(s) (50 mL/Hr) IV Continuous <Continuous>  dextrose 50% Injectable 12.5 Gram(s) IV Push once  dextrose 50% Injectable 25 Gram(s) IV Push once  dextrose 50% Injectable 25 Gram(s) IV Push once  digoxin     Tablet 0.125 milliGRAM(s) Oral daily  furosemide   Injectable 40 milliGRAM(s) IV Push daily  heparin  Injectable 5000 Unit(s) SubCutaneous every 12 hours  influenza   Vaccine 0.5 milliLiter(s) IntraMuscular once  insulin glargine Injectable (LANTUS) 18 Unit(s) SubCutaneous at bedtime  insulin lispro (HumaLOG) corrective regimen sliding scale   SubCutaneous three times a day before meals  insulin lispro (HumaLOG) corrective regimen sliding scale   SubCutaneous at bedtime  insulin lispro Injectable (HumaLOG) 2 Unit(s) SubCutaneous two times a day before meals  losartan 25 milliGRAM(s) Oral daily  metoprolol succinate ER 25 milliGRAM(s) Oral daily  piperacillin/tazobactam IVPB.. 3.375 Gram(s) IV Intermittent every 8 hours  vancomycin  IVPB      vancomycin  IVPB 1000 milliGRAM(s) IV Intermittent every 12 hours    MEDICATIONS  (PRN):  dextrose 40% Gel 15 Gram(s) Oral once PRN Blood Glucose LESS THAN 70 milliGRAM(s)/deciliter  glucagon  Injectable 1 milliGRAM(s) IntraMuscular once PRN Glucose LESS THAN 70 milligrams/deciliter      Vital Signs Last 24 Hrs  T(C): 37.1 (23 Sep 2019 05:52), Max: 37.1 (22 Sep 2019 19:39)  T(F): 98.8 (23 Sep 2019 05:52), Max: 98.8 (22 Sep 2019 19:39)  HR: 92 (23 Sep 2019 05:52) (65 - 92)  BP: 120/79 (23 Sep 2019 05:52) (111/63 - 120/79)  BP(mean): --  RR: 18 (23 Sep 2019 05:52) (18 - 18)  SpO2: 95% (23 Sep 2019 05:52) (95% - 100%)  CAPILLARY BLOOD GLUCOSE      POCT Blood Glucose.: 208 mg/dL (22 Sep 2019 21:28)  POCT Blood Glucose.: 213 mg/dL (22 Sep 2019 17:05)  POCT Blood Glucose.: 189 mg/dL (22 Sep 2019 12:09)  POCT Blood Glucose.: 130 mg/dL (22 Sep 2019 08:21)    I&O's Summary    22 Sep 2019 07:01  -  23 Sep 2019 07:00  --------------------------------------------------------  IN: 1130 mL / OUT: 1800 mL / NET: -670 mL        PHYSICAL EXAM:  HEAD:  Atraumatic, Normocephalic  NECK: Supple, No   JVD  CHEST/LUNG:   no     rales,     no,    rhonchi  HEART: Regular rate and rhythm;         murmur  ABDOMEN: Soft, Nontender, ;   EXTREMITIES:     right foot  gangrene  NEUROLOGY:  alert    LABS:                        10.4   10.49 )-----------( 275      ( 22 Sep 2019 09:34 )             34.3     09-22    139  |  102  |  26<H>  ----------------------------<  119<H>  3.5   |  24  |  0.86    Ca    8.7      22 Sep 2019 07:10                    Hemoglobin A1C, Whole Blood: 6.9 % (09-18 @ 11:08)    Thyroid Stimulating Hormone, Serum: 3.57 uIU/mL (07-10 @ 13:02)          Consultant(s) Notes Reviewed:      Care Discussed with Consultants/Other Providers:

## 2019-09-24 ENCOUNTER — RESULT REVIEW (OUTPATIENT)
Age: 69
End: 2019-09-24

## 2019-09-24 LAB
ANION GAP SERPL CALC-SCNC: 14 MMOL/L — SIGNIFICANT CHANGE UP (ref 5–17)
ANION GAP SERPL CALC-SCNC: 14 MMOL/L — SIGNIFICANT CHANGE UP (ref 5–17)
APTT BLD: 34 SEC — SIGNIFICANT CHANGE UP (ref 27.5–36.3)
BLD GP AB SCN SERPL QL: NEGATIVE — SIGNIFICANT CHANGE UP
BLD GP AB SCN SERPL QL: NEGATIVE — SIGNIFICANT CHANGE UP
BUN SERPL-MCNC: 16 MG/DL — SIGNIFICANT CHANGE UP (ref 7–23)
BUN SERPL-MCNC: 16 MG/DL — SIGNIFICANT CHANGE UP (ref 7–23)
CALCIUM SERPL-MCNC: 8.9 MG/DL — SIGNIFICANT CHANGE UP (ref 8.4–10.5)
CALCIUM SERPL-MCNC: 9.7 MG/DL — SIGNIFICANT CHANGE UP (ref 8.4–10.5)
CHLORIDE SERPL-SCNC: 98 MMOL/L — SIGNIFICANT CHANGE UP (ref 96–108)
CHLORIDE SERPL-SCNC: 99 MMOL/L — SIGNIFICANT CHANGE UP (ref 96–108)
CO2 SERPL-SCNC: 26 MMOL/L — SIGNIFICANT CHANGE UP (ref 22–31)
CO2 SERPL-SCNC: 26 MMOL/L — SIGNIFICANT CHANGE UP (ref 22–31)
CREAT SERPL-MCNC: 0.77 MG/DL — SIGNIFICANT CHANGE UP (ref 0.5–1.3)
CREAT SERPL-MCNC: 0.85 MG/DL — SIGNIFICANT CHANGE UP (ref 0.5–1.3)
GLUCOSE BLDC GLUCOMTR-MCNC: 135 MG/DL — HIGH (ref 70–99)
GLUCOSE BLDC GLUCOMTR-MCNC: 140 MG/DL — HIGH (ref 70–99)
GLUCOSE BLDC GLUCOMTR-MCNC: 159 MG/DL — HIGH (ref 70–99)
GLUCOSE BLDC GLUCOMTR-MCNC: 202 MG/DL — HIGH (ref 70–99)
GLUCOSE SERPL-MCNC: 171 MG/DL — HIGH (ref 70–99)
GLUCOSE SERPL-MCNC: 180 MG/DL — HIGH (ref 70–99)
HCT VFR BLD CALC: 33.2 % — LOW (ref 39–50)
HCT VFR BLD CALC: 37.5 % — LOW (ref 39–50)
HGB BLD-MCNC: 10.2 G/DL — LOW (ref 13–17)
HGB BLD-MCNC: 11.6 G/DL — LOW (ref 13–17)
INR BLD: 1.39 RATIO — HIGH (ref 0.88–1.16)
MAGNESIUM SERPL-MCNC: 1.6 MG/DL — SIGNIFICANT CHANGE UP (ref 1.6–2.6)
MAGNESIUM SERPL-MCNC: 1.9 MG/DL — SIGNIFICANT CHANGE UP (ref 1.6–2.6)
MCHC RBC-ENTMCNC: 27.9 PG — SIGNIFICANT CHANGE UP (ref 27–34)
MCHC RBC-ENTMCNC: 28.2 PG — SIGNIFICANT CHANGE UP (ref 27–34)
MCHC RBC-ENTMCNC: 30.9 GM/DL — LOW (ref 32–36)
MCHC RBC-ENTMCNC: 30.9 GM/DL — LOW (ref 32–36)
MCV RBC AUTO: 90.1 FL — SIGNIFICANT CHANGE UP (ref 80–100)
MCV RBC AUTO: 91.2 FL — SIGNIFICANT CHANGE UP (ref 80–100)
PHOSPHATE SERPL-MCNC: 2.5 MG/DL — SIGNIFICANT CHANGE UP (ref 2.5–4.5)
PHOSPHATE SERPL-MCNC: 3 MG/DL — SIGNIFICANT CHANGE UP (ref 2.5–4.5)
PLATELET # BLD AUTO: 348 K/UL — SIGNIFICANT CHANGE UP (ref 150–400)
PLATELET # BLD AUTO: 352 K/UL — SIGNIFICANT CHANGE UP (ref 150–400)
POTASSIUM SERPL-MCNC: 3.5 MMOL/L — SIGNIFICANT CHANGE UP (ref 3.5–5.3)
POTASSIUM SERPL-MCNC: 3.9 MMOL/L — SIGNIFICANT CHANGE UP (ref 3.5–5.3)
POTASSIUM SERPL-SCNC: 3.5 MMOL/L — SIGNIFICANT CHANGE UP (ref 3.5–5.3)
POTASSIUM SERPL-SCNC: 3.9 MMOL/L — SIGNIFICANT CHANGE UP (ref 3.5–5.3)
PROTHROM AB SERPL-ACNC: 16.2 SEC — HIGH (ref 10–12.9)
RBC # BLD: 3.64 M/UL — LOW (ref 4.2–5.8)
RBC # BLD: 4.16 M/UL — LOW (ref 4.2–5.8)
RBC # FLD: 14.1 % — SIGNIFICANT CHANGE UP (ref 10.3–14.5)
RBC # FLD: 14.8 % — HIGH (ref 10.3–14.5)
RH IG SCN BLD-IMP: POSITIVE — SIGNIFICANT CHANGE UP
RH IG SCN BLD-IMP: POSITIVE — SIGNIFICANT CHANGE UP
SODIUM SERPL-SCNC: 138 MMOL/L — SIGNIFICANT CHANGE UP (ref 135–145)
SODIUM SERPL-SCNC: 139 MMOL/L — SIGNIFICANT CHANGE UP (ref 135–145)
VANCOMYCIN TROUGH SERPL-MCNC: 11.8 UG/ML — SIGNIFICANT CHANGE UP (ref 10–20)
WBC # BLD: 13.48 K/UL — HIGH (ref 3.8–10.5)
WBC # BLD: 13.7 K/UL — HIGH (ref 3.8–10.5)
WBC # FLD AUTO: 13.48 K/UL — HIGH (ref 3.8–10.5)
WBC # FLD AUTO: 13.7 K/UL — HIGH (ref 3.8–10.5)

## 2019-09-24 PROCEDURE — 71045 X-RAY EXAM CHEST 1 VIEW: CPT | Mod: 26

## 2019-09-24 PROCEDURE — 27590 AMPUTATE LEG AT THIGH: CPT | Mod: LT,78

## 2019-09-24 PROCEDURE — 88307 TISSUE EXAM BY PATHOLOGIST: CPT | Mod: 26

## 2019-09-24 PROCEDURE — 93010 ELECTROCARDIOGRAM REPORT: CPT

## 2019-09-24 RX ORDER — SODIUM CHLORIDE 9 MG/ML
1000 INJECTION, SOLUTION INTRAVENOUS
Refills: 0 | Status: DISCONTINUED | OUTPATIENT
Start: 2019-09-24 | End: 2019-09-24

## 2019-09-24 RX ORDER — ONDANSETRON 8 MG/1
4 TABLET, FILM COATED ORAL ONCE
Refills: 0 | Status: DISCONTINUED | OUTPATIENT
Start: 2019-09-24 | End: 2019-09-24

## 2019-09-24 RX ORDER — CHLORHEXIDINE GLUCONATE 213 G/1000ML
1 SOLUTION TOPICAL ONCE
Refills: 0 | Status: COMPLETED | OUTPATIENT
Start: 2019-09-24 | End: 2019-09-24

## 2019-09-24 RX ORDER — ACETAMINOPHEN 500 MG
1000 TABLET ORAL ONCE
Refills: 0 | Status: DISCONTINUED | OUTPATIENT
Start: 2019-09-24 | End: 2019-09-24

## 2019-09-24 RX ORDER — HYDROMORPHONE HYDROCHLORIDE 2 MG/ML
0.25 INJECTION INTRAMUSCULAR; INTRAVENOUS; SUBCUTANEOUS
Refills: 0 | Status: DISCONTINUED | OUTPATIENT
Start: 2019-09-24 | End: 2019-09-24

## 2019-09-24 RX ADMIN — LOSARTAN POTASSIUM 25 MILLIGRAM(S): 100 TABLET, FILM COATED ORAL at 05:20

## 2019-09-24 RX ADMIN — Medication 1: at 19:46

## 2019-09-24 RX ADMIN — Medication 0.12 MILLIGRAM(S): at 05:19

## 2019-09-24 RX ADMIN — Medication 250 MILLIGRAM(S): at 12:45

## 2019-09-24 RX ADMIN — PIPERACILLIN AND TAZOBACTAM 25 GRAM(S): 4; .5 INJECTION, POWDER, LYOPHILIZED, FOR SOLUTION INTRAVENOUS at 08:48

## 2019-09-24 RX ADMIN — Medication 20 MILLIEQUIVALENT(S): at 21:57

## 2019-09-24 RX ADMIN — PIPERACILLIN AND TAZOBACTAM 25 GRAM(S): 4; .5 INJECTION, POWDER, LYOPHILIZED, FOR SOLUTION INTRAVENOUS at 18:35

## 2019-09-24 RX ADMIN — Medication 40 MILLIGRAM(S): at 05:19

## 2019-09-24 RX ADMIN — Medication 25 MILLIGRAM(S): at 05:20

## 2019-09-24 RX ADMIN — ATORVASTATIN CALCIUM 10 MILLIGRAM(S): 80 TABLET, FILM COATED ORAL at 21:57

## 2019-09-24 RX ADMIN — CHLORHEXIDINE GLUCONATE 1 APPLICATION(S): 213 SOLUTION TOPICAL at 14:18

## 2019-09-24 RX ADMIN — Medication 1: at 08:47

## 2019-09-24 NOTE — PROGRESS NOTE ADULT - SUBJECTIVE AND OBJECTIVE BOX
CC: f/u for L BKA stump wound, R foot gangrene    Patient reports minimal L BKA stump pain, remains more alert    REVIEW OF SYSTEMS:  All other review of systems negative (Comprehensive ROS)    Antimicrobials Day # 5    piperacillin/tazobactam IVPB.. 3.375 Gram(s) IV Intermittent every 8 hours  vancomycin  IVPB      vancomycin  IVPB 1000 milliGRAM(s) IV Intermittent every 12 hours        Other Medications Reviewed    Vital Signs Last 24 Hrs  T(C): 36.8 (24 Sep 2019 14:33), Max: 36.9 (23 Sep 2019 23:59)  T(F): 98.2 (24 Sep 2019 13:36), Max: 98.4 (23 Sep 2019 23:59)  HR: 79 (24 Sep 2019 14:33) (79 - 90)  BP: 129/81 (24 Sep 2019 14:33) (111/72 - 148/84)  BP(mean): --  RR: 18 (24 Sep 2019 14:33) (18 - 18)  SpO2: 95% (24 Sep 2019 14:33) (92% - 97%)    PHYSICAL EXAM:  General: alert, no acute distress  Eyes:  anicteric, no conjunctival injection, no discharge  Oropharynx: no lesions or injection 	  Neck: supple, without adenopathy  Lungs: diminished bases  Heart: regular rate and rhythm; no murmur, rubs or gallops  Abdomen: soft, nondistended, nontender, without mass or organomegaly  Skin: no rash  Extremities: L BKA dressing in place, no surrounding erythema; R foot dressings in place, gangrene toes; mild R leg edema  Neurologic: alert, moves all extremities    LAB RESULTS:                                   11.6   13.48 )-----------( 352      ( 24 Sep 2019 09:44 )             37.5   09-24    138  |  98  |  16  ----------------------------<  171<H>  3.9   |  26  |  0.85    Ca    9.7      24 Sep 2019 07:11  Phos  2.5     09-24  Mg     1.9     09-24          MICROBIOLOGY:  RECENT CULTURES:  Culture - Blood in AM (09.20.19 @ 14:22)    Specimen Source: .Blood    Culture Results:   No growth to date.    Culture - Abscess with Gram Stain (09.19.19 @ 17:27)    Specimen Source: .Abscess left foot wound    Culture Results:   Culture yields >4 types of aerobic and/or anaerobic bacteria  Call client services within 7 days if further workup is clinically  indicated.  Culture includes  Numerous Pseudomonas aeruginosa  Susceptibility to follow.      09-17 @ 22:21 .Blood     No growth to date.    RADIOLOGY REVIEWED:  Xray Chest 1 View- PORTABLE-Urgent (09.20.19 @ 02:37) >  New bilateral pleural effusions and pulmonary edema    CT Lower Extremity w/ IV Cont, Left (09.17.19 @ 17:53) >  1.  Skin defect at the distal anterior stump with adjacent soft tissue   swelling. Circumferential edema and skin thickening about the thigh.   Nonspecific but can be seen with cellulitis.  2.Moderate confluent edema deep to the myofascial plane of the left   vastus lateralis muscle. May reflect phlegmon versus early abscess   formation. No definite drainable fluid collection.  3.  No CT evidence for osteomyelitis.  4.  Distal femoral proximal tibial bone infarctions.

## 2019-09-24 NOTE — PROGRESS NOTE ADULT - SUBJECTIVE AND OBJECTIVE BOX
Vascular Surgery Consult - Daily Progress Note    HPI:  69M h/o poor med compliance, DM2, HTN, PAD s/p LLE fem-pop bypass with vein graft , found to have severe flow limiting stenosis of the popliteal artery and occluded bypass graft is occluded, the popliteal artery is occluded and the trifurcation arteries are occluded on the left s/p Left BKA on 7/22/19 [facility] CCU for NSTEMI complicated by cardiogenic shock and acute respiratory failure with hypoxia requiring intubation and subsequent extubation. Hospital course also notable for concern for pontine stroke possibly seen on CT head, dysphagia initially requiring NGT feeds but later passed MBS with improved mental status placed on puree diet. He was discharged to [facility], now re-presenting to ED with lower extremity wound.   Vascular Surgery consulted for wound evaluation of L BKA. Patient states that his leg wound has been present for unknown period of time. He states that his staples were removed approximately three weeks ago. No fevers, chills, has had some pain in the LLE, constant.     Interval / 24 Hour Events:  No acute events, patient in no acute distress. C/o LLE Stump pain during dressing change. Planned for OR today for LLE BKA revision vs LLE AKA.     ** VITAL SIGNS / I&O's **    T(C): 36.2 (09-24-19 @ 17:40), Max: 36.9 (09-23-19 @ 23:59)  HR: 75 (09-24-19 @ 18:30) (74 - 90)  BP: 131/61 (09-24-19 @ 18:30) (110/62 - 148/84)  BP(mean): 87 (09-24-19 @ 18:30) (77 - 87)  ABP: --  ABP(mean): --  RR: 18 (09-24-19 @ 18:30) (14 - 18)  SpO2: 95% (09-24-19 @ 18:30) (92% - 98%)  Wt(kg): --  CVP(mm Hg): --  CI: --  CAPILLARY BLOOD GLUCOSE      POCT Blood Glucose.: 140 mg/dL (24 Sep 2019 14:07)  POCT Blood Glucose.: 135 mg/dL (24 Sep 2019 12:23)  POCT Blood Glucose.: 159 mg/dL (24 Sep 2019 07:54)  POCT Blood Glucose.: 251 mg/dL (23 Sep 2019 22:36)   N/A      09-23 @ 07:01  -  09-24 @ 07:00  --------------------------------------------------------  IN:    IV PiggyBack: 800 mL    Oral Fluid: 1510 mL  Total IN: 2310 mL    OUT:    Indwelling Catheter - Urethral: 2300 mL  Total OUT: 2300 mL    Total NET: 10 mL      09-24 @ 07:01  -  09-24 @ 18:44  --------------------------------------------------------  IN:  Total IN: 0 mL    OUT:    Indwelling Catheter - Urethral: 1500 mL  Total OUT: 1500 mL    Total NET: -1500 mL          ** PHYSICAL EXAM **  Gen: well-developed, in no acute distress   Resp: Nonlabored, CTABL, no wheezes, ronchi, or rales. Normal respiratory effort.   CVS: Normotensive, RRR, no M/R/G   Ext: B/l femoral pulses palpable, b/l popliteal signals, R DP signal present. Dorsum of right foot has 3x6cm wound, dry, non-crepitant, no discharge. LLE prior BKA staple site dehisced, with muscle and mild fibrinous exudate visible, malodorous, with developing granulation tissue. Dressing changed w/ Dakins solution wet to dry, wrapped in Kerlix      ** LABS **     CBC (09-24 @ 09:44)                              11.6<L>                         13.48<H>  )----------------(  352        --    % Neutrophils, --    % Lymphocytes, ANC: --                                  37.5<L>              CBC (09-23 @ 13:24)                              10.9<L>                         9.86    )----------------(  318        --    % Neutrophils, --    % Lymphocytes, ANC: --                                  35.9<L>                BMP (09-24 @ 07:11)             138     |  98      |  16    		Ca++ --      Ca 9.7                ---------------------------------( 171<H>		Mg 1.9                3.9     |  26      |  0.85  			Ph 2.5     BMP (09-23 @ 10:27)             136     |  98      |  16    		Ca++ --      Ca 8.9                ---------------------------------( 164<H>		Mg --                 3.3<L>  |  29      |  0.88  			Ph --                -> .Blood Culture (09-20 @ 14:22)     NG    NG    No growth to date.    -> .Blood Culture (09-20 @ 04:54)     NG    NG    No growth to date.    -> .Abscess left foot wound Culture (09-19 @ 17:27)     NG    Pseudomonas aeruginosa    Culture yields >4 types of aerobic and/or anaerobic bacteria  Call client services within 7 days if further workup is clinically  indicated.  Culture includes  Numerous Pseudomonas aeruginosa    -> .Blood Culture (09-17 @ 22:21)     NG    NG    No growth at 5 days.

## 2019-09-24 NOTE — PROGRESS NOTE ADULT - ASSESSMENT
68 yo male PMH DM(II), PAD s/p left BKA and prior left leg fem-pop, BIBEMS from facility for wound evaluation. Patient recently received left BKA in July 2019 in [facility], where he was discharged to rehab. Was sent from facility today for concerns of improper wound healing, drainage, and bleeding. Patient otherwise had no complaints, but endorses LLE pain near wound site.    Vascular: Ortho and Vascular notes reviewed. Needs MRI of right foot to r/o OM with IV contrast. CT of left limb shows no abscess or OM. Elevated ESR of 84 and elevated CRP noted on arrival. Continue ASA 81 mg/day. Add Lipitor 10 mg/day. Lipid panel in AM. VAC dressing placed in left foot. ID  to continue IV Zosyn 3.375 every 8 and IV Vancomycin 1 gram every 12 hours.  Vascular plans for left BKA site revision vs left AKA.      Endo: Hold Humolog and Lantus while NPO. BS stable 159 -- 251.      CV: Contine Cozaar 25 mg/day and Toprol XL 25 mg/day. BP stable 114/76. Added Lasix 40 mg IVP daily and Digoxin .125 mg/day. Fletcher placed. Repeat CXR today to eval CHF.   Likely change to oral lasix in AM. 70 yo male PMH DM(II), PAD s/p left BKA and prior left leg fem-pop, BIBEMS from facility for wound evaluation. Patient recently received left BKA in July 2019 in [facility], where he was discharged to rehab. Was sent from facility today for concerns of improper wound healing, drainage, and bleeding. Patient otherwise had no complaints, but endorses LLE pain near wound site.    Vascular: Ortho and Vascular notes reviewed. Needs MRI of right foot to r/o OM with IV contrast. CT of left limb shows no abscess or OM. Elevated ESR of 84 and elevated CRP noted on arrival. Continue ASA 81 mg/day. Add Lipitor 10 mg/day. Lipid panel in AM. VAC dressing placed in left foot. ID  to continue IV Zosyn 3.375 every 8 and IV Vancomycin 1 gram every 12 hours.  Vascular plans for left BKA site revision vs left AKA.      Endo: Hold Humolog and Lantus while NPO. BS stable 159 -- 251.      CV: Contine Cozaar 25 mg/day and Toprol XL 25 mg/day. BP stable 114/76. Added Lasix 40 mg IVP daily and Digoxin .125 mg/day. Lfetcher placed. Repeat CXR today to eval CHF.   Likely change to oral lasix in AM.     Spoke to nephew today by phone about plan of care : [phone]

## 2019-09-24 NOTE — PROGRESS NOTE ADULT - ASSESSMENT
A/P: 69 Year-Old male with L BKA wound dehiscence and concern for osteomyelitis of right foot    - OR today for LLE BKA stump revision, possible LLE AKA  - Right foot not salvageable per discussion with podiatry   - May need right AKA, F/u MRI to evaluate for osteo (ordered).       Omar Angel, PGY 2  Pager [phone]

## 2019-09-24 NOTE — PROGRESS NOTE ADULT - SUBJECTIVE AND OBJECTIVE BOX
INTERVAL HPI/OVERNIGHT EVENTS:  Pt seen and examined at bedside.     Allergies/Intolerance: No Known Allergies      MEDICATIONS  (STANDING):  aspirin enteric coated 81 milliGRAM(s) Oral daily  atorvastatin 10 milliGRAM(s) Oral at bedtime  dextrose 5%. 1000 milliLiter(s) (120 mL/Hr) IV Continuous <Continuous>  dextrose 50% Injectable 12.5 Gram(s) IV Push once  dextrose 50% Injectable 25 Gram(s) IV Push once  dextrose 50% Injectable 25 Gram(s) IV Push once  digoxin     Tablet 0.125 milliGRAM(s) Oral daily  furosemide   Injectable 40 milliGRAM(s) IV Push daily  heparin  Injectable 5000 Unit(s) SubCutaneous every 12 hours  influenza   Vaccine 0.5 milliLiter(s) IntraMuscular once  insulin glargine Injectable (LANTUS) 10 Unit(s) SubCutaneous at bedtime  insulin lispro (HumaLOG) corrective regimen sliding scale   SubCutaneous three times a day before meals  insulin lispro (HumaLOG) corrective regimen sliding scale   SubCutaneous at bedtime  insulin lispro Injectable (HumaLOG) 2 Unit(s) SubCutaneous two times a day before meals  losartan 25 milliGRAM(s) Oral daily  metoprolol succinate ER 25 milliGRAM(s) Oral daily  piperacillin/tazobactam IVPB.. 3.375 Gram(s) IV Intermittent every 8 hours  potassium chloride    Tablet ER 20 milliEquivalent(s) Oral daily  vancomycin  IVPB      vancomycin  IVPB 1000 milliGRAM(s) IV Intermittent every 12 hours    MEDICATIONS  (PRN):  dextrose 40% Gel 15 Gram(s) Oral once PRN Blood Glucose LESS THAN 70 milliGRAM(s)/deciliter  glucagon  Injectable 1 milliGRAM(s) IntraMuscular once PRN Glucose LESS THAN 70 milligrams/deciliter        ROS: all systems reviewed and wnl      PHYSICAL EXAMINATION:  Vital Signs Last 24 Hrs  T(C): 36.7 (24 Sep 2019 04:38), Max: 36.9 (23 Sep 2019 23:59)  T(F): 98 (24 Sep 2019 04:38), Max: 98.4 (23 Sep 2019 23:59)  HR: 90 (24 Sep 2019 04:38) (87 - 90)  BP: 148/84 (24 Sep 2019 04:38) (111/72 - 148/84)  BP(mean): --  RR: 18 (24 Sep 2019 04:38) (18 - 18)  SpO2: 92% (24 Sep 2019 04:38) (92% - 97%)  CAPILLARY BLOOD GLUCOSE      POCT Blood Glucose.: 159 mg/dL (24 Sep 2019 07:54)  POCT Blood Glucose.: 251 mg/dL (23 Sep 2019 22:36)  POCT Blood Glucose.: 213 mg/dL (23 Sep 2019 17:13)  POCT Blood Glucose.: 214 mg/dL (23 Sep 2019 12:52)  POCT Blood Glucose.: 163 mg/dL (23 Sep 2019 08:13)      09-23 @ 07:01  -  09-24 @ 07:00  --------------------------------------------------------  IN: 2310 mL / OUT: 2300 mL / NET: 10 mL        GENERAL:   NECK: supple, No JVD  CHEST/LUNG: clear to auscultation bilaterally; no rales, rhonchi, or wheezing b/l  HEART: normal S1, S2  ABDOMEN: BS+, soft, ND, NT   EXTREMITIES:  pulses palpable; no clubbing, cyanosis, or edema b/l LEs  SKIN: no rashes or lesions      LABS:                        10.9   9.86  )-----------( 318      ( 23 Sep 2019 13:24 )             35.9     09-23    136  |  98  |  16  ----------------------------<  164<H>  3.3<L>   |  29  |  0.88    Ca    8.9      23 Sep 2019 10:27 INTERVAL HPI/OVERNIGHT EVENTS:  Pt seen and examined at bedside.     Allergies/Intolerance: No Known Allergies      MEDICATIONS  (STANDING):  aspirin enteric coated 81 milliGRAM(s) Oral daily  atorvastatin 10 milliGRAM(s) Oral at bedtime  dextrose 5%. 1000 milliLiter(s) (120 mL/Hr) IV Continuous <Continuous>  dextrose 50% Injectable 12.5 Gram(s) IV Push once  dextrose 50% Injectable 25 Gram(s) IV Push once  dextrose 50% Injectable 25 Gram(s) IV Push once  digoxin     Tablet 0.125 milliGRAM(s) Oral daily  furosemide   Injectable 40 milliGRAM(s) IV Push daily  heparin  Injectable 5000 Unit(s) SubCutaneous every 12 hours  influenza   Vaccine 0.5 milliLiter(s) IntraMuscular once  insulin glargine Injectable (LANTUS) 10 Unit(s) SubCutaneous at bedtime  insulin lispro (HumaLOG) corrective regimen sliding scale   SubCutaneous three times a day before meals  insulin lispro (HumaLOG) corrective regimen sliding scale   SubCutaneous at bedtime  insulin lispro Injectable (HumaLOG) 2 Unit(s) SubCutaneous two times a day before meals  losartan 25 milliGRAM(s) Oral daily  metoprolol succinate ER 25 milliGRAM(s) Oral daily  piperacillin/tazobactam IVPB.. 3.375 Gram(s) IV Intermittent every 8 hours  potassium chloride    Tablet ER 20 milliEquivalent(s) Oral daily  vancomycin  IVPB      vancomycin  IVPB 1000 milliGRAM(s) IV Intermittent every 12 hours    MEDICATIONS  (PRN):  dextrose 40% Gel 15 Gram(s) Oral once PRN Blood Glucose LESS THAN 70 milliGRAM(s)/deciliter  glucagon  Injectable 1 milliGRAM(s) IntraMuscular once PRN Glucose LESS THAN 70 milligrams/deciliter        ROS: all systems reviewed and wnl      PHYSICAL EXAMINATION:  Vital Signs Last 24 Hrs  T(C): 36.7 (24 Sep 2019 04:38), Max: 36.9 (23 Sep 2019 23:59)  T(F): 98 (24 Sep 2019 04:38), Max: 98.4 (23 Sep 2019 23:59)  HR: 90 (24 Sep 2019 04:38) (87 - 90)  BP: 148/84 (24 Sep 2019 04:38) (111/72 - 148/84)  BP(mean): --  RR: 18 (24 Sep 2019 04:38) (18 - 18)  SpO2: 92% (24 Sep 2019 04:38) (92% - 97%)  CAPILLARY BLOOD GLUCOSE      POCT Blood Glucose.: 159 mg/dL (24 Sep 2019 07:54)  POCT Blood Glucose.: 251 mg/dL (23 Sep 2019 22:36)  POCT Blood Glucose.: 213 mg/dL (23 Sep 2019 17:13)  POCT Blood Glucose.: 214 mg/dL (23 Sep 2019 12:52)  POCT Blood Glucose.: 163 mg/dL (23 Sep 2019 08:13)      09-23 @ 07:01  -  09-24 @ 07:00  --------------------------------------------------------  IN: 2310 mL / OUT: 2300 mL / NET: 10 mL        GENERAL: in bed, alert, comfortable, no fevers or diarrhea  NECK: supple, No JVD  CHEST/LUNG: clear to auscultation bilaterally; no rales, rhonchi, or wheezing b/l  HEART: normal S1, S2  ABDOMEN: BS+, soft, ND, NT   EXTREMITIES:  pulses palpable; no clubbing, cyanosis, or edema b/l LEs  south in place  SKIN: no rashes or lesions      LABS:                        10.9   9.86  )-----------( 318      ( 23 Sep 2019 13:24 )             35.9     09-23    136  |  98  |  16  ----------------------------<  164<H>  3.3<L>   |  29  |  0.88    Ca    8.9      23 Sep 2019 10:27

## 2019-09-24 NOTE — PROGRESS NOTE ADULT - ASSESSMENT
68 yo diabetic, PAD, s/p L BKA 7/22, returns from rehab 9/17 with poorly healing wound, drainage- afebrile, WBC/diff normal in ED.    CT nonspecific skin thickening, edema, but no collection or osteo  Febrile 9/19, leukocytosis, Vanco and Zosyn started 9/20  R foot vs L BKA stump source  Bld Cxs negative   Wound Cx mult organisms, including pseudomonas as above  Afebrile, but leukocytosis persists    Plan:  Continue empiric Vanco and Zosyn for now  Local wound care as per Vascular and Podiatry  Plans for R AKA and possible LLE stump intervention as per vascular  MRI as per vascular  Monitor WBC and fever trend

## 2019-09-24 NOTE — PROGRESS NOTE ADULT - SUBJECTIVE AND OBJECTIVE BOX
Vascular Surgery Post-op Note   STATUS POST:  left AKA  POST OPERATIVE DAY #: 0    pt seen and examined at bedside. No complaints. Pain well controlled. south in place. tolerating diet. denies dizziness, SOB, CP or palpitations.     Vital Signs Last 24 Hrs  T(C): 36.8 (24 Sep 2019 21:45), Max: 36.9 (23 Sep 2019 23:59)  T(F): 98.2 (24 Sep 2019 21:45), Max: 98.4 (23 Sep 2019 23:59)  HR: 80 (24 Sep 2019 21:45) (71 - 90)  BP: 115/68 (24 Sep 2019 21:45) (110/62 - 148/84)  BP(mean): 91 (24 Sep 2019 19:45) (77 - 91)  RR: 18 (24 Sep 2019 21:45) (14 - 18)  SpO2: 96% (24 Sep 2019 21:45) (92% - 98%)  I&O's Summary    23 Sep 2019 07:01  -  24 Sep 2019 07:00  --------------------------------------------------------  IN: 2310 mL / OUT: 2300 mL / NET: 10 mL    24 Sep 2019 07:01  -  24 Sep 2019 23:20  --------------------------------------------------------  IN: 0 mL / OUT: 1600 mL / NET: -1600 mL      I&O's Detail    23 Sep 2019 07:01  -  24 Sep 2019 07:00  --------------------------------------------------------  IN:    IV PiggyBack: 800 mL    Oral Fluid: 1510 mL  Total IN: 2310 mL    OUT:    Indwelling Catheter - Urethral: 2300 mL  Total OUT: 2300 mL    Total NET: 10 mL      24 Sep 2019 07:01  -  24 Sep 2019 23:20  --------------------------------------------------------  IN:  Total IN: 0 mL    OUT:    Indwelling Catheter - Urethral: 1600 mL  Total OUT: 1600 mL    Total NET: -1600 mL      MEDICATIONS  (STANDING):  aspirin enteric coated 81 milliGRAM(s) Oral daily  atorvastatin 10 milliGRAM(s) Oral at bedtime  dextrose 5%. 1000 milliLiter(s) (120 mL/Hr) IV Continuous <Continuous>  dextrose 50% Injectable 12.5 Gram(s) IV Push once  dextrose 50% Injectable 25 Gram(s) IV Push once  dextrose 50% Injectable 25 Gram(s) IV Push once  digoxin     Tablet 0.125 milliGRAM(s) Oral daily  furosemide   Injectable 40 milliGRAM(s) IV Push daily  heparin  Injectable 5000 Unit(s) SubCutaneous every 12 hours  influenza   Vaccine 0.5 milliLiter(s) IntraMuscular once  insulin lispro (HumaLOG) corrective regimen sliding scale   SubCutaneous three times a day before meals  insulin lispro (HumaLOG) corrective regimen sliding scale   SubCutaneous at bedtime  losartan 25 milliGRAM(s) Oral daily  metoprolol succinate ER 25 milliGRAM(s) Oral daily  piperacillin/tazobactam IVPB.. 3.375 Gram(s) IV Intermittent every 8 hours  potassium chloride    Tablet ER 20 milliEquivalent(s) Oral daily  vancomycin  IVPB      vancomycin  IVPB 1000 milliGRAM(s) IV Intermittent every 12 hours    MEDICATIONS  (PRN):  dextrose 40% Gel 15 Gram(s) Oral once PRN Blood Glucose LESS THAN 70 milliGRAM(s)/deciliter  glucagon  Injectable 1 milliGRAM(s) IntraMuscular once PRN Glucose LESS THAN 70 milligrams/deciliter      LABS:                        10.2   13.7  )-----------( 348      ( 24 Sep 2019 19:07 )             33.2     09-24    139  |  99  |  16  ----------------------------<  180<H>  3.5   |  26  |  0.77    Ca    8.9      24 Sep 2019 19:07  Phos  3.0     09-24  Mg     1.6     09-24      PT/INR - ( 24 Sep 2019 19:07 )   PT: 16.2 sec;   INR: 1.39 ratio         PTT - ( 24 Sep 2019 19:07 )  PTT:34.0 sec      RADIOLOGY & ADDITIONAL STUDIES:    PHYSICAL EXAM:  Constitutional: NAD. alert   Respiratory: non labor breathing   Extremities: left AKA stump with ACE wrap in place, no strikethrough bleeding     A/P: 69y Male s/p left AKA POD #0    - Diet: diabetic diet  - Activity: OOB with assistance.  - Labs: f/u AM labs   - Pain medication: oxycodone and tylenol prn   - DVT ppx: [facility]    vascular Surgery   [id number]

## 2019-09-25 LAB
ANION GAP SERPL CALC-SCNC: 16 MMOL/L — SIGNIFICANT CHANGE UP (ref 5–17)
BLD GP AB SCN SERPL QL: NEGATIVE — SIGNIFICANT CHANGE UP
BUN SERPL-MCNC: 18 MG/DL — SIGNIFICANT CHANGE UP (ref 7–23)
CALCIUM SERPL-MCNC: 9.1 MG/DL — SIGNIFICANT CHANGE UP (ref 8.4–10.5)
CHLORIDE SERPL-SCNC: 95 MMOL/L — LOW (ref 96–108)
CO2 SERPL-SCNC: 28 MMOL/L — SIGNIFICANT CHANGE UP (ref 22–31)
CREAT SERPL-MCNC: 0.8 MG/DL — SIGNIFICANT CHANGE UP (ref 0.5–1.3)
CULTURE RESULTS: SIGNIFICANT CHANGE UP
CULTURE RESULTS: SIGNIFICANT CHANGE UP
GLUCOSE BLDC GLUCOMTR-MCNC: 241 MG/DL — HIGH (ref 70–99)
GLUCOSE BLDC GLUCOMTR-MCNC: 255 MG/DL — HIGH (ref 70–99)
GLUCOSE BLDC GLUCOMTR-MCNC: 286 MG/DL — HIGH (ref 70–99)
GLUCOSE BLDC GLUCOMTR-MCNC: 290 MG/DL — HIGH (ref 70–99)
GLUCOSE SERPL-MCNC: 244 MG/DL — HIGH (ref 70–99)
HCT VFR BLD CALC: 37.5 % — LOW (ref 39–50)
HGB BLD-MCNC: 11 G/DL — LOW (ref 13–17)
MCHC RBC-ENTMCNC: 26.9 PG — LOW (ref 27–34)
MCHC RBC-ENTMCNC: 29.3 GM/DL — LOW (ref 32–36)
MCV RBC AUTO: 91.9 FL — SIGNIFICANT CHANGE UP (ref 80–100)
PLATELET # BLD AUTO: 420 K/UL — HIGH (ref 150–400)
POTASSIUM SERPL-MCNC: 4 MMOL/L — SIGNIFICANT CHANGE UP (ref 3.5–5.3)
POTASSIUM SERPL-SCNC: 4 MMOL/L — SIGNIFICANT CHANGE UP (ref 3.5–5.3)
RBC # BLD: 4.08 M/UL — LOW (ref 4.2–5.8)
RBC # FLD: 14.4 % — SIGNIFICANT CHANGE UP (ref 10.3–14.5)
RH IG SCN BLD-IMP: POSITIVE — SIGNIFICANT CHANGE UP
SODIUM SERPL-SCNC: 139 MMOL/L — SIGNIFICANT CHANGE UP (ref 135–145)
SPECIMEN SOURCE: SIGNIFICANT CHANGE UP
SPECIMEN SOURCE: SIGNIFICANT CHANGE UP
WBC # BLD: 20.3 K/UL — HIGH (ref 3.8–10.5)
WBC # FLD AUTO: 20.3 K/UL — HIGH (ref 3.8–10.5)

## 2019-09-25 RX ORDER — FUROSEMIDE 40 MG
40 TABLET ORAL DAILY
Refills: 0 | Status: DISCONTINUED | OUTPATIENT
Start: 2019-09-25 | End: 2019-10-03

## 2019-09-25 RX ORDER — INSULIN GLARGINE 100 [IU]/ML
12 INJECTION, SOLUTION SUBCUTANEOUS AT BEDTIME
Refills: 0 | Status: DISCONTINUED | OUTPATIENT
Start: 2019-09-25 | End: 2019-09-29

## 2019-09-25 RX ORDER — POTASSIUM CHLORIDE 20 MEQ
20 PACKET (EA) ORAL
Refills: 0 | Status: DISCONTINUED | OUTPATIENT
Start: 2019-09-25 | End: 2019-10-03

## 2019-09-25 RX ADMIN — Medication 250 MILLIGRAM(S): at 00:05

## 2019-09-25 RX ADMIN — Medication 0.12 MILLIGRAM(S): at 06:27

## 2019-09-25 RX ADMIN — Medication 3: at 13:10

## 2019-09-25 RX ADMIN — Medication 40 MILLIGRAM(S): at 06:27

## 2019-09-25 RX ADMIN — Medication 3: at 17:19

## 2019-09-25 RX ADMIN — Medication 3: at 09:06

## 2019-09-25 RX ADMIN — PIPERACILLIN AND TAZOBACTAM 25 GRAM(S): 4; .5 INJECTION, POWDER, LYOPHILIZED, FOR SOLUTION INTRAVENOUS at 09:12

## 2019-09-25 RX ADMIN — HEPARIN SODIUM 5000 UNIT(S): 5000 INJECTION INTRAVENOUS; SUBCUTANEOUS at 17:19

## 2019-09-25 RX ADMIN — LOSARTAN POTASSIUM 25 MILLIGRAM(S): 100 TABLET, FILM COATED ORAL at 06:27

## 2019-09-25 RX ADMIN — Medication 25 MILLIGRAM(S): at 06:27

## 2019-09-25 RX ADMIN — ATORVASTATIN CALCIUM 10 MILLIGRAM(S): 80 TABLET, FILM COATED ORAL at 21:15

## 2019-09-25 RX ADMIN — Medication 20 MILLIEQUIVALENT(S): at 17:19

## 2019-09-25 RX ADMIN — PIPERACILLIN AND TAZOBACTAM 25 GRAM(S): 4; .5 INJECTION, POWDER, LYOPHILIZED, FOR SOLUTION INTRAVENOUS at 17:19

## 2019-09-25 RX ADMIN — Medication 250 MILLIGRAM(S): at 13:10

## 2019-09-25 RX ADMIN — Medication 81 MILLIGRAM(S): at 13:09

## 2019-09-25 RX ADMIN — Medication 40 MILLIGRAM(S): at 17:19

## 2019-09-25 RX ADMIN — HEPARIN SODIUM 5000 UNIT(S): 5000 INJECTION INTRAVENOUS; SUBCUTANEOUS at 06:27

## 2019-09-25 RX ADMIN — INSULIN GLARGINE 12 UNIT(S): 100 INJECTION, SOLUTION SUBCUTANEOUS at 22:22

## 2019-09-25 NOTE — PROGRESS NOTE ADULT - ASSESSMENT
A/P: 69 Year-Old male s/p LLE AKA    - Surgical sterile dressing to remain in place on LLE until friday 9/27, replace ACE wrap when patient is amenable  - Right foot not salvageable per discussion with podiatry   - May need right AKA, F/u MRI to evaluate for osteo (ordered). C/w daily bacitracin soaked dressing changes to RLE.       Omar Angel, PGY 2  Pager [phone]

## 2019-09-25 NOTE — PROGRESS NOTE ADULT - ASSESSMENT
68 yo male PMH DM(II), PAD s/p left BKA and prior left leg fem-pop, BIBEMS from facility for wound evaluation. Patient recently received left BKA in July 2019 in [facility], where he was discharged to rehab. Was sent from facility today for concerns of improper wound healing, drainage, and bleeding. Patient otherwise had no complaints, but endorses LLE pain near wound site.    Vascular: Ortho and Vascular notes reviewed. Needs MRI of right foot to r/o OM with IV contrast. CT of left limb shows no abscess or OM. Elevated ESR of 84 and elevated CRP noted on arrival. Continue ASA 81 mg/day. Add Lipitor 10 mg/day. Lipid panel in AM. VAC dressing placed in left AKA site. Continue IV Zosyn 3.375 every 8 and IV Vancomycin 1 gram every 12 hours.  Vascular considering right BKA surgery.       Endo: Resume Lantus 12 units at night.       CV: Contine Cozaar 25 mg/day and Toprol XL 25 mg/day. BP stable 114/76. Change to PO Lasix 40 mg/day. Continue  Digoxin .125 mg/day. Fletcher placed. Repeat CXR shows some residual  CHF, no   symptoms or SOB.      Spoke to nephew today by phone about plan of care : [phone]

## 2019-09-25 NOTE — PROGRESS NOTE ADULT - SUBJECTIVE AND OBJECTIVE BOX
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 Hospital course also notable for concern for pontine stroke possibly seen on CT head, dysphagia initially requiring NGT feeds but later passed MBS with improved mental status placed on puree diet. He was discharged to [facility], now re-presenting to ED with lower extremity wound.   Vascular Surgery consulted for wound evaluation of L MARCELA. Patient states that his leg wound has been present for unknown period of time. He states that his staples were removed approximately three weeks ago. No fevers, chills, has had some pain in the LLE, constant.     Interval / 24 Hour Events:  No acute events, Pt is s/p LLE AKA 9/24. Pt removed his ACE wrap today. On exam, he is agitated and disoriented, refusing replacement of LLE ACE wrap and refusing dressing change to RLE.     ** VITAL SIGNS / I&O's **    T(C): 36.8 (09-25-19 @ 12:08), Max: 36.8 (09-24-19 @ 21:45)  HR: 93 (09-25-19 @ 12:08) (71 - 93)  BP: 124/78 (09-25-19 @ 12:08) (110/62 - 131/61)  BP(mean): 91 (09-24-19 @ 19:45) (77 - 91)  ABP: --  ABP(mean): --  RR: 18 (09-25-19 @ 12:08) (14 - 18)  SpO2: 98% (09-25-19 @ 12:08) (93% - 98%)  Wt(kg): --  CVP(mm Hg): --  CI: --  CAPILLARY BLOOD GLUCOSE      POCT Blood Glucose.: 286 mg/dL (25 Sep 2019 12:48)  POCT Blood Glucose.: 255 mg/dL (25 Sep 2019 08:24)  POCT Blood Glucose.: 202 mg/dL (24 Sep 2019 21:49)   N/A      09-24 @ 07:01  -  09-25 @ 07:00  --------------------------------------------------------  IN:    Oral Fluid: 240 mL  Total IN: 240 mL    OUT:    Indwelling Catheter - Urethral: 2050 mL  Total OUT: 2050 mL    Total NET: -1810 mL      	    ** PHYSICAL EXAM **  Gen: well-developed, in no acute distress   Resp: Nonlabored, CTABL, no wheezes, ronchi, or rales. Normal respiratory effort.   CVS: Normotensive, RRR, no M/R/G   Ext: B/l femoral pulses palpable, RLE popliteal signals, R DP signal present. Dorsum of right foot has 3x6cm wound, dry, non-crepitant, no discharge. LLE prior AKA with gauze and tegaderm dressing in place, mild saturation.       ** LABS **    CBC (09-25 @ 11:31)                              11.0<L>                         20.3<H>  )----------------(  420<H>     --    % Neutrophils, --    % Lymphocytes, ANC: --                                  37.5<L>              CBC (09-24 @ 19:07)                              10.2<L>                         13.7<H>  )----------------(  348        --    % Neutrophils, --    % Lymphocytes, ANC: --                                  33.2<L>                BMP (09-25 @ 10:24)             139     |  95<L>   |  18    		Ca++ --      Ca 9.1                ---------------------------------( 244<H>		Mg --                 4.0     |  28      |  0.80  			Ph --      BMP (09-24 @ 19:07)             139     |  99      |  16    		Ca++ --      Ca 8.9                ---------------------------------( 180<H>		Mg 1.6                3.5     |  26      |  0.77  			Ph 3.0         Coags (09-24 @ 19:07)  aPTT 34.0 / INR 1.39<H> / PT 16.2<H>        -> .Blood Culture (09-20 @ 14:22)     NG    NG    No growth to date.    -> .Blood Culture (09-20 @ 04:54)     NG    NG    No growth at 5 days.    -> .Abscess left foot wound Culture (09-19 @ 17:27)     NG    Pseudomonas aeruginosa    Culture yields >4 types of aerobic and/or anaerobic bacteria  Call client services within 7 days if further workup is clinically  indicated.  Culture includes  Numerous Pseudomonas aeruginosa    -> .Blood Culture (09-17 @ 22:21)     NG    NG    No growth at 5 days.

## 2019-09-25 NOTE — PROGRESS NOTE ADULT - SUBJECTIVE AND OBJECTIVE BOX
CC: f/u for L BKA stump wound, R foot gangrene    Patient reports minimal L BKA stump pain, remains more alert    REVIEW OF SYSTEMS:  All other review of systems negative (Comprehensive ROS)    Antimicrobials Day # 6    piperacillin/tazobactam IVPB.. 3.375 Gram(s) IV Intermittent every 8 hours  vancomycin  IVPB      vancomycin  IVPB 1000 milliGRAM(s) IV Intermittent every 12 hours        Other Medications Reviewed  Vital Signs Last 24 Hrs  T(C): 36.6 (25 Sep 2019 04:11), Max: 36.8 (24 Sep 2019 13:36)  T(F): 97.8 (25 Sep 2019 04:11), Max: 98.2 (24 Sep 2019 13:36)  HR: 77 (25 Sep 2019 04:11) (71 - 82)  BP: 127/76 (25 Sep 2019 04:11) (110/62 - 131/61)  BP(mean): 91 (24 Sep 2019 19:45) (77 - 91)  RR: 18 (25 Sep 2019 04:11) (14 - 18)  SpO2: 95% (25 Sep 2019 04:11) (93% - 98%)    PHYSICAL EXAM:  General: alert, no acute distress  Eyes:  anicteric, no conjunctival injection, no discharge  Oropharynx: no lesions or injection 	  Neck: supple, without adenopathy  Lungs: diminished bases  Heart: regular rate and rhythm; no murmur, rubs or gallops  Abdomen: soft, nondistended, nontender, without mass or organomegaly  Skin: no rash  Extremities: L BKA dressing in place, no surrounding erythema; R foot dressings in place, gangrene toes; mild R leg edema  Neurologic: alert, moves all extremities    LAB RESULTS:                                               11.0   20.3  )-----------( 420      ( 25 Sep 2019 11:31 )             37.5   09-25    139  |  95<L>  |  18  ----------------------------<  244<H>  4.0   |  28  |  0.80    Ca    9.1      25 Sep 2019 10:24  Phos  3.0     09-24  Mg     1.6     09-24            MICROBIOLOGY:  RECENT CULTURES:    Culture - Abscess with Gram Stain (09.19.19 @ 17:27)    -  Piperacillin/Tazobactam: S <=8    -  Tobramycin: S <=2    -  Aztreonam: S <=4    -  Cefepime: S <=2    -  Ceftazidime: S 4    -  Levofloxacin: S <=2    -  Meropenem: S <=1    -  Gentamicin: S 4    -  Imipenem: S <=1    -  Ciprofloxacin: S <=1    -  Amikacin: S <=16    Specimen Source: .Abscess left foot wound    Culture Results:   Culture yields >4 types of aerobic and/or anaerobic bacteria  Call client services within 7 days if further workup is clinically  indicated.  Culture includes  Numerous Pseudomonas aeruginosa    Organism Identification: Pseudomonas aeruginosa    Organism: Pseudomonas aeruginosa    Method Type: HELEN              RADIOLOGY REVIEWED:    < from: Xray Chest 1 View- PORTABLE-Urgent (09.24.19 @ 12:04) >  IMPRESSION:   Redemonstration of small bilateral pleural effusions and pulmonary edema.    < end of copied text >    CT Lower Extremity w/ IV Cont, Left (09.17.19 @ 17:53) >  1.  Skin defect at the distal anterior stump with adjacent soft tissue   swelling. Circumferential edema and skin thickening about the thigh.   Nonspecific but can be seen with cellulitis.  2.Moderate confluent edema deep to the myofascial plane of the left   vastus lateralis muscle. May reflect phlegmon versus early abscess   formation. No definite drainable fluid collection.  3.  No CT evidence for osteomyelitis.  4.  Distal femoral proximal tibial bone infarctions. CC: f/u for L BKA stump wound, R foot gangrene    Patient reports no new c/o, s/p L AKA    REVIEW OF SYSTEMS:  All other review of systems negative (Comprehensive ROS)    Antimicrobials Day # 6    piperacillin/tazobactam IVPB.. 3.375 Gram(s) IV Intermittent every 8 hours  vancomycin  IVPB      vancomycin  IVPB 1000 milliGRAM(s) IV Intermittent every 12 hours        Other Medications Reviewed  Vital Signs Last 24 Hrs  T(C): 36.6 (25 Sep 2019 04:11), Max: 36.8 (24 Sep 2019 13:36)  T(F): 97.8 (25 Sep 2019 04:11), Max: 98.2 (24 Sep 2019 13:36)  HR: 77 (25 Sep 2019 04:11) (71 - 82)  BP: 127/76 (25 Sep 2019 04:11) (110/62 - 131/61)  BP(mean): 91 (24 Sep 2019 19:45) (77 - 91)  RR: 18 (25 Sep 2019 04:11) (14 - 18)  SpO2: 95% (25 Sep 2019 04:11) (93% - 98%)    PHYSICAL EXAM:  General: alert, no acute distress  Eyes:  anicteric, no conjunctival injection, no discharge  Oropharynx: no lesions or injection 	  Neck: supple, without adenopathy  Lungs: diminished bases  Heart: regular rate and rhythm; no murmur, rubs or gallops  Abdomen: soft, nondistended, nontender, without mass or organomegaly  Skin: no rash  Extremities: L AKA dressing in place, R foot dressings in place, gangrene toes; mild R leg edema  Neurologic: alert, no focal deficits  LAB RESULTS:                                               11.0   20.3  )-----------( 420      ( 25 Sep 2019 11:31 )             37.5   09-25    139  |  95<L>  |  18  ----------------------------<  244<H>  4.0   |  28  |  0.80    Ca    9.1      25 Sep 2019 10:24  Phos  3.0     09-24  Mg     1.6     09-24            MICROBIOLOGY:  RECENT CULTURES:    Culture - Abscess with Gram Stain (09.19.19 @ 17:27)    -  Piperacillin/Tazobactam: S <=8    -  Tobramycin: S <=2    -  Aztreonam: S <=4    -  Cefepime: S <=2    -  Ceftazidime: S 4    -  Levofloxacin: S <=2    -  Meropenem: S <=1    -  Gentamicin: S 4    -  Imipenem: S <=1    -  Ciprofloxacin: S <=1    -  Amikacin: S <=16    Specimen Source: .Abscess left foot wound    Culture Results:   Culture yields >4 types of aerobic and/or anaerobic bacteria  Call client services within 7 days if further workup is clinically  indicated.  Culture includes  Numerous Pseudomonas aeruginosa    Organism Identification: Pseudomonas aeruginosa    Organism: Pseudomonas aeruginosa    Method Type: HELEN              RADIOLOGY REVIEWED:    < from: Xray Chest 1 View- PORTABLE-Urgent (09.24.19 @ 12:04) >  IMPRESSION:   Redemonstration of small bilateral pleural effusions and pulmonary edema.    < end of copied text >    CT Lower Extremity w/ IV Cont, Left (09.17.19 @ 17:53) >  1.  Skin defect at the distal anterior stump with adjacent soft tissue   swelling. Circumferential edema and skin thickening about the thigh.   Nonspecific but can be seen with cellulitis.  2.Moderate confluent edema deep to the myofascial plane of the left   vastus lateralis muscle. May reflect phlegmon versus early abscess   formation. No definite drainable fluid collection.  3.  No CT evidence for osteomyelitis.  4.  Distal femoral proximal tibial bone infarctions.

## 2019-09-25 NOTE — PROGRESS NOTE ADULT - SUBJECTIVE AND OBJECTIVE BOX
INTERVAL HPI/OVERNIGHT EVENTS:  Pt seen and examined at bedside.     Allergies/Intolerance: No Known Allergies      MEDICATIONS  (STANDING):  aspirin enteric coated 81 milliGRAM(s) Oral daily  atorvastatin 10 milliGRAM(s) Oral at bedtime  dextrose 5%. 1000 milliLiter(s) (120 mL/Hr) IV Continuous <Continuous>  dextrose 50% Injectable 12.5 Gram(s) IV Push once  dextrose 50% Injectable 25 Gram(s) IV Push once  dextrose 50% Injectable 25 Gram(s) IV Push once  digoxin     Tablet 0.125 milliGRAM(s) Oral daily  furosemide   Injectable 40 milliGRAM(s) IV Push daily  heparin  Injectable 5000 Unit(s) SubCutaneous every 12 hours  influenza   Vaccine 0.5 milliLiter(s) IntraMuscular once  insulin lispro (HumaLOG) corrective regimen sliding scale   SubCutaneous three times a day before meals  insulin lispro (HumaLOG) corrective regimen sliding scale   SubCutaneous at bedtime  losartan 25 milliGRAM(s) Oral daily  metoprolol succinate ER 25 milliGRAM(s) Oral daily  piperacillin/tazobactam IVPB.. 3.375 Gram(s) IV Intermittent every 8 hours  potassium chloride    Tablet ER 20 milliEquivalent(s) Oral daily  vancomycin  IVPB      vancomycin  IVPB 1000 milliGRAM(s) IV Intermittent every 12 hours    MEDICATIONS  (PRN):  dextrose 40% Gel 15 Gram(s) Oral once PRN Blood Glucose LESS THAN 70 milliGRAM(s)/deciliter  glucagon  Injectable 1 milliGRAM(s) IntraMuscular once PRN Glucose LESS THAN 70 milligrams/deciliter        ROS: all systems reviewed and wnl      PHYSICAL EXAMINATION:  Vital Signs Last 24 Hrs  T(C): 36.6 (25 Sep 2019 04:11), Max: 36.8 (24 Sep 2019 13:36)  T(F): 97.8 (25 Sep 2019 04:11), Max: 98.2 (24 Sep 2019 13:36)  HR: 77 (25 Sep 2019 04:11) (71 - 82)  BP: 127/76 (25 Sep 2019 04:11) (110/62 - 131/61)  BP(mean): 91 (24 Sep 2019 19:45) (77 - 91)  RR: 18 (25 Sep 2019 04:11) (14 - 18)  SpO2: 95% (25 Sep 2019 04:11) (93% - 98%)  CAPILLARY BLOOD GLUCOSE      POCT Blood Glucose.: 202 mg/dL (24 Sep 2019 21:49)  POCT Blood Glucose.: 140 mg/dL (24 Sep 2019 14:07)  POCT Blood Glucose.: 135 mg/dL (24 Sep 2019 12:23)  POCT Blood Glucose.: 159 mg/dL (24 Sep 2019 07:54)      09-24 @ 07:01  -  09-25 @ 07:00  --------------------------------------------------------  IN: 240 mL / OUT: 2050 mL / NET: -1810 mL        GENERAL:   NECK: supple, No JVD  CHEST/LUNG: clear to auscultation bilaterally; no rales, rhonchi, or wheezing b/l  HEART: normal S1, S2  ABDOMEN: BS+, soft, ND, NT   EXTREMITIES:  pulses palpable; no clubbing, cyanosis, or edema b/l LEs  SKIN: no rashes or lesions      LABS:                        10.2   13.7  )-----------( 348      ( 24 Sep 2019 19:07 )             33.2     09-24    139  |  99  |  16  ----------------------------<  180<H>  3.5   |  26  |  0.77    Ca    8.9      24 Sep 2019 19:07  Phos  3.0     09-24  Mg     1.6     09-24      PT/INR - ( 24 Sep 2019 19:07 )   PT: 16.2 sec;   INR: 1.39 ratio         PTT - ( 24 Sep 2019 19:07 )  PTT:34.0 sec INTERVAL HPI/OVERNIGHT EVENTS:  Pt seen and examined at bedside.     Allergies/Intolerance: No Known Allergies      MEDICATIONS  (STANDING):  aspirin enteric coated 81 milliGRAM(s) Oral daily  atorvastatin 10 milliGRAM(s) Oral at bedtime  dextrose 5%. 1000 milliLiter(s) (120 mL/Hr) IV Continuous <Continuous>  dextrose 50% Injectable 12.5 Gram(s) IV Push once  dextrose 50% Injectable 25 Gram(s) IV Push once  dextrose 50% Injectable 25 Gram(s) IV Push once  digoxin     Tablet 0.125 milliGRAM(s) Oral daily  furosemide   Injectable 40 milliGRAM(s) IV Push daily  heparin  Injectable 5000 Unit(s) SubCutaneous every 12 hours  influenza   Vaccine 0.5 milliLiter(s) IntraMuscular once  insulin lispro (HumaLOG) corrective regimen sliding scale   SubCutaneous three times a day before meals  insulin lispro (HumaLOG) corrective regimen sliding scale   SubCutaneous at bedtime  losartan 25 milliGRAM(s) Oral daily  metoprolol succinate ER 25 milliGRAM(s) Oral daily  piperacillin/tazobactam IVPB.. 3.375 Gram(s) IV Intermittent every 8 hours  potassium chloride    Tablet ER 20 milliEquivalent(s) Oral daily  vancomycin  IVPB      vancomycin  IVPB 1000 milliGRAM(s) IV Intermittent every 12 hours    MEDICATIONS  (PRN):  dextrose 40% Gel 15 Gram(s) Oral once PRN Blood Glucose LESS THAN 70 milliGRAM(s)/deciliter  glucagon  Injectable 1 milliGRAM(s) IntraMuscular once PRN Glucose LESS THAN 70 milligrams/deciliter        ROS: all systems reviewed and wnl      PHYSICAL EXAMINATION:  Vital Signs Last 24 Hrs  T(C): 36.6 (25 Sep 2019 04:11), Max: 36.8 (24 Sep 2019 13:36)  T(F): 97.8 (25 Sep 2019 04:11), Max: 98.2 (24 Sep 2019 13:36)  HR: 77 (25 Sep 2019 04:11) (71 - 82)  BP: 127/76 (25 Sep 2019 04:11) (110/62 - 131/61)  BP(mean): 91 (24 Sep 2019 19:45) (77 - 91)  RR: 18 (25 Sep 2019 04:11) (14 - 18)  SpO2: 95% (25 Sep 2019 04:11) (93% - 98%)  CAPILLARY BLOOD GLUCOSE      POCT Blood Glucose.: 202 mg/dL (24 Sep 2019 21:49)  POCT Blood Glucose.: 140 mg/dL (24 Sep 2019 14:07)  POCT Blood Glucose.: 135 mg/dL (24 Sep 2019 12:23)  POCT Blood Glucose.: 159 mg/dL (24 Sep 2019 07:54)      09-24 @ 07:01  -  09-25 @ 07:00  --------------------------------------------------------  IN: 240 mL / OUT: 2050 mL / NET: -1810 mL        GENERAL: stable in bed, south in place, no CP, SOB or fevers  NECK: supple, No JVD  CHEST/LUNG: clear to auscultation bilaterally; no rales, rhonchi, or wheezing b/l  HEART: normal S1, S2  ABDOMEN: BS+, soft, ND, NT   EXTREMITIES:  left leg AKA surgical site dressed, right foot poor pulses, necrotic toes      LABS:                        10.2   13.7  )-----------( 348      ( 24 Sep 2019 19:07 )             33.2     09-24    139  |  99  |  16  ----------------------------<  180<H>  3.5   |  26  |  0.77    Ca    8.9      24 Sep 2019 19:07  Phos  3.0     09-24  Mg     1.6     09-24      PT/INR - ( 24 Sep 2019 19:07 )   PT: 16.2 sec;   INR: 1.39 ratio         PTT - ( 24 Sep 2019 19:07 )  PTT:34.0 sec

## 2019-09-25 NOTE — PROGRESS NOTE ADULT - ASSESSMENT
70 yo diabetic, PAD, s/p L BKA 7/22, returns from rehab 9/17 with poorly healing wound, drainage- afebrile, WBC/diff normal in ED.    CT nonspecific skin thickening, edema, but no collection or osteo      Febrile 9/19, leukocytosis, Vanco and Zosyn started 9/20  R foot vs L BKA stump source  Bld Cxs negative   Wound Cx mult organisms, including pseudomonas as above  Afebrile  Pt now s/p AKA      Plan:  Continue empiric Vanco and Zosyn for short post-op course  Local wound care as per Vascular and Podiatry  f/u OR culures  Monitor WBC and fever trend 70 yo diabetic, PAD, s/p L BKA 7/22, returns from rehab 9/17 with poorly healing wound, drainage- afebrile, WBC/diff normal in ED.    CT nonspecific skin thickening, edema, but no collection or osteo      Febrile 9/19, leukocytosis, Vanco and Zosyn started 9/20  R foot vs L BKA stump source  Bld Cxs negative   Wound Cx mult organisms, including pseudomonas as above  Afebrile  Pt now s/p AKA      Plan:  Continue empiric Vanco and Zosyn for short post-op course  Local wound care as per Vascular and Podiatry  f/u OR culures  Plans for R foot interventions?  Monitor WBC and fever trend

## 2019-09-26 ENCOUNTER — TRANSCRIPTION ENCOUNTER (OUTPATIENT)
Age: 69
End: 2019-09-26

## 2019-09-26 LAB
GLUCOSE BLDC GLUCOMTR-MCNC: 150 MG/DL — HIGH (ref 70–99)
GLUCOSE BLDC GLUCOMTR-MCNC: 204 MG/DL — HIGH (ref 70–99)
GLUCOSE BLDC GLUCOMTR-MCNC: 204 MG/DL — HIGH (ref 70–99)
GLUCOSE BLDC GLUCOMTR-MCNC: 305 MG/DL — HIGH (ref 70–99)
HCT VFR BLD CALC: 31.8 % — LOW (ref 39–50)
HGB BLD-MCNC: 9.6 G/DL — LOW (ref 13–17)
MCHC RBC-ENTMCNC: 27.4 PG — SIGNIFICANT CHANGE UP (ref 27–34)
MCHC RBC-ENTMCNC: 30.2 GM/DL — LOW (ref 32–36)
MCV RBC AUTO: 90.9 FL — SIGNIFICANT CHANGE UP (ref 80–100)
PLATELET # BLD AUTO: 364 K/UL — SIGNIFICANT CHANGE UP (ref 150–400)
RBC # BLD: 3.5 M/UL — LOW (ref 4.2–5.8)
RBC # FLD: 15.1 % — HIGH (ref 10.3–14.5)
VANCOMYCIN TROUGH SERPL-MCNC: 7.7 UG/ML — LOW (ref 10–20)
WBC # BLD: 15.71 K/UL — HIGH (ref 3.8–10.5)
WBC # FLD AUTO: 15.71 K/UL — HIGH (ref 3.8–10.5)

## 2019-09-26 RX ORDER — LOSARTAN POTASSIUM 100 MG/1
50 TABLET, FILM COATED ORAL DAILY
Refills: 0 | Status: DISCONTINUED | OUTPATIENT
Start: 2019-09-26 | End: 2019-10-01

## 2019-09-26 RX ORDER — INSULIN LISPRO 100/ML
VIAL (ML) SUBCUTANEOUS
Refills: 0 | Status: DISCONTINUED | OUTPATIENT
Start: 2019-09-26 | End: 2019-10-07

## 2019-09-26 RX ORDER — ALPRAZOLAM 0.25 MG
1 TABLET ORAL ONCE
Refills: 0 | Status: DISCONTINUED | OUTPATIENT
Start: 2019-09-26 | End: 2019-09-26

## 2019-09-26 RX ADMIN — Medication 2: at 08:54

## 2019-09-26 RX ADMIN — ATORVASTATIN CALCIUM 10 MILLIGRAM(S): 80 TABLET, FILM COATED ORAL at 23:16

## 2019-09-26 RX ADMIN — Medication 1 MILLIGRAM(S): at 20:33

## 2019-09-26 RX ADMIN — LOSARTAN POTASSIUM 50 MILLIGRAM(S): 100 TABLET, FILM COATED ORAL at 23:16

## 2019-09-26 RX ADMIN — HEPARIN SODIUM 5000 UNIT(S): 5000 INJECTION INTRAVENOUS; SUBCUTANEOUS at 06:10

## 2019-09-26 RX ADMIN — Medication 250 MILLIGRAM(S): at 01:37

## 2019-09-26 RX ADMIN — PIPERACILLIN AND TAZOBACTAM 25 GRAM(S): 4; .5 INJECTION, POWDER, LYOPHILIZED, FOR SOLUTION INTRAVENOUS at 16:56

## 2019-09-26 RX ADMIN — PIPERACILLIN AND TAZOBACTAM 25 GRAM(S): 4; .5 INJECTION, POWDER, LYOPHILIZED, FOR SOLUTION INTRAVENOUS at 08:54

## 2019-09-26 RX ADMIN — Medication 81 MILLIGRAM(S): at 13:00

## 2019-09-26 RX ADMIN — Medication 20 MILLIEQUIVALENT(S): at 17:58

## 2019-09-26 RX ADMIN — INSULIN GLARGINE 12 UNIT(S): 100 INJECTION, SOLUTION SUBCUTANEOUS at 23:16

## 2019-09-26 RX ADMIN — Medication 250 MILLIGRAM(S): at 13:00

## 2019-09-26 RX ADMIN — Medication 0.12 MILLIGRAM(S): at 06:10

## 2019-09-26 RX ADMIN — Medication 40 MILLIGRAM(S): at 06:09

## 2019-09-26 RX ADMIN — Medication 20 MILLIEQUIVALENT(S): at 06:09

## 2019-09-26 RX ADMIN — Medication 4: at 13:00

## 2019-09-26 RX ADMIN — PIPERACILLIN AND TAZOBACTAM 25 GRAM(S): 4; .5 INJECTION, POWDER, LYOPHILIZED, FOR SOLUTION INTRAVENOUS at 01:37

## 2019-09-26 RX ADMIN — LOSARTAN POTASSIUM 25 MILLIGRAM(S): 100 TABLET, FILM COATED ORAL at 06:09

## 2019-09-26 RX ADMIN — HEPARIN SODIUM 5000 UNIT(S): 5000 INJECTION INTRAVENOUS; SUBCUTANEOUS at 17:57

## 2019-09-26 RX ADMIN — Medication 25 MILLIGRAM(S): at 06:09

## 2019-09-26 NOTE — DISCHARGE NOTE PROVIDER - HOSPITAL COURSE
70 yo male PMH DM(II), PAD, s/p left BKA and prior left leg fem-pop, BIBEMS from facility for wound evaluation. Patient recently received left BKA in July 2019 in [facility], where he was discharged to rehab. Was sent from facility today for concerns of improper wound healing, drainage, and bleeding. Found to have Left BKA wound dehiscence - vac placed 9/18    CT LLE nonspecific skin thickening, edema, but no collection or osteo. S/p Lt AKA on 9/24 by vascular.      MR Rt foot--------------------------------------------- 68 yo male PMH DM(II), PAD, s/p left BKA and prior left leg fem-pop, BIBEMS from facility for wound evaluation. Patient recently received left BKA in July 2019 in [facility], where he was discharged to rehab. Was sent from facility today for concerns of improper wound healing, drainage, and bleeding. Found to have Left BKA wound dehiscence - vac placed 9/18    CT LLE nonspecific skin thickening, edema, but no collection or osteo. S/p Lt AKA on 9/24 by vascular.      Pt refused MR Rt foot. 68 yo male PMH DM(II), PAD, s/p left BKA and prior left leg fem-pop, BIBEMS from facility for wound evaluation. Patient recently received left BKA in July 2019 in [facility], where he was discharged to rehab. Was sent from facility today for concerns of improper wound healing, drainage, and bleeding. Found to have Left BKA wound dehiscence - vac placed 9/18    CT LLE nonspecific skin thickening, edema, but no collection or osteo. S/p Lt AKA on 9/24 by vascular.      Per Vascular no plans for OR this admission. Will do watchful waiting. Pt medically cleared for discharge. 68 yo male PMH DM(II), PAD, s/p left BKA and prior left leg fem-pop, BIBEMS from facility for wound evaluation. Patient recently received left BKA in July 2019 in [facility], where he was discharged to rehab. Was sent from facility today for concerns of improper wound healing, drainage, and bleeding. Found to have Left BKA wound dehiscence - vac placed 9/18    CT LLE nonspecific skin thickening, edema, but no collection or osteo. S/p Lt AKA on 9/24 by vascular.      Per Vascular no plans for OR this admission. Will do watchful waiting. Pt medically cleared for discharge. Outpatient follow up with Vascular and PMD. 70 yo male PMH DM(II), PAD, s/p left BKA and prior left leg fem-pop, BIBEMS from facility for wound evaluation. Patient recently received left BKA in July 2019 in [facility], where he was discharged to rehab. Was sent from facility today for concerns of improper wound healing, drainage, and bleeding. Found to have Left BKA wound dehiscence - vac placed 9/18    CT LLE nonspecific skin thickening, edema, but no collection or osteo. S/p Lt AKA on 9/24 by vascular.      Per Vascular no plans for OR this admission. Recommended dressing changes for right LE and LLE. Will do watchful waiting of RLE. Pt medically cleared for discharge. Outpatient follow up with Vascular and PMD. 68 yo male PMH DM(II), PAD, s/p left BKA and prior left leg fem-pop, BIBEMS from facility for wound evaluation. Patient recently received left BKA in July 2019 in [facility], where he was discharged to rehab. Was sent from facility today for concerns of improper wound healing, drainage, bleeding. Found to have Left BKA wound dehiscence and infection. VAC placed 9/18/19.  CT LLE nonspecific skin thickening, edema, but no collection or osteo.         S/p Lt AKA revision on 9/24 by vascular.  Per Vascular no plans for right AKA this admission. No pulse RLL on arrival, infected with necroticd tissue. Palliative care was consulted, MOLST form was declined by son.        Recommended dressing changes for right LE and LLE. Will do watchful waiting of RLE. Pt medically cleared for discharge. Outpatient follow up with Vascular and PMD. For now, plan is to leave RLE in place, no amputation. May need inpatient hospice. Note: Had acute decompensated systolic CHF. 68 yo male PMH DM(II), PAD, s/p left BKA and prior left leg fem-pop, BIBEMS from facility for wound evaluation. Patient recently received left BKA in July 2019 in [facility], where he was discharged to rehab. Was sent from facility today for concerns of improper wound healing, drainage, bleeding. Found to have Left BKA wound dehiscence and infection. VAC placed 9/18/19.  CT LLE nonspecific skin thickening, edema, but no collection or osteo.         S/p Lt AKA revision on 9/24 by vascular.  Per Vascular no plans for right AKA this admission. No pulse RLL on arrival, infected with necroticd tissue. Palliative care was consulted, MOLST form was declined by son.        Recommended dressing changes for right LE and LLE. Will do watchful waiting of RLE. Pt medically cleared for discharge. Outpatient follow up with Vascular and PMD. For now, plan is to leave RLE in place, no amputation. May need inpatient hospice.         Note: Had acute decompensated systolic CHF that cause acute respiratory failure. No sepsis on arrival.

## 2019-09-26 NOTE — DISCHARGE NOTE PROVIDER - CARE PROVIDER_API CALL
Tomás Barriga)  Vascular Surgery  [address] [facility], [address] [place] [address]  Phone: [phone]  Fax: [phone]  Follow Up Time:     Medical Clinic,,   Medical Clinic,   [address] [place] [address]  Phone: [phone]  Fax: (   )    -  Follow Up Time: 1 week  Phone: (   )    -  Fax: (   )    -  Follow Up Time:

## 2019-09-26 NOTE — DISCHARGE NOTE PROVIDER - PROVIDER TOKENS
PROVIDER:[TOKEN:[[id number]]],FREE:[LAST:[Medical Clinic,],PHONE:[(   )    -],FAX:[(   )    -],ADDRESS:[[place] [address]  Phone: [phone]  Fax: (   )    -  Follow Up Time: 1 week]]

## 2019-09-26 NOTE — PROGRESS NOTE ADULT - ASSESSMENT
70 yo diabetic, PAD, s/p L BKA 7/22, returns from rehab 9/17 with poorly healing wound, drainage- afebrile, WBC/diff normal in ED.    CT nonspecific skin thickening, edema, but no collection or osteo      Febrile 9/19, leukocytosis, Vanco and Zosyn started 9/20  R foot vs L BKA stump source  Bld Cxs negative   Wound Cx mult organisms, including pseudomonas as above  Afebrile  Pt now s/p AKA      Plan:  Continue empiric Vanco and Zosyn for short post-op course  Local wound care as per Vascular and Podiatry  f/u OR culures  Plans for R foot interventions? awaiting MRI as per vascular  Monitor WBC and fever trend--moderating

## 2019-09-26 NOTE — PROGRESS NOTE ADULT - ASSESSMENT
70 yo male PMH DM(II), PAD s/p left BKA and prior left leg fem-pop, BIBEMS from facility for wound evaluation. Patient recently received left BKA in July 2019 in [facility], where he was discharged to rehab. Was sent from facility today for concerns of improper wound healing, drainage, and bleeding. Patient otherwise had no complaints, but endorses LLE pain near wound site.    Vascular: Ortho and Vascular notes reviewed. Needs MRI of right foot to r/o OM with IV contrast. Will reschedule MRI with oral ativan for sedation. Elevated ESR of 84 and elevated CRP noted on arrival. Continue ASA 81 mg/day. Add Lipitor 10 mg/day. Continue IV Zosyn 3.375 every 8 and IV Vancomycin 1 gram every 12 hours.  Vascular considering right AKA surgery after MRI is done. Vascular surgery has documented no pulses in right foot. Had necrotic toes right foot on arrival.         Endo: Resume Lantus 12 units at night. BS controlled 204 today.        CV: Increase Cozaar to 50 mg/day for decompensated systolic CHF. Continue Toprol XL 25 mg/day. BP stable 114/76. Change to PO Lasix 40 mg/day. Continue  Digoxin .125 mg/day. Fletcher placed. Repeat CXR shows some residual  CHF, no symptoms or SOB. Comfortable on RA.      Spoke to nephew by phone about plan of care : [phone]

## 2019-09-26 NOTE — DISCHARGE NOTE PROVIDER - NSDCFUSCHEDAPPT_GEN_ALL_CORE_FT
JIM PAREDES ; 10/14/2019 ; NPP Med Int [address] [facility]  JIM PAREDES ; 10/25/2019 ; NPP Surg Vasc [address]

## 2019-09-26 NOTE — DISCHARGE NOTE PROVIDER - NSDCCPCAREPLAN_GEN_ALL_CORE_FT
PRINCIPAL DISCHARGE DIAGNOSIS  Diagnosis: Wound, surgical, nonhealing  Assessment and Plan of Treatment: Had Left above knee amputation on 9/24. Continue with wound care      SECONDARY DISCHARGE DIAGNOSES  Diagnosis: Gangrene of right foot  Assessment and Plan of Treatment:     Diagnosis: Diabetes mellitus, type 2  Assessment and Plan of Treatment: 9/18: HgbA1c- 6.9.  Continue with your diabetic medications PRINCIPAL DISCHARGE DIAGNOSIS  Diagnosis: Wound, surgical, nonhealing  Assessment and Plan of Treatment: Had Left above knee amputation on 9/24. Continue with wound care  Staples to come out 10/24  Follow up with Vascular in 1 week-Dr. Barriga      SECONDARY DISCHARGE DIAGNOSES  Diagnosis: Diabetes mellitus, type 2  Assessment and Plan of Treatment: 9/18: HgbA1c- 6.9.    Diabetic diet  Follow up at Medicine clinic    Diagnosis: Gangrene of right foot  Assessment and Plan of Treatment: Continue wound care  Follow up with Vascular in 1 week PRINCIPAL DISCHARGE DIAGNOSIS  Diagnosis: Wound, surgical, nonhealing  Assessment and Plan of Treatment: Had Left above knee amputation on 9/24. Continue with wound care  Staples to come out 10/24-follow up with Vascular  Follow up with Vascular in 1 week-Dr. Nithya TRIVEDI dressing-Loose dry gauze and wrap with an ACE wrap. Can check every 1-2 days and replace.  RLE dressing-daily wound care on discharge with betadine or soap scrubs followed by irrigation with normal saline, pat dry, wrap with gauze pads, abdominal pads and Pieter.        SECONDARY DISCHARGE DIAGNOSES  Diagnosis: Diabetes mellitus, type 2  Assessment and Plan of Treatment: 9/18: HgbA1c- 6.9.    Diabetic diet  Follow up at Medicine clinic    Diagnosis: Gangrene of right foot  Assessment and Plan of Treatment: Continue wound care  Follow up with Vascular in 1 week

## 2019-09-26 NOTE — CHART NOTE - NSCHARTNOTEFT_GEN_A_CORE
Patient seen earlier today and R foot evaluated.  R foot was re-dressed and toes were painted with betadine

## 2019-09-26 NOTE — PROGRESS NOTE ADULT - SUBJECTIVE AND OBJECTIVE BOX
CC: f/u for L BKA stump wound, R foot gangrene    Patient reports no new c/o, s/p L AKA    REVIEW OF SYSTEMS:  All other review of systems negative (Comprehensive ROS)    Antimicrobials Day # 7    piperacillin/tazobactam IVPB.. 3.375 Gram(s) IV Intermittent every 8 hours  vancomycin  IVPB      vancomycin  IVPB 1000 milliGRAM(s) IV Intermittent every 12 hours          Other Medications Reviewed    Vital Signs Last 24 Hrs  T(C): 36.7 (26 Sep 2019 11:32), Max: 36.9 (25 Sep 2019 22:37)  T(F): 98.1 (26 Sep 2019 11:32), Max: 98.4 (25 Sep 2019 22:37)  HR: 93 (26 Sep 2019 11:32) (84 - 93)  BP: 113/74 (26 Sep 2019 11:32) (113/74 - 132/68)  BP(mean): --  RR: 18 (26 Sep 2019 11:32) (18 - 18)  SpO2: 93% (26 Sep 2019 11:32) (93% - 98%)    PHYSICAL EXAM:  General: alert, no acute distress  Eyes:  anicteric, no conjunctival injection, no discharge  Oropharynx: no lesions or injection 	  Neck: supple, without adenopathy  Lungs: diminished bases  Heart: regular rate and rhythm; no murmur, rubs or gallops  Abdomen: soft, nondistended, nontender, without mass or organomegaly  Skin: no rash  Extremities: L AKA dressing in place, R foot dressings in place, gangrene toes; mild R leg edema  Neurologic: alert, no focal deficits  LAB RESULTS:                                                9.6    15.71 )-----------( 364      ( 26 Sep 2019 12:41 )             31.8   09-25    139  |  95<L>  |  18  ----------------------------<  244<H>  4.0   |  28  |  0.80    Ca    9.1      25 Sep 2019 10:24  Phos  3.0     09-24  Mg     1.6     09-24    Vancomycin Level, Trough: 7.7              MICROBIOLOGY:  RECENT CULTURES:    Culture - Abscess with Gram Stain (09.19.19 @ 17:27)    -  Piperacillin/Tazobactam: S <=8    -  Tobramycin: S <=2    -  Aztreonam: S <=4    -  Cefepime: S <=2    -  Ceftazidime: S 4    -  Levofloxacin: S <=2    -  Meropenem: S <=1    -  Gentamicin: S 4    -  Imipenem: S <=1    -  Ciprofloxacin: S <=1    -  Amikacin: S <=16    Specimen Source: .Abscess left foot wound    Culture Results:   Culture yields >4 types of aerobic and/or anaerobic bacteria  Call client services within 7 days if further workup is clinically  indicated.  Culture includes  Numerous Pseudomonas aeruginosa    Organism Identification: Pseudomonas aeruginosa    Organism: Pseudomonas aeruginosa    Method Type: HELEN              RADIOLOGY REVIEWED:    < from: Xray Chest 1 View- PORTABLE-Urgent (09.24.19 @ 12:04) >  IMPRESSION:   Redemonstration of small bilateral pleural effusions and pulmonary edema.    < end of copied text >    CT Lower Extremity w/ IV Cont, Left (09.17.19 @ 17:53) >  1.  Skin defect at the distal anterior stump with adjacent soft tissue   swelling. Circumferential edema and skin thickening about the thigh.   Nonspecific but can be seen with cellulitis.  2.Moderate confluent edema deep to the myofascial plane of the left   vastus lateralis muscle. May reflect phlegmon versus early abscess   formation. No definite drainable fluid collection.  3.  No CT evidence for osteomyelitis.  4.  Distal femoral proximal tibial bone infarctions.

## 2019-09-26 NOTE — PROGRESS NOTE ADULT - SUBJECTIVE AND OBJECTIVE BOX
INTERVAL HPI/OVERNIGHT EVENTS:  Pt seen and examined at bedside.     Allergies/Intolerance: No Known Allergies      MEDICATIONS  (STANDING):  aspirin enteric coated 81 milliGRAM(s) Oral daily  atorvastatin 10 milliGRAM(s) Oral at bedtime  dextrose 5%. 1000 milliLiter(s) (120 mL/Hr) IV Continuous <Continuous>  dextrose 50% Injectable 12.5 Gram(s) IV Push once  dextrose 50% Injectable 25 Gram(s) IV Push once  dextrose 50% Injectable 25 Gram(s) IV Push once  digoxin     Tablet 0.125 milliGRAM(s) Oral daily  furosemide    Tablet 40 milliGRAM(s) Oral daily  heparin  Injectable 5000 Unit(s) SubCutaneous every 12 hours  influenza   Vaccine 0.5 milliLiter(s) IntraMuscular once  insulin glargine Injectable (LANTUS) 12 Unit(s) SubCutaneous at bedtime  insulin lispro (HumaLOG) corrective regimen sliding scale   SubCutaneous three times a day before meals  insulin lispro (HumaLOG) corrective regimen sliding scale   SubCutaneous at bedtime  losartan 25 milliGRAM(s) Oral daily  metoprolol succinate ER 25 milliGRAM(s) Oral daily  piperacillin/tazobactam IVPB.. 3.375 Gram(s) IV Intermittent every 8 hours  potassium chloride    Tablet ER 20 milliEquivalent(s) Oral two times a day  vancomycin  IVPB      vancomycin  IVPB 1000 milliGRAM(s) IV Intermittent every 12 hours    MEDICATIONS  (PRN):  dextrose 40% Gel 15 Gram(s) Oral once PRN Blood Glucose LESS THAN 70 milliGRAM(s)/deciliter  glucagon  Injectable 1 milliGRAM(s) IntraMuscular once PRN Glucose LESS THAN 70 milligrams/deciliter        ROS: all systems reviewed and wnl      PHYSICAL EXAMINATION:  Vital Signs Last 24 Hrs  T(C): 36.7 (26 Sep 2019 04:34), Max: 36.9 (25 Sep 2019 22:37)  T(F): 98.1 (26 Sep 2019 04:34), Max: 98.4 (25 Sep 2019 22:37)  HR: 88 (26 Sep 2019 04:34) (84 - 93)  BP: 132/68 (26 Sep 2019 04:34) (124/78 - 132/68)  BP(mean): --  RR: 18 (26 Sep 2019 04:34) (18 - 18)  SpO2: 96% (26 Sep 2019 04:34) (96% - 98%)  CAPILLARY BLOOD GLUCOSE      POCT Blood Glucose.: 204 mg/dL (26 Sep 2019 08:17)  POCT Blood Glucose.: 241 mg/dL (25 Sep 2019 22:20)  POCT Blood Glucose.: 290 mg/dL (25 Sep 2019 17:11)  POCT Blood Glucose.: 286 mg/dL (25 Sep 2019 12:48)      09-25 @ 07:01  -  09-26 @ 07:00  --------------------------------------------------------  IN: 1720 mL / OUT: 575 mL / NET: 1145 mL        GENERAL:   NECK: supple, No JVD  CHEST/LUNG: clear to auscultation bilaterally; no rales, rhonchi, or wheezing b/l  HEART: normal S1, S2  ABDOMEN: BS+, soft, ND, NT   EXTREMITIES:  pulses palpable; no clubbing, cyanosis, or edema b/l LEs  SKIN: no rashes or lesions      LABS:                        11.0   20.3  )-----------( 420      ( 25 Sep 2019 11:31 )             37.5     09-25    139  |  95<L>  |  18  ----------------------------<  244<H>  4.0   |  28  |  0.80    Ca    9.1      25 Sep 2019 10:24  Phos  3.0     09-24  Mg     1.6     09-24      PT/INR - ( 24 Sep 2019 19:07 )   PT: 16.2 sec;   INR: 1.39 ratio         PTT - ( 24 Sep 2019 19:07 )  PTT:34.0 sec INTERVAL HPI/OVERNIGHT EVENTS:  Pt seen and examined at bedside.     Allergies/Intolerance: No Known Allergies      MEDICATIONS  (STANDING):  aspirin enteric coated 81 milliGRAM(s) Oral daily  atorvastatin 10 milliGRAM(s) Oral at bedtime  dextrose 5%. 1000 milliLiter(s) (120 mL/Hr) IV Continuous <Continuous>  dextrose 50% Injectable 12.5 Gram(s) IV Push once  dextrose 50% Injectable 25 Gram(s) IV Push once  dextrose 50% Injectable 25 Gram(s) IV Push once  digoxin     Tablet 0.125 milliGRAM(s) Oral daily  furosemide    Tablet 40 milliGRAM(s) Oral daily  heparin  Injectable 5000 Unit(s) SubCutaneous every 12 hours  influenza   Vaccine 0.5 milliLiter(s) IntraMuscular once  insulin glargine Injectable (LANTUS) 12 Unit(s) SubCutaneous at bedtime  insulin lispro (HumaLOG) corrective regimen sliding scale   SubCutaneous three times a day before meals  insulin lispro (HumaLOG) corrective regimen sliding scale   SubCutaneous at bedtime  losartan 25 milliGRAM(s) Oral daily  metoprolol succinate ER 25 milliGRAM(s) Oral daily  piperacillin/tazobactam IVPB.. 3.375 Gram(s) IV Intermittent every 8 hours  potassium chloride    Tablet ER 20 milliEquivalent(s) Oral two times a day  vancomycin  IVPB      vancomycin  IVPB 1000 milliGRAM(s) IV Intermittent every 12 hours    MEDICATIONS  (PRN):  dextrose 40% Gel 15 Gram(s) Oral once PRN Blood Glucose LESS THAN 70 milliGRAM(s)/deciliter  glucagon  Injectable 1 milliGRAM(s) IntraMuscular once PRN Glucose LESS THAN 70 milligrams/deciliter        ROS: all systems reviewed and wnl      PHYSICAL EXAMINATION:  Vital Signs Last 24 Hrs  T(C): 36.7 (26 Sep 2019 04:34), Max: 36.9 (25 Sep 2019 22:37)  T(F): 98.1 (26 Sep 2019 04:34), Max: 98.4 (25 Sep 2019 22:37)  HR: 88 (26 Sep 2019 04:34) (84 - 93)  BP: 132/68 (26 Sep 2019 04:34) (124/78 - 132/68)  BP(mean): --  RR: 18 (26 Sep 2019 04:34) (18 - 18)  SpO2: 96% (26 Sep 2019 04:34) (96% - 98%)  CAPILLARY BLOOD GLUCOSE      POCT Blood Glucose.: 204 mg/dL (26 Sep 2019 08:17)  POCT Blood Glucose.: 241 mg/dL (25 Sep 2019 22:20)  POCT Blood Glucose.: 290 mg/dL (25 Sep 2019 17:11)  POCT Blood Glucose.: 286 mg/dL (25 Sep 2019 12:48)      09-25 @ 07:01  -  09-26 @ 07:00  --------------------------------------------------------  IN: 1720 mL / OUT: 575 mL / NET: 1145 mL        GENERAL: in bed, stable, no agitation presently, south in place, urine yellow, at times has agitation  NECK: supple, No JVD  CHEST/LUNG: clear to auscultation bilaterally; no rales, rhonchi, or wheezing b/l  HEART: normal S1, S2  ABDOMEN: BS+, soft, ND, NT   EXTREMITIES:  pulses palpable; no clubbing, cyanosis, or edema b/l LEs  SKIN: no rashes or lesions      LABS:                        11.0   20.3  )-----------( 420      ( 25 Sep 2019 11:31 )             37.5     09-25    139  |  95<L>  |  18  ----------------------------<  244<H>  4.0   |  28  |  0.80    Ca    9.1      25 Sep 2019 10:24  Phos  3.0     09-24  Mg     1.6     09-24      PT/INR - ( 24 Sep 2019 19:07 )   PT: 16.2 sec;   INR: 1.39 ratio         PTT - ( 24 Sep 2019 19:07 )  PTT:34.0 sec

## 2019-09-27 LAB
ANION GAP SERPL CALC-SCNC: 12 MMOL/L — SIGNIFICANT CHANGE UP (ref 5–17)
BUN SERPL-MCNC: 17 MG/DL — SIGNIFICANT CHANGE UP (ref 7–23)
CALCIUM SERPL-MCNC: 8.8 MG/DL — SIGNIFICANT CHANGE UP (ref 8.4–10.5)
CHLORIDE SERPL-SCNC: 100 MMOL/L — SIGNIFICANT CHANGE UP (ref 96–108)
CO2 SERPL-SCNC: 25 MMOL/L — SIGNIFICANT CHANGE UP (ref 22–31)
CREAT SERPL-MCNC: 0.85 MG/DL — SIGNIFICANT CHANGE UP (ref 0.5–1.3)
GLUCOSE BLDC GLUCOMTR-MCNC: 182 MG/DL — HIGH (ref 70–99)
GLUCOSE BLDC GLUCOMTR-MCNC: 209 MG/DL — HIGH (ref 70–99)
GLUCOSE BLDC GLUCOMTR-MCNC: 234 MG/DL — HIGH (ref 70–99)
GLUCOSE BLDC GLUCOMTR-MCNC: 293 MG/DL — HIGH (ref 70–99)
GLUCOSE SERPL-MCNC: 225 MG/DL — HIGH (ref 70–99)
HCT VFR BLD CALC: 29.5 % — LOW (ref 39–50)
HGB BLD-MCNC: 9.1 G/DL — LOW (ref 13–17)
MCHC RBC-ENTMCNC: 27.3 PG — SIGNIFICANT CHANGE UP (ref 27–34)
MCHC RBC-ENTMCNC: 30.8 GM/DL — LOW (ref 32–36)
MCV RBC AUTO: 88.6 FL — SIGNIFICANT CHANGE UP (ref 80–100)
PLATELET # BLD AUTO: 380 K/UL — SIGNIFICANT CHANGE UP (ref 150–400)
POTASSIUM SERPL-MCNC: 4 MMOL/L — SIGNIFICANT CHANGE UP (ref 3.5–5.3)
POTASSIUM SERPL-SCNC: 4 MMOL/L — SIGNIFICANT CHANGE UP (ref 3.5–5.3)
RBC # BLD: 3.33 M/UL — LOW (ref 4.2–5.8)
RBC # FLD: 15.1 % — HIGH (ref 10.3–14.5)
SODIUM SERPL-SCNC: 137 MMOL/L — SIGNIFICANT CHANGE UP (ref 135–145)
WBC # BLD: 14.98 K/UL — HIGH (ref 3.8–10.5)
WBC # FLD AUTO: 14.98 K/UL — HIGH (ref 3.8–10.5)

## 2019-09-27 RX ADMIN — ATORVASTATIN CALCIUM 10 MILLIGRAM(S): 80 TABLET, FILM COATED ORAL at 22:51

## 2019-09-27 RX ADMIN — HEPARIN SODIUM 5000 UNIT(S): 5000 INJECTION INTRAVENOUS; SUBCUTANEOUS at 05:30

## 2019-09-27 RX ADMIN — Medication 20 MILLIEQUIVALENT(S): at 18:21

## 2019-09-27 RX ADMIN — Medication 4: at 18:21

## 2019-09-27 RX ADMIN — PIPERACILLIN AND TAZOBACTAM 25 GRAM(S): 4; .5 INJECTION, POWDER, LYOPHILIZED, FOR SOLUTION INTRAVENOUS at 01:52

## 2019-09-27 RX ADMIN — Medication 4: at 09:07

## 2019-09-27 RX ADMIN — Medication 6: at 12:44

## 2019-09-27 RX ADMIN — Medication 0.12 MILLIGRAM(S): at 05:29

## 2019-09-27 RX ADMIN — INSULIN GLARGINE 12 UNIT(S): 100 INJECTION, SOLUTION SUBCUTANEOUS at 22:51

## 2019-09-27 RX ADMIN — Medication 81 MILLIGRAM(S): at 12:44

## 2019-09-27 RX ADMIN — Medication 250 MILLIGRAM(S): at 00:28

## 2019-09-27 RX ADMIN — Medication 20 MILLIEQUIVALENT(S): at 05:29

## 2019-09-27 RX ADMIN — Medication 250 MILLIGRAM(S): at 12:47

## 2019-09-27 RX ADMIN — HEPARIN SODIUM 5000 UNIT(S): 5000 INJECTION INTRAVENOUS; SUBCUTANEOUS at 18:21

## 2019-09-27 RX ADMIN — Medication 25 MILLIGRAM(S): at 05:29

## 2019-09-27 RX ADMIN — LOSARTAN POTASSIUM 50 MILLIGRAM(S): 100 TABLET, FILM COATED ORAL at 05:29

## 2019-09-27 RX ADMIN — Medication 40 MILLIGRAM(S): at 05:29

## 2019-09-27 NOTE — PROGRESS NOTE ADULT - SUBJECTIVE AND OBJECTIVE BOX
INTERVAL HPI/OVERNIGHT EVENTS:  Pt seen and examined at bedside.     Allergies/Intolerance: No Known Allergies      MEDICATIONS  (STANDING):  aspirin enteric coated 81 milliGRAM(s) Oral daily  atorvastatin 10 milliGRAM(s) Oral at bedtime  dextrose 5%. 1000 milliLiter(s) (120 mL/Hr) IV Continuous <Continuous>  dextrose 50% Injectable 12.5 Gram(s) IV Push once  dextrose 50% Injectable 25 Gram(s) IV Push once  dextrose 50% Injectable 25 Gram(s) IV Push once  digoxin     Tablet 0.125 milliGRAM(s) Oral daily  furosemide    Tablet 40 milliGRAM(s) Oral daily  heparin  Injectable 5000 Unit(s) SubCutaneous every 12 hours  influenza   Vaccine 0.5 milliLiter(s) IntraMuscular once  insulin glargine Injectable (LANTUS) 12 Unit(s) SubCutaneous at bedtime  insulin lispro (HumaLOG) corrective regimen sliding scale   SubCutaneous three times a day before meals  insulin lispro (HumaLOG) corrective regimen sliding scale   SubCutaneous at bedtime  losartan 50 milliGRAM(s) Oral daily  metoprolol succinate ER 25 milliGRAM(s) Oral daily  potassium chloride    Tablet ER 20 milliEquivalent(s) Oral two times a day  vancomycin  IVPB      vancomycin  IVPB 1000 milliGRAM(s) IV Intermittent every 12 hours    MEDICATIONS  (PRN):  dextrose 40% Gel 15 Gram(s) Oral once PRN Blood Glucose LESS THAN 70 milliGRAM(s)/deciliter  glucagon  Injectable 1 milliGRAM(s) IntraMuscular once PRN Glucose LESS THAN 70 milligrams/deciliter        ROS: all systems reviewed and wnl      PHYSICAL EXAMINATION:  Vital Signs Last 24 Hrs  T(C): 36.2 (27 Sep 2019 05:24), Max: 37 (26 Sep 2019 19:15)  T(F): 97.1 (27 Sep 2019 05:24), Max: 98.6 (26 Sep 2019 19:15)  HR: 58 (27 Sep 2019 05:24) (58 - 100)  BP: 130/84 (27 Sep 2019 05:24) (113/74 - 133/75)  BP(mean): --  RR: 18 (27 Sep 2019 05:24) (18 - 18)  SpO2: 98% (27 Sep 2019 05:24) (93% - 98%)  CAPILLARY BLOOD GLUCOSE      POCT Blood Glucose.: 204 mg/dL (26 Sep 2019 22:35)  POCT Blood Glucose.: 150 mg/dL (26 Sep 2019 17:14)  POCT Blood Glucose.: 305 mg/dL (26 Sep 2019 12:15)  POCT Blood Glucose.: 204 mg/dL (26 Sep 2019 08:17)      09-26 @ 07:01  -  09-27 @ 07:00  --------------------------------------------------------  IN: 1610 mL / OUT: 1075 mL / NET: 535 mL        GENERAL:   NECK: supple, No JVD  CHEST/LUNG: clear to auscultation bilaterally; no rales, rhonchi, or wheezing b/l  HEART: normal S1, S2  ABDOMEN: BS+, soft, ND, NT   EXTREMITIES:  pulses palpable; no clubbing, cyanosis, or edema b/l LEs  SKIN: no rashes or lesions      LABS:                        9.6    15.71 )-----------( 364      ( 26 Sep 2019 12:41 )             31.8     09-25    139  |  95<L>  |  18  ----------------------------<  244<H>  4.0   |  28  |  0.80    Ca    9.1      25 Sep 2019 10:24 INTERVAL HPI/OVERNIGHT EVENTS:  Pt seen and examined at bedside.     Allergies/Intolerance: No Known Allergies      MEDICATIONS  (STANDING):  aspirin enteric coated 81 milliGRAM(s) Oral daily  atorvastatin 10 milliGRAM(s) Oral at bedtime  dextrose 5%. 1000 milliLiter(s) (120 mL/Hr) IV Continuous <Continuous>  dextrose 50% Injectable 12.5 Gram(s) IV Push once  dextrose 50% Injectable 25 Gram(s) IV Push once  dextrose 50% Injectable 25 Gram(s) IV Push once  digoxin     Tablet 0.125 milliGRAM(s) Oral daily  furosemide    Tablet 40 milliGRAM(s) Oral daily  heparin  Injectable 5000 Unit(s) SubCutaneous every 12 hours  influenza   Vaccine 0.5 milliLiter(s) IntraMuscular once  insulin glargine Injectable (LANTUS) 12 Unit(s) SubCutaneous at bedtime  insulin lispro (HumaLOG) corrective regimen sliding scale   SubCutaneous three times a day before meals  insulin lispro (HumaLOG) corrective regimen sliding scale   SubCutaneous at bedtime  losartan 50 milliGRAM(s) Oral daily  metoprolol succinate ER 25 milliGRAM(s) Oral daily  potassium chloride    Tablet ER 20 milliEquivalent(s) Oral two times a day  vancomycin  IVPB      vancomycin  IVPB 1000 milliGRAM(s) IV Intermittent every 12 hours    MEDICATIONS  (PRN):  dextrose 40% Gel 15 Gram(s) Oral once PRN Blood Glucose LESS THAN 70 milliGRAM(s)/deciliter  glucagon  Injectable 1 milliGRAM(s) IntraMuscular once PRN Glucose LESS THAN 70 milligrams/deciliter        ROS: all systems reviewed and wnl      PHYSICAL EXAMINATION:  Vital Signs Last 24 Hrs  T(C): 36.2 (27 Sep 2019 05:24), Max: 37 (26 Sep 2019 19:15)  T(F): 97.1 (27 Sep 2019 05:24), Max: 98.6 (26 Sep 2019 19:15)  HR: 58 (27 Sep 2019 05:24) (58 - 100)  BP: 130/84 (27 Sep 2019 05:24) (113/74 - 133/75)  BP(mean): --  RR: 18 (27 Sep 2019 05:24) (18 - 18)  SpO2: 98% (27 Sep 2019 05:24) (93% - 98%)  CAPILLARY BLOOD GLUCOSE      POCT Blood Glucose.: 204 mg/dL (26 Sep 2019 22:35)  POCT Blood Glucose.: 150 mg/dL (26 Sep 2019 17:14)  POCT Blood Glucose.: 305 mg/dL (26 Sep 2019 12:15)  POCT Blood Glucose.: 204 mg/dL (26 Sep 2019 08:17)      09-26 @ 07:01  -  09-27 @ 07:00  --------------------------------------------------------  IN: 1610 mL / OUT: 1075 mL / NET: 535 mL        GENERAL: stable, in bed, south in place, urine yellow, no CP, fevers or cough  NECK: supple, No JVD  CHEST/LUNG: clear to auscultation bilaterally; no rales, rhonchi, or wheezing b/l  HEART: normal S1, S2  ABDOMEN: BS+, soft, ND, NT   EXTREMITIES:  pulses palpable; no clubbing, cyanosis, or edema b/l LEs  SKIN: no rashes or lesions      LABS:                        9.6    15.71 )-----------( 364      ( 26 Sep 2019 12:41 )             31.8     09-25    139  |  95<L>  |  18  ----------------------------<  244<H>  4.0   |  28  |  0.80    Ca    9.1      25 Sep 2019 10:24

## 2019-09-27 NOTE — PROGRESS NOTE ADULT - SUBJECTIVE AND OBJECTIVE BOX
CC: f/u for  bka infection  Patient reports  he is ok but has pain in the right foot and left stump  REVIEW OF SYSTEMS:  All other review of systems negative (Comprehensive ROS)    Antimicrobials Day #  :pod 3  vancomycin  IVPB      vancomycin  IVPB 1000 milliGRAM(s) IV Intermittent every 12 hours    Other Medications Reviewed    T(F): 98.1 (09-27-19 @ 14:13), Max: 98.6 (09-26-19 @ 19:15)  HR: 81 (09-27-19 @ 14:13)  BP: 127/85 (09-27-19 @ 14:13)  RR: 18 (09-27-19 @ 14:13)  SpO2: 95% (09-27-19 @ 14:13)  Wt(kg): --    PHYSICAL EXAM:  General: alert, no acute distress  Eyes:  anicteric, no conjunctival injection, no discharge  Oropharynx: no lesions or injection 	  Neck: supple, without adenopathy  Lungs: clear to auscultation  Heart: regular rate and rhythm; no murmur, rubs or gallops  Abdomen: soft, nondistended, nontender, without mass or organomegaly  Skin: no lesions  Extremities: left stump is clean, right foot has dry gangrene  Neurologic: alert,  moves all extremities    LAB RESULTS:                        9.1    14.98 )-----------( 380      ( 27 Sep 2019 12:53 )             29.5     09-27    137  |  100  |  17  ----------------------------<  225<H>  4.0   |  25  |  0.85    Ca    8.8      27 Sep 2019 10:32          MICROBIOLOGY:  RECENT CULTURES:      RADIOLOGY REVIEWED:    < from: Xray Chest 1 View- PORTABLE-Urgent (09.24.19 @ 12:04) >  EXAM:  XR CHEST PORTABLE URGENT 1V                            PROCEDURE DATE:  09/24/2019            INTERPRETATION:    INDICATION: Shortness of breath, reevaluate for resolution of congestive   heart failure    TECHNIQUE: Chest x-ray, AP view    COMPARISON: Chest x-ray 9/20/2019, chest x-ray 8/2/2019    FINDINGS:  Redemonstration of small bilateral pleural effusions and pulmonary edema.    Cardiac silhouette cannot be adequately assessed on this projection.    No acute osseous abnormalities.    IMPRESSION:   Redemonstration of small bilateral pleural effusions and pulmonary edema.            < end of copied text >      Assessment:  Patient with poorly healing bka on left s/p aka with clean wound . He has dry gangrene of the right foot and antibiotics will not impact that. He has gotten 3 days post op antibiotics so can stop   Plan:  stop antibiotics and monitor off  await further vascular plans for right limb

## 2019-09-28 LAB
GLUCOSE BLDC GLUCOMTR-MCNC: 138 MG/DL — HIGH (ref 70–99)
GLUCOSE BLDC GLUCOMTR-MCNC: 225 MG/DL — HIGH (ref 70–99)
GLUCOSE BLDC GLUCOMTR-MCNC: 231 MG/DL — HIGH (ref 70–99)
GLUCOSE BLDC GLUCOMTR-MCNC: 247 MG/DL — HIGH (ref 70–99)
HCT VFR BLD CALC: 34.4 % — LOW (ref 39–50)
HGB BLD-MCNC: 10.2 G/DL — LOW (ref 13–17)
MCHC RBC-ENTMCNC: 27.2 PG — SIGNIFICANT CHANGE UP (ref 27–34)
MCHC RBC-ENTMCNC: 29.7 GM/DL — LOW (ref 32–36)
MCV RBC AUTO: 91.7 FL — SIGNIFICANT CHANGE UP (ref 80–100)
PLATELET # BLD AUTO: 405 K/UL — HIGH (ref 150–400)
RBC # BLD: 3.75 M/UL — LOW (ref 4.2–5.8)
RBC # FLD: 15 % — HIGH (ref 10.3–14.5)
WBC # BLD: 14.17 K/UL — HIGH (ref 3.8–10.5)
WBC # FLD AUTO: 14.17 K/UL — HIGH (ref 3.8–10.5)

## 2019-09-28 RX ADMIN — Medication 20 MILLIEQUIVALENT(S): at 04:19

## 2019-09-28 RX ADMIN — Medication 20 MILLIEQUIVALENT(S): at 18:46

## 2019-09-28 RX ADMIN — Medication 81 MILLIGRAM(S): at 11:22

## 2019-09-28 RX ADMIN — HEPARIN SODIUM 5000 UNIT(S): 5000 INJECTION INTRAVENOUS; SUBCUTANEOUS at 18:46

## 2019-09-28 RX ADMIN — INSULIN GLARGINE 12 UNIT(S): 100 INJECTION, SOLUTION SUBCUTANEOUS at 22:36

## 2019-09-28 RX ADMIN — Medication 40 MILLIGRAM(S): at 04:19

## 2019-09-28 RX ADMIN — ATORVASTATIN CALCIUM 10 MILLIGRAM(S): 80 TABLET, FILM COATED ORAL at 22:36

## 2019-09-28 RX ADMIN — Medication 4: at 09:03

## 2019-09-28 RX ADMIN — Medication 25 MILLIGRAM(S): at 04:19

## 2019-09-28 RX ADMIN — HEPARIN SODIUM 5000 UNIT(S): 5000 INJECTION INTRAVENOUS; SUBCUTANEOUS at 04:19

## 2019-09-28 RX ADMIN — Medication 0.12 MILLIGRAM(S): at 04:19

## 2019-09-28 RX ADMIN — Medication 4: at 18:45

## 2019-09-28 RX ADMIN — LOSARTAN POTASSIUM 50 MILLIGRAM(S): 100 TABLET, FILM COATED ORAL at 04:20

## 2019-09-28 NOTE — PROGRESS NOTE ADULT - ASSESSMENT
70 yo male PMH DM(II), PAD s/p left BKA and prior left leg fem-pop, BIBEMS from facility for wound evaluation. Patient recently received left BKA in July 2019 in [facility], where he was discharged to rehab. Was sent from facility today for concerns of improper wound healing, drainage, and bleeding. Patient otherwise had no complaints, but endorses LLE pain near wound site.    Vascular: Ortho and Vascular notes reviewed. Will cancel MRI of right foot as likely OM, foul smelling, not likely to . Not able to complete MRI due to agitation. Elevated ESR of 84 and elevated CRP noted on arrival. Continue ASA 81 mg/day. Add Lipitor 10 mg/day. Continue IV Vancomycin 1 gram every 12 hours.  Vascular recommends right AKA surgery. Vascular surgery has documented no pulses in right foot. Had necrotic toes right foot on arrival.         Endo: Resume Lantus 12 units at night. BS controlled 204 today.        CV: Increase Cozaar to 50 mg/day for decompensated systolic CHF. Continue Toprol XL 25 mg/day. BP stable 114/76. Change to PO Lasix 40 mg/day. Continue  Digoxin .125 mg/day. Fletcher placed. Repeat CXR shows some residual  CHF, no symptoms or SOB. Comfortable on RA.      Spoke to nephew again by phone today about plan of care and recommendation for right AKA early next week, [phone].     Patient is cleared for right AKA. 70 yo male PMH DM(II), PAD s/p left BKA and prior left leg fem-pop, BIBEMS from facility for wound evaluation. Patient recently received left BKA in July 2019 in [facility], where he was discharged to rehab. Was sent from facility today for concerns of improper wound healing, drainage, and bleeding. Patient otherwise had no complaints, but endorses LLE pain near wound site.    Vascular: Ortho and Vascular notes reviewed. Will cancel MRI of right foot as likely OM, foul smelling, not likely to . Not able to complete MRI due to agitation. Elevated ESR of 84 and elevated CRP noted on arrival. Continue ASA 81 mg/day. Add Lipitor 10 mg/day. Agree with stopping IV Vancomycin. Vascular recommends right AKA surgery. Vascular surgery has documented no pulses in right foot. Had necrotic toes right foot on arrival.         Endo: Resume Lantus 12 units at night. BS controlled 204 today.        CV: Increase Cozaar to 50 mg/day for decompensated systolic CHF. Continue Toprol XL 25 mg/day. BP stable 114/76. Change to PO Lasix 40 mg/day. Continue  Digoxin .125 mg/day. Fletcher placed. Repeat CXR shows some residual  CHF, no symptoms or SOB. Comfortable on RA.      Spoke to nephew again by phone today about plan of care and recommendation for right AKA early next week, [phone].     Patient is cleared for right AKA.

## 2019-09-28 NOTE — PROGRESS NOTE ADULT - SUBJECTIVE AND OBJECTIVE BOX
INTERVAL HPI/OVERNIGHT EVENTS:  Pt seen and examined at bedside.     Allergies/Intolerance: No Known Allergies      MEDICATIONS  (STANDING):  aspirin enteric coated 81 milliGRAM(s) Oral daily  atorvastatin 10 milliGRAM(s) Oral at bedtime  dextrose 5%. 1000 milliLiter(s) (120 mL/Hr) IV Continuous <Continuous>  dextrose 50% Injectable 12.5 Gram(s) IV Push once  dextrose 50% Injectable 25 Gram(s) IV Push once  dextrose 50% Injectable 25 Gram(s) IV Push once  digoxin     Tablet 0.125 milliGRAM(s) Oral daily  furosemide    Tablet 40 milliGRAM(s) Oral daily  heparin  Injectable 5000 Unit(s) SubCutaneous every 12 hours  influenza   Vaccine 0.5 milliLiter(s) IntraMuscular once  insulin glargine Injectable (LANTUS) 12 Unit(s) SubCutaneous at bedtime  insulin lispro (HumaLOG) corrective regimen sliding scale   SubCutaneous three times a day before meals  insulin lispro (HumaLOG) corrective regimen sliding scale   SubCutaneous at bedtime  losartan 50 milliGRAM(s) Oral daily  metoprolol succinate ER 25 milliGRAM(s) Oral daily  potassium chloride    Tablet ER 20 milliEquivalent(s) Oral two times a day    MEDICATIONS  (PRN):  dextrose 40% Gel 15 Gram(s) Oral once PRN Blood Glucose LESS THAN 70 milliGRAM(s)/deciliter  glucagon  Injectable 1 milliGRAM(s) IntraMuscular once PRN Glucose LESS THAN 70 milligrams/deciliter        ROS: all systems reviewed and wnl      PHYSICAL EXAMINATION:  Vital Signs Last 24 Hrs  T(C): 36.4 (28 Sep 2019 04:14), Max: 37.1 (27 Sep 2019 20:16)  T(F): 97.5 (28 Sep 2019 04:14), Max: 98.8 (27 Sep 2019 20:16)  HR: 96 (28 Sep 2019 04:14) (81 - 96)  BP: 118/77 (28 Sep 2019 04:14) (116/68 - 127/85)  BP(mean): --  RR: 18 (28 Sep 2019 04:14) (18 - 18)  SpO2: 95% (28 Sep 2019 04:14) (95% - 95%)  CAPILLARY BLOOD GLUCOSE      POCT Blood Glucose.: 182 mg/dL (27 Sep 2019 21:58)  POCT Blood Glucose.: 234 mg/dL (27 Sep 2019 17:39)  POCT Blood Glucose.: 293 mg/dL (27 Sep 2019 12:42)  POCT Blood Glucose.: 209 mg/dL (27 Sep 2019 08:20)      09-27 @ 07:01  -  09-28 @ 07:00  --------------------------------------------------------  IN: 830 mL / OUT: 1375 mL / NET: -545 mL        GENERAL:   NECK: supple, No JVD  CHEST/LUNG: clear to auscultation bilaterally; no rales, rhonchi, or wheezing b/l  HEART: normal S1, S2  ABDOMEN: BS+, soft, ND, NT   EXTREMITIES:  pulses palpable; no clubbing, cyanosis, or edema b/l LEs  SKIN: no rashes or lesions      LABS:                        9.1    14.98 )-----------( 380      ( 27 Sep 2019 12:53 )             29.5     09-27    137  |  100  |  17  ----------------------------<  225<H>  4.0   |  25  |  0.85    Ca    8.8      27 Sep 2019 10:32 INTERVAL HPI/OVERNIGHT EVENTS:  Pt seen and examined at bedside.     Allergies/Intolerance: No Known Allergies      MEDICATIONS  (STANDING):  aspirin enteric coated 81 milliGRAM(s) Oral daily  atorvastatin 10 milliGRAM(s) Oral at bedtime  dextrose 5%. 1000 milliLiter(s) (120 mL/Hr) IV Continuous <Continuous>  dextrose 50% Injectable 12.5 Gram(s) IV Push once  dextrose 50% Injectable 25 Gram(s) IV Push once  dextrose 50% Injectable 25 Gram(s) IV Push once  digoxin     Tablet 0.125 milliGRAM(s) Oral daily  furosemide    Tablet 40 milliGRAM(s) Oral daily  heparin  Injectable 5000 Unit(s) SubCutaneous every 12 hours  influenza   Vaccine 0.5 milliLiter(s) IntraMuscular once  insulin glargine Injectable (LANTUS) 12 Unit(s) SubCutaneous at bedtime  insulin lispro (HumaLOG) corrective regimen sliding scale   SubCutaneous three times a day before meals  insulin lispro (HumaLOG) corrective regimen sliding scale   SubCutaneous at bedtime  losartan 50 milliGRAM(s) Oral daily  metoprolol succinate ER 25 milliGRAM(s) Oral daily  potassium chloride    Tablet ER 20 milliEquivalent(s) Oral two times a day    MEDICATIONS  (PRN):  dextrose 40% Gel 15 Gram(s) Oral once PRN Blood Glucose LESS THAN 70 milliGRAM(s)/deciliter  glucagon  Injectable 1 milliGRAM(s) IntraMuscular once PRN Glucose LESS THAN 70 milligrams/deciliter        ROS: all systems reviewed and wnl      PHYSICAL EXAMINATION:  Vital Signs Last 24 Hrs  T(C): 36.4 (28 Sep 2019 04:14), Max: 37.1 (27 Sep 2019 20:16)  T(F): 97.5 (28 Sep 2019 04:14), Max: 98.8 (27 Sep 2019 20:16)  HR: 96 (28 Sep 2019 04:14) (81 - 96)  BP: 118/77 (28 Sep 2019 04:14) (116/68 - 127/85)  BP(mean): --  RR: 18 (28 Sep 2019 04:14) (18 - 18)  SpO2: 95% (28 Sep 2019 04:14) (95% - 95%)  CAPILLARY BLOOD GLUCOSE      POCT Blood Glucose.: 182 mg/dL (27 Sep 2019 21:58)  POCT Blood Glucose.: 234 mg/dL (27 Sep 2019 17:39)  POCT Blood Glucose.: 293 mg/dL (27 Sep 2019 12:42)  POCT Blood Glucose.: 209 mg/dL (27 Sep 2019 08:20)      09-27 @ 07:01  -  09-28 @ 07:00  --------------------------------------------------------  IN: 830 mL / OUT: 1375 mL / NET: -545 mL        GENERAL: stable, south in place, mild confusion at rest, no CP or fevers  NECK: supple, No JVD  CHEST/LUNG: clear to auscultation bilaterally; no rales, rhonchi, or wheezing b/l  HEART: normal S1, S2  ABDOMEN: BS+, soft, ND, NT   EXTREMITIES:  pulses palpable; no clubbing, cyanosis, left AKA site healing well  right foot necrotic toes, no change   SKIN: no rashes or lesions      LABS:                        9.1    14.98 )-----------( 380      ( 27 Sep 2019 12:53 )             29.5     09-27    137  |  100  |  17  ----------------------------<  225<H>  4.0   |  25  |  0.85    Ca    8.8      27 Sep 2019 10:32

## 2019-09-29 LAB
GLUCOSE BLDC GLUCOMTR-MCNC: 174 MG/DL — HIGH (ref 70–99)
GLUCOSE BLDC GLUCOMTR-MCNC: 183 MG/DL — HIGH (ref 70–99)
GLUCOSE BLDC GLUCOMTR-MCNC: 257 MG/DL — HIGH (ref 70–99)
GLUCOSE BLDC GLUCOMTR-MCNC: 260 MG/DL — HIGH (ref 70–99)
HCT VFR BLD CALC: 32.4 % — LOW (ref 39–50)
HGB BLD-MCNC: 9.6 G/DL — LOW (ref 13–17)
MCHC RBC-ENTMCNC: 26.4 PG — LOW (ref 27–34)
MCHC RBC-ENTMCNC: 29.6 GM/DL — LOW (ref 32–36)
MCV RBC AUTO: 89.3 FL — SIGNIFICANT CHANGE UP (ref 80–100)
PLATELET # BLD AUTO: 411 K/UL — HIGH (ref 150–400)
RBC # BLD: 3.63 M/UL — LOW (ref 4.2–5.8)
RBC # FLD: 14.9 % — HIGH (ref 10.3–14.5)
WBC # BLD: 14.17 K/UL — HIGH (ref 3.8–10.5)
WBC # FLD AUTO: 14.17 K/UL — HIGH (ref 3.8–10.5)

## 2019-09-29 RX ORDER — INSULIN GLARGINE 100 [IU]/ML
15 INJECTION, SOLUTION SUBCUTANEOUS AT BEDTIME
Refills: 0 | Status: DISCONTINUED | OUTPATIENT
Start: 2019-09-29 | End: 2019-10-04

## 2019-09-29 RX ADMIN — Medication 0.12 MILLIGRAM(S): at 05:20

## 2019-09-29 RX ADMIN — Medication 25 MILLIGRAM(S): at 05:20

## 2019-09-29 RX ADMIN — ATORVASTATIN CALCIUM 10 MILLIGRAM(S): 80 TABLET, FILM COATED ORAL at 22:01

## 2019-09-29 RX ADMIN — HEPARIN SODIUM 5000 UNIT(S): 5000 INJECTION INTRAVENOUS; SUBCUTANEOUS at 05:20

## 2019-09-29 RX ADMIN — Medication 2: at 18:44

## 2019-09-29 RX ADMIN — Medication 40 MILLIGRAM(S): at 05:20

## 2019-09-29 RX ADMIN — INSULIN GLARGINE 15 UNIT(S): 100 INJECTION, SOLUTION SUBCUTANEOUS at 22:13

## 2019-09-29 RX ADMIN — Medication 1: at 22:13

## 2019-09-29 RX ADMIN — Medication 2: at 09:02

## 2019-09-29 RX ADMIN — LOSARTAN POTASSIUM 50 MILLIGRAM(S): 100 TABLET, FILM COATED ORAL at 05:20

## 2019-09-29 RX ADMIN — Medication 20 MILLIEQUIVALENT(S): at 05:20

## 2019-09-29 RX ADMIN — Medication 6: at 12:11

## 2019-09-29 RX ADMIN — Medication 20 MILLIEQUIVALENT(S): at 18:45

## 2019-09-29 RX ADMIN — HEPARIN SODIUM 5000 UNIT(S): 5000 INJECTION INTRAVENOUS; SUBCUTANEOUS at 18:45

## 2019-09-29 RX ADMIN — Medication 81 MILLIGRAM(S): at 12:11

## 2019-09-29 NOTE — CHART NOTE - NSCHARTNOTEFT_GEN_A_CORE
Plan for now:  - Continue ABx mgmt  - No plan for surgery at this time Plan for now:  - Monitoring off abx  - No plan for surgery at this time. If this means patient is stable for d/c otherwise, should allow LLE to heal and follow with Dr. Barriga as outpatient.  - Please call vascular surgery with concerns.  [phone]

## 2019-09-29 NOTE — PROGRESS NOTE ADULT - ASSESSMENT
68 yo male PMH DM(II), PAD s/p left BKA and prior left leg fem-pop, BIBEMS from facility for wound evaluation. Patient recently received left BKA in July 2019 in [facility], where he was discharged to rehab. Was sent from facility today for concerns of improper wound healing, drainage, and bleeding. Patient otherwise had no complaints, but endorses LLE pain near wound site.    Vascular: Ortho and Vascular notes reviewed. Will cancel MRI of right foot as likely OM, foul smelling, not likely to . Not able to complete MRI due to agitation. Elevated ESR of 84 and elevated CRP noted on arrival all consistent with infection. Continue ASA 81 mg/day. Add Lipitor 10 mg/day. Agree with stopping IV Vancomycin. Vascular recommends right AKA surgery. Vascular surgery has documented no pulses in right foot. Had necrotic toes right foot on arrival.         Endo: Resume Lantus, increase to  15 units at night. BS controlled 204 today.        CV: Increase Cozaar to 50 mg/day for decompensated systolic CHF. Continue Toprol XL 25 mg/day. BP stable 114/76. Change to PO Lasix 40 mg/day. Continue  Digoxin .125 mg/day. Fletcher placed. Repeat CXR shows some residual  CHF, no symptoms or SOB. Comfortable on RA.      Spoke to nephew again by phone today about plan of care and recommendation for right AKA early next week, [phone].     Patient is cleared for right AKA. HGB stable 10.2, creatinine normal .85. TTE reviewed from earlier this year. Stable for right AKA  surgery when Vascular surgery can schedule.

## 2019-09-29 NOTE — PROGRESS NOTE ADULT - SUBJECTIVE AND OBJECTIVE BOX
INTERVAL HPI/OVERNIGHT EVENTS:  Pt seen and examined at bedside.     Allergies/Intolerance: No Known Allergies      MEDICATIONS  (STANDING):  aspirin enteric coated 81 milliGRAM(s) Oral daily  atorvastatin 10 milliGRAM(s) Oral at bedtime  dextrose 5%. 1000 milliLiter(s) (120 mL/Hr) IV Continuous <Continuous>  dextrose 50% Injectable 12.5 Gram(s) IV Push once  dextrose 50% Injectable 25 Gram(s) IV Push once  dextrose 50% Injectable 25 Gram(s) IV Push once  digoxin     Tablet 0.125 milliGRAM(s) Oral daily  furosemide    Tablet 40 milliGRAM(s) Oral daily  heparin  Injectable 5000 Unit(s) SubCutaneous every 12 hours  influenza   Vaccine 0.5 milliLiter(s) IntraMuscular once  insulin glargine Injectable (LANTUS) 12 Unit(s) SubCutaneous at bedtime  insulin lispro (HumaLOG) corrective regimen sliding scale   SubCutaneous three times a day before meals  insulin lispro (HumaLOG) corrective regimen sliding scale   SubCutaneous at bedtime  losartan 50 milliGRAM(s) Oral daily  metoprolol succinate ER 25 milliGRAM(s) Oral daily  potassium chloride    Tablet ER 20 milliEquivalent(s) Oral two times a day    MEDICATIONS  (PRN):  dextrose 40% Gel 15 Gram(s) Oral once PRN Blood Glucose LESS THAN 70 milliGRAM(s)/deciliter  glucagon  Injectable 1 milliGRAM(s) IntraMuscular once PRN Glucose LESS THAN 70 milligrams/deciliter        ROS: all systems reviewed and wnl      PHYSICAL EXAMINATION:  Vital Signs Last 24 Hrs  T(C): 36.7 (29 Sep 2019 04:50), Max: 36.9 (28 Sep 2019 13:31)  T(F): 98.1 (29 Sep 2019 04:50), Max: 98.4 (28 Sep 2019 13:31)  HR: 81 (29 Sep 2019 04:50) (81 - 93)  BP: 128/76 (29 Sep 2019 04:50) (106/69 - 128/76)  BP(mean): --  RR: 18 (29 Sep 2019 04:50) (18 - 18)  SpO2: 94% (29 Sep 2019 04:50) (94% - 95%)  CAPILLARY BLOOD GLUCOSE      POCT Blood Glucose.: 231 mg/dL (28 Sep 2019 22:12)  POCT Blood Glucose.: 247 mg/dL (28 Sep 2019 17:50)  POCT Blood Glucose.: 138 mg/dL (28 Sep 2019 12:09)  POCT Blood Glucose.: 225 mg/dL (28 Sep 2019 08:08)      09-28 @ 07:01  -  09-29 @ 07:00  --------------------------------------------------------  IN: 340 mL / OUT: 1200 mL / NET: -860 mL        GENERAL:   NECK: supple, No JVD  CHEST/LUNG: clear to auscultation bilaterally; no rales, rhonchi, or wheezing b/l  HEART: normal S1, S2  ABDOMEN: BS+, soft, ND, NT   EXTREMITIES:  pulses palpable; no clubbing, cyanosis, or edema b/l LEs  SKIN: no rashes or lesions      LABS:                        10.2   14.17 )-----------( 405      ( 28 Sep 2019 12:52 )             34.4     09-27    137  |  100  |  17  ----------------------------<  225<H>  4.0   |  25  |  0.85    Ca    8.8      27 Sep 2019 10:32 INTERVAL HPI/OVERNIGHT EVENTS:  Pt seen and examined at bedside.     Allergies/Intolerance: No Known Allergies      MEDICATIONS  (STANDING):  aspirin enteric coated 81 milliGRAM(s) Oral daily  atorvastatin 10 milliGRAM(s) Oral at bedtime  dextrose 5%. 1000 milliLiter(s) (120 mL/Hr) IV Continuous <Continuous>  dextrose 50% Injectable 12.5 Gram(s) IV Push once  dextrose 50% Injectable 25 Gram(s) IV Push once  dextrose 50% Injectable 25 Gram(s) IV Push once  digoxin     Tablet 0.125 milliGRAM(s) Oral daily  furosemide    Tablet 40 milliGRAM(s) Oral daily  heparin  Injectable 5000 Unit(s) SubCutaneous every 12 hours  influenza   Vaccine 0.5 milliLiter(s) IntraMuscular once  insulin glargine Injectable (LANTUS) 12 Unit(s) SubCutaneous at bedtime  insulin lispro (HumaLOG) corrective regimen sliding scale   SubCutaneous three times a day before meals  insulin lispro (HumaLOG) corrective regimen sliding scale   SubCutaneous at bedtime  losartan 50 milliGRAM(s) Oral daily  metoprolol succinate ER 25 milliGRAM(s) Oral daily  potassium chloride    Tablet ER 20 milliEquivalent(s) Oral two times a day    MEDICATIONS  (PRN):  dextrose 40% Gel 15 Gram(s) Oral once PRN Blood Glucose LESS THAN 70 milliGRAM(s)/deciliter  glucagon  Injectable 1 milliGRAM(s) IntraMuscular once PRN Glucose LESS THAN 70 milligrams/deciliter        ROS: all systems reviewed and wnl      PHYSICAL EXAMINATION:  Vital Signs Last 24 Hrs  T(C): 36.7 (29 Sep 2019 04:50), Max: 36.9 (28 Sep 2019 13:31)  T(F): 98.1 (29 Sep 2019 04:50), Max: 98.4 (28 Sep 2019 13:31)  HR: 81 (29 Sep 2019 04:50) (81 - 93)  BP: 128/76 (29 Sep 2019 04:50) (106/69 - 128/76)  BP(mean): --  RR: 18 (29 Sep 2019 04:50) (18 - 18)  SpO2: 94% (29 Sep 2019 04:50) (94% - 95%)  CAPILLARY BLOOD GLUCOSE      POCT Blood Glucose.: 231 mg/dL (28 Sep 2019 22:12)  POCT Blood Glucose.: 247 mg/dL (28 Sep 2019 17:50)  POCT Blood Glucose.: 138 mg/dL (28 Sep 2019 12:09)  POCT Blood Glucose.: 225 mg/dL (28 Sep 2019 08:08)      09-28 @ 07:01  -  09-29 @ 07:00  --------------------------------------------------------  IN: 340 mL / OUT: 1200 mL / NET: -860 mL        GENERAL: stable in bed, south in place, urine yellow, no fevers or CP  NECK: supple, No JVD  CHEST/LUNG: clear to auscultation bilaterally; no rales, rhonchi, or wheezing b/l  HEART: normal S1, S2  ABDOMEN: BS+, soft, ND, NT   EXTREMITIES:  pulses palpable; no clubbing, cyanosis, or edema b/l LEs  SKIN: no rashes or lesions      LABS:                        10.2   14.17 )-----------( 405      ( 28 Sep 2019 12:52 )             34.4     09-27    137  |  100  |  17  ----------------------------<  225<H>  4.0   |  25  |  0.85    Ca    8.8      27 Sep 2019 10:32

## 2019-09-30 LAB
GLUCOSE BLDC GLUCOMTR-MCNC: 130 MG/DL — HIGH (ref 70–99)
GLUCOSE BLDC GLUCOMTR-MCNC: 132 MG/DL — HIGH (ref 70–99)
GLUCOSE BLDC GLUCOMTR-MCNC: 134 MG/DL — HIGH (ref 70–99)
GLUCOSE BLDC GLUCOMTR-MCNC: 221 MG/DL — HIGH (ref 70–99)

## 2019-09-30 RX ORDER — ASCORBIC ACID 60 MG
500 TABLET,CHEWABLE ORAL DAILY
Refills: 0 | Status: DISCONTINUED | OUTPATIENT
Start: 2019-09-30 | End: 2019-10-07

## 2019-09-30 RX ADMIN — LOSARTAN POTASSIUM 50 MILLIGRAM(S): 100 TABLET, FILM COATED ORAL at 12:54

## 2019-09-30 RX ADMIN — Medication 20 MILLIEQUIVALENT(S): at 05:26

## 2019-09-30 RX ADMIN — Medication 81 MILLIGRAM(S): at 12:55

## 2019-09-30 RX ADMIN — HEPARIN SODIUM 5000 UNIT(S): 5000 INJECTION INTRAVENOUS; SUBCUTANEOUS at 05:26

## 2019-09-30 RX ADMIN — HEPARIN SODIUM 5000 UNIT(S): 5000 INJECTION INTRAVENOUS; SUBCUTANEOUS at 18:25

## 2019-09-30 RX ADMIN — Medication 20 MILLIEQUIVALENT(S): at 18:25

## 2019-09-30 RX ADMIN — Medication 25 MILLIGRAM(S): at 05:26

## 2019-09-30 RX ADMIN — ATORVASTATIN CALCIUM 10 MILLIGRAM(S): 80 TABLET, FILM COATED ORAL at 21:11

## 2019-09-30 RX ADMIN — INSULIN GLARGINE 15 UNIT(S): 100 INJECTION, SOLUTION SUBCUTANEOUS at 21:55

## 2019-09-30 RX ADMIN — Medication 1 TABLET(S): at 18:25

## 2019-09-30 RX ADMIN — Medication 500 MILLIGRAM(S): at 18:25

## 2019-09-30 RX ADMIN — Medication 40 MILLIGRAM(S): at 05:26

## 2019-09-30 RX ADMIN — Medication 0.12 MILLIGRAM(S): at 05:26

## 2019-09-30 NOTE — PROGRESS NOTE ADULT - ASSESSMENT
68 yo male PMH DM(II), PAD s/p left BKA and prior left leg fem-pop, BIBEMS from facility for wound evaluation. Patient recently received left BKA in July 2019 in [facility], where he was discharged to rehab. Was sent from facility today for concerns of improper wound healing, drainage, and bleeding. Patient otherwise had no complaints, but endorses LLE pain near wound site.    Vascular: Ortho and Vascular notes reviewed. Will cancel MRI of right foot as likely OM, foul smelling, not likely to . Not able to complete MRI due to agitation. Elevated ESR of 84 and elevated CRP noted on arrival all consistent with infection. Continue ASA 81 mg/day. Add Lipitor 10 mg/day. Agree with stopping IV Vancomycin. Vascular recommends right AKA surgery. Vascular surgery has documented no pulses in right foot. Had necrotic toes right foot on arrival.         Endo: Resume Lantus, increase to  15 units at night. BS controlled 204 today. Decrease Lantus dose by 50 % when NPO for surgery.       CV: Increase Cozaar to 50 mg/day for decompensated systolic CHF. Continue Toprol XL 25 mg/day. BP stable 114/76. Change to PO Lasix 40 mg/day. Continue  Digoxin .125 mg/day. Fletcher placed. Repeat CXR shows some residual  CHF, no symptoms or SOB. Comfortable on RA.      Spoke to nephew again by phone today about plan of care and recommendation for right AKA early next week, [phone].     Patient is cleared for right AKA. HGB stable 10.2, creatinine normal .85. TTE reviewed from earlier this year. Stable for right AKA  surgery when Vascular surgery can schedule. Had left leg AKA 1 week ago without difficulty.

## 2019-09-30 NOTE — DIETITIAN INITIAL EVALUATION ADULT. - ENERGY NEEDS
HT 6'0   WT  176.9lbs  80.1kg     IBW=  172lbs (accounts for L BKA)  used for protein estimation  BMI=  29.4

## 2019-09-30 NOTE — DIETITIAN INITIAL EVALUATION ADULT. - OTHER INFO
Admission History of Present Illness:   70 yo male PMH DM(II), PAD s/p left BKA and prior left leg fem-pop, BIBEMS from facility for wound evaluation. Patient recently received left BKA in July 2019 in [facility], where he was discharged to rehab. Was sent from facility today for concerns of improper wound healing, drainage, and bleeding. Patient otherwise had no complaints, but endorses LLE pain near wound site.
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
## 2019-09-30 NOTE — DIETITIAN INITIAL EVALUATION ADULT. - REASON INDICATOR FOR ASSESSMENT
Length of stay. Patient visited in room, noted with no teeth yet refuses chopped or puree foods stating   "I'm eating OK, I do not want puree or chopped food." Per RN patient drinks better than eating however he at the meatloaf well today. Discussed supplements, ie, glucerna. Patient agreed to 2 xdaily

## 2019-09-30 NOTE — PROGRESS NOTE ADULT - SUBJECTIVE AND OBJECTIVE BOX
INTERVAL HPI/OVERNIGHT EVENTS:  Pt seen and examined at bedside.     Allergies/Intolerance: No Known Allergies      MEDICATIONS  (STANDING):  aspirin enteric coated 81 milliGRAM(s) Oral daily  atorvastatin 10 milliGRAM(s) Oral at bedtime  dextrose 5%. 1000 milliLiter(s) (120 mL/Hr) IV Continuous <Continuous>  dextrose 50% Injectable 12.5 Gram(s) IV Push once  dextrose 50% Injectable 25 Gram(s) IV Push once  dextrose 50% Injectable 25 Gram(s) IV Push once  digoxin     Tablet 0.125 milliGRAM(s) Oral daily  furosemide    Tablet 40 milliGRAM(s) Oral daily  heparin  Injectable 5000 Unit(s) SubCutaneous every 12 hours  influenza   Vaccine 0.5 milliLiter(s) IntraMuscular once  insulin glargine Injectable (LANTUS) 15 Unit(s) SubCutaneous at bedtime  insulin lispro (HumaLOG) corrective regimen sliding scale   SubCutaneous three times a day before meals  insulin lispro (HumaLOG) corrective regimen sliding scale   SubCutaneous at bedtime  losartan 50 milliGRAM(s) Oral daily  metoprolol succinate ER 25 milliGRAM(s) Oral daily  potassium chloride    Tablet ER 20 milliEquivalent(s) Oral two times a day    MEDICATIONS  (PRN):  dextrose 40% Gel 15 Gram(s) Oral once PRN Blood Glucose LESS THAN 70 milliGRAM(s)/deciliter  glucagon  Injectable 1 milliGRAM(s) IntraMuscular once PRN Glucose LESS THAN 70 milligrams/deciliter        ROS: all systems reviewed and wnl      PHYSICAL EXAMINATION:  Vital Signs Last 24 Hrs  T(C): 36.5 (30 Sep 2019 05:22), Max: 36.7 (29 Sep 2019 13:42)  T(F): 97.7 (30 Sep 2019 05:22), Max: 98 (29 Sep 2019 13:42)  HR: 92 (30 Sep 2019 05:22) (65 - 92)  BP: 114/71 (30 Sep 2019 05:22) (112/69 - 114/75)  BP(mean): --  RR: 18 (30 Sep 2019 05:22) (18 - 18)  SpO2: 95% (30 Sep 2019 05:22) (93% - 96%)  CAPILLARY BLOOD GLUCOSE      POCT Blood Glucose.: 257 mg/dL (29 Sep 2019 22:05)  POCT Blood Glucose.: 174 mg/dL (29 Sep 2019 17:46)  POCT Blood Glucose.: 260 mg/dL (29 Sep 2019 12:02)  POCT Blood Glucose.: 183 mg/dL (29 Sep 2019 08:41)      09-29 @ 07:01  -  09-30 @ 07:00  --------------------------------------------------------  IN: 480 mL / OUT: 900 mL / NET: -420 mL        GENERAL:   NECK: supple, No JVD  CHEST/LUNG: clear to auscultation bilaterally; no rales, rhonchi, or wheezing b/l  HEART: normal S1, S2  ABDOMEN: BS+, soft, ND, NT   EXTREMITIES:  pulses palpable; no clubbing, cyanosis, or edema b/l LEs  SKIN: no rashes or lesions      LABS:                        9.6    14.17 )-----------( 411      ( 29 Sep 2019 09:26 )             32.4 INTERVAL HPI/OVERNIGHT EVENTS:  Pt seen and examined at bedside.     Allergies/Intolerance: No Known Allergies      MEDICATIONS  (STANDING):  aspirin enteric coated 81 milliGRAM(s) Oral daily  atorvastatin 10 milliGRAM(s) Oral at bedtime  dextrose 5%. 1000 milliLiter(s) (120 mL/Hr) IV Continuous <Continuous>  dextrose 50% Injectable 12.5 Gram(s) IV Push once  dextrose 50% Injectable 25 Gram(s) IV Push once  dextrose 50% Injectable 25 Gram(s) IV Push once  digoxin     Tablet 0.125 milliGRAM(s) Oral daily  furosemide    Tablet 40 milliGRAM(s) Oral daily  heparin  Injectable 5000 Unit(s) SubCutaneous every 12 hours  influenza   Vaccine 0.5 milliLiter(s) IntraMuscular once  insulin glargine Injectable (LANTUS) 15 Unit(s) SubCutaneous at bedtime  insulin lispro (HumaLOG) corrective regimen sliding scale   SubCutaneous three times a day before meals  insulin lispro (HumaLOG) corrective regimen sliding scale   SubCutaneous at bedtime  losartan 50 milliGRAM(s) Oral daily  metoprolol succinate ER 25 milliGRAM(s) Oral daily  potassium chloride    Tablet ER 20 milliEquivalent(s) Oral two times a day    MEDICATIONS  (PRN):  dextrose 40% Gel 15 Gram(s) Oral once PRN Blood Glucose LESS THAN 70 milliGRAM(s)/deciliter  glucagon  Injectable 1 milliGRAM(s) IntraMuscular once PRN Glucose LESS THAN 70 milligrams/deciliter        ROS: all systems reviewed and wnl      PHYSICAL EXAMINATION:  Vital Signs Last 24 Hrs  T(C): 36.5 (30 Sep 2019 05:22), Max: 36.7 (29 Sep 2019 13:42)  T(F): 97.7 (30 Sep 2019 05:22), Max: 98 (29 Sep 2019 13:42)  HR: 92 (30 Sep 2019 05:22) (65 - 92)  BP: 114/71 (30 Sep 2019 05:22) (112/69 - 114/75)  BP(mean): --  RR: 18 (30 Sep 2019 05:22) (18 - 18)  SpO2: 95% (30 Sep 2019 05:22) (93% - 96%)  CAPILLARY BLOOD GLUCOSE      POCT Blood Glucose.: 257 mg/dL (29 Sep 2019 22:05)  POCT Blood Glucose.: 174 mg/dL (29 Sep 2019 17:46)  POCT Blood Glucose.: 260 mg/dL (29 Sep 2019 12:02)  POCT Blood Glucose.: 183 mg/dL (29 Sep 2019 08:41)      09-29 @ 07:01  -  09-30 @ 07:00  --------------------------------------------------------  IN: 480 mL / OUT: 900 mL / NET: -420 mL        GENERAL: stable in bed, responds to name, follows simple commands, south in palce, urine yellow   NECK: supple, No JVD  CHEST/LUNG: clear to auscultation bilaterally; no rales, rhonchi, or wheezing b/l  HEART: normal S1, S2  ABDOMEN: BS+, soft, ND, NT   EXTREMITIES:  pulses palpable; no clubbing, cyanosis, or edema b/l LEs  SKIN: no rashes or lesions      LABS:                        9.6    14.17 )-----------( 411      ( 29 Sep 2019 09:26 )             32.4

## 2019-10-01 LAB
GLUCOSE BLDC GLUCOMTR-MCNC: 116 MG/DL — HIGH (ref 70–99)
GLUCOSE BLDC GLUCOMTR-MCNC: 143 MG/DL — HIGH (ref 70–99)
GLUCOSE BLDC GLUCOMTR-MCNC: 168 MG/DL — HIGH (ref 70–99)
GLUCOSE BLDC GLUCOMTR-MCNC: 95 MG/DL — SIGNIFICANT CHANGE UP (ref 70–99)

## 2019-10-01 RX ORDER — METOPROLOL TARTRATE 50 MG
25 TABLET ORAL DAILY
Refills: 0 | Status: DISCONTINUED | OUTPATIENT
Start: 2019-10-01 | End: 2019-10-06

## 2019-10-01 RX ORDER — PIPERACILLIN AND TAZOBACTAM 4; .5 G/20ML; G/20ML
3.38 INJECTION, POWDER, LYOPHILIZED, FOR SOLUTION INTRAVENOUS EVERY 8 HOURS
Refills: 0 | Status: DISCONTINUED | OUTPATIENT
Start: 2019-10-01 | End: 2019-10-04

## 2019-10-01 RX ORDER — METOPROLOL TARTRATE 50 MG
50 TABLET ORAL DAILY
Refills: 0 | Status: DISCONTINUED | OUTPATIENT
Start: 2019-10-01 | End: 2019-10-01

## 2019-10-01 RX ORDER — PIPERACILLIN AND TAZOBACTAM 4; .5 G/20ML; G/20ML
3.38 INJECTION, POWDER, LYOPHILIZED, FOR SOLUTION INTRAVENOUS ONCE
Refills: 0 | Status: COMPLETED | OUTPATIENT
Start: 2019-10-01 | End: 2019-10-01

## 2019-10-01 RX ADMIN — HEPARIN SODIUM 5000 UNIT(S): 5000 INJECTION INTRAVENOUS; SUBCUTANEOUS at 17:51

## 2019-10-01 RX ADMIN — Medication 500 MILLIGRAM(S): at 11:05

## 2019-10-01 RX ADMIN — LOSARTAN POTASSIUM 50 MILLIGRAM(S): 100 TABLET, FILM COATED ORAL at 05:50

## 2019-10-01 RX ADMIN — Medication 40 MILLIGRAM(S): at 05:50

## 2019-10-01 RX ADMIN — Medication 81 MILLIGRAM(S): at 11:05

## 2019-10-01 RX ADMIN — Medication 20 MILLIEQUIVALENT(S): at 17:51

## 2019-10-01 RX ADMIN — Medication 20 MILLIEQUIVALENT(S): at 05:52

## 2019-10-01 RX ADMIN — INSULIN GLARGINE 15 UNIT(S): 100 INJECTION, SOLUTION SUBCUTANEOUS at 22:25

## 2019-10-01 RX ADMIN — Medication 25 MILLIGRAM(S): at 05:52

## 2019-10-01 RX ADMIN — HEPARIN SODIUM 5000 UNIT(S): 5000 INJECTION INTRAVENOUS; SUBCUTANEOUS at 05:50

## 2019-10-01 RX ADMIN — Medication 0.12 MILLIGRAM(S): at 05:50

## 2019-10-01 RX ADMIN — Medication 2: at 17:51

## 2019-10-01 RX ADMIN — PIPERACILLIN AND TAZOBACTAM 200 GRAM(S): 4; .5 INJECTION, POWDER, LYOPHILIZED, FOR SOLUTION INTRAVENOUS at 12:37

## 2019-10-01 RX ADMIN — Medication 1 TABLET(S): at 11:05

## 2019-10-01 RX ADMIN — PIPERACILLIN AND TAZOBACTAM 25 GRAM(S): 4; .5 INJECTION, POWDER, LYOPHILIZED, FOR SOLUTION INTRAVENOUS at 22:12

## 2019-10-01 RX ADMIN — ATORVASTATIN CALCIUM 10 MILLIGRAM(S): 80 TABLET, FILM COATED ORAL at 22:12

## 2019-10-01 NOTE — PROGRESS NOTE ADULT - ASSESSMENT
70 yo male PMH DM(II), PAD s/p left BKA and prior left leg fem-pop, BIBEMS from facility for wound evaluation. Patient recently received left BKA in July 2019 in [facility], where he was discharged to rehab. Was sent from facility today for concerns of improper wound healing, drainage, and bleeding. Patient otherwise had no complaints, but endorses LLE pain near wound site.    Vascular: Ortho and Vascular notes reviewed. Will cancel MRI of right foot as likely OM, foul smelling, not likely to . Not able to complete MRI due to agitation. Elevated ESR of 84 and elevated CRP noted on arrival all consistent with infection. Continue ASA 81 mg/day. Add Lipitor 10 mg/day. Agree with stopping IV Vancomycin. Vascular recommends right AKA surgery. Vascular surgery has documented no pulses in right foot. Had necrotic toes right foot on arrival.         Endo: Resume Lantus, increase to  15 units at night. BS controlled 204 today. Decrease Lantus dose by 50 % when NPO for surgery.       CV: Hold Cozaar today given lower BP. Continue Toprol XL 25 mg/day. BP stable 114/76. Change to PO Lasix 40 mg/day. Continue  Digoxin .125 mg/day. Fletcher placed. Repeat CXR shows some residual  CHF, no symptoms or SOB. Comfortable on RA.      Spoke to nephew again by phone today about plan of care and recommendation for right AKA early next week, [phone].     Patient is cleared for right AKA. HGB stable 10.2, creatinine normal .85. TTE reviewed from earlier this year. Stable for right AKA  surgery when Vascular surgery can schedule. Had left leg AKA 1 week ago without difficulty.

## 2019-10-01 NOTE — PROGRESS NOTE ADULT - SUBJECTIVE AND OBJECTIVE BOX
INTERVAL HPI/OVERNIGHT EVENTS:  Pt seen and examined at bedside.     Allergies/Intolerance: No Known Allergies      MEDICATIONS  (STANDING):  ascorbic acid 500 milliGRAM(s) Oral daily  aspirin enteric coated 81 milliGRAM(s) Oral daily  atorvastatin 10 milliGRAM(s) Oral at bedtime  dextrose 5%. 1000 milliLiter(s) (120 mL/Hr) IV Continuous <Continuous>  dextrose 50% Injectable 12.5 Gram(s) IV Push once  dextrose 50% Injectable 25 Gram(s) IV Push once  dextrose 50% Injectable 25 Gram(s) IV Push once  digoxin     Tablet 0.125 milliGRAM(s) Oral daily  furosemide    Tablet 40 milliGRAM(s) Oral daily  heparin  Injectable 5000 Unit(s) SubCutaneous every 12 hours  influenza   Vaccine 0.5 milliLiter(s) IntraMuscular once  insulin glargine Injectable (LANTUS) 15 Unit(s) SubCutaneous at bedtime  insulin lispro (HumaLOG) corrective regimen sliding scale   SubCutaneous three times a day before meals  insulin lispro (HumaLOG) corrective regimen sliding scale   SubCutaneous at bedtime  losartan 50 milliGRAM(s) Oral daily  metoprolol succinate ER 25 milliGRAM(s) Oral daily  multivitamin 1 Tablet(s) Oral daily  potassium chloride    Tablet ER 20 milliEquivalent(s) Oral two times a day    MEDICATIONS  (PRN):  dextrose 40% Gel 15 Gram(s) Oral once PRN Blood Glucose LESS THAN 70 milliGRAM(s)/deciliter  glucagon  Injectable 1 milliGRAM(s) IntraMuscular once PRN Glucose LESS THAN 70 milligrams/deciliter        ROS: all systems reviewed and wnl      PHYSICAL EXAMINATION:  Vital Signs Last 24 Hrs  T(C): 36.7 (01 Oct 2019 05:57), Max: 36.7 (30 Sep 2019 10:44)  T(F): 98 (01 Oct 2019 05:57), Max: 98.1 (30 Sep 2019 10:44)  HR: 90 (01 Oct 2019 05:57) (89 - 95)  BP: 136/70 (01 Oct 2019 05:57) (119/74 - 136/70)  BP(mean): --  RR: 18 (01 Oct 2019 05:57) (18 - 18)  SpO2: 95% (01 Oct 2019 05:57) (92% - 95%)  CAPILLARY BLOOD GLUCOSE      POCT Blood Glucose.: 221 mg/dL (30 Sep 2019 21:43)  POCT Blood Glucose.: 132 mg/dL (30 Sep 2019 17:18)  POCT Blood Glucose.: 130 mg/dL (30 Sep 2019 12:42)  POCT Blood Glucose.: 134 mg/dL (30 Sep 2019 08:16)      09-30 @ 07:01  -  10-01 @ 07:00  --------------------------------------------------------  IN: 160 mL / OUT: 1500 mL / NET: -1340 mL        GENERAL:   NECK: supple, No JVD  CHEST/LUNG: clear to auscultation bilaterally; no rales, rhonchi, or wheezing b/l  HEART: normal S1, S2  ABDOMEN: BS+, soft, ND, NT   EXTREMITIES:  pulses palpable; no clubbing, cyanosis, or edema b/l LEs  SKIN: no rashes or lesions      LABS:                        9.6    14.17 )-----------( 411      ( 29 Sep 2019 09:26 )             32.4 INTERVAL HPI/OVERNIGHT EVENTS:  Pt seen and examined at bedside.     Allergies/Intolerance: No Known Allergies      MEDICATIONS  (STANDING):  ascorbic acid 500 milliGRAM(s) Oral daily  aspirin enteric coated 81 milliGRAM(s) Oral daily  atorvastatin 10 milliGRAM(s) Oral at bedtime  dextrose 5%. 1000 milliLiter(s) (120 mL/Hr) IV Continuous <Continuous>  dextrose 50% Injectable 12.5 Gram(s) IV Push once  dextrose 50% Injectable 25 Gram(s) IV Push once  dextrose 50% Injectable 25 Gram(s) IV Push once  digoxin     Tablet 0.125 milliGRAM(s) Oral daily  furosemide    Tablet 40 milliGRAM(s) Oral daily  heparin  Injectable 5000 Unit(s) SubCutaneous every 12 hours  influenza   Vaccine 0.5 milliLiter(s) IntraMuscular once  insulin glargine Injectable (LANTUS) 15 Unit(s) SubCutaneous at bedtime  insulin lispro (HumaLOG) corrective regimen sliding scale   SubCutaneous three times a day before meals  insulin lispro (HumaLOG) corrective regimen sliding scale   SubCutaneous at bedtime  losartan 50 milliGRAM(s) Oral daily  metoprolol succinate ER 25 milliGRAM(s) Oral daily  multivitamin 1 Tablet(s) Oral daily  potassium chloride    Tablet ER 20 milliEquivalent(s) Oral two times a day    MEDICATIONS  (PRN):  dextrose 40% Gel 15 Gram(s) Oral once PRN Blood Glucose LESS THAN 70 milliGRAM(s)/deciliter  glucagon  Injectable 1 milliGRAM(s) IntraMuscular once PRN Glucose LESS THAN 70 milligrams/deciliter        ROS: all systems reviewed and wnl      PHYSICAL EXAMINATION:  Vital Signs Last 24 Hrs  T(C): 36.7 (01 Oct 2019 05:57), Max: 36.7 (30 Sep 2019 10:44)  T(F): 98 (01 Oct 2019 05:57), Max: 98.1 (30 Sep 2019 10:44)  HR: 90 (01 Oct 2019 05:57) (89 - 95)  BP: 136/70 (01 Oct 2019 05:57) (119/74 - 136/70)  BP(mean): --  RR: 18 (01 Oct 2019 05:57) (18 - 18)  SpO2: 95% (01 Oct 2019 05:57) (92% - 95%)  CAPILLARY BLOOD GLUCOSE      POCT Blood Glucose.: 221 mg/dL (30 Sep 2019 21:43)  POCT Blood Glucose.: 132 mg/dL (30 Sep 2019 17:18)  POCT Blood Glucose.: 130 mg/dL (30 Sep 2019 12:42)  POCT Blood Glucose.: 134 mg/dL (30 Sep 2019 08:16)      09-30 @ 07:01  -  10-01 @ 07:00  --------------------------------------------------------  IN: 160 mL / OUT: 1500 mL / NET: -1340 mL        GENERAL: comfortable, south in place, no fevers or cough, baseline level of confusion  NECK: supple, No JVD  CHEST/LUNG: clear to auscultation bilaterally; no rales, rhonchi, or wheezing b/l  HEART: normal S1, S2  ABDOMEN: BS+, soft, ND, NT   EXTREMITIES:  pulses palpable; no clubbing, cyanosis, or edema b/l LEs  SKIN: no rashes or lesions      LABS:                        9.6    14.17 )-----------( 411      ( 29 Sep 2019 09:26 )             32.4

## 2019-10-01 NOTE — PROGRESS NOTE ADULT - SUBJECTIVE AND OBJECTIVE BOX
CC: f/u for left stump infection, right foot gangrene    Patient reports  nothing intelligible  REVIEW OF SYSTEMS:  All other review of systems negative (Comprehensive ROS)    Antimicrobials Day #  :    Other Medications Reviewed    T(F): 98.6 (10-01-19 @ 09:24), Max: 98.6 (10-01-19 @ 09:24)  HR: 92 (10-01-19 @ 09:24)  BP: 98/64 (10-01-19 @ 09:24)  RR: 18 (10-01-19 @ 09:24)  SpO2: 99% (10-01-19 @ 09:24)  Wt(kg): --    PHYSICAL EXAM:  General: alert, no acute distress  Eyes:  anicteric, no conjunctival injection, no discharge  Oropharynx: no lesions or injection 	  Neck: supple, without adenopathy  Lungs: clear to auscultation  Heart: regular rate and rhythm; no murmur, rubs or gallops  Abdomen: soft, nondistended, nontender, without mass or organomegaly  Skin: no lesions  Extremities: right foot mummified  Neurologic: alert, confused, moves all extremities    LAB RESULTS:    Complete Blood Count in AM (09.29.19 @ 09:26)    WBC Count: 14.17 K/uL    RBC Count: 3.63 M/uL    Hemoglobin: 9.6 g/dL    Hematocrit: 32.4 %    Mean Cell Volume: 89.3 fl    Mean Cell Hemoglobin: 26.4 pg    Mean Cell Hemoglobin Conc: 29.6 gm/dL    Red Cell Distrib Width: 14.9 %    Platelet Count - Automated: 411 K/uL    Basic Metabolic Panel in AM (09.27.19 @ 10:32)    Sodium, Serum: 137 mmol/L    Potassium, Serum: 4.0 mmol/L    Chloride, Serum: 100 mmol/L    Carbon Dioxide, Serum: 25 mmol/L    Anion Gap, Serum: 12 mmol/L    Blood Urea Nitrogen, Serum: 17 mg/dL    Creatinine, Serum: 0.85 mg/dL    Glucose, Serum: 225 mg/dL    Calcium, Total Serum: 8.8 mg/dL    eGFR if Non : 89: Interpretative comment      MICROBIOLOGY:  RECENT CULTURES:  Culture - Abscess with Gram Stain (09.19.19 @ 17:27)    -  Piperacillin/Tazobactam: S <=8    -  Tobramycin: S <=2    -  Levofloxacin: S <=2    -  Meropenem: S <=1    -  Gentamicin: S 4    -  Imipenem: S <=1    -  Ciprofloxacin: S <=1    -  Amikacin: S <=16    -  Aztreonam: S <=4    -  Cefepime: S <=2    -  Ceftazidime: S 4    Specimen Source: .Abscess left foot wound    Culture Results:   Culture yields >4 types of aerobic and/or anaerobic bacteria  Call client services within 7 days if further workup is clinically  indicated.  Culture includes  Numerous Pseudomonas aeruginosa    Organism Identification: Pseudomonas aeruginosa    Organism: Pseudomonas aeruginosa    Method Type: HELEN        RADIOLOGY REVIEWED:  < from: Xray Chest 1 View- PORTABLE-Urgent (09.24.19 @ 12:04) >  EXAM:  XR CHEST PORTABLE URGENT 1V                            PROCEDURE DATE:  09/24/2019            INTERPRETATION:    INDICATION: Shortness of breath, reevaluate for resolution of congestive   heart failure    TECHNIQUE: Chest x-ray, AP view    COMPARISON: Chest x-ray 9/20/2019, chest x-ray 8/2/2019    FINDINGS:  Redemonstration of small bilateral pleural effusions and pulmonary edema.    Cardiac silhouette cannot be adequately assessed on this projection.    No acute osseous abnormalities.    IMPRESSION:   Redemonstration of small bilateral pleural effusions and pulmonary edema.      < end of copied text >      Assessment:  Patient with dm, pvd, had left bka that did not heal and now is revision to aka. He has a gangrenous right foot and needs an amputation as well. I am concerned that he is more confused today because he is getting ill from the dead foot.   Plan:  will culture  will start zosyn in case some sepsis from foot  favor amputation as soon as possible.

## 2019-10-02 LAB
ANION GAP SERPL CALC-SCNC: 13 MMOL/L — SIGNIFICANT CHANGE UP (ref 5–17)
BASOPHILS # BLD AUTO: 0.01 K/UL — SIGNIFICANT CHANGE UP (ref 0–0.2)
BASOPHILS NFR BLD AUTO: 0.1 % — SIGNIFICANT CHANGE UP (ref 0–2)
BUN SERPL-MCNC: 45 MG/DL — HIGH (ref 7–23)
CALCIUM SERPL-MCNC: 8.5 MG/DL — SIGNIFICANT CHANGE UP (ref 8.4–10.5)
CHLORIDE SERPL-SCNC: 113 MMOL/L — HIGH (ref 96–108)
CO2 SERPL-SCNC: 23 MMOL/L — SIGNIFICANT CHANGE UP (ref 22–31)
CREAT SERPL-MCNC: 1.66 MG/DL — HIGH (ref 0.5–1.3)
EOSINOPHIL # BLD AUTO: 0 K/UL — SIGNIFICANT CHANGE UP (ref 0–0.5)
EOSINOPHIL NFR BLD AUTO: 0 % — SIGNIFICANT CHANGE UP (ref 0–6)
GLUCOSE BLDC GLUCOMTR-MCNC: 117 MG/DL — HIGH (ref 70–99)
GLUCOSE BLDC GLUCOMTR-MCNC: 133 MG/DL — HIGH (ref 70–99)
GLUCOSE BLDC GLUCOMTR-MCNC: 166 MG/DL — HIGH (ref 70–99)
GLUCOSE BLDC GLUCOMTR-MCNC: 189 MG/DL — HIGH (ref 70–99)
GLUCOSE BLDC GLUCOMTR-MCNC: 203 MG/DL — HIGH (ref 70–99)
GLUCOSE SERPL-MCNC: 119 MG/DL — HIGH (ref 70–99)
HCT VFR BLD CALC: 34.3 % — LOW (ref 39–50)
HGB BLD-MCNC: 10.9 G/DL — LOW (ref 13–17)
IMM GRANULOCYTES NFR BLD AUTO: 0.6 % — SIGNIFICANT CHANGE UP (ref 0–1.5)
LYMPHOCYTES # BLD AUTO: 1.45 K/UL — SIGNIFICANT CHANGE UP (ref 1–3.3)
LYMPHOCYTES # BLD AUTO: 8.5 % — LOW (ref 13–44)
MCHC RBC-ENTMCNC: 27.7 PG — SIGNIFICANT CHANGE UP (ref 27–34)
MCHC RBC-ENTMCNC: 31.8 GM/DL — LOW (ref 32–36)
MCV RBC AUTO: 87.1 FL — SIGNIFICANT CHANGE UP (ref 80–100)
MONOCYTES # BLD AUTO: 0.68 K/UL — SIGNIFICANT CHANGE UP (ref 0–0.9)
MONOCYTES NFR BLD AUTO: 4 % — SIGNIFICANT CHANGE UP (ref 2–14)
NEUTROPHILS # BLD AUTO: 14.82 K/UL — HIGH (ref 1.8–7.4)
NEUTROPHILS NFR BLD AUTO: 86.8 % — HIGH (ref 43–77)
NRBC # BLD: 0 /100 WBCS — SIGNIFICANT CHANGE UP (ref 0–0)
PLATELET # BLD AUTO: 442 K/UL — HIGH (ref 150–400)
POTASSIUM SERPL-MCNC: 3.7 MMOL/L — SIGNIFICANT CHANGE UP (ref 3.5–5.3)
POTASSIUM SERPL-SCNC: 3.7 MMOL/L — SIGNIFICANT CHANGE UP (ref 3.5–5.3)
RBC # BLD: 3.94 M/UL — LOW (ref 4.2–5.8)
RBC # FLD: 15.6 % — HIGH (ref 10.3–14.5)
SODIUM SERPL-SCNC: 149 MMOL/L — HIGH (ref 135–145)
WBC # BLD: 17.06 K/UL — HIGH (ref 3.8–10.5)
WBC # FLD AUTO: 17.06 K/UL — HIGH (ref 3.8–10.5)

## 2019-10-02 RX ADMIN — Medication 2: at 17:19

## 2019-10-02 RX ADMIN — Medication 500 MILLIGRAM(S): at 11:21

## 2019-10-02 RX ADMIN — PIPERACILLIN AND TAZOBACTAM 25 GRAM(S): 4; .5 INJECTION, POWDER, LYOPHILIZED, FOR SOLUTION INTRAVENOUS at 11:21

## 2019-10-02 RX ADMIN — Medication 81 MILLIGRAM(S): at 11:21

## 2019-10-02 RX ADMIN — PIPERACILLIN AND TAZOBACTAM 25 GRAM(S): 4; .5 INJECTION, POWDER, LYOPHILIZED, FOR SOLUTION INTRAVENOUS at 04:17

## 2019-10-02 RX ADMIN — Medication 40 MILLIGRAM(S): at 06:18

## 2019-10-02 RX ADMIN — Medication 0.12 MILLIGRAM(S): at 06:18

## 2019-10-02 RX ADMIN — HEPARIN SODIUM 5000 UNIT(S): 5000 INJECTION INTRAVENOUS; SUBCUTANEOUS at 06:18

## 2019-10-02 RX ADMIN — INSULIN GLARGINE 15 UNIT(S): 100 INJECTION, SOLUTION SUBCUTANEOUS at 22:49

## 2019-10-02 RX ADMIN — Medication 20 MILLIEQUIVALENT(S): at 17:18

## 2019-10-02 RX ADMIN — Medication 1 TABLET(S): at 11:21

## 2019-10-02 RX ADMIN — PIPERACILLIN AND TAZOBACTAM 25 GRAM(S): 4; .5 INJECTION, POWDER, LYOPHILIZED, FOR SOLUTION INTRAVENOUS at 22:49

## 2019-10-02 RX ADMIN — Medication 20 MILLIEQUIVALENT(S): at 06:18

## 2019-10-02 RX ADMIN — ATORVASTATIN CALCIUM 10 MILLIGRAM(S): 80 TABLET, FILM COATED ORAL at 22:48

## 2019-10-02 RX ADMIN — HEPARIN SODIUM 5000 UNIT(S): 5000 INJECTION INTRAVENOUS; SUBCUTANEOUS at 17:18

## 2019-10-02 RX ADMIN — Medication 25 MILLIGRAM(S): at 06:18

## 2019-10-02 NOTE — PROGRESS NOTE ADULT - ASSESSMENT
70 yo male PMH DM(II), PAD s/p left BKA and prior left leg fem-pop, BIBEMS from facility for wound evaluation. Patient recently received left BKA in July 2019 in [facility], where he was discharged to rehab. Was sent from facility today for concerns of improper wound healing, drainage, and bleeding. Patient otherwise had no complaints, but endorses LLE pain near wound site.    Vascular: Ortho and Vascular notes reviewed. Will cancel MRI of right foot as likely OM, foul smelling, not likely to . Not able to complete MRI due to agitation. Elevated ESR of 84 and elevated CRP noted on arrival all consistent with infection. Continue ASA 81 mg/day. Add Lipitor 10 mg/day. Agree with stopping IV Vancomycin. Vascular recommends right AKA surgery. Vascular surgery has documented no pulses in right foot. Had necrotic toes right foot on arrival. Needs right AKA. ID restarted IV Zosyn yesterday.         Endo: Resume Lantus, increase to  15 units at night. BS controlled 204 today. Decrease Lantus dose by 50 % when NPO for surgery.       CV: Hold Cozaar today given lower BP. Continue Toprol XL 25 mg/day. BP stable 114/76. Change to PO Lasix 40 mg/day. Continue  Digoxin .125 mg/day. Fletcher placed. Repeat CXR shows some residual  CHF, no symptoms or SOB. Comfortable on RA.      Spoke to nephew again by phone today about plan of care and recommendation for right AKA early next week, [phone].     Patient is cleared for right AKA. HGB stable 10.2, creatinine normal .85. TTE reviewed from earlier this year. Stable for right AKA  surgery when Vascular surgery can schedule. Had left leg AKA 1 week ago without difficulty.

## 2019-10-02 NOTE — PROGRESS NOTE ADULT - SUBJECTIVE AND OBJECTIVE BOX
INTERVAL HPI/OVERNIGHT EVENTS:  Pt seen and examined at bedside.     Allergies/Intolerance: No Known Allergies      MEDICATIONS  (STANDING):  ascorbic acid 500 milliGRAM(s) Oral daily  aspirin enteric coated 81 milliGRAM(s) Oral daily  atorvastatin 10 milliGRAM(s) Oral at bedtime  dextrose 5%. 1000 milliLiter(s) (120 mL/Hr) IV Continuous <Continuous>  dextrose 50% Injectable 12.5 Gram(s) IV Push once  dextrose 50% Injectable 25 Gram(s) IV Push once  dextrose 50% Injectable 25 Gram(s) IV Push once  digoxin     Tablet 0.125 milliGRAM(s) Oral daily  furosemide    Tablet 40 milliGRAM(s) Oral daily  heparin  Injectable 5000 Unit(s) SubCutaneous every 12 hours  influenza   Vaccine 0.5 milliLiter(s) IntraMuscular once  insulin glargine Injectable (LANTUS) 15 Unit(s) SubCutaneous at bedtime  insulin lispro (HumaLOG) corrective regimen sliding scale   SubCutaneous three times a day before meals  insulin lispro (HumaLOG) corrective regimen sliding scale   SubCutaneous at bedtime  metoprolol succinate ER 25 milliGRAM(s) Oral daily  multivitamin 1 Tablet(s) Oral daily  piperacillin/tazobactam IVPB.. 3.375 Gram(s) IV Intermittent every 8 hours  potassium chloride    Tablet ER 20 milliEquivalent(s) Oral two times a day    MEDICATIONS  (PRN):  dextrose 40% Gel 15 Gram(s) Oral once PRN Blood Glucose LESS THAN 70 milliGRAM(s)/deciliter  glucagon  Injectable 1 milliGRAM(s) IntraMuscular once PRN Glucose LESS THAN 70 milligrams/deciliter        ROS: all systems reviewed and wnl      PHYSICAL EXAMINATION:  Vital Signs Last 24 Hrs  T(C): 36.7 (02 Oct 2019 04:15), Max: 37 (01 Oct 2019 09:24)  T(F): 98 (02 Oct 2019 04:15), Max: 98.6 (01 Oct 2019 09:24)  HR: 82 (02 Oct 2019 04:15) (82 - 98)  BP: 119/72 (02 Oct 2019 04:15) (98/64 - 119/72)  BP(mean): --  RR: 18 (02 Oct 2019 01:23) (18 - 18)  SpO2: 97% (02 Oct 2019 04:15) (95% - 99%)  CAPILLARY BLOOD GLUCOSE      POCT Blood Glucose.: 116 mg/dL (01 Oct 2019 22:09)  POCT Blood Glucose.: 168 mg/dL (01 Oct 2019 17:38)  POCT Blood Glucose.: 143 mg/dL (01 Oct 2019 12:53)  POCT Blood Glucose.: 95 mg/dL (01 Oct 2019 08:48)      10-01 @ 07:01  -  10-02 @ 07:00  --------------------------------------------------------  IN: 740 mL / OUT: 400 mL / NET: 340 mL        GENERAL:   NECK: supple, No JVD  CHEST/LUNG: clear to auscultation bilaterally; no rales, rhonchi, or wheezing b/l  HEART: normal S1, S2  ABDOMEN: BS+, soft, ND, NT   EXTREMITIES:  pulses palpable; no clubbing, cyanosis, or edema b/l LEs  SKIN: no rashes or lesions      LABS: INTERVAL HPI/OVERNIGHT EVENTS:  Pt seen and examined at bedside.     Allergies/Intolerance: No Known Allergies      MEDICATIONS  (STANDING):  ascorbic acid 500 milliGRAM(s) Oral daily  aspirin enteric coated 81 milliGRAM(s) Oral daily  atorvastatin 10 milliGRAM(s) Oral at bedtime  dextrose 5%. 1000 milliLiter(s) (120 mL/Hr) IV Continuous <Continuous>  dextrose 50% Injectable 12.5 Gram(s) IV Push once  dextrose 50% Injectable 25 Gram(s) IV Push once  dextrose 50% Injectable 25 Gram(s) IV Push once  digoxin     Tablet 0.125 milliGRAM(s) Oral daily  furosemide    Tablet 40 milliGRAM(s) Oral daily  heparin  Injectable 5000 Unit(s) SubCutaneous every 12 hours  influenza   Vaccine 0.5 milliLiter(s) IntraMuscular once  insulin glargine Injectable (LANTUS) 15 Unit(s) SubCutaneous at bedtime  insulin lispro (HumaLOG) corrective regimen sliding scale   SubCutaneous three times a day before meals  insulin lispro (HumaLOG) corrective regimen sliding scale   SubCutaneous at bedtime  metoprolol succinate ER 25 milliGRAM(s) Oral daily  multivitamin 1 Tablet(s) Oral daily  piperacillin/tazobactam IVPB.. 3.375 Gram(s) IV Intermittent every 8 hours  potassium chloride    Tablet ER 20 milliEquivalent(s) Oral two times a day    MEDICATIONS  (PRN):  dextrose 40% Gel 15 Gram(s) Oral once PRN Blood Glucose LESS THAN 70 milliGRAM(s)/deciliter  glucagon  Injectable 1 milliGRAM(s) IntraMuscular once PRN Glucose LESS THAN 70 milligrams/deciliter        ROS: all systems reviewed and wnl      PHYSICAL EXAMINATION:  Vital Signs Last 24 Hrs  T(C): 36.7 (02 Oct 2019 04:15), Max: 37 (01 Oct 2019 09:24)  T(F): 98 (02 Oct 2019 04:15), Max: 98.6 (01 Oct 2019 09:24)  HR: 82 (02 Oct 2019 04:15) (82 - 98)  BP: 119/72 (02 Oct 2019 04:15) (98/64 - 119/72)  BP(mean): --  RR: 18 (02 Oct 2019 01:23) (18 - 18)  SpO2: 97% (02 Oct 2019 04:15) (95% - 99%)  CAPILLARY BLOOD GLUCOSE      POCT Blood Glucose.: 116 mg/dL (01 Oct 2019 22:09)  POCT Blood Glucose.: 168 mg/dL (01 Oct 2019 17:38)  POCT Blood Glucose.: 143 mg/dL (01 Oct 2019 12:53)  POCT Blood Glucose.: 95 mg/dL (01 Oct 2019 08:48)      10-01 @ 07:01  -  10-02 @ 07:00  --------------------------------------------------------  IN: 740 mL / OUT: 400 mL / NET: 340 mL        GENERAL: stable, south in place, no fevers or CP  NECK: supple, No JVD  CHEST/LUNG: clear to auscultation bilaterally; no rales, rhonchi, or wheezing b/l  HEART: normal S1, S2  ABDOMEN: BS+, soft, ND, NT   EXTREMITIES:  pulses palpable; no clubbing, cyanosis, or edema b/l LEs  SKIN: no rashes or lesions      LABS:

## 2019-10-02 NOTE — CHART NOTE - NSCHARTNOTEFT_GEN_A_CORE
Case discussed with team, patient s/p L AKA and with chronic ischemia to RLE. Per earlier plan, there is no short-term plan for R AKA. Instead would prefer to wait some weeks for LLE AKA wound to heal. If patient is otherwise stable, he can be discharged in the interim with close follow-up with vascular surgery. If there is a sudden worsening, please call with any concerns. Otherwise, vascular surgery will sign off.

## 2019-10-03 LAB
BASOPHILS # BLD AUTO: 0.02 K/UL — SIGNIFICANT CHANGE UP (ref 0–0.2)
BASOPHILS NFR BLD AUTO: 0.1 % — SIGNIFICANT CHANGE UP (ref 0–2)
EOSINOPHIL # BLD AUTO: 0 K/UL — SIGNIFICANT CHANGE UP (ref 0–0.5)
EOSINOPHIL NFR BLD AUTO: 0 % — SIGNIFICANT CHANGE UP (ref 0–6)
GLUCOSE BLDC GLUCOMTR-MCNC: 115 MG/DL — HIGH (ref 70–99)
GLUCOSE BLDC GLUCOMTR-MCNC: 121 MG/DL — HIGH (ref 70–99)
GLUCOSE BLDC GLUCOMTR-MCNC: 159 MG/DL — HIGH (ref 70–99)
GLUCOSE BLDC GLUCOMTR-MCNC: 163 MG/DL — HIGH (ref 70–99)
HCT VFR BLD CALC: 35 % — LOW (ref 39–50)
HGB BLD-MCNC: 10.6 G/DL — LOW (ref 13–17)
IMM GRANULOCYTES NFR BLD AUTO: 0.5 % — SIGNIFICANT CHANGE UP (ref 0–1.5)
LYMPHOCYTES # BLD AUTO: 1.78 K/UL — SIGNIFICANT CHANGE UP (ref 1–3.3)
LYMPHOCYTES # BLD AUTO: 10.5 % — LOW (ref 13–44)
MCHC RBC-ENTMCNC: 27 PG — SIGNIFICANT CHANGE UP (ref 27–34)
MCHC RBC-ENTMCNC: 30.3 GM/DL — LOW (ref 32–36)
MCV RBC AUTO: 89.3 FL — SIGNIFICANT CHANGE UP (ref 80–100)
MONOCYTES # BLD AUTO: 0.78 K/UL — SIGNIFICANT CHANGE UP (ref 0–0.9)
MONOCYTES NFR BLD AUTO: 4.6 % — SIGNIFICANT CHANGE UP (ref 2–14)
NEUTROPHILS # BLD AUTO: 14.28 K/UL — HIGH (ref 1.8–7.4)
NEUTROPHILS NFR BLD AUTO: 84.3 % — HIGH (ref 43–77)
PLATELET # BLD AUTO: 434 K/UL — HIGH (ref 150–400)
RBC # BLD: 3.92 M/UL — LOW (ref 4.2–5.8)
RBC # FLD: 15.8 % — HIGH (ref 10.3–14.5)
WBC # BLD: 16.94 K/UL — HIGH (ref 3.8–10.5)
WBC # FLD AUTO: 16.94 K/UL — HIGH (ref 3.8–10.5)

## 2019-10-03 RX ORDER — SODIUM CHLORIDE 9 MG/ML
1000 INJECTION, SOLUTION INTRAVENOUS
Refills: 0 | Status: DISCONTINUED | OUTPATIENT
Start: 2019-10-03 | End: 2019-10-04

## 2019-10-03 RX ADMIN — Medication 40 MILLIGRAM(S): at 05:04

## 2019-10-03 RX ADMIN — Medication 0.12 MILLIGRAM(S): at 05:04

## 2019-10-03 RX ADMIN — HEPARIN SODIUM 5000 UNIT(S): 5000 INJECTION INTRAVENOUS; SUBCUTANEOUS at 05:06

## 2019-10-03 RX ADMIN — Medication 500 MILLIGRAM(S): at 13:40

## 2019-10-03 RX ADMIN — SODIUM CHLORIDE 60 MILLILITER(S): 9 INJECTION, SOLUTION INTRAVENOUS at 12:02

## 2019-10-03 RX ADMIN — Medication 20 MILLIEQUIVALENT(S): at 05:04

## 2019-10-03 RX ADMIN — Medication 81 MILLIGRAM(S): at 13:39

## 2019-10-03 RX ADMIN — HEPARIN SODIUM 5000 UNIT(S): 5000 INJECTION INTRAVENOUS; SUBCUTANEOUS at 17:59

## 2019-10-03 RX ADMIN — PIPERACILLIN AND TAZOBACTAM 25 GRAM(S): 4; .5 INJECTION, POWDER, LYOPHILIZED, FOR SOLUTION INTRAVENOUS at 14:12

## 2019-10-03 RX ADMIN — PIPERACILLIN AND TAZOBACTAM 25 GRAM(S): 4; .5 INJECTION, POWDER, LYOPHILIZED, FOR SOLUTION INTRAVENOUS at 04:53

## 2019-10-03 RX ADMIN — Medication 1 TABLET(S): at 13:39

## 2019-10-03 RX ADMIN — ATORVASTATIN CALCIUM 10 MILLIGRAM(S): 80 TABLET, FILM COATED ORAL at 22:10

## 2019-10-03 RX ADMIN — Medication 2: at 13:39

## 2019-10-03 RX ADMIN — PIPERACILLIN AND TAZOBACTAM 25 GRAM(S): 4; .5 INJECTION, POWDER, LYOPHILIZED, FOR SOLUTION INTRAVENOUS at 22:06

## 2019-10-03 RX ADMIN — Medication 2: at 17:59

## 2019-10-03 RX ADMIN — Medication 25 MILLIGRAM(S): at 05:04

## 2019-10-03 RX ADMIN — INSULIN GLARGINE 15 UNIT(S): 100 INJECTION, SOLUTION SUBCUTANEOUS at 22:18

## 2019-10-03 NOTE — CHART NOTE - NSCHARTNOTEFT_GEN_A_CORE
Pt with foul smelling Rt foot gangrene. Called and spoke with Vasc MD, Dr Pimentel to re-evaluate pt's Right foot which is foul smelling, black, necrotic, and with elevated WBC increasing - 16.94 today.   Pt evaluated by Vascular Surgery with Plan to wait until Lt AKA heals before performing Rt foot surgery.  Will continue to monitor.

## 2019-10-03 NOTE — PROGRESS NOTE ADULT - ASSESSMENT
68 yo male s/p left AKA, dressing intact without strike through, patient refusing to have dressing taken down for close examination; H/H stable per lab this morning     - care per primary team  - no intervention planned for the other side until the left AKA wound heals

## 2019-10-03 NOTE — PROGRESS NOTE ADULT - SUBJECTIVE AND OBJECTIVE BOX
INTERVAL HPI/OVERNIGHT EVENTS:  Pt seen and examined at bedside.     Allergies/Intolerance: No Known Allergies      MEDICATIONS  (STANDING):  ascorbic acid 500 milliGRAM(s) Oral daily  aspirin enteric coated 81 milliGRAM(s) Oral daily  atorvastatin 10 milliGRAM(s) Oral at bedtime  dextrose 5%. 1000 milliLiter(s) (120 mL/Hr) IV Continuous <Continuous>  dextrose 50% Injectable 12.5 Gram(s) IV Push once  dextrose 50% Injectable 25 Gram(s) IV Push once  dextrose 50% Injectable 25 Gram(s) IV Push once  digoxin     Tablet 0.125 milliGRAM(s) Oral daily  furosemide    Tablet 40 milliGRAM(s) Oral daily  heparin  Injectable 5000 Unit(s) SubCutaneous every 12 hours  influenza   Vaccine 0.5 milliLiter(s) IntraMuscular once  insulin glargine Injectable (LANTUS) 15 Unit(s) SubCutaneous at bedtime  insulin lispro (HumaLOG) corrective regimen sliding scale   SubCutaneous three times a day before meals  insulin lispro (HumaLOG) corrective regimen sliding scale   SubCutaneous at bedtime  metoprolol succinate ER 25 milliGRAM(s) Oral daily  multivitamin 1 Tablet(s) Oral daily  piperacillin/tazobactam IVPB.. 3.375 Gram(s) IV Intermittent every 8 hours  potassium chloride    Tablet ER 20 milliEquivalent(s) Oral two times a day    MEDICATIONS  (PRN):  dextrose 40% Gel 15 Gram(s) Oral once PRN Blood Glucose LESS THAN 70 milliGRAM(s)/deciliter  glucagon  Injectable 1 milliGRAM(s) IntraMuscular once PRN Glucose LESS THAN 70 milligrams/deciliter        ROS: all systems reviewed and wnl      PHYSICAL EXAMINATION:  Vital Signs Last 24 Hrs  T(C): 36.7 (03 Oct 2019 04:21), Max: 36.7 (02 Oct 2019 20:01)  T(F): 98 (03 Oct 2019 04:21), Max: 98.1 (02 Oct 2019 20:01)  HR: 87 (03 Oct 2019 04:21) (80 - 100)  BP: 112/65 (03 Oct 2019 04:21) (85/55 - 112/65)  BP(mean): --  RR: 18 (03 Oct 2019 04:21) (18 - 18)  SpO2: 98% (03 Oct 2019 04:21) (98% - 100%)  CAPILLARY BLOOD GLUCOSE      POCT Blood Glucose.: 121 mg/dL (03 Oct 2019 08:27)  POCT Blood Glucose.: 203 mg/dL (02 Oct 2019 22:41)  POCT Blood Glucose.: 189 mg/dL (02 Oct 2019 21:31)  POCT Blood Glucose.: 166 mg/dL (02 Oct 2019 17:13)  POCT Blood Glucose.: 133 mg/dL (02 Oct 2019 12:21)      10-02 @ 07:01  -  10-03 @ 07:00  --------------------------------------------------------  IN: 1060 mL / OUT: 1080 mL / NET: -20 mL        GENERAL:   NECK: supple, No JVD  CHEST/LUNG: clear to auscultation bilaterally; no rales, rhonchi, or wheezing b/l  HEART: normal S1, S2  ABDOMEN: BS+, soft, ND, NT   EXTREMITIES:  pulses palpable; no clubbing, cyanosis, or edema b/l LEs  SKIN: no rashes or lesions      LABS:                        10.9   17.06 )-----------( 442      ( 02 Oct 2019 09:48 )             34.3     10-02    149<H>  |  113<H>  |  45<H>  ----------------------------<  119<H>  3.7   |  23  |  1.66<H>    Ca    8.5      02 Oct 2019 09:41 INTERVAL HPI/OVERNIGHT EVENTS:  Pt seen and examined at bedside.     Allergies/Intolerance: No Known Allergies      MEDICATIONS  (STANDING):  ascorbic acid 500 milliGRAM(s) Oral daily  aspirin enteric coated 81 milliGRAM(s) Oral daily  atorvastatin 10 milliGRAM(s) Oral at bedtime  dextrose 5%. 1000 milliLiter(s) (120 mL/Hr) IV Continuous <Continuous>  dextrose 50% Injectable 12.5 Gram(s) IV Push once  dextrose 50% Injectable 25 Gram(s) IV Push once  dextrose 50% Injectable 25 Gram(s) IV Push once  digoxin     Tablet 0.125 milliGRAM(s) Oral daily  furosemide    Tablet 40 milliGRAM(s) Oral daily  heparin  Injectable 5000 Unit(s) SubCutaneous every 12 hours  influenza   Vaccine 0.5 milliLiter(s) IntraMuscular once  insulin glargine Injectable (LANTUS) 15 Unit(s) SubCutaneous at bedtime  insulin lispro (HumaLOG) corrective regimen sliding scale   SubCutaneous three times a day before meals  insulin lispro (HumaLOG) corrective regimen sliding scale   SubCutaneous at bedtime  metoprolol succinate ER 25 milliGRAM(s) Oral daily  multivitamin 1 Tablet(s) Oral daily  piperacillin/tazobactam IVPB.. 3.375 Gram(s) IV Intermittent every 8 hours  potassium chloride    Tablet ER 20 milliEquivalent(s) Oral two times a day    MEDICATIONS  (PRN):  dextrose 40% Gel 15 Gram(s) Oral once PRN Blood Glucose LESS THAN 70 milliGRAM(s)/deciliter  glucagon  Injectable 1 milliGRAM(s) IntraMuscular once PRN Glucose LESS THAN 70 milligrams/deciliter        ROS: all systems reviewed and wnl      PHYSICAL EXAMINATION:  Vital Signs Last 24 Hrs  T(C): 36.7 (03 Oct 2019 04:21), Max: 36.7 (02 Oct 2019 20:01)  T(F): 98 (03 Oct 2019 04:21), Max: 98.1 (02 Oct 2019 20:01)  HR: 87 (03 Oct 2019 04:21) (80 - 100)  BP: 112/65 (03 Oct 2019 04:21) (85/55 - 112/65)  BP(mean): --  RR: 18 (03 Oct 2019 04:21) (18 - 18)  SpO2: 98% (03 Oct 2019 04:21) (98% - 100%)  CAPILLARY BLOOD GLUCOSE      POCT Blood Glucose.: 121 mg/dL (03 Oct 2019 08:27)  POCT Blood Glucose.: 203 mg/dL (02 Oct 2019 22:41)  POCT Blood Glucose.: 189 mg/dL (02 Oct 2019 21:31)  POCT Blood Glucose.: 166 mg/dL (02 Oct 2019 17:13)  POCT Blood Glucose.: 133 mg/dL (02 Oct 2019 12:21)      10-02 @ 07:01  -  10-03 @ 07:00  --------------------------------------------------------  IN: 1060 mL / OUT: 1080 mL / NET: -20 mL        GENERAL: stable in bed, no new complaints  NECK: supple, No JVD  CHEST/LUNG: clear to auscultation bilaterally; no rales, rhonchi, or wheezing b/l  HEART: normal S1, S2  ABDOMEN: BS+, soft, ND, NT   EXTREMITIES:  pulses palpable; no clubbing, cyanosis, or edema b/l LEs  left AKA site healing well, staples in place  SKIN: no rashes or lesions      LABS:                        10.9   17.06 )-----------( 442      ( 02 Oct 2019 09:48 )             34.3     10-02    149<H>  |  113<H>  |  45<H>  ----------------------------<  119<H>  3.7   |  23  |  1.66<H>    Ca    8.5      02 Oct 2019 09:41

## 2019-10-03 NOTE — PROGRESS NOTE ADULT - ASSESSMENT
68 yo male PMH DM(II), PAD s/p left BKA and prior left leg fem-pop, BIBEMS from facility for wound evaluation. Patient recently received left BKA in July 2019 in [facility], where he was discharged to rehab. Was sent from facility today for concerns of improper wound healing, drainage, and bleeding. Patient otherwise had no complaints, but endorses LLE pain near wound site.    Vascular: Ortho and Vascular notes reviewed. Will cancel MRI of right foot as likely OM, foul smelling, not likely to . Not able to complete MRI due to agitation. Elevated ESR of 84 and elevated CRP noted on arrival all consistent with infection. Continue ASA 81 mg/day. Add Lipitor 10 mg/day. Agree with stopping IV Vancomycin. Vascular does not recommend right AKA surgery. Vascular surgery has documented no pulses in right foot. Had necrotic toes right foot on arrival. Stop ABX on discharge.          Endo: Resume Lantus, increase to  15 units at night. BS controlled 204 today. Decrease Lantus dose by 50 % when NPO for surgery.       CV: Hold Cozaar today given lower BP. Continue Toprol XL 25 mg/day. BP stable 114/76. Hold Lasix. Continue  Digoxin .125 mg/day. Remove Fletcher. Comfortable on RA.      Will Speak to nephew again by phone today about plan of care and recommendations, [phone].     Patient is cleared for right AKA. HGB stable 10.2, creatinine normal .85. TTE reviewed from earlier this year. Stable for right AKA  surgery when Vascular surgery can schedule. Had left leg AKA 1 week ago without difficulty.      Mild VINAY today, creatinine 1.66. Stop Lasix, IVF for 24 hours.

## 2019-10-04 DIAGNOSIS — Z51.5 ENCOUNTER FOR PALLIATIVE CARE: ICD-10-CM

## 2019-10-04 DIAGNOSIS — F05 DELIRIUM DUE TO KNOWN PHYSIOLOGICAL CONDITION: ICD-10-CM

## 2019-10-04 DIAGNOSIS — I96 GANGRENE, NOT ELSEWHERE CLASSIFIED: ICD-10-CM

## 2019-10-04 DIAGNOSIS — I73.9 PERIPHERAL VASCULAR DISEASE, UNSPECIFIED: ICD-10-CM

## 2019-10-04 LAB
ANION GAP SERPL CALC-SCNC: 10 MMOL/L — SIGNIFICANT CHANGE UP (ref 5–17)
BUN SERPL-MCNC: 36 MG/DL — HIGH (ref 7–23)
CALCIUM SERPL-MCNC: 8.3 MG/DL — LOW (ref 8.4–10.5)
CHLORIDE SERPL-SCNC: 117 MMOL/L — HIGH (ref 96–108)
CO2 SERPL-SCNC: 24 MMOL/L — SIGNIFICANT CHANGE UP (ref 22–31)
CREAT SERPL-MCNC: 1.21 MG/DL — SIGNIFICANT CHANGE UP (ref 0.5–1.3)
GLUCOSE BLDC GLUCOMTR-MCNC: 129 MG/DL — HIGH (ref 70–99)
GLUCOSE BLDC GLUCOMTR-MCNC: 156 MG/DL — HIGH (ref 70–99)
GLUCOSE BLDC GLUCOMTR-MCNC: 169 MG/DL — HIGH (ref 70–99)
GLUCOSE BLDC GLUCOMTR-MCNC: 279 MG/DL — HIGH (ref 70–99)
GLUCOSE SERPL-MCNC: 148 MG/DL — HIGH (ref 70–99)
HCT VFR BLD CALC: 33.9 % — LOW (ref 39–50)
HGB BLD-MCNC: 10.4 G/DL — LOW (ref 13–17)
MAGNESIUM SERPL-MCNC: 2.2 MG/DL — SIGNIFICANT CHANGE UP (ref 1.6–2.6)
MCHC RBC-ENTMCNC: 27.6 PG — SIGNIFICANT CHANGE UP (ref 27–34)
MCHC RBC-ENTMCNC: 30.7 GM/DL — LOW (ref 32–36)
MCV RBC AUTO: 89.9 FL — SIGNIFICANT CHANGE UP (ref 80–100)
NRBC # BLD: 0 /100 WBCS — SIGNIFICANT CHANGE UP (ref 0–0)
PHOSPHATE SERPL-MCNC: 2.5 MG/DL — SIGNIFICANT CHANGE UP (ref 2.5–4.5)
PLATELET # BLD AUTO: 385 K/UL — SIGNIFICANT CHANGE UP (ref 150–400)
POTASSIUM SERPL-MCNC: 3.1 MMOL/L — LOW (ref 3.5–5.3)
POTASSIUM SERPL-SCNC: 3.1 MMOL/L — LOW (ref 3.5–5.3)
RBC # BLD: 3.77 M/UL — LOW (ref 4.2–5.8)
RBC # FLD: 15.6 % — HIGH (ref 10.3–14.5)
SODIUM SERPL-SCNC: 151 MMOL/L — HIGH (ref 135–145)
WBC # BLD: 12.87 K/UL — HIGH (ref 3.8–10.5)
WBC # FLD AUTO: 12.87 K/UL — HIGH (ref 3.8–10.5)

## 2019-10-04 PROCEDURE — 99223 1ST HOSP IP/OBS HIGH 75: CPT | Mod: GC

## 2019-10-04 RX ORDER — POTASSIUM CHLORIDE 20 MEQ
40 PACKET (EA) ORAL EVERY 4 HOURS
Refills: 0 | Status: COMPLETED | OUTPATIENT
Start: 2019-10-04 | End: 2019-10-04

## 2019-10-04 RX ORDER — SODIUM CHLORIDE 9 MG/ML
1000 INJECTION, SOLUTION INTRAVENOUS
Refills: 0 | Status: DISCONTINUED | OUTPATIENT
Start: 2019-10-04 | End: 2019-10-07

## 2019-10-04 RX ORDER — INSULIN GLARGINE 100 [IU]/ML
10 INJECTION, SOLUTION SUBCUTANEOUS AT BEDTIME
Refills: 0 | Status: DISCONTINUED | OUTPATIENT
Start: 2019-10-04 | End: 2019-10-04

## 2019-10-04 RX ORDER — INSULIN GLARGINE 100 [IU]/ML
16 INJECTION, SOLUTION SUBCUTANEOUS AT BEDTIME
Refills: 0 | Status: DISCONTINUED | OUTPATIENT
Start: 2019-10-04 | End: 2019-10-06

## 2019-10-04 RX ORDER — METRONIDAZOLE 500 MG
500 TABLET ORAL ONCE
Refills: 0 | Status: COMPLETED | OUTPATIENT
Start: 2019-10-04 | End: 2019-10-04

## 2019-10-04 RX ADMIN — Medication 500 MILLIGRAM(S): at 13:00

## 2019-10-04 RX ADMIN — Medication 1 TABLET(S): at 13:00

## 2019-10-04 RX ADMIN — Medication 1: at 22:33

## 2019-10-04 RX ADMIN — INSULIN GLARGINE 16 UNIT(S): 100 INJECTION, SOLUTION SUBCUTANEOUS at 22:35

## 2019-10-04 RX ADMIN — SODIUM CHLORIDE 60 MILLILITER(S): 9 INJECTION, SOLUTION INTRAVENOUS at 13:48

## 2019-10-04 RX ADMIN — Medication 25 MILLIGRAM(S): at 05:01

## 2019-10-04 RX ADMIN — Medication 2: at 08:36

## 2019-10-04 RX ADMIN — Medication 40 MILLIEQUIVALENT(S): at 17:44

## 2019-10-04 RX ADMIN — HEPARIN SODIUM 5000 UNIT(S): 5000 INJECTION INTRAVENOUS; SUBCUTANEOUS at 05:01

## 2019-10-04 RX ADMIN — PIPERACILLIN AND TAZOBACTAM 25 GRAM(S): 4; .5 INJECTION, POWDER, LYOPHILIZED, FOR SOLUTION INTRAVENOUS at 05:01

## 2019-10-04 RX ADMIN — HEPARIN SODIUM 5000 UNIT(S): 5000 INJECTION INTRAVENOUS; SUBCUTANEOUS at 17:44

## 2019-10-04 RX ADMIN — Medication 0.12 MILLIGRAM(S): at 05:01

## 2019-10-04 RX ADMIN — SODIUM CHLORIDE 60 MILLILITER(S): 9 INJECTION, SOLUTION INTRAVENOUS at 04:57

## 2019-10-04 RX ADMIN — Medication 2: at 17:43

## 2019-10-04 RX ADMIN — Medication 40 MILLIEQUIVALENT(S): at 13:00

## 2019-10-04 RX ADMIN — Medication 100 MILLIGRAM(S): at 13:48

## 2019-10-04 RX ADMIN — Medication 81 MILLIGRAM(S): at 13:00

## 2019-10-04 NOTE — CONSULT NOTE ADULT - PROBLEM SELECTOR RECOMMENDATION 2
Oriented only to person.  Unclear if at baseline, at present lacks capacity for any medical decision making.  Monitor for constipation, urinary retention, pain, hunger, thirst, etc.  Promote sleep wake cycle and reorientation as indicated.

## 2019-10-04 NOTE — PROGRESS NOTE ADULT - ASSESSMENT
70 yo male with PAD and rt foot gangrene.  S/P left BKA, bloody drainage, now wound is drying up.  DM, gangrene of rt foot, vascular does not want to do rt BKA at this time.  Zosyn stopped, blood cultures negative, no clear indications for antibiotics.  Can follow conservatively, reserve antibiotics for systemic signs of infection.  Agree with cancellation of foot MRI.  [place] guarded

## 2019-10-04 NOTE — CONSULT NOTE ADULT - ATTENDING COMMENTS
Pt seen and examined.  Exam and plan as above. MRI to r/o osteo.  If osteo present, tx would likely be revision BKA by vasc surg.
I have personally seen and examined this patient and agree with the above assessment and plan, which I have reviewed and edited where appropriate.   MOLST to be left for naz Mccallum to review and complete.  Night team may need to assist.  Flagyl spray for odor management of right foot.

## 2019-10-04 NOTE — PROGRESS NOTE ADULT - ASSESSMENT
70 yo male PMH DM(II), PAD s/p left BKA and prior left leg fem-pop, BIBEMS from facility for wound evaluation. Patient recently received left BKA in July 2019 in [facility], where he was discharged to rehab. Was sent from facility today for concerns of improper wound healing, drainage, and bleeding. Patient otherwise had no complaints, but endorses LLE pain near wound site.    Vascular: Ortho and Vascular notes reviewed. Will cancel MRI of right foot as likely OM, foul smelling, not likely to . Not able to complete MRI due to agitation. Elevated ESR of 84 and elevated CRP noted on arrival all consistent with infection. Continue ASA 81 mg/day. Add Lipitor 10 mg/day. Agree with stopping IV Vancomycin. Vascular does not recommend right AKA surgery now. Vascular surgery has documented no pulses in right foot. Had necrotic toes right foot on arrival. Stop Zosyn today. Vascular prefers watchful waiting.           Endo: Decrease Lantus to  10 units at night. BS controlled 204 today. Decrease Lantus dose by 50 % when NPO for surgery.       CV: Hold Cozaar today given lower BP. Continue Toprol XL 25 mg/day. BP stable 114/76. Hold Lasix. Continue  Digoxin .125 mg/day. Remove Fletcher. Comfortable on RA.      Spoke to nephew again by phone today about plan of care and recommendations, [phone]. Will ask Palliative care to eval.      Patient is cleared for right AKA. HGB stable 10.2. TTE reviewed from earlier this year.       Mild VINAY today, creatinine 1.66. Stop Lasix, IVF for 24 hours.

## 2019-10-04 NOTE — CONSULT NOTE ADULT - PROBLEM SELECTOR RECOMMENDATION 3
Complication of diabetes, left BKA converted to AKA, right foot gangrenous.  Management as per medical/vascular surgery team

## 2019-10-04 NOTE — PROGRESS NOTE ADULT - SUBJECTIVE AND OBJECTIVE BOX
INTERVAL HPI/OVERNIGHT EVENTS:  Pt seen and examined at bedside.     Allergies/Intolerance: No Known Allergies      MEDICATIONS  (STANDING):  ascorbic acid 500 milliGRAM(s) Oral daily  aspirin enteric coated 81 milliGRAM(s) Oral daily  atorvastatin 10 milliGRAM(s) Oral at bedtime  dextrose 5%. 1000 milliLiter(s) (120 mL/Hr) IV Continuous <Continuous>  dextrose 50% Injectable 12.5 Gram(s) IV Push once  dextrose 50% Injectable 25 Gram(s) IV Push once  dextrose 50% Injectable 25 Gram(s) IV Push once  digoxin     Tablet 0.125 milliGRAM(s) Oral daily  heparin  Injectable 5000 Unit(s) SubCutaneous every 12 hours  influenza   Vaccine 0.5 milliLiter(s) IntraMuscular once  insulin glargine Injectable (LANTUS) 15 Unit(s) SubCutaneous at bedtime  insulin lispro (HumaLOG) corrective regimen sliding scale   SubCutaneous three times a day before meals  insulin lispro (HumaLOG) corrective regimen sliding scale   SubCutaneous at bedtime  metoprolol succinate ER 25 milliGRAM(s) Oral daily  multivitamin 1 Tablet(s) Oral daily  piperacillin/tazobactam IVPB.. 3.375 Gram(s) IV Intermittent every 8 hours  sodium chloride 0.45%. 1000 milliLiter(s) (60 mL/Hr) IV Continuous <Continuous>    MEDICATIONS  (PRN):  dextrose 40% Gel 15 Gram(s) Oral once PRN Blood Glucose LESS THAN 70 milliGRAM(s)/deciliter  glucagon  Injectable 1 milliGRAM(s) IntraMuscular once PRN Glucose LESS THAN 70 milligrams/deciliter        ROS: all systems reviewed and wnl      PHYSICAL EXAMINATION:  Vital Signs Last 24 Hrs  T(C): 36.3 (04 Oct 2019 04:47), Max: 36.7 (03 Oct 2019 14:37)  T(F): 97.3 (04 Oct 2019 04:47), Max: 98.1 (03 Oct 2019 17:54)  HR: 73 (04 Oct 2019 04:47) (73 - 82)  BP: 116/72 (04 Oct 2019 04:47) (94/56 - 116/72)  BP(mean): --  RR: 18 (04 Oct 2019 04:47) (18 - 18)  SpO2: 95% (04 Oct 2019 04:47) (95% - 97%)  CAPILLARY BLOOD GLUCOSE      POCT Blood Glucose.: 156 mg/dL (04 Oct 2019 08:15)  POCT Blood Glucose.: 115 mg/dL (03 Oct 2019 21:34)  POCT Blood Glucose.: 159 mg/dL (03 Oct 2019 17:30)  POCT Blood Glucose.: 163 mg/dL (03 Oct 2019 12:40)      10-03 @ 07:01  -  10-04 @ 07:00  --------------------------------------------------------  IN: 930 mL / OUT: 725 mL / NET: 205 mL        GENERAL:   NECK: supple, No JVD  CHEST/LUNG: clear to auscultation bilaterally; no rales, rhonchi, or wheezing b/l  HEART: normal S1, S2  ABDOMEN: BS+, soft, ND, NT   EXTREMITIES:  pulses palpable; no clubbing, cyanosis, or edema b/l LEs  SKIN: no rashes or lesions      LABS:                        10.6   16.94 )-----------( 434      ( 03 Oct 2019 08:52 )             35.0     10-02    149<H>  |  113<H>  |  45<H>  ----------------------------<  119<H>  3.7   |  23  |  1.66<H>    Ca    8.5      02 Oct 2019 09:41 INTERVAL HPI/OVERNIGHT EVENTS:  Pt seen and examined at bedside.     Allergies/Intolerance: No Known Allergies      MEDICATIONS  (STANDING):  ascorbic acid 500 milliGRAM(s) Oral daily  aspirin enteric coated 81 milliGRAM(s) Oral daily  atorvastatin 10 milliGRAM(s) Oral at bedtime  dextrose 5%. 1000 milliLiter(s) (120 mL/Hr) IV Continuous <Continuous>  dextrose 50% Injectable 12.5 Gram(s) IV Push once  dextrose 50% Injectable 25 Gram(s) IV Push once  dextrose 50% Injectable 25 Gram(s) IV Push once  digoxin     Tablet 0.125 milliGRAM(s) Oral daily  heparin  Injectable 5000 Unit(s) SubCutaneous every 12 hours  influenza   Vaccine 0.5 milliLiter(s) IntraMuscular once  insulin glargine Injectable (LANTUS) 15 Unit(s) SubCutaneous at bedtime  insulin lispro (HumaLOG) corrective regimen sliding scale   SubCutaneous three times a day before meals  insulin lispro (HumaLOG) corrective regimen sliding scale   SubCutaneous at bedtime  metoprolol succinate ER 25 milliGRAM(s) Oral daily  multivitamin 1 Tablet(s) Oral daily  piperacillin/tazobactam IVPB.. 3.375 Gram(s) IV Intermittent every 8 hours  sodium chloride 0.45%. 1000 milliLiter(s) (60 mL/Hr) IV Continuous <Continuous>    MEDICATIONS  (PRN):  dextrose 40% Gel 15 Gram(s) Oral once PRN Blood Glucose LESS THAN 70 milliGRAM(s)/deciliter  glucagon  Injectable 1 milliGRAM(s) IntraMuscular once PRN Glucose LESS THAN 70 milligrams/deciliter        ROS: all systems reviewed and wnl      PHYSICAL EXAMINATION:  Vital Signs Last 24 Hrs  T(C): 36.3 (04 Oct 2019 04:47), Max: 36.7 (03 Oct 2019 14:37)  T(F): 97.3 (04 Oct 2019 04:47), Max: 98.1 (03 Oct 2019 17:54)  HR: 73 (04 Oct 2019 04:47) (73 - 82)  BP: 116/72 (04 Oct 2019 04:47) (94/56 - 116/72)  BP(mean): --  RR: 18 (04 Oct 2019 04:47) (18 - 18)  SpO2: 95% (04 Oct 2019 04:47) (95% - 97%)  CAPILLARY BLOOD GLUCOSE      POCT Blood Glucose.: 156 mg/dL (04 Oct 2019 08:15)  POCT Blood Glucose.: 115 mg/dL (03 Oct 2019 21:34)  POCT Blood Glucose.: 159 mg/dL (03 Oct 2019 17:30)  POCT Blood Glucose.: 163 mg/dL (03 Oct 2019 12:40)      10-03 @ 07:01  -  10-04 @ 07:00  --------------------------------------------------------  IN: 930 mL / OUT: 725 mL / NET: 205 mL        GENERAL: stable in bed, no fevers, CP or SOB  NECK: supple, No JVD  CHEST/LUNG: clear to auscultation bilaterally; no rales, rhonchi, or wheezing b/l  HEART: normal S1, S2  ABDOMEN: BS+, soft, ND, NT   EXTREMITIES:  pulses palpable; no clubbing, cyanosis, or edema b/l LEs  SKIN: no rashes or lesions      LABS:                        10.6   16.94 )-----------( 434      ( 03 Oct 2019 08:52 )             35.0     10-02    149<H>  |  113<H>  |  45<H>  ----------------------------<  119<H>  3.7   |  23  |  1.66<H>    Ca    8.5      02 Oct 2019 09:41

## 2019-10-04 NOTE — CONSULT NOTE ADULT - ASSESSMENT
70 yo male PMH DM(II), PAD s/p left BKA and prior left leg fem-pop, BIBEMS from facility for wound evaluation. Patient recently received left BKA in July 2019 in [facility], where he was discharged to rehab. Was sent from facility today for concerns of improper wound healing, drainage, and bleeding. Patient otherwise had no complaints, but endorses LLE pain near wound site.    Hospital course notable for L AKA from L BKA for management of infection.  Patient now with gangrene of the right foot, being monitored by vascular surgery.  Patient seen and examined at bedside.  Oriented to person only.   We are called to assist with goals of care

## 2019-10-04 NOTE — CONSULT NOTE ADULT - PROBLEM SELECTOR RECOMMENDATION 4
Spoke with naz Mccallum - works full time and coaches football in the evenings.  He has a good understanding of the medical situation.  He was not able to discuss a MOLST form over the phone but agrees to review the form that will be left at the patient's bedside.  This can be completed with the covering team overnight.

## 2019-10-04 NOTE — CONSULT NOTE ADULT - PROBLEM SELECTOR RECOMMENDATION 9
At present conservatively managed.  Team notes foul smell.  Recommend flagyl either as a spray (easier to apply) or as a topical gel, during wound care.

## 2019-10-04 NOTE — CONSULT NOTE ADULT - SUBJECTIVE AND OBJECTIVE BOX
HPI:  70 yo male PMH DM(II), PAD s/p left BKA and prior left leg fem-pop, BIBEMS from facility for wound evaluation. Patient recently received left BKA in July 2019 in [facility], where he was discharged to rehab. Was sent from facility today for concerns of improper wound healing, drainage, and bleeding. Patient otherwise had no complaints, but endorses LLE pain near wound site.    Denies fevers, cough, LOC, CP, SOB. (17 Sep 2019 19:58)    PERTINENT PM/SXH:   Hyperlipidemia  Hypertension  Diabetes  No pertinent past medical history    H/O mastoidectomy  No significant past surgical history  No significant past surgical history    FAMILY HISTORY:  FHx: diabetes mellitus: In all siblings(2 sisters and 4 brothers)    ITEMS NOT CHECKED ARE NOT PRESENT    SOCIAL HISTORY:   Significant other/partner:  Single [ ]  Children:  [ ]  Zoroastrianism/Spirituality:  Faith  Substance hx:  [ ]   Tobacco hx:  [ ]   Alcohol hx: [ ]   Home Opioid hx:  [ ] I-Stop Reference No:  Living Situation: [ ]Home  [ x]Long term care at the [facility] [ ]Rehab [ ]Other    ADVANCE DIRECTIVES:    DNR  MOLST  [ ]  Living Will  [ ] No  DECISION MAKER(s):  [ ] Health Care Proxy(s)  [ ] Surrogate(s)  [ ] Guardian           Name(s): Nathan Guillen  Phone Number(s): [phone]    BASELINE (I)ADL(s) (prior to admission):  [place]: [ ]Total  [ ] Moderate [x ]Dependent    Allergies    No Known Allergies    Intolerances    MEDICATIONS  (STANDING):  ascorbic acid 500 milliGRAM(s) Oral daily  aspirin enteric coated 81 milliGRAM(s) Oral daily  atorvastatin 10 milliGRAM(s) Oral at bedtime  dextrose 5%. 1000 milliLiter(s) (120 mL/Hr) IV Continuous <Continuous>  dextrose 50% Injectable 12.5 Gram(s) IV Push once  dextrose 50% Injectable 25 Gram(s) IV Push once  dextrose 50% Injectable 25 Gram(s) IV Push once  digoxin     Tablet 0.125 milliGRAM(s) Oral daily  heparin  Injectable 5000 Unit(s) SubCutaneous every 12 hours  influenza   Vaccine 0.5 milliLiter(s) IntraMuscular once  insulin glargine Injectable (LANTUS) 10 Unit(s) SubCutaneous at bedtime  insulin lispro (HumaLOG) corrective regimen sliding scale   SubCutaneous three times a day before meals  insulin lispro (HumaLOG) corrective regimen sliding scale   SubCutaneous at bedtime  metoprolol succinate ER 25 milliGRAM(s) Oral daily  multivitamin 1 Tablet(s) Oral daily    MEDICATIONS  (PRN):  dextrose 40% Gel 15 Gram(s) Oral once PRN Blood Glucose LESS THAN 70 milliGRAM(s)/deciliter  glucagon  Injectable 1 milliGRAM(s) IntraMuscular once PRN Glucose LESS THAN 70 milligrams/deciliter    PRESENT SYMPTOMS: [ ]Unable to obtain due to poor mentation   Source if other than patient:  [ ]Family   [ ]Team     Pain: [ ] yes [ ] no  QOL impact -   Location -                    Aggravating factors -  Quality -  Radiation -  Timing-  Severity (0-10 scale):  Minimal acceptable level (0-10 scale):     PAIN AD Score: O    [email or web address] (press ctrl +  left click to view)    Dyspnea:                           [ ]Mild [ ]Moderate [ ]Severe  Anxiety:                             [ ]Mild [ ]Moderate [ ]Severe  Fatigue:                             [ ]Mild [ ]Moderate [ ]Severe  Nausea:                             [ ]Mild [ ]Moderate [ ]Severe  Loss of appetite:              [ ]Mild [ ]Moderate [ ]Severe  Constipation:                    [ ]Mild [ ]Moderate [ ]Severe    Other Symptoms:  [ ]All other review of systems negative     Karnofsky Performance Score/Palliative Performance Status Version 2:         %    http://npcrc.org/files/news/palliative_performance_scale_ppsv2.pdf  PHYSICAL EXAM:  Vital Signs Last 24 Hrs  T(C): 36.3 (04 Oct 2019 04:47), Max: 36.7 (03 Oct 2019 14:37)  T(F): 97.3 (04 Oct 2019 04:47), Max: 98.1 (03 Oct 2019 17:54)  HR: 73 (04 Oct 2019 04:47) (73 - 82)  BP: 116/72 (04 Oct 2019 04:47) (94/56 - 116/72)  BP(mean): --  RR: 18 (04 Oct 2019 04:47) (18 - 18)  SpO2: 95% (04 Oct 2019 04:47) (95% - 97%) I&O's Summary    03 Oct 2019 07:01  -  04 Oct 2019 07:00  --------------------------------------------------------  IN: 930 mL / OUT: 725 mL / NET: 205 mL    04 Oct 2019 07:01  -  04 Oct 2019 11:12  --------------------------------------------------------  IN: 240 mL / OUT: 0 mL / NET: 240 mL    GENERAL:  [ ]Alert  [ ]Oriented x   [ ]Lethargic  [ ]Cachexia  [ ]Unarousable  [ ]Verbal  [ ]Non-Verbal  Behavioral:   [ ] Anxiety  [ ] Delirium [ ] Agitation [ ] Other  HEENT:  [ ]Normal   [ ]Dry mouth   [ ]ET Tube/Trach  [ ]Oral lesions  PULMONARY:   [ ]Clear [ ]Tachypnea  [ ]Audible excessive secretions   [ ]Rhonchi        [ ]Right [ ]Left [ ]Bilateral  [ ]Crackles        [ ]Right [ ]Left [ ]Bilateral  [ ]Wheezing     [ ]Right [ ]Left [ ]Bilateral  CARDIOVASCULAR:    [ ]Regular [ ]Irregular [ ]Tachy  [ ]Jefferson [ ]Murmur [ ]Other  GASTROINTESTINAL:  [ ]Soft  [ ]Distended   [ ]+BS  [ ]Non tender [ ]Tender  [ ]PEG [ ]OGT/ NGT  Last BM:  fecal incontenence 10/4  GENITOURINARY:  [ ]Normal [ ] Incontinent   [ ]Oliguria/Anuria   [x ]Fletcher  MUSCULOSKELETAL:   [ ]Normal   [ ]Weakness  [x ]Bed/Wheelchair bound [ ]Edema  gangrenous toes right  NEUROLOGIC:   [ ]No focal deficits  [ ] Cognitive impairment  [ ] Dysphagia [ ]Dysarthria [ ] Paresis [ ]Other   SKIN:   [ ]Normal   [ ]Pressure ulcer(s)  [ ]Rash    CRITICAL CARE:  [ ] Shock Present  [ ]Septic [ ]Cardiogenic [ ]Neurologic [ ]Hypovolemic  [ ]  Vasopressors [ ]  Inotropes   [ ] Respiratory failure present [ ] mechanical ventilation [ ] non-invasive ventilatory support [ ] High flow  [ ] Acute  [ ] Chronic [ ] Hypoxic  [ ] Hypercarbic [ ] Other  [ ] Other organ failure     LABS:                        10.6   16.94 )-----------( 434      ( 03 Oct 2019 08:52 )             35.0             RADIOLOGY & ADDITIONAL STUDIES:   < from: Xray Chest 1 View- PORTABLE-Urgent (09.24.19 @ 12:04) >  IMPRESSION:   Redemonstration of small bilateral pleural effusions and pulmonary edema.                LILLIE GAN M.D., RADIOLOGY RESIDENT  This document has been electronically signed.  DOTTIE JONES M.D., ATTENDING RADIOLOGIST  This document has been electronically signed. Sep 24 2019  3:57PM          < end of copied text >  < from: Transthoracic Echocardiogram (07.06.19 @ 09:48) >  Conclusions:  1. Mild mitral regurgitation.  2. Moderately dilated left atrium.  LA volume index = 47  cc/m2.  The interatrial setpum is bowed towards the right  suggesting elevated left atrial pressure.  3. The left ventricle is moderately dilated.  4. There is global hypokineiss with minor regional  variation.  The LVEF about 20-25%.  5. Normal right ventricular size and function.  6. Normal tricuspid valve. No tricuspid regurgitation.  7. There is no pericardial effusion.  8. There is a left pleural effusion.  There are no prior studies available for comparison.  ------------------------------------------------------------------------  Confirmed on  7/6/2019 - 13:40:59 by Marilee Hahn M.D.  ------------------------------------------------------------------------    < end of copied text >    Culture - Abscess with Gram Stain (09.19.19 @ 17:27)    -  Piperacillin/Tazobactam: S <=8    -  Tobramycin: S <=2    -  Levofloxacin: S <=2    -  Meropenem: S <=1    -  Gentamicin: S 4    -  Imipenem: S <=1    -  Ciprofloxacin: S <=1    -  Aztreonam: S <=4    -  Cefepime: S <=2    -  Ceftazidime: S 4    -  Amikacin: S <=16    Specimen Source: .Abscess left foot wound    Culture Results:   Culture yields >4 types of aerobic and/or anaerobic bacteria  Call client services within 7 days if further workup is clinically  indicated.  Culture includes  Numerous Pseudomonas aeruginosa    Organism Identification: Pseudomonas aeruginosa    Organism: Pseudomonas aeruginosa    Method Type: HELEN    PROTEIN CALORIE MALNUTRITION PRESENT: [ ] Yes [ ] No  [ ] PPSV2 < or = to 30% [ ] significant weight loss  [ ] poor nutritional intake [ ] catabolic state [ ] anasarca     Albumin, Serum: 3.1 g/dL (09-17-19 @ 14:47)  Artificial Nutrition [ ]     REFERRALS:   [ ]Chaplaincy  [ ] Hospice  [ ]Child Life  [ ]Social Work  [ ]Case management [ ]Holistic Therapy     Goals of Care Document:   Care Coordination Assessment [C. Provider] (09-18-19 @ 12:01)   Progress Notes - Care Coordination [C. Provider] (10-01-19 @ 15:08) HPI:  68 yo male PMH DM(II), PAD s/p left BKA and prior left leg fem-pop, BIBEMS from facility for wound evaluation. Patient recently received left BKA in July 2019 in [facility], where he was discharged to rehab. Was sent from facility today for concerns of improper wound healing, drainage, and bleeding. Patient otherwise had no complaints, but endorses LLE pain near wound site.    Hospital course notable for L AKA from L BKA for management of infection.  Patient now with gangrene of the right foot, being monitored by vascular surgery.  Patient seen and examined at bedside.    PERTINENT PM/SXH:   Hyperlipidemia  Hypertension  Diabetes  No pertinent past medical history    H/O mastoidectomy  No significant past surgical history    FAMILY HISTORY:  FHx: diabetes mellitus: In all siblings(2 sisters and 4 brothers)    ITEMS NOT CHECKED ARE NOT PRESENT    SOCIAL HISTORY:   Significant other/partner:  Single [ ]  Children:  [ ]  Christian/Spirituality:  Holiness  Substance hx:  [ ]   Tobacco hx:  [ ]   Alcohol hx: [ ]   Home Opioid hx:  [ ] I-Stop Reference No:  Living Situation: [ ]Home  [ x]Long term care at the [facility] [ ]Rehab [ ]Other    ADVANCE DIRECTIVES:    DNR  MOLST  [ ]  Living Will  [x ] No  DECISION MAKER(s):  [ ] Health Care Proxy(s)  [ ] Surrogate(s)  [ ] Guardian           Name(s): Nathan Guillen  Phone Number(s): [phone]    BASELINE (I)ADL(s) (prior to admission):  [place]: [ ]Total  [ ] Moderate [x ]Dependent    Allergies    No Known Allergies    Intolerances    MEDICATIONS  (STANDING):  ascorbic acid 500 milliGRAM(s) Oral daily  aspirin enteric coated 81 milliGRAM(s) Oral daily  atorvastatin 10 milliGRAM(s) Oral at bedtime  dextrose 5%. 1000 milliLiter(s) (120 mL/Hr) IV Continuous <Continuous>  dextrose 50% Injectable 12.5 Gram(s) IV Push once  dextrose 50% Injectable 25 Gram(s) IV Push once  dextrose 50% Injectable 25 Gram(s) IV Push once  digoxin     Tablet 0.125 milliGRAM(s) Oral daily  heparin  Injectable 5000 Unit(s) SubCutaneous every 12 hours  influenza   Vaccine 0.5 milliLiter(s) IntraMuscular once  insulin glargine Injectable (LANTUS) 10 Unit(s) SubCutaneous at bedtime  insulin lispro (HumaLOG) corrective regimen sliding scale   SubCutaneous three times a day before meals  insulin lispro (HumaLOG) corrective regimen sliding scale   SubCutaneous at bedtime  metoprolol succinate ER 25 milliGRAM(s) Oral daily  multivitamin 1 Tablet(s) Oral daily    MEDICATIONS  (PRN):  dextrose 40% Gel 15 Gram(s) Oral once PRN Blood Glucose LESS THAN 70 milliGRAM(s)/deciliter  glucagon  Injectable 1 milliGRAM(s) IntraMuscular once PRN Glucose LESS THAN 70 milligrams/deciliter    PRESENT SYMPTOMS: [ ]Unable to obtain due to poor mentation   Source if other than patient:  [ ]Family   [ ]Team     Pain: [ ] yes [x ] no  QOL impact -   Location -                    Aggravating factors -  Quality -  Radiation -  Timing-  Severity (0-10 scale):  Minimal acceptable level (0-10 scale):     PAIN AD Score: O    http://[email or web address]/[email or web address] (press ctrl +  left click to view)    Dyspnea:                           [ ]Mild [ ]Moderate [ ]Severe  Anxiety:                             [ ]Mild [ ]Moderate [ ]Severe  Fatigue:                             [ ]Mild [ ]Moderate [ ]Severe  Nausea:                             [ ]Mild [ ]Moderate [ ]Severe  Loss of appetite:              [ ]Mild [ ]Moderate [ ]Severe  Constipation:                    [ ]Mild [ ]Moderate [ ]Severe    Other Symptoms:  [ ]All other review of systems negative     Karnofsky Performance Score/Palliative Performance Status Version 2:   40      %    http://npcrc.org/files/news/palliative_performance_scale_ppsv2.pdf  PHYSICAL EXAM:  Vital Signs Last 24 Hrs  T(C): 36.3 (04 Oct 2019 04:47), Max: 36.7 (03 Oct 2019 14:37)  T(F): 97.3 (04 Oct 2019 04:47), Max: 98.1 (03 Oct 2019 17:54)  HR: 73 (04 Oct 2019 04:47) (73 - 82)  BP: 116/72 (04 Oct 2019 04:47) (94/56 - 116/72)  BP(mean): --  RR: 18 (04 Oct 2019 04:47) (18 - 18)  SpO2: 95% (04 Oct 2019 04:47) (95% - 97%) I&O's Summary    03 Oct 2019 07:01  -  04 Oct 2019 07:00  --------------------------------------------------------  IN: 930 mL / OUT: 725 mL / NET: 205 mL    04 Oct 2019 07:01  -  04 Oct 2019 11:12  --------------------------------------------------------  IN: 240 mL / OUT: 0 mL / NET: 240 mL    GENERAL:  [x ]Alert  [x ]Oriented x 1  [ ]Lethargic  [ ]Cachexia  [ ]Unarousable  [s ]Verbal  [ ]Non-Verbal  Behavioral:   [ ] Anxiety  [s ] Delirium [ ] Agitation [ ] Other  HEENT:  [ s]Normal   [ ]Dry mouth   [ ]ET Tube/Trach  [ ]Oral lesions  PULMONARY:   [s ]Clear [ ]Tachypnea  [ ]Audible excessive secretions   [ ]Rhonchi        [ ]Right [ ]Left [ ]Bilateral  [ ]Crackles        [ ]Right [ ]Left [ ]Bilateral  [ ]Wheezing     [ ]Right [ ]Left [ ]Bilateral  CARDIOVASCULAR:    [s ]Regular [ ]Irregular [ ]Tachy  [ ]Jefferson [ ]Murmur [ ]Other +S1 +S2   GASTROINTESTINAL:  [ x]Soft  [ ]Distended   [x ]+BS  [x ]Non tender [ ]Tender  [ ]PEG [ ]OGT/ NGT  Last BM:  fecal incontenence 10/4  GENITOURINARY:  [ ]Normal [ ] Incontinent   [ ]Oliguria/Anuria   [x ]Fletcher  MUSCULOSKELETAL:   [ ]Normal   [ ]Weakness  [x ]Bed/Wheelchair bound [ ]Edema  gangrenous toes right (first and fifth) with dressing, left AKA with dressing  NEUROLOGIC:   [ ]No focal deficits  [x ] Cognitive impairment  [ ] Dysphagia [ ]Dysarthria [ ] Paresis [ ]Other Possible delirium  SKIN:   [ ]Normal   [ ]Pressure ulcer(s)  [ ]Rash     CRITICAL CARE:  [ ] Shock Present  [ ]Septic [ ]Cardiogenic [ ]Neurologic [ ]Hypovolemic  [ ]  Vasopressors [ ]  Inotropes   [ ] Respiratory failure present [ ] mechanical ventilation [ ] non-invasive ventilatory support [ ] High flow  [ ] Acute  [ ] Chronic [ ] Hypoxic  [ ] Hypercarbic [ ] Other  [ ] Other organ failure     LABS:                        10.6   16.94 )-----------( 434      ( 03 Oct 2019 08:52 )             35.0             RADIOLOGY & ADDITIONAL STUDIES:   < from: Xray Chest 1 View- PORTABLE-Urgent (09.24.19 @ 12:04) >  IMPRESSION:   Redemonstration of small bilateral pleural effusions and pulmonary edema.                LILLIE GAN M.D., RADIOLOGY RESIDENT  This document has been electronically signed.  DOTTIE JONES M.D., ATTENDING RADIOLOGIST  This document has been electronically signed. Sep 24 2019  3:57PM          < end of copied text >  < from: Transthoracic Echocardiogram (07.06.19 @ 09:48) >  Conclusions:  1. Mild mitral regurgitation.  2. Moderately dilated left atrium.  LA volume index = 47  cc/m2.  The interatrial setpum is bowed towards the right  suggesting elevated left atrial pressure.  3. The left ventricle is moderately dilated.  4. There is global hypokineiss with minor regional  variation.  The LVEF about 20-25%.  5. Normal right ventricular size and function.  6. Normal tricuspid valve. No tricuspid regurgitation.  7. There is no pericardial effusion.  8. There is a left pleural effusion.  There are no prior studies available for comparison.  ------------------------------------------------------------------------  Confirmed on  7/6/2019 - 13:40:59 by Marilee Hahn M.D.  ------------------------------------------------------------------------    < end of copied text >    Culture - Abscess with Gram Stain (09.19.19 @ 17:27)    -  Piperacillin/Tazobactam: S <=8    -  Tobramycin: S <=2    -  Levofloxacin: S <=2    -  Meropenem: S <=1    -  Gentamicin: S 4    -  Imipenem: S <=1    -  Ciprofloxacin: S <=1    -  Aztreonam: S <=4    -  Cefepime: S <=2    -  Ceftazidime: S 4    -  Amikacin: S <=16    Specimen Source: .Abscess left foot wound    Culture Results:   Culture yields >4 types of aerobic and/or anaerobic bacteria  Call client services within 7 days if further workup is clinically  indicated.  Culture includes  Numerous Pseudomonas aeruginosa    Organism Identification: Pseudomonas aeruginosa    Organism: Pseudomonas aeruginosa    Method Type: HELEN    PROTEIN CALORIE MALNUTRITION PRESENT: [ ] Yes [ ] No  [ ] PPSV2 < or = to 30% [ ] significant weight loss  [ ] poor nutritional intake [ ] catabolic state [ ] anasarca     Albumin, Serum: 3.1 g/dL (09-17-19 @ 14:47)  Artificial Nutrition [ ]     REFERRALS:   [ ]Chaplaincy  [ ] Hospice  [ ]Child Life  [ ]Social Work  [ ]Case management [ ]Holistic Therapy     Goals of Care Document:   Care Coordination Assessment [C. Provider] (09-18-19 @ 12:01)   Progress Notes - Care Coordination [C. Provider] (10-01-19 @ 15:08)

## 2019-10-05 LAB
ANION GAP SERPL CALC-SCNC: 10 MMOL/L — SIGNIFICANT CHANGE UP (ref 5–17)
BUN SERPL-MCNC: 30 MG/DL — HIGH (ref 7–23)
CALCIUM SERPL-MCNC: 8.7 MG/DL — SIGNIFICANT CHANGE UP (ref 8.4–10.5)
CHLORIDE SERPL-SCNC: 115 MMOL/L — HIGH (ref 96–108)
CO2 SERPL-SCNC: 24 MMOL/L — SIGNIFICANT CHANGE UP (ref 22–31)
CREAT SERPL-MCNC: 1.05 MG/DL — SIGNIFICANT CHANGE UP (ref 0.5–1.3)
GLUCOSE BLDC GLUCOMTR-MCNC: 106 MG/DL — HIGH (ref 70–99)
GLUCOSE BLDC GLUCOMTR-MCNC: 110 MG/DL — HIGH (ref 70–99)
GLUCOSE BLDC GLUCOMTR-MCNC: 205 MG/DL — HIGH (ref 70–99)
GLUCOSE BLDC GLUCOMTR-MCNC: 88 MG/DL — SIGNIFICANT CHANGE UP (ref 70–99)
GLUCOSE SERPL-MCNC: 87 MG/DL — SIGNIFICANT CHANGE UP (ref 70–99)
HCT VFR BLD CALC: 36.3 % — LOW (ref 39–50)
HGB BLD-MCNC: 10.8 G/DL — LOW (ref 13–17)
MCHC RBC-ENTMCNC: 26.9 PG — LOW (ref 27–34)
MCHC RBC-ENTMCNC: 29.8 GM/DL — LOW (ref 32–36)
MCV RBC AUTO: 90.3 FL — SIGNIFICANT CHANGE UP (ref 80–100)
NRBC # BLD: 0 /100 WBCS — SIGNIFICANT CHANGE UP (ref 0–0)
PLATELET # BLD AUTO: 409 K/UL — HIGH (ref 150–400)
POTASSIUM SERPL-MCNC: 3.8 MMOL/L — SIGNIFICANT CHANGE UP (ref 3.5–5.3)
POTASSIUM SERPL-SCNC: 3.8 MMOL/L — SIGNIFICANT CHANGE UP (ref 3.5–5.3)
RBC # BLD: 4.02 M/UL — LOW (ref 4.2–5.8)
RBC # FLD: 15.7 % — HIGH (ref 10.3–14.5)
SODIUM SERPL-SCNC: 149 MMOL/L — HIGH (ref 135–145)
WBC # BLD: 12 K/UL — HIGH (ref 3.8–10.5)
WBC # FLD AUTO: 12 K/UL — HIGH (ref 3.8–10.5)

## 2019-10-05 RX ORDER — METRONIDAZOLE 500 MG
500 TABLET ORAL
Refills: 0 | Status: DISCONTINUED | OUTPATIENT
Start: 2019-10-05 | End: 2019-10-07

## 2019-10-05 RX ADMIN — Medication 81 MILLIGRAM(S): at 13:14

## 2019-10-05 RX ADMIN — SODIUM CHLORIDE 75 MILLILITER(S): 9 INJECTION, SOLUTION INTRAVENOUS at 05:23

## 2019-10-05 RX ADMIN — HEPARIN SODIUM 5000 UNIT(S): 5000 INJECTION INTRAVENOUS; SUBCUTANEOUS at 18:14

## 2019-10-05 RX ADMIN — HEPARIN SODIUM 5000 UNIT(S): 5000 INJECTION INTRAVENOUS; SUBCUTANEOUS at 05:20

## 2019-10-05 RX ADMIN — Medication 500 MILLIGRAM(S): at 13:14

## 2019-10-05 RX ADMIN — Medication 1 TABLET(S): at 13:14

## 2019-10-05 RX ADMIN — ATORVASTATIN CALCIUM 10 MILLIGRAM(S): 80 TABLET, FILM COATED ORAL at 21:22

## 2019-10-05 RX ADMIN — Medication 25 MILLIGRAM(S): at 05:18

## 2019-10-05 RX ADMIN — Medication 4: at 18:14

## 2019-10-05 RX ADMIN — INSULIN GLARGINE 16 UNIT(S): 100 INJECTION, SOLUTION SUBCUTANEOUS at 22:32

## 2019-10-05 RX ADMIN — Medication 0.12 MILLIGRAM(S): at 05:18

## 2019-10-05 NOTE — PROGRESS NOTE ADULT - ASSESSMENT
69 Year-Old male with L BKA wound dehiscence and   infected right foot\    		  70 yo male     PMH,    DM(II),    HTN. CVA,   HLD,  Cardiomyopathy,   PAD s/p left BKA and prior left leg fem-pop,      BIBEMS from facility for wound evaluation.   s/p   left BKA in July 2019 in [facility]   CT of left limb shows no abscess or OM.   Elevated ESR of 84 and elevated CRP noted on arrival.     Right foot not salvageable per  podiatry  and vascular   plan, was  right BKA vs AKA, however now  no  intervention per  vascular   Continue   dressing,  of  left   leg  stump    stopped  zosyn/ vanco     DM,   on  Lantus  / humalog   at home on ,  Cozaar m Toprol XL , Lasix , Digoxin/ asa, lipitor    s/p  acute  systolic  chf/ was  on iv lasix/ not in fluid  overload  at present  left AKA  on 9/ 24/ 19  seen by palliative care/  hcp has not signed  molst form yet       < from: Transthoracic Echocardiogram (07.06.19 @ 09:48) >  onclusions:  1. Mild mitral regurgitation.  2. Moderately dilated left atrium.  LA volume index = 47  cc/m2.  The interatrial setpum is bowed towards the right  suggesting elevated left atrial pressure.  3. The left ventricle is moderately dilated.  4. There is global hypokineiss with minor regional  variation.  The LVEF about 20-25%.  5. Normal right ventricular size and function.  6. Normal tricuspid valve. No tricuspid regurgitation.  7. There is no pericardial effusion.  8. There is a left pleural effusion.  < end of copied text >

## 2019-10-05 NOTE — PROGRESS NOTE ADULT - SUBJECTIVE AND OBJECTIVE BOX
CC: f/u for gangrene right foot , left stump infection    Patient reports    REVIEW OF SYSTEMS:  All other review of systems negative (Comprehensive ROS)    Antimicrobials Day #  :  metroNIDAZOLE  IVPB 500 milliGRAM(s) IV Intermittent <User Schedule>    Other Medications Reviewed    T(F): 97.4 (10-05-19 @ 20:20), Max: 98.4 (10-05-19 @ 11:33)  HR: 72 (10-05-19 @ 20:20)  BP: 123/77 (10-05-19 @ 20:20)  RR: 18 (10-05-19 @ 20:20)  SpO2: 100% (10-05-19 @ 20:20)  Wt(kg): --    PHYSICAL EXAM:  General: sleepy no acute distress  Eyes:  anicteric, no conjunctival injection, no discharge  Oropharynx: no lesions or injection 	  Neck: supple, without adenopathy  Lungs: clear to auscultation  Heart: regular rate and rhythm; no murmur, rubs or gallops  Abdomen: soft, nondistended, nontender, without mass or organomegaly  Skin: no lesions  Extremities: mummified right foot, clean stump left aka  Neurologic: sleeping     LAB RESULTS:                        10.8   12.00 )-----------( 409      ( 05 Oct 2019 10:14 )             36.3     10-05    149<H>  |  115<H>  |  30<H>  ----------------------------<  87  3.8   |  24  |  1.05    Ca    8.7      05 Oct 2019 10:13  Phos  2.5     10-04  Mg     2.2     10-04          MICROBIOLOGY:  RECENT CULTURES:  10-01 @ 18:10 .Blood     No growth to date.          RADIOLOGY REVIEWED:    < from: Xray Chest 1 View- PORTABLE-Urgent (09.24.19 @ 12:04) >    EXAM:  XR CHEST PORTABLE URGENT 1V                            PROCEDURE DATE:  09/24/2019            INTERPRETATION:    INDICATION: Shortness of breath, reevaluate for resolution of congestive   heart failure    TECHNIQUE: Chest x-ray, AP view    COMPARISON: Chest x-ray 9/20/2019, chest x-ray 8/2/2019    FINDINGS:  Redemonstration of small bilateral pleural effusions and pulmonary edema.    Cardiac silhouette cannot be adequately assessed on this projection.    No acute osseous abnormalities.    IMPRESSION:   Redemonstration of small bilateral pleural effusions and pulmonary edema.      < end of copied text >      Assessment:  Patient with pvd, left bka revised to aka for poor healing now with dry gangrene right foot.  Plan:  monitor off systemic antibiotics  call if further input is needed

## 2019-10-05 NOTE — PROGRESS NOTE ADULT - SUBJECTIVE AND OBJECTIVE BOX
foul odor  from foot    REVIEW OF SYSTEMS:  GEN: no fever,    no chills  RESP: no SOB,   no cough  CVS: no chest pain,   no palpitations  GI: no abdominal pain,   no nausea,   no vomiting,   no constipation,   no diarrhea  : no dysuria,   no frequency  NEURO: no headache,   no dizziness  PSYCH: no depression,   not anxious  Derm : no rash    MEDICATIONS  (STANDING):  ascorbic acid 500 milliGRAM(s) Oral daily  aspirin enteric coated 81 milliGRAM(s) Oral daily  atorvastatin 10 milliGRAM(s) Oral at bedtime  dextrose 5%. 1000 milliLiter(s) (120 mL/Hr) IV Continuous <Continuous>  dextrose 5%. 1000 milliLiter(s) (75 mL/Hr) IV Continuous <Continuous>  dextrose 50% Injectable 12.5 Gram(s) IV Push once  dextrose 50% Injectable 25 Gram(s) IV Push once  dextrose 50% Injectable 25 Gram(s) IV Push once  digoxin     Tablet 0.125 milliGRAM(s) Oral daily  heparin  Injectable 5000 Unit(s) SubCutaneous every 12 hours  influenza   Vaccine 0.5 milliLiter(s) IntraMuscular once  insulin glargine Injectable (LANTUS) 16 Unit(s) SubCutaneous at bedtime  insulin lispro (HumaLOG) corrective regimen sliding scale   SubCutaneous three times a day before meals  insulin lispro (HumaLOG) corrective regimen sliding scale   SubCutaneous at bedtime  metoprolol succinate ER 25 milliGRAM(s) Oral daily  multivitamin 1 Tablet(s) Oral daily    MEDICATIONS  (PRN):  dextrose 40% Gel 15 Gram(s) Oral once PRN Blood Glucose LESS THAN 70 milliGRAM(s)/deciliter  glucagon  Injectable 1 milliGRAM(s) IntraMuscular once PRN Glucose LESS THAN 70 milligrams/deciliter      Vital Signs Last 24 Hrs  T(C): 36.5 (05 Oct 2019 04:40), Max: 36.6 (04 Oct 2019 22:49)  T(F): 97.7 (05 Oct 2019 04:40), Max: 97.8 (04 Oct 2019 22:49)  HR: 68 (05 Oct 2019 04:40) (68 - 77)  BP: 120/72 (05 Oct 2019 04:40) (105/70 - 150/73)  BP(mean): --  RR: 17 (05 Oct 2019 04:40) (17 - 28)  SpO2: 94% (05 Oct 2019 04:40) (89% - 94%)  CAPILLARY BLOOD GLUCOSE      POCT Blood Glucose.: 279 mg/dL (04 Oct 2019 22:22)  POCT Blood Glucose.: 169 mg/dL (04 Oct 2019 17:17)  POCT Blood Glucose.: 129 mg/dL (04 Oct 2019 12:28)  POCT Blood Glucose.: 156 mg/dL (04 Oct 2019 08:15)    I&O's Summary    04 Oct 2019 07:01  -  05 Oct 2019 07:00  --------------------------------------------------------  IN: 1800 mL / OUT: 0 mL / NET: 1800 mL        PHYSICAL EXAM:  HEAD:  Atraumatic, Normocephalic  NECK: Supple, No   JVD  CHEST/LUNG:   no     rales,     no,    rhonchi  HEART: Regular rate and rhythm;         murmur  ABDOMEN: Soft, Nontender, ;   EXTREMITIES:    gangrene. right  foot/  left leg stump    LABS:                        10.4   12.87 )-----------( 385      ( 04 Oct 2019 11:25 )             33.9     10-04    151<H>  |  117<H>  |  36<H>  ----------------------------<  148<H>  3.1<L>   |  24  |  1.21    Ca    8.3<L>      04 Oct 2019 11:25  Phos  2.5     10-04  Mg     2.2     10-04                    Hemoglobin A1C, Whole Blood: 6.9 % (09-18 @ 11:08)    Thyroid Stimulating Hormone, Serum: 3.57 uIU/mL (07-10 @ 13:02)          Consultant(s) Notes Reviewed:      Care Discussed with Consultants/Other Providers:

## 2019-10-06 LAB
ANION GAP SERPL CALC-SCNC: 11 MMOL/L — SIGNIFICANT CHANGE UP (ref 5–17)
BUN SERPL-MCNC: 22 MG/DL — SIGNIFICANT CHANGE UP (ref 7–23)
CALCIUM SERPL-MCNC: 8 MG/DL — LOW (ref 8.4–10.5)
CHLORIDE SERPL-SCNC: 111 MMOL/L — HIGH (ref 96–108)
CO2 SERPL-SCNC: 22 MMOL/L — SIGNIFICANT CHANGE UP (ref 22–31)
CREAT SERPL-MCNC: 0.93 MG/DL — SIGNIFICANT CHANGE UP (ref 0.5–1.3)
CULTURE RESULTS: SIGNIFICANT CHANGE UP
CULTURE RESULTS: SIGNIFICANT CHANGE UP
GLUCOSE BLDC GLUCOMTR-MCNC: 116 MG/DL — HIGH (ref 70–99)
GLUCOSE BLDC GLUCOMTR-MCNC: 149 MG/DL — HIGH (ref 70–99)
GLUCOSE BLDC GLUCOMTR-MCNC: 175 MG/DL — HIGH (ref 70–99)
GLUCOSE BLDC GLUCOMTR-MCNC: 178 MG/DL — HIGH (ref 70–99)
GLUCOSE BLDC GLUCOMTR-MCNC: 224 MG/DL — HIGH (ref 70–99)
GLUCOSE BLDC GLUCOMTR-MCNC: 43 MG/DL — CRITICAL LOW (ref 70–99)
GLUCOSE BLDC GLUCOMTR-MCNC: 57 MG/DL — LOW (ref 70–99)
GLUCOSE SERPL-MCNC: 59 MG/DL — LOW (ref 70–99)
HCT VFR BLD CALC: 33.7 % — LOW (ref 39–50)
HGB BLD-MCNC: 10.2 G/DL — LOW (ref 13–17)
MCHC RBC-ENTMCNC: 27.1 PG — SIGNIFICANT CHANGE UP (ref 27–34)
MCHC RBC-ENTMCNC: 30.3 GM/DL — LOW (ref 32–36)
MCV RBC AUTO: 89.4 FL — SIGNIFICANT CHANGE UP (ref 80–100)
NRBC # BLD: 0 /100 WBCS — SIGNIFICANT CHANGE UP (ref 0–0)
PLATELET # BLD AUTO: 339 K/UL — SIGNIFICANT CHANGE UP (ref 150–400)
POTASSIUM SERPL-MCNC: 3.5 MMOL/L — SIGNIFICANT CHANGE UP (ref 3.5–5.3)
POTASSIUM SERPL-SCNC: 3.5 MMOL/L — SIGNIFICANT CHANGE UP (ref 3.5–5.3)
RBC # BLD: 3.77 M/UL — LOW (ref 4.2–5.8)
RBC # FLD: 15.7 % — HIGH (ref 10.3–14.5)
SODIUM SERPL-SCNC: 142 MMOL/L — SIGNIFICANT CHANGE UP (ref 135–145)
SPECIMEN SOURCE: SIGNIFICANT CHANGE UP
SPECIMEN SOURCE: SIGNIFICANT CHANGE UP
WBC # BLD: 13.56 K/UL — HIGH (ref 3.8–10.5)
WBC # FLD AUTO: 13.56 K/UL — HIGH (ref 3.8–10.5)

## 2019-10-06 RX ORDER — POTASSIUM CHLORIDE 20 MEQ
20 PACKET (EA) ORAL ONCE
Refills: 0 | Status: COMPLETED | OUTPATIENT
Start: 2019-10-06 | End: 2019-10-06

## 2019-10-06 RX ORDER — DEXTROSE 50 % IN WATER 50 %
15 SYRINGE (ML) INTRAVENOUS ONCE
Refills: 0 | Status: COMPLETED | OUTPATIENT
Start: 2019-10-06 | End: 2019-10-06

## 2019-10-06 RX ADMIN — Medication 15 GRAM(S): at 09:25

## 2019-10-06 RX ADMIN — HEPARIN SODIUM 5000 UNIT(S): 5000 INJECTION INTRAVENOUS; SUBCUTANEOUS at 05:37

## 2019-10-06 RX ADMIN — HEPARIN SODIUM 5000 UNIT(S): 5000 INJECTION INTRAVENOUS; SUBCUTANEOUS at 18:27

## 2019-10-06 RX ADMIN — Medication 500 MILLIGRAM(S): at 12:59

## 2019-10-06 RX ADMIN — Medication 4: at 18:27

## 2019-10-06 RX ADMIN — SODIUM CHLORIDE 100 MILLILITER(S): 9 INJECTION, SOLUTION INTRAVENOUS at 10:19

## 2019-10-06 RX ADMIN — Medication 0.12 MILLIGRAM(S): at 05:37

## 2019-10-06 RX ADMIN — Medication 81 MILLIGRAM(S): at 12:59

## 2019-10-06 RX ADMIN — Medication 2: at 12:57

## 2019-10-06 RX ADMIN — ATORVASTATIN CALCIUM 10 MILLIGRAM(S): 80 TABLET, FILM COATED ORAL at 21:33

## 2019-10-06 RX ADMIN — Medication 1 TABLET(S): at 12:59

## 2019-10-06 RX ADMIN — Medication 100 MILLIGRAM(S): at 16:00

## 2019-10-06 RX ADMIN — Medication 20 MILLIEQUIVALENT(S): at 18:25

## 2019-10-06 NOTE — PROGRESS NOTE ADULT - ASSESSMENT
68 yo male PMH DM(II), PAD s/p left BKA and prior left leg fem-pop, BIBEMS from facility for wound evaluation. Patient recently received left BKA in July 2019 in [facility], where he was discharged to rehab. Was sent from facility today for concerns of improper wound healing, drainage, and bleeding. Patient otherwise had no complaints, but endorses LLE pain near wound site.    Vascular: Vascular notes reviewed. Will cancel MRI of right foot as likely OM, foul smelling, not likely to . Not able to complete MRI due to agitation. Elevated ESR of 84 and elevated CRP noted on arrival all consistent with infection of right leg. Continue ASA 81 mg/day. Add Lipitor 10 mg/day. Agree with stopping IV Vancomycin. Vascular does not recommend right AKA surgery now. Vascular surgery has documented no pulses in right foot. Had necrotic toes right foot on arrival. Stop Zosyn today. Vascular prefers watchful waiting. Flagyl spray to control necrotic odor from right leg.            Endo: Stop Lantus as BS < 100. Increase IV D5W to 100/h for hypernatremia.        CV: Hold Cozaar today given lower BP. Hold Toprol given low BP. Hold Lasix. Continue  Digoxin .125 mg/day. Remove Fletcher. Comfortable on RA.      Spoke to nephew again by phone today about plan of care and recommendations, [phone]. Appreciate Palliative care eval.      Patient is cleared for right AKA. HGB stable 10.2. TTE reviewed from earlier this year.       Mild VINAY today, creatinine 1.66. Stop Lasix, IVF for 24 hours.

## 2019-10-06 NOTE — PROGRESS NOTE ADULT - SUBJECTIVE AND OBJECTIVE BOX
INTERVAL HPI/OVERNIGHT EVENTS:  Pt seen and examined at bedside.     Allergies/Intolerance: No Known Allergies      MEDICATIONS  (STANDING):  ascorbic acid 500 milliGRAM(s) Oral daily  aspirin enteric coated 81 milliGRAM(s) Oral daily  atorvastatin 10 milliGRAM(s) Oral at bedtime  dextrose 5%. 1000 milliLiter(s) (120 mL/Hr) IV Continuous <Continuous>  dextrose 5%. 1000 milliLiter(s) (75 mL/Hr) IV Continuous <Continuous>  dextrose 50% Injectable 12.5 Gram(s) IV Push once  dextrose 50% Injectable 25 Gram(s) IV Push once  dextrose 50% Injectable 25 Gram(s) IV Push once  digoxin     Tablet 0.125 milliGRAM(s) Oral daily  heparin  Injectable 5000 Unit(s) SubCutaneous every 12 hours  influenza   Vaccine 0.5 milliLiter(s) IntraMuscular once  insulin glargine Injectable (LANTUS) 16 Unit(s) SubCutaneous at bedtime  insulin lispro (HumaLOG) corrective regimen sliding scale   SubCutaneous three times a day before meals  insulin lispro (HumaLOG) corrective regimen sliding scale   SubCutaneous at bedtime  metoprolol succinate ER 25 milliGRAM(s) Oral daily  metroNIDAZOLE  IVPB 500 milliGRAM(s) IV Intermittent <User Schedule>  multivitamin 1 Tablet(s) Oral daily    MEDICATIONS  (PRN):  dextrose 40% Gel 15 Gram(s) Oral once PRN Blood Glucose LESS THAN 70 milliGRAM(s)/deciliter  glucagon  Injectable 1 milliGRAM(s) IntraMuscular once PRN Glucose LESS THAN 70 milligrams/deciliter        ROS: all systems reviewed and wnl      PHYSICAL EXAMINATION:  Vital Signs Last 24 Hrs  T(C): 36.3 (06 Oct 2019 04:58), Max: 36.9 (05 Oct 2019 11:33)  T(F): 97.3 (06 Oct 2019 04:58), Max: 98.4 (05 Oct 2019 11:33)  HR: 70 (06 Oct 2019 04:58) (70 - 75)  BP: 103/59 (06 Oct 2019 04:58) (103/59 - 127/86)  BP(mean): --  RR: 17 (06 Oct 2019 04:58) (17 - 18)  SpO2: 98% (06 Oct 2019 04:58) (94% - 100%)  CAPILLARY BLOOD GLUCOSE      POCT Blood Glucose.: 110 mg/dL (05 Oct 2019 22:11)  POCT Blood Glucose.: 205 mg/dL (05 Oct 2019 17:32)  POCT Blood Glucose.: 106 mg/dL (05 Oct 2019 12:40)  POCT Blood Glucose.: 88 mg/dL (05 Oct 2019 08:27)      10-05 @ 07:01  -  10-06 @ 07:00  --------------------------------------------------------  IN: 2195 mL / OUT: 0 mL / NET: 2195 mL        GENERAL:   NECK: supple, No JVD  CHEST/LUNG: clear to auscultation bilaterally; no rales, rhonchi, or wheezing b/l  HEART: normal S1, S2  ABDOMEN: BS+, soft, ND, NT   EXTREMITIES:  pulses palpable; no clubbing, cyanosis, or edema b/l LEs  SKIN: no rashes or lesions      LABS:                        10.8   12.00 )-----------( 409      ( 05 Oct 2019 10:14 )             36.3     10-05    149<H>  |  115<H>  |  30<H>  ----------------------------<  87  3.8   |  24  |  1.05    Ca    8.7      05 Oct 2019 10:13  Phos  2.5     10-04  Mg     2.2     10-04 INTERVAL HPI/OVERNIGHT EVENTS:  Pt seen and examined at bedside.     Allergies/Intolerance: No Known Allergies      MEDICATIONS  (STANDING):  ascorbic acid 500 milliGRAM(s) Oral daily  aspirin enteric coated 81 milliGRAM(s) Oral daily  atorvastatin 10 milliGRAM(s) Oral at bedtime  dextrose 5%. 1000 milliLiter(s) (120 mL/Hr) IV Continuous <Continuous>  dextrose 5%. 1000 milliLiter(s) (75 mL/Hr) IV Continuous <Continuous>  dextrose 50% Injectable 12.5 Gram(s) IV Push once  dextrose 50% Injectable 25 Gram(s) IV Push once  dextrose 50% Injectable 25 Gram(s) IV Push once  digoxin     Tablet 0.125 milliGRAM(s) Oral daily  heparin  Injectable 5000 Unit(s) SubCutaneous every 12 hours  influenza   Vaccine 0.5 milliLiter(s) IntraMuscular once  insulin glargine Injectable (LANTUS) 16 Unit(s) SubCutaneous at bedtime  insulin lispro (HumaLOG) corrective regimen sliding scale   SubCutaneous three times a day before meals  insulin lispro (HumaLOG) corrective regimen sliding scale   SubCutaneous at bedtime  metoprolol succinate ER 25 milliGRAM(s) Oral daily  metroNIDAZOLE  IVPB 500 milliGRAM(s) IV Intermittent <User Schedule>  multivitamin 1 Tablet(s) Oral daily    MEDICATIONS  (PRN):  dextrose 40% Gel 15 Gram(s) Oral once PRN Blood Glucose LESS THAN 70 milliGRAM(s)/deciliter  glucagon  Injectable 1 milliGRAM(s) IntraMuscular once PRN Glucose LESS THAN 70 milligrams/deciliter        ROS: all systems reviewed and wnl      PHYSICAL EXAMINATION:  Vital Signs Last 24 Hrs  T(C): 36.3 (06 Oct 2019 04:58), Max: 36.9 (05 Oct 2019 11:33)  T(F): 97.3 (06 Oct 2019 04:58), Max: 98.4 (05 Oct 2019 11:33)  HR: 70 (06 Oct 2019 04:58) (70 - 75)  BP: 103/59 (06 Oct 2019 04:58) (103/59 - 127/86)  BP(mean): --  RR: 17 (06 Oct 2019 04:58) (17 - 18)  SpO2: 98% (06 Oct 2019 04:58) (94% - 100%)  CAPILLARY BLOOD GLUCOSE      POCT Blood Glucose.: 110 mg/dL (05 Oct 2019 22:11)  POCT Blood Glucose.: 205 mg/dL (05 Oct 2019 17:32)  POCT Blood Glucose.: 106 mg/dL (05 Oct 2019 12:40)  POCT Blood Glucose.: 88 mg/dL (05 Oct 2019 08:27)      10-05 @ 07:01  -  10-06 @ 07:00  --------------------------------------------------------  IN: 2195 mL / OUT: 0 mL / NET: 2195 mL        GENERAL: stable in bed, NAD, south removed, no fevers, baseline dementia  NECK: supple, No JVD  CHEST/LUNG: clear to auscultation bilaterally; no rales, rhonchi, or wheezing b/l  HEART: normal S1, S2  ABDOMEN: BS+, soft, ND, NT   EXTREMITIES:  pulses palpable; no clubbing, cyanosis, or edema b/l LEs  SKIN: no rashes or lesions      LABS:                        10.8   12.00 )-----------( 409      ( 05 Oct 2019 10:14 )             36.3     10-05    149<H>  |  115<H>  |  30<H>  ----------------------------<  87  3.8   |  24  |  1.05    Ca    8.7      05 Oct 2019 10:13  Phos  2.5     10-04  Mg     2.2     10-04

## 2019-10-07 ENCOUNTER — TRANSCRIPTION ENCOUNTER (OUTPATIENT)
Age: 69
End: 2019-10-07

## 2019-10-07 VITALS
OXYGEN SATURATION: 98 % | DIASTOLIC BLOOD PRESSURE: 67 MMHG | RESPIRATION RATE: 18 BRPM | TEMPERATURE: 98 F | SYSTOLIC BLOOD PRESSURE: 120 MMHG | HEART RATE: 98 BPM

## 2019-10-07 LAB
ANION GAP SERPL CALC-SCNC: 11 MMOL/L — SIGNIFICANT CHANGE UP (ref 5–17)
BUN SERPL-MCNC: 17 MG/DL — SIGNIFICANT CHANGE UP (ref 7–23)
CALCIUM SERPL-MCNC: 8.6 MG/DL — SIGNIFICANT CHANGE UP (ref 8.4–10.5)
CHLORIDE SERPL-SCNC: 108 MMOL/L — SIGNIFICANT CHANGE UP (ref 96–108)
CO2 SERPL-SCNC: 19 MMOL/L — LOW (ref 22–31)
CREAT SERPL-MCNC: 0.86 MG/DL — SIGNIFICANT CHANGE UP (ref 0.5–1.3)
GLUCOSE BLDC GLUCOMTR-MCNC: 178 MG/DL — HIGH (ref 70–99)
GLUCOSE BLDC GLUCOMTR-MCNC: 213 MG/DL — HIGH (ref 70–99)
GLUCOSE SERPL-MCNC: 190 MG/DL — HIGH (ref 70–99)
HCT VFR BLD CALC: 40 % — SIGNIFICANT CHANGE UP (ref 39–50)
HGB BLD-MCNC: 11.6 G/DL — LOW (ref 13–17)
MCHC RBC-ENTMCNC: 27 PG — SIGNIFICANT CHANGE UP (ref 27–34)
MCHC RBC-ENTMCNC: 29 GM/DL — LOW (ref 32–36)
MCV RBC AUTO: 93 FL — SIGNIFICANT CHANGE UP (ref 80–100)
PLATELET # BLD AUTO: 320 K/UL — SIGNIFICANT CHANGE UP (ref 150–400)
POTASSIUM SERPL-MCNC: 4.1 MMOL/L — SIGNIFICANT CHANGE UP (ref 3.5–5.3)
POTASSIUM SERPL-SCNC: 4.1 MMOL/L — SIGNIFICANT CHANGE UP (ref 3.5–5.3)
RBC # BLD: 4.3 M/UL — SIGNIFICANT CHANGE UP (ref 4.2–5.8)
RBC # FLD: 15.7 % — HIGH (ref 10.3–14.5)
SODIUM SERPL-SCNC: 138 MMOL/L — SIGNIFICANT CHANGE UP (ref 135–145)
WBC # BLD: 11.44 K/UL — HIGH (ref 3.8–10.5)
WBC # FLD AUTO: 11.44 K/UL — HIGH (ref 3.8–10.5)

## 2019-10-07 PROCEDURE — 86140 C-REACTIVE PROTEIN: CPT

## 2019-10-07 PROCEDURE — 85730 THROMBOPLASTIN TIME PARTIAL: CPT

## 2019-10-07 PROCEDURE — 99285 EMERGENCY DEPT VISIT HI MDM: CPT | Mod: 25

## 2019-10-07 PROCEDURE — 71045 X-RAY EXAM CHEST 1 VIEW: CPT

## 2019-10-07 PROCEDURE — 82565 ASSAY OF CREATININE: CPT

## 2019-10-07 PROCEDURE — 86900 BLOOD TYPING SEROLOGIC ABO: CPT

## 2019-10-07 PROCEDURE — 96374 THER/PROPH/DIAG INJ IV PUSH: CPT | Mod: XU

## 2019-10-07 PROCEDURE — 87070 CULTURE OTHR SPECIMN AEROBIC: CPT

## 2019-10-07 PROCEDURE — 87040 BLOOD CULTURE FOR BACTERIA: CPT

## 2019-10-07 PROCEDURE — 85014 HEMATOCRIT: CPT

## 2019-10-07 PROCEDURE — 83036 HEMOGLOBIN GLYCOSYLATED A1C: CPT

## 2019-10-07 PROCEDURE — 80053 COMPREHEN METABOLIC PANEL: CPT

## 2019-10-07 PROCEDURE — 86850 RBC ANTIBODY SCREEN: CPT

## 2019-10-07 PROCEDURE — 83605 ASSAY OF LACTIC ACID: CPT

## 2019-10-07 PROCEDURE — 80202 ASSAY OF VANCOMYCIN: CPT

## 2019-10-07 PROCEDURE — 82803 BLOOD GASES ANY COMBINATION: CPT

## 2019-10-07 PROCEDURE — 84132 ASSAY OF SERUM POTASSIUM: CPT

## 2019-10-07 PROCEDURE — 96375 TX/PRO/DX INJ NEW DRUG ADDON: CPT | Mod: XU

## 2019-10-07 PROCEDURE — 80061 LIPID PANEL: CPT

## 2019-10-07 PROCEDURE — 82435 ASSAY OF BLOOD CHLORIDE: CPT

## 2019-10-07 PROCEDURE — 94660 CPAP INITIATION&MGMT: CPT

## 2019-10-07 PROCEDURE — 94640 AIRWAY INHALATION TREATMENT: CPT

## 2019-10-07 PROCEDURE — 73630 X-RAY EXAM OF FOOT: CPT

## 2019-10-07 PROCEDURE — 87186 SC STD MICRODIL/AGAR DIL: CPT

## 2019-10-07 PROCEDURE — 87205 SMEAR GRAM STAIN: CPT

## 2019-10-07 PROCEDURE — 85027 COMPLETE CBC AUTOMATED: CPT

## 2019-10-07 PROCEDURE — 93005 ELECTROCARDIOGRAM TRACING: CPT

## 2019-10-07 PROCEDURE — 97530 THERAPEUTIC ACTIVITIES: CPT

## 2019-10-07 PROCEDURE — 82330 ASSAY OF CALCIUM: CPT

## 2019-10-07 PROCEDURE — 88307 TISSUE EXAM BY PATHOLOGIST: CPT

## 2019-10-07 PROCEDURE — 97162 PT EVAL MOD COMPLEX 30 MIN: CPT

## 2019-10-07 PROCEDURE — 82962 GLUCOSE BLOOD TEST: CPT

## 2019-10-07 PROCEDURE — 84295 ASSAY OF SERUM SODIUM: CPT

## 2019-10-07 PROCEDURE — 82947 ASSAY GLUCOSE BLOOD QUANT: CPT

## 2019-10-07 PROCEDURE — 86901 BLOOD TYPING SEROLOGIC RH(D): CPT

## 2019-10-07 PROCEDURE — 73590 X-RAY EXAM OF LOWER LEG: CPT

## 2019-10-07 PROCEDURE — 97110 THERAPEUTIC EXERCISES: CPT

## 2019-10-07 PROCEDURE — 80048 BASIC METABOLIC PNL TOTAL CA: CPT

## 2019-10-07 PROCEDURE — 85610 PROTHROMBIN TIME: CPT

## 2019-10-07 PROCEDURE — 87075 CULTR BACTERIA EXCEPT BLOOD: CPT

## 2019-10-07 PROCEDURE — 73701 CT LOWER EXTREMITY W/DYE: CPT

## 2019-10-07 PROCEDURE — 83735 ASSAY OF MAGNESIUM: CPT

## 2019-10-07 PROCEDURE — 97602 WOUND(S) CARE NON-SELECTIVE: CPT

## 2019-10-07 PROCEDURE — 84100 ASSAY OF PHOSPHORUS: CPT

## 2019-10-07 PROCEDURE — 85652 RBC SED RATE AUTOMATED: CPT

## 2019-10-07 RX ORDER — DIGOXIN 250 MCG
1 TABLET ORAL
Qty: 0 | Refills: 0 | DISCHARGE
Start: 2019-10-07

## 2019-10-07 RX ORDER — METOPROLOL TARTRATE 50 MG
0.5 TABLET ORAL
Qty: 0 | Refills: 0 | DISCHARGE

## 2019-10-07 RX ORDER — INSULIN LISPRO 100/ML
2 VIAL (ML) SUBCUTANEOUS
Qty: 0 | Refills: 0 | DISCHARGE

## 2019-10-07 RX ORDER — ASCORBIC ACID 60 MG
1 TABLET,CHEWABLE ORAL
Qty: 0 | Refills: 0 | DISCHARGE
Start: 2019-10-07

## 2019-10-07 RX ORDER — ATORVASTATIN CALCIUM 80 MG/1
1 TABLET, FILM COATED ORAL
Qty: 0 | Refills: 0 | DISCHARGE
Start: 2019-10-07

## 2019-10-07 RX ADMIN — Medication 2: at 12:47

## 2019-10-07 RX ADMIN — HEPARIN SODIUM 5000 UNIT(S): 5000 INJECTION INTRAVENOUS; SUBCUTANEOUS at 05:46

## 2019-10-07 RX ADMIN — Medication 100 MILLIGRAM(S): at 12:46

## 2019-10-07 RX ADMIN — Medication 4: at 08:57

## 2019-10-07 RX ADMIN — Medication 0.12 MILLIGRAM(S): at 05:46

## 2019-10-07 RX ADMIN — Medication 1 TABLET(S): at 12:47

## 2019-10-07 RX ADMIN — Medication 500 MILLIGRAM(S): at 12:46

## 2019-10-07 RX ADMIN — Medication 81 MILLIGRAM(S): at 12:46

## 2019-10-07 NOTE — PROGRESS NOTE ADULT - SUBJECTIVE AND OBJECTIVE BOX
INTERVAL HPI/OVERNIGHT EVENTS:  Pt seen and examined at bedside.     Allergies/Intolerance: No Known Allergies      MEDICATIONS  (STANDING):  ascorbic acid 500 milliGRAM(s) Oral daily  aspirin enteric coated 81 milliGRAM(s) Oral daily  atorvastatin 10 milliGRAM(s) Oral at bedtime  dextrose 5%. 1000 milliLiter(s) (100 mL/Hr) IV Continuous <Continuous>  dextrose 5%. 1000 milliLiter(s) (120 mL/Hr) IV Continuous <Continuous>  dextrose 50% Injectable 12.5 Gram(s) IV Push once  dextrose 50% Injectable 25 Gram(s) IV Push once  dextrose 50% Injectable 25 Gram(s) IV Push once  digoxin     Tablet 0.125 milliGRAM(s) Oral daily  heparin  Injectable 5000 Unit(s) SubCutaneous every 12 hours  influenza   Vaccine 0.5 milliLiter(s) IntraMuscular once  insulin lispro (HumaLOG) corrective regimen sliding scale   SubCutaneous three times a day before meals  insulin lispro (HumaLOG) corrective regimen sliding scale   SubCutaneous at bedtime  metroNIDAZOLE  IVPB 500 milliGRAM(s) IV Intermittent <User Schedule>  multivitamin 1 Tablet(s) Oral daily    MEDICATIONS  (PRN):  dextrose 40% Gel 15 Gram(s) Oral once PRN Blood Glucose LESS THAN 70 milliGRAM(s)/deciliter  glucagon  Injectable 1 milliGRAM(s) IntraMuscular once PRN Glucose LESS THAN 70 milligrams/deciliter        ROS: all systems reviewed and wnl      PHYSICAL EXAMINATION:  Vital Signs Last 24 Hrs  T(C): 36.6 (07 Oct 2019 05:42), Max: 36.8 (06 Oct 2019 10:03)  T(F): 97.8 (07 Oct 2019 05:42), Max: 98.2 (06 Oct 2019 10:03)  HR: 78 (07 Oct 2019 05:42) (75 - 79)  BP: 109/61 (07 Oct 2019 05:42) (109/61 - 127/86)  BP(mean): --  RR: 18 (07 Oct 2019 05:42) (18 - 18)  SpO2: 96% (07 Oct 2019 05:42) (92% - 96%)  CAPILLARY BLOOD GLUCOSE      POCT Blood Glucose.: 149 mg/dL (06 Oct 2019 22:17)  POCT Blood Glucose.: 224 mg/dL (06 Oct 2019 18:21)  POCT Blood Glucose.: 175 mg/dL (06 Oct 2019 17:12)  POCT Blood Glucose.: 178 mg/dL (06 Oct 2019 12:21)  POCT Blood Glucose.: 116 mg/dL (06 Oct 2019 09:44)  POCT Blood Glucose.: 57 mg/dL (06 Oct 2019 08:54)  POCT Blood Glucose.: 43 mg/dL (06 Oct 2019 08:52)  POCT Blood Glucose.: 57 mg/dL (06 Oct 2019 08:25)  POCT Blood Glucose.: 57 mg/dL (06 Oct 2019 08:22)      10-06 @ 07:01  -  10-07 @ 07:00  --------------------------------------------------------  IN: 2940 mL / OUT: 0 mL / NET: 2940 mL        GENERAL:   NECK: supple, No JVD  CHEST/LUNG: clear to auscultation bilaterally; no rales, rhonchi, or wheezing b/l  HEART: normal S1, S2  ABDOMEN: BS+, soft, ND, NT   EXTREMITIES:  pulses palpable; no clubbing, cyanosis, or edema b/l LEs  SKIN: no rashes or lesions      LABS:                        10.2   13.56 )-----------( 339      ( 06 Oct 2019 07:06 )             33.7     10-06    142  |  111<H>  |  22  ----------------------------<  59<L>  3.5   |  22  |  0.93    Ca    8.0<L>      06 Oct 2019 07:05 INTERVAL HPI/OVERNIGHT EVENTS:  Pt seen and examined at bedside.     Allergies/Intolerance: No Known Allergies      MEDICATIONS  (STANDING):  ascorbic acid 500 milliGRAM(s) Oral daily  aspirin enteric coated 81 milliGRAM(s) Oral daily  atorvastatin 10 milliGRAM(s) Oral at bedtime  dextrose 5%. 1000 milliLiter(s) (100 mL/Hr) IV Continuous <Continuous>  dextrose 5%. 1000 milliLiter(s) (120 mL/Hr) IV Continuous <Continuous>  dextrose 50% Injectable 12.5 Gram(s) IV Push once  dextrose 50% Injectable 25 Gram(s) IV Push once  dextrose 50% Injectable 25 Gram(s) IV Push once  digoxin     Tablet 0.125 milliGRAM(s) Oral daily  heparin  Injectable 5000 Unit(s) SubCutaneous every 12 hours  influenza   Vaccine 0.5 milliLiter(s) IntraMuscular once  insulin lispro (HumaLOG) corrective regimen sliding scale   SubCutaneous three times a day before meals  insulin lispro (HumaLOG) corrective regimen sliding scale   SubCutaneous at bedtime  metroNIDAZOLE  IVPB 500 milliGRAM(s) IV Intermittent <User Schedule>  multivitamin 1 Tablet(s) Oral daily    MEDICATIONS  (PRN):  dextrose 40% Gel 15 Gram(s) Oral once PRN Blood Glucose LESS THAN 70 milliGRAM(s)/deciliter  glucagon  Injectable 1 milliGRAM(s) IntraMuscular once PRN Glucose LESS THAN 70 milligrams/deciliter        ROS: all systems reviewed and wnl      PHYSICAL EXAMINATION:  Vital Signs Last 24 Hrs  T(C): 36.6 (07 Oct 2019 05:42), Max: 36.8 (06 Oct 2019 10:03)  T(F): 97.8 (07 Oct 2019 05:42), Max: 98.2 (06 Oct 2019 10:03)  HR: 78 (07 Oct 2019 05:42) (75 - 79)  BP: 109/61 (07 Oct 2019 05:42) (109/61 - 127/86)  BP(mean): --  RR: 18 (07 Oct 2019 05:42) (18 - 18)  SpO2: 96% (07 Oct 2019 05:42) (92% - 96%)  CAPILLARY BLOOD GLUCOSE      POCT Blood Glucose.: 149 mg/dL (06 Oct 2019 22:17)  POCT Blood Glucose.: 224 mg/dL (06 Oct 2019 18:21)  POCT Blood Glucose.: 175 mg/dL (06 Oct 2019 17:12)  POCT Blood Glucose.: 178 mg/dL (06 Oct 2019 12:21)  POCT Blood Glucose.: 116 mg/dL (06 Oct 2019 09:44)  POCT Blood Glucose.: 57 mg/dL (06 Oct 2019 08:54)  POCT Blood Glucose.: 43 mg/dL (06 Oct 2019 08:52)  POCT Blood Glucose.: 57 mg/dL (06 Oct 2019 08:25)  POCT Blood Glucose.: 57 mg/dL (06 Oct 2019 08:22)      10-06 @ 07:01  -  10-07 @ 07:00  --------------------------------------------------------  IN: 2940 mL / OUT: 0 mL / NET: 2940 mL        GENERAL: stable in bed, left AKA site healing well, no change to necrotic RLE   NECK: supple, No JVD  CHEST/LUNG: clear to auscultation bilaterally; no rales, rhonchi, or wheezing b/l  HEART: normal S1, S2  ABDOMEN: BS+, soft, ND, NT   EXTREMITIES:  pulses palpable; no clubbing, cyanosis, or edema b/l LEs  SKIN: no rashes or lesions      LABS:                        10.2   13.56 )-----------( 339      ( 06 Oct 2019 07:06 )             33.7     10-06    142  |  111<H>  |  22  ----------------------------<  59<L>  3.5   |  22  |  0.93    Ca    8.0<L>      06 Oct 2019 07:05

## 2019-10-07 NOTE — CHART NOTE - NSCHARTNOTEFT_GEN_A_CORE
Pt medically cleared for discharge as d/w Dr. Dacosta. Medication reconciliation reviewed with attending and completed. Appreciate Vascular for dressing change and recommendations.  Nephew Nathan was not able to review and sign MOLST form this admission. Discussed with him that he will follow up about MOLST in Nursing home. Also discussed discharge instructions. Outpatient follow up with Vascular and medicine clinic.

## 2019-10-07 NOTE — PROGRESS NOTE ADULT - REASON FOR ADMISSION
r/o left BKA site infection

## 2019-10-07 NOTE — PROGRESS NOTE ADULT - PROVIDER SPECIALTY LIST ADULT
Hospice
Hospitalist
Infectious Disease
Internal Medicine
Podiatry
Vascular Surgery
Hospitalist
Infectious Disease
Infectious Disease
Vascular Surgery

## 2019-10-07 NOTE — DISCHARGE NOTE NURSING/CASE MANAGEMENT/SOCIAL WORK - PATIENT PORTAL LINK FT
You can access the FollowMyHealth Patient Portal offered by [facility] by registering at the following website: [email or web address]. By joining [employer]’s FollowMyHealth portal, you will also be able to view your health information using other applications (apps) compatible with our system.

## 2019-10-07 NOTE — PROGRESS NOTE ADULT - ASSESSMENT
70 yo male PMH DM(II), PAD s/p left BKA and prior left leg fem-pop, BIBEMS from facility for wound evaluation. Patient recently received left BKA in July 2019 in [facility], where he was discharged to rehab. Was sent from facility today for concerns of improper wound healing, drainage, and bleeding. Patient otherwise had no complaints, but endorses LLE pain near wound site.    Vascular: Vascular notes reviewed. Will cancel MRI of right foot as likely OM, foul smelling, not likely to . Not able to complete MRI due to agitation. Elevated ESR of 84 and elevated CRP noted on arrival all consistent with infection of right leg. Continue ASA 81 mg/day. Add Lipitor 10 mg/day. Agree with stopping IV Vancomycin. Vascular does not recommend right AKA surgery now. Vascular surgery has documented no pulses in right foot. Had necrotic toes right foot on arrival. Stop Zosyn today. Vascular prefers watchful waiting. Flagyl spray to control necrotic odor from right leg.            Endo: Stop Lantus as BS < 100. Hypernatremia corrected, stop D5W.         CV: Hold Cozaar today given lower BP. Hold Toprol given low BP. Hold Lasix. Continue  Digoxin .125 mg/day. Remove Fletcher. Comfortable on RA.      Spoke to nephew again by phone today about plan of care and recommendations, [phone]. Appreciate Palliative care eval.      Needs placement in nursing home. DNR should be signed along with MOLST. Hold on right AKA for now.

## 2019-10-07 NOTE — CHART NOTE - NSCHARTNOTEFT_GEN_A_CORE
Came to change dressings prior to discharge and evaluate wound per primary team request. Left leg stump staple line c/d/i, soft and without erythema or drainage. Looks to be healing appropriately. Would recommend loose dry gauze and wrap with an ACE wrap. Can check every 1-2 days and replace. Stables should come out at approximately 4 weeks from surgery, likely at follow-up appointment with vascular surgery. For the right leg, would recommend following wound care directives as the foot is malodorous and with desquamation around the dorsal surface. Does not appear to be changed from last evaluation 1-2 weeks prior and is therefore stable from that perspective. If they have not provided recommendations, would suggest daily wound care on discharge with betadine or soap scrubs followed by irrigation with normal saline, pat dry, wrap with gauze pads, abdominal pads and Pieter.

## 2019-10-12 ENCOUNTER — TRANSCRIPTION ENCOUNTER (OUTPATIENT)
Age: 69
End: 2019-10-12

## 2019-10-12 ENCOUNTER — INPATIENT (INPATIENT)
Facility: HOSPITAL | Age: 69
LOS: 19 days | Discharge: HOME | DRG: 853 | End: 2019-11-01
Attending: STUDENT IN AN ORGANIZED HEALTH CARE EDUCATION/TRAINING PROGRAM | Admitting: STUDENT IN AN ORGANIZED HEALTH CARE EDUCATION/TRAINING PROGRAM
Payer: MEDICARE

## 2019-10-12 VITALS
DIASTOLIC BLOOD PRESSURE: 76 MMHG | RESPIRATION RATE: 10 BRPM | SYSTOLIC BLOOD PRESSURE: 128 MMHG | OXYGEN SATURATION: 99 % | HEART RATE: 103 BPM

## 2019-10-12 DIAGNOSIS — Z90.89 ACQUIRED ABSENCE OF OTHER ORGANS: Chronic | ICD-10-CM

## 2019-10-12 LAB
ALBUMIN SERPL ELPH-MCNC: 2 G/DL — LOW (ref 3.3–5)
ALBUMIN SERPL ELPH-MCNC: 2.3 G/DL — LOW (ref 3.3–5)
ALP SERPL-CCNC: 153 U/L — HIGH (ref 40–120)
ALP SERPL-CCNC: 173 U/L — HIGH (ref 40–120)
ALT FLD-CCNC: 51 U/L — HIGH (ref 10–45)
ALT FLD-CCNC: 56 U/L — HIGH (ref 10–45)
ANION GAP SERPL CALC-SCNC: 10 MMOL/L — SIGNIFICANT CHANGE UP (ref 5–17)
ANION GAP SERPL CALC-SCNC: 14 MMOL/L — SIGNIFICANT CHANGE UP (ref 5–17)
APTT BLD: 28.9 SEC — SIGNIFICANT CHANGE UP (ref 27.5–36.3)
AST SERPL-CCNC: 14 U/L — SIGNIFICANT CHANGE UP (ref 10–40)
AST SERPL-CCNC: 31 U/L — SIGNIFICANT CHANGE UP (ref 10–40)
BASE EXCESS BLDV CALC-SCNC: -1.5 MMOL/L — SIGNIFICANT CHANGE UP (ref -2–2)
BASOPHILS # BLD AUTO: 0.02 K/UL — SIGNIFICANT CHANGE UP (ref 0–0.2)
BASOPHILS NFR BLD AUTO: 0.1 % — SIGNIFICANT CHANGE UP (ref 0–2)
BILIRUB SERPL-MCNC: 0.5 MG/DL — SIGNIFICANT CHANGE UP (ref 0.2–1.2)
BILIRUB SERPL-MCNC: 0.5 MG/DL — SIGNIFICANT CHANGE UP (ref 0.2–1.2)
BUN SERPL-MCNC: 31 MG/DL — HIGH (ref 7–23)
BUN SERPL-MCNC: 31 MG/DL — HIGH (ref 7–23)
CA-I SERPL-SCNC: 1.22 MMOL/L — SIGNIFICANT CHANGE UP (ref 1.12–1.3)
CALCIUM SERPL-MCNC: 8.2 MG/DL — LOW (ref 8.4–10.5)
CALCIUM SERPL-MCNC: 8.6 MG/DL — SIGNIFICANT CHANGE UP (ref 8.4–10.5)
CHLORIDE BLDV-SCNC: 114 MMOL/L — HIGH (ref 96–108)
CHLORIDE SERPL-SCNC: 110 MMOL/L — HIGH (ref 96–108)
CHLORIDE SERPL-SCNC: 111 MMOL/L — HIGH (ref 96–108)
CO2 BLDV-SCNC: 24 MMOL/L — SIGNIFICANT CHANGE UP (ref 22–30)
CO2 SERPL-SCNC: 21 MMOL/L — LOW (ref 22–31)
CO2 SERPL-SCNC: 22 MMOL/L — SIGNIFICANT CHANGE UP (ref 22–31)
CREAT SERPL-MCNC: 1.06 MG/DL — SIGNIFICANT CHANGE UP (ref 0.5–1.3)
CREAT SERPL-MCNC: 1.19 MG/DL — SIGNIFICANT CHANGE UP (ref 0.5–1.3)
DIGOXIN SERPL-MCNC: 1 NG/ML — SIGNIFICANT CHANGE UP (ref 0.8–2)
EOSINOPHIL # BLD AUTO: 0 K/UL — SIGNIFICANT CHANGE UP (ref 0–0.5)
EOSINOPHIL NFR BLD AUTO: 0 % — SIGNIFICANT CHANGE UP (ref 0–6)
ERYTHROCYTE [SEDIMENTATION RATE] IN BLOOD: 108 MM/HR — HIGH (ref 0–20)
GAS PNL BLDA: SIGNIFICANT CHANGE UP
GAS PNL BLDV: 144 MMOL/L — SIGNIFICANT CHANGE UP (ref 135–145)
GAS PNL BLDV: SIGNIFICANT CHANGE UP
GAS PNL BLDV: SIGNIFICANT CHANGE UP
GLUCOSE BLDV-MCNC: 320 MG/DL — HIGH (ref 70–99)
GLUCOSE SERPL-MCNC: 336 MG/DL — HIGH (ref 70–99)
GLUCOSE SERPL-MCNC: 337 MG/DL — HIGH (ref 70–99)
HCO3 BLDV-SCNC: 23 MMOL/L — SIGNIFICANT CHANGE UP (ref 21–29)
HCT VFR BLD CALC: 31.8 % — LOW (ref 39–50)
HCT VFR BLDA CALC: 31 % — LOW (ref 39–50)
HGB BLD CALC-MCNC: 10 G/DL — LOW (ref 13–17)
HGB BLD-MCNC: 9.5 G/DL — LOW (ref 13–17)
IMM GRANULOCYTES NFR BLD AUTO: 0.6 % — SIGNIFICANT CHANGE UP (ref 0–1.5)
INR BLD: 1.7 RATIO — HIGH (ref 0.88–1.16)
LACTATE BLDV-MCNC: 3.6 MMOL/L — HIGH (ref 0.7–2)
LYMPHOCYTES # BLD AUTO: 1.34 K/UL — SIGNIFICANT CHANGE UP (ref 1–3.3)
LYMPHOCYTES # BLD AUTO: 7.7 % — LOW (ref 13–44)
MCHC RBC-ENTMCNC: 26.3 PG — LOW (ref 27–34)
MCHC RBC-ENTMCNC: 29.9 GM/DL — LOW (ref 32–36)
MCV RBC AUTO: 88.1 FL — SIGNIFICANT CHANGE UP (ref 80–100)
MONOCYTES # BLD AUTO: 0.53 K/UL — SIGNIFICANT CHANGE UP (ref 0–0.9)
MONOCYTES NFR BLD AUTO: 3 % — SIGNIFICANT CHANGE UP (ref 2–14)
NEUTROPHILS # BLD AUTO: 15.43 K/UL — HIGH (ref 1.8–7.4)
NEUTROPHILS NFR BLD AUTO: 88.6 % — HIGH (ref 43–77)
NRBC # BLD: 0 /100 WBCS — SIGNIFICANT CHANGE UP (ref 0–0)
NT-PROBNP SERPL-SCNC: HIGH PG/ML (ref 0–300)
PCO2 BLDV: 38 MMHG — SIGNIFICANT CHANGE UP (ref 35–50)
PH BLDV: 7.39 — SIGNIFICANT CHANGE UP (ref 7.35–7.45)
PLATELET # BLD AUTO: 386 K/UL — SIGNIFICANT CHANGE UP (ref 150–400)
PO2 BLDV: 52 MMHG — HIGH (ref 25–45)
POTASSIUM BLDV-SCNC: 4.9 MMOL/L — SIGNIFICANT CHANGE UP (ref 3.5–5.3)
POTASSIUM SERPL-MCNC: 5.3 MMOL/L — SIGNIFICANT CHANGE UP (ref 3.5–5.3)
POTASSIUM SERPL-MCNC: 5.7 MMOL/L — HIGH (ref 3.5–5.3)
POTASSIUM SERPL-SCNC: 5.3 MMOL/L — SIGNIFICANT CHANGE UP (ref 3.5–5.3)
POTASSIUM SERPL-SCNC: 5.7 MMOL/L — HIGH (ref 3.5–5.3)
PROCALCITONIN SERPL-MCNC: 0.81 NG/ML — HIGH (ref 0.02–0.1)
PROT SERPL-MCNC: 7.4 G/DL — SIGNIFICANT CHANGE UP (ref 6–8.3)
PROT SERPL-MCNC: 8 G/DL — SIGNIFICANT CHANGE UP (ref 6–8.3)
PROTHROM AB SERPL-ACNC: 19.9 SEC — HIGH (ref 10–12.9)
RBC # BLD: 3.61 M/UL — LOW (ref 4.2–5.8)
RBC # FLD: 15.9 % — HIGH (ref 10.3–14.5)
SAO2 % BLDV: 80 % — SIGNIFICANT CHANGE UP (ref 67–88)
SODIUM SERPL-SCNC: 143 MMOL/L — SIGNIFICANT CHANGE UP (ref 135–145)
SODIUM SERPL-SCNC: 145 MMOL/L — SIGNIFICANT CHANGE UP (ref 135–145)
WBC # BLD: 17.43 K/UL — HIGH (ref 3.8–10.5)
WBC # FLD AUTO: 17.43 K/UL — HIGH (ref 3.8–10.5)

## 2019-10-12 PROCEDURE — 99291 CRITICAL CARE FIRST HOUR: CPT | Mod: GC

## 2019-10-12 PROCEDURE — 93010 ELECTROCARDIOGRAM REPORT: CPT | Mod: GC

## 2019-10-12 PROCEDURE — 73630 X-RAY EXAM OF FOOT: CPT | Mod: 26,RT

## 2019-10-12 PROCEDURE — 71045 X-RAY EXAM CHEST 1 VIEW: CPT | Mod: 26

## 2019-10-12 RX ORDER — PIPERACILLIN AND TAZOBACTAM 4; .5 G/20ML; G/20ML
3.38 INJECTION, POWDER, LYOPHILIZED, FOR SOLUTION INTRAVENOUS ONCE
Refills: 0 | Status: COMPLETED | OUTPATIENT
Start: 2019-10-12 | End: 2019-10-12

## 2019-10-12 RX ORDER — SODIUM CHLORIDE 9 MG/ML
250 INJECTION, SOLUTION INTRAVENOUS ONCE
Refills: 0 | Status: COMPLETED | OUTPATIENT
Start: 2019-10-12 | End: 2019-10-12

## 2019-10-12 RX ORDER — VANCOMYCIN HCL 1 G
1000 VIAL (EA) INTRAVENOUS ONCE
Refills: 0 | Status: COMPLETED | OUTPATIENT
Start: 2019-10-12 | End: 2019-10-12

## 2019-10-12 RX ADMIN — Medication 100 MILLIGRAM(S): at 22:30

## 2019-10-12 RX ADMIN — Medication 250 MILLIGRAM(S): at 23:00

## 2019-10-12 RX ADMIN — PIPERACILLIN AND TAZOBACTAM 200 GRAM(S): 4; .5 INJECTION, POWDER, LYOPHILIZED, FOR SOLUTION INTRAVENOUS at 20:59

## 2019-10-12 RX ADMIN — PIPERACILLIN AND TAZOBACTAM 3.38 GRAM(S): 4; .5 INJECTION, POWDER, LYOPHILIZED, FOR SOLUTION INTRAVENOUS at 21:30

## 2019-10-12 RX ADMIN — SODIUM CHLORIDE 250 MILLILITER(S): 9 INJECTION, SOLUTION INTRAVENOUS at 22:30

## 2019-10-12 RX ADMIN — SODIUM CHLORIDE 500 MILLILITER(S): 9 INJECTION, SOLUTION INTRAVENOUS at 22:00

## 2019-10-12 NOTE — ED PROVIDER NOTE - CLINICAL SUMMARY MEDICAL DECISION MAKING FREE TEXT BOX
Pt presented in critical condition, altered likely 2/2 sepsis, may be due to known right foot infection/necrosis, pt is awake but not following commands, though tachycardic otherwise HD stable, low threshold to intubate patient given clinical picture and inability to protect the airway, labs with elevated WBC/lactate, respiratory status tenuous, will not overload with IVF at this time, MICU  to see pt, in critical condition and will continue monitor closely   Karen Norris, PGY-3 EM Pt presented in critical condition, altered likely 2/2 sepsis, may be due to known right foot infection/necrosis, pt is awake but not following commands, though tachycardic otherwise HD stable, low threshold to intubate patient given clinical picture and inability to protect the airway, labs with elevated WBC/lactate, respiratory status tenuous, will not overload with IVF at this time, MICU  to see pt, in critical condition and will continue monitor closely   Karen Norris, PGY-3 EM  Attending Melanie Miller: 68 y/o male h/o left leg amputation, CHF presenting with tachcyardia. upon arrival pt ill appearing, tachypnic on oxygen. pt pan cultured. on exam pt with gangrene to right leg. pt febrile and ill appearing. cultures sent. concern for sig infection from leg. pt given zosyn, vanco and clinda. pt with sig cardiac history with wheezing on exam concerning for interstitial edema. very cautious fluids. vascular surgery consultd as concern for gangrene to leg and possible early nec fascitis. micu also consulted per vascular request. decision made to admit to vascular. pt very ill appearing, with tenuous respiratory status may require intubation. vascular to monitor

## 2019-10-12 NOTE — H&P ADULT - NSHPLABSRESULTS_GEN_ALL_CORE
LABS  CBC (10-12 @ 20:57)                              9.5<L>                         17.43<H>  )----------------(  386        88.6<H>% Neutrophils, 7.7<L>% Lymphocytes, ANC: 15.43<H>                              31.8<L>    BMP (10-12 @ 20:57)             145     |  110<H>  |  31<H> 		Ca++ --      Ca 8.6                ---------------------------------( 337<H>		Mg --                 5.3     |  21<L>   |  1.19  			Ph --        LFTs (10-12 @ 20:57)      TPro 8.0 / Alb 2.3<L> / TBili 0.5 / DBili -- / AST 14 / ALT 56<H> / AlkPhos 173<H>    Coags (10-12 @ 20:57)  aPTT 28.9 / INR 1.70<H> / PT 19.9<H>        VBG (10-12 @ 20:57)     7.39 / 38 / 52<H> / 23 / -1.5 / 80%     Lactate: 3.6<H>    --------------------------------------------------------------------------------------------    MICROBIOLOGY      --------------------------------------------------------------------------------------------    IMAGING  *** LABS  CBC (10-12 @ 20:57)                              9.5<L>                         17.43<H>  )----------------(  386        88.6<H>% Neutrophils, 7.7<L>% Lymphocytes, ANC: 15.43<H>                              31.8<L>    BMP (10-12 @ 20:57)             145     |  110<H>  |  31<H> 		Ca++ --      Ca 8.6                ---------------------------------( 337<H>		Mg --                 5.3     |  21<L>   |  1.19  			Ph --        LFTs (10-12 @ 20:57)      TPro 8.0 / Alb 2.3<L> / TBili 0.5 / DBili -- / AST 14 / ALT 56<H> / AlkPhos 173<H>    Coags (10-12 @ 20:57)  aPTT 28.9 / INR 1.70<H> / PT 19.9<H>  VBG (10-12 @ 20:57)     7.39 / 38 / 52<H> / 23 / -1.5 / 80%     Lactate: 3.6<H>      --------------------------------------------------------------------------------------------    IMAGING  < from: Xray Foot AP + Lateral + Oblique, Right (10.12.19 @ 22:22) >    INTERPRETATION:  Subcutaneous emphysema tracks along the anterolateral   aspect of the right ankle/leg. No cortical osseous destruction is noted   at this radiograph. If high clinical suspicion for osteomyelitis, MRI is   more sensitive for evaluation.    < end of copied text >    < from: Xray Chest 1 View AP/PA (10.12.19 @ 22:00) >  INTERPRETATION:  Pulmonary edema.    < end of copied text >

## 2019-10-12 NOTE — H&P ADULT - HISTORY OF PRESENT ILLNESS
VASCULAR SURGERY CONSULT NOTE    Mr. Erik Olivares is a 69 year old gentleman with pmhx of T2DM, HTN, CVA, NSTEMI complicated by cardiogenic shock, significant PVD s/p LLE fem-pop bypass with subsequent LLE BKA (07/2019, Dr. MARILEE Barriga), LLE AKA (09/2019, Dr. MARILEE Roper), known chronically ischemic RLE who was brought in from his nursing facility        Denies fevers, cough, LOC, CP, SOB.      70 yo male with multiple medical issues including sig vascular disease s/p left leg amputation, heart disease, CHF, dementia sent from rehab for reported elevated heart rate. limited history avaialble. per report pt is at mental status baseline    Pt was seen and examined by me, 69M with minimal ability to take history as pt is has dementia, altered and uncooperative. Per chart review, hx of poor med compliance, DM2, HTN, PAD s/p LLE fem-pop bypass with vein graft , s/p Left BKA on 7/22/19, [facility] CCU for NSTEMI complicated by cardiogenic shock and acute respiratory failure with hypoxia requiring intubation and subsequent extubation. The hospital course also notable for concern for pontine stroke possibly seen on CT head, dysphagia initially requiring NGT feeds but later passed MBS with improved mental status placed on puree diet. Pt presents altered today likely 2/2 sepsis with right gangrenous foul smelling foot, seen by vascular in the past and did not recommend BKA at that time. Today presented from nursing facility with minimal information, was agitated, initially called for ?HR, here altered likely 2/2 infectious etiology, unclear if hes on oxygen at home but is currently on 4L here, sats in 90%, tachy to 100 rectal temp of 100.4. Will continue to monitor carefully. Mr. Erik Olivares is a 69 year old gentleman with pmhx of T2DM, HTN, CVA, NSTEMI complicated by cardiogenic shock, significant PVD s/p LLE fem-pop bypass (vein graft) with subsequent LLE BKA (07/2019, Dr. MARILEE Barriga), LLE AKA (09/2019, Dr. MARILEE Roper), known chronically ischemic RLE who was brought in from his nursing facility with progressively altered mentation, fevers and tachycardia.         Denies fevers, cough, LOC, CP, SOB.      70 yo male with multiple medical issues including sig vascular disease s/p left leg amputation, heart disease, CHF, dementia sent from rehab for reported elevated heart rate. limited history avaialble. per report pt is at mental status baseline    Pt was seen and examined by me, 69M with minimal ability to take history as pt is has dementia, altered and uncooperative. Per chart review, hx of poor med compliance, DM2, HTN, PAD s/p LLE fem-pop bypass with vein graft , s/p Left BKA on 7/22/19, [facility] CCU for NSTEMI complicated by cardiogenic shock and acute respiratory failure with hypoxia requiring intubation and subsequent extubation. The hospital course also notable for concern for pontine stroke possibly seen on CT head, dysphagia initially requiring NGT feeds but later passed MBS with improved mental status placed on puree diet. Pt presents altered today likely 2/2 sepsis with right gangrenous foul smelling foot, seen by vascular in the past and did not recommend BKA at that time. Today presented from nursing facility with minimal information, was agitated, initially called for ?HR, here altered likely 2/2 infectious etiology, unclear if hes on oxygen at home but is currently on 4L here, sats in 90%, tachy to 100 rectal temp of 100.4. Will continue to monitor carefully. Interval information was obtained from the patient's nephew who was present.    Mr. Erik Olivares is a 69 year old gentleman with pmhx of T2DM, HTN, CVA, NSTEMI complicated by cardiogenic shock, significant PVD s/p LLE fem-pop bypass (vein graft) with subsequent LLE BKA (07/2019, Dr. MARILEE Barriga), LLE AKA (09/2019, Dr. MARILEE Roper), known chronically ischemic RLE who was brought in from his nursing facility with progressively altered mentation, fevers and tachycardia. In the ED, the patient was agitated, required supplemental O2, and was tachycardic.

## 2019-10-12 NOTE — H&P ADULT - NSHPPHYSICALEXAM_GEN_ALL_CORE
Vitals:   T(C): 38 (10-12-19 @ 20:30), Max: 38 (10-12-19 @ 20:30)  HR: 90 (10-12-19 @ 22:16) (90 - 103)  BP: 120/70 (10-12-19 @ 22:16) (120/70 - 128/76)  RR: 26 (10-12-19 @ 22:16) (10 - 26)  SpO2: 100% (10-12-19 @ 22:16) (99% - 100%)  CAPILLARY BLOOD GLUCOSE                PHYSICAL EXAM: ***  General: NAD, Lying in bed comfortably  Neuro: A+Ox3  HEENT: NC/AT, EOMI  Neck: Soft, supple  Cardio: RRR, nml S1/S2  Resp: Good effort, CTA b/l  Thorax: No chest wall tenderness  Breast: No lesions/masses, no drainage  GI/Abd: Soft, NT/ND, no rebound/guarding, no masses palpated  Vascular: All 4 extremities warm.  Skin: Intact, no breakdown  Lymphatic/Nodes: No palpable lymphadenopathy  Musculoskeletal: All 4 extremities moving spontaneously, no limitations  -------------------------------------------------------------------------------------------- Vitals:   T(C): 38 (10-12-19 @ 20:30), Max: 38 (10-12-19 @ 20:30)  HR: 90 (10-12-19 @ 22:16) (90 - 103)  BP: 120/70 (10-12-19 @ 22:16) (120/70 - 128/76)  RR: 26 (10-12-19 @ 22:16) (10 - 26)  SpO2: 100% (10-12-19 @ 22:16) (99% - 100%)  CAPILLARY BLOOD GLUCOSE    PHYSICAL EXAM:  General: Agitated on approach, combative, withdrawn  Neuro: Altered mentation  Cardio: RRR, nml S1/S2  Resp: Good effort, CTABL  Vascular: Unable to assess RLE popliteal pulses due to patient's agitated  Skin: RLE s/p AKA stump with staples in place, clean, dry and intact. Right foot with dry gangrene, desquamated skin over lateral portion, malodorous, no evidence of active purulence or exudate,     --------------------------------------------------------------------------------------------

## 2019-10-12 NOTE — ED PROVIDER NOTE - OBJECTIVE STATEMENT
Attending Melanie Miller: 68 yo male with multiple medical issues including sig vascular disease s/p left leg amputation, heart disease, CHF, dementia sent from rehab for reported elevated heart rate. limited history avaialble. per report pt is at mental status baseline Attending Melanie Miller: 68 yo male with multiple medical issues including sig vascular disease s/p left leg amputation, heart disease, CHF, dementia sent from rehab for reported elevated heart rate. limited history avaialble. per report pt is at mental status baseline    Pt was seen and examined by me, 69M with minimal ability to take history as pt is has dementia, altered and uncooperative. Per chart review, hx of poor med compliance, DM2, HTN, PAD s/p LLE fem-pop bypass with vein graft , s/p Left BKA on 7/22/19, [facility] CCU for NSTEMI complicated by cardiogenic shock and acute respiratory failure with hypoxia requiring intubation and subsequent extubation. The hospital course also notable for concern for pontine stroke possibly seen on CT head, dysphagia initially requiring NGT feeds but later passed MBS with improved mental status placed on puree diet. Pt presents altered today likely 2/2 sepsis with right gangrenous foul smelling foot, seen by vascular in the past and did not recommend BKA at that time. Today presented from nursing facility with minimal information, was agitated, initially called for ?HR, here altered likely 2/2 infectious etiology, unclear if hes on oxygen at home but is currently on 4L here, sats in 90%, tachy to 100 rectal temp of 100.4. Will continue to monitor carefully.

## 2019-10-12 NOTE — ED ADULT NURSE NOTE - OBJECTIVE STATEMENT
Pt is a 70 yo male c/o elevated hr, hx of dementia. Per EMS, pt is non-verbal, combative when touched because of dementia. He had a recent L bka. Pt is a 70 yo male c/o elevated hr, . Per EMS, pt is has hx of dementia, non-verbal, combative when touched. He had a recent L bka. According to nephew, pt had a "stroke" during the summertime & has been "baseline" non-verbal, combative when touched. Pt is a 70 yo male c/o elevated hr, . Per EMS, pt is has hx of stoke & combative when touched or evaluated. He had a recent L bka. According to nephew, pt had a "stroke" during the summertime & has been "baseline" non-verbal most of the time & combative when touched or evaluated

## 2019-10-12 NOTE — H&P ADULT - ASSESSMENT
ASSESSMENT: Patient is a 69ym pmhx ***    PLAN:  ***  -   -   -   -   - Patient seen/examined with attending.  - Plan to be discussed with Attending,  Mr. Erik Olivares is a 69 year old gentleman with extensive LE vaso-occlusive disease, prior LLE AKA, who was brought in from his nursing home facility with sepsis likely secondary to his chronically necrotic RLE. The patient was febrile and tachycardic on admission, worsened mentation with combativeness. Exam notable for dry gangrene on right foot, no evidence of purulent drainage or pitting tissue. No emergent surgical intervention is warranted at this time.     PLAN:  - Admission to Vascular Surgery -- Dr. MARILEE Barriga  - NPO, IVF  - Broad spectrum antibiotics -- IV Zosyn  - Trending lactate  - Patient seen and examined with Sr. Resident, Dr. SALLIE Bergman and discussed with Vasc. Surg Fellow, Dr. SAMY Ramirez  - Plan to be discussed with Attending, Dr. MARILEE Barriga

## 2019-10-12 NOTE — ED PROVIDER NOTE - PROGRESS NOTE DETAILS
Attending Melanie Miller: vascular paged Pt was seen and examined by me, 69M with minimal ability to take history as pt is has dementia, altered and uncooperative. Per chart review, hx of poor med compliance, DM2, HTN, PAD s/p LLE fem-pop bypass with vein graft , s/p Left BKA on 7/22/19, [facility] CCU for NSTEMI complicated by cardiogenic shock and acute respiratory failure with hypoxia requiring intubation and subsequent extubation. The hospital course also notable for concern for pontine stroke possibly seen on CT head, dysphagia initially requiring NGT feeds but later passed MBS with improved mental status placed on puree diet. Pt presents altered today likely 2/2 sepsis with right gangrenous foul smelling foot, seen by vascular in the past and did not recommend BKA at that time. Today presented from nursing facility with minimal information, was agitated, initially called for ?HR, here altered likely 2/2 infectious etiology, unclear if hes on oxygen at home but is currently on Attending Melanie Miller: pt ill appearing. nephew at bedside. states more somnolent then in the past. BP stable however concern for respiratory status. will check ABG. seen by vascular who does not feel foot is the source. pt likely septic. concern may eventually require intubation based on respiratory and mental status atherefore micu consulted

## 2019-10-12 NOTE — H&P ADULT - ATTENDING COMMENTS
Full H&P as above; discussed with surgery resident and vascular fellow.   He is s/p a left AKA for severe infection. He now presents with wet gangrene of his RT foot. He has CHF that has slightly been improved with diuretics. He has an elevated white count and glucose of over 300. There was a foul smelling wound in the RT foot with gas in the soft tissues on an x-ray. He will get a guillotine amputation. The patient does not have capacity and consent was obtained from his nephew.

## 2019-10-12 NOTE — ED PROVIDER NOTE - CRITICAL CARE PROVIDED
documentation/interpretation of diagnostic studies/additional history taking/direct patient care (not related to procedure)/consultation with other physicians

## 2019-10-12 NOTE — ED PROVIDER NOTE - ATTENDING CONTRIBUTION TO CARE
Attending MD Melanie Miller:  I personally have seen and examined this patient.  Resident note reviewed and agree on plan of care and except where noted.  See HPI, PE, and MDM for details.

## 2019-10-12 NOTE — ED ADULT NURSE NOTE - INTERVENTIONS DEFINITIONS
Physically safe environment: no spills, clutter or unnecessary equipment/Stretcher in lowest position, wheels locked, appropriate side rails in place/Provide visual clues: red socks

## 2019-10-12 NOTE — ED PROVIDER NOTE - PHYSICAL EXAMINATION
Attending Melanie Miller: Gen: ill appearing, heent: atrauamtic, eomi, dry mucous membranes, op pink, uvula midline, , chest: nttp, no crepitus, cv: rrr, +murmur, lungs: diffuse wheezing and rhonchi, abd: soft, nontender, nondistended, no peritoneal signs, +BS, no guarding, ext:s/o BKA to left leg, right foot cool, necrotic appearing, no pulses, neuro: awake not following commands, moving extremities Attending Melanie Miller: Gen: ill appearing, heent: atrauamtic, eomi, dry mucous membranes, op pink, uvula midline, , chest: nttp, no crepitus, cv: rrr, +murmur, lungs: diffuse wheezing and rhonchi, abd: soft, nontender, nondistended, no peritoneal signs, +BS, no guarding, ext:s/o BKA to left leg, right foot cool, necrotic appearing, no pulses, neuro: awake not following commands, moving extremities        Pt is unkempt appearing, very ill, tachypneic and belly breathing, not following commands appropriately but will open eyes spontaneously at times, has an obvious source of infection with right necrotic appearing foot, foul smelling, no active drainage, abd soft NT, lungs with diffuse wheezing

## 2019-10-13 ENCOUNTER — RESULT REVIEW (OUTPATIENT)
Age: 69
End: 2019-10-13

## 2019-10-13 DIAGNOSIS — R41.82 ALTERED MENTAL STATUS, UNSPECIFIED: ICD-10-CM

## 2019-10-13 LAB
ANION GAP SERPL CALC-SCNC: 11 MMOL/L — SIGNIFICANT CHANGE UP (ref 5–17)
ANION GAP SERPL CALC-SCNC: 11 MMOL/L — SIGNIFICANT CHANGE UP (ref 5–17)
ANION GAP SERPL CALC-SCNC: 14 MMOL/L — SIGNIFICANT CHANGE UP (ref 5–17)
APPEARANCE UR: ABNORMAL
APTT BLD: 30.2 SEC — SIGNIFICANT CHANGE UP (ref 27.5–36.3)
BILIRUB UR-MCNC: NEGATIVE — SIGNIFICANT CHANGE UP
BLD GP AB SCN SERPL QL: NEGATIVE — SIGNIFICANT CHANGE UP
BUN SERPL-MCNC: 28 MG/DL — HIGH (ref 7–23)
BUN SERPL-MCNC: 30 MG/DL — HIGH (ref 7–23)
BUN SERPL-MCNC: 32 MG/DL — HIGH (ref 7–23)
CALCIUM SERPL-MCNC: 8.2 MG/DL — LOW (ref 8.4–10.5)
CALCIUM SERPL-MCNC: 8.3 MG/DL — LOW (ref 8.4–10.5)
CALCIUM SERPL-MCNC: 9 MG/DL — SIGNIFICANT CHANGE UP (ref 8.4–10.5)
CHLORIDE SERPL-SCNC: 109 MMOL/L — HIGH (ref 96–108)
CHLORIDE SERPL-SCNC: 111 MMOL/L — HIGH (ref 96–108)
CHLORIDE SERPL-SCNC: 111 MMOL/L — HIGH (ref 96–108)
CK MB BLD-MCNC: 2.5 % — SIGNIFICANT CHANGE UP (ref 0–3.5)
CK MB CFR SERPL CALC: 4.5 NG/ML — SIGNIFICANT CHANGE UP (ref 0–6.7)
CK SERPL-CCNC: 183 U/L — SIGNIFICANT CHANGE UP (ref 30–200)
CO2 SERPL-SCNC: 21 MMOL/L — LOW (ref 22–31)
CO2 SERPL-SCNC: 23 MMOL/L — SIGNIFICANT CHANGE UP (ref 22–31)
CO2 SERPL-SCNC: 23 MMOL/L — SIGNIFICANT CHANGE UP (ref 22–31)
COLOR SPEC: YELLOW — SIGNIFICANT CHANGE UP
CREAT SERPL-MCNC: 1.02 MG/DL — SIGNIFICANT CHANGE UP (ref 0.5–1.3)
CREAT SERPL-MCNC: 1.05 MG/DL — SIGNIFICANT CHANGE UP (ref 0.5–1.3)
CREAT SERPL-MCNC: 1.12 MG/DL — SIGNIFICANT CHANGE UP (ref 0.5–1.3)
CRP SERPL-MCNC: 19.11 MG/DL — HIGH (ref 0–0.4)
DIFF PNL FLD: ABNORMAL
GAS PNL BLDA: SIGNIFICANT CHANGE UP
GAS PNL BLDA: SIGNIFICANT CHANGE UP
GAS PNL BLDV: SIGNIFICANT CHANGE UP
GAS PNL BLDV: SIGNIFICANT CHANGE UP
GLUCOSE BLDC GLUCOMTR-MCNC: 112 MG/DL — HIGH (ref 70–99)
GLUCOSE BLDC GLUCOMTR-MCNC: 114 MG/DL — HIGH (ref 70–99)
GLUCOSE BLDC GLUCOMTR-MCNC: 138 MG/DL — HIGH (ref 70–99)
GLUCOSE BLDC GLUCOMTR-MCNC: 153 MG/DL — HIGH (ref 70–99)
GLUCOSE BLDC GLUCOMTR-MCNC: 180 MG/DL — HIGH (ref 70–99)
GLUCOSE BLDC GLUCOMTR-MCNC: 183 MG/DL — HIGH (ref 70–99)
GLUCOSE BLDC GLUCOMTR-MCNC: 199 MG/DL — HIGH (ref 70–99)
GLUCOSE BLDC GLUCOMTR-MCNC: 240 MG/DL — HIGH (ref 70–99)
GLUCOSE BLDC GLUCOMTR-MCNC: 66 MG/DL — LOW (ref 70–99)
GLUCOSE BLDC GLUCOMTR-MCNC: 91 MG/DL — SIGNIFICANT CHANGE UP (ref 70–99)
GLUCOSE SERPL-MCNC: 168 MG/DL — HIGH (ref 70–99)
GLUCOSE SERPL-MCNC: 291 MG/DL — HIGH (ref 70–99)
GLUCOSE SERPL-MCNC: 336 MG/DL — HIGH (ref 70–99)
GLUCOSE UR QL: NEGATIVE — SIGNIFICANT CHANGE UP
HCT VFR BLD CALC: 27.6 % — LOW (ref 39–50)
HCT VFR BLD CALC: 32.3 % — LOW (ref 39–50)
HGB BLD-MCNC: 8.7 G/DL — LOW (ref 13–17)
HGB BLD-MCNC: 9.9 G/DL — LOW (ref 13–17)
INR BLD: 1.68 RATIO — HIGH (ref 0.88–1.16)
KETONES UR-MCNC: NEGATIVE — SIGNIFICANT CHANGE UP
LACTATE BLDV-MCNC: 3.3 MMOL/L — HIGH (ref 0.7–2)
LEUKOCYTE ESTERASE UR-ACNC: ABNORMAL
MAGNESIUM SERPL-MCNC: 2 MG/DL — SIGNIFICANT CHANGE UP (ref 1.6–2.6)
MAGNESIUM SERPL-MCNC: 2.2 MG/DL — SIGNIFICANT CHANGE UP (ref 1.6–2.6)
MCHC RBC-ENTMCNC: 27 PG — SIGNIFICANT CHANGE UP (ref 27–34)
MCHC RBC-ENTMCNC: 27.4 PG — SIGNIFICANT CHANGE UP (ref 27–34)
MCHC RBC-ENTMCNC: 30.7 GM/DL — LOW (ref 32–36)
MCHC RBC-ENTMCNC: 31.5 GM/DL — LOW (ref 32–36)
MCV RBC AUTO: 87.1 FL — SIGNIFICANT CHANGE UP (ref 80–100)
MCV RBC AUTO: 88.3 FL — SIGNIFICANT CHANGE UP (ref 80–100)
NITRITE UR-MCNC: NEGATIVE — SIGNIFICANT CHANGE UP
NRBC # BLD: 0 /100 WBCS — SIGNIFICANT CHANGE UP (ref 0–0)
NRBC # BLD: 0 /100 WBCS — SIGNIFICANT CHANGE UP (ref 0–0)
PH UR: 5.5 — SIGNIFICANT CHANGE UP (ref 5–8)
PHOSPHATE SERPL-MCNC: 2.9 MG/DL — SIGNIFICANT CHANGE UP (ref 2.5–4.5)
PHOSPHATE SERPL-MCNC: 3.3 MG/DL — SIGNIFICANT CHANGE UP (ref 2.5–4.5)
PLATELET # BLD AUTO: 308 K/UL — SIGNIFICANT CHANGE UP (ref 150–400)
PLATELET # BLD AUTO: 342 K/UL — SIGNIFICANT CHANGE UP (ref 150–400)
POTASSIUM SERPL-MCNC: 3.9 MMOL/L — SIGNIFICANT CHANGE UP (ref 3.5–5.3)
POTASSIUM SERPL-MCNC: 4.1 MMOL/L — SIGNIFICANT CHANGE UP (ref 3.5–5.3)
POTASSIUM SERPL-MCNC: 4.7 MMOL/L — SIGNIFICANT CHANGE UP (ref 3.5–5.3)
POTASSIUM SERPL-SCNC: 3.9 MMOL/L — SIGNIFICANT CHANGE UP (ref 3.5–5.3)
POTASSIUM SERPL-SCNC: 4.1 MMOL/L — SIGNIFICANT CHANGE UP (ref 3.5–5.3)
POTASSIUM SERPL-SCNC: 4.7 MMOL/L — SIGNIFICANT CHANGE UP (ref 3.5–5.3)
PROT UR-MCNC: 100 — SIGNIFICANT CHANGE UP
PROTHROM AB SERPL-ACNC: 19.5 SEC — HIGH (ref 10–12.9)
RBC # BLD: 3.17 M/UL — LOW (ref 4.2–5.8)
RBC # BLD: 3.66 M/UL — LOW (ref 4.2–5.8)
RBC # FLD: 15.9 % — HIGH (ref 10.3–14.5)
RBC # FLD: 15.9 % — HIGH (ref 10.3–14.5)
RH IG SCN BLD-IMP: POSITIVE — SIGNIFICANT CHANGE UP
SODIUM SERPL-SCNC: 144 MMOL/L — SIGNIFICANT CHANGE UP (ref 135–145)
SODIUM SERPL-SCNC: 145 MMOL/L — SIGNIFICANT CHANGE UP (ref 135–145)
SODIUM SERPL-SCNC: 145 MMOL/L — SIGNIFICANT CHANGE UP (ref 135–145)
SP GR SPEC: 1.02 — SIGNIFICANT CHANGE UP (ref 1.01–1.02)
TROPONIN T, HIGH SENSITIVITY RESULT: 128 NG/L — HIGH (ref 0–51)
TROPONIN T, HIGH SENSITIVITY RESULT: 138 NG/L — HIGH (ref 0–51)
UROBILINOGEN FLD QL: NEGATIVE — SIGNIFICANT CHANGE UP
WBC # BLD: 17.64 K/UL — HIGH (ref 3.8–10.5)
WBC # BLD: 17.92 K/UL — HIGH (ref 3.8–10.5)
WBC # FLD AUTO: 17.64 K/UL — HIGH (ref 3.8–10.5)
WBC # FLD AUTO: 17.92 K/UL — HIGH (ref 3.8–10.5)

## 2019-10-13 PROCEDURE — 71045 X-RAY EXAM CHEST 1 VIEW: CPT | Mod: 26,77

## 2019-10-13 PROCEDURE — 93010 ELECTROCARDIOGRAM REPORT: CPT

## 2019-10-13 PROCEDURE — 99291 CRITICAL CARE FIRST HOUR: CPT | Mod: 25

## 2019-10-13 PROCEDURE — 93308 TTE F-UP OR LMTD: CPT | Mod: 26

## 2019-10-13 PROCEDURE — 71045 X-RAY EXAM CHEST 1 VIEW: CPT | Mod: 26

## 2019-10-13 PROCEDURE — 36556 INSERT NON-TUNNEL CV CATH: CPT

## 2019-10-13 PROCEDURE — 99291 CRITICAL CARE FIRST HOUR: CPT

## 2019-10-13 PROCEDURE — 99223 1ST HOSP IP/OBS HIGH 75: CPT | Mod: GC

## 2019-10-13 PROCEDURE — 88307 TISSUE EXAM BY PATHOLOGIST: CPT | Mod: 26

## 2019-10-13 RX ORDER — INSULIN HUMAN 100 [IU]/ML
3 INJECTION, SOLUTION SUBCUTANEOUS ONCE
Refills: 0 | Status: COMPLETED | OUTPATIENT
Start: 2019-10-13 | End: 2019-10-13

## 2019-10-13 RX ORDER — GLUCAGON INJECTION, SOLUTION 0.5 MG/.1ML
1 INJECTION, SOLUTION SUBCUTANEOUS ONCE
Refills: 0 | Status: DISCONTINUED | OUTPATIENT
Start: 2019-10-13 | End: 2019-10-13

## 2019-10-13 RX ORDER — ALBUMIN HUMAN 25 %
250 VIAL (ML) INTRAVENOUS ONCE
Refills: 0 | Status: COMPLETED | OUTPATIENT
Start: 2019-10-13 | End: 2019-10-13

## 2019-10-13 RX ORDER — DEXTROSE 50 % IN WATER 50 %
25 SYRINGE (ML) INTRAVENOUS ONCE
Refills: 0 | Status: DISCONTINUED | OUTPATIENT
Start: 2019-10-13 | End: 2019-10-13

## 2019-10-13 RX ORDER — PIPERACILLIN AND TAZOBACTAM 4; .5 G/20ML; G/20ML
3.38 INJECTION, POWDER, LYOPHILIZED, FOR SOLUTION INTRAVENOUS EVERY 8 HOURS
Refills: 0 | Status: DISCONTINUED | OUTPATIENT
Start: 2019-10-13 | End: 2019-10-14

## 2019-10-13 RX ORDER — DEXTROSE 50 % IN WATER 50 %
15 SYRINGE (ML) INTRAVENOUS ONCE
Refills: 0 | Status: DISCONTINUED | OUTPATIENT
Start: 2019-10-13 | End: 2019-10-13

## 2019-10-13 RX ORDER — SODIUM CHLORIDE 9 MG/ML
1000 INJECTION, SOLUTION INTRAVENOUS
Refills: 0 | Status: DISCONTINUED | OUTPATIENT
Start: 2019-10-13 | End: 2019-10-13

## 2019-10-13 RX ORDER — INSULIN LISPRO 100/ML
VIAL (ML) SUBCUTANEOUS
Refills: 0 | Status: DISCONTINUED | OUTPATIENT
Start: 2019-10-13 | End: 2019-10-13

## 2019-10-13 RX ORDER — PIPERACILLIN AND TAZOBACTAM 4; .5 G/20ML; G/20ML
3.38 INJECTION, POWDER, LYOPHILIZED, FOR SOLUTION INTRAVENOUS EVERY 8 HOURS
Refills: 0 | Status: DISCONTINUED | OUTPATIENT
Start: 2019-10-13 | End: 2019-10-13

## 2019-10-13 RX ORDER — PANTOPRAZOLE SODIUM 20 MG/1
40 TABLET, DELAYED RELEASE ORAL DAILY
Refills: 0 | Status: DISCONTINUED | OUTPATIENT
Start: 2019-10-13 | End: 2019-10-14

## 2019-10-13 RX ORDER — PIPERACILLIN AND TAZOBACTAM 4; .5 G/20ML; G/20ML
3.38 INJECTION, POWDER, LYOPHILIZED, FOR SOLUTION INTRAVENOUS ONCE
Refills: 0 | Status: DISCONTINUED | OUTPATIENT
Start: 2019-10-13 | End: 2019-10-13

## 2019-10-13 RX ORDER — FUROSEMIDE 40 MG
40 TABLET ORAL ONCE
Refills: 0 | Status: COMPLETED | OUTPATIENT
Start: 2019-10-13 | End: 2019-10-13

## 2019-10-13 RX ORDER — DIGOXIN 250 MCG
0.12 TABLET ORAL DAILY
Refills: 0 | Status: DISCONTINUED | OUTPATIENT
Start: 2019-10-13 | End: 2019-10-13

## 2019-10-13 RX ORDER — ACETAMINOPHEN 500 MG
1000 TABLET ORAL ONCE
Refills: 0 | Status: DISCONTINUED | OUTPATIENT
Start: 2019-10-13 | End: 2019-10-14

## 2019-10-13 RX ORDER — CHLORHEXIDINE GLUCONATE 213 G/1000ML
15 SOLUTION TOPICAL EVERY 12 HOURS
Refills: 0 | Status: DISCONTINUED | OUTPATIENT
Start: 2019-10-13 | End: 2019-10-14

## 2019-10-13 RX ORDER — INFLUENZA VIRUS VACCINE 15; 15; 15; 15 UG/.5ML; UG/.5ML; UG/.5ML; UG/.5ML
0.5 SUSPENSION INTRAMUSCULAR ONCE
Refills: 0 | Status: DISCONTINUED | OUTPATIENT
Start: 2019-10-13 | End: 2019-11-01

## 2019-10-13 RX ORDER — NOREPINEPHRINE BITARTRATE/D5W 8 MG/250ML
0.1 PLASTIC BAG, INJECTION (ML) INTRAVENOUS
Qty: 8 | Refills: 0 | Status: DISCONTINUED | OUTPATIENT
Start: 2019-10-13 | End: 2019-10-15

## 2019-10-13 RX ORDER — HYDROMORPHONE HYDROCHLORIDE 2 MG/ML
0.5 INJECTION INTRAMUSCULAR; INTRAVENOUS; SUBCUTANEOUS EVERY 4 HOURS
Refills: 0 | Status: DISCONTINUED | OUTPATIENT
Start: 2019-10-13 | End: 2019-10-14

## 2019-10-13 RX ORDER — PHENYLEPHRINE HYDROCHLORIDE 10 MG/ML
0.8 INJECTION INTRAVENOUS
Qty: 40 | Refills: 0 | Status: DISCONTINUED | OUTPATIENT
Start: 2019-10-13 | End: 2019-10-13

## 2019-10-13 RX ORDER — CHLORHEXIDINE GLUCONATE 213 G/1000ML
15 SOLUTION TOPICAL
Refills: 0 | Status: DISCONTINUED | OUTPATIENT
Start: 2019-10-13 | End: 2019-10-13

## 2019-10-13 RX ORDER — DEXMEDETOMIDINE HYDROCHLORIDE IN 0.9% SODIUM CHLORIDE 4 UG/ML
0.2 INJECTION INTRAVENOUS
Qty: 200 | Refills: 0 | Status: DISCONTINUED | OUTPATIENT
Start: 2019-10-13 | End: 2019-10-14

## 2019-10-13 RX ORDER — SENNA PLUS 8.6 MG/1
2 TABLET ORAL AT BEDTIME
Refills: 0 | Status: DISCONTINUED | OUTPATIENT
Start: 2019-10-13 | End: 2019-10-13

## 2019-10-13 RX ORDER — NOREPINEPHRINE BITARTRATE/D5W 8 MG/250ML
0.05 PLASTIC BAG, INJECTION (ML) INTRAVENOUS
Qty: 8 | Refills: 0 | Status: DISCONTINUED | OUTPATIENT
Start: 2019-10-13 | End: 2019-10-13

## 2019-10-13 RX ORDER — INSULIN LISPRO 100/ML
VIAL (ML) SUBCUTANEOUS EVERY 6 HOURS
Refills: 0 | Status: DISCONTINUED | OUTPATIENT
Start: 2019-10-13 | End: 2019-10-13

## 2019-10-13 RX ORDER — SODIUM CHLORIDE 9 MG/ML
1000 INJECTION, SOLUTION INTRAVENOUS
Refills: 0 | Status: DISCONTINUED | OUTPATIENT
Start: 2019-10-13 | End: 2019-10-14

## 2019-10-13 RX ORDER — MAGNESIUM SULFATE 500 MG/ML
2 VIAL (ML) INJECTION ONCE
Refills: 0 | Status: COMPLETED | OUTPATIENT
Start: 2019-10-13 | End: 2019-10-13

## 2019-10-13 RX ORDER — INSULIN LISPRO 100/ML
VIAL (ML) SUBCUTANEOUS AT BEDTIME
Refills: 0 | Status: DISCONTINUED | OUTPATIENT
Start: 2019-10-13 | End: 2019-10-13

## 2019-10-13 RX ORDER — INSULIN HUMAN 100 [IU]/ML
3 INJECTION, SOLUTION SUBCUTANEOUS
Qty: 100 | Refills: 0 | Status: DISCONTINUED | OUTPATIENT
Start: 2019-10-13 | End: 2019-10-13

## 2019-10-13 RX ORDER — VANCOMYCIN HCL 1 G
1000 VIAL (EA) INTRAVENOUS EVERY 12 HOURS
Refills: 0 | Status: DISCONTINUED | OUTPATIENT
Start: 2019-10-13 | End: 2019-10-13

## 2019-10-13 RX ORDER — VANCOMYCIN HCL 1 G
1000 VIAL (EA) INTRAVENOUS EVERY 12 HOURS
Refills: 0 | Status: DISCONTINUED | OUTPATIENT
Start: 2019-10-13 | End: 2019-10-15

## 2019-10-13 RX ORDER — DEXTROSE 50 % IN WATER 50 %
50 SYRINGE (ML) INTRAVENOUS ONCE
Refills: 0 | Status: DISCONTINUED | OUTPATIENT
Start: 2019-10-13 | End: 2019-10-13

## 2019-10-13 RX ORDER — ENOXAPARIN SODIUM 100 MG/ML
40 INJECTION SUBCUTANEOUS DAILY
Refills: 0 | Status: DISCONTINUED | OUTPATIENT
Start: 2019-10-13 | End: 2019-10-13

## 2019-10-13 RX ORDER — NYSTATIN 500MM UNIT
500000 POWDER (EA) MISCELLANEOUS
Refills: 0 | Status: DISCONTINUED | OUTPATIENT
Start: 2019-10-13 | End: 2019-10-23

## 2019-10-13 RX ORDER — ENOXAPARIN SODIUM 100 MG/ML
40 INJECTION SUBCUTANEOUS DAILY
Refills: 0 | Status: DISCONTINUED | OUTPATIENT
Start: 2019-10-13 | End: 2019-10-23

## 2019-10-13 RX ORDER — CHLORHEXIDINE GLUCONATE 213 G/1000ML
1 SOLUTION TOPICAL DAILY
Refills: 0 | Status: DISCONTINUED | OUTPATIENT
Start: 2019-10-13 | End: 2019-10-14

## 2019-10-13 RX ORDER — DOCUSATE SODIUM 100 MG
100 CAPSULE ORAL THREE TIMES A DAY
Refills: 0 | Status: DISCONTINUED | OUTPATIENT
Start: 2019-10-13 | End: 2019-10-13

## 2019-10-13 RX ORDER — DEXTROSE 50 % IN WATER 50 %
12.5 SYRINGE (ML) INTRAVENOUS ONCE
Refills: 0 | Status: DISCONTINUED | OUTPATIENT
Start: 2019-10-13 | End: 2019-10-13

## 2019-10-13 RX ADMIN — Medication 100 MILLIGRAM(S): at 06:11

## 2019-10-13 RX ADMIN — INSULIN HUMAN 3 UNIT(S): 100 INJECTION, SOLUTION SUBCUTANEOUS at 13:44

## 2019-10-13 RX ADMIN — Medication 5.47 MICROGRAM(S)/KG/MIN: at 13:45

## 2019-10-13 RX ADMIN — DEXMEDETOMIDINE HYDROCHLORIDE IN 0.9% SODIUM CHLORIDE 2.92 MICROGRAM(S)/KG/HR: 4 INJECTION INTRAVENOUS at 13:10

## 2019-10-13 RX ADMIN — Medication 50 GRAM(S): at 17:07

## 2019-10-13 RX ADMIN — Medication 3: at 06:09

## 2019-10-13 RX ADMIN — CHLORHEXIDINE GLUCONATE 15 MILLILITER(S): 213 SOLUTION TOPICAL at 17:08

## 2019-10-13 RX ADMIN — PANTOPRAZOLE SODIUM 40 MILLIGRAM(S): 20 TABLET, DELAYED RELEASE ORAL at 18:25

## 2019-10-13 RX ADMIN — PIPERACILLIN AND TAZOBACTAM 25 GRAM(S): 4; .5 INJECTION, POWDER, LYOPHILIZED, FOR SOLUTION INTRAVENOUS at 13:44

## 2019-10-13 RX ADMIN — PIPERACILLIN AND TAZOBACTAM 25 GRAM(S): 4; .5 INJECTION, POWDER, LYOPHILIZED, FOR SOLUTION INTRAVENOUS at 06:11

## 2019-10-13 RX ADMIN — INSULIN HUMAN 3 UNIT(S)/HR: 100 INJECTION, SOLUTION SUBCUTANEOUS at 14:02

## 2019-10-13 RX ADMIN — Medication 250 MILLIGRAM(S): at 17:07

## 2019-10-13 RX ADMIN — Medication 125 MILLILITER(S): at 22:11

## 2019-10-13 RX ADMIN — Medication 0.12 MILLIGRAM(S): at 06:10

## 2019-10-13 RX ADMIN — Medication 40 MILLIGRAM(S): at 06:58

## 2019-10-13 RX ADMIN — PIPERACILLIN AND TAZOBACTAM 25 GRAM(S): 4; .5 INJECTION, POWDER, LYOPHILIZED, FOR SOLUTION INTRAVENOUS at 21:41

## 2019-10-13 RX ADMIN — Medication 100 MILLIGRAM(S): at 21:05

## 2019-10-13 RX ADMIN — PHENYLEPHRINE HYDROCHLORIDE 17.52 MICROGRAM(S)/KG/MIN: 10 INJECTION INTRAVENOUS at 16:30

## 2019-10-13 RX ADMIN — SODIUM CHLORIDE 30 MILLILITER(S): 9 INJECTION, SOLUTION INTRAVENOUS at 13:45

## 2019-10-13 RX ADMIN — HYDROMORPHONE HYDROCHLORIDE 0.5 MILLIGRAM(S): 2 INJECTION INTRAMUSCULAR; INTRAVENOUS; SUBCUTANEOUS at 19:00

## 2019-10-13 RX ADMIN — ENOXAPARIN SODIUM 40 MILLIGRAM(S): 100 INJECTION SUBCUTANEOUS at 13:44

## 2019-10-13 RX ADMIN — Medication 500000 UNIT(S): at 17:08

## 2019-10-13 RX ADMIN — Medication 100 MILLIGRAM(S): at 14:02

## 2019-10-13 RX ADMIN — HYDROMORPHONE HYDROCHLORIDE 0.5 MILLIGRAM(S): 2 INJECTION INTRAMUSCULAR; INTRAVENOUS; SUBCUTANEOUS at 19:12

## 2019-10-13 NOTE — CONSULT NOTE ADULT - ASSESSMENT
69M with pontine CVA c/b vascular dementia, poor med compliance, DM2, HTN, ? COPD, severe PAD s/p LLE fem-pop bypass with vein graft , s/p Left BKA on 7/22/19 (course c/b cardiogenic shock 2/2 NSTEMI s/p intubation extubation with course further c/b pontine stroke) sent by rehab for agitation and AMS compared to baseline found to be tachy and febrile 2/2 sepsis likely due to wet gangerene of the R foot. Hemodynamically stable and saturating 100% on NC when calm. Desats temporarily when agitated.    Recs:  -Elevate HOB to at least 45 degrees  -Oral suctioning and hygiene  -Chest PT  -continuous pulse ox  -Abx for likely osteo per primary team  -vascular surg recs  -wound care    Vignesh Hancock MD/MS  PGY-2 Internal Medicine  [facility]  Pager# [phone] ([facility])/[phone] ([facility])

## 2019-10-13 NOTE — CONSULT NOTE ADULT - NSHPATTENDINGPLANDISCUSS_GEN_ALL_CORE
Plan discussed with cardiology fellow; patient seen and examined.       I was physically present for the key portions of the evaluation and management (E/M) service provided.    I agree with the above history, physical, and plan which I have reviewed and edited where appropriate.

## 2019-10-13 NOTE — PATIENT PROFILE ADULT - PUBLIC BENEFITS
Physical Therapy   Seated Endurance Group    Kev Vasquez   MRN: [id number]   PTA visit #: 1     08/16/17 0945   PT Time Calculation   PT Start Time 0945   PT Stop Time 1030   PT Total Time (min) 45 min   Treatment   Treatment Type Treatment   PT/PTA PTA   PTA Visit Number 1   General   PT Received On 08/16/17   Treatment/Billable Minutes   Therapeutic Activity Group 45   Total Time 45   Patient participated in 45 minute seated high endurance group. The group activity challenged bilateral upper extremity and lower extremity strengthening. In addition, cardiovascular and functional endurance were challenged during this group.     Patient completed 20 reps x 5 sets bicep curls, side raises, shoulder flexion, shoulder extension (in UE circuit)    Patient completed 20 reps x 5 sets hip flexion, knee flexion, knee extension, toe and heel taps (in LE circuit)    - Alternating punches, side punches, diagonal reaches x 20 reps    Pt required a few short rest breaks during therapeutic exercises. These activities were performed in a group setting to encourage participation with peers and social interaction skills.       She Benavides, PTA  8/16/2017     no

## 2019-10-13 NOTE — CHART NOTE - NSCHARTNOTEFT_GEN_A_CORE
POST-OPERATIVE NOTE    Subjective:  Patient is s/p R AVNI kearney for wet gangrene. Recovering in SICU. Still on Levophed, insulin drip, intubated and on PRVC. Currently afebrile    Vital Signs Last 24 Hrs  T(C): 36.6 (13 Oct 2019 11:54), Max: 38 (12 Oct 2019 20:30)  T(F): 97.9 (13 Oct 2019 11:54), Max: 100.4 (12 Oct 2019 20:30)  HR: 59 (13 Oct 2019 15:45) (56 - 103)  BP: 97/63 (13 Oct 2019 13:45) (83/54 - 128/76)  BP(mean): 75 (13 Oct 2019 13:45) (65 - 92)  RR: 14 (13 Oct 2019 15:45) (10 - 26)  SpO2: 100% (13 Oct 2019 15:45) (97% - 100%)  I&O's Detail    13 Oct 2019 07:01  -  13 Oct 2019 16:12  --------------------------------------------------------  IN:    dexmedetomidine Infusion: 28 mL    insulin regular Infusion: 6 mL    IV PiggyBack: 100 mL    lactated ringers.: 90 mL    norepinephrine Infusion: 13.2 mL  Total IN: 237.2 mL    OUT:    Indwelling Catheter - Urethral: 430 mL  Total OUT: 430 mL    Total NET: -192.8 mL        clindamycin IVPB 300  nystatin    Suspension 389808  piperacillin/tazobactam IVPB.. 3.375  vancomycin  IVPB 1000  clindamycin IVPB 300  enoxaparin Injectable 40  norepinephrine Infusion 0.05  nystatin    Suspension [id number]  piperacillin/tazobactam IVPB.. 3.375  vancomycin  IVPB 1000    PAST MEDICAL & SURGICAL HISTORY:  Hyperlipidemia  Hypertension  Diabetes  H/O mastoidectomy        Physical Exam:  General: ill-appearing  Pulmonary: intubated and on vent  Abdominal: soft, ND  Extremities: RLE dressing in place c/d/i, no strikethrough      LABS:                        8.7    17.92 )-----------( 308      ( 13 Oct 2019 12:14 )             27.6     10-13    145  |  111<H>  |  30<H>  ----------------------------<  291<H>  4.1   |  23  |  1.02    Ca    8.2<L>      13 Oct 2019 12:14    TPro  7.4  /  Alb  2.0<L>  /  TBili  0.5  /  DBili  x   /  AST  31  /  ALT  51<H>  /  AlkPhos  153<H>  10-12    PT/INR - ( 13 Oct 2019 12:14 )   PT: 19.5 sec;   INR: 1.68 ratio         PTT - ( 13 Oct 2019 12:14 )  PTT:30.2 sec  CAPILLARY BLOOD GLUCOSE      POCT Blood Glucose.: 240 mg/dL (13 Oct 2019 14:56)  POCT Blood Glucose.: 298 mg/dL (13 Oct 2019 05:41)      Radiology and Additional Studies: none    Assessment:  The patient is a 69y male who is now several hours post-op from R below knee [facility] for wet gangrene    Plan:  - Pain control/sedation as needed  - DVT ppx  - F/u labs, gases  - Wean drips as appropriate  - BID WTD dressing changes starting tomorrow  - Care per SICU, see primary notes

## 2019-10-13 NOTE — PRE-ANESTHESIA EVALUATION ADULT - NSANTHPMHFT_GEN_ALL_CORE
70 yo M with pontine CVA c/b vascular dementia, poor med compliance, DM2, HTN, ?COPD, severe PAD s/p LLE fem-pop bypass with vein graft , s/p Left BKA on 7/22/19 (course c/b cardiogenic shock 2/2 NSTEMI s/p intubation extubation with course further c/b pontine stroke), dilated HF (EF 20-25%) presenting in sepsis due to gangrene of the R foot, requiring urgent R guillotine today.

## 2019-10-13 NOTE — PROGRESS NOTE ADULT - ASSESSMENT
Pt is a 69M with extensive LE vaso-occlusive disease, prior LLE AKA, who was brought in from his nursing home facility with sepsis likely secondary to his chronically necrotic RLE. The patient was febrile and tachycardic on admission, AMS. Admitted for sepsis with concern of wet gangrene    PLAN:  - NPO, IVF; plan for OR this AM, anesthesia aware, case added and nephew consented  - Broad spectrum antibiotics with Zosyn, clinda, vanc  - Trending lactate, labs, exam, vitals  - cards consulted for overload, appreciate recs    #[id number] Vascular

## 2019-10-13 NOTE — CONSULT NOTE ADULT - SUBJECTIVE AND OBJECTIVE BOX
CHIEF COMPLAINT:     HPI:  VASCULAR SURGERY CONSULT NOTE    Mr. Erik Olivares is a 69 year old gentleman with pmhx of T2DM, HTN, CVA, NSTEMI complicated by cardiogenic shock, significant PVD s/p LLE fem-pop bypass with subsequent LLE BKA (07/2019, Dr. MARILEE Barriga), LLE AKA (09/2019, Dr. MARILEE Roper), known chronically ischemic RLE who was brought in from his nursing facility        Denies fevers, cough, LOC, CP, SOB.      70 yo male with multiple medical issues including sig vascular disease s/p left leg amputation, heart disease, CHF, dementia sent from rehab for reported elevated heart rate. limited history avaialble. per report pt is at mental status baseline    Pt was seen and examined by me, 69M with minimal ability to take history as pt is has dementia, altered and uncooperative. Per chart review, hx of poor med compliance, DM2, HTN, PAD s/p LLE fem-pop bypass with vein graft , s/p Left BKA on 7/22/19, [facility] CCU for NSTEMI complicated by cardiogenic shock and acute respiratory failure with hypoxia requiring intubation and subsequent extubation. The hospital course also notable for concern for pontine stroke possibly seen on CT head, dysphagia initially requiring NGT feeds but later passed MBS with improved mental status placed on puree diet. Pt presents altered today likely 2/2 sepsis with right gangrenous foul smelling foot, seen by vascular in the past and did not recommend BKA at that time. Today presented from nursing facility with minimal information, was agitated, initially called for ?HR, here altered likely 2/2 infectious etiology, unclear if hes on oxygen at home but is currently on 4L here, sats in 90%, tachy to 100 rectal temp of 100.4. Will continue to monitor carefully. (12 Oct 2019 23:02)    Patient does not respond or follow commands on exam. Patient was saturating 100% on NC at 4L.       FAMILY HISTORY:  FHx: diabetes mellitus: In all siblings(2 sisters and 4 brothers)      SOCIAL HISTORY:  Comes from rehab after complicated hospitalization course for his L BTKA.     Allergies    No Known Allergies    Intolerances        HOME MEDICATIONS:    REVIEW OF SYSTEMS:  Constitutional:   Eyes:  ENT:  CV:  Resp:  GI:  :  MSK:  Integumentary:  Neurological:  Psychiatric:  Endocrine:  Hematologic/Lymphatic:  Allergic/Immunologic:  [ ] All other systems negative  [ x] Unable to assess ROS because AMS, nonverbal    OBJECTIVE:  ICU Vital Signs Last 24 Hrs  T(C): 36.4 (13 Oct 2019 01:06), Max: 38 (12 Oct 2019 20:30)  T(F): 97.6 (13 Oct 2019 01:06), Max: 100.4 (12 Oct 2019 20:30)  HR: 98 (13 Oct 2019 01:06) (89 - 103)  BP: 119/81 (13 Oct 2019 01:06) (112/69 - 128/76)  BP(mean): --  ABP: --  ABP(mean): --  RR: 22 (13 Oct 2019 01:06) (10 - 26)  SpO2: 97% (13 Oct 2019 01:06) (97% - 100%)        CAPILLARY BLOOD GLUCOSE      PHYSICAL EXAM:  GENERAL: in mild distress  HEAD:  Atraumatic. Pursed lips and temporal wasting. Crusted secretions inside his mouth  EYES: EOMI, PERRLA, conjunctiva and sclera clear  NECK: Supple, No JVD  CHEST/LUNG: Clear to auscultation bilaterally; No wheeze  HEART: NL s1s2, regular rate and rhythm; No murmurs, rubs, or gallops  ABDOMEN: Soft, Nontender, Nondistended; Bowel sounds present  EXTREMITIES:  L BTKA w/t mild fluctuance on posterior aspect of stump. R dorsal and plantar foot with wet fluctuant gangerene  PSYCH: AAOx0, nonverbal  NEUROLOGY: non-focal  SKIN: flesh colored bumps along bilateral shins      HOSPITAL MEDICATIONS:  MEDICATIONS  (STANDING):  clindamycin IVPB 300 milliGRAM(s) IV Intermittent every 8 hours    MEDICATIONS  (PRN):      LABS:                        9.5    17.43 )-----------( 386      ( 12 Oct 2019 20:57 )             31.8     10-12    143  |  111<H>  |  31<H>  ----------------------------<  336<H>  5.7<H>   |  22  |  1.06    Ca    8.2<L>      12 Oct 2019 23:14    TPro  7.4  /  Alb  2.0<L>  /  TBili  0.5  /  DBili  x   /  AST  31  /  ALT  51<H>  /  AlkPhos  153<H>  10-12    PT/INR - ( 12 Oct 2019 20:57 )   PT: 19.9 sec;   INR: 1.70 ratio         PTT - ( 12 Oct 2019 20:57 )  PTT:28.9 sec    Arterial Blood Gas:  10-12 @ 23:14  7.38/44/44/25/68/.4  ABG lactate: --    Venous Blood Gas:  10-12 @ 20:57  7.39/38/52/23/80  VBG Lactate: 3.6      MICROBIOLOGY:     RADIOLOGY:  [x] Reviewed and interpreted by me  < from: Xray Foot AP + Lateral + Oblique, Right (10.12.19 @ 22:22) >  ******PRELIMINARY REPORT******              INTERPRETATION:  Subcutaneous emphysema tracks along the anterolateral   aspect of the right ankle/leg. No cortical osseous destruction is noted   at this radiograph. If high clinical suspicion for osteomyelitis, MRI is   more sensitive for evaluation.    < end of copied text >      EKG:

## 2019-10-13 NOTE — CONSULT NOTE ADULT - ATTENDING COMMENTS
36 - 40 minutes of critical care management applied as follows:    Patient brought directly from the operating room  Known CHF  Recent left AK amputation now with new right BK amputation for septic gangrene  on arrival post surgical respiratory failure  able to wean down FIO2 but will maintain vent support as patient hemodynamically unstable  has had increasing doses of Phenylphrine up to 2 microgram per kilogram per minute  performed bedside critical care echocardiography showing dilated left ventricle with poor ventricular wall motion  patient has known history of CHF with ejection fraction of 20%  patient is Vanocmycin, Zosyn and Clindamycin for sepsis related to gangrene of extremity

## 2019-10-13 NOTE — CONSULT NOTE ADULT - ASSESSMENT
A/P:     70 y/o M with Hx of PVD, DM type II, HTN, CVA, NSTEMI, L AKA (discharged 10/7), presenting to the [facility] for evaluation of AMS, fevers and tachycardia. Patient found to have xray concerning for gas gangrene now s/p emergent christel MEDRANO in SICU:     NEURO: No active issues at this time   - Precedex for sedation, wean as tolerated   - Tylenol, Dilaudid PRN pain     RESP: Mechanical ventilation s/p resp insufficiency following OR, pulmonary edema   - Mechanical vent 30%/5/14/450   - F/U ABG   - CXR in AM     CARDIAC: CHF, HTN, Hx of NSTEMI   - Hold home medications at this time   - Levophed, wean as tolerated   - Monitor vitals     GI: No active issues at this time   - NPO     Renal: No active issues at this time   - LR @ 30ml/hr   - Monitor intake and outtake   - Fletcher   - F/U BMP, replete electrolytes as needed     Heme: No active issues at this time   - F/U CBC   - Lovenox 40mg q24 hours     ID: R LE gas gangrene, leukocytosis   - Follow WBC   - Vancomycin, Zosyn and Clindamycin   - Monitor for fevers       SICU X [id number]

## 2019-10-13 NOTE — PROGRESS NOTE ADULT - SUBJECTIVE AND OBJECTIVE BOX
Vascular Surgery Progress Note    Subjective: admitted overnight with concern of sepsis from R foot gangrene, this morning seen on rounds and noticeably altered and non-communicative; plan for semi-urgent R guillotine this morning    Exam:    Neuro: Arousable, non-communicative  GA: ill-appearing  Pulm: breathing comfortably  GI: non-distended  Ext: L AKA wound mostly healed; RLE with patches of devitalized tissue primarily over the dorsum, very malodorous and mottled, toes gangrenous, no signals    Vital Signs Last 24 Hrs  T(C): 36.7 (13 Oct 2019 06:27), Max: 38 (12 Oct 2019 20:30)  T(F): 98 (13 Oct 2019 06:27), Max: 100.4 (12 Oct 2019 20:30)  HR: 92 (13 Oct 2019 06:27) (89 - 103)  BP: 121/80 (13 Oct 2019 06:27) (112/69 - 128/76)  BP(mean): --  RR: 20 (13 Oct 2019 06:27) (10 - 26)  SpO2: 97% (13 Oct 2019 06:27) (97% - 100%)    I&O's Detail  MEDICATIONS  (STANDING):  clindamycin IVPB 300 milliGRAM(s) IV Intermittent every 8 hours  dextrose 5%. 1000 milliLiter(s) (50 mL/Hr) IV Continuous <Continuous>  dextrose 50% Injectable 12.5 Gram(s) IV Push once  dextrose 50% Injectable 25 Gram(s) IV Push once  dextrose 50% Injectable 25 Gram(s) IV Push once  digoxin     Tablet 0.125 milliGRAM(s) Oral daily  enoxaparin Injectable 40 milliGRAM(s) SubCutaneous daily  insulin lispro (HumaLOG) corrective regimen sliding scale   SubCutaneous every 6 hours  lactated ringers. 1000 milliLiter(s) (75 mL/Hr) IV Continuous <Continuous>  piperacillin/tazobactam IVPB. 3.375 Gram(s) IV Intermittent once  piperacillin/tazobactam IVPB.. 3.375 Gram(s) IV Intermittent every 8 hours  vancomycin  IVPB 1000 milliGRAM(s) IV Intermittent every 12 hours    MEDICATIONS  (PRN):  dextrose 40% Gel 15 Gram(s) Oral once PRN Blood Glucose LESS THAN 70 milliGRAM(s)/deciliter  glucagon  Injectable 1 milliGRAM(s) IntraMuscular once PRN Glucose LESS THAN 70 milligrams/deciliter      LABS:                        9.9    17.64 )-----------( 342      ( 13 Oct 2019 03:02 )             32.3     10-13    144  |  109<H>  |  32<H>  ----------------------------<  336<H>  4.7   |  21<L>  |  1.12    Ca    9.0      13 Oct 2019 03:02    TPro  7.4  /  Alb  2.0<L>  /  TBili  0.5  /  DBili  x   /  AST  31  /  ALT  51<H>  /  AlkPhos  153<H>  10-12    PT/INR - ( 12 Oct 2019 20:57 )   PT: 19.9 sec;   INR: 1.70 ratio         PTT - ( 12 Oct 2019 20:57 )  PTT:28.9 sec  LIVER FUNCTIONS - ( 12 Oct 2019 23:14 )  Alb: 2.0 g/dL / Pro: 7.4 g/dL / ALK PHOS: 153 U/L / ALT: 51 U/L / AST: 31 U/L / GGT: x

## 2019-10-13 NOTE — CONSULT NOTE ADULT - SUBJECTIVE AND OBJECTIVE BOX
HISTORY OF PRESENT ILLNESS:    JIM PAREDES is a 69y Male with history of DM II, HTN, CVA, NSTEMI c/b cardiogenic shock, PVD s/p L AKA (discharged on 10/7) presents to [facility] ED from rehab for altered mental status. In ED patient was noted to have leukocytosis of 17, hyperglycemia, and tachycardic. At previous hospital discharge patient was noted to have right LE dry gangrene, however xray of LE upon ED presentation today was significant for emphysematous changes along lateral malleolus. CXR also significant for bilateral pulmonary edema, BNP 35275. 40mg Lasixs was given prior to patient going to OR for emergent R sided guillotine BKA with vascular surgery (EBL minimal, 150 IVF, 0 UOP). During surgery, purulent drainage noted within BKA stump, cultures obtained. Patient was brought to SICU intubated on 0.05 Levophed. VSS.     PAST MEDICAL HISTORY: Hyperlipidemia  Hypertension  Diabetes  No pertinent past medical history      PAST SURGICAL HISTORY: H/O mastoidectomy  No significant past surgical history  No significant past surgical history      FAMILY HISTORY: FHx: diabetes mellitus  No pertinent family history in first degree relatives      SOCIAL HISTORY:    CODE STATUS:     HOME MEDICATIONS:    ALLERGIES: No Known Allergies      VITAL SIGNS:  ICU Vital Signs Last 24 Hrs  T(C): 36.6 (13 Oct 2019 11:54), Max: 38 (12 Oct 2019 20:30)  T(F): 97.9 (13 Oct 2019 11:54), Max: 100.4 (12 Oct 2019 20:30)  HR: 70 (13 Oct 2019 12:15) (61 - 103)  BP: 100/63 (13 Oct 2019 12:00) (100/61 - 128/76)  BP(mean): 75 (13 Oct 2019 12:00) (75 - 76)  ABP: 106/67 (13 Oct 2019 12:15) (103/50 - 106/67)  ABP(mean): 86 (13 Oct 2019 12:15) (67 - 86)  RR: 23 (13 Oct 2019 12:15) (10 - 26)  SpO2: 100% (13 Oct 2019 12:15) (97% - 100%)      NEURO  Exam:      RESPIRATORY  Mechanical Ventilation: Mode: AC/ CMV (Assist Control/ Continuous Mandatory Ventilation), RR (machine): 12, RR (patient): 12, TV (machine): 450, FiO2: 60, PEEP: 5, ITime: 1, MAP: 9, PIP: 23  ABG - ( 13 Oct 2019 11:28 )  pH: 7.48  /  pCO2: 33    /  pO2: 402   / HCO3: 24    / Base Excess: 1.4   /  SaO2: 100     Lactate: x                Exam:      CARDIOVASCULAR  VBG - ( 13 Oct 2019 02:59 )  pH: x     /  pCO2: x     /  pO2: x     / HCO3: x     / Base Excess: x     /  SaO2: x      Lactate: 3.3              Exam:  Cardiac Rhythm:      GI/NUTRITION  Exam:  Diet:  docusate sodium 100 milliGRAM(s) Oral three times a day  senna 2 Tablet(s) Oral at bedtime      GENITOURINARY/RENAL  lactated ringers. 1000 milliLiter(s) IV Continuous <Continuous>        10-13    145  |  111<H>  |  30<H>  ----------------------------<  291<H>  4.1   |  23  |  1.02    Ca    8.2<L>      13 Oct 2019 12:14    TPro  7.4  /  Alb  2.0<L>  /  TBili  0.5  /  DBili  x   /  AST  31  /  ALT  51<H>  /  AlkPhos  153<H>  10-12    [ ] Fletcher catheter, indication: urine output monitoring in critically ill patient    HEMATOLOGIC  [ ] VTE Prophylaxis:  enoxaparin Injectable 40 milliGRAM(s) SubCutaneous daily                          8.7    17.92 )-----------( 308      ( 13 Oct 2019 12:14 )             27.6     PT/INR - ( 12 Oct 2019 20:57 )   PT: 19.9 sec;   INR: 1.70 ratio         PTT - ( 12 Oct 2019 20:57 )  PTT:28.9 sec  Transfusion: [ ] PRBC	[ ] Platelets	[ ] FFP	[ ] Cryoprecipitate      INFECTIOUS DISEASES  clindamycin IVPB 300 milliGRAM(s) IV Intermittent every 8 hours  piperacillin/tazobactam IVPB.. 3.375 Gram(s) IV Intermittent every 8 hours  vancomycin  IVPB 1000 milliGRAM(s) IV Intermittent every 12 hours    RECENT CULTURES:      ENDOCRINE    CAPILLARY BLOOD GLUCOSE      POCT Blood Glucose.: 298 mg/dL (13 Oct 2019 05:41)      PATIENT CARE ACCESS DEVICES:  [ ] Peripheral IV  [ ] Central Venous Line	[ ] R	[ ] L	[ ] IJ	[ ] Fem	[ ] SC	Placed:   [ ] Arterial Line		[ ] R	[ ] L	[ ] Fem	[ ] Rad	[ ] Ax	Placed:   [ ] PICC:					[ ] Mediport  [ ] Urinary Catheter, Date Placed:   [x] Necessity of urinary, arterial, and venous catheters discussed    OTHER MEDICATIONS: chlorhexidine 0.12% Liquid 15 milliLiter(s) Oral Mucosa two times a day  chlorhexidine 0.12% Liquid 15 milliLiter(s) Oral Mucosa every 12 hours  chlorhexidine 2% Cloths 1 Application(s) Topical daily      IMAGING STUDIES: HISTORY OF PRESENT ILLNESS:    JIM PARDEES is a 69y Male with history of DM II, HTN, CVA, NSTEMI c/b cardiogenic shock, PVD s/p L AKA (discharged on 10/7) presents to [facility] ED from rehab for altered mental status. In ED patient was noted to have leukocytosis of 17, hyperglycemia, and tachycardic. At previous hospital discharge patient was noted to have right LE dry gangrene, however xray of LE upon ED presentation today was significant for emphysematous changes along lateral malleolus. CXR also significant for bilateral pulmonary edema, BNP 63038. 40mg Lasixs was given prior to patient going to OR for emergent R sided guillotine BKA with vascular surgery (EBL minimal, 150 IVF, 0 UOP). During surgery, purulent drainage noted within BKA stump, cultures obtained. Patient was brought to SICU intubated on 0.05 Levophed.     PAST MEDICAL HISTORY: Hyperlipidemia  Hypertension  Diabetes  No pertinent past medical history      PAST SURGICAL HISTORY: H/O mastoidectomy  No significant past surgical history  No significant past surgical history      FAMILY HISTORY: FHx: diabetes mellitus  No pertinent family history in first degree relatives      ALLERGIES: No Known Allergies      VITAL SIGNS:  ICU Vital Signs Last 24 Hrs  T(C): 36.6 (13 Oct 2019 11:54), Max: 38 (12 Oct 2019 20:30)  T(F): 97.9 (13 Oct 2019 11:54), Max: 100.4 (12 Oct 2019 20:30)  HR: 70 (13 Oct 2019 12:15) (61 - 103)  BP: 100/63 (13 Oct 2019 12:00) (100/61 - 128/76)  BP(mean): 75 (13 Oct 2019 12:00) (75 - 76)  ABP: 106/67 (13 Oct 2019 12:15) (103/50 - 106/67)  ABP(mean): 86 (13 Oct 2019 12:15) (67 - 86)  RR: 23 (13 Oct 2019 12:15) (10 - 26)  SpO2: 100% (13 Oct 2019 12:15) (97% - 100%)      NEURO  Exam: Sedated on mechanical ventilation, no acute distress     RESPIRATORY  Mechanical Ventilation: Mode: AC/ CMV (Assist Control/ Continuous Mandatory Ventilation), RR (machine): 12, RR (patient): 12, TV (machine): 450, FiO2: 60, PEEP: 5, ITime: 1, MAP: 9, PIP: 23  ABG - ( 13 Oct 2019 11:28 )  pH: 7.48  /  pCO2: 33    /  pO2: 402   / HCO3: 24    / Base Excess: 1.4   /  SaO2: 100     Lactate: x   Exam: ET in place, non labored respirations.       CARDIOVASCULAR  VBG - ( 13 Oct 2019 02:59 )  pH: x     /  pCO2: x     /  pO2: x     / HCO3: x     / Base Excess: x     /  SaO2: x      Lactate: 3.3    Exam: Regular rate and rhythm   Cardiac Rhythm:      GI/NUTRITION  Exam: Soft, non tender, non distended,   Diet: NPO   docusate sodium 100 milliGRAM(s) Oral three times a day  senna 2 Tablet(s) Oral at bedtime      GENITOURINARY/RENAL  lactated ringers. 1000 milliLiter(s) IV Continuous <Continuous>        10-13    145  |  111<H>  |  30<H>  ----------------------------<  291<H>  4.1   |  23  |  1.02    Ca    8.2<L>      13 Oct 2019 12:14    TPro  7.4  /  Alb  2.0<L>  /  TBili  0.5  /  DBili  x   /  AST  31  /  ALT  51<H>  /  AlkPhos  153<H>  10-12    [ ] Fletcher catheter, indication: urine output monitoring in critically ill patient    HEMATOLOGIC  [ ] VTE Prophylaxis: Lovenox   enoxaparin Injectable 40 milliGRAM(s) SubCutaneous daily                          8.7    17.92 )-----------( 308      ( 13 Oct 2019 12:14 )             27.6     PT/INR - ( 12 Oct 2019 20:57 )   PT: 19.9 sec;   INR: 1.70 ratio         PTT - ( 12 Oct 2019 20:57 )  PTT:28.9 sec  Transfusion: [ ] PRBC	[ ] Platelets	[ ] FFP	[ ] Cryoprecipitate      INFECTIOUS DISEASES  clindamycin IVPB 300 milliGRAM(s) IV Intermittent every 8 hours  piperacillin/tazobactam IVPB.. 3.375 Gram(s) IV Intermittent every 8 hours  vancomycin  IVPB 1000 milliGRAM(s) IV Intermittent every 12 hours      CAPILLARY BLOOD GLUCOSE  POCT Blood Glucose.: 298 mg/dL (13 Oct 2019 05:41)      PATIENT CARE ACCESS DEVICES:  [ ] Peripheral IV  [ ] Central Venous Line	[ ] R	[ ] L	[ ] IJ	[ ] Fem	[ ] SC	Placed:   [ ] Arterial Line		[ ] R	[ ] L	[ ] Fem	[ ] Rad	[ ] Ax	Placed:   [ ] PICC:					[ ] Mediport  [ ] Urinary Catheter, Date Placed:   [x] Necessity of urinary, arterial, and venous catheters discussed    OTHER MEDICATIONS: chlorhexidine 0.12% Liquid 15 milliLiter(s) Oral Mucosa two times a day  chlorhexidine 0.12% Liquid 15 milliLiter(s) Oral Mucosa every 12 hours  chlorhexidine 2% Cloths 1 Application(s) Topical daily

## 2019-10-13 NOTE — CONSULT NOTE ADULT - SUBJECTIVE AND OBJECTIVE BOX
Patient seen and evaluated at bedside    Chief Complaint:    HPI:  Interval information was obtained from the patient's nephew who was present.    Mr. Erik Olivares is a 69 year old gentleman with pmhx of T2DM, HTN, CVA, NSTEMI complicated by cardiogenic shock, significant PVD s/p LLE fem-pop bypass (vein graft) with subsequent LLE BKA (07/2019, Dr. MARILEE Barriga), LLE AKA (09/2019, Dr. MARILEE Roper), known chronically ischemic RLE who was brought in from his nursing facility with progressively altered mentation, fevers and tachycardia. In the ED, the patient was agitated, required supplemental O2, and was tachycardic. (12 Oct 2019 23:02)      PMHx:   Hyperlipidemia  Hypertension  Diabetes  No pertinent past medical history      PSHx:   H/O mastoidectomy  No significant past surgical history  No significant past surgical history      Allergies:  No Known Allergies      Home Meds:    Current Medications:   clindamycin IVPB 300 milliGRAM(s) IV Intermittent every 8 hours  dextrose 40% Gel 15 Gram(s) Oral once PRN  dextrose 5%. 1000 milliLiter(s) IV Continuous <Continuous>  dextrose 50% Injectable 12.5 Gram(s) IV Push once  dextrose 50% Injectable 25 Gram(s) IV Push once  dextrose 50% Injectable 25 Gram(s) IV Push once  digoxin     Tablet 0.125 milliGRAM(s) Oral daily  enoxaparin Injectable 40 milliGRAM(s) SubCutaneous daily  glucagon  Injectable 1 milliGRAM(s) IntraMuscular once PRN  insulin lispro (HumaLOG) corrective regimen sliding scale   SubCutaneous every 6 hours  lactated ringers. 1000 milliLiter(s) IV Continuous <Continuous>  piperacillin/tazobactam IVPB. 3.375 Gram(s) IV Intermittent once  piperacillin/tazobactam IVPB.. 3.375 Gram(s) IV Intermittent every 8 hours  vancomycin  IVPB 1000 milliGRAM(s) IV Intermittent every 12 hours      FAMILY HISTORY:  FHx: diabetes mellitus: In all siblings(2 sisters and 4 brothers)      Social History:  Smoking History:  Alcohol Use:  Drug Use:    REVIEW OF SYSTEMS:  CONSTITUTIONAL: No weakness, fevers or chills  EYES/ENT: No visual changes;  No dysphagia  NECK: No pain or stiffness  RESPIRATORY: No cough, wheezing, hemoptysis; No shortness of breath  CARDIOVASCULAR: No chest pain or palpitations; No lower extremity edema  GASTROINTESTINAL: No abdominal or epigastric pain. No nausea, vomiting, or hematemesis; No diarrhea or constipation. No melena or hematochezia.  BACK: No back pain  GENITOURINARY: No dysuria, frequency or hematuria  NEUROLOGICAL: No numbness or weakness  SKIN: No itching, burning, rashes, or lesions   All other review of systems is negative unless indicated above.    Physical Exam:  T(F): 98 (10-13), Max: 100.4 (10-12)  HR: 92 (10-13) (89 - 103)  BP: 121/80 (10-13) (112/69 - 128/76)  RR: 20 (10-13)  SpO2: 97% (10-13)  GENERAL: No acute distress, well-developed  HEAD:  Atraumatic, Normocephalic  ENT: EOMI, PERRLA, conjunctiva and sclera clear, Neck supple, No JVD, moist mucosa  CHEST/LUNG: Clear to auscultation bilaterally; No wheeze, equal breath sounds bilaterally   BACK: No spinal tenderness  HEART: Regular rate and rhythm; No murmurs, rubs, or gallops  ABDOMEN: Soft, Nontender, Nondistended; Bowel sounds present  EXTREMITIES:  No clubbing, cyanosis, or edema  PSYCH: Nl behavior, nl affect  NEUROLOGY: AAOx3, non-focal, cranial nerves intact  SKIN: Normal color, No rashes or lesions  LINES:    Cardiovascular Diagnostic Testing:    ECG: Personally reviewed:    Echo: Personally reviewed:    Stress Testing:    Cath:    Imaging:    CXR: Personally reviewed    Labs: Personally reviewed                        9.9    17.64 )-----------( 342      ( 13 Oct 2019 03:02 )             32.3     10-13    144  |  109<H>  |  32<H>  ----------------------------<  336<H>  4.7   |  21<L>  |  1.12    Ca    9.0      13 Oct 2019 03:02    TPro  7.4  /  Alb  2.0<L>  /  TBili  0.5  /  DBili  x   /  AST  31  /  ALT  51<H>  /  AlkPhos  153<H>  10-12    PT/INR - ( 12 Oct 2019 20:57 )   PT: 19.9 sec;   INR: 1.70 ratio         PTT - ( 12 Oct 2019 20:57 )  PTT:28.9 sec          Serum Pro-Brain Natriuretic Peptide: 57789 pg/mL (10-12 @ 23:14) Patient seen and evaluated at bedside    Chief Complaint: Altered mental status    HPI:  Information obtained from chart, patient altered and not responsive on exam.  70 yo M with pontine CVA c/b vascular dementia, poor med compliance, DM2, HTN, ?COPD, severe PAD s/p LLE fem-pop bypass with vein graft , s/p Left BKA on 7/22/19 (course c/b cardiogenic shock 2/2 NSTEMI s/p intubation extubation with course further c/b pontine stroke), dilated HF (EF 20-25%) presenting in sepsis due to gangrene of the R foot, requiring urgent R guillotine today. Cardiology requested for pre-op risk stratification and volume management. Patient noted to be in acute heart failure on admission with pulmonary edema, treating sepsis with IVFs and antibiotics.  Patient with complicated cardiac history, was recently admitted 7/2019 with NSTEMI and cardiogenic shock requiring admit to CCU, medically managed without ischemic evaluation. TTE obtained with EF 20-25%, dilated cardiomyopathy. Was discharged home on aspirin, Plavix, beta blocker and losartan.    PMHx:   Hyperlipidemia  Hypertension  Diabetes  No pertinent past medical history      PSHx:   H/O mastoidectomy  No significant past surgical history  No significant past surgical history      Allergies:  No Known Allergies    Home Meds:  · 	ascorbic acid 500 mg oral tablet: 1 tab(s) orally once a day  · 	Multiple Vitamins oral tablet: 1 tab(s) orally once a day  · 	digoxin 125 mcg (0.125 mg) oral tablet: 1 tab(s) orally once a day  · 	atorvastatin 10 mg oral tablet: 1 tab(s) orally once a day (at bedtime)  · 	melatonin 3 mg oral tablet: 1 tab(s) orally once a day (at bedtime), As needed, Insomnia  · 	aspirin 81 mg oral tablet, chewable: 1 tab(s) orally once a day    Current Medications:   clindamycin IVPB 300 milliGRAM(s) IV Intermittent every 8 hours  dextrose 40% Gel 15 Gram(s) Oral once PRN  dextrose 5%. 1000 milliLiter(s) IV Continuous <Continuous>  dextrose 50% Injectable 12.5 Gram(s) IV Push once  dextrose 50% Injectable 25 Gram(s) IV Push once  dextrose 50% Injectable 25 Gram(s) IV Push once  digoxin     Tablet 0.125 milliGRAM(s) Oral daily  enoxaparin Injectable 40 milliGRAM(s) SubCutaneous daily  glucagon  Injectable 1 milliGRAM(s) IntraMuscular once PRN  insulin lispro (HumaLOG) corrective regimen sliding scale   SubCutaneous every 6 hours  lactated ringers. 1000 milliLiter(s) IV Continuous <Continuous>  piperacillin/tazobactam IVPB. 3.375 Gram(s) IV Intermittent once  piperacillin/tazobactam IVPB.. 3.375 Gram(s) IV Intermittent every 8 hours  vancomycin  IVPB 1000 milliGRAM(s) IV Intermittent every 12 hours    FAMILY HISTORY:  FHx: diabetes mellitus: In all siblings(2 sisters and 4 brothers)    Social History:  Unable to obtain    REVIEW OF SYSTEMS:  Unable to obtain secondary to altered mental status    Physical Exam:  T(F): 98 (10-13), Max: 100.4 (10-12)  HR: 92 (10-13) (89 - 103)  BP: 121/80 (10-13) (112/69 - 128/76)  RR: 20 (10-13)  SpO2: 97% (10-13)  GENERAL: Looks unwell, unresponsive, tachypneic on NC  HEAD:  Atraumatic  ENT: EOMI, conjunctiva and sclera clear, unable to appreciate JVP secondary to patient positioning  CHEST/LUNG: Diffuse crackles bilaterally anteriorly  HEART: Tachycardic, +S1, S2, no murmurs, possible S3  EXTREMITIES:  1+ pitting edema of RLE with wet gangrene of foot  NEUROLOGY: Unable to perform neuro exam  SKIN: Purulent malodorous drainage from R foot    Cardiovascular Diagnostic Testing:  ECG:  Sinus rhythm with LBBB, unable to assess ST changes with LBBB    Echo:  < from: Transthoracic Echocardiogram (07.06.19 @ 09:48) >  Conclusions:  1. Mild mitral regurgitation.  2. Moderately dilated left atrium.  LA volume index = 47  cc/m2.  The interatrial setpum is bowed towards the right  suggesting elevated left atrial pressure.  3. The left ventricle is moderately dilated.  4. There is global hypokineiss with minor regional  variation.  The LVEF about 20-25%.  5. Normal right ventricular size and function.  6. Normal tricuspid valve. No tricuspid regurgitation.  7. There is no pericardial effusion.  8. There is a left pleural effusion.  There are no prior studies available for comparison.    < end of copied text >    Stress Testing: None    Cath: None    Imaging:  CXR:  < from: Xray Chest 1 View AP/PA (10.12.19 @ 22:00) >  INTERPRETATION:  Pulmonary edema.    < end of copied text >    Labs: Personally reviewed                        9.9    17.64 )-----------( 342      ( 13 Oct 2019 03:02 )             32.3     10-13    144  |  109<H>  |  32<H>  ----------------------------<  336<H>  4.7   |  21<L>  |  1.12    Ca    9.0      13 Oct 2019 03:02    TPro  7.4  /  Alb  2.0<L>  /  TBili  0.5  /  DBili  x   /  AST  31  /  ALT  51<H>  /  AlkPhos  153<H>  10-12    PT/INR - ( 12 Oct 2019 20:57 )   PT: 19.9 sec;   INR: 1.70 ratio         PTT - ( 12 Oct 2019 20:57 )  PTT:28.9 sec          Serum Pro-Brain Natriuretic Peptide: 36657 pg/mL (10-12 @ 23:14) For all Cardiology service contact information, go to [email or web address] and use "[email or web address]" to login.     HPI:  Information obtained from chart, patient with altered mental status and not responsive on exam.    70 yo M with pontine CVA c/b vascular dementia, poor med compliance, DM2, HTN & ?COPD.  Hx. of severe PAD s/p LLE fem-pop bypass with vein graft, s/p Left BKA on 19.  Course complicated by pontine stroke and N-STEMI and cardiogenic shock, requiring stay in CCU; he was medically managed without ischemic evaluation. TTE obtained with EF 20-25%, dilated cardiomyopathy. Was discharged home on aspirin, Plavix, beta blocker and losartan.    Now presenting in sepsis, due to gangrene of the R foot, requiring urgent R guillotine today.   Cardiology requested for pre-op risk stratification and volume management. Patient noted to be in acute heart failure on admission with pulmonary edema, and being treated for sepsis with IVFs and antibiotics.      PMHx:   Hyperlipidemia  Hypertension  Diabetes    PSHx:   H/O mastoidectomy  LLE fem-pop bypass  L BKA      Allergies:  No Known Allergies    Home Meds:  · 	ascorbic acid 500 mg oral tablet: 1 tab(s) orally once a day  · 	Multiple Vitamins oral tablet: 1 tab(s) orally once a day  · 	digoxin 125 mcg (0.125 mg) oral tablet: 1 tab(s) orally once a day  · 	atorvastatin 10 mg oral tablet: 1 tab(s) orally once a day (at bedtime)  · 	melatonin 3 mg oral tablet: 1 tab(s) orally once a day (at bedtime), As needed, Insomnia  · 	aspirin 81 mg oral tablet, chewable: 1 tab(s) orally once a day    Current Medications:   clindamycin IVPB 300 milliGRAM(s) IV Intermittent every 8 hours  dextrose 40% Gel 15 Gram(s) Oral once PRN  dextrose 5%. 1000 milliLiter(s) IV Continuous <Continuous>  dextrose 50% Injectable 12.5 Gram(s) IV Push once  dextrose 50% Injectable 25 Gram(s) IV Push once  dextrose 50% Injectable 25 Gram(s) IV Push once  digoxin     Tablet 0.125 milliGRAM(s) Oral daily  enoxaparin Injectable 40 milliGRAM(s) SubCutaneous daily  glucagon  Injectable 1 milliGRAM(s) IntraMuscular once PRN  insulin lispro (HumaLOG) corrective regimen sliding scale   SubCutaneous every 6 hours  lactated ringers. 1000 milliLiter(s) IV Continuous <Continuous>  piperacillin/tazobactam IVPB. 3.375 Gram(s) IV Intermittent once  piperacillin/tazobactam IVPB.. 3.375 Gram(s) IV Intermittent every 8 hours  vancomycin  IVPB 1000 milliGRAM(s) IV Intermittent every 12 hours    FAMILY HISTORY:  FHx: diabetes mellitus: In all siblings(2 sisters and 4 brothers)    Social History:  Unable to obtain    REVIEW OF SYSTEMS:  Unable to obtain secondary to altered mental status    Physical Exam:  T(F): 98 (10-13), Max: 100.4 (10-12)  HR: 92 (10-) (89 - 103)  BP: 121/80 (10-13) (112/69 - 128/76)  RR: 20 (10-13)  SpO2: 97% (10-13)    GENERAL: Looks unwell, unresponsive, tachypneic on NC  HEAD:  Atraumatic  ENT: EOMI, conjunctiva and sclera clear, unable to appreciate JVP secondary to patient positioning  CHEST/LUNG: Diffuse crackles bilaterally anteriorly  HEART: Tachycardic, +S1, S2, no murmurs, possible S3  EXTREMITIES:  1+ pitting edema of RLE with wet gangrene of foot  NEUROLOGY: Unable to perform neuro exam  SKIN: Purulent malodorous drainage from R foot      Cardiovascular Diagnostic Testin Lead ECG (10.12.19 @ 21:16)  NSR at 99 BPM   P-R Interval 184 ms, QRS Duration 134 ms, Q-T Interval 368 ms   Axis -37 degrees    LAD, POSSIBLE LEFT ATRIAL ENLARGEMENT, LEFT AXIS DEVIATION, LEFT BUNDLE BRANCH BLOCK      Echo:  < from: Transthoracic Echocardiogram (19 @ 09:48) >  Conclusions:  1. Mild mitral regurgitation.  2. Moderately dilated left atrium.  LA volume index = 47 cc/m2. The interatrial setpum is bowed towards the right suggesting elevated left atrial pressure.  3. The left ventricle is moderately dilated.   4. There is global hypokinesis with minor regional variation.  The LVEF about 20-25%.  5. Normal right ventricular size and function.  6. Normal tricuspid valve. No tricuspid regurgitation.  7. There is no pericardial effusion.  8. There is a left pleural effusion.  There are no prior studies available for comparison.        Stress Testing: None    Cath: None    Xray Chest 1 View AP/PA (10.12.19 @ 22:00) >  EXAM:  XR CHEST AP OR PA 1V                        PROCEDURE DATE:  10/12/2019      INTERPRETATION:  Clinical information: Shortness of breath.  One view of the chest was obtained.  Comparison made to chest radiograph 2019 and 2019.    Findings:  Limited views. Pulmonary edema. No pneumothorax. The heart is enlarged. No acute osseous findings.    Impression: Pulmonary edema.    LLOYD POOL M.D., RADIOLOGY RESIDENT  This document has been electronically signed.  SAMANTHA WASHINGTON M.D., ATTENDING RADIOLOGIST  This document has been electronically signed. Oct 13 2019 10:18AM      Labs:                      9.9    17.64 )-----------( 342      ( 13 Oct 2019 03:02 )             32.3     10-13  144  |  109<H>  |  32<H>  ----------------------------<  336<H>  4.7   |  21<L>  |  1.12    Ca    9.0      13 Oct 2019 03:02    TPro  7.4  /  Alb  2.0<L>  /  TBili  0.5  /  DBili  x   /  AST  31  /  ALT  51<H>  /  AlkPhos  153<H>  10-12    PT/INR - ( 12 Oct 2019 20:57 )   PT: 19.9 sec;   INR: 1.70 ratio    PTT - ( 12 Oct 2019 20:57 )  PTT:28.9 sec    Serum Pro-Brain Natriuretic Peptide: 91254 pg/mL (10-12 @ 23:14)

## 2019-10-13 NOTE — CONSULT NOTE ADULT - ATTENDING COMMENTS
69M with complex medical history resulting in severe reduction of functional status, most notably:  with pontine CVA ,dementia, PAD s/p LLE fem-pop bypass with vein graft , s/p Left BKA on 7/22/19, cardiogenic shock, DM2, HTN, COPD. Sent by rehab for agitation and AMS compared to baseline.  Found to be septic.  On exam concerning for RLE foot infection.  Concern for airway protection. [place] currently patient is maintaining 100% fio2 on 4L, not hemodynamicly unstable and not in distress.   - No need For ICU level of care.  - Avoid over sedation, maintain aspiration precautions  - broad spectrum abx and vascular eval.    reconsult PRN

## 2019-10-13 NOTE — CONSULT NOTE ADULT - ASSESSMENT
68 yo M with pontine CVA c/b vascular dementia, poor med compliance, DM2, HTN, ?COPD, severe PAD s/p LLE fem-pop bypass with vein graft , s/p Left BKA on 7/22/19 (course c/b cardiogenic shock 2/2 NSTEMI s/p intubation extubation with course further c/b pontine stroke), dilated HF (EF 20-25%) presenting in sepsis due to gangrene of the R foot, requiring urgent debridement. Cardiology requested for pre-op risk stratification and volume management. 	    #Pre-op risk stratification  #ADHF  - ECG with LBBB, unchanged from 9/2019, no acute ischemic changes  - Patient with heart failure, history of NSTEMI likely 2/2 CAD, RCRI score 3, is high risk for emergent life-saving procedure  - Given high risk procedure, would recommend optimizing volume status for surgery, s/p IV Lasix 40mg x1, would give additional Lasix 40mg IV now    Will discuss with attending.    Hillary Gonzalez MD  Cardiology Fellow  [phone]  All Cardiology service information can be found 24/7 on [email or web address], password: [email or web address] 68 yo M with pontine CVA c/b vascular dementia, poor med compliance, DM2, HTN, ?COPD, severe PAD s/p LLE fem-pop bypass with vein graft , s/p Left BKA on 7/22/19 (course c/b cardiogenic shock 2/2 NSTEMI s/p intubation extubation with course further c/b pontine stroke), dilated HF (EF 20-25%) presenting in sepsis due to gangrene of the R foot, requiring urgent R guillotine today. Cardiology requested for pre-op risk stratification and volume management.    #Pre-op risk stratification  #ADHF  - ECG with LBBB, unchanged from 9/2019, no acute ischemic changes  - Patient with heart failure, history of NSTEMI likely 2/2 CAD, RCRI score 3, is high risk for medium risk procedure  - If procedure is emergent and life-saving, would proceed without additional intervention or cardiac workup  - Given high risk procedure, would recommend optimizing volume status for surgery, s/p IV Lasix 40mg x1, would give additional Lasix 40mg IV now for significant pulmonary edema    Will discuss with attending.    Hillary Gonzalez MD  Cardiology Fellow  [phone]  All Cardiology service information can be found 24/7 on [email or web address], password: [email or web address] 70 yo M with DM2, HTN, ?COPD, severe PAD s/p LLE fem-pop bypass with vein graft , s/p Left BKA on 7/22/19 (course c/b cardiogenic shock 2/2 NSTEMI s/p intubation extubation with course further c/b pontine stroke), dilated HF (EF 20-25%).  Presenting in sepsis due to gangrene of the R foot, requiring urgent R guillotine today.   Cardiology requested for pre-op risk stratification and volume management.      REC:  #Pre-op risk stratification/#ADHF  - ECG with LBBB, unchanged from 9/2019, no acute ischemic changes  - Patient with heart failure, history of NSTEMI likely 2/2 CAD, RCRI score 3, is high risk for medium risk procedure  - If procedure is emergent and life-saving, would proceed without additional intervention or cardiac workup  - If procedure not urgent, would recommend optimizing volume status for surgery, s/p IV Lasix 40mg x1, would give additional Lasix 40mg IV now for significant pulmonary edema      Hillary Gonzalez MD  Cardiology Fellow  [phone]    Luis Navarro M.D.  Cardiology Attending, Consult Service  [phone] or beeper     For all Cardiology service contact information, go to [email or web address] and use "[email or web address]" to login.

## 2019-10-14 ENCOUNTER — APPOINTMENT (OUTPATIENT)
Dept: INTERNAL MEDICINE | Facility: CLINIC | Age: 69
End: 2019-10-14

## 2019-10-14 LAB
-  CANDIDA ALBICANS: SIGNIFICANT CHANGE UP
-  CANDIDA GLABRATA: SIGNIFICANT CHANGE UP
-  CANDIDA KRUSEI: SIGNIFICANT CHANGE UP
-  CANDIDA PARAPSILOSIS: SIGNIFICANT CHANGE UP
-  CANDIDA TROPICALIS: SIGNIFICANT CHANGE UP
-  COAGULASE NEGATIVE STAPHYLOCOCCUS: SIGNIFICANT CHANGE UP
-  K. PNEUMONIAE GROUP: SIGNIFICANT CHANGE UP
-  KPC RESISTANCE GENE: SIGNIFICANT CHANGE UP
-  STREPTOCOCCUS SP. (NOT GRP A, B OR S PNEUMONIAE): SIGNIFICANT CHANGE UP
A BAUMANNII DNA SPEC QL NAA+PROBE: SIGNIFICANT CHANGE UP
ANION GAP SERPL CALC-SCNC: 13 MMOL/L — SIGNIFICANT CHANGE UP (ref 5–17)
APTT BLD: 32.8 SEC — SIGNIFICANT CHANGE UP (ref 27.5–36.3)
BASE EXCESS BLDV CALC-SCNC: 2.7 MMOL/L — HIGH (ref -2–2)
BUN SERPL-MCNC: 31 MG/DL — HIGH (ref 7–23)
CALCIUM SERPL-MCNC: 8.3 MG/DL — LOW (ref 8.4–10.5)
CHLORIDE SERPL-SCNC: 111 MMOL/L — HIGH (ref 96–108)
CK MB BLD-MCNC: 2.8 % — SIGNIFICANT CHANGE UP (ref 0–3.5)
CK MB CFR SERPL CALC: 3.4 NG/ML — SIGNIFICANT CHANGE UP (ref 0–6.7)
CK SERPL-CCNC: 121 U/L — SIGNIFICANT CHANGE UP (ref 30–200)
CO2 BLDV-SCNC: 30 MMOL/L — SIGNIFICANT CHANGE UP (ref 22–30)
CO2 SERPL-SCNC: 22 MMOL/L — SIGNIFICANT CHANGE UP (ref 22–31)
CREAT SERPL-MCNC: 1.14 MG/DL — SIGNIFICANT CHANGE UP (ref 0.5–1.3)
E CLOAC COMP DNA BLD POS QL NAA+PROBE: SIGNIFICANT CHANGE UP
E COLI DNA BLD POS QL NAA+NON-PROBE: SIGNIFICANT CHANGE UP
ENTEROCOC DNA BLD POS QL NAA+NON-PROBE: SIGNIFICANT CHANGE UP
ENTEROCOC DNA BLD POS QL NAA+NON-PROBE: SIGNIFICANT CHANGE UP
GAS PNL BLDA: SIGNIFICANT CHANGE UP
GAS PNL BLDA: SIGNIFICANT CHANGE UP
GAS PNL BLDV: SIGNIFICANT CHANGE UP
GLUCOSE SERPL-MCNC: 138 MG/DL — HIGH (ref 70–99)
GP B STREP DNA BLD POS QL NAA+NON-PROBE: SIGNIFICANT CHANGE UP
GRAM STN FLD: SIGNIFICANT CHANGE UP
HAEM INFLU DNA BLD POS QL NAA+NON-PROBE: SIGNIFICANT CHANGE UP
HCO3 BLDV-SCNC: 28 MMOL/L — SIGNIFICANT CHANGE UP (ref 21–29)
HCT VFR BLD CALC: 29.4 % — LOW (ref 39–50)
HCT VFR BLD CALC: 29.9 % — LOW (ref 39–50)
HGB BLD-MCNC: 8.6 G/DL — LOW (ref 13–17)
HGB BLD-MCNC: 8.7 G/DL — LOW (ref 13–17)
HOROWITZ INDEX BLDV+IHG-RTO: 30 — SIGNIFICANT CHANGE UP
INR BLD: 1.6 RATIO — HIGH (ref 0.88–1.16)
K OXYTOCA DNA BLD POS QL NAA+NON-PROBE: SIGNIFICANT CHANGE UP
L MONOCYTOG DNA BLD POS QL NAA+NON-PROBE: SIGNIFICANT CHANGE UP
MAGNESIUM SERPL-MCNC: 2.3 MG/DL — SIGNIFICANT CHANGE UP (ref 1.6–2.6)
MCHC RBC-ENTMCNC: 26.1 PG — LOW (ref 27–34)
MCHC RBC-ENTMCNC: 26.2 PG — LOW (ref 27–34)
MCHC RBC-ENTMCNC: 29.1 GM/DL — LOW (ref 32–36)
MCHC RBC-ENTMCNC: 29.3 GM/DL — LOW (ref 32–36)
MCV RBC AUTO: 89.1 FL — SIGNIFICANT CHANGE UP (ref 80–100)
MCV RBC AUTO: 90.1 FL — SIGNIFICANT CHANGE UP (ref 80–100)
METHOD TYPE: SIGNIFICANT CHANGE UP
MRSA SPEC QL CULT: SIGNIFICANT CHANGE UP
MSSA DNA SPEC QL NAA+PROBE: SIGNIFICANT CHANGE UP
N MEN ISLT CULT: SIGNIFICANT CHANGE UP
NRBC # BLD: 0 /100 WBCS — SIGNIFICANT CHANGE UP (ref 0–0)
NRBC # BLD: 0 /100 WBCS — SIGNIFICANT CHANGE UP (ref 0–0)
P AERUGINOSA DNA BLD POS NAA+NON-PROBE: SIGNIFICANT CHANGE UP
PCO2 BLDV: 51 MMHG — HIGH (ref 35–50)
PH BLDV: 7.36 — SIGNIFICANT CHANGE UP (ref 7.35–7.45)
PHOSPHATE SERPL-MCNC: 3.3 MG/DL — SIGNIFICANT CHANGE UP (ref 2.5–4.5)
PLATELET # BLD AUTO: 288 K/UL — SIGNIFICANT CHANGE UP (ref 150–400)
PLATELET # BLD AUTO: 324 K/UL — SIGNIFICANT CHANGE UP (ref 150–400)
PO2 BLDV: 33 MMHG — SIGNIFICANT CHANGE UP (ref 25–45)
POTASSIUM SERPL-MCNC: 4 MMOL/L — SIGNIFICANT CHANGE UP (ref 3.5–5.3)
POTASSIUM SERPL-SCNC: 4 MMOL/L — SIGNIFICANT CHANGE UP (ref 3.5–5.3)
PROTHROM AB SERPL-ACNC: 18.5 SEC — HIGH (ref 10–12.9)
RBC # BLD: 3.3 M/UL — LOW (ref 4.2–5.8)
RBC # BLD: 3.32 M/UL — LOW (ref 4.2–5.8)
RBC # FLD: 15.9 % — HIGH (ref 10.3–14.5)
RBC # FLD: 16 % — HIGH (ref 10.3–14.5)
S MARCESCENS DNA BLD POS NAA+NON-PROBE: SIGNIFICANT CHANGE UP
S PNEUM DNA BLD POS QL NAA+NON-PROBE: SIGNIFICANT CHANGE UP
S PYO DNA BLD POS QL NAA+NON-PROBE: SIGNIFICANT CHANGE UP
SAO2 % BLDV: 51 % — LOW (ref 67–88)
SODIUM SERPL-SCNC: 146 MMOL/L — HIGH (ref 135–145)
TROPONIN T, HIGH SENSITIVITY RESULT: 131 NG/L — HIGH (ref 0–51)
TROPONIN T, HIGH SENSITIVITY RESULT: 160 NG/L — HIGH (ref 0–51)
WBC # BLD: 11.14 K/UL — HIGH (ref 3.8–10.5)
WBC # BLD: 11.69 K/UL — HIGH (ref 3.8–10.5)
WBC # FLD AUTO: 11.14 K/UL — HIGH (ref 3.8–10.5)
WBC # FLD AUTO: 11.69 K/UL — HIGH (ref 3.8–10.5)

## 2019-10-14 PROCEDURE — 93010 ELECTROCARDIOGRAM REPORT: CPT

## 2019-10-14 PROCEDURE — 36620 INSERTION CATHETER ARTERY: CPT

## 2019-10-14 PROCEDURE — 71045 X-RAY EXAM CHEST 1 VIEW: CPT | Mod: 26

## 2019-10-14 PROCEDURE — 76937 US GUIDE VASCULAR ACCESS: CPT | Mod: 26

## 2019-10-14 PROCEDURE — 93306 TTE W/DOPPLER COMPLETE: CPT | Mod: 26

## 2019-10-14 PROCEDURE — 99291 CRITICAL CARE FIRST HOUR: CPT | Mod: 25

## 2019-10-14 RX ORDER — FENTANYL CITRATE 50 UG/ML
0.5 INJECTION INTRAVENOUS
Qty: 5000 | Refills: 0 | Status: DISCONTINUED | OUTPATIENT
Start: 2019-10-14 | End: 2019-10-15

## 2019-10-14 RX ORDER — ATORVASTATIN CALCIUM 80 MG/1
10 TABLET, FILM COATED ORAL AT BEDTIME
Refills: 0 | Status: DISCONTINUED | OUTPATIENT
Start: 2019-10-14 | End: 2019-10-15

## 2019-10-14 RX ORDER — HYDROMORPHONE HYDROCHLORIDE 2 MG/ML
0.5 INJECTION INTRAMUSCULAR; INTRAVENOUS; SUBCUTANEOUS EVERY 4 HOURS
Refills: 0 | Status: DISCONTINUED | OUTPATIENT
Start: 2019-10-14 | End: 2019-10-15

## 2019-10-14 RX ORDER — ASPIRIN/CALCIUM CARB/MAGNESIUM 324 MG
81 TABLET ORAL DAILY
Refills: 0 | Status: DISCONTINUED | OUTPATIENT
Start: 2019-10-14 | End: 2019-10-15

## 2019-10-14 RX ORDER — FUROSEMIDE 40 MG
20 TABLET ORAL ONCE
Refills: 0 | Status: COMPLETED | OUTPATIENT
Start: 2019-10-14 | End: 2019-10-14

## 2019-10-14 RX ORDER — DOBUTAMINE HCL 250MG/20ML
2.5 VIAL (ML) INTRAVENOUS
Qty: 500 | Refills: 0 | Status: DISCONTINUED | OUTPATIENT
Start: 2019-10-14 | End: 2019-10-16

## 2019-10-14 RX ORDER — PIPERACILLIN AND TAZOBACTAM 4; .5 G/20ML; G/20ML
4.5 INJECTION, POWDER, LYOPHILIZED, FOR SOLUTION INTRAVENOUS EVERY 8 HOURS
Refills: 0 | Status: DISCONTINUED | OUTPATIENT
Start: 2019-10-14 | End: 2019-10-15

## 2019-10-14 RX ORDER — INSULIN LISPRO 100/ML
VIAL (ML) SUBCUTANEOUS EVERY 4 HOURS
Refills: 0 | Status: DISCONTINUED | OUTPATIENT
Start: 2019-10-14 | End: 2019-10-15

## 2019-10-14 RX ORDER — CHLORHEXIDINE GLUCONATE 213 G/1000ML
1 SOLUTION TOPICAL
Refills: 0 | Status: DISCONTINUED | OUTPATIENT
Start: 2019-10-14 | End: 2019-10-23

## 2019-10-14 RX ORDER — CALCIUM GLUCONATE 100 MG/ML
2 VIAL (ML) INTRAVENOUS ONCE
Refills: 0 | Status: COMPLETED | OUTPATIENT
Start: 2019-10-14 | End: 2019-10-14

## 2019-10-14 RX ORDER — CLOPIDOGREL BISULFATE 75 MG/1
75 TABLET, FILM COATED ORAL DAILY
Refills: 0 | Status: DISCONTINUED | OUTPATIENT
Start: 2019-10-14 | End: 2019-10-15

## 2019-10-14 RX ORDER — DOCUSATE SODIUM 100 MG
100 CAPSULE ORAL
Refills: 0 | Status: DISCONTINUED | OUTPATIENT
Start: 2019-10-14 | End: 2019-10-15

## 2019-10-14 RX ORDER — INSULIN LISPRO 100/ML
VIAL (ML) SUBCUTANEOUS EVERY 6 HOURS
Refills: 0 | Status: DISCONTINUED | OUTPATIENT
Start: 2019-10-14 | End: 2019-10-14

## 2019-10-14 RX ORDER — PANTOPRAZOLE SODIUM 20 MG/1
40 TABLET, DELAYED RELEASE ORAL DAILY
Refills: 0 | Status: DISCONTINUED | OUTPATIENT
Start: 2019-10-15 | End: 2019-10-15

## 2019-10-14 RX ORDER — SENNA PLUS 8.6 MG/1
5 TABLET ORAL EVERY 24 HOURS
Refills: 0 | Status: DISCONTINUED | OUTPATIENT
Start: 2019-10-14 | End: 2019-10-15

## 2019-10-14 RX ADMIN — FENTANYL CITRATE 1.46 MICROGRAM(S)/KG/HR: 50 INJECTION INTRAVENOUS at 09:10

## 2019-10-14 RX ADMIN — Medication 2: at 17:11

## 2019-10-14 RX ADMIN — Medication 250 MILLIGRAM(S): at 05:31

## 2019-10-14 RX ADMIN — CLOPIDOGREL BISULFATE 75 MILLIGRAM(S): 75 TABLET, FILM COATED ORAL at 11:52

## 2019-10-14 RX ADMIN — Medication 500000 UNIT(S): at 17:11

## 2019-10-14 RX ADMIN — Medication 250 MILLIGRAM(S): at 17:10

## 2019-10-14 RX ADMIN — Medication 1 MILLIGRAM(S): at 10:35

## 2019-10-14 RX ADMIN — CHLORHEXIDINE GLUCONATE 1 APPLICATION(S): 213 SOLUTION TOPICAL at 05:18

## 2019-10-14 RX ADMIN — Medication 81 MILLIGRAM(S): at 11:53

## 2019-10-14 RX ADMIN — PIPERACILLIN AND TAZOBACTAM 25 GRAM(S): 4; .5 INJECTION, POWDER, LYOPHILIZED, FOR SOLUTION INTRAVENOUS at 05:32

## 2019-10-14 RX ADMIN — HYDROMORPHONE HYDROCHLORIDE 0.5 MILLIGRAM(S): 2 INJECTION INTRAMUSCULAR; INTRAVENOUS; SUBCUTANEOUS at 17:35

## 2019-10-14 RX ADMIN — PIPERACILLIN AND TAZOBACTAM 25 GRAM(S): 4; .5 INJECTION, POWDER, LYOPHILIZED, FOR SOLUTION INTRAVENOUS at 22:07

## 2019-10-14 RX ADMIN — Medication 200 GRAM(S): at 07:20

## 2019-10-14 RX ADMIN — Medication 0.5 MILLIGRAM(S): at 14:42

## 2019-10-14 RX ADMIN — CHLORHEXIDINE GLUCONATE 15 MILLILITER(S): 213 SOLUTION TOPICAL at 17:11

## 2019-10-14 RX ADMIN — CHLORHEXIDINE GLUCONATE 15 MILLILITER(S): 213 SOLUTION TOPICAL at 05:31

## 2019-10-14 RX ADMIN — ENOXAPARIN SODIUM 40 MILLIGRAM(S): 100 INJECTION SUBCUTANEOUS at 11:53

## 2019-10-14 RX ADMIN — Medication 500000 UNIT(S): at 05:18

## 2019-10-14 RX ADMIN — Medication 100 MILLIGRAM(S): at 05:18

## 2019-10-14 RX ADMIN — Medication 4.38 MICROGRAM(S)/KG/MIN: at 00:30

## 2019-10-14 RX ADMIN — Medication 2: at 13:51

## 2019-10-14 RX ADMIN — PIPERACILLIN AND TAZOBACTAM 25 GRAM(S): 4; .5 INJECTION, POWDER, LYOPHILIZED, FOR SOLUTION INTRAVENOUS at 13:51

## 2019-10-14 RX ADMIN — ATORVASTATIN CALCIUM 10 MILLIGRAM(S): 80 TABLET, FILM COATED ORAL at 22:04

## 2019-10-14 RX ADMIN — HYDROMORPHONE HYDROCHLORIDE 0.5 MILLIGRAM(S): 2 INJECTION INTRAMUSCULAR; INTRAVENOUS; SUBCUTANEOUS at 04:40

## 2019-10-14 RX ADMIN — HYDROMORPHONE HYDROCHLORIDE 0.5 MILLIGRAM(S): 2 INJECTION INTRAMUSCULAR; INTRAVENOUS; SUBCUTANEOUS at 17:50

## 2019-10-14 RX ADMIN — PANTOPRAZOLE SODIUM 40 MILLIGRAM(S): 20 TABLET, DELAYED RELEASE ORAL at 11:52

## 2019-10-14 RX ADMIN — Medication 20 MILLIGRAM(S): at 09:10

## 2019-10-14 RX ADMIN — HYDROMORPHONE HYDROCHLORIDE 0.5 MILLIGRAM(S): 2 INJECTION INTRAMUSCULAR; INTRAVENOUS; SUBCUTANEOUS at 10:26

## 2019-10-14 RX ADMIN — HYDROMORPHONE HYDROCHLORIDE 0.5 MILLIGRAM(S): 2 INJECTION INTRAMUSCULAR; INTRAVENOUS; SUBCUTANEOUS at 04:24

## 2019-10-14 RX ADMIN — HYDROMORPHONE HYDROCHLORIDE 0.5 MILLIGRAM(S): 2 INJECTION INTRAMUSCULAR; INTRAVENOUS; SUBCUTANEOUS at 10:11

## 2019-10-14 NOTE — DIETITIAN INITIAL EVALUATION ADULT. - PERTINENT LABORATORY DATA
10-14 @ 10:30:  Hemoglobin 8.7<L>, Hematocrit 29.9<L>  10-14 @ 02:16: Sodium 146<H>, Potassium 4.0, Chloride 111<H>, Calcium 8.3<L>, Magnesium 2.3, Phosphorus 3.3, BUN 31<H>, Creatinine 1.14, <H>, Hemoglobin 8.6<L>, Hematocrit 29.4<L>  HbA1c 6.9% (9/18/19)  POCT Blood Glucose.: 191 mg/dL (14 Oct 2019 13:47)  POCT Blood Glucose.: 149 mg/dL (14 Oct 2019 09:08)

## 2019-10-14 NOTE — PROCEDURE NOTE - NSPROCDETAILS_GEN_ALL_CORE
sutured in place/all materials/supplies accounted for at end of procedure/connected to a pressurized flush line/hemostasis with direct pressure, dressing applied/Seldinger technique/ultrasound guidance/positive blood return obtained via catheter/location identified, draped/prepped, sterile technique used, needle inserted/introduced

## 2019-10-14 NOTE — PROGRESS NOTE ADULT - ATTENDING COMMENTS
S/p rt BKA for gangrenous RLE  Acute resp failure post op  Cardiogenic shock  Possible septic shock with gM negative bacteremia  Myocardial ischemia with ST depression with inferior leads  Aneuria    Change precedex drip to Fentanyl drip gtt which might be contributing for hemodynamic instability  Vent adjustment  Add Dobutamine drip gtt, CI very low, will monitor with flow track and urine output, Titrate Levophed gtt  Diuresis with Lasix push and drip, CXR has pulmonary vascular congestion, add Zaroxolyn to prevent worsening of hypernatremia  Trophic feed, Lactic acid wnl  IV lock S/p rt BKA for gangrenous RLE  Acute resp failure post op  Cardiogenic shock  Possible septic shock with gM negative bacteremia  Myocardial ischemia with ST depression with inferior leads  Aneuria    Change precedex drip to Fentanyl drip gtt which might be contributing for hemodynamic instability  Vent adjustment  Add Dobutamine drip gtt, CI very low, will monitor with flow track and urine output, Titrate Levophed gtt  Diuresis with Lasix push and drip, CXR has pulmonary vascular congestion, add Zaroxolyn to prevent worsening of hypernatremia  Trophic feed, Lactic acid wnl  IV lock  Increase dose of Zosyn to cover for pseudomonas, on Vanco, Vanco trough  ISS   Pharmacologic DVT prophylaix

## 2019-10-14 NOTE — DIETITIAN INITIAL EVALUATION ADULT. - OTHER INFO
INFORMATION PTA  Diet PTA: unable to assess   Nutrition status PTA: unable to assess  Nutrition Supplements PTA: vit C, MVI  Food Allergies: NKFA  Weight/Height History PTA: 97.8Kg (4/24/15, noted at 68 inches); 89.4Kg (7/9/19, noted at 72 inches); 80.1Kg (9/30/19 S/P L BKA, noted at 72 inches), currently 58.4Kg (10/13/19 S/P left AKA, noted at 65 inches). Question accuracy of weight/height history in setting of multiple amputations.     INFORMATION THIS ADMISSION  Last BM: 10/13  Other  Information: Left AKA (9/24/19); Right BKA (10/13/19)  Therapeutic Diet Education Provided: not appropriate at this time; intubated, AMS noted

## 2019-10-14 NOTE — DIETITIAN INITIAL EVALUATION ADULT. - PHYSICAL APPEARANCE
ht: 5 feet 5 inches (? accuracy, bilateral amputations), admit wt (S/P left AKA): 129 pounds, BMI: 21.4 Kg/m2, Amputation (bilateral) IBW: 116 pounds (+/- 10%)/other (specify) Edema: 1+ generalized  Skin: no pressure injuries noted per nursing flowsheet; S/P left AKA, right BKA  Nutrition Focused Physical Exam: Unable to perform Nutrition Focused Physical Assessment in current setting; RD will reassess as appropriate.

## 2019-10-14 NOTE — PHYSICAL THERAPY INITIAL EVALUATION ADULT - GENERAL OBSERVATIONS, REHAB EVAL
Pt tolerated 45min eval. Pt found semi supine +b/l wrist restraints +tele +pulse ox +central line +kahlil +condom cath +RLE ace bandage.

## 2019-10-14 NOTE — PHYSICAL THERAPY INITIAL EVALUATION ADULT - PERTINENT HX OF CURRENT PROBLEM, REHAB EVAL
70 yo M h/o CVA, NSTEMI complicated by cardiogenic shock, significant PVD s/p LLE fem-pop bypass (vein graft) with subsequent LLE MARCELA (07/2019), LLE AKA (09/2019), known chronically ischemic RLE who was brought in from his nursing facility with progressively altered mentation, fevers and tachycardia. Found to be in sepsis likely from RLE gangrene, now s/p R christel MEDRANO on 10/13. XRay Chest 10/14: Trace b/l pleural effusions.

## 2019-10-14 NOTE — PROGRESS NOTE ADULT - ASSESSMENT
Please see our previous note for recommendations.   We will sign off at this time. Please contact General Cardiology Consult fellow if any questions arise or guidance needed. The number can be found on [email or web address] under "[email or web address]"     Thank you for allowing us to participate in this patient's care.     Antoine Holt   Cardiology Fellow

## 2019-10-14 NOTE — PHYSICAL THERAPY INITIAL EVALUATION ADULT - BALANCE TRAINING, PT EVAL
GOAL: Pt will increase static sitting balance to Fair to improve safety with functional activities in 4 weeks.

## 2019-10-14 NOTE — DIETITIAN INITIAL EVALUATION ADULT. - ADD RECOMMEND
1) Increase EN as tolerated; see recommendations above. 2) Monitor need to increase EN provision if pt is extubated. 3) Recommend add 2 Mejia packets daily to provide an additional 160 bárbara and 28 Gm amino acids to support collagen formation and promote wound healing.

## 2019-10-14 NOTE — PROGRESS NOTE ADULT - ASSESSMENT
69y male s/p R below knee guillotine for wet gangrene performed on 10/13/19    Plan:  - Appreciate care per SICU team  - Daily dressing changes  - Continue to monitor trops  - Continue vanc 69y male s/p R below knee guillotine for wet gangrene performed on 10/13/19    Plan:  - Appreciate care per SICU team  - Daily dressing changes  - Continue to monitor trops  - Continue vanc  - Will continue to re-eval for take back to OR for completion vs. AKA

## 2019-10-14 NOTE — PHYSICAL THERAPY INITIAL EVALUATION ADULT - PLANNED THERAPY INTERVENTIONS, PT EVAL
balance training/bed mobility training balance training/bed mobility training/Negative Pressure Wound Therapy

## 2019-10-14 NOTE — PROGRESS NOTE ADULT - SUBJECTIVE AND OBJECTIVE BOX
SICU DAILY PROGRESS NOTE    JIM PAREDES is a 69y Male with history of DM II, HTN, CVA, NSTEMI c/b cardiogenic shock, PVD s/p L AKA (discharged on 10/7) presents to [facility] ED from rehab for altered mental status. In ED patient was noted to have leukocytosis of 17, hyperglycemia, and tachycardic. At previous hospital discharge patient was noted to have right LE dry gangrene, however xray of LE upon ED presentation today was significant for emphysematous changes along lateral malleolus. CXR also significant for bilateral pulmonary edema, BNP 32564. 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 During surgery, purulent drainage noted within BKA stump, cultures obtained. Patient was brought to SICU intubated on 0.05 Levophed. R IJ triple lumen was placed in SICU. Dobutamine gtt started overnight.     24 HOUR EVENTS:    - Insulin gtt (currently stopped)   - Mechanical vent weaned to 30%   - UA sent  - 250 5% Albumin given overnight   - Troponins sent and trended (128 -> 138)   - Dobutamine gtt started at night   - Triple lumen placed     SUBJECTIVE/ROS:  [ ] A ten-point review of systems was otherwise negative except as noted.  [X] Due to altered mental status/intubation, subjective information were not able to be obtained from the patient. History was obtained, to the extent possible, from review of the chart and collateral sources of information.      NEURO  Exam: Sedated on ventilator, responsive to pain and stimuli. No acute distress, appears comfortable.   Meds: acetaminophen  IVPB .. 1000 milliGRAM(s) IV Intermittent once PRN Mild Pain (1 - 3)  dexmedetomidine Infusion 0.2 MICROgram(s)/kG/Hr IV Continuous <Continuous>  HYDROmorphone  Injectable 0.5 milliGRAM(s) IV Push every 4 hours PRN Severe Pain (7 - 10)    [x] Adequacy of sedation and pain control has been assessed and adjusted      RESPIRATORY  RR: 12 (10-14-19 @ 00:00) (12 - 26)  SpO2: 100% (10-14-19 @ 00:00) (87% - 100%)  Exam: on mechanical ventilator, no acute resp distress   Mechanical Ventilation: Mode: AC/ CMV (Assist Control/ Continuous Mandatory Ventilation), RR (machine): 12, RR (patient): 12, TV (machine): 450, FiO2: 30, PEEP: 5, ITime: 1, MAP: 7, PIP: 20  ABG - ( 13 Oct 2019 12:17 )  pH: 7.43  /  pCO2: 38    /  pO2: 414   / HCO3: 25    / Base Excess: .9    /  SaO2: 100     Lactate: x          [N/A] Extubation Readiness Assessed  Meds:       CARDIOVASCULAR  HR: 56 (10-14-19 @ 00:00) (49 - 98)  BP: 107/62 (10-13-19 @ 19:45) (78/51 - 123/72)  BP(mean): 80 (10-13-19 @ 19:45) (61 - 92)  ABP: 116/58 (10-14-19 @ 00:00) (61/35 - 142/70)  ABP(mean): 75 (10-14-19 @ 00:00) (49 - 92)  VBG - ( 13 Oct 2019 23:56 )  pH: 7.34  /  pCO2: 50    /  pO2: 40    / HCO3: 26    / Base Excess: 0.9   /  SaO2: 64     Lactate: 1.1        Exam: regular rate and rhythm  Cardiac Rhythm: sinus  Perfusion     [x]Adequate   [ ]Inadequate  Mentation   [x]Normal       [ ]Reduced  Extremities  [x]Warm         [ ]Cool  Volume Status [ ]Hypervolemic [x]Euvolemic [ ]Hypovolemic  Meds: DOBUTamine Infusion 2.5 MICROgram(s)/kG/Min IV Continuous <Continuous>  norepinephrine Infusion 0.1 MICROgram(s)/kG/Min IV Continuous <Continuous>        GI/NUTRITION  Exam: soft, non distended, non tender to palpation   Diet: NPO with OGT   Meds: pantoprazole  Injectable 40 milliGRAM(s) IV Push daily      GENITOURINARY  I&O's Detail    10-13 @ 07:01  -  10-14 @ 00:30  --------------------------------------------------------  IN:    Albumin 5%  - 250 mL: 250 mL    dexmedetomidine Infusion: 59.5 mL    dextrose 5% + lactated ringers.: 120 mL    insulin regular Infusion: 16.5 mL    IV PiggyBack: 575 mL    lactated ringers.: 240 mL    norepinephrine Infusion: 13.2 mL    norepinephrine Infusion: 39.3 mL    phenylephrine   Infusion: 90.7 mL  Total IN: 1404.2 mL    OUT:    Indwelling Catheter - Urethral: 670 mL  Total OUT: 670 mL    Total NET: 734.2 mL        Weight (kg): 58.4 (10-13 @ 12:15)  10-13    145  |  111<H>  |  28<H>  ----------------------------<  168<H>  3.9   |  23  |  1.05    Ca    8.3<L>      13 Oct 2019 17:42  Phos  2.9     10-13  Mg     2.2     10-13    TPro  7.4  /  Alb  2.0<L>  /  TBili  0.5  /  DBili  x   /  AST  31  /  ALT  51<H>  /  AlkPhos  153<H>  10-12    Meds: dextrose 5% + lactated ringers. 1000 milliLiter(s) IV Continuous <Continuous>        HEMATOLOGIC  Meds: enoxaparin Injectable 40 milliGRAM(s) SubCutaneous daily    [x] VTE Prophylaxis                        8.7    17.92 )-----------( 308      ( 13 Oct 2019 12:14 )             27.6     PT/INR - ( 13 Oct 2019 12:14 )   PT: 19.5 sec;   INR: 1.68 ratio         PTT - ( 13 Oct 2019 12:14 )  PTT:30.2 sec  Transfusion     [ ] PRBC   [ ] Platelets   [ ] FFP   [ ] Cryoprecipitate      INFECTIOUS DISEASES  WBC Count: 17.92 K/uL (10-13 @ 12:14)  WBC Count: 17.64 K/uL (10-13 @ 03:02)    RECENT CULTURES:    Meds: clindamycin IVPB 300 milliGRAM(s) IV Intermittent every 8 hours  influenza   Vaccine 0.5 milliLiter(s) IntraMuscular once  nystatin    Suspension 328513 Unit(s) Oral two times a day  piperacillin/tazobactam IVPB.. 3.375 Gram(s) IV Intermittent every 8 hours  vancomycin  IVPB 1000 milliGRAM(s) IV Intermittent every 12 hours    Meds:       ACCESS DEVICES:  [ ] Peripheral IV  [ ] Central Venous Line	[ ] R	[ ] L	[ ] IJ	[ ] Fem	[ ] SC	Placed:   [ ] Arterial Line		[ ] R	[ ] L	[ ] Fem	[ ] Rad	[ ] Ax	Placed:   [ ] PICC:					[ ] Mediport  [ ] Urinary Catheter, Date Placed:   [x] Necessity of urinary, arterial, and venous catheters discussed    OTHER MEDICATIONS:  chlorhexidine 0.12% Liquid 15 milliLiter(s) Oral Mucosa every 12 hours  chlorhexidine 2% Cloths 1 Application(s) Topical daily

## 2019-10-14 NOTE — PHYSICAL THERAPY INITIAL EVALUATION ADULT - IMPAIRMENTS FOUND, PT EVAL
aerobic capacity/endurance/gait, locomotion, and balance aerobic capacity/endurance/integumentary integrity/gait, locomotion, and balance

## 2019-10-14 NOTE — PROGRESS NOTE ADULT - ASSESSMENT
A/P:     68 y/o M with Hx of PVD, DM type II, HTN, CVA, NSTEMI, L AKA (discharged 10/7), presenting to the [facility] for evaluation of AMS, fevers and tachycardia. Patient found to have xray concerning for gas gangrene now s/p emergent christel MEDRANO in SICU:     NEURO: No active issues at this time   - Precedex for sedation, wean as tolerated   - Tylenol, Dilaudid PRN pain     RESP: Mechanical ventilation s/p resp insufficiency following OR, pulmonary edema   - Mechanical vent 30%/5/14/450   - F/U ABG   - CXR in AM     CARDIAC: CHF, HTN, Hx of NSTEMI   - F/U Troponin in AM   - Hold home medications at this time   - Levophed, wean as tolerated   - Dobutamine @ 2.5 mcg/kg/min   - Monitor vitals   - F/U TTE (last EF ~20%)     GI: No active issues at this time   - NPO with OGT to wall suction     Renal: No active issues at this time   - D5 + LR @ 30ml/hr   - Monitor intake and outtake   - Fletcher   - F/U BMP, replete electrolytes as needed     Heme: No active issues at this time   - F/U CBC   - Lovenox 40mg q24 hours     ID: R LE gas gangrene, leukocytosis   - Follow WBC   - Vancomycin, Zosyn and Clindamycin   - Monitor for fevers     ENDO: DM II with hyperglycemia   - Insulin gtt (held currently for glucose of 80)   - F/U glucose q1hr     SICU X [id number]

## 2019-10-14 NOTE — DIETITIAN INITIAL EVALUATION ADULT. - ENERGY NEEDS
The [employer] Equation (PSU) 2003b was used to calculate resting energy expenditure: 1300 calories (22cal/Kg per dosing wt 58.4Kg)

## 2019-10-14 NOTE — DIETITIAN INITIAL EVALUATION ADULT. - PERTINENT MEDS FT
MEDICATIONS  (STANDING):  aspirin  chewable 81 milliGRAM(s) Oral daily  chlorhexidine 0.12% Liquid 15 milliLiter(s) Oral Mucosa every 12 hours  chlorhexidine 2% Cloths 1 Application(s) Topical <User Schedule>  clopidogrel Tablet 75 milliGRAM(s) Oral daily  DOBUTamine Infusion 2.5 MICROgram(s)/kG/Min (4.38 mL/Hr) IV Continuous <Continuous>  enoxaparin Injectable 40 milliGRAM(s) SubCutaneous daily  fentaNYL   Infusion. 0.5 MICROgram(s)/kG/Hr (1.46 mL/Hr) IV Continuous <Continuous>  influenza   Vaccine 0.5 milliLiter(s) IntraMuscular once  insulin lispro (HumaLOG) corrective regimen sliding scale   SubCutaneous every 4 hours  norepinephrine Infusion 0.1 MICROgram(s)/kG/Min (10.95 mL/Hr) IV Continuous <Continuous>  nystatin    Suspension 530897 Unit(s) Oral two times a day  pantoprazole  Injectable 40 milliGRAM(s) IV Push daily  piperacillin/tazobactam IVPB.. 4.5 Gram(s) IV Intermittent every 8 hours  vancomycin  IVPB 1000 milliGRAM(s) IV Intermittent every 12 hours    MEDICATIONS  (PRN):  HYDROmorphone  Injectable 0.5 milliGRAM(s) IV Push every 4 hours PRN Severe Pain (7 - 10)  LORazepam   Injectable 0.5 milliGRAM(s) IV Push every 3 hours PRN Agitation

## 2019-10-14 NOTE — DIETITIAN INITIAL EVALUATION ADULT. - ENTERAL
Increase EN as tolerated to Jevity1.2 via OGT @ 55ml/hr x 24 hours to provide 1320ml formula, 1584 bárbara/day, 73 Gm protein/day, 1065ml free water; meets 27cal/Kg and 1.25Gm protein/Kg per dosing wt 58.4Kg, with consideration for intubation. Increase EN as tolerated to Glucerna1.2 via OGT @ 55ml/hr x 24 hours to provide 1320ml formula, 1584 bárbara/day, 79 Gm protein/day, 1063ml free water; meets 27cal/Kg and 1.35Gm protein/Kg per dosing wt 58.4Kg, with consideration for intubation.

## 2019-10-14 NOTE — DIETITIAN INITIAL EVALUATION ADULT. - REASON INDICATOR FOR ASSESSMENT
Nutrition Assessment warranted for length of stay on 8ICU.  Information obtained from: medical record, prior RD notes. Pt intubated, sedated.   Per chart: "70 y/o M with Hx of PVD, DM type II, HTN, CVA, NSTEMI, L AKA (discharged 10/7), presenting to the [facility] for evaluation of AMS, fevers and tachycardia. Patient found to have xray concerning for gas gangrene now s/p emergent christel MEDRANO in SICU."

## 2019-10-15 LAB
-  AMIKACIN: SIGNIFICANT CHANGE UP
-  AMOXICILLIN/CLAVULANIC ACID: SIGNIFICANT CHANGE UP
-  AMOXICILLIN/CLAVULANIC ACID: SIGNIFICANT CHANGE UP
-  AMPICILLIN/SULBACTAM: SIGNIFICANT CHANGE UP
-  AMPICILLIN: SIGNIFICANT CHANGE UP
-  AZTREONAM: SIGNIFICANT CHANGE UP
-  CEFAZOLIN: SIGNIFICANT CHANGE UP
-  CEFEPIME: SIGNIFICANT CHANGE UP
-  CEFOXITIN: SIGNIFICANT CHANGE UP
-  CEFTRIAXONE: SIGNIFICANT CHANGE UP
-  CIPROFLOXACIN: SIGNIFICANT CHANGE UP
-  ERTAPENEM: SIGNIFICANT CHANGE UP
-  GENTAMICIN: SIGNIFICANT CHANGE UP
-  IMIPENEM: SIGNIFICANT CHANGE UP
-  LEVOFLOXACIN: SIGNIFICANT CHANGE UP
-  MEROPENEM: SIGNIFICANT CHANGE UP
-  PIPERACILLIN/TAZOBACTAM: SIGNIFICANT CHANGE UP
-  TOBRAMYCIN: SIGNIFICANT CHANGE UP
-  TRIMETHOPRIM/SULFAMETHOXAZOLE: SIGNIFICANT CHANGE UP
ALBUMIN SERPL ELPH-MCNC: 2 G/DL — LOW (ref 3.3–5)
ALP SERPL-CCNC: 117 U/L — SIGNIFICANT CHANGE UP (ref 40–120)
ALT FLD-CCNC: 29 U/L — SIGNIFICANT CHANGE UP (ref 10–45)
ANION GAP SERPL CALC-SCNC: 11 MMOL/L — SIGNIFICANT CHANGE UP (ref 5–17)
ANION GAP SERPL CALC-SCNC: 13 MMOL/L — SIGNIFICANT CHANGE UP (ref 5–17)
APTT BLD: 31.2 SEC — SIGNIFICANT CHANGE UP (ref 27.5–36.3)
AST SERPL-CCNC: 13 U/L — SIGNIFICANT CHANGE UP (ref 10–40)
BASE EXCESS BLDV CALC-SCNC: 1.9 MMOL/L — SIGNIFICANT CHANGE UP (ref -2–2)
BILIRUB DIRECT SERPL-MCNC: 0.2 MG/DL — SIGNIFICANT CHANGE UP (ref 0–0.2)
BILIRUB INDIRECT FLD-MCNC: 0.2 MG/DL — SIGNIFICANT CHANGE UP (ref 0.2–1)
BILIRUB SERPL-MCNC: 0.4 MG/DL — SIGNIFICANT CHANGE UP (ref 0.2–1.2)
BUN SERPL-MCNC: 21 MG/DL — SIGNIFICANT CHANGE UP (ref 7–23)
BUN SERPL-MCNC: 26 MG/DL — HIGH (ref 7–23)
CALCIUM SERPL-MCNC: 8.2 MG/DL — LOW (ref 8.4–10.5)
CALCIUM SERPL-MCNC: 8.2 MG/DL — LOW (ref 8.4–10.5)
CHLORIDE SERPL-SCNC: 102 MMOL/L — SIGNIFICANT CHANGE UP (ref 96–108)
CHLORIDE SERPL-SCNC: 107 MMOL/L — SIGNIFICANT CHANGE UP (ref 96–108)
CO2 BLDV-SCNC: 29 MMOL/L — SIGNIFICANT CHANGE UP (ref 22–30)
CO2 SERPL-SCNC: 24 MMOL/L — SIGNIFICANT CHANGE UP (ref 22–31)
CO2 SERPL-SCNC: 24 MMOL/L — SIGNIFICANT CHANGE UP (ref 22–31)
CREAT SERPL-MCNC: 0.95 MG/DL — SIGNIFICANT CHANGE UP (ref 0.5–1.3)
CREAT SERPL-MCNC: 1.05 MG/DL — SIGNIFICANT CHANGE UP (ref 0.5–1.3)
CULTURE RESULTS: SIGNIFICANT CHANGE UP
CULTURE RESULTS: SIGNIFICANT CHANGE UP
FIBRINOGEN PPP-MCNC: 630 MG/DL — HIGH (ref 350–510)
GAS PNL BLDA: SIGNIFICANT CHANGE UP
GAS PNL BLDA: SIGNIFICANT CHANGE UP
GLUCOSE SERPL-MCNC: 185 MG/DL — HIGH (ref 70–99)
GLUCOSE SERPL-MCNC: 211 MG/DL — HIGH (ref 70–99)
HCO3 BLDV-SCNC: 28 MMOL/L — SIGNIFICANT CHANGE UP (ref 21–29)
HCT VFR BLD CALC: 28.9 % — LOW (ref 39–50)
HGB BLD-MCNC: 8.4 G/DL — LOW (ref 13–17)
HOROWITZ INDEX BLDV+IHG-RTO: 30 — SIGNIFICANT CHANGE UP
INR BLD: 1.5 RATIO — HIGH (ref 0.88–1.16)
MAGNESIUM SERPL-MCNC: 1.9 MG/DL — SIGNIFICANT CHANGE UP (ref 1.6–2.6)
MAGNESIUM SERPL-MCNC: 1.9 MG/DL — SIGNIFICANT CHANGE UP (ref 1.6–2.6)
MCHC RBC-ENTMCNC: 26.2 PG — LOW (ref 27–34)
MCHC RBC-ENTMCNC: 29.1 GM/DL — LOW (ref 32–36)
MCV RBC AUTO: 90 FL — SIGNIFICANT CHANGE UP (ref 80–100)
METHOD TYPE: SIGNIFICANT CHANGE UP
NRBC # BLD: 0 /100 WBCS — SIGNIFICANT CHANGE UP (ref 0–0)
NT-PROBNP SERPL-SCNC: 2970 PG/ML — HIGH (ref 0–300)
ORGANISM # SPEC MICROSCOPIC CNT: SIGNIFICANT CHANGE UP
PCO2 BLDV: 53 MMHG — HIGH (ref 35–50)
PH BLDV: 7.34 — LOW (ref 7.35–7.45)
PHOSPHATE SERPL-MCNC: 2.8 MG/DL — SIGNIFICANT CHANGE UP (ref 2.5–4.5)
PHOSPHATE SERPL-MCNC: 3.5 MG/DL — SIGNIFICANT CHANGE UP (ref 2.5–4.5)
PLATELET # BLD AUTO: 284 K/UL — SIGNIFICANT CHANGE UP (ref 150–400)
PO2 BLDV: 48 MMHG — HIGH (ref 25–45)
POTASSIUM SERPL-MCNC: 3.7 MMOL/L — SIGNIFICANT CHANGE UP (ref 3.5–5.3)
POTASSIUM SERPL-MCNC: 3.8 MMOL/L — SIGNIFICANT CHANGE UP (ref 3.5–5.3)
POTASSIUM SERPL-SCNC: 3.7 MMOL/L — SIGNIFICANT CHANGE UP (ref 3.5–5.3)
POTASSIUM SERPL-SCNC: 3.8 MMOL/L — SIGNIFICANT CHANGE UP (ref 3.5–5.3)
PROT SERPL-MCNC: 6.6 G/DL — SIGNIFICANT CHANGE UP (ref 6–8.3)
PROTHROM AB SERPL-ACNC: 17.5 SEC — HIGH (ref 10–12.9)
RBC # BLD: 3.21 M/UL — LOW (ref 4.2–5.8)
RBC # FLD: 15.8 % — HIGH (ref 10.3–14.5)
SAO2 % BLDV: 75 % — SIGNIFICANT CHANGE UP (ref 67–88)
SODIUM SERPL-SCNC: 139 MMOL/L — SIGNIFICANT CHANGE UP (ref 135–145)
SODIUM SERPL-SCNC: 142 MMOL/L — SIGNIFICANT CHANGE UP (ref 135–145)
SPECIMEN SOURCE: SIGNIFICANT CHANGE UP
SPECIMEN SOURCE: SIGNIFICANT CHANGE UP
WBC # BLD: 13 K/UL — HIGH (ref 3.8–10.5)
WBC # FLD AUTO: 13 K/UL — HIGH (ref 3.8–10.5)

## 2019-10-15 PROCEDURE — 71045 X-RAY EXAM CHEST 1 VIEW: CPT | Mod: 26,76

## 2019-10-15 PROCEDURE — 99233 SBSQ HOSP IP/OBS HIGH 50: CPT

## 2019-10-15 RX ORDER — ERTAPENEM SODIUM 1 G/1
1000 INJECTION, POWDER, LYOPHILIZED, FOR SOLUTION INTRAMUSCULAR; INTRAVENOUS ONCE
Refills: 0 | Status: COMPLETED | OUTPATIENT
Start: 2019-10-15 | End: 2019-10-15

## 2019-10-15 RX ORDER — MAGNESIUM SULFATE 500 MG/ML
2 VIAL (ML) INJECTION ONCE
Refills: 0 | Status: COMPLETED | OUTPATIENT
Start: 2019-10-15 | End: 2019-10-15

## 2019-10-15 RX ORDER — ATORVASTATIN CALCIUM 80 MG/1
10 TABLET, FILM COATED ORAL AT BEDTIME
Refills: 0 | Status: DISCONTINUED | OUTPATIENT
Start: 2019-10-15 | End: 2019-10-16

## 2019-10-15 RX ORDER — FUROSEMIDE 40 MG
2.5 TABLET ORAL
Qty: 500 | Refills: 0 | Status: DISCONTINUED | OUTPATIENT
Start: 2019-10-15 | End: 2019-10-17

## 2019-10-15 RX ORDER — POTASSIUM CHLORIDE 20 MEQ
20 PACKET (EA) ORAL ONCE
Refills: 0 | Status: COMPLETED | OUTPATIENT
Start: 2019-10-15 | End: 2019-10-15

## 2019-10-15 RX ORDER — CLOPIDOGREL BISULFATE 75 MG/1
75 TABLET, FILM COATED ORAL DAILY
Refills: 0 | Status: DISCONTINUED | OUTPATIENT
Start: 2019-10-16 | End: 2019-10-16

## 2019-10-15 RX ORDER — FUROSEMIDE 40 MG
20 TABLET ORAL EVERY 6 HOURS
Refills: 0 | Status: DISCONTINUED | OUTPATIENT
Start: 2019-10-15 | End: 2019-10-15

## 2019-10-15 RX ORDER — ACETAMINOPHEN 500 MG
975 TABLET ORAL EVERY 6 HOURS
Refills: 0 | Status: DISCONTINUED | OUTPATIENT
Start: 2019-10-15 | End: 2019-10-16

## 2019-10-15 RX ORDER — ERTAPENEM SODIUM 1 G/1
1000 INJECTION, POWDER, LYOPHILIZED, FOR SOLUTION INTRAMUSCULAR; INTRAVENOUS EVERY 24 HOURS
Refills: 0 | Status: DISCONTINUED | OUTPATIENT
Start: 2019-10-16 | End: 2019-10-23

## 2019-10-15 RX ORDER — ASPIRIN/CALCIUM CARB/MAGNESIUM 324 MG
81 TABLET ORAL DAILY
Refills: 0 | Status: DISCONTINUED | OUTPATIENT
Start: 2019-10-15 | End: 2019-10-16

## 2019-10-15 RX ORDER — PIPERACILLIN AND TAZOBACTAM 4; .5 G/20ML; G/20ML
3.38 INJECTION, POWDER, LYOPHILIZED, FOR SOLUTION INTRAVENOUS EVERY 8 HOURS
Refills: 0 | Status: DISCONTINUED | OUTPATIENT
Start: 2019-10-15 | End: 2019-10-15

## 2019-10-15 RX ORDER — POTASSIUM CHLORIDE 20 MEQ
20 PACKET (EA) ORAL
Refills: 0 | Status: COMPLETED | OUTPATIENT
Start: 2019-10-15 | End: 2019-10-15

## 2019-10-15 RX ORDER — INSULIN LISPRO 100/ML
VIAL (ML) SUBCUTANEOUS EVERY 6 HOURS
Refills: 0 | Status: DISCONTINUED | OUTPATIENT
Start: 2019-10-15 | End: 2019-10-18

## 2019-10-15 RX ORDER — DOCUSATE SODIUM 100 MG
100 CAPSULE ORAL
Refills: 0 | Status: DISCONTINUED | OUTPATIENT
Start: 2019-10-15 | End: 2019-10-16

## 2019-10-15 RX ORDER — ERTAPENEM SODIUM 1 G/1
INJECTION, POWDER, LYOPHILIZED, FOR SOLUTION INTRAMUSCULAR; INTRAVENOUS
Refills: 0 | Status: DISCONTINUED | OUTPATIENT
Start: 2019-10-15 | End: 2019-10-23

## 2019-10-15 RX ORDER — SENNA PLUS 8.6 MG/1
5 TABLET ORAL EVERY 24 HOURS
Refills: 0 | Status: DISCONTINUED | OUTPATIENT
Start: 2019-10-15 | End: 2019-10-16

## 2019-10-15 RX ADMIN — Medication 100 MILLIGRAM(S): at 06:10

## 2019-10-15 RX ADMIN — HYDROMORPHONE HYDROCHLORIDE 0.5 MILLIGRAM(S): 2 INJECTION INTRAMUSCULAR; INTRAVENOUS; SUBCUTANEOUS at 03:20

## 2019-10-15 RX ADMIN — Medication 2: at 05:35

## 2019-10-15 RX ADMIN — CHLORHEXIDINE GLUCONATE 1 APPLICATION(S): 213 SOLUTION TOPICAL at 05:20

## 2019-10-15 RX ADMIN — PIPERACILLIN AND TAZOBACTAM 25 GRAM(S): 4; .5 INJECTION, POWDER, LYOPHILIZED, FOR SOLUTION INTRAVENOUS at 05:35

## 2019-10-15 RX ADMIN — Medication 81 MILLIGRAM(S): at 11:11

## 2019-10-15 RX ADMIN — ERTAPENEM SODIUM 120 MILLIGRAM(S): 1 INJECTION, POWDER, LYOPHILIZED, FOR SOLUTION INTRAMUSCULAR; INTRAVENOUS at 21:31

## 2019-10-15 RX ADMIN — Medication 50 GRAM(S): at 03:51

## 2019-10-15 RX ADMIN — Medication 20 MILLIGRAM(S): at 08:30

## 2019-10-15 RX ADMIN — Medication 20 MILLIEQUIVALENT(S): at 03:52

## 2019-10-15 RX ADMIN — Medication 100 MILLIGRAM(S): at 17:57

## 2019-10-15 RX ADMIN — CLOPIDOGREL BISULFATE 75 MILLIGRAM(S): 75 TABLET, FILM COATED ORAL at 11:11

## 2019-10-15 RX ADMIN — ENOXAPARIN SODIUM 40 MILLIGRAM(S): 100 INJECTION SUBCUTANEOUS at 11:10

## 2019-10-15 RX ADMIN — Medication 50 GRAM(S): at 18:05

## 2019-10-15 RX ADMIN — Medication 2: at 02:30

## 2019-10-15 RX ADMIN — Medication 500000 UNIT(S): at 17:57

## 2019-10-15 RX ADMIN — Medication 20 MILLIEQUIVALENT(S): at 21:31

## 2019-10-15 RX ADMIN — Medication 20 MILLIEQUIVALENT(S): at 18:05

## 2019-10-15 RX ADMIN — PIPERACILLIN AND TAZOBACTAM 25 GRAM(S): 4; .5 INJECTION, POWDER, LYOPHILIZED, FOR SOLUTION INTRAVENOUS at 13:36

## 2019-10-15 RX ADMIN — Medication 20 MILLIEQUIVALENT(S): at 16:33

## 2019-10-15 RX ADMIN — Medication 500000 UNIT(S): at 06:10

## 2019-10-15 RX ADMIN — PANTOPRAZOLE SODIUM 40 MILLIGRAM(S): 20 TABLET, DELAYED RELEASE ORAL at 11:10

## 2019-10-15 RX ADMIN — ATORVASTATIN CALCIUM 10 MILLIGRAM(S): 80 TABLET, FILM COATED ORAL at 23:47

## 2019-10-15 RX ADMIN — HYDROMORPHONE HYDROCHLORIDE 0.5 MILLIGRAM(S): 2 INJECTION INTRAMUSCULAR; INTRAVENOUS; SUBCUTANEOUS at 03:05

## 2019-10-15 RX ADMIN — Medication 1.25 MG/HR: at 10:06

## 2019-10-15 NOTE — PROGRESS NOTE ADULT - ATTENDING COMMENTS
Comfortable on fentanyl drip gtt  Acute systolic HF with EF 20%, CXR pul edema pattern, start gently diurese with small dose Lasix drip gtt, continue Dobutamine drip 2.5, monitor cardiac output, stroke volume and SVV on flow track, lactate cleared  Ti decreasing, on ASA plavix  Remains in septic shock, continue to titrate Levophed  BC Morganella, surgical swab proteus, dose of Zosyn adjusted  Wound care    Condition remains critical

## 2019-10-15 NOTE — OCCUPATIONAL THERAPY INITIAL EVALUATION ADULT - LEVEL OF INDEPENDENCE: SIT/SUPINE, REHAB EVAL
unsafe at this time, pt unable to comprehend basic 1 step commands and is resistant./unable to perform

## 2019-10-15 NOTE — AIRWAY REMOVAL NOTE  ADULT & PEDS - ARTIFICAL AIRWAY REMOVAL COMMENTS
[place] order for extubation verified.  The patient was identified by full name and birth day compared to the identification band. Present during the procedure was  Kelly Valdaez RN. Pt successfully extubated and was placed on HFNC 30l 30% sat 100%.

## 2019-10-15 NOTE — PROGRESS NOTE ADULT - ASSESSMENT
69y male with extensive LE vaso-occlusive disease, prior LLE AKA, who was brought in from his nursing home facility with sepsis likely secondary to his chronically necrotic RLE. Also with volume overload. Admitted for sepsis with concern of wet gangrene, now s/p R below knee guillotine for wet gangrene performed on 10/13/19, recovering in SICU    Plan:  - Appreciate care per SICU team  - Daily dressing changes  - IV abx based on sensitivities  - Will continue to re-eval for take back to OR for completion vs. AKA    #[id number] Vascular

## 2019-10-15 NOTE — PROGRESS NOTE ADULT - SUBJECTIVE AND OBJECTIVE BOX
Subjective:    Patient seen and evaluated this AM with team. Dressing changed in AM, wound stable. BCx positive for Morganella, wound culture with Pseudomonas, Zosyn increased. Cardiology following for overload, demand ischemia    OBJECTIVE:     ** PHYSICAL EXAM **    Physical Exam:  General: ill-appearing  Pulmonary: intubated and on vent  Abdominal: soft, ND  Extremities: RLE dressing in place c/d/i, no strikethrough, dressing changed and wound appears healthy    Vital Signs Last 24 Hrs  T(C): 36.7 (15 Oct 2019 07:00), Max: 36.9 (15 Oct 2019 03:00)  T(F): 98.1 (15 Oct 2019 07:00), Max: 98.4 (15 Oct 2019 03:00)  HR: 75 (15 Oct 2019 10:45) (62 - 776)  BP: 126/51 (15 Oct 2019 08:12) (92/51 - 126/51)  BP(mean): 70 (15 Oct 2019 08:12) (67 - 77)  RR: 16 (15 Oct 2019 10:45) (12 - 26)  SpO2: 100% (15 Oct 2019 10:45) (99% - 100%)    I&O's Detail    14 Oct 2019 07:01  -  15 Oct 2019 07:00  --------------------------------------------------------  IN:    dexmedetomidine Infusion: 4.4 mL    dextrose 5% + lactated ringers.: 60 mL    DOBUTamine Infusion: 105.6 mL    Enteral Tube Flush: 280 mL    fentaNYL Infusion.: 33 mL    Glucerna 1.5: 440 mL    IV PiggyBack: 250 mL    norepinephrine Infusion: 139.5 mL    Solution: 300 mL    Solution: 50 mL  Total IN: 1662.5 mL    OUT:    Indwelling Catheter - Urethral: 1825 mL  Total OUT: 1825 mL    Total NET: -162.5 mL      15 Oct 2019 07:01  -  15 Oct 2019 10:57  --------------------------------------------------------  IN:    DOBUTamine Infusion: 13.2 mL    Enteral Tube Flush: 60 mL    Glucerna 1.5: 160 mL    norepinephrine Infusion: 19.8 mL    Solution: 75 mL  Total IN: 328 mL    OUT:    Indwelling Catheter - Urethral: 500 mL  Total OUT: 500 mL    Total NET: -172 mL      MEDICATIONS  (STANDING):  aspirin  chewable 81 milliGRAM(s) Oral daily  atorvastatin 10 milliGRAM(s) Oral at bedtime  chlorhexidine 2% Cloths 1 Application(s) Topical <User Schedule>  clopidogrel Tablet 75 milliGRAM(s) Oral daily  DOBUTamine Infusion 2.5 MICROgram(s)/kG/Min (4.38 mL/Hr) IV Continuous <Continuous>  docusate sodium Liquid 100 milliGRAM(s) Enteral Tube two times a day  enoxaparin Injectable 40 milliGRAM(s) SubCutaneous daily  furosemide Infusion 2.5 mG/Hr (1.25 mL/Hr) IV Continuous <Continuous>  influenza   Vaccine 0.5 milliLiter(s) IntraMuscular once  insulin lispro (HumaLOG) corrective regimen sliding scale   SubCutaneous every 6 hours  norepinephrine Infusion 0.1 MICROgram(s)/kG/Min (10.95 mL/Hr) IV Continuous <Continuous>  nystatin    Suspension 732048 Unit(s) Oral two times a day  pantoprazole  Injectable 40 milliGRAM(s) IV Push daily  piperacillin/tazobactam IVPB.. 3.375 Gram(s) IV Intermittent every 8 hours  senna Syrup 5 milliLiter(s) Oral every 24 hours    MEDICATIONS  (PRN):  HYDROmorphone  Injectable 0.5 milliGRAM(s) IV Push every 4 hours PRN Breakthrough Pain      LABS:                        8.4    13.00 )-----------( 284      ( 15 Oct 2019 02:28 )             28.9     10-15    142  |  107  |  26<H>  ----------------------------<  185<H>  3.8   |  24  |  1.05    Ca    8.2<L>      15 Oct 2019 02:28  Phos  3.5     10-15  Mg     1.9     10-15    TPro  6.6  /  Alb  2.0<L>  /  TBili  0.4  /  DBili  0.2  /  AST  13  /  ALT  29  /  AlkPhos  117  10-15    PT/INR - ( 15 Oct 2019 02:28 )   PT: 17.5 sec;   INR: 1.50 ratio         PTT - ( 15 Oct 2019 02:28 )  PTT:31.2 sec  LIVER FUNCTIONS - ( 15 Oct 2019 02:28 )  Alb: 2.0 g/dL / Pro: 6.6 g/dL / ALK PHOS: 117 U/L / ALT: 29 U/L / AST: 13 U/L / GGT: x           Urinalysis Basic - ( 13 Oct 2019 21:58 )    Color: Yellow / Appearance: Turbid / S.024 / pH: x  Gluc: x / Ketone: Negative  / Bili: Negative / Urobili: Negative   Blood: x / Protein: 100 / Nitrite: Negative   Leuk Esterase: Large / RBC: >50 /hpf / WBC >50 /HPF   Sq Epi: x / Non Sq Epi: 3 / Bacteria: Negative

## 2019-10-15 NOTE — OCCUPATIONAL THERAPY INITIAL EVALUATION ADULT - NS ASR FOLLOW COMMAND OT EVAL
25% of the time/able to follow single-step instructions able to follow single-step instructions/<25%

## 2019-10-15 NOTE — OCCUPATIONAL THERAPY INITIAL EVALUATION ADULT - DIAGNOSIS, OT EVAL
Pt presents with BLE NWB, decreased AROM, decreased strength, poor safety awareness/insight, decreased cognition, impaired communication, decreased endurance.

## 2019-10-15 NOTE — OCCUPATIONAL THERAPY INITIAL EVALUATION ADULT - LIVES WITH, PROFILE
per EMR: pt was living in a townhouse, nephew says he has been there about 2 months. He cannot go home as his apt is no longer available. Pt was dependent in all ADL and nonambulatory prior to admission.

## 2019-10-15 NOTE — OCCUPATIONAL THERAPY INITIAL EVALUATION ADULT - PERTINENT HX OF CURRENT PROBLEM, REHAB EVAL
70yo M s/p LLE fem-pop bypass (vein graft) with subsequent LLE BKA (07/2019), LLE AKA (09/2019), known chronically ischemic RLE who was brought in from his nursing facility with progressively altered mentation, fevers and tachycardia. In the ED, the patient was agitated, required supplemental O2, and was tachycardic. R below knee guillotine for wet gangrene performed on 10/13/19

## 2019-10-15 NOTE — PROGRESS NOTE ADULT - ASSESSMENT
70 y/o M with Hx of PVD, DM type II, HTN, CVA, NSTEMI, L AKA (discharged 10/7), presenting to the [facility] for evaluation of AMS, fevers and tachycardia. Patient found to have xray concerning for gas gangrene now s/p emergent guillotine R BKA. Admitted to SICU postoperatively intubated and on vasopressor support. Continuing to require dobutamine and norepinephrine.     NEURO: No active issues at this time   - Fentanyl gtt and dilaudid PRN, wean as tolerated     RESP: Mechanical ventilation s/p resp insufficiency following OR, pulmonary edema   - Mechanical ventilation 450/12/5/30%  - SBT this morning  - F/U ABG   - CXR in AM     CARDIAC: CHF, HTN, Hx of NSTEMI   - F/U Troponin in AM   - Hold home medications at this time   - Levophed, wean as tolerated   - Dobutamine @ 2.5 mcg/kg/min   - Monitor vitals   - F/U TTE (last EF ~20%)     GI: No active issues at this time   - Tube feeds via OGT, will advance rate today    Renal: S/p diuresis   - IV locked  - Urine output appropriate following diuresis  - Monitor intake and outtake   - Fletcher   - F/U BMP, replete electrolytes as needed     Heme: On home ASA and plavix  - Cont home dual antiplatelet therapy   - F/U CBC   - Lovenox 40mg q24 hours     ID: R LE gas gangrene, leukocytosis, Bcx (+) for Morganella morganii and surgical cultures positive for Proteus  - Follow WBC   - Vancomycin through the morning, Zosyn   - Monitor for fevers     ENDO: History of DM II  - Continue FSG q4 hours

## 2019-10-15 NOTE — PROGRESS NOTE ADULT - SUBJECTIVE AND OBJECTIVE BOX
SICU DAILY PROGRESS NOTE    69y Male with history of DM II, HTN, CVA, NSTEMI c/b cardiogenic shock, PVD s/p L AILEEN (discharged on 10/7) presents to [facility] ED from rehab for altered mental status. In ED patient was noted to have leukocytosis of 17, hyperglycemia, and tachycardic. At previous hospital discharge patient was noted to have right LE dry gangrene, however xray of LE upon ED presentation today was significant for emphysematous changes along lateral malleolus. CXR also significant for bilateral pulmonary edema, BNP 29020. 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 During surgery, purulent drainage noted within BKA stump, cultures obtained. Patient was brought to SICU intubated on 0.05 Levophed. R IJ triple lumen was placed in SICU. Dobutamine gtt started overnight. Has remained on the dobutamine infusion with reduced norepinephrine requirement.     24 HOUR EVENTS:  - ST depression found on EKG from night prior resolved on repeat EKG this AM, resumed home plavix  - TTE showing EF - 25%   - Switched to fentanyl gtt from precedex  - Diuresed with lasix 20 and metolazone 5  - Troponins trended down so stopped checking (demand ischemia)  - Started TF @ 20cc  - Replaced Arterial line - left radial   - Cultures resulted as Morginella Morganii from Bcx and Proteus from surgical culture  - Increased dose of Zosyn for Pseudomonas dosing, and discontinued Clindamycin  - New blood cultures sent    SUBJECTIVE/ROS:  [ ] A ten-point review of systems was otherwise negative except as noted.  [X] Due to altered mental status/intubation, subjective information were not able to be obtained from the patient. History was obtained, to the extent possible, from review of the chart and collateral sources of information.    NEURO  RASS: 0 -1     Exam: sedated, arousable but does not follow commands  Meds: fentaNYL   Infusion. 0.5 MICROgram(s)/kG/Hr IV Continuous <Continuous>  HYDROmorphone  Injectable 0.5 milliGRAM(s) IV Push every 4 hours PRN Breakthrough Pain    [x] Adequacy of sedation and pain control has been assessed and adjusted    RESPIRATORY  RR: 13 (10-15-19 @ 01:15) (11 - 20)  SpO2: 100% (10-15-19 @ 01:15) (100% - 100%)  Exam: unlabored, clear to auscultation bilaterally, no wheezing  Mechanical Ventilation: Mode: AC/ CMV (Assist Control/ Continuous Mandatory Ventilation), RR (machine): 12, RR (patient): 12, TV (machine): 450, FiO2: 30, PEEP: 5, ITime: 1, MAP: 8, PIP: 20  ABG - ( 14 Oct 2019 15:33 )  pH: 7.39  /  pCO2: 44    /  pO2: 156   / HCO3: 26    / Base Excess: 1.4   /  SaO2: 99      Lactate: x        [X] Extubation Readiness Assessed    CARDIOVASCULAR  HR: 71 (10-15-19 @ 01:15) (59 - 78)  BP: 95/56 (10-14-19 @ 16:00) (92/51 - 111/60)  BP(mean): 71 (10-14-19 @ 16:00) (67 - 81)  ABP: 131/45 (10-15-19 @ 01:15) (59/54 - 148/77)  ABP(mean): 66 (10-15-19 @ 01:15) (55 - 101)  VBG - ( 14 Oct 2019 10:15 )  pH: 7.36  /  pCO2: 51    /  pO2: 33    / HCO3: 28    / Base Excess: 2.7   /  SaO2: 51     Lactate: x      Repeat TTE showing EF of 25%    Exam: regular rate and rhythm  Cardiac Rhythm: sinus  Perfusion     [x]Adequate   [ ]Inadequate  Mentation   [x]Normal       [ ]Reduced  Extremities  [x]Warm         [ ]Cool  Volume Status [ ]Hypervolemic [x]Euvolemic [ ]Hypovolemic  Meds: DOBUTamine Infusion 2.5 MICROgram(s)/kG/Min IV Continuous <Continuous>  norepinephrine Infusion 0.1 MICROgram(s)/kG/Min IV Continuous <Continuous>    GI/NUTRITION  Exam: soft, nontender, nondistended  Diet: TF @ 20cc/hr  Meds: docusate sodium Liquid 100 milliGRAM(s) Enteral Tube two times a day  pantoprazole  Injectable 40 milliGRAM(s) IV Push daily  senna Syrup 5 milliLiter(s) Oral every 24 hours    GENITOURINARY  I&O's Detail    10-13 @ 07:01  -  10-14 @ 07:00  --------------------------------------------------------  IN:    Albumin 5%  - 250 mL: 250 mL    dexmedetomidine Infusion: 88.8 mL    dextrose 5% + lactated ringers.: 330 mL    DOBUTamine Infusion: 30.8 mL    insulin regular Infusion: 16.5 mL    IV PiggyBack: 650 mL    lactated ringers.: 240 mL    norepinephrine Infusion: 13.2 mL    norepinephrine Infusion: 93.1 mL    phenylephrine   Infusion: 140 mL    Solution: 25 mL    Solution: 100 mL  Total IN: 1977.4 mL    OUT:    Indwelling Catheter - Urethral: 840 mL    Nasoenteral Tube: 50 mL  Total OUT: 890 mL    Total NET: 1087.4 mL      10-14 @ 07:01  -  10-15 @ 01:26  --------------------------------------------------------  IN:    dexmedetomidine Infusion: 4.4 mL    dextrose 5% + lactated ringers.: 60 mL    DOBUTamine Infusion: 74.8 mL    Enteral Tube Flush: 220 mL    fentaNYL Infusion.: 22.5 mL    Glucerna 1.5: 300 mL    IV PiggyBack: 250 mL    norepinephrine Infusion: 82.3 mL    Solution: 225 mL  Total IN: 1239 mL    OUT:    Indwelling Catheter - Urethral: 1450 mL  Total OUT: 1450 mL    Total NET: -211 mL    10-14    146<H>  |  111<H>  |  31<H>  ----------------------------<  138<H>  4.0   |  22  |  1.14    Ca    8.3<L>      14 Oct 2019 02:16  Phos  3.3     10-14  Mg     2.3     10-14    [X] Fletcher catheter, indication: N/A  Meds:  diuresed with 20 lasix and 5 metolazone    HEMATOLOGIC  Meds: aspirin  chewable 81 milliGRAM(s) Oral daily  clopidogrel Tablet 75 milliGRAM(s) Oral daily  enoxaparin Injectable 40 milliGRAM(s) SubCutaneous daily    [x] VTE Prophylaxis                        8.7    11.69 )-----------( 288      ( 14 Oct 2019 10:30 )             29.9     PT/INR - ( 14 Oct 2019 02:16 )   PT: 18.5 sec;   INR: 1.60 ratio         PTT - ( 14 Oct 2019 02:16 )  PTT:32.8 sec    INFECTIOUS DISEASES  WBC Count: 11.69 K/uL (10-14 @ 10:30)  WBC Count: 11.14 K/uL (10-14 @ 02:16)    RECENT CULTURES:  Specimen Source: .Other  Date/Time: 10-13 @ 17:56  Culture Results:   Testing in progress  Specimen Source: .Blood  Date/Time: 10-13 @ 01:05  Culture Results:   No growth to date.  Gram Stain:   Growth in anaerobic bottle: Gram Negative Rods  Organism: Blood Culture PCR    Meds: influenza   Vaccine 0.5 milliLiter(s) IntraMuscular once  nystatin    Suspension 532680 Unit(s) Oral two times a day  piperacillin/tazobactam IVPB.. 4.5 Gram(s) IV Intermittent every 8 hours  vancomycin  IVPB 1000 milliGRAM(s) IV Intermittent every 12 hours    ENDOCRINE  CAPILLARY BLOOD GLUCOSE    POCT Blood Glucose.: 100 mg/dL (14 Oct 2019 22:28)  POCT Blood Glucose.: 155 mg/dL (14 Oct 2019 17:05)  POCT Blood Glucose.: 191 mg/dL (14 Oct 2019 13:47)  POCT Blood Glucose.: 149 mg/dL (14 Oct 2019 09:08)    Meds: atorvastatin 10 milliGRAM(s) Oral at bedtime  insulin lispro (HumaLOG) corrective regimen sliding scale   SubCutaneous every 4 hours    ACCESS DEVICES:  [X] Peripheral IV  [X] Central Venous Line	[ ] R	[ ] L	[ ] IJ	[ ] Fem	[ ] SC	Placed:   [X] Arterial Line		[ ] R	[ ] L	[ ] Fem	[ ] Rad	[ ] Ax	Placed:   [ ] PICC:					[ ] Mediport  [X] Urinary Catheter, Date Placed:   [x] Necessity of urinary, arterial, and venous catheters discussed    OTHER MEDICATIONS:  chlorhexidine 2% Cloths 1 Application(s) Topical <User Schedule>    IMAGING:    < from: Transthoracic Echocardiogram (10.14.19 @ 14:08) >  Conclusions:  1. Tethered and thickened mitral valve leaflets. Minimal  mitral regurgitation.  2. Normal left ventricular internal dimensions and wall  thicknesses.  3. Severe global left ventricular systolic dysfunction.  Estimated LVEF = 25%.  4. Normal right ventricular size with decreased right  ventricular systolic function.  5. Bilateral pleural effusions.    < end of copied text >

## 2019-10-15 NOTE — OCCUPATIONAL THERAPY INITIAL EVALUATION ADULT - GENERAL OBSERVATIONS, REHAB EVAL
Recieved supine in bed +B wrist restriants, +NG, +pulse ox, +A Line, +high flow NC, +catheter, +IV, +IVL, +tele. Received supine in bed +B wrist restraints, +NG, +pulse ox, +A Line, +high flow NC, +catheter, +IV, +IVL, +tele.

## 2019-10-15 NOTE — OCCUPATIONAL THERAPY INITIAL EVALUATION ADULT - LONG TERM MEMORY, REHAB EVAL
pt only able to recall he lives alone, unable to recall other biographical information at this time/impaired

## 2019-10-16 LAB
ANION GAP SERPL CALC-SCNC: 12 MMOL/L — SIGNIFICANT CHANGE UP (ref 5–17)
ANION GAP SERPL CALC-SCNC: 9 MMOL/L — SIGNIFICANT CHANGE UP (ref 5–17)
APTT BLD: 31.6 SEC — SIGNIFICANT CHANGE UP (ref 27.5–36.3)
BASE EXCESS BLDV CALC-SCNC: 8.8 MMOL/L — HIGH (ref -2–2)
BUN SERPL-MCNC: 15 MG/DL — SIGNIFICANT CHANGE UP (ref 7–23)
BUN SERPL-MCNC: 19 MG/DL — SIGNIFICANT CHANGE UP (ref 7–23)
CALCIUM SERPL-MCNC: 8.4 MG/DL — SIGNIFICANT CHANGE UP (ref 8.4–10.5)
CALCIUM SERPL-MCNC: 8.5 MG/DL — SIGNIFICANT CHANGE UP (ref 8.4–10.5)
CHLORIDE SERPL-SCNC: 96 MMOL/L — SIGNIFICANT CHANGE UP (ref 96–108)
CHLORIDE SERPL-SCNC: 97 MMOL/L — SIGNIFICANT CHANGE UP (ref 96–108)
CO2 BLDV-SCNC: 35 MMOL/L — HIGH (ref 22–30)
CO2 SERPL-SCNC: 30 MMOL/L — SIGNIFICANT CHANGE UP (ref 22–31)
CO2 SERPL-SCNC: 31 MMOL/L — SIGNIFICANT CHANGE UP (ref 22–31)
CREAT SERPL-MCNC: 0.84 MG/DL — SIGNIFICANT CHANGE UP (ref 0.5–1.3)
CREAT SERPL-MCNC: 0.92 MG/DL — SIGNIFICANT CHANGE UP (ref 0.5–1.3)
GAS PNL BLDA: SIGNIFICANT CHANGE UP
GLUCOSE SERPL-MCNC: 126 MG/DL — HIGH (ref 70–99)
GLUCOSE SERPL-MCNC: 201 MG/DL — HIGH (ref 70–99)
HCO3 BLDV-SCNC: 34 MMOL/L — HIGH (ref 21–29)
HCT VFR BLD CALC: 26.7 % — LOW (ref 39–50)
HGB BLD-MCNC: 8.2 G/DL — LOW (ref 13–17)
HOROWITZ INDEX BLDV+IHG-RTO: 30 — SIGNIFICANT CHANGE UP
INR BLD: 1.41 RATIO — HIGH (ref 0.88–1.16)
MAGNESIUM SERPL-MCNC: 1.8 MG/DL — SIGNIFICANT CHANGE UP (ref 1.6–2.6)
MAGNESIUM SERPL-MCNC: 2 MG/DL — SIGNIFICANT CHANGE UP (ref 1.6–2.6)
MCHC RBC-ENTMCNC: 26.4 PG — LOW (ref 27–34)
MCHC RBC-ENTMCNC: 30.7 GM/DL — LOW (ref 32–36)
MCV RBC AUTO: 85.9 FL — SIGNIFICANT CHANGE UP (ref 80–100)
NRBC # BLD: 0 /100 WBCS — SIGNIFICANT CHANGE UP (ref 0–0)
NT-PROBNP SERPL-SCNC: 3840 PG/ML — HIGH (ref 0–300)
PCO2 BLDV: 51 MMHG — HIGH (ref 35–50)
PH BLDV: 7.43 — SIGNIFICANT CHANGE UP (ref 7.35–7.45)
PHOSPHATE SERPL-MCNC: 2.4 MG/DL — LOW (ref 2.5–4.5)
PHOSPHATE SERPL-MCNC: 2.7 MG/DL — SIGNIFICANT CHANGE UP (ref 2.5–4.5)
PLATELET # BLD AUTO: 252 K/UL — SIGNIFICANT CHANGE UP (ref 150–400)
PO2 BLDV: 38 MMHG — SIGNIFICANT CHANGE UP (ref 25–45)
POTASSIUM SERPL-MCNC: 3.4 MMOL/L — LOW (ref 3.5–5.3)
POTASSIUM SERPL-MCNC: 3.5 MMOL/L — SIGNIFICANT CHANGE UP (ref 3.5–5.3)
POTASSIUM SERPL-SCNC: 3.4 MMOL/L — LOW (ref 3.5–5.3)
POTASSIUM SERPL-SCNC: 3.5 MMOL/L — SIGNIFICANT CHANGE UP (ref 3.5–5.3)
PROTHROM AB SERPL-ACNC: 16.3 SEC — HIGH (ref 10–12.9)
RBC # BLD: 3.11 M/UL — LOW (ref 4.2–5.8)
RBC # FLD: 15.5 % — HIGH (ref 10.3–14.5)
SAO2 % BLDV: 67 % — SIGNIFICANT CHANGE UP (ref 67–88)
SODIUM SERPL-SCNC: 137 MMOL/L — SIGNIFICANT CHANGE UP (ref 135–145)
SODIUM SERPL-SCNC: 138 MMOL/L — SIGNIFICANT CHANGE UP (ref 135–145)
WBC # BLD: 11.36 K/UL — HIGH (ref 3.8–10.5)
WBC # FLD AUTO: 11.36 K/UL — HIGH (ref 3.8–10.5)

## 2019-10-16 PROCEDURE — 99233 SBSQ HOSP IP/OBS HIGH 50: CPT

## 2019-10-16 PROCEDURE — 71045 X-RAY EXAM CHEST 1 VIEW: CPT | Mod: 26,77

## 2019-10-16 PROCEDURE — 71045 X-RAY EXAM CHEST 1 VIEW: CPT | Mod: 26

## 2019-10-16 RX ORDER — POTASSIUM CHLORIDE 20 MEQ
20 PACKET (EA) ORAL
Refills: 0 | Status: COMPLETED | OUTPATIENT
Start: 2019-10-16 | End: 2019-10-16

## 2019-10-16 RX ORDER — ATORVASTATIN CALCIUM 80 MG/1
10 TABLET, FILM COATED ORAL AT BEDTIME
Refills: 0 | Status: DISCONTINUED | OUTPATIENT
Start: 2019-10-16 | End: 2019-10-18

## 2019-10-16 RX ORDER — DIGOXIN 250 MCG
0.12 TABLET ORAL EVERY 24 HOURS
Refills: 0 | Status: DISCONTINUED | OUTPATIENT
Start: 2019-10-16 | End: 2019-10-18

## 2019-10-16 RX ORDER — SODIUM,POTASSIUM PHOSPHATES 278-250MG
1 POWDER IN PACKET (EA) ORAL EVERY 6 HOURS
Refills: 0 | Status: COMPLETED | OUTPATIENT
Start: 2019-10-16 | End: 2019-10-17

## 2019-10-16 RX ORDER — ATORVASTATIN CALCIUM 80 MG/1
10 TABLET, FILM COATED ORAL AT BEDTIME
Refills: 0 | Status: DISCONTINUED | OUTPATIENT
Start: 2019-10-16 | End: 2019-10-16

## 2019-10-16 RX ORDER — POTASSIUM PHOSPHATE, MONOBASIC POTASSIUM PHOSPHATE, DIBASIC 236; 224 MG/ML; MG/ML
15 INJECTION, SOLUTION INTRAVENOUS ONCE
Refills: 0 | Status: COMPLETED | OUTPATIENT
Start: 2019-10-16 | End: 2019-10-16

## 2019-10-16 RX ORDER — CLOPIDOGREL BISULFATE 75 MG/1
75 TABLET, FILM COATED ORAL DAILY
Refills: 0 | Status: DISCONTINUED | OUTPATIENT
Start: 2019-10-16 | End: 2019-10-18

## 2019-10-16 RX ORDER — MAGNESIUM SULFATE 500 MG/ML
2 VIAL (ML) INJECTION ONCE
Refills: 0 | Status: COMPLETED | OUTPATIENT
Start: 2019-10-16 | End: 2019-10-16

## 2019-10-16 RX ORDER — LANOLIN ALCOHOL/MO/W.PET/CERES
3 CREAM (GRAM) TOPICAL AT BEDTIME
Refills: 0 | Status: DISCONTINUED | OUTPATIENT
Start: 2019-10-16 | End: 2019-10-18

## 2019-10-16 RX ORDER — ACETAMINOPHEN 500 MG
975 TABLET ORAL EVERY 6 HOURS
Refills: 0 | Status: DISCONTINUED | OUTPATIENT
Start: 2019-10-16 | End: 2019-10-16

## 2019-10-16 RX ORDER — ACETAMINOPHEN 500 MG
975 TABLET ORAL EVERY 6 HOURS
Refills: 0 | Status: DISCONTINUED | OUTPATIENT
Start: 2019-10-16 | End: 2019-10-18

## 2019-10-16 RX ORDER — ASPIRIN/CALCIUM CARB/MAGNESIUM 324 MG
81 TABLET ORAL DAILY
Refills: 0 | Status: DISCONTINUED | OUTPATIENT
Start: 2019-10-16 | End: 2019-10-18

## 2019-10-16 RX ADMIN — ERTAPENEM SODIUM 120 MILLIGRAM(S): 1 INJECTION, POWDER, LYOPHILIZED, FOR SOLUTION INTRAMUSCULAR; INTRAVENOUS at 19:55

## 2019-10-16 RX ADMIN — Medication 50 MILLIEQUIVALENT(S): at 10:13

## 2019-10-16 RX ADMIN — Medication 1.25 MG/HR: at 19:55

## 2019-10-16 RX ADMIN — ENOXAPARIN SODIUM 40 MILLIGRAM(S): 100 INJECTION SUBCUTANEOUS at 13:18

## 2019-10-16 RX ADMIN — Medication 1.25 MG/HR: at 07:00

## 2019-10-16 RX ADMIN — Medication 50 GRAM(S): at 06:49

## 2019-10-16 RX ADMIN — Medication 20 MILLIEQUIVALENT(S): at 19:54

## 2019-10-16 RX ADMIN — ATORVASTATIN CALCIUM 10 MILLIGRAM(S): 80 TABLET, FILM COATED ORAL at 21:06

## 2019-10-16 RX ADMIN — Medication 50 GRAM(S): at 18:17

## 2019-10-16 RX ADMIN — Medication 1 TABLET(S): at 18:27

## 2019-10-16 RX ADMIN — POTASSIUM PHOSPHATE, MONOBASIC POTASSIUM PHOSPHATE, DIBASIC 62.5 MILLIMOLE(S): 236; 224 INJECTION, SOLUTION INTRAVENOUS at 06:50

## 2019-10-16 RX ADMIN — Medication 0.12 MILLIGRAM(S): at 18:27

## 2019-10-16 RX ADMIN — Medication 500000 UNIT(S): at 06:49

## 2019-10-16 RX ADMIN — CHLORHEXIDINE GLUCONATE 1 APPLICATION(S): 213 SOLUTION TOPICAL at 05:14

## 2019-10-16 RX ADMIN — Medication 1 TABLET(S): at 23:31

## 2019-10-16 RX ADMIN — Medication 81 MILLIGRAM(S): at 14:57

## 2019-10-16 RX ADMIN — Medication 50 MILLIEQUIVALENT(S): at 06:50

## 2019-10-16 RX ADMIN — Medication 50 MILLIEQUIVALENT(S): at 08:16

## 2019-10-16 RX ADMIN — Medication 3 MILLIGRAM(S): at 21:06

## 2019-10-16 RX ADMIN — Medication 20 MILLIEQUIVALENT(S): at 21:06

## 2019-10-16 RX ADMIN — Medication 500000 UNIT(S): at 18:36

## 2019-10-16 RX ADMIN — CLOPIDOGREL BISULFATE 75 MILLIGRAM(S): 75 TABLET, FILM COATED ORAL at 14:57

## 2019-10-16 RX ADMIN — Medication 1.25 MG/HR: at 21:06

## 2019-10-16 RX ADMIN — Medication 20 MILLIEQUIVALENT(S): at 18:27

## 2019-10-16 RX ADMIN — Medication 2: at 13:21

## 2019-10-16 NOTE — PROGRESS NOTE ADULT - ASSESSMENT
69y male with extensive LE vaso-occlusive disease, prior LLE AKA, who was brought in from his nursing home facility with sepsis likely secondary to his chronically necrotic RLE. Also with volume overload. Admitted for sepsis with concern of wet gangrene, now s/p R below knee guillotine for wet gangrene performed on 10/13/19, recovering in SICU    Plan:  - Appreciate care per SICU team  - Rec: consider vac placement vs. daily dressing changes  - Continue update IV ABx   - Planning for take back to OR for completion vs. AKA

## 2019-10-16 NOTE — PROGRESS NOTE ADULT - SUBJECTIVE AND OBJECTIVE BOX
HISTORY  69y Male    24 HOUR EVENTS:    SUBJECTIVE/ROS:  [ ] A ten-point review of systems was otherwise negative except as noted.  [ ] Due to altered mental status/intubation, subjective information were not able to be obtained from the patient. History was obtained, to the extent possible, from review of the chart and collateral sources of information.      NEURO  RASS:     GCS:     CAM ICU:  Exam: awake, alert, oriented  Meds: acetaminophen    Suspension .. 975 milliGRAM(s) Oral every 6 hours PRN Mild Pain (1 - 3)    [x] Adequacy of sedation and pain control has been assessed and adjusted      RESPIRATORY  RR: 16 (10-16-19 @ 00:06) (12 - 43)  SpO2: 100% (10-16-19 @ 00:06) (92% - 100%)  Wt(kg): --  Exam: unlabored, clear to auscultation bilaterally  Mechanical Ventilation: Mode: CPAP with PS, RR (patient): 15, FiO2: 30, PEEP: 5, PS: 5, PIP: 11  ABG - ( 15 Oct 2019 12:29 )  pH: 7.43  /  pCO2: 43    /  pO2: 85    / HCO3: 28    / Base Excess: 3.7   /  SaO2: 98      Lactate: x                [N/A] Extubation Readiness Assessed  Meds:       CARDIOVASCULAR  HR: 78 (10-16-19 @ 00:06) (69 - 776)  BP: 126/51 (10-15-19 @ 08:12) (126/51 - 126/51)  BP(mean): 70 (10-15-19 @ 08:12) (70 - 70)  ABP: 123/41 (10-16-19 @ 00:06) (100/41 - 157/69)  ABP(mean): 64 (10-16-19 @ 00:06) (58 - 94)  Wt(kg): --  CVP(cm H2O): --  VBG - ( 15 Oct 2019 02:27 )  pH: 7.34  /  pCO2: 53    /  pO2: 48    / HCO3: 28    / Base Excess: 1.9   /  SaO2: 75     Lactate: x                  Exam: regular rate and rhythm  Cardiac Rhythm: sinus  Perfusion     [x]Adequate   [ ]Inadequate  Mentation   [x]Normal       [ ]Reduced  Extremities  [x]Warm         [ ]Cool  Volume Status [ ]Hypervolemic [x]Euvolemic [ ]Hypovolemic  Meds: DOBUTamine Infusion 2.5 MICROgram(s)/kG/Min IV Continuous <Continuous>  furosemide Infusion 2.5 mG/Hr IV Continuous <Continuous>        GI/NUTRITION  Exam: soft, nontender, nondistended, incision C/D/I  Diet:  Meds: docusate sodium Liquid 100 milliGRAM(s) Enteral Tube two times a day  senna Syrup 5 milliLiter(s) Oral every 24 hours      GENITOURINARY  I&O's Detail    10-14 @ 07:01  -  10-15 @ 07:00  --------------------------------------------------------  IN:    dexmedetomidine Infusion: 4.4 mL    dextrose 5% + lactated ringers.: 60 mL    DOBUTamine Infusion: 105.6 mL    Enteral Tube Flush: 280 mL    fentaNYL Infusion.: 33 mL    Glucerna 1.5: 440 mL    IV PiggyBack: 250 mL    norepinephrine Infusion: 139.5 mL    Solution: 300 mL    Solution: 50 mL  Total IN: 1662.5 mL    OUT:    Indwelling Catheter - Urethral: 1825 mL  Total OUT: 1825 mL    Total NET: -162.5 mL      10-15 @ 07:01  -  10-16 @ 01:09  --------------------------------------------------------  IN:    DOBUTamine Infusion: 57.2 mL    Enteral Tube Flush: 240 mL    furosemide Infusion: 13 mL    Glucerna 1.5: 270 mL    IV PiggyBack: 50 mL    norepinephrine Infusion: 62.7 mL    Solution: 175 mL  Total IN: 867.9 mL    OUT:    Incontinent per Condom Catheter: 300 mL    Indwelling Catheter - Urethral: 2125 mL  Total OUT: 2425 mL    Total NET: -1557.1 mL          10-15    139  |  102  |  21  ----------------------------<  211<H>  3.7   |  24  |  0.95    Ca    8.2<L>      15 Oct 2019 14:15  Phos  2.8     10-15  Mg     1.9     10-15    TPro  6.6  /  Alb  2.0<L>  /  TBili  0.4  /  DBili  0.2  /  AST  13  /  ALT  29  /  AlkPhos  117  10-15    [ ] Fletcher catheter, indication: N/A  Meds:       HEMATOLOGIC  Meds: aspirin  chewable 81 milliGRAM(s) Oral daily  clopidogrel Tablet 75 milliGRAM(s) Oral daily  enoxaparin Injectable 40 milliGRAM(s) SubCutaneous daily    [x] VTE Prophylaxis                        8.4    13.00 )-----------( 284      ( 15 Oct 2019 02:28 )             28.9     PT/INR - ( 15 Oct 2019 02:28 )   PT: 17.5 sec;   INR: 1.50 ratio         PTT - ( 15 Oct 2019 02:28 )  PTT:31.2 sec  Transfusion     [ ] PRBC   [ ] Platelets   [ ] FFP   [ ] Cryoprecipitate      INFECTIOUS DISEASES  WBC Count: 13.00 K/uL (10-15 @ 02:28)    RECENT CULTURES:  Specimen Source: .Blood  Date/Time: 10-14 @ 10:46  Culture Results:   No growth to date.  Gram Stain: --  Organism: --  Specimen Source: .Urine  Date/Time: 10-14 @ 09:45  Culture Results:   10,000 - 49,000 CFU/mL Presumptive Candida albicans "Susceptibilities not  performed"  Gram Stain: --  Organism: --  Specimen Source: .Other  Date/Time: 10-13 @ 17:56  Culture Results:   Testing in progress  Gram Stain: --  Organism: Proteus mirabilis  Proteus mirabilis  Specimen Source: .Blood  Date/Time: 10-13 @ 01:05  Culture Results:   No growth to date.  Gram Stain:   Growth in anaerobic bottle: Gram Negative Rods  Organism: Blood Culture PCR  Morganella morganii    Meds: ertapenem  IVPB 1000 milliGRAM(s) IV Intermittent every 24 hours  ertapenem  IVPB      influenza   Vaccine 0.5 milliLiter(s) IntraMuscular once  nystatin    Suspension 657495 Unit(s) Oral two times a day        ENDOCRINE  CAPILLARY BLOOD GLUCOSE      POCT Blood Glucose.: 129 mg/dL (15 Oct 2019 17:51)  POCT Blood Glucose.: 148 mg/dL (15 Oct 2019 11:08)  POCT Blood Glucose.: 155 mg/dL (15 Oct 2019 05:29)    Meds: atorvastatin 10 milliGRAM(s) Oral at bedtime  insulin lispro (HumaLOG) corrective regimen sliding scale   SubCutaneous every 6 hours        ACCESS DEVICES:  [ ] Peripheral IV  [ ] Central Venous Line	[ ] R	[ ] L	[ ] IJ	[ ] Fem	[ ] SC	Placed:   [ ] Arterial Line		[ ] R	[ ] L	[ ] Fem	[ ] Rad	[ ] Ax	Placed:   [ ] PICC:					[ ] Mediport  [ ] Urinary Catheter, Date Placed:   [x] Necessity of urinary, arterial, and venous catheters discussed    OTHER MEDICATIONS:  chlorhexidine 2% Cloths 1 Application(s) Topical <User Schedule>      CODE STATUS:      IMAGING: SICU DAILY PROGRESS NOTE    HISTORY  69y Male with history of DM II, HTN, CVA, NSTEMI c/b cardiogenic shock, PVD s/p L AILEEN (discharged on 10/7) presents to [facility] ED from rehab for altered mental status. In ED patient was noted to have leukocytosis of 17, hyperglycemia, and tachycardic. At previous hospital discharge patient was noted to have right LE dry gangrene, however xray of LE upon ED presentation today was significant for emphysematous changes along lateral malleolus. CXR also significant for bilateral pulmonary edema, BNP 17025. 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 During surgery, purulent drainage noted within BKA stump, cultures obtained. Patient was brought to SICU intubated on 0.05 Levophed. R IJ triple lumen was placed in SICU. Dobutamine gtt started overnight. Has remained on the dobutamine infusion with reduced norepinephrine requirement.       24 HOUR EVENTS:      SUBJECTIVE/ROS:  [ ] A ten-point review of systems was otherwise negative except as noted.  [ ] Due to altered mental status/intubation, subjective information were not able to be obtained from the patient. History was obtained, to the extent possible, from review of the chart and collateral sources of information.      NEURO  RASS:     GCS:     CAM ICU:  Exam: awake, alert, oriented  Meds: acetaminophen    Suspension .. 975 milliGRAM(s) Oral every 6 hours PRN Mild Pain (1 - 3)    [x] Adequacy of sedation and pain control has been assessed and adjusted      RESPIRATORY  RR: 16 (10-16-19 @ 00:06) (12 - 43)  SpO2: 100% (10-16-19 @ 00:06) (92% - 100%)  Wt(kg): --  Exam: unlabored, clear to auscultation bilaterally  Mechanical Ventilation: Mode: CPAP with PS, RR (patient): 15, FiO2: 30, PEEP: 5, PS: 5, PIP: 11  ABG - ( 15 Oct 2019 12:29 )  pH: 7.43  /  pCO2: 43    /  pO2: 85    / HCO3: 28    / Base Excess: 3.7   /  SaO2: 98      Lactate: x                [N/A] Extubation Readiness Assessed  Meds:       CARDIOVASCULAR  HR: 78 (10-16-19 @ 00:06) (69 - 776)  BP: 126/51 (10-15-19 @ 08:12) (126/51 - 126/51)  BP(mean): 70 (10-15-19 @ 08:12) (70 - 70)  ABP: 123/41 (10-16-19 @ 00:06) (100/41 - 157/69)  ABP(mean): 64 (10-16-19 @ 00:06) (58 - 94)  Wt(kg): --  CVP(cm H2O): --  VBG - ( 15 Oct 2019 02:27 )  pH: 7.34  /  pCO2: 53    /  pO2: 48    / HCO3: 28    / Base Excess: 1.9   /  SaO2: 75     Lactate: x                  Exam: regular rate and rhythm  Cardiac Rhythm: sinus  Perfusion     [x]Adequate   [ ]Inadequate  Mentation   [x]Normal       [ ]Reduced  Extremities  [x]Warm         [ ]Cool  Volume Status [ ]Hypervolemic [x]Euvolemic [ ]Hypovolemic  Meds: DOBUTamine Infusion 2.5 MICROgram(s)/kG/Min IV Continuous <Continuous>  furosemide Infusion 2.5 mG/Hr IV Continuous <Continuous>        GI/NUTRITION  Exam: soft, nontender, nondistended, incision C/D/I  Diet:  Meds: docusate sodium Liquid 100 milliGRAM(s) Enteral Tube two times a day  senna Syrup 5 milliLiter(s) Oral every 24 hours      GENITOURINARY  I&O's Detail    10-14 @ 07:01  -  10-15 @ 07:00  --------------------------------------------------------  IN:    dexmedetomidine Infusion: 4.4 mL    dextrose 5% + lactated ringers.: 60 mL    DOBUTamine Infusion: 105.6 mL    Enteral Tube Flush: 280 mL    fentaNYL Infusion.: 33 mL    Glucerna 1.5: 440 mL    IV PiggyBack: 250 mL    norepinephrine Infusion: 139.5 mL    Solution: 300 mL    Solution: 50 mL  Total IN: 1662.5 mL    OUT:    Indwelling Catheter - Urethral: 1825 mL  Total OUT: 1825 mL    Total NET: -162.5 mL      10-15 @ 07:01  -  10-16 @ 01:09  --------------------------------------------------------  IN:    DOBUTamine Infusion: 57.2 mL    Enteral Tube Flush: 240 mL    furosemide Infusion: 13 mL    Glucerna 1.5: 270 mL    IV PiggyBack: 50 mL    norepinephrine Infusion: 62.7 mL    Solution: 175 mL  Total IN: 867.9 mL    OUT:    Incontinent per Condom Catheter: 300 mL    Indwelling Catheter - Urethral: 2125 mL  Total OUT: 2425 mL    Total NET: -1557.1 mL          10-15    139  |  102  |  21  ----------------------------<  211<H>  3.7   |  24  |  0.95    Ca    8.2<L>      15 Oct 2019 14:15  Phos  2.8     10-15  Mg     1.9     10-15    TPro  6.6  /  Alb  2.0<L>  /  TBili  0.4  /  DBili  0.2  /  AST  13  /  ALT  29  /  AlkPhos  117  10-15    [ ] Fletcher catheter, indication: N/A  Meds:       HEMATOLOGIC  Meds: aspirin  chewable 81 milliGRAM(s) Oral daily  clopidogrel Tablet 75 milliGRAM(s) Oral daily  enoxaparin Injectable 40 milliGRAM(s) SubCutaneous daily    [x] VTE Prophylaxis                        8.4    13.00 )-----------( 284      ( 15 Oct 2019 02:28 )             28.9     PT/INR - ( 15 Oct 2019 02:28 )   PT: 17.5 sec;   INR: 1.50 ratio         PTT - ( 15 Oct 2019 02:28 )  PTT:31.2 sec  Transfusion     [ ] PRBC   [ ] Platelets   [ ] FFP   [ ] Cryoprecipitate      INFECTIOUS DISEASES  WBC Count: 13.00 K/uL (10-15 @ 02:28)    RECENT CULTURES:  Specimen Source: .Blood  Date/Time: 10-14 @ 10:46  Culture Results:   No growth to date.  Gram Stain: --  Organism: --  Specimen Source: .Urine  Date/Time: 10-14 @ 09:45  Culture Results:   10,000 - 49,000 CFU/mL Presumptive Candida albicans "Susceptibilities not  performed"  Gram Stain: --  Organism: --  Specimen Source: .Other  Date/Time: 10-13 @ 17:56  Culture Results:   Testing in progress  Gram Stain: --  Organism: Proteus mirabilis  Proteus mirabilis  Specimen Source: .Blood  Date/Time: 10-13 @ 01:05  Culture Results:   No growth to date.  Gram Stain:   Growth in anaerobic bottle: Gram Negative Rods  Organism: Blood Culture PCR  Morganella morganii    Meds: ertapenem  IVPB 1000 milliGRAM(s) IV Intermittent every 24 hours  ertapenem  IVPB      influenza   Vaccine 0.5 milliLiter(s) IntraMuscular once  nystatin    Suspension 328899 Unit(s) Oral two times a day        ENDOCRINE  CAPILLARY BLOOD GLUCOSE      POCT Blood Glucose.: 129 mg/dL (15 Oct 2019 17:51)  POCT Blood Glucose.: 148 mg/dL (15 Oct 2019 11:08)  POCT Blood Glucose.: 155 mg/dL (15 Oct 2019 05:29)    Meds: atorvastatin 10 milliGRAM(s) Oral at bedtime  insulin lispro (HumaLOG) corrective regimen sliding scale   SubCutaneous every 6 hours        ACCESS DEVICES:  [ ] Peripheral IV  [ ] Central Venous Line	[ ] R	[ ] L	[ ] IJ	[ ] Fem	[ ] SC	Placed:   [ ] Arterial Line		[ ] R	[ ] L	[ ] Fem	[ ] Rad	[ ] Ax	Placed:   [ ] PICC:					[ ] Mediport  [ ] Urinary Catheter, Date Placed:   [x] Necessity of urinary, arterial, and venous catheters discussed    OTHER MEDICATIONS:  chlorhexidine 2% Cloths 1 Application(s) Topical <User Schedule>      CODE STATUS:      IMAGING: SICU DAILY PROGRESS NOTE    HISTORY  69y Male with history of DM II, HTN, CVA, NSTEMI c/b cardiogenic shock, PVD s/p L AILEEN (discharged on 10/7) presents to [facility] ED from rehab for altered mental status. In ED patient was noted to have leukocytosis of 17, hyperglycemia, and tachycardic. At previous hospital discharge patient was noted to have right LE dry gangrene, however xray of LE upon ED presentation today was significant for emphysematous changes along lateral malleolus. CXR also significant for bilateral pulmonary edema, BNP 68917. 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 During surgery, purulent drainage noted within BKA stump, cultures obtained. Patient was brought to SICU intubated on 0.05 Levophed. R IJ triple lumen was placed in SICU. Dobutamine gtt started overnight. Has remained on the dobutamine infusion with reduced norepinephrine requirement.       24 HOUR EVENTS:  -Extubated 10/15 to HFNC 30/30  -Diuresed with lasix 20mg IV and 10 metolazone given 10/15  -lasix gtt 2.5mG/Hr started   -south discontinued   -OG tube placed, plan for tube feeds today   -Zosyn changed to ertapenem 1g for bacteremia Morginella Morganii and Proteus (surg cx)       SUBJECTIVE/ROS:  [x] A ten-point review of systems was otherwise negative except as noted.  [ ] Due to altered mental status/intubation, subjective information were not able to be obtained from the patient. History was obtained, to the extent possible, from review of the chart and collateral sources of information.      NEURO  Exam: awake, alert, oriented  Meds: acetaminophen    Suspension .. 975 milliGRAM(s) Oral every 6 hours PRN Mild Pain (1 - 3)  [x] Adequacy of sedation and pain control has been assessed and adjusted      RESPIRATORY  RR: 16 (10-16-19 @ 00:06) (12 - 43)  SpO2: 100% (10-16-19 @ 00:06) (92% - 100%)  Wt(kg): --  Exam:   Mechanical Ventilation: Mode: CPAP with PS, RR (patient): 15, FiO2: 30, PEEP: 5, PS: 5, PIP: 11  ABG - ( 15 Oct 2019 12:29 )  pH: 7.43  /  pCO2: 43    /  pO2: 85    / HCO3: 28    / Base Excess: 3.7   /  SaO2: 98      Lactate: x          [N/A] Extubation Readiness Assessed  Meds:       CARDIOVASCULAR  HR: 78 (10-16-19 @ 00:06) (69 - 776)  BP: 126/51 (10-15-19 @ 08:12) (126/51 - 126/51)  BP(mean): 70 (10-15-19 @ 08:12) (70 - 70)  ABP: 123/41 (10-16-19 @ 00:06) (100/41 - 157/69)  ABP(mean): 64 (10-16-19 @ 00:06) (58 - 94)  VBG - ( 15 Oct 2019 02:27 )  pH: 7.34  /  pCO2: 53    /  pO2: 48    / HCO3: 28    / Base Excess: 1.9   /  SaO2: 75     Lactate: x        Exam: regular rate and rhythm  Cardiac Rhythm: sinus  Perfusion     [x]Adequate   [ ]Inadequate  Mentation   [x]Normal       [ ]Reduced  Extremities  [x]Warm         [ ]Cool  Volume Status [ ]Hypervolemic [x]Euvolemic [ ]Hypovolemic  Meds: DOBUTamine Infusion 2.5 MICROgram(s)/kG/Min IV Continuous <Continuous>  furosemide Infusion 2.5 mG/Hr IV Continuous <Continuous>    GI/NUTRITION  Exam: soft, nontender, nondistended  Diet: NPO, plan for tube feeds later today   Meds: docusate sodium Liquid 100 milliGRAM(s) Enteral Tube two times a day  senna Syrup 5 milliLiter(s) Oral every 24 hours      GENITOURINARY  I&O's Detail    10-14 @ 07:01  -  10-15 @ 07:00  --------------------------------------------------------  IN:    dexmedetomidine Infusion: 4.4 mL    dextrose 5% + lactated ringers.: 60 mL    DOBUTamine Infusion: 105.6 mL    Enteral Tube Flush: 280 mL    fentaNYL Infusion.: 33 mL    Glucerna 1.5: 440 mL    IV PiggyBack: 250 mL    norepinephrine Infusion: 139.5 mL    Solution: 300 mL    Solution: 50 mL  Total IN: 1662.5 mL    OUT:    Indwelling Catheter - Urethral: 1825 mL  Total OUT: 1825 mL    Total NET: -162.5 mL      10-15 @ 07:01  -  10-16 @ 01:09  --------------------------------------------------------  IN:    DOBUTamine Infusion: 57.2 mL    Enteral Tube Flush: 240 mL    furosemide Infusion: 13 mL    Glucerna 1.5: 270 mL    IV PiggyBack: 50 mL    norepinephrine Infusion: 62.7 mL    Solution: 175 mL  Total IN: 867.9 mL    OUT:    Incontinent per Condom Catheter: 300 mL    Indwelling Catheter - Urethral: 2125 mL  Total OUT: 2425 mL    Total NET: -1557.1 mL          10-15    139  |  102  |  21  ----------------------------<  211<H>  3.7   |  24  |  0.95    Ca    8.2<L>      15 Oct 2019 14:15  Phos  2.8     10-15  Mg     1.9     10-15    TPro  6.6  /  Alb  2.0<L>  /  TBili  0.4  /  DBili  0.2  /  AST  13  /  ALT  29  /  AlkPhos  117  10-15    [ ] South catheter, indication: N/A  Meds: lasix gtt 2.5mG/Hr      HEMATOLOGIC  Meds: aspirin  chewable 81 milliGRAM(s) Oral daily  clopidogrel Tablet 75 milliGRAM(s) Oral daily  enoxaparin Injectable 40 milliGRAM(s) SubCutaneous daily    [x] VTE Prophylaxis                        8.4    13.00 )-----------( 284      ( 15 Oct 2019 02:28 )             28.9     PT/INR - ( 15 Oct 2019 02:28 )   PT: 17.5 sec;   INR: 1.50 ratio         PTT - ( 15 Oct 2019 02:28 )  PTT:31.2 sec  Transfusion     [ ] PRBC   [ ] Platelets   [ ] FFP   [ ] Cryoprecipitate      INFECTIOUS DISEASES  WBC Count: 13.00 K/uL (10-15 @ 02:28)    RECENT CULTURES:  Specimen Source: .Blood  Date/Time: 10-14 @ 10:46  Culture Results:   No growth to date.  Gram Stain: --  Organism: --  Specimen Source: .Urine  Date/Time: 10-14 @ 09:45  Culture Results:   10,000 - 49,000 CFU/mL Presumptive Candida albicans "Susceptibilities not  performed"  Gram Stain: --  Organism: --  Specimen Source: .Other  Date/Time: 10-13 @ 17:56  Culture Results:   Testing in progress  Gram Stain: --  Organism: Proteus mirabilis  Proteus mirabilis  Specimen Source: .Blood  Date/Time: 10-13 @ 01:05  Culture Results:   No growth to date.  Gram Stain:   Growth in anaerobic bottle: Gram Negative Rods  Organism: Blood Culture PCR  Morganella morganii    Meds: ertapenem  IVPB 1000 milliGRAM(s) IV Intermittent every 24 hours  ertapenem  IVPB      influenza   Vaccine 0.5 milliLiter(s) IntraMuscular once  nystatin    Suspension 333346 Unit(s) Oral two times a day        ENDOCRINE  CAPILLARY BLOOD GLUCOSE      POCT Blood Glucose.: 129 mg/dL (15 Oct 2019 17:51)  POCT Blood Glucose.: 148 mg/dL (15 Oct 2019 11:08)  POCT Blood Glucose.: 155 mg/dL (15 Oct 2019 05:29)    Meds: atorvastatin 10 milliGRAM(s) Oral at bedtime  insulin lispro (HumaLOG) corrective regimen sliding scale   SubCutaneous every 6 hours        ACCESS DEVICES:  [X] Peripheral IV  [X] Central Venous Line	[ ] R	[ ] L	[ ] IJ	[ ] Fem	[ ] SC	Placed:   [X] Arterial Line		[ ] R	[ ] L	[ ] Fem	[ ] Rad	[ ] Ax	Placed:   [ ] PICC:					[ ] Mediport  [ ] Urinary Catheter, Date Placed:   [x] Necessity of urinary, arterial, and venous catheters discussed    OTHER MEDICATIONS:  chlorhexidine 2% Cloths 1 Application(s) Topical <User Schedule>    IMAGING:    < from: Transthoracic Echocardiogram (10.14.19 @ 14:08) >  Conclusions:  1. Tethered and thickened mitral valve leaflets. Minimal  mitral regurgitation.  2. Normal left ventricular internal dimensions and wall  thicknesses.  3. Severe global left ventricular systolic dysfunction.  Estimated LVEF = 25%.  4. Normal right ventricular size with decreased right  ventricular systolic function.  5. Bilateral pleural effusions.    < end of copied text > SICU DAILY PROGRESS NOTE    HISTORY  69y Male with history of DM II, HTN, CVA, NSTEMI c/b cardiogenic shock, PVD s/p L AILEEN (discharged on 10/7) presents to [facility] ED from rehab for altered mental status. In ED patient was noted to have leukocytosis of 17, hyperglycemia, and tachycardic. At previous hospital discharge patient was noted to have right LE dry gangrene, however xray of LE upon ED presentation today was significant for emphysematous changes along lateral malleolus. CXR also significant for bilateral pulmonary edema, BNP 89103. 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 During surgery, purulent drainage noted within BKA stump, cultures obtained. Patient was brought to SICU intubated on 0.05 Levophed. R IJ triple lumen was placed in SICU. Dobutamine gtt started overnight. Has remained on the dobutamine infusion with reduced norepinephrine requirement.       24 HOUR EVENTS:  -Extubated 10/15 to HFNC 30/30  -Diuresed with lasix 20mg IV and 10 metolazone given 10/15  -lasix gtt 2.5mG/Hr started   -south discontinued   -OG tube placed, plan for tube feeds today   -Zosyn changed to ertapenem 1g for bacteremia Morginella Morganii and Proteus (surg cx)       SUBJECTIVE/ROS:  [x] A ten-point review of systems was otherwise negative except as noted.  [ ] Due to altered mental status/intubation, subjective information were not able to be obtained from the patient. History was obtained, to the extent possible, from review of the chart and collateral sources of information.      NEURO  Exam: awake, alert not oriented to self, person or place, not following commands   Meds: acetaminophen    Suspension .. 975 milliGRAM(s) Oral every 6 hours PRN Mild Pain (1 - 3)  [x] Adequacy of sedation and pain control has been assessed and adjusted      RESPIRATORY  RR: 16 (10-16-19 @ 00:06) (12 - 43)  SpO2: 100% (10-16-19 @ 00:06) (92% - 100%)  Wt(kg): --  Exam: lungs CTAB, no crackles or wheezes   Mechanical Ventilation: Mode: CPAP with PS, RR (patient): 15, FiO2: 30, PEEP: 5, PS: 5, PIP: 11  ABG - ( 15 Oct 2019 12:29 )  pH: 7.43  /  pCO2: 43    /  pO2: 85    / HCO3: 28    / Base Excess: 3.7   /  SaO2: 98      Lactate: x          [N/A] Extubation Readiness Assessed  Meds:       CARDIOVASCULAR  HR: 78 (10-16-19 @ 00:06) (69 - 776)  BP: 126/51 (10-15-19 @ 08:12) (126/51 - 126/51)  BP(mean): 70 (10-15-19 @ 08:12) (70 - 70)  ABP: 123/41 (10-16-19 @ 00:06) (100/41 - 157/69)  ABP(mean): 64 (10-16-19 @ 00:06) (58 - 94)  VBG - ( 15 Oct 2019 02:27 )  pH: 7.34  /  pCO2: 53    /  pO2: 48    / HCO3: 28    / Base Excess: 1.9   /  SaO2: 75     Lactate: x        Exam: regular rate and rhythm  Cardiac Rhythm: sinus  Perfusion     [x]Adequate   [ ]Inadequate  Mentation   [x]Normal       [ ]Reduced  Extremities  [x]Warm         [ ]Cool  Volume Status [ ]Hypervolemic [x]Euvolemic [ ]Hypovolemic  Meds: DOBUTamine Infusion 2.5 MICROgram(s)/kG/Min IV Continuous <Continuous>  furosemide Infusion 2.5 mG/Hr IV Continuous <Continuous>    GI/NUTRITION  Exam: soft, nontender, nondistended  Diet: NPO, plan for tube feeds later today   Meds: docusate sodium Liquid 100 milliGRAM(s) Enteral Tube two times a day  senna Syrup 5 milliLiter(s) Oral every 24 hours      GENITOURINARY  I&O's Detail    10-14 @ 07:01  -  10-15 @ 07:00  --------------------------------------------------------  IN:    dexmedetomidine Infusion: 4.4 mL    dextrose 5% + lactated ringers.: 60 mL    DOBUTamine Infusion: 105.6 mL    Enteral Tube Flush: 280 mL    fentaNYL Infusion.: 33 mL    Glucerna 1.5: 440 mL    IV PiggyBack: 250 mL    norepinephrine Infusion: 139.5 mL    Solution: 300 mL    Solution: 50 mL  Total IN: 1662.5 mL    OUT:    Indwelling Catheter - Urethral: 1825 mL  Total OUT: 1825 mL    Total NET: -162.5 mL      10-15 @ 07:01  -  10-16 @ 01:09  --------------------------------------------------------  IN:    DOBUTamine Infusion: 57.2 mL    Enteral Tube Flush: 240 mL    furosemide Infusion: 13 mL    Glucerna 1.5: 270 mL    IV PiggyBack: 50 mL    norepinephrine Infusion: 62.7 mL    Solution: 175 mL  Total IN: 867.9 mL    OUT:    Incontinent per Condom Catheter: 300 mL    Indwelling Catheter - Urethral: 2125 mL  Total OUT: 2425 mL    Total NET: -1557.1 mL          10-15    139  |  102  |  21  ----------------------------<  211<H>  3.7   |  24  |  0.95    Ca    8.2<L>      15 Oct 2019 14:15  Phos  2.8     10-15  Mg     1.9     10-15    TPro  6.6  /  Alb  2.0<L>  /  TBili  0.4  /  DBili  0.2  /  AST  13  /  ALT  29  /  AlkPhos  117  10-15    [ ] South catheter, indication: N/A  Meds: lasix gtt 2.5mG/Hr      HEMATOLOGIC  Meds: aspirin  chewable 81 milliGRAM(s) Oral daily  clopidogrel Tablet 75 milliGRAM(s) Oral daily  enoxaparin Injectable 40 milliGRAM(s) SubCutaneous daily    [x] VTE Prophylaxis                        8.4    13.00 )-----------( 284      ( 15 Oct 2019 02:28 )             28.9     PT/INR - ( 15 Oct 2019 02:28 )   PT: 17.5 sec;   INR: 1.50 ratio         PTT - ( 15 Oct 2019 02:28 )  PTT:31.2 sec  Transfusion     [ ] PRBC   [ ] Platelets   [ ] FFP   [ ] Cryoprecipitate      INFECTIOUS DISEASES  WBC Count: 13.00 K/uL (10-15 @ 02:28)    RECENT CULTURES:  Specimen Source: .Blood  Date/Time: 10-14 @ 10:46  Culture Results:   No growth to date.  Gram Stain: --  Organism: --  Specimen Source: .Urine  Date/Time: 10-14 @ 09:45  Culture Results:   10,000 - 49,000 CFU/mL Presumptive Candida albicans "Susceptibilities not  performed"  Gram Stain: --  Organism: --  Specimen Source: .Other  Date/Time: 10-13 @ 17:56  Culture Results:   Testing in progress  Gram Stain: --  Organism: Proteus mirabilis  Proteus mirabilis  Specimen Source: .Blood  Date/Time: 10-13 @ 01:05  Culture Results:   No growth to date.  Gram Stain:   Growth in anaerobic bottle: Gram Negative Rods  Organism: Blood Culture PCR  Morganella morganii    Meds: ertapenem  IVPB 1000 milliGRAM(s) IV Intermittent every 24 hours  ertapenem  IVPB      influenza   Vaccine 0.5 milliLiter(s) IntraMuscular once  nystatin    Suspension 456165 Unit(s) Oral two times a day        ENDOCRINE  CAPILLARY BLOOD GLUCOSE      POCT Blood Glucose.: 129 mg/dL (15 Oct 2019 17:51)  POCT Blood Glucose.: 148 mg/dL (15 Oct 2019 11:08)  POCT Blood Glucose.: 155 mg/dL (15 Oct 2019 05:29)    Meds: atorvastatin 10 milliGRAM(s) Oral at bedtime  insulin lispro (HumaLOG) corrective regimen sliding scale   SubCutaneous every 6 hours        ACCESS DEVICES:  [X] Peripheral IV  [X] Central Venous Line	[ ] R	[ ] L	[ ] IJ	[ ] Fem	[ ] SC	Placed:   [X] Arterial Line		[ ] R	[ ] L	[ ] Fem	[ ] Rad	[ ] Ax	Placed:   [ ] PICC:					[ ] Mediport  [ ] Urinary Catheter, Date Placed:   [x] Necessity of urinary, arterial, and venous catheters discussed    OTHER MEDICATIONS:  chlorhexidine 2% Cloths 1 Application(s) Topical <User Schedule>    IMAGING:    < from: Transthoracic Echocardiogram (10.14.19 @ 14:08) >  Conclusions:  1. Tethered and thickened mitral valve leaflets. Minimal  mitral regurgitation.  2. Normal left ventricular internal dimensions and wall  thicknesses.  3. Severe global left ventricular systolic dysfunction.  Estimated LVEF = 25%.  4. Normal right ventricular size with decreased right  ventricular systolic function.  5. Bilateral pleural effusions.    < end of copied text >

## 2019-10-16 NOTE — PROGRESS NOTE ADULT - ASSESSMENT
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 Patient found to have xray concerning for gas gangrene now s/p emergent guillotine CELESTE HOLLYA. Admitted to SICU postoperatively intubated and on vasopressor support. Continuing to require dobutamine and norepinephrine.     NEURO: No active issues at this time   - dilaudid PRN    RESP: Mechanical ventilation s/p resp insufficiency following OR, pulmonary edema, extubated 10/15 to HFNC 30/30  - F/U ABG  - CXR in AM     CARDIAC: CHF (EF 25% 10/2019), HTN, Hx of NSTEMI   - Aspirin, plavix, lovenox   - Dobutamine @ 2.5 mcg/kg/min   - Monitor vitals     GI: No active issues at this time    -Tube feeds later today via OGT, will advance rate as tolerated     Renal: S/p diuresis   - IV locked  - Urine output appropriate following diuresis  - Monitor intake and outtake   - F/U BMP, replete electrolytes as needed     Heme: On home ASA and plavix  - Cont home dual antiplatelet therapy   - F/U CBC   - Lovenox 40mg q24 hours     ID: R LE gas gangrene, leukocytosis, Bcx (+) for Morganella morganii and surgical cultures positive for Proteus  - Follow WBC   - Ertapenem 1g x14 days   - Monitor for fevers     ENDO: History of DM II  - Continue FSG q4 hours    Attending Attestation:   Comfortable on fentanyl drip gtt  Acute systolic HF with EF 20%, CXR pul edema pattern, start gently diurese with small dose Lasix drip gtt, continue Dobutamine drip 2.5, monitor cardiac output, stroke volume and SVV on flow track, lactate cleared  Ti decreasing, on ASA plavix  Remains in septic shock, continue to titrate Levophed  BC Morganella, surgical swab proteus, dose of Zosyn adjusted  Wound care    Condition remains critical .

## 2019-10-16 NOTE — PROGRESS NOTE ADULT - SUBJECTIVE AND OBJECTIVE BOX
Subjective:    Patient seen and evaluated this AM with team.  Patient was extubated yesterday and remains on HFNC.  Zosyn changed to ertapenem for M. morganii and Proteus.  Dressing changed in AM.  Patient denies any acute complaints besides discomfort during dressing change.    OBJECTIVE:     ** PHYSICAL EXAM **    Gen: NAD, lying in bed  HEENT: NC/AT  RESP: HFNC  ABDOMEN: soft, non-distended, non-tender  LLE: AKA wound c/d/i; staples in place  RLE: guillotine amp; no obvious signs of infection      ** VITAL SIGNS / I&O's **    Vital Signs Last 24 Hrs  T(C): 36.8 (16 Oct 2019 03:00), Max: 37.2 (15 Oct 2019 15:00)  T(F): 98.2 (16 Oct 2019 03:00), Max: 99 (15 Oct 2019 15:00)  HR: 74 (16 Oct 2019 07:00) (71 - 103)  BP: --  BP(mean): --  RR: 14 (16 Oct 2019 07:00) (12 - 43)  SpO2: 100% (16 Oct 2019 07:00) (92% - 100%)  Mode: CPAP with PS  FiO2: 30  PEEP: 5  PS: 5  PIP: 11      15 Oct 2019 07:01  -  16 Oct 2019 07:00  --------------------------------------------------------  IN:    Albumin 5%  - 250 mL: 500 mL    DOBUTamine Infusion: 101.2 mL    Enteral Tube Flush: 240 mL    furosemide Infusion: 26 mL    Glucerna 1.5: 270 mL    IV PiggyBack: 50 mL    norepinephrine Infusion: 62.7 mL    Solution: 175 mL  Total IN: 1424.9 mL    OUT:    Incontinent per Condom Catheter: 2300 mL    Indwelling Catheter - Urethral: 2125 mL  Total OUT: 4425 mL    Total NET: -3000.1 mL            ** LABS **                          8.2    11.36 )-----------( 252      ( 16 Oct 2019 03:40 )             26.7     16 Oct 2019 03:40    137    |  97     |  19     ----------------------------<  201    3.4     |  31     |  0.92     Ca    8.4        16 Oct 2019 03:40  Phos  2.7       16 Oct 2019 03:40  Mg     2.0       16 Oct 2019 03:40    TPro  6.6    /  Alb  2.0    /  TBili  0.4    /  DBili  0.2    /  AST  13     /  ALT  29     /  AlkPhos  117    15 Oct 2019 02:28    PT/INR - ( 16 Oct 2019 03:40 )   PT: 16.3 sec;   INR: 1.41 ratio         PTT - ( 16 Oct 2019 03:40 )  PTT:31.6 sec  CAPILLARY BLOOD GLUCOSE      POCT Blood Glucose.: 152 mg/dL (16 Oct 2019 06:48)  POCT Blood Glucose.: 129 mg/dL (15 Oct 2019 17:51)  POCT Blood Glucose.: 148 mg/dL (15 Oct 2019 11:08)    CARDIAC MARKERS ( 14 Oct 2019 10:30 )  x     / x     / 121 U/L / x     / 3.4 ng/mL      LIVER FUNCTIONS - ( 15 Oct 2019 02:28 )  Alb: 2.0 g/dL / Pro: 6.6 g/dL / ALK PHOS: 117 U/L / ALT: 29 U/L / AST: 13 U/L / GGT: x             Culture - Blood (collected 14 Oct 2019 10:46)  Source: .Blood  Preliminary Report (15 Oct 2019 11:02):    No growth to date.    Culture - Blood (collected 14 Oct 2019 10:46)  Source: .Blood  Preliminary Report (15 Oct 2019 11:02):    No growth to date.    Culture - Urine (collected 14 Oct 2019 09:45)  Source: .Urine  Final Report (15 Oct 2019 14:44):    10,000 - 49,000 CFU/mL Presumptive Candida albicans "Susceptibilities not    performed"    Culture - Surgical Swab (collected 13 Oct 2019 17:56)  Source: .Surgical Swab  Preliminary Report (15 Oct 2019 18:14):    Moderate Proteus mirabilis    Moderate Proteus mirabilis #2  Organism: Proteus mirabilis  Proteus mirabilis (15 Oct 2019 17:55)  Organism: Proteus mirabilis (15 Oct 2019 17:55)  Organism: Proteus mirabilis (15 Oct 2019 17:55)    Culture - Fungal, Other (collected 13 Oct 2019 17:56)  Source: .Other  Preliminary Report (14 Oct 2019 06:03):    Testing in progress          MEDICATIONS  (STANDING):  aspirin  chewable 81 milliGRAM(s) Oral daily  atorvastatin 10 milliGRAM(s) Oral at bedtime  chlorhexidine 2% Cloths 1 Application(s) Topical <User Schedule>  clopidogrel Tablet 75 milliGRAM(s) Oral daily  DOBUTamine Infusion 2.5 MICROgram(s)/kG/Min (4.38 mL/Hr) IV Continuous <Continuous>  docusate sodium Liquid 100 milliGRAM(s) Enteral Tube two times a day  enoxaparin Injectable 40 milliGRAM(s) SubCutaneous daily  ertapenem  IVPB 1000 milliGRAM(s) IV Intermittent every 24 hours  ertapenem  IVPB      furosemide Infusion 2.5 mG/Hr (1.25 mL/Hr) IV Continuous <Continuous>  influenza   Vaccine 0.5 milliLiter(s) IntraMuscular once  insulin lispro (HumaLOG) corrective regimen sliding scale   SubCutaneous every 6 hours  nystatin    Suspension 470440 Unit(s) Oral two times a day  potassium chloride  20 mEq/100 mL IVPB 20 milliEquivalent(s) IV Intermittent every 2 hours  senna Syrup 5 milliLiter(s) Oral every 24 hours    MEDICATIONS  (PRN):  acetaminophen    Suspension .. 975 milliGRAM(s) Oral every 6 hours PRN Mild Pain (1 - 3)

## 2019-10-16 NOTE — PROGRESS NOTE ADULT - ATTENDING COMMENTS
Met criteria for extubation s/p extubated tolerating well  Acute systolic HF with EF 20%, CXR resolving pul edema, DC Dobutamine, continue Lasix drip 2.5 mg/hr, off levophed, tolerating diuresis  Continue ASA plavix  Out of septic shock  BC Morganella resistant to Zosyn, abx change to [facility]  Wound care  Swallow eval

## 2019-10-17 LAB
ANION GAP SERPL CALC-SCNC: 13 MMOL/L — SIGNIFICANT CHANGE UP (ref 5–17)
APTT BLD: 31.1 SEC — SIGNIFICANT CHANGE UP (ref 27.5–36.3)
BUN SERPL-MCNC: 15 MG/DL — SIGNIFICANT CHANGE UP (ref 7–23)
CALCIUM SERPL-MCNC: 8.7 MG/DL — SIGNIFICANT CHANGE UP (ref 8.4–10.5)
CHLORIDE SERPL-SCNC: 93 MMOL/L — LOW (ref 96–108)
CO2 SERPL-SCNC: 30 MMOL/L — SIGNIFICANT CHANGE UP (ref 22–31)
CREAT SERPL-MCNC: 0.9 MG/DL — SIGNIFICANT CHANGE UP (ref 0.5–1.3)
GLUCOSE SERPL-MCNC: 138 MG/DL — HIGH (ref 70–99)
HCT VFR BLD CALC: 29.1 % — LOW (ref 39–50)
HGB BLD-MCNC: 8.8 G/DL — LOW (ref 13–17)
INR BLD: 1.43 RATIO — HIGH (ref 0.88–1.16)
MAGNESIUM SERPL-MCNC: 2 MG/DL — SIGNIFICANT CHANGE UP (ref 1.6–2.6)
MCHC RBC-ENTMCNC: 25.6 PG — LOW (ref 27–34)
MCHC RBC-ENTMCNC: 30.2 GM/DL — LOW (ref 32–36)
MCV RBC AUTO: 84.6 FL — SIGNIFICANT CHANGE UP (ref 80–100)
NRBC # BLD: 0 /100 WBCS — SIGNIFICANT CHANGE UP (ref 0–0)
PHOSPHATE SERPL-MCNC: 2.5 MG/DL — SIGNIFICANT CHANGE UP (ref 2.5–4.5)
PLATELET # BLD AUTO: 267 K/UL — SIGNIFICANT CHANGE UP (ref 150–400)
POTASSIUM SERPL-MCNC: 3.8 MMOL/L — SIGNIFICANT CHANGE UP (ref 3.5–5.3)
POTASSIUM SERPL-SCNC: 3.8 MMOL/L — SIGNIFICANT CHANGE UP (ref 3.5–5.3)
PROTHROM AB SERPL-ACNC: 16.6 SEC — HIGH (ref 10–12.9)
RBC # BLD: 3.44 M/UL — LOW (ref 4.2–5.8)
RBC # FLD: 15.6 % — HIGH (ref 10.3–14.5)
SODIUM SERPL-SCNC: 136 MMOL/L — SIGNIFICANT CHANGE UP (ref 135–145)
SURGICAL PATHOLOGY STUDY: SIGNIFICANT CHANGE UP
WBC # BLD: 8.95 K/UL — SIGNIFICANT CHANGE UP (ref 3.8–10.5)
WBC # FLD AUTO: 8.95 K/UL — SIGNIFICANT CHANGE UP (ref 3.8–10.5)

## 2019-10-17 PROCEDURE — 71045 X-RAY EXAM CHEST 1 VIEW: CPT | Mod: 26

## 2019-10-17 PROCEDURE — 99232 SBSQ HOSP IP/OBS MODERATE 35: CPT

## 2019-10-17 RX ORDER — INSULIN GLARGINE 100 [IU]/ML
5 INJECTION, SOLUTION SUBCUTANEOUS AT BEDTIME
Refills: 0 | Status: DISCONTINUED | OUTPATIENT
Start: 2019-10-17 | End: 2019-10-23

## 2019-10-17 RX ORDER — POTASSIUM CHLORIDE 20 MEQ
20 PACKET (EA) ORAL DAILY
Refills: 0 | Status: DISCONTINUED | OUTPATIENT
Start: 2019-10-17 | End: 2019-10-18

## 2019-10-17 RX ORDER — POTASSIUM CHLORIDE 20 MEQ
20 PACKET (EA) ORAL ONCE
Refills: 0 | Status: COMPLETED | OUTPATIENT
Start: 2019-10-17 | End: 2019-10-17

## 2019-10-17 RX ORDER — FUROSEMIDE 40 MG
20 TABLET ORAL EVERY 8 HOURS
Refills: 0 | Status: DISCONTINUED | OUTPATIENT
Start: 2019-10-17 | End: 2019-10-23

## 2019-10-17 RX ORDER — HYDROMORPHONE HYDROCHLORIDE 2 MG/ML
0.25 INJECTION INTRAMUSCULAR; INTRAVENOUS; SUBCUTANEOUS ONCE
Refills: 0 | Status: DISCONTINUED | OUTPATIENT
Start: 2019-10-17 | End: 2019-10-17

## 2019-10-17 RX ADMIN — Medication 1 TABLET(S): at 05:14

## 2019-10-17 RX ADMIN — Medication 20 MILLIGRAM(S): at 14:16

## 2019-10-17 RX ADMIN — Medication 500000 UNIT(S): at 17:59

## 2019-10-17 RX ADMIN — Medication 3 MILLIGRAM(S): at 21:30

## 2019-10-17 RX ADMIN — HYDROMORPHONE HYDROCHLORIDE 0.25 MILLIGRAM(S): 2 INJECTION INTRAMUSCULAR; INTRAVENOUS; SUBCUTANEOUS at 10:30

## 2019-10-17 RX ADMIN — Medication 0.12 MILLIGRAM(S): at 17:59

## 2019-10-17 RX ADMIN — INSULIN GLARGINE 5 UNIT(S): 100 INJECTION, SOLUTION SUBCUTANEOUS at 21:31

## 2019-10-17 RX ADMIN — Medication 81 MILLIGRAM(S): at 11:59

## 2019-10-17 RX ADMIN — Medication 1 TABLET(S): at 11:59

## 2019-10-17 RX ADMIN — HYDROMORPHONE HYDROCHLORIDE 0.25 MILLIGRAM(S): 2 INJECTION INTRAMUSCULAR; INTRAVENOUS; SUBCUTANEOUS at 10:45

## 2019-10-17 RX ADMIN — Medication 4: at 17:59

## 2019-10-17 RX ADMIN — ATORVASTATIN CALCIUM 10 MILLIGRAM(S): 80 TABLET, FILM COATED ORAL at 21:30

## 2019-10-17 RX ADMIN — Medication 20 MILLIGRAM(S): at 21:30

## 2019-10-17 RX ADMIN — ERTAPENEM SODIUM 120 MILLIGRAM(S): 1 INJECTION, POWDER, LYOPHILIZED, FOR SOLUTION INTRAMUSCULAR; INTRAVENOUS at 19:26

## 2019-10-17 RX ADMIN — CHLORHEXIDINE GLUCONATE 1 APPLICATION(S): 213 SOLUTION TOPICAL at 05:14

## 2019-10-17 RX ADMIN — ENOXAPARIN SODIUM 40 MILLIGRAM(S): 100 INJECTION SUBCUTANEOUS at 12:00

## 2019-10-17 RX ADMIN — Medication 4: at 12:13

## 2019-10-17 RX ADMIN — Medication 20 MILLIEQUIVALENT(S): at 11:59

## 2019-10-17 RX ADMIN — CLOPIDOGREL BISULFATE 75 MILLIGRAM(S): 75 TABLET, FILM COATED ORAL at 11:59

## 2019-10-17 RX ADMIN — Medication 500000 UNIT(S): at 05:14

## 2019-10-17 RX ADMIN — Medication 2: at 05:14

## 2019-10-17 RX ADMIN — Medication 20 MILLIEQUIVALENT(S): at 05:14

## 2019-10-17 NOTE — PROGRESS NOTE ADULT - ASSESSMENT
69y male with extensive LE vaso-occlusive disease, prior LLE AKA, who was brought in from his nursing home facility with sepsis likely secondary to his chronically necrotic RLE. Also with volume overload. Admitted for sepsis with concern of wet gangrene, now s/p R below knee guillotine for wet gangrene performed on 10/13/19, recovering in SICU    Plan:  - c/w with Lasix gtt  - c/w ertapenem  - Rec: consider vac placement vs. daily dressing changes  - Planning for take back to OR for completion vs. AKA  -  ASA, plavix, lovenox  - Continue care as per SICU      VASCULAR [phone]

## 2019-10-17 NOTE — PROGRESS NOTE ADULT - SUBJECTIVE AND OBJECTIVE BOX
SICU DAILY PROGRESS NOTE    JIM PAREDES is a 69y Male with history of DM II, HTN, CVA, NSTEMI c/b cardiogenic shock, PVD s/p L AKA (discharged on 10/7) presents to [facility] ED from rehab for altered mental status. In ED patient was noted to have leukocytosis of 17, hyperglycemia, and tachycardic. At previous hospital discharge patient was noted to have right LE dry gangrene, however xray of LE upon ED presentation today was significant for emphysematous changes along lateral malleolus. CXR also significant for bilateral pulmonary edema, BNP 77224. 40mg Lasixs was given prior to patient going to OR for emergent R sided guillotine BKA with vascular surgery (EBL minimal, 150 IVF, 0 UOP). During surgery, purulent drainage noted within BKA stump, cultures obtained. Patient was brought to SICU intubated on 0.05 Levophed.     24 HOUR EVENTS:    - D/C Dobutamine   - Lasixs for continued diuresis   - failed Dysphagia screen, KO feeding tubes  - Weaned off high flow on nasal canula   - Repeat BNP   - DC flow track       SUBJECTIVE/ROS:  [X] A ten-point review of systems was otherwise negative except as noted.  [ ] Due to altered mental status/intubation, subjective information were not able to be obtained from the patient. History was obtained, to the extent possible, from review of the chart and collateral sources of information.      NEURO  Exam: awake, alert at baseline mental status   Meds: acetaminophen    Suspension .. 975 milliGRAM(s) Oral every 6 hours PRN Mild Pain (1 - 3)  melatonin 3 milliGRAM(s) Oral at bedtime    [x] Adequacy of sedation and pain control has been assessed and adjusted      RESPIRATORY  RR: 22 (10-17-19 @ 00:00) (12 - 41)  SpO2: 98% (10-17-19 @ 00:00) (87% - 100%)  Exam: unlabored, no respiratory distress, on RA      CARDIOVASCULAR  HR: 83 (10-17-19 @ 00:00) (70 - 92)  ABP: 140/61 (10-17-19 @ 00:00) (109/35 - 146/66)  ABP(mean): 83 (10-17-19 @ 00:00) (54 - 90)    VBG - ( 16 Oct 2019 03:20 )  pH: 7.43  /  pCO2: 51    /  pO2: 38    / HCO3: 34    / Base Excess: 8.8   /  SaO2: 67     Lactate: x          Exam: regular rate and rhythm  Cardiac Rhythm: sinus  Perfusion     [x]Adequate   [ ]Inadequate  Mentation   [x]Normal       [ ]Reduced  Extremities  [x]Warm         [ ]Cool  Volume Status [ ]Hypervolemic [x]Euvolemic [ ]Hypovolemic  Meds: digoxin     Tablet 0.125 milliGRAM(s) Oral every 24 hours  furosemide Infusion 2.5 mG/Hr IV Continuous <Continuous>        GI/NUTRITION  Exam: soft, nontender, nondistended  Diet: NPO with tube feed      GENITOURINARY  I&O's Detail    10-15 @ 07:01  -  10-16 @ 07:00  --------------------------------------------------------  IN:    Albumin 5%  - 250 mL: 500 mL    DOBUTamine Infusion: 101.2 mL    Enteral Tube Flush: 240 mL    furosemide Infusion: 26 mL    Glucerna 1.5: 270 mL    IV PiggyBack: 50 mL    norepinephrine Infusion: 62.7 mL    Solution: 175 mL  Total IN: 1424.9 mL    OUT:    Incontinent per Condom Catheter: 2301 mL    Indwelling Catheter - Urethral: 2125 mL  Total OUT: 4426 mL    Total NET: -3001.1 mL      10-16 @ 07:01  -  10-17 @ 00:18  --------------------------------------------------------  IN:    DOBUTamine Infusion: 4.4 mL    furosemide Infusion: 22.1 mL    Glucerna 1.5: 275 mL    Solution: 310 mL  Total IN: 611.5 mL    OUT:    Incontinent per Condom Catheter: 1800 mL    Voided: 600 mL  Total OUT: 2400 mL    Total NET: -1788.5 mL          10-16    138  |  96  |  15  ----------------------------<  126<H>  3.5   |  30  |  0.84    Ca    8.5      16 Oct 2019 15:19  Phos  2.4     10-16  Mg     1.8     10-16    TPro  6.6  /  Alb  2.0<L>  /  TBili  0.4  /  DBili  0.2  /  AST  13  /  ALT  29  /  AlkPhos  117  10-15    [ ] Fletcher catheter, indication: N/A  Meds: multivitamin 1 Tablet(s) Oral daily  potassium acid phosphate/sodium acid phosphate tablet (K-PHOS No. 2) 1 Tablet(s) Oral every 6 hours        HEMATOLOGIC  Meds: aspirin  chewable 81 milliGRAM(s) Oral daily  clopidogrel Tablet 75 milliGRAM(s) Oral daily  enoxaparin Injectable 40 milliGRAM(s) SubCutaneous daily    [x] VTE Prophylaxis                        8.2    11.36 )-----------( 252      ( 16 Oct 2019 03:40 )             26.7     PT/INR - ( 16 Oct 2019 03:40 )   PT: 16.3 sec;   INR: 1.41 ratio         PTT - ( 16 Oct 2019 03:40 )  PTT:31.6 sec  Transfusion     [ ] PRBC   [ ] Platelets   [ ] FFP   [ ] Cryoprecipitate      INFECTIOUS DISEASES  WBC Count: 11.36 K/uL (10-16 @ 03:40)    RECENT CULTURES:  Specimen Source: .Blood  Date/Time: 10-14 @ 10:46  Culture Results:   No growth to date.  Gram Stain: --  Organism: --  Specimen Source: .Urine  Date/Time: 10-14 @ 09:45  Culture Results:   10,000 - 49,000 CFU/mL Presumptive Candida albicans "Susceptibilities not  performed"  Gram Stain: --  Organism: --  Specimen Source: .Other  Date/Time: 10-13 @ 17:56  Culture Results:   Testing in progress  Gram Stain: --  Organism: Proteus mirabilis  Proteus mirabilis  Specimen Source: .Blood  Date/Time: 10-13 @ 01:05  Culture Results:   No growth to date.  Gram Stain:   Growth in anaerobic bottle: Gram Negative Rods  Organism: Blood Culture PCR  Morganella morganii    Meds: ertapenem  IVPB 1000 milliGRAM(s) IV Intermittent every 24 hours  ertapenem  IVPB      influenza   Vaccine 0.5 milliLiter(s) IntraMuscular once  nystatin    Suspension [id number] Unit(s) Oral two times a day        ENDOCRINE  CAPILLARY BLOOD GLUCOSE      POCT Blood Glucose.: 113 mg/dL (16 Oct 2019 23:21)  POCT Blood Glucose.: 106 mg/dL (16 Oct 2019 18:21)  POCT Blood Glucose.: 155 mg/dL (16 Oct 2019 13:20)  POCT Blood Glucose.: 152 mg/dL (16 Oct 2019 06:48)    Meds: atorvastatin 10 milliGRAM(s) Oral at bedtime  insulin lispro (HumaLOG) corrective regimen sliding scale   SubCutaneous every 6 hours        ACCESS DEVICES:  [ ] Peripheral IV  [ ] Central Venous Line	[ ] R	[ ] L	[ ] IJ	[ ] Fem	[ ] SC	Placed:   [ ] Arterial Line		[ ] R	[ ] L	[ ] Fem	[ ] Rad	[ ] Ax	Placed:   [ ] PICC:					[ ] Mediport  [ ] Urinary Catheter, Date Placed:   [x] Necessity of urinary, arterial, and venous catheters discussed

## 2019-10-17 NOTE — PROGRESS NOTE ADULT - ASSESSMENT
70 y/o M with Hx of PVD, DM type II, HTN, CVA, NSTEMI, L AKA (discharged 10/7), presenting to the [facility] for evaluation of AMS, fevers and tachycardia. Patient found to have xray concerning for gas gangrene now s/p emergent guillotine CELESTE BKA. Admitted to SICU postoperatively intubated and on vasopressor support. Continuing to require dobutamine and norepinephrine.     NEURO: No active issues at this time   - Diilaudid PRN  - Monitor mental status     RESP: Mechanical ventilation s/p resp insufficiency following OR, pulmonary edema, extubated 10/15 to HFNC 30/30, weaned to nasal cannula   - Monitor SpO2   - F/U ABG  - CXR in AM     CARDIAC: CHF (EF 25% 10/2019), HTN, Hx of NSTEMI   - Aspirin, plavix, lovenox, Digoxin   - Follow up BNP   - Monitor vitals     GI: No active issues at this time    - KO feeding tube replaced today, restarted tube feeds   - Monitor bowel function     Renal: S/p diuresis   - IV locked  - Monitor UOP following lasixs   - F/U BMP, replete electrolytes as needed     Heme: On home ASA and plavix  - Cont home dual antiplatelet therapy   - F/U CBC   - Lovenox 40mg q24 hours     ID: R LE gas gangrene, leukocytosis, Bcx (+) for Morganella morganii and surgical cultures positive for Proteus  - Follow WBC   - Ertapenem 1g x14 days   - Monitor for fevers   - Nystatin for oral thrush     ENDO: History of DM II  - Continue FSG q4 hours    SICU X [id number]

## 2019-10-17 NOTE — PROGRESS NOTE ADULT - ATTENDING COMMENTS
More awake and alert  Passed swallow eval, will start PO, may need nocturnal NGT supplement  Change Lasix drip to intermittent q 8 hrs pushes as tolerated, CXR clearing of edema effusion  Abx Ertapenem Morganella bacteremia, repeat culture clearing of bacteremia  Continue ASA plavix, pharmacologic DVT prophylaxis, HCT platelets have been stable  ISS  Wound care

## 2019-10-17 NOTE — PROGRESS NOTE ADULT - SUBJECTIVE AND OBJECTIVE BOX
VASCULAR SURGERY DAILY PROGRESS NOTE:       SUBJECTIVE/ROS: Patient seen and examined at bedside.  Patient off pressors, remains on lasix gtt.    Failed Dysphagia screen, KO feeding tube placed and weaned off Hi-flow NC. Pain is well controlled.         MEDICATIONS  (STANDING):  aspirin  chewable 81 milliGRAM(s) Oral daily  atorvastatin 10 milliGRAM(s) Oral at bedtime  chlorhexidine 2% Cloths 1 Application(s) Topical <User Schedule>  clopidogrel Tablet 75 milliGRAM(s) Oral daily  digoxin     Tablet 0.125 milliGRAM(s) Oral every 24 hours  enoxaparin Injectable 40 milliGRAM(s) SubCutaneous daily  ertapenem  IVPB 1000 milliGRAM(s) IV Intermittent every 24 hours  ertapenem  IVPB      furosemide Infusion 2.5 mG/Hr (1.25 mL/Hr) IV Continuous <Continuous>  influenza   Vaccine 0.5 milliLiter(s) IntraMuscular once  insulin lispro (HumaLOG) corrective regimen sliding scale   SubCutaneous every 6 hours  melatonin 3 milliGRAM(s) Oral at bedtime  multivitamin 1 Tablet(s) Oral daily  nystatin    Suspension 460889 Unit(s) Oral two times a day  potassium acid phosphate/sodium acid phosphate tablet (K-PHOS No. 2) 1 Tablet(s) Oral every 6 hours    MEDICATIONS  (PRN):  acetaminophen    Suspension .. 975 milliGRAM(s) Oral every 6 hours PRN Mild Pain (1 - 3)      OBJECTIVE:    Vital Signs Last 24 Hrs  T(C): 37 (17 Oct 2019 03:00), Max: 37 (17 Oct 2019 03:00)  T(F): 98.6 (17 Oct 2019 03:00), Max: 98.6 (17 Oct 2019 03:00)  HR: 79 (17 Oct 2019 07:00) (70 - 92)  BP: 96/53 (17 Oct 2019 07:00) (93/55 - 118/61)  BP(mean): 69 (17 Oct 2019 07:00) (69 - 82)  RR: 17 (17 Oct 2019 07:00) (15 - 41)  SpO2: 99% (17 Oct 2019 07:00) (87% - 100%)        I&O's Detail    16 Oct 2019 07:01  -  17 Oct 2019 07:00  --------------------------------------------------------  IN:    DOBUTamine Infusion: 4.4 mL    furosemide Infusion: 31.2 mL    Glucerna 1.5: 660 mL    Solution: 310 mL  Total IN: 1005.6 mL    OUT:    Incontinent per Condom Catheter: 1500 mL    Voided: 1300 mL  Total OUT: 2800 mL    Total NET: -1794.4 mL          Daily     Daily Weight in k.6 (17 Oct 2019 00:00)    LABS:                        8.8    8.95  )-----------( 267      ( 17 Oct 2019 00:27 )             29.1     10-    136  |  93<L>  |  15  ----------------------------<  138<H>  3.8   |  30  |  0.90    Ca    8.7      17 Oct 2019 00:27  Phos  2.5     10-  Mg     2.0     10-17      PT/INR - ( 17 Oct 2019 00:27 )   PT: 16.6 sec;   INR: 1.43 ratio         PTT - ( 17 Oct 2019 00:27 )  PTT:31.1 sec              PHYSICAL EXAM:  Constitutional: well developed, well nourished, NAD  Respiratory: No increased WOB  Abdomen: Soft, NT, KO tube in place with TF running  LLE: AKA wound c/d/i; staples in place  RLE: Dressing mildly saturated with sanguineous fluid,  changed on AM rounds. +motor +sensation.  No erythema.

## 2019-10-18 LAB
ANION GAP SERPL CALC-SCNC: 10 MMOL/L — SIGNIFICANT CHANGE UP (ref 5–17)
ANION GAP SERPL CALC-SCNC: 11 MMOL/L — SIGNIFICANT CHANGE UP (ref 5–17)
APTT BLD: 30.6 SEC — SIGNIFICANT CHANGE UP (ref 27.5–36.3)
APTT BLD: 31.5 SEC — SIGNIFICANT CHANGE UP (ref 27.5–36.3)
BUN SERPL-MCNC: 21 MG/DL — SIGNIFICANT CHANGE UP (ref 7–23)
BUN SERPL-MCNC: 24 MG/DL — HIGH (ref 7–23)
CALCIUM SERPL-MCNC: 8.6 MG/DL — SIGNIFICANT CHANGE UP (ref 8.4–10.5)
CALCIUM SERPL-MCNC: 8.6 MG/DL — SIGNIFICANT CHANGE UP (ref 8.4–10.5)
CHLORIDE SERPL-SCNC: 94 MMOL/L — LOW (ref 96–108)
CHLORIDE SERPL-SCNC: 96 MMOL/L — SIGNIFICANT CHANGE UP (ref 96–108)
CO2 SERPL-SCNC: 33 MMOL/L — HIGH (ref 22–31)
CO2 SERPL-SCNC: 34 MMOL/L — HIGH (ref 22–31)
CREAT SERPL-MCNC: 0.98 MG/DL — SIGNIFICANT CHANGE UP (ref 0.5–1.3)
CREAT SERPL-MCNC: 1.02 MG/DL — SIGNIFICANT CHANGE UP (ref 0.5–1.3)
CULTURE RESULTS: SIGNIFICANT CHANGE UP
CULTURE RESULTS: SIGNIFICANT CHANGE UP
GLUCOSE SERPL-MCNC: 166 MG/DL — HIGH (ref 70–99)
GLUCOSE SERPL-MCNC: 245 MG/DL — HIGH (ref 70–99)
HCT VFR BLD CALC: 27 % — LOW (ref 39–50)
HCT VFR BLD CALC: 27.4 % — LOW (ref 39–50)
HGB BLD-MCNC: 8.5 G/DL — LOW (ref 13–17)
HGB BLD-MCNC: 8.5 G/DL — LOW (ref 13–17)
INR BLD: 1.36 RATIO — HIGH (ref 0.88–1.16)
INR BLD: 1.36 RATIO — HIGH (ref 0.88–1.16)
MAGNESIUM SERPL-MCNC: 1.8 MG/DL — SIGNIFICANT CHANGE UP (ref 1.6–2.6)
MAGNESIUM SERPL-MCNC: 2.1 MG/DL — SIGNIFICANT CHANGE UP (ref 1.6–2.6)
MCHC RBC-ENTMCNC: 26.3 PG — LOW (ref 27–34)
MCHC RBC-ENTMCNC: 26.7 PG — LOW (ref 27–34)
MCHC RBC-ENTMCNC: 31 GM/DL — LOW (ref 32–36)
MCHC RBC-ENTMCNC: 31.5 GM/DL — LOW (ref 32–36)
MCV RBC AUTO: 84.8 FL — SIGNIFICANT CHANGE UP (ref 80–100)
MCV RBC AUTO: 84.9 FL — SIGNIFICANT CHANGE UP (ref 80–100)
NRBC # BLD: 0 /100 WBCS — SIGNIFICANT CHANGE UP (ref 0–0)
NRBC # BLD: 0 /100 WBCS — SIGNIFICANT CHANGE UP (ref 0–0)
ORGANISM # SPEC MICROSCOPIC CNT: SIGNIFICANT CHANGE UP
PHOSPHATE SERPL-MCNC: 3.1 MG/DL — SIGNIFICANT CHANGE UP (ref 2.5–4.5)
PHOSPHATE SERPL-MCNC: 3.4 MG/DL — SIGNIFICANT CHANGE UP (ref 2.5–4.5)
PLATELET # BLD AUTO: 259 K/UL — SIGNIFICANT CHANGE UP (ref 150–400)
PLATELET # BLD AUTO: 269 K/UL — SIGNIFICANT CHANGE UP (ref 150–400)
POTASSIUM SERPL-MCNC: 3.6 MMOL/L — SIGNIFICANT CHANGE UP (ref 3.5–5.3)
POTASSIUM SERPL-MCNC: 3.9 MMOL/L — SIGNIFICANT CHANGE UP (ref 3.5–5.3)
POTASSIUM SERPL-SCNC: 3.6 MMOL/L — SIGNIFICANT CHANGE UP (ref 3.5–5.3)
POTASSIUM SERPL-SCNC: 3.9 MMOL/L — SIGNIFICANT CHANGE UP (ref 3.5–5.3)
PROTHROM AB SERPL-ACNC: 15.6 SEC — HIGH (ref 10–12.9)
PROTHROM AB SERPL-ACNC: 15.7 SEC — HIGH (ref 10–12.9)
RBC # BLD: 3.18 M/UL — LOW (ref 4.2–5.8)
RBC # BLD: 3.23 M/UL — LOW (ref 4.2–5.8)
RBC # FLD: 15.7 % — HIGH (ref 10.3–14.5)
RBC # FLD: 15.7 % — HIGH (ref 10.3–14.5)
SODIUM SERPL-SCNC: 139 MMOL/L — SIGNIFICANT CHANGE UP (ref 135–145)
SODIUM SERPL-SCNC: 139 MMOL/L — SIGNIFICANT CHANGE UP (ref 135–145)
SPECIMEN SOURCE: SIGNIFICANT CHANGE UP
SPECIMEN SOURCE: SIGNIFICANT CHANGE UP
WBC # BLD: 10.47 K/UL — SIGNIFICANT CHANGE UP (ref 3.8–10.5)
WBC # BLD: 8.96 K/UL — SIGNIFICANT CHANGE UP (ref 3.8–10.5)
WBC # FLD AUTO: 10.47 K/UL — SIGNIFICANT CHANGE UP (ref 3.8–10.5)
WBC # FLD AUTO: 8.96 K/UL — SIGNIFICANT CHANGE UP (ref 3.8–10.5)

## 2019-10-18 PROCEDURE — 71045 X-RAY EXAM CHEST 1 VIEW: CPT | Mod: 26

## 2019-10-18 RX ORDER — MAGNESIUM SULFATE 500 MG/ML
2 VIAL (ML) INJECTION ONCE
Refills: 0 | Status: COMPLETED | OUTPATIENT
Start: 2019-10-18 | End: 2019-10-18

## 2019-10-18 RX ORDER — CLOPIDOGREL BISULFATE 75 MG/1
75 TABLET, FILM COATED ORAL DAILY
Refills: 0 | Status: DISCONTINUED | OUTPATIENT
Start: 2019-10-18 | End: 2019-10-23

## 2019-10-18 RX ORDER — DIGOXIN 250 MCG
0.12 TABLET ORAL DAILY
Refills: 0 | Status: DISCONTINUED | OUTPATIENT
Start: 2019-10-18 | End: 2019-10-23

## 2019-10-18 RX ORDER — ATORVASTATIN CALCIUM 80 MG/1
10 TABLET, FILM COATED ORAL AT BEDTIME
Refills: 0 | Status: DISCONTINUED | OUTPATIENT
Start: 2019-10-18 | End: 2019-10-23

## 2019-10-18 RX ORDER — ACETAMINOPHEN 500 MG
975 TABLET ORAL EVERY 6 HOURS
Refills: 0 | Status: DISCONTINUED | OUTPATIENT
Start: 2019-10-18 | End: 2019-10-23

## 2019-10-18 RX ORDER — ASPIRIN/CALCIUM CARB/MAGNESIUM 324 MG
81 TABLET ORAL DAILY
Refills: 0 | Status: DISCONTINUED | OUTPATIENT
Start: 2019-10-18 | End: 2019-10-23

## 2019-10-18 RX ORDER — LANOLIN ALCOHOL/MO/W.PET/CERES
3 CREAM (GRAM) TOPICAL AT BEDTIME
Refills: 0 | Status: DISCONTINUED | OUTPATIENT
Start: 2019-10-18 | End: 2019-10-23

## 2019-10-18 RX ORDER — POTASSIUM CHLORIDE 20 MEQ
20 PACKET (EA) ORAL DAILY
Refills: 0 | Status: COMPLETED | OUTPATIENT
Start: 2019-10-18 | End: 2019-10-20

## 2019-10-18 RX ORDER — INSULIN LISPRO 100/ML
VIAL (ML) SUBCUTANEOUS
Refills: 0 | Status: DISCONTINUED | OUTPATIENT
Start: 2019-10-18 | End: 2019-10-23

## 2019-10-18 RX ADMIN — Medication 4: at 12:53

## 2019-10-18 RX ADMIN — Medication 4: at 18:32

## 2019-10-18 RX ADMIN — INSULIN GLARGINE 5 UNIT(S): 100 INJECTION, SOLUTION SUBCUTANEOUS at 21:49

## 2019-10-18 RX ADMIN — Medication 500000 UNIT(S): at 18:34

## 2019-10-18 RX ADMIN — Medication 0.12 MILLIGRAM(S): at 18:39

## 2019-10-18 RX ADMIN — Medication 2: at 00:02

## 2019-10-18 RX ADMIN — Medication 6: at 05:50

## 2019-10-18 RX ADMIN — Medication 500000 UNIT(S): at 05:28

## 2019-10-18 RX ADMIN — ENOXAPARIN SODIUM 40 MILLIGRAM(S): 100 INJECTION SUBCUTANEOUS at 12:54

## 2019-10-18 RX ADMIN — Medication 3 MILLIGRAM(S): at 21:52

## 2019-10-18 RX ADMIN — Medication 1 TABLET(S): at 12:55

## 2019-10-18 RX ADMIN — Medication 4: at 21:53

## 2019-10-18 RX ADMIN — Medication 20 MILLIEQUIVALENT(S): at 12:55

## 2019-10-18 RX ADMIN — Medication 81 MILLIGRAM(S): at 12:54

## 2019-10-18 RX ADMIN — CHLORHEXIDINE GLUCONATE 1 APPLICATION(S): 213 SOLUTION TOPICAL at 05:29

## 2019-10-18 RX ADMIN — ERTAPENEM SODIUM 120 MILLIGRAM(S): 1 INJECTION, POWDER, LYOPHILIZED, FOR SOLUTION INTRAMUSCULAR; INTRAVENOUS at 21:47

## 2019-10-18 RX ADMIN — ATORVASTATIN CALCIUM 10 MILLIGRAM(S): 80 TABLET, FILM COATED ORAL at 21:51

## 2019-10-18 RX ADMIN — CLOPIDOGREL BISULFATE 75 MILLIGRAM(S): 75 TABLET, FILM COATED ORAL at 12:54

## 2019-10-18 RX ADMIN — Medication 20 MILLIGRAM(S): at 18:44

## 2019-10-18 RX ADMIN — Medication 50 GRAM(S): at 01:15

## 2019-10-18 NOTE — PROGRESS NOTE ADULT - SUBJECTIVE AND OBJECTIVE BOX
HISTORY  69y Male with history of DM II, HTN, CVA, NSTEMI c/b cardiogenic shock, PVD s/p L AILEEN (discharged on 10/7) presents to [facility] ED from rehab for altered mental status. In ED patient was noted to have leukocytosis of 17, hyperglycemia, and tachycardic. At previous hospital discharge patient was noted to have right LE dry gangrene, however xray of LE upon ED presentation today was significant for emphysematous changes along lateral malleolus. CXR also significant for bilateral pulmonary edema, BNP 17590. 40mg Lasixs was given prior to patient going to OR for emergent R sided guillotine BKA with vascular surgery (EBL minimal, 150 IVF, 0 UOP). During surgery, purulent drainage noted within BKA stump, cultures obtained. Patient was brought to SICU intubated on 0.05 Levophed.    24 HOUR EVENTS:  - Lasix drip stopped  - Lasix 20mg q8h started  - Fletcher removed and condom cath applied  - Started on free water 250ml q12h for hypernatremia  - Blood culture (10/12) w/o growth at 5 days  - Hgb A1c: 5.9%  - Miralax increased to BID  - Lantus 5u at bedtime  - Wound care consulted for BKA site      SUBJECTIVE/ROS:  [ ] A ten-point review of systems was otherwise negative except as noted.  [ ] Due to altered mental status/intubation, subjective information were not able to be obtained from the patient. History was obtained, to the extent possible, from review of the chart and collateral sources of information.      NEURO  Exam: awake, somewhat lethargic  Meds: acetaminophen    Suspension .. 975 milliGRAM(s) Oral every 6 hours PRN Mild Pain (1 - 3)  melatonin 3 milliGRAM(s) Oral at bedtime    [x] Adequacy of sedation and pain control has been assessed and adjusted      RESPIRATORY  RR: 26 (10-17-19 @ 23:00) (17 - 29)  SpO2: 94% (10-17-19 @ 23:00) (85% - 100%)  Wt(kg): --  Exam: unlabored, normal respiratory effort  Mechanical Ventilation:   ABG - ( 16 Oct 2019 03:20 )  pH: 7.50  /  pCO2: 41    /  pO2: 149   / HCO3: 32    / Base Excess: 7.9   /  SaO2: 99      Lactate: x                [N/A] Extubation Readiness Assessed  Meds:       CARDIOVASCULAR  HR: 87 (10-17-19 @ 23:00) (76 - 91)  BP: 101/58 (10-17-19 @ 23:00) (93/55 - 120/60)  BP(mean): 73 (10-17-19 @ 23:00) (69 - 82)  ABP: 110/45 (10-17-19 @ 04:00) (110/45 - 120/50)  ABP(mean): 62 (10-17-19 @ 04:00) (62 - 69)  Wt(kg): --  CVP(cm H2O): --  VBG - ( 16 Oct 2019 03:20 )  pH: 7.43  /  pCO2: 51    /  pO2: 38    / HCO3: 34    / Base Excess: 8.8   /  SaO2: 67     Lactate: x                  Exam: Pulse regularly present  Cardiac Rhythm: sinus  Mentation   [x]Normal       [ ]Reduced  Extremities  [x]Warm         [ ]Cool  Meds: digoxin     Tablet 0.125 milliGRAM(s) Oral every 24 hours  furosemide   Injectable 20 milliGRAM(s) IV Push every 8 hours        GI/NUTRITION  Exam: soft, nontender, nondistended  Diet: Pureed diet during the day. Noctural glucerna tube feeds @ 60ml/hr  Meds: -    MSK: ZORA MEDRANO w/ stump wound VAC in place     GENITOURINARY  I&O's Detail    10-16 @ 07:01  -  10-17 @ 07:00  --------------------------------------------------------  IN:    DOBUTamine Infusion: 4.4 mL    furosemide Infusion: 31.2 mL    Glucerna 1.5: 660 mL    Solution: 310 mL  Total IN: 1005.6 mL    OUT:    Incontinent per Condom Catheter: 1500 mL    Voided: 1300 mL  Total OUT: 2800 mL    Total NET: -1794.4 mL      10-17 @ 07:01  -  10-18 @ 00:48  --------------------------------------------------------  IN:    furosemide Infusion: 1.3 mL    Glucerna 1.5: 295 mL    IV PiggyBack: 60 mL  Total IN: 356.3 mL    OUT:    Incontinent per Condom Catheter: 1300 mL    VAC (Vacuum Assisted Closure) System: 50 mL  Total OUT: 1350 mL    Total NET: -993.7 mL          10-17    136  |  93<L>  |  15  ----------------------------<  138<H>  3.8   |  30  |  0.90    Ca    8.7      17 Oct 2019 00:27  Phos  2.5     10-17  Mg     2.0     10-17      [ ] Fletcher catheter, indication: N/A  Meds: multivitamin 1 Tablet(s) Oral daily  potassium chloride   Solution 20 milliEquivalent(s) Enteral Tube daily        HEMATOLOGIC  Meds: aspirin  chewable 81 milliGRAM(s) Oral daily  clopidogrel Tablet 75 milliGRAM(s) Oral daily  enoxaparin Injectable 40 milliGRAM(s) SubCutaneous daily    [x] VTE Prophylaxis                        8.5    8.96  )-----------( 269      ( 18 Oct 2019 00:21 )             27.4     PT/INR - ( 17 Oct 2019 00:27 )   PT: 16.6 sec;   INR: 1.43 ratio         PTT - ( 17 Oct 2019 00:27 )  PTT:31.1 sec  Transfusion     [ ] PRBC   [ ] Platelets   [ ] FFP   [ ] Cryoprecipitate      INFECTIOUS DISEASES  WBC Count: 8.96 K/uL (10-18 @ 00:21)    RECENT CULTURES:  Specimen Source: .Blood  Date/Time: 10-14 @ 10:46  Culture Results:   No growth to date.  Gram Stain: --  Organism: --  Specimen Source: .Urine  Date/Time: 10-14 @ 09:45  Culture Results:   10,000 - 49,000 CFU/mL Presumptive Candida albicans "Susceptibilities not  performed"  Gram Stain: --  Organism: --  Specimen Source: .Other  Date/Time: 10-13 @ 17:56  Culture Results:   Testing in progress  Gram Stain: --  Organism: Proteus mirabilis  Proteus mirabilis  Specimen Source: .Blood  Date/Time: 10-13 @ 01:05  Culture Results:   No growth to date.  Gram Stain:   Growth in anaerobic bottle: Gram Negative Rods  Organism: Blood Culture PCR  Morganella morganii    Meds: ertapenem  IVPB 1000 milliGRAM(s) IV Intermittent every 24 hours  ertapenem  IVPB      influenza   Vaccine 0.5 milliLiter(s) IntraMuscular once  nystatin    Suspension 480540 Unit(s) Oral two times a day        ENDOCRINE  CAPILLARY BLOOD GLUCOSE      POCT Blood Glucose.: 156 mg/dL (17 Oct 2019 23:54)  POCT Blood Glucose.: 113 mg/dL (17 Oct 2019 21:23)  POCT Blood Glucose.: 211 mg/dL (17 Oct 2019 17:55)  POCT Blood Glucose.: 218 mg/dL (17 Oct 2019 12:10)  POCT Blood Glucose.: >600 mg/dL (17 Oct 2019 12:07)  POCT Blood Glucose.: 166 mg/dL (17 Oct 2019 05:02)    Meds: atorvastatin 10 milliGRAM(s) Oral at bedtime  insulin glargine Injectable (LANTUS) 5 Unit(s) SubCutaneous at bedtime  insulin lispro (HumaLOG) corrective regimen sliding scale   SubCutaneous every 6 hours        ACCESS DEVICES:  [x] Peripheral IV (LUE)  [x] Central Venous Line	[x] R	[ ] L	[x] IJ	[ ] Fem	[ ] SC	Placed:   [ ] Arterial Line		[ ] R	[ ] L	[ ] Fem	[ ] Rad	[ ] Ax	Placed:   [ ] PICC:					[ ] Mediport  [ ] Urinary Catheter, Date Placed:   [x] KO feeding tube  [x] Necessity of urinary, arterial, and venous catheters discussed    OTHER MEDICATIONS:  chlorhexidine 2% Cloths 1 Application(s) Topical <User Schedule>      CODE STATUS:      IMAGING:  EXAM:  XR CHEST PORTABLE ROUTINE 1V                          PROCEDURE DATE:  10/17/2019      INTERPRETATION:  XR CHEST    TECHNIQUE: One view of the chest, AP view, was obtained.    INDICATION: Hypoxemia. Rapid heart rate.      COMPARISON: Radiograph 10/16/2019 at 1803 hours.      FINDINGS:      Lines and Tubes: Right-sided central venous catheter in the SVC. Enteric   tube with its tip in the stomach.      Lungs: Bilateral perihilar opacities are secondary to edema.      Pleura: The right pleural effusion.      Heart and Mediastinum: The heart is normal in size.      Skeletal: Unremarkable.        IMPRESSION:    1.  No change in the likely right pleural effusion or pulmonary edema

## 2019-10-18 NOTE — PROGRESS NOTE ADULT - ASSESSMENT
68yo M with Hx of PVD, DM type II, HTN, CVA, NSTEMI, L AKA (discharged 10/7), presenting to the [facility] for evaluation of AMS, fevers and tachycardia. Patient found to have xray concerning for gas gangrene now s/p emergent guillotine CELESTE HOLLYA. Admitted to SICU postoperatively intubated and on vasopressor support.      NEURO: No active issues at this time   - Tylenol 975mg q6h PRN  - Melatonin 3mg at bedtime  - Monitor mental status     RESP: Mechanical ventilation s/p resp insufficiency following OR, pulmonary edema, extubated 10/15 to HFNC 30/30, weaned to nasal cannula   - Monitor SpO2   - F/U ABG  - CXR in AM  - Plan for trach 10/18    CARDIAC: CHF (EF 25% 10/2019), HTN, Hx of NSTEMI   - Digoxin 0.125mg daily  - Monitor vitals     GI: No active issues at this time    - Consistent carb pureed diet during the day  - Nocturnal Glucerna tube feeds at 60cc/hr    Renal: S/p diuresis   - IV locked   - Lasix 20mg q8h  - Monitor UOP  - F/U BMP, replete electrolytes as needed     Heme: On home ASA and plavix  - Aspirin 81mg via PO daily  - Plavix 75mg PO daily  - Monitor CBC   - DVT ppx: Lovenox 40mg daily     ID: R LE gas gangrene, leukocytosis, Bcx (+) for Morganella morganii and surgical cultures positive for Proteus  - Follow WBC  - Monitor for fevers   - Ertapenem 1g x14 days (stop date 10/27)  - Nystatin for oral thrush     ENDO: History of DM II  - Continue FSG q6h

## 2019-10-18 NOTE — PROGRESS NOTE ADULT - ASSESSMENT
69y male with extensive LE vaso-occlusive disease, prior LLE AKA, who was brought in from his nursing home facility with sepsis likely secondary to his chronically necrotic RLE. Also with volume overload. Admitted for sepsis with concern of wet gangrene, now s/p R below knee guillotine for wet gangrene performed on 10/13/19, recovered in SICU and since transferred to the floor    Plan:  - c/w Lasix as needed  - c/w ertapenem  - Vac changes per wound care  - Planning for take-back to OR for completion vs. AKA  - ASA, plavix, Lovenox    VASCULAR [id number]

## 2019-10-18 NOTE — PROGRESS NOTE ADULT - SUBJECTIVE AND OBJECTIVE BOX
VASCULAR SURGERY DAILY PROGRESS NOTE:     Transferred from SICU to floor this AM.   Course in SICU:   69y Male with history of DM II, HTN, CVA, NSTEMI c/b cardiogenic shock, PVD s/p L AILEEN (discharged on 10/7) presented to [facility] ED from rehab for altered mental status. In ED patient was noted to have leukocytosis of 17, hyperglycemia, and tachycardic. At previous hospital discharge patient was noted to have right LE dry gangrene, however xray of LE upon ED presentation with emphysematous changes along lateral malleolus. CXR also significant for bilateral pulmonary edema, BNP 89501. 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 During surgery, purulent drainage noted within BKA stump, cultures obtained. Patient was brought to SICU intubated on 0.05 Levophed.    Since maintained on Lasix drip, weaned to pushes. Fletcher removed. Blood culture (10/12) w/o growth at 5 days. Hgb A1c: 5.9%. Miralax increased to BID. Wound vac placed to stump.    SUBJECTIVE/ROS: Patient seen and examined at bedside. KO feeding tube in place and tolerating feeds. Pain is well controlled. No issues this AM. Wound examined and vac to be reapplied today     PHYSICAL EXAM:  Constitutional: well developed, well nourished, NAD  Respiratory: No increased WOB  Abdomen: Soft, NT, KO tube in place with TF running  LLE: AKA wound c/d/i; staples in place  RLE: Dressing mildly saturated with sanguineous fluid, changed on AM rounds. +motor +sensation. No erythema.    Vital Signs Last 24 Hrs  T(C): 36.3 (18 Oct 2019 09:08), Max: 37.8 (18 Oct 2019 03:00)  T(F): 97.4 (18 Oct 2019 09:08), Max: 100 (18 Oct 2019 03:00)  HR: 82 (18 Oct 2019 09:08) (82 - 91)  BP: 95/59 (18 Oct 2019 09:08) (95/59 - 120/60)  BP(mean): 85 (18 Oct 2019 04:00) (70 - 85)  RR: 18 (18 Oct 2019 09:08) (10 - 28)  SpO2: 99% (18 Oct 2019 09:08) (85% - 100%)    I&O's Detail    17 Oct 2019 07:01  -  18 Oct 2019 07:00  --------------------------------------------------------  IN:    furosemide Infusion: 1.3 mL    Glucerna 1.5: 655 mL    IV PiggyBack: 110 mL  Total IN: 766.3 mL    OUT:    Incontinent per Condom Catheter: 1800 mL    VAC (Vacuum Assisted Closure) System: 50 mL  Total OUT: 1850 mL    Total NET: -1083.7 mL      18 Oct 2019 07:01  -  18 Oct 2019 09:48  --------------------------------------------------------  IN:  Total IN: 0 mL    OUT:    Incontinent per Condom Catheter: 275 mL  Total OUT: 275 mL    Total NET: -275 mL      MEDICATIONS  (STANDING):  aspirin  chewable 81 milliGRAM(s) Oral daily  atorvastatin 10 milliGRAM(s) Oral at bedtime  chlorhexidine 2% Cloths 1 Application(s) Topical <User Schedule>  clopidogrel Tablet 75 milliGRAM(s) Oral daily  digoxin     Tablet 0.125 milliGRAM(s) Oral every 24 hours  enoxaparin Injectable 40 milliGRAM(s) SubCutaneous daily  ertapenem  IVPB 1000 milliGRAM(s) IV Intermittent every 24 hours  ertapenem  IVPB      furosemide   Injectable 20 milliGRAM(s) IV Push every 8 hours  influenza   Vaccine 0.5 milliLiter(s) IntraMuscular once  insulin glargine Injectable (LANTUS) 5 Unit(s) SubCutaneous at bedtime  insulin lispro (HumaLOG) corrective regimen sliding scale   SubCutaneous every 6 hours  melatonin 3 milliGRAM(s) Oral at bedtime  multivitamin 1 Tablet(s) Oral daily  nystatin    Suspension 581287 Unit(s) Oral two times a day  potassium chloride   Solution 20 milliEquivalent(s) Enteral Tube daily    MEDICATIONS  (PRN):  acetaminophen    Suspension .. 975 milliGRAM(s) Oral every 6 hours PRN Mild Pain (1 - 3)      LABS:                        8.5    10.47 )-----------( 259      ( 18 Oct 2019 05:28 )             27.0     10-18    139  |  96  |  24<H>  ----------------------------<  245<H>  3.9   |  33<H>  |  0.98    Ca    8.6      18 Oct 2019 05:28  Phos  3.1     10-18  Mg     2.1     10-18      PT/INR - ( 18 Oct 2019 05:28 )   PT: 15.6 sec;   INR: 1.36 ratio         PTT - ( 18 Oct 2019 05:28 )  PTT:31.5 sec

## 2019-10-19 LAB
ANION GAP SERPL CALC-SCNC: 8 MMOL/L — SIGNIFICANT CHANGE UP (ref 5–17)
APTT BLD: 30.4 SEC — SIGNIFICANT CHANGE UP (ref 27.5–36.3)
BUN SERPL-MCNC: 21 MG/DL — SIGNIFICANT CHANGE UP (ref 7–23)
CALCIUM SERPL-MCNC: 8.7 MG/DL — SIGNIFICANT CHANGE UP (ref 8.4–10.5)
CHLORIDE SERPL-SCNC: 94 MMOL/L — LOW (ref 96–108)
CO2 SERPL-SCNC: 31 MMOL/L — SIGNIFICANT CHANGE UP (ref 22–31)
CREAT SERPL-MCNC: 0.84 MG/DL — SIGNIFICANT CHANGE UP (ref 0.5–1.3)
CULTURE RESULTS: SIGNIFICANT CHANGE UP
CULTURE RESULTS: SIGNIFICANT CHANGE UP
GLUCOSE SERPL-MCNC: 127 MG/DL — HIGH (ref 70–99)
HCT VFR BLD CALC: 26.8 % — LOW (ref 39–50)
HGB BLD-MCNC: 8.3 G/DL — LOW (ref 13–17)
INR BLD: 1.27 RATIO — HIGH (ref 0.88–1.16)
MAGNESIUM SERPL-MCNC: 1.8 MG/DL — SIGNIFICANT CHANGE UP (ref 1.6–2.6)
MCHC RBC-ENTMCNC: 26.3 PG — LOW (ref 27–34)
MCHC RBC-ENTMCNC: 31 GM/DL — LOW (ref 32–36)
MCV RBC AUTO: 85.1 FL — SIGNIFICANT CHANGE UP (ref 80–100)
NRBC # BLD: 0 /100 WBCS — SIGNIFICANT CHANGE UP (ref 0–0)
PHOSPHATE SERPL-MCNC: 2.7 MG/DL — SIGNIFICANT CHANGE UP (ref 2.5–4.5)
PLATELET # BLD AUTO: 258 K/UL — SIGNIFICANT CHANGE UP (ref 150–400)
POTASSIUM SERPL-MCNC: 3.5 MMOL/L — SIGNIFICANT CHANGE UP (ref 3.5–5.3)
POTASSIUM SERPL-SCNC: 3.5 MMOL/L — SIGNIFICANT CHANGE UP (ref 3.5–5.3)
PROTHROM AB SERPL-ACNC: 14.7 SEC — HIGH (ref 10–12.9)
RBC # BLD: 3.15 M/UL — LOW (ref 4.2–5.8)
RBC # FLD: 15.5 % — HIGH (ref 10.3–14.5)
SODIUM SERPL-SCNC: 133 MMOL/L — LOW (ref 135–145)
SPECIMEN SOURCE: SIGNIFICANT CHANGE UP
SPECIMEN SOURCE: SIGNIFICANT CHANGE UP
WBC # BLD: 8.43 K/UL — SIGNIFICANT CHANGE UP (ref 3.8–10.5)
WBC # FLD AUTO: 8.43 K/UL — SIGNIFICANT CHANGE UP (ref 3.8–10.5)

## 2019-10-19 RX ORDER — POTASSIUM CHLORIDE 20 MEQ
10 PACKET (EA) ORAL
Refills: 0 | Status: DISCONTINUED | OUTPATIENT
Start: 2019-10-19 | End: 2019-10-19

## 2019-10-19 RX ADMIN — Medication 975 MILLIGRAM(S): at 22:24

## 2019-10-19 RX ADMIN — CHLORHEXIDINE GLUCONATE 1 APPLICATION(S): 213 SOLUTION TOPICAL at 06:55

## 2019-10-19 RX ADMIN — Medication 500000 UNIT(S): at 06:56

## 2019-10-19 RX ADMIN — Medication 3 MILLIGRAM(S): at 21:25

## 2019-10-19 RX ADMIN — Medication 1 TABLET(S): at 11:34

## 2019-10-19 RX ADMIN — ATORVASTATIN CALCIUM 10 MILLIGRAM(S): 80 TABLET, FILM COATED ORAL at 21:25

## 2019-10-19 RX ADMIN — ENOXAPARIN SODIUM 40 MILLIGRAM(S): 100 INJECTION SUBCUTANEOUS at 11:34

## 2019-10-19 RX ADMIN — Medication 0.12 MILLIGRAM(S): at 06:54

## 2019-10-19 RX ADMIN — ERTAPENEM SODIUM 120 MILLIGRAM(S): 1 INJECTION, POWDER, LYOPHILIZED, FOR SOLUTION INTRAMUSCULAR; INTRAVENOUS at 20:00

## 2019-10-19 RX ADMIN — CLOPIDOGREL BISULFATE 75 MILLIGRAM(S): 75 TABLET, FILM COATED ORAL at 11:34

## 2019-10-19 RX ADMIN — Medication 2: at 09:47

## 2019-10-19 RX ADMIN — Medication 20 MILLIEQUIVALENT(S): at 11:34

## 2019-10-19 RX ADMIN — INSULIN GLARGINE 5 UNIT(S): 100 INJECTION, SOLUTION SUBCUTANEOUS at 22:51

## 2019-10-19 RX ADMIN — Medication 2: at 18:49

## 2019-10-19 RX ADMIN — Medication 500000 UNIT(S): at 18:29

## 2019-10-19 RX ADMIN — Medication 20 MILLIGRAM(S): at 09:47

## 2019-10-19 RX ADMIN — Medication 2: at 13:07

## 2019-10-19 RX ADMIN — Medication 975 MILLIGRAM(S): at 21:24

## 2019-10-19 RX ADMIN — Medication 2: at 22:52

## 2019-10-19 RX ADMIN — Medication 81 MILLIGRAM(S): at 11:34

## 2019-10-19 NOTE — CHART NOTE - NSCHARTNOTEFT_GEN_A_CORE
Nutrition Follow Up Note    Patient seen for: nutrition follow up and Calorie Count ordered 10/18 (posted 10/19; communicated with RN)    Source: RN, medical record. Pt refused interview, non-responsive.    Chart reviewed, events noted. "69y male with extensive LE vaso-occlusive disease, prior LLE AKA, who was brought in from his nursing home facility with sepsis likely secondary to his chronically necrotic RLE. Also with volume overload. Admitted for sepsis with concern of wet gangrene, now s/p R below knee guillotine for wet gangrene performed on 10/13/19, recovered in SICU and since transferred to the floor in stable condition."    Nutrition History: Pt was previously receiving EN via OGT/NGT, Glucerna1.2 @ 55ml/hr x 24 hours until 10/17, at which point nocturnal EN (Glucerna1.2 @ 60ml/hr x 10hrs) was initiated. Per RN, pt self-removed NGT. Calorie Count in progress. Speech and Swallow evaluation ordered 10/18.    Pt noted with pressure injuries: Stage II perianal (10/13); Stage II right shoulder (10/18). Multivitamin ordered. RD will add Nectar Consistency No Sugar Added Mighty Shakes (200 calories, 7 Gm protein) to meal trays.    Diet (10/18): Pureed, Consistent Carbohydrate diet with snack     PO intake : Per RN, pt ate very little for breakfast. Pt requires full feeding assistance.    Last BM: 10/19 x2    Daily Weight in k.6 (10-19), Weight in k.6 (10-17), Weight in k.8 (10-16), Weight in k.2 (10-15), Weight in k.4 (10-14); variable weight/height history in setting of b/l amputations and diuresis.    Amputation (bilateral) IBW: 116 pounds (+/- 10%)    Drug Dosing Weight  Weight (kg): 58.4 (13 Oct 2019 12:15)  BMI (kg/m2): 21.4 (13 Oct 2019 12:15)    Pertinent Medications: MEDICATIONS  (STANDING):  aspirin  chewable 81 milliGRAM(s) Oral daily  atorvastatin 10 milliGRAM(s) Oral at bedtime  chlorhexidine 2% Cloths 1 Application(s) Topical <User Schedule>  clopidogrel Tablet 75 milliGRAM(s) Oral daily  digoxin     Tablet 0.125 milliGRAM(s) Oral daily  enoxaparin Injectable 40 milliGRAM(s) SubCutaneous daily  ertapenem  IVPB 1000 milliGRAM(s) IV Intermittent every 24 hours  ertapenem  IVPB      furosemide   Injectable 20 milliGRAM(s) IV Push every 8 hours  influenza   Vaccine 0.5 milliLiter(s) IntraMuscular once  insulin glargine Injectable (LANTUS) 5 Unit(s) SubCutaneous at bedtime  insulin lispro (HumaLOG) corrective regimen sliding scale   SubCutaneous Before meals and at bedtime  melatonin 3 milliGRAM(s) Oral at bedtime  multivitamin 1 Tablet(s) Oral daily  nystatin    Suspension 480204 Unit(s) Oral two times a day  potassium chloride   Solution 20 milliEquivalent(s) Oral daily    MEDICATIONS  (PRN):  acetaminophen   Tablet .. 975 milliGRAM(s) Oral every 6 hours PRN Mild Pain (1 - 3), Moderate Pain (4 - 6)    LABS:   10-19 @ 05:56: Sodium 133<L>, Potassium 3.5, Chloride 94<L>, Calcium 8.7, Magnesium 1.8, Phosphorus 2.7, BUN 21, Creatinine 0.84, <H>, Hemoglobin 8.3<L>, Hematocrit 26.8<L>    POCT Blood Glucose.: 177 mg/dL (19 Oct 2019 13:06)  POCT Blood Glucose.: 168 mg/dL (19 Oct 2019 09:39)  POCT Blood Glucose.: 234 mg/dL (18 Oct 2019 21:44)  POCT Blood Glucose.: 213 mg/dL (18 Oct 2019 17:53)    Skin per nursing documentation: Stage II perianal (10/13); Stage II right shoulder (10/18)  Edema: none noted    Estimated Needs:   [ X] no change since previous assessment  [ ] recalculated:     Previous Nutrition Diagnosis: Increased Nutrient Needs  Nutrition Diagnosis is: ongoing, being addressed with diet advancement, oral supplements, and Calorie Count (10/19 - 10/21)    New Nutrition Diagnosis: none     Interventions:     Recommend  1) Continue Pureed, Consistent Carbohydrate diet with snack with full feeding assistance.   2)  RD will add Nectar Consistency No Sugar Added Mighty Shakes (200 calories, 7 Gm protein) to meal trays.  3) Monitor swallow evaluation. Monitor results of Calorie Count (10/19 - 10/21); communicated with RN.  4) Continue multivitamin supplementation.    Monitoring and Evaluation:     Continue to monitor nutritional intake, tolerance to diet prescription, weights, labs, skin integrity.    RD remains available upon request and will follow up per protocol.    Berta Wong MS RD CDN [facility], Pager # [phone]

## 2019-10-19 NOTE — PROGRESS NOTE ADULT - SUBJECTIVE AND OBJECTIVE BOX
Subjective:  Patient seen and evaluated this AM with team.  Patient denies any acute concerns.  States pain is under control.  Wound vac seal tested - sealed, unclogged, working.    OBJECTIVE:     ** PHYSICAL EXAM **    Constitutional: well developed, well nourished, NAD  Respiratory: non-labored breathing   Abdomen: Soft, NT, ND  LLE: AKA wound c/d/i; staples in tact  RLE: wound vac in place; suctioning well; outer dressing c/d/i       ** VITAL SIGNS / I&O's **    Vital Signs Last 24 Hrs  T(C): 36.2 (19 Oct 2019 05:00), Max: 36.9 (19 Oct 2019 01:00)  T(F): 97.2 (19 Oct 2019 05:00), Max: 98.5 (19 Oct 2019 01:00)  HR: 73 (19 Oct 2019 05:00) (73 - 84)  BP: 103/55 (19 Oct 2019 05:00) (95/60 - 117/72)  BP(mean): --  RR: 20 (19 Oct 2019 05:00) (17 - 20)  SpO2: 97% (19 Oct 2019 05:00) (97% - 98%)      18 Oct 2019 07:01  -  19 Oct 2019 07:00  --------------------------------------------------------  IN:    Oral Fluid: 520 mL  Total IN: 520 mL    OUT:    Incontinent per Condom Catheter: 625 mL  Total OUT: 625 mL    Total NET: -105 mL            ** LABS **                          8.3    8.43  )-----------( 258      ( 19 Oct 2019 05:56 )             26.8     19 Oct 2019 05:56    133    |  94     |  21     ----------------------------<  127    3.5     |  31     |  0.84     Ca    8.7        19 Oct 2019 05:56  Phos  2.7       19 Oct 2019 05:56  Mg     1.8       19 Oct 2019 05:56      PT/INR - ( 19 Oct 2019 05:56 )   PT: 14.7 sec;   INR: 1.27 ratio         PTT - ( 19 Oct 2019 05:56 )  PTT:30.4 sec  CAPILLARY BLOOD GLUCOSE      POCT Blood Glucose.: 234 mg/dL (18 Oct 2019 21:44)  POCT Blood Glucose.: 213 mg/dL (18 Oct 2019 17:53)  POCT Blood Glucose.: 201 mg/dL (18 Oct 2019 12:21)      MEDICATIONS  (STANDING):  aspirin  chewable 81 milliGRAM(s) Oral daily  atorvastatin 10 milliGRAM(s) Oral at bedtime  chlorhexidine 2% Cloths 1 Application(s) Topical <User Schedule>  clopidogrel Tablet 75 milliGRAM(s) Oral daily  digoxin     Tablet 0.125 milliGRAM(s) Oral daily  enoxaparin Injectable 40 milliGRAM(s) SubCutaneous daily  ertapenem  IVPB 1000 milliGRAM(s) IV Intermittent every 24 hours  ertapenem  IVPB      furosemide   Injectable 20 milliGRAM(s) IV Push every 8 hours  influenza   Vaccine 0.5 milliLiter(s) IntraMuscular once  insulin glargine Injectable (LANTUS) 5 Unit(s) SubCutaneous at bedtime  insulin lispro (HumaLOG) corrective regimen sliding scale   SubCutaneous Before meals and at bedtime  melatonin 3 milliGRAM(s) Oral at bedtime  multivitamin 1 Tablet(s) Oral daily  nystatin    Suspension 549685 Unit(s) Oral two times a day  potassium chloride   Solution 20 milliEquivalent(s) Oral daily    MEDICATIONS  (PRN):  acetaminophen   Tablet .. 975 milliGRAM(s) Oral every 6 hours PRN Mild Pain (1 - 3), Moderate Pain (4 - 6)

## 2019-10-19 NOTE — PROGRESS NOTE ADULT - ASSESSMENT
69y male with extensive LE vaso-occlusive disease, prior LLE AKA, who was brought in from his nursing home facility with sepsis likely secondary to his chronically necrotic RLE. Also with volume overload. 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    Plan:  - speech and swallow evaluation  - wound care for R shoulder and buttocks wounds  - continue pureed diet  - continue lasix push

## 2019-10-20 LAB
ANION GAP SERPL CALC-SCNC: 9 MMOL/L — SIGNIFICANT CHANGE UP (ref 5–17)
BUN SERPL-MCNC: 22 MG/DL — SIGNIFICANT CHANGE UP (ref 7–23)
CALCIUM SERPL-MCNC: 8.5 MG/DL — SIGNIFICANT CHANGE UP (ref 8.4–10.5)
CHLORIDE SERPL-SCNC: 99 MMOL/L — SIGNIFICANT CHANGE UP (ref 96–108)
CO2 SERPL-SCNC: 30 MMOL/L — SIGNIFICANT CHANGE UP (ref 22–31)
CREAT SERPL-MCNC: 0.8 MG/DL — SIGNIFICANT CHANGE UP (ref 0.5–1.3)
GLUCOSE SERPL-MCNC: 119 MG/DL — HIGH (ref 70–99)
HCT VFR BLD CALC: 23.7 % — LOW (ref 39–50)
HGB BLD-MCNC: 7.2 G/DL — LOW (ref 13–17)
MAGNESIUM SERPL-MCNC: 1.9 MG/DL — SIGNIFICANT CHANGE UP (ref 1.6–2.6)
MCHC RBC-ENTMCNC: 26.4 PG — LOW (ref 27–34)
MCHC RBC-ENTMCNC: 30.4 GM/DL — LOW (ref 32–36)
MCV RBC AUTO: 86.8 FL — SIGNIFICANT CHANGE UP (ref 80–100)
NRBC # BLD: 0 /100 WBCS — SIGNIFICANT CHANGE UP (ref 0–0)
PHOSPHATE SERPL-MCNC: 2.6 MG/DL — SIGNIFICANT CHANGE UP (ref 2.5–4.5)
PLATELET # BLD AUTO: 253 K/UL — SIGNIFICANT CHANGE UP (ref 150–400)
POTASSIUM SERPL-MCNC: 3.3 MMOL/L — LOW (ref 3.5–5.3)
POTASSIUM SERPL-SCNC: 3.3 MMOL/L — LOW (ref 3.5–5.3)
RBC # BLD: 2.73 M/UL — LOW (ref 4.2–5.8)
RBC # FLD: 15.7 % — HIGH (ref 10.3–14.5)
SODIUM SERPL-SCNC: 138 MMOL/L — SIGNIFICANT CHANGE UP (ref 135–145)
WBC # BLD: 8.07 K/UL — SIGNIFICANT CHANGE UP (ref 3.8–10.5)
WBC # FLD AUTO: 8.07 K/UL — SIGNIFICANT CHANGE UP (ref 3.8–10.5)

## 2019-10-20 RX ADMIN — Medication 2: at 14:06

## 2019-10-20 RX ADMIN — Medication 1 TABLET(S): at 14:06

## 2019-10-20 RX ADMIN — Medication 81 MILLIGRAM(S): at 14:05

## 2019-10-20 RX ADMIN — Medication 3 MILLIGRAM(S): at 22:34

## 2019-10-20 RX ADMIN — ENOXAPARIN SODIUM 40 MILLIGRAM(S): 100 INJECTION SUBCUTANEOUS at 14:05

## 2019-10-20 RX ADMIN — INSULIN GLARGINE 5 UNIT(S): 100 INJECTION, SOLUTION SUBCUTANEOUS at 22:34

## 2019-10-20 RX ADMIN — Medication 500000 UNIT(S): at 05:17

## 2019-10-20 RX ADMIN — CLOPIDOGREL BISULFATE 75 MILLIGRAM(S): 75 TABLET, FILM COATED ORAL at 14:06

## 2019-10-20 RX ADMIN — Medication 20 MILLIEQUIVALENT(S): at 14:06

## 2019-10-20 RX ADMIN — ERTAPENEM SODIUM 120 MILLIGRAM(S): 1 INJECTION, POWDER, LYOPHILIZED, FOR SOLUTION INTRAMUSCULAR; INTRAVENOUS at 23:41

## 2019-10-20 RX ADMIN — ATORVASTATIN CALCIUM 10 MILLIGRAM(S): 80 TABLET, FILM COATED ORAL at 22:34

## 2019-10-20 RX ADMIN — Medication 0.12 MILLIGRAM(S): at 05:17

## 2019-10-20 RX ADMIN — Medication 2: at 18:53

## 2019-10-20 RX ADMIN — Medication 20 MILLIGRAM(S): at 10:13

## 2019-10-20 RX ADMIN — Medication 20 MILLIGRAM(S): at 17:46

## 2019-10-20 RX ADMIN — CHLORHEXIDINE GLUCONATE 1 APPLICATION(S): 213 SOLUTION TOPICAL at 05:18

## 2019-10-20 NOTE — PROGRESS NOTE ADULT - ASSESSMENT
69y male with extensive LE vaso-occlusive disease, prior LLE AKA, who was brought in from his nursing home facility with sepsis likely secondary to his chronically necrotic RLE. Also with volume overload. 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    Plan:  - speech and swallow evaluation  - wound care for R shoulder and buttocks wounds  - continue pureed diet  - continue lasix push  - condom cath instead of south  - completion amp this week  - will leave triple lumen and EJ in if weak peripheral access

## 2019-10-20 NOTE — PROGRESS NOTE ADULT - SUBJECTIVE AND OBJECTIVE BOX
Subjective:    Patient seen and evaluated this AM with team.  No acute events overnight.  Patient denies any acute complaints.      OBJECTIVE:     ** PHYSICAL EXAM **    Constitutional: well developed, well nourished, NAD  Respiratory: non-labored breathing   Abdomen: Soft, NT, ND  LLE: AKA wound c/d/i; staples in tact  RLE: wound vac in place; suctioning well; outer dressing c/d/i       ** VITAL SIGNS / I&O's **    Vital Signs Last 24 Hrs  T(C): 36.4 (20 Oct 2019 09:14), Max: 37 (19 Oct 2019 22:24)  T(F): 97.5 (20 Oct 2019 09:14), Max: 98.6 (19 Oct 2019 22:24)  HR: 75 (20 Oct 2019 09:14) (69 - 85)  BP: 107/74 (20 Oct 2019 09:14) (99/60 - 130/71)  BP(mean): --  RR: 18 (20 Oct 2019 09:14) (18 - 18)  SpO2: 99% (20 Oct 2019 09:14) (98% - 100%)      19 Oct 2019 07:01  -  20 Oct 2019 07:00  --------------------------------------------------------  IN:    Oral Fluid: 450 mL  Total IN: 450 mL    OUT:    Incontinent per Condom Catheter: 350 mL    Voided: 400 mL  Total OUT: 750 mL    Total NET: -300 mL            ** LABS **                          7.2    8.07  )-----------( 253      ( 20 Oct 2019 07:03 )             23.7     20 Oct 2019 07:03    138    |  99     |  22     ----------------------------<  119    3.3     |  30     |  0.80     Ca    8.5        20 Oct 2019 07:03  Phos  2.6       20 Oct 2019 07:03  Mg     1.9       20 Oct 2019 07:03      PT/INR - ( 19 Oct 2019 05:56 )   PT: 14.7 sec;   INR: 1.27 ratio         PTT - ( 19 Oct 2019 05:56 )  PTT:30.4 sec  CAPILLARY BLOOD GLUCOSE      POCT Blood Glucose.: 170 mg/dL (19 Oct 2019 22:16)  POCT Blood Glucose.: 175 mg/dL (19 Oct 2019 18:46)  POCT Blood Glucose.: 177 mg/dL (19 Oct 2019 13:06)  POCT Blood Glucose.: 168 mg/dL (19 Oct 2019 09:39)                MEDICATIONS  (STANDING):  aspirin  chewable 81 milliGRAM(s) Oral daily  atorvastatin 10 milliGRAM(s) Oral at bedtime  chlorhexidine 2% Cloths 1 Application(s) Topical <User Schedule>  clopidogrel Tablet 75 milliGRAM(s) Oral daily  digoxin     Tablet 0.125 milliGRAM(s) Oral daily  enoxaparin Injectable 40 milliGRAM(s) SubCutaneous daily  ertapenem  IVPB 1000 milliGRAM(s) IV Intermittent every 24 hours  ertapenem  IVPB      furosemide   Injectable 20 milliGRAM(s) IV Push every 8 hours  influenza   Vaccine 0.5 milliLiter(s) IntraMuscular once  insulin glargine Injectable (LANTUS) 5 Unit(s) SubCutaneous at bedtime  insulin lispro (HumaLOG) corrective regimen sliding scale   SubCutaneous Before meals and at bedtime  melatonin 3 milliGRAM(s) Oral at bedtime  multivitamin 1 Tablet(s) Oral daily  nystatin    Suspension 393714 Unit(s) Oral two times a day  potassium chloride   Solution 20 milliEquivalent(s) Oral daily    MEDICATIONS  (PRN):  acetaminophen   Tablet .. 975 milliGRAM(s) Oral every 6 hours PRN Mild Pain (1 - 3), Moderate Pain (4 - 6)

## 2019-10-21 LAB
ANION GAP SERPL CALC-SCNC: 15 MMOL/L — SIGNIFICANT CHANGE UP (ref 5–17)
BLD GP AB SCN SERPL QL: NEGATIVE — SIGNIFICANT CHANGE UP
BUN SERPL-MCNC: 23 MG/DL — SIGNIFICANT CHANGE UP (ref 7–23)
CALCIUM SERPL-MCNC: 8.1 MG/DL — LOW (ref 8.4–10.5)
CHLORIDE SERPL-SCNC: 97 MMOL/L — SIGNIFICANT CHANGE UP (ref 96–108)
CO2 SERPL-SCNC: 28 MMOL/L — SIGNIFICANT CHANGE UP (ref 22–31)
CREAT SERPL-MCNC: 0.79 MG/DL — SIGNIFICANT CHANGE UP (ref 0.5–1.3)
GLUCOSE SERPL-MCNC: 81 MG/DL — SIGNIFICANT CHANGE UP (ref 70–99)
POTASSIUM SERPL-MCNC: 3.7 MMOL/L — SIGNIFICANT CHANGE UP (ref 3.5–5.3)
POTASSIUM SERPL-SCNC: 3.7 MMOL/L — SIGNIFICANT CHANGE UP (ref 3.5–5.3)
RH IG SCN BLD-IMP: POSITIVE — SIGNIFICANT CHANGE UP
SODIUM SERPL-SCNC: 140 MMOL/L — SIGNIFICANT CHANGE UP (ref 135–145)

## 2019-10-21 RX ORDER — POTASSIUM CHLORIDE 20 MEQ
40 PACKET (EA) ORAL ONCE
Refills: 0 | Status: DISCONTINUED | OUTPATIENT
Start: 2019-10-21 | End: 2019-10-21

## 2019-10-21 RX ORDER — POTASSIUM CHLORIDE 20 MEQ
10 PACKET (EA) ORAL
Refills: 0 | Status: COMPLETED | OUTPATIENT
Start: 2019-10-21 | End: 2019-10-21

## 2019-10-21 RX ORDER — SODIUM CHLORIDE 9 MG/ML
1000 INJECTION, SOLUTION INTRAVENOUS
Refills: 0 | Status: DISCONTINUED | OUTPATIENT
Start: 2019-10-21 | End: 2019-10-23

## 2019-10-21 RX ADMIN — Medication 100 MILLIEQUIVALENT(S): at 09:33

## 2019-10-21 RX ADMIN — Medication 81 MILLIGRAM(S): at 13:09

## 2019-10-21 RX ADMIN — Medication 20 MILLIGRAM(S): at 17:31

## 2019-10-21 RX ADMIN — Medication 500000 UNIT(S): at 05:16

## 2019-10-21 RX ADMIN — Medication 20 MILLIGRAM(S): at 23:02

## 2019-10-21 RX ADMIN — Medication 100 MILLIEQUIVALENT(S): at 11:47

## 2019-10-21 RX ADMIN — Medication 3 MILLIGRAM(S): at 23:01

## 2019-10-21 RX ADMIN — Medication 1 TABLET(S): at 13:08

## 2019-10-21 RX ADMIN — Medication 100 MILLIEQUIVALENT(S): at 10:46

## 2019-10-21 RX ADMIN — Medication 20 MILLIGRAM(S): at 09:33

## 2019-10-21 RX ADMIN — CHLORHEXIDINE GLUCONATE 1 APPLICATION(S): 213 SOLUTION TOPICAL at 05:17

## 2019-10-21 RX ADMIN — Medication 0.12 MILLIGRAM(S): at 05:16

## 2019-10-21 RX ADMIN — CLOPIDOGREL BISULFATE 75 MILLIGRAM(S): 75 TABLET, FILM COATED ORAL at 13:09

## 2019-10-21 RX ADMIN — ERTAPENEM SODIUM 120 MILLIGRAM(S): 1 INJECTION, POWDER, LYOPHILIZED, FOR SOLUTION INTRAMUSCULAR; INTRAVENOUS at 23:01

## 2019-10-21 RX ADMIN — ATORVASTATIN CALCIUM 10 MILLIGRAM(S): 80 TABLET, FILM COATED ORAL at 23:01

## 2019-10-21 RX ADMIN — ENOXAPARIN SODIUM 40 MILLIGRAM(S): 100 INJECTION SUBCUTANEOUS at 13:09

## 2019-10-21 NOTE — SWALLOW BEDSIDE ASSESSMENT ADULT - COMMENTS
Pt was seen for a MBS 8/2019 with findings of: oropharyngeal dysphagia characterized by reduced bolus formation/control, spillover to the pharynx on all textures administered, delayed pharyngeal swallows and trace-mild post swallow residue in the pharynx. No aspiration evident on exam. Chewables not administered 2/2 edentulous state and altered mental status. Diet recommendation was made for Puree and thin liquids. 100% supervision with all PO intake. Small, SINGLE SIPS ONLY when drinking. Medication in applesauce.

## 2019-10-21 NOTE — SWALLOW BEDSIDE ASSESSMENT ADULT - SWALLOW EVAL: DIAGNOSIS
Consult for bedside swallow received and appreciated. Pt well known to this service with most recent MBS completed 8/2019 with recommendation for pureed texture diet with thin liquids (more details above). Case d/w vascular MD Rey. Unclear reason for consult for clinical assessment of swallow function. Patient appears to be tolerating diet since consult was requested on 10/18. CXR on 10/18 not concerning for pulmonary process. Per MD defer evaluation at this time. Medical team to reconsult if needed. This service remains available.

## 2019-10-21 NOTE — ADVANCED PRACTICE NURSE CONSULT - RECOMMEDATIONS
Will recommend the followin. B/l buttocks: continue to monitor, apply Cavilon daily  2. continue with turning and postioning  3. nutrition support as pt condition allows  Tx plan discussed with RN

## 2019-10-21 NOTE — ADVANCED PRACTICE NURSE CONSULT - REASON FOR CONSULT
Requested by staff to assess skin status of pt with severe IAD. PMH is noted:  Mr. Erik Olivares is a 69 year old gentleman with pmhx of T2DM, HTN, CVA, NSTEMI complicated by cardiogenic shock, significant PVD s/p LLE fem-pop bypass (vein graft) with subsequent LLE BKA (07/2019, Dr. MARILEE Barriga), LLE AKA (09/2019, Dr. MARILEE Roper), known chronically ischemic RLE who was brought in from his nursing facility with progressively altered mentation, fevers and tachycardia. In the ED, the patient was agitated, required supplemental O2, and was tachycardic.  The pt is s/p right  guilltotine BKA on 10/13- scheduled for complete amp this week.

## 2019-10-21 NOTE — PROGRESS NOTE ADULT - ASSESSMENT
69y male with extensive LE vaso-occlusive disease, prior LLE AKA, who was brought in from his nursing home facility with sepsis likely secondary to his chronically necrotic RLE. Also with volume overload. 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    Plan:  - speech and swallow evaluation ordered   - wound care for R shoulder and buttocks wounds  - continue pureed diet pending Speech and Swallow recs   - continue lasix push  - condom cath instead of south  - completion amp this week possibly Thursday   - will leave triple lumen and EJ in given difficult access to peripheral vasculature   - will transfuse 1U pRBC today and repeat as necessary leading to surgery    Plan discussed with Vascular Surgery [place]  Vascular Surgery   [phone]

## 2019-10-21 NOTE — CONSULT NOTE ADULT - SUBJECTIVE AND OBJECTIVE BOX
HPI:   Patient is a 69y male with pvd, dm, had left leg  bka to aka last month returns to hospital with severe sepsis from dry gangrene to wet gangrene of the right foot and morganella bacteremia. He underwent guillotine bka and is now out of the sicu on a regular floor. He developed severe hypoperfusion necrosis of the upper middle buttocks. He is to undergo completion of the bka later this week. THe patient does  not answer any specific ros questions.     REVIEW OF SYSTEMS:  All other review of systems negative (Comprehensive ROS)    PAST MEDICAL & SURGICAL HISTORY:  Hyperlipidemia  Hypertension  Diabetes  H/O mastoidectomy      Allergies    No Known Allergies    Intolerances        Antimicrobials Day #  :7  ertapenem  IVPB 1000 milliGRAM(s) IV Intermittent every 24 hours  ertapenem  IVPB      nystatin    Suspension 103041 Unit(s) Oral two times a day    Other Medications:  acetaminophen   Tablet .. 975 milliGRAM(s) Oral every 6 hours PRN  aspirin  chewable 81 milliGRAM(s) Oral daily  atorvastatin 10 milliGRAM(s) Oral at bedtime  chlorhexidine 2% Cloths 1 Application(s) Topical <User Schedule>  clopidogrel Tablet 75 milliGRAM(s) Oral daily  digoxin     Tablet 0.125 milliGRAM(s) Oral daily  enoxaparin Injectable 40 milliGRAM(s) SubCutaneous daily  furosemide   Injectable 20 milliGRAM(s) IV Push every 8 hours  influenza   Vaccine 0.5 milliLiter(s) IntraMuscular once  insulin glargine Injectable (LANTUS) 5 Unit(s) SubCutaneous at bedtime  insulin lispro (HumaLOG) corrective regimen sliding scale   SubCutaneous Before meals and at bedtime  melatonin 3 milliGRAM(s) Oral at bedtime  multivitamin 1 Tablet(s) Oral daily      FAMILY HISTORY:  FHx: diabetes mellitus: In all siblings(2 sisters and 4 brothers)      SOCIAL HISTORY:  Smoking: [ ]Yes [x ]No  ETOH: [ ]Yes [x ]No  Drug Use: [ ]Yes [ ]No   [ ] Single[x ]    T(F): 97.9 (10-21-19 @ 13:25), Max: 98.4 (10-20-19 @ 21:23)  HR: 78 (10-21-19 @ 13:25)  BP: 107/65 (10-21-19 @ 13:25)  RR: 18 (10-21-19 @ 13:25)  SpO2: 99% (10-21-19 @ 13:25)  Wt(kg): --    PHYSICAL EXAM:  General: alert, no acute distress, smiles  Eyes:  anicteric, no conjunctival injection, no discharge  Oropharynx: no lesions or injection 	  Neck: supple, without adenopathy  Lungs: clear to auscultation  Heart: regular rate and rhythm; no murmur, rubs or gallops  Abdomen: soft, nondistended, nontender, without mass or organomegaly  Skin: no lesions  Extremities:left stump wound clean, some serous drainage, staples in place. right stump dressed with vac. track marks on arms  Neurologic: alert, confused, moves all extremities  back- eschar over upper medial buttock   LAB RESULTS:                        7.2    8.07  )-----------( 253      ( 20 Oct 2019 07:03 )             23.7     10-20    138  |  99  |  22  ----------------------------<  119<H>  3.3<L>   |  30  |  0.80    Ca    8.5      20 Oct 2019 07:03  Phos  2.6     10-20  Mg     1.9     10-20            MICROBIOLOGY:  RECENT CULTURES:  Culture - Blood (10.14.19 @ 10:46)    Specimen Source: .Blood    Culture Results:   No growth at 5 days.    Culture - Surgical Swab (10.13.19 @ 17:56)    -  Tobramycin: S <=2    -  Tobramycin: S <=2    -  Trimethoprim/Sulfamethoxazole: S <=2/38    -  Trimethoprim/Sulfamethoxazole: R >2/38    -  Piperacillin/Tazobactam: S <=8    -  Piperacillin/Tazobactam: I 64    -  Meropenem: S <=1    -  Meropenem: S <=1    -  Gentamicin: S <=2    -  Gentamicin: R >8    -  Levofloxacin: I 4    -  Levofloxacin: I 4    -  Ceftriaxone: S <=1 Enterobacter, Citrobacter, and Serratia may develop resistance during prolonged therapy    -  Ceftriaxone: R 16 Enterobacter, Citrobacter, and Serratia may develop resistance during prolonged therapy    -  Ciprofloxacin: R >2    -  Ciprofloxacin: R >2    -  Ertapenem: S <=0.5    -  Ertapenem: S <=0.5    -  Cefoxitin: S <=8    -  Cefoxitin: R >16    -  Cefazolin: S <=2 Enterobacter, Citrobacter, and Serratia may develop resistance during prolonged therapy (3-4 days)    -  Cefazolin: R >16 Enterobacter, Citrobacter, and Serratia may develop resistance during prolonged therapy (3-4 days)    -  Cefepime: S <=2    -  Cefepime: S <=2    -  Amikacin: S <=16    -  Amikacin: S <=16    -  Amoxicillin/Clavulanic Acid: S <=8/4    -  Amoxicillin/Clavulanic Acid: R >16/8    -  Ampicillin/Sulbactam: S <=4/2 Enterobacter, Citrobacter, and Serratia may develop resistance during prolonged therapy (3-4 days)    -  Ampicillin/Sulbactam: R >16/8 Enterobacter, Citrobacter, and Serratia may develop resistance during prolonged therapy (3-4 days)    -  Ampicillin: S <=8 These ampicillin results predict results for amoxicillin    -  Ampicillin: R >16 These ampicillin results predict results for amoxicillin    -  Aztreonam: S <=4    -  Aztreonam: R >16    Specimen Source: .Surgical Swab    Culture Results:   Moderate Proteus mirabilis  Moderate Proteus mirabilis #2    Organism Identification: Proteus mirabilis  Proteus mirabilis    Organism: Proteus mirabilis    Organism: Proteus mirabilis    Method Type: HELEN    Method Type: HELEN    Culture - Blood (10.13.19 @ 01:05)    Gram Stain:   Growth in anaerobic bottle: Gram Negative Rods    -  Cefepime: S <=2    -  Cefazolin: R >16 Enterobacter, Citrobacter, and Serratia may develop resistance during prolonged therapy (3-4 days)    -  Cefoxitin: R >16    -  Ciprofloxacin: R >2    -  Ertapenem: S <=0.5    -  Ceftriaxone: R >32 Enterobacter, Citrobacter, and Serratia may develop resistance during prolonged therapy    -  Imipenem: S <=1    -  Gentamicin: R >8    -  Meropenem: S <=1    -  Piperacillin/Tazobactam: R >64    -  Aztreonam: R >16    -  Ampicillin: R >16 These ampicillin results predict results for amoxicillin    -  Ampicillin/Sulbactam: R >16/8 Enterobacter, Citrobacter, and Serratia may develop resistance during prolonged therapy (3-4 days)    -  Amikacin: S <=16    -  Multidrug (KPC pos) resistant organism: Nondet    -  Staphylococcus aureus: Nondet    -  Methicillin resistant Staphylococcus aureus (MRSA): Nondet    -  Coagulase negative Staphylococcus: Nondet    -  Enterococcus species: Nondet    -  Vancomycin resistant Enterococcus sp.: Nondet    -  Escherichia coli: Nondet    -  Klebsiella oxytoca: Nondet    -  Klebsiella pneumoniae: Nondet    -  Serratia marcescens: Nondet    -  Haemophilus influenzae: Nondet    -  Listeria monocytogenes: Nondet    -  Neisseria meningitidis: Nondet    -  Pseudomonas aeruginosa: Nondet    -  Acinetobacter baumanii: Nondet    -  Enterobacter cloacae complex: Nondet    -  Streptococcus sp. (Not Grp A, B or S pneumoniae): Nondet    -  Streptococcus agalactiae (Group B): Nondet    -  Streptococcus pyogenes (Group A): Nondet    -  Streptococcus pneumoniae: Nondet    -  Candida albicans: Nondet    -  Candida glabrata: Nondet    -  Candida krusei: Nondet    -  Candida parapsilosis: Nondet    -  Candida tropicalis: Nondet    -  Levofloxacin: I 4    -  Trimethoprim/Sulfamethoxazole: R >2/38    -  Tobramycin: I 8    Specimen Source: .Blood    Organism: Blood Culture PCR    Organism: Morganella morganii    Culture Results:   Growth in anaerobic bottle: Morganella morganii  "Due to technical problems, Proteus sp. will Not be reported as part of  the BCID panel until further notice"  ***Blood Panel PCR results on this specimen are available  approximately 3 hours after the Gram stain result.***  Gram stain, PCR, and/or culture results may not always  correspond due to difference in methodologies.  ************************************************************  This PCR assay was performed using [employer].  The following targets are tested for: Enterococcus,  vancomycin resistant enterococci, Listeria monocytogenes,  coagulase negative staphylococci, S. aureus,  methicillin resistant S. aureus, Streptococcus agalactiae  (Group B), S. pneumoniae, S. pyogenes (Group A),  Acinetobacter baumannii, Enterobacter cloacae, E. coli,  Klebsiella oxytoca, K. pneumoniae, Proteus sp.,  Serratia marcescens, Haemophilus influenzae,  Neisseria meningitidis, Pseudomonas aeruginosa, Candida  albicans, C. glabrata, C krusei, C parapsilosis,  C. tropicalis and the KPC resistance gene.    Organism Identification: Blood Culture PCR  Morganella morganii    Method Type: PCR    Method Type: HELEN          RADIOLOGY REVIEWED:  < from: Xray Chest 1 View- PORTABLE-Routine (10.18.19 @ 08:52) >  XAM:  XR CHEST PORTABLE ROUTINE 1V                            PROCEDURE DATE:  10/18/2019            INTERPRETATION:  Chest one view    HISTORY: Pulmonary edema    COMPARISON STUDY: 10/17/2019    Frontal expiratory view of the chest shows the heart to be similar in   size. Feeding tube remains present. Right jugular line has been removed.   The lungs show normal pulmonary vascularity, likely with skin fold over   the left chest. There is no evidence of pneumothorax nor pleural effusion.    IMPRESSION:  No evidence of pulmonary congestion.     Thank you for th    < end of copied text >          Impression:    Patient with gangrene of the right foot, morganella bacteremia likely from the limb despite not growing it from surgical swab. Follow up cultures of blood negative. Had guillotine bka so source control achieved, for revision this week. Has dry necrosis over buttocks but I do not think that is source of bacteremia.     Recommendations:  continue ertapenem through planned bka revision. Hopefully can then limit it to a short post op course  wound care team evaluation of buttocks area

## 2019-10-21 NOTE — PROGRESS NOTE ADULT - SUBJECTIVE AND OBJECTIVE BOX
Surgery Progress Note    Subjective:     Patient seen and examined at bedside. Patient is without complaints this AM. Vac is in place and holding suction. Patient reports that pain is well controlled. Denies N/V/F/C.     OBJECTIVE:     T(C): 36.7 (10-21-19 @ 05:04), Max: 36.9 (10-20-19 @ 21:23)  HR: 78 (10-21-19 @ 05:04) (75 - 99)  BP: 107/66 (10-21-19 @ 05:04) (105/63 - 122/70)  RR: 18 (10-21-19 @ 05:04) (18 - 18)  SpO2: 97% (10-21-19 @ 05:04) (97% - 100%)  Wt(kg): --    I&O's Detail    20 Oct 2019 07:01  -  21 Oct 2019 07:00  --------------------------------------------------------  IN:    Oral Fluid: 360 mL    Solution: 50 mL  Total IN: 410 mL    OUT:    Incontinent per Condom Catheter: 280 mL    VAC (Vacuum Assisted Closure) System: 50 mL    Voided: 1025 mL  Total OUT: 1355 mL    Total NET: -945 mL          PHYSICAL EXAM:  GENERAL: NAD, lying in bed comfortably  HEAD:  Atraumatic, Normocephalic  EYES: EOMI, PERRLA  ENT: Moist mucous membranes  CHEST/LUNG: Unlabored respirations  EXT: Vac in place on RLE holding suction, wrapped in Kerlix.         MEDICATIONS  (STANDING):  aspirin  chewable 81 milliGRAM(s) Oral daily  atorvastatin 10 milliGRAM(s) Oral at bedtime  chlorhexidine 2% Cloths 1 Application(s) Topical <User Schedule>  clopidogrel Tablet 75 milliGRAM(s) Oral daily  digoxin     Tablet 0.125 milliGRAM(s) Oral daily  enoxaparin Injectable 40 milliGRAM(s) SubCutaneous daily  ertapenem  IVPB 1000 milliGRAM(s) IV Intermittent every 24 hours  ertapenem  IVPB      furosemide   Injectable 20 milliGRAM(s) IV Push every 8 hours  influenza   Vaccine 0.5 milliLiter(s) IntraMuscular once  insulin glargine Injectable (LANTUS) 5 Unit(s) SubCutaneous at bedtime  insulin lispro (HumaLOG) corrective regimen sliding scale   SubCutaneous Before meals and at bedtime  melatonin 3 milliGRAM(s) Oral at bedtime  multivitamin 1 Tablet(s) Oral daily  nystatin    Suspension 583249 Unit(s) Oral two times a day    MEDICATIONS  (PRN):  acetaminophen   Tablet .. 975 milliGRAM(s) Oral every 6 hours PRN Mild Pain (1 - 3), Moderate Pain (4 - 6)      LABS:                          7.2    8.07  )-----------( 253      ( 20 Oct 2019 07:03 )             23.7     10-20    138  |  99  |  22  ----------------------------<  119<H>  3.3<L>   |  30  |  0.80    Ca    8.5      20 Oct 2019 07:03  Phos  2.6     10-20  Mg     1.9     10-20

## 2019-10-21 NOTE — ADVANCED PRACTICE NURSE CONSULT - ASSESSMENT
The pt is on 9 Mazin on a [facility] [facility] P 500 support surface for low air-loss and pressure redistribution features. Pt is combative when being turned.   He is thin, frail- as per primary RN, his appetite is poor. He is being followed by nutrition and a calorie count is in progress, there is discussion of a possible PEG tube. The  pt had an NG tube in place and pulled it out.  the pt is incontinent of urine and stool , staff have applied a condom catheter to divert the urine from the skin. Staff is concerned that skin status has deteriorated.  Upon assessment, there is a butterfly-shaped encompassing the b/l buttocks and gluteal cleft measuring approximately 11.5cm x 14cmx 0cm. 80% of the wound presents with stable leathery non-viable tissue, the remaining tissue is moist and red. There is scant drainage. the periwound skin is intact with no erythema, induration or fluctuance.  Suggest that the alteration in skin integrity began with incontinence - pts vascular status and poor nutrition may be contributing to the deterioration.   At this time, will attempt to maintain a stable eschar and apply Cavilon to lay down a protective coating on the skin.

## 2019-10-22 ENCOUNTER — TRANSCRIPTION ENCOUNTER (OUTPATIENT)
Age: 69
End: 2019-10-22

## 2019-10-22 LAB
ANION GAP SERPL CALC-SCNC: 12 MMOL/L — SIGNIFICANT CHANGE UP (ref 5–17)
BLD GP AB SCN SERPL QL: NEGATIVE — SIGNIFICANT CHANGE UP
BUN SERPL-MCNC: 26 MG/DL — HIGH (ref 7–23)
CALCIUM SERPL-MCNC: 8.5 MG/DL — SIGNIFICANT CHANGE UP (ref 8.4–10.5)
CHLORIDE SERPL-SCNC: 99 MMOL/L — SIGNIFICANT CHANGE UP (ref 96–108)
CO2 SERPL-SCNC: 29 MMOL/L — SIGNIFICANT CHANGE UP (ref 22–31)
CREAT SERPL-MCNC: 0.81 MG/DL — SIGNIFICANT CHANGE UP (ref 0.5–1.3)
GLUCOSE SERPL-MCNC: 113 MG/DL — HIGH (ref 70–99)
HCT VFR BLD CALC: 26.5 % — LOW (ref 39–50)
HCT VFR BLD CALC: 37.4 % — LOW (ref 39–50)
HGB BLD-MCNC: 11.9 G/DL — LOW (ref 13–17)
HGB BLD-MCNC: 8.5 G/DL — LOW (ref 13–17)
MAGNESIUM SERPL-MCNC: 1.8 MG/DL — SIGNIFICANT CHANGE UP (ref 1.6–2.6)
MCHC RBC-ENTMCNC: 27.9 PG — SIGNIFICANT CHANGE UP (ref 27–34)
MCHC RBC-ENTMCNC: 27.9 PG — SIGNIFICANT CHANGE UP (ref 27–34)
MCHC RBC-ENTMCNC: 31.8 GM/DL — LOW (ref 32–36)
MCHC RBC-ENTMCNC: 32.1 GM/DL — SIGNIFICANT CHANGE UP (ref 32–36)
MCV RBC AUTO: 86.9 FL — SIGNIFICANT CHANGE UP (ref 80–100)
MCV RBC AUTO: 87.8 FL — SIGNIFICANT CHANGE UP (ref 80–100)
NRBC # BLD: 0 /100 WBCS — SIGNIFICANT CHANGE UP (ref 0–0)
NRBC # BLD: 0 /100 WBCS — SIGNIFICANT CHANGE UP (ref 0–0)
PHOSPHATE SERPL-MCNC: 2.8 MG/DL — SIGNIFICANT CHANGE UP (ref 2.5–4.5)
PLATELET # BLD AUTO: 260 K/UL — SIGNIFICANT CHANGE UP (ref 150–400)
PLATELET # BLD AUTO: 273 K/UL — SIGNIFICANT CHANGE UP (ref 150–400)
POTASSIUM SERPL-MCNC: 3.6 MMOL/L — SIGNIFICANT CHANGE UP (ref 3.5–5.3)
POTASSIUM SERPL-SCNC: 3.6 MMOL/L — SIGNIFICANT CHANGE UP (ref 3.5–5.3)
RBC # BLD: 3.05 M/UL — LOW (ref 4.2–5.8)
RBC # BLD: 4.26 M/UL — SIGNIFICANT CHANGE UP (ref 4.2–5.8)
RBC # FLD: 15.7 % — HIGH (ref 10.3–14.5)
RBC # FLD: 16 % — HIGH (ref 10.3–14.5)
RH IG SCN BLD-IMP: POSITIVE — SIGNIFICANT CHANGE UP
SODIUM SERPL-SCNC: 140 MMOL/L — SIGNIFICANT CHANGE UP (ref 135–145)
WBC # BLD: 10.62 K/UL — HIGH (ref 3.8–10.5)
WBC # BLD: 9.92 K/UL — SIGNIFICANT CHANGE UP (ref 3.8–10.5)
WBC # FLD AUTO: 10.62 K/UL — HIGH (ref 3.8–10.5)
WBC # FLD AUTO: 9.92 K/UL — SIGNIFICANT CHANGE UP (ref 3.8–10.5)

## 2019-10-22 RX ORDER — POTASSIUM PHOSPHATE, MONOBASIC POTASSIUM PHOSPHATE, DIBASIC 236; 224 MG/ML; MG/ML
15 INJECTION, SOLUTION INTRAVENOUS ONCE
Refills: 0 | Status: COMPLETED | OUTPATIENT
Start: 2019-10-22 | End: 2019-10-22

## 2019-10-22 RX ORDER — MAGNESIUM SULFATE 500 MG/ML
2 VIAL (ML) INJECTION ONCE
Refills: 0 | Status: COMPLETED | OUTPATIENT
Start: 2019-10-22 | End: 2019-10-22

## 2019-10-22 RX ADMIN — Medication 2: at 18:26

## 2019-10-22 RX ADMIN — ENOXAPARIN SODIUM 40 MILLIGRAM(S): 100 INJECTION SUBCUTANEOUS at 13:26

## 2019-10-22 RX ADMIN — Medication 50 GRAM(S): at 10:27

## 2019-10-22 RX ADMIN — Medication 0.12 MILLIGRAM(S): at 06:15

## 2019-10-22 RX ADMIN — SODIUM CHLORIDE 50 MILLILITER(S): 9 INJECTION, SOLUTION INTRAVENOUS at 00:16

## 2019-10-22 RX ADMIN — Medication 20 MILLIGRAM(S): at 10:29

## 2019-10-22 RX ADMIN — Medication 2: at 10:29

## 2019-10-22 RX ADMIN — Medication 20 MILLIGRAM(S): at 17:05

## 2019-10-22 RX ADMIN — Medication 3 MILLIGRAM(S): at 22:04

## 2019-10-22 RX ADMIN — Medication 2: at 22:06

## 2019-10-22 RX ADMIN — INSULIN GLARGINE 5 UNIT(S): 100 INJECTION, SOLUTION SUBCUTANEOUS at 22:04

## 2019-10-22 RX ADMIN — Medication 500000 UNIT(S): at 17:05

## 2019-10-22 RX ADMIN — ATORVASTATIN CALCIUM 10 MILLIGRAM(S): 80 TABLET, FILM COATED ORAL at 22:04

## 2019-10-22 RX ADMIN — Medication 1 TABLET(S): at 13:27

## 2019-10-22 RX ADMIN — Medication 500000 UNIT(S): at 06:15

## 2019-10-22 RX ADMIN — CHLORHEXIDINE GLUCONATE 1 APPLICATION(S): 213 SOLUTION TOPICAL at 10:31

## 2019-10-22 RX ADMIN — CLOPIDOGREL BISULFATE 75 MILLIGRAM(S): 75 TABLET, FILM COATED ORAL at 13:27

## 2019-10-22 RX ADMIN — POTASSIUM PHOSPHATE, MONOBASIC POTASSIUM PHOSPHATE, DIBASIC 62.5 MILLIMOLE(S): 236; 224 INJECTION, SOLUTION INTRAVENOUS at 10:27

## 2019-10-22 RX ADMIN — Medication 81 MILLIGRAM(S): at 13:27

## 2019-10-22 NOTE — PROVIDER CONTACT NOTE (OTHER) - SITUATION
After turning pt, hypotension with SBP<70 noted on monitor.
Decreased urine output for the last 2 hours
Found pt's NGT dislodged
IV RN Darrick at bedside to assess IJ dressing, noted that chest x-ray from 10/18 read that IJ was removed. IJ is in place and intact, VSS, no s/s of distress
MD notified to discontinue central lines
PT at rest in bed, a/ox1, VSS, w/ L 18G EJ from 10/12/19, protocol states IVs should be removed after 4 days. Day RN reports that is has been discussed with Vascular team but no one has come to remove
Patient noted with left neck EJ iv access dated 10/13/19.
Pt's blood sugar is 84, and has a scheduled Lantus of 5 units to be given tonight.
Sacral and rt shoulder wounds
no urine output
Pt has skin tears on the Perineum (Penis).

## 2019-10-22 NOTE — PROVIDER CONTACT NOTE (OTHER) - REASON
NGT out
Patient noted with Left neck EJ.
Pt became significantly hypotensive with turning
Pt with L 18G EJ from 10/12/19
Pt's blood sugar is 84, and has a scheduled Lantus of 5 units to be given tonight.
Right  IJ triple lumen, Left EJ from SICU
central line reading in chest x-ray from 10/18/19
decreased urine output
no urine output
sacrum and right shoulder wounds, No urine output overnight
Pt has skin tears on the Perineum (Penis).

## 2019-10-22 NOTE — CHART NOTE - NSCHARTNOTEFT_GEN_A_CORE
PRE OPERATIVE NOTE      Procedure: Completion Amputation of RLE  Surgeon: Nithya                           8.5    9.92  )-----------( 273      ( 22 Oct 2019 06:30 )             26.5     10-22    140  |  99  |  26<H>  ----------------------------<  113<H>  3.6   |  29  |  0.81    Ca    8.5      22 Oct 2019 06:29  Phos  2.8     10-22  Mg     1.8     10-22            CAPILLARY BLOOD GLUCOSE      POCT Blood Glucose.: 99 mg/dL (22 Oct 2019 13:17)      Type & Screen:  CXR:  EKG:  Echocardiogram:     A/P: 69y Male planned for above procedure    NPO past midnight, except medications  IVF  Pain/nausea control  AM labs  Consent in chart PRE OPERATIVE NOTE      Procedure: Completion Amputation of RLE  Surgeon: Nithya                           8.5    9.92  )-----------( 273      ( 22 Oct 2019 06:30 )             26.5     10-22    140  |  99  |  26<H>  ----------------------------<  113<H>  3.6   |  29  |  0.81    Ca    8.5      22 Oct 2019 06:29  Phos  2.8     10-22  Mg     1.8     10-22            CAPILLARY BLOOD GLUCOSE      POCT Blood Glucose.: 99 mg/dL (22 Oct 2019 13:17)      Type & Screen: 10/21 A+, negative antibody screen   CXR: 10/18 - R IJ in place, no pulmonary edema   EKG: 10/14 - NSR, LBBB  Echocardiogram: 10/14 - left ventricular hypokinesis globally seen, LVEF = 25%    A/P: 69y Male planned for above procedure    NPO past midnight, except medications  IVF  Pain/nausea control  AM labs  Consent in chart  2U pRBC on hold for OR

## 2019-10-22 NOTE — PROVIDER CONTACT NOTE (OTHER) - NAME OF MD/NP/PA/DO NOTIFIED:
Charlotte Hayley, PA
DR العلي
Dr MARCELO
Dr. Mayfield( vascular)
Md. Negrito Le
OSMAR Hernandez
OSMAR Rodrigues
Pereira PA
Pereira PA
Shirley Hernandez, PA
Shirley Hernandez, PA

## 2019-10-22 NOTE — PROGRESS NOTE ADULT - SUBJECTIVE AND OBJECTIVE BOX
Surgery Progress Note    Subjective:     Patient seen and examined at bedside. Patient is without complaints this AM. Vac is in place and holding suction.     OBJECTIVE:   ICU Vital Signs Last 24 Hrs  T(C): 36.8 (22 Oct 2019 05:47), Max: 37.1 (21 Oct 2019 22:14)  T(F): 98.3 (22 Oct 2019 05:47), Max: 98.7 (21 Oct 2019 22:14)  HR: 80 (22 Oct 2019 05:47) (73 - 81)  BP: 113/63 (22 Oct 2019 05:47) (103/60 - 115/71)  BP(mean): --  ABP: --  ABP(mean): --  RR: 18 (22 Oct 2019 05:47) (17 - 18)  SpO2: 98% (22 Oct 2019 05:47) (97% - 100%)    I&O's Detail    21 Oct 2019 07:01  -  22 Oct 2019 07:00  --------------------------------------------------------  IN:    dextrose 5% + sodium chloride 0.45%.: 400 mL    Oral Fluid: 220 mL    Packed Red Blood Cells: 350 mL    Solution: 50 mL  Total IN: 1020 mL    OUT:    Incontinent per Condom Catheter: 300 mL    VAC (Vacuum Assisted Closure) System: 100 mL    Voided: 800 mL  Total OUT: 1200 mL    Total NET: -180 mL                PHYSICAL EXAM:  GENERAL: NAD, lying in bed comfortably  HEAD:  Atraumatic, Normocephalic  EYES: EOMI, PERRLA  ENT: Moist mucous membranes  CHEST/LUNG: Unlabored respirations  EXT: Vac in place on RLE holding suction, wrapped in Kerlix.         MEDICATIONS  (STANDING):  aspirin  chewable 81 milliGRAM(s) Oral daily  atorvastatin 10 milliGRAM(s) Oral at bedtime  chlorhexidine 2% Cloths 1 Application(s) Topical <User Schedule>  clopidogrel Tablet 75 milliGRAM(s) Oral daily  digoxin     Tablet 0.125 milliGRAM(s) Oral daily  enoxaparin Injectable 40 milliGRAM(s) SubCutaneous daily  ertapenem  IVPB 1000 milliGRAM(s) IV Intermittent every 24 hours  ertapenem  IVPB      furosemide   Injectable 20 milliGRAM(s) IV Push every 8 hours  influenza   Vaccine 0.5 milliLiter(s) IntraMuscular once  insulin glargine Injectable (LANTUS) 5 Unit(s) SubCutaneous at bedtime  insulin lispro (HumaLOG) corrective regimen sliding scale   SubCutaneous Before meals and at bedtime  melatonin 3 milliGRAM(s) Oral at bedtime  multivitamin 1 Tablet(s) Oral daily  nystatin    Suspension 081666 Unit(s) Oral two times a day    MEDICATIONS  (PRN):  acetaminophen   Tablet .. 975 milliGRAM(s) Oral every 6 hours PRN Mild Pain (1 - 3), Moderate Pain (4 - 6)      LABS:               10-22    140  |  99  |  26<H>  ----------------------------<  113<H>  3.6   |  29  |  0.81    Ca    8.5      22 Oct 2019 06:29  Phos  2.8     10-22  Mg     1.8     10-22                            8.5    9.92  )-----------( 273      ( 22 Oct 2019 06:30 )             26.5

## 2019-10-22 NOTE — PROVIDER CONTACT NOTE (OTHER) - BACKGROUND
Pt is admitted with altered mental status. Hx of Left AKA, and Right BKA.
Patient admitted with right leg gangrene patient pending a right leg AKA on 10/23/19.
Pt admitted for AMS, tachy, febrile and RLE dry gangrene s/p L AKA
Pt admitted for fevers, tachy, AMS, and dry gangrene of RLE
Pt is admitted for altered mental status. Plan for Right BKA.
Pt s/p right guillotine BKA, known cardiac history with EF 20%. Arterial line in place, hypotension confirmed with NIBP. Pt on phenylephrine gtt @0.8mcg/kg/min prior to event.
RLE gangrene, Rt guillotine,  LLE fem pap bypass. Lt AKA
S/P intubated from SICU
S/p R BKA
s/p Right BKA

## 2019-10-22 NOTE — PROVIDER CONTACT NOTE (OTHER) - RECOMMENDATIONS
To remove condom catheter, and apply cavilon.
  stated he will discuss it with Vascular team. For no urine output overnight, He recommended to give patient  more time to void. pt is resting in bed no signs and symptoms of discomfort noted.
Assess pt fluid status
Assess volume status
MD to address radiology to ascertain appropriate reading and amend x-ray reading, also pt needs active central line access order.
Requested that MD remove 10/13/19 EJ iv acces and have Right IJ triple lumen central remain in place until sx. Also recommended placement of PIVL or midline if appropriate
To give apple juice, and D5 IV fluids.
none
phenylephrine gtt increased to 2mcg/kg/min during, now titrating down as tolerated. ECHO/PA ultrasound

## 2019-10-22 NOTE — CHART NOTE - NSCHARTNOTEFT_GEN_A_CORE
Source:  Unable to speak with Patient related to dementia, confusion.  As per RN, patient is eating very poorly, total assist with meals and spits out food at times.  Calorie count complete x 3 days.  Overall intake is <300 calories daily.  Inadequate oral pro energy intake and unable to meet nutritional Increased nutrient needs on PO diet.  Patient would likely benefit from a PEG placement.    Diet : Consistent CHO diet with snack, pureed    Dxd- extensive LE vaso-occlusive disease, prior LLE AKA, who was brought in from his nursing home facility with sepsis likely secondary to his chronically necrotic RLE. Also with volume overload. 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           PO intake:  <25%, sips of juice, pudding, jello           Current Weight: 142 pounds 10/19 vs. 122 pounds 10/17, 139 pounds 10/15  Difficult to assess true weight changes with above numbers and S/P B/L AKA with  pounds adjusted.     Pertinent Medications: MEDICATIONS  (STANDING):  aspirin  chewable 81 milliGRAM(s) Oral daily  atorvastatin 10 milliGRAM(s) Oral at bedtime  chlorhexidine 2% Cloths 1 Application(s) Topical <User Schedule>  clopidogrel Tablet 75 milliGRAM(s) Oral daily  dextrose 5% + sodium chloride 0.45%. 1000 milliLiter(s) (50 mL/Hr) IV Continuous <Continuous>  digoxin     Tablet 0.125 milliGRAM(s) Oral daily  enoxaparin Injectable 40 milliGRAM(s) SubCutaneous daily  ertapenem  IVPB 1000 milliGRAM(s) IV Intermittent every 24 hours  ertapenem  IVPB      furosemide   Injectable 20 milliGRAM(s) IV Push every 8 hours  influenza   Vaccine 0.5 milliLiter(s) IntraMuscular once  insulin glargine Injectable (LANTUS) 5 Unit(s) SubCutaneous at bedtime  insulin lispro (HumaLOG) corrective regimen sliding scale   SubCutaneous Before meals and at bedtime  melatonin 3 milliGRAM(s) Oral at bedtime  multivitamin 1 Tablet(s) Oral daily  nystatin    Suspension [id number] Unit(s) Oral two times a day    MEDICATIONS  (PRN):  acetaminophen   Tablet .. 975 milliGRAM(s) Oral every 6 hours PRN Mild Pain (1 - 3), Moderate Pain (4 - 6)    Pertinent Labs:  10-22 Na140 mmol/L Glu 113 mg/dL<H> K+ 3.6 mmol/L Cr  0.81 mg/dL BUN 26 mg/dL<H> 10-22 Phos 2.8 mg/dL      Skin: R BKA stump wounds, Stage to right shoulder, necrotic vascular wound to perianal area.    Estimated Needs:   [x ] no change since previous assessment        Previous Nutrition Diagnosis:          [x ] Increased Nutrient Needs with R BKA, awaiting revision for AKA, Not eating.       Nutrition Diagnosis is [ x] ongoing      New Nutrition Diagnosis:  Moderate malnutrition related to increased nutrient need with R pending BKA to AKA, Necrotic wound to perianal area and Right shoulder Stage 2 wound, inadequate po intake <25% x > 1week.        Interventions:     Recommend: Suggest PEG placement.  Continue pureed diet, no need for Consistent CHO diet as patient not eating  Resume multivitamin, and add Vit C, Vit D for healing.  Consider Mejia 1 package x2 if PEG placed otherwise Patient will likely not accept.            Monitoring and Evaluation:     D/C further Calorie Counts  Monitor diet tolerance, po intake, GI tolerance, weight trends, labs, and skin integrity   RDN remains available for follow up

## 2019-10-22 NOTE — PROGRESS NOTE ADULT - ASSESSMENT
69y male with extensive LE vaso-occlusive disease, prior LLE AKA, who was brought in from his nursing home facility with sepsis likely secondary to his chronically necrotic RLE. Also with volume overload. 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    Plan:  - wound care for R shoulder and buttocks wounds - wound care to see, awaiting recs   - continue pureed diet - will discuss other routes of nutrition given recent dietary note - PEG vs other tubes   - continue lasix push  - condom cath instead of south  - completion amp this tomorrow for completion amp - will pre op and consent   - will leave triple lumen in for RTOR and have established PIV at this time. EJ removed yesterday.   - appropriate response s/p 1U pRBC - CBC overnight likely concentrated with this AM likely being a more accurate result       Vascular Surgery   [id number] 69y male with extensive LE vaso-occlusive disease, prior LLE AKA, who was brought in from his nursing home facility with sepsis likely secondary to his chronically necrotic RLE. Also with volume overload. 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    Plan:  - wound care for R shoulder and buttocks wounds - wound care to see, awaiting recs   - continue pureed diet - will discuss other routes of nutrition given recent dietary note - PEG vs other tubes   - continue lasix push  - condom cath instead of south  - completion amp in OR tomorrow - will pre op and consent   - will leave triple lumen in for RTOR and have established PIV at this time. EJ removed yesterday.   - appropriate response s/p 1U pRBC - CBC overnight likely concentrated with this AM likely being a more accurate result       Vascular Surgery   [id number]

## 2019-10-22 NOTE — PROVIDER CONTACT NOTE (OTHER) - ASSESSMENT
Pt has skin tears on the penis. Tears measured see flow sheet.
Pt able to tolerate diet.
IV RN Darrick at bedside to assess IJ dressing, noted that chest x-ray from 10/18 read that IJ was removed. IJ is in place and intact, VSS, no s/s of distress
No signs and symptoms of discomfort noted.
PT at rest in bed, a/ox1, VSS, with L 18G EJ from 10/12/19, protocol stated IV's should be removed after 4 days.
Patient noted with right neck IJ triple lumen central line. as well as left EJ iv access dated 10/13/19. Vs remained stable dressing intact on EJ access and patent.
Pt hypotensive with SBP<70, confirmed with NIBP. Pt persistently bradycardic with LBBB
Pt is A&Ox1, no HA, V/S stable see flow sheet. Pt does not want to eat or drink.
Urine output 10cc and 5cc for the last 2 hours, respectively.   250cc 5% albumin administered between 2200 and 2300 as per orders.  Levo gtt in progress for MAP greater than 65. VSS.  Fletcher flushed and patent.
no complications noted
no urine output for the 0200 and 0300. Pt on dobutamine drip and Levo drip titrating to MAP of 65. VSS.

## 2019-10-22 NOTE — PROVIDER CONTACT NOTE (OTHER) - DATE AND TIME:
13-Oct-2019 18:15
13-Oct-2019 23:46
14-Oct-2019 03:15
18-Oct-2019 18:25
19-Oct-2019 05:00
20-Oct-2019 08:30
20-Oct-2019 22:20
21-Oct-2019 18:00
21-Oct-2019 22:30
22-Oct-2019 06:30
21-Oct-2019 00:49

## 2019-10-22 NOTE — CHART NOTE - NSCHARTNOTEFT_GEN_A_CORE
Upon Nutritional Assessment by the Registered Dietitian your patient was determined to meet criteria / has evidence of the following diagnosis/diagnoses:          [ ]  Mild Protein Calorie Malnutrition        [x ]  Moderate Protein Calorie Malnutrition        [ ] Severe Protein Calorie Malnutrition        [ ] Unspecified Protein Calorie Malnutrition        [ ] Underweight / BMI <19        Recommendations:   Pureed diet as preet,  No Consistent CHO diet   Suggest PEG.  Supplement with multivitamin, Vit C, Vit D, Mejia 1 package x2 if PEG placed.  If PEG placed suggest Glucerna 1.2 60cc/hr x 24 hrs provides 1728 kcals, 86 gm protein, 1159cc free water,  as goal rate,  based on previously estimated nutrient need for wound healing.     Please obtain actual weight      PROVIDER Section:     By signing this assessment you are acknowledging and agree with the diagnosis/diagnoses assigned by the Registered Dietitian    Comments:

## 2019-10-22 NOTE — PROGRESS NOTE ADULT - SUBJECTIVE AND OBJECTIVE BOX
CC: f/u for  morganella bacteremia  Patient reports nothing not speaking    REVIEW OF SYSTEMS:  All other review of systems cannot gete (Comprehensive ROS)    Antimicrobials Day #  :8  ertapenem  IVPB 1000 milliGRAM(s) IV Intermittent every 24 hours  ertapenem  IVPB      nystatin    Suspension 502005 Unit(s) Oral two times a day    Other Medications Reviewed    T(F): 98.3 (10-22-19 @ 05:47), Max: 98.7 (10-21-19 @ 22:14)  HR: 80 (10-22-19 @ 05:47)  BP: 113/63 (10-22-19 @ 05:47)  RR: 18 (10-22-19 @ 05:47)  SpO2: 98% (10-22-19 @ 05:47)  Wt(kg): --    PHYSICAL EXAM:  General: sleeps but  no acute distress  Eyes:  anicteric, no conjunctival injection, no discharge  Oropharynx: no lesions or injection 	  Neck: supple, without adenopathy  Lungs: clear to auscultation  Heart: regular rate and rhythm; no murmur, rubs or gallops  Abdomen: soft, nondistended, nontender, without mass or organomegaly  Skin: no lesions  Extremities: no clubbing, cyanosis, or edema  Neurologic: alert, oriented, moves all extremities    LAB RESULTS:                        8.5    9.92  )-----------( 273      ( 22 Oct 2019 06:30 )             26.5     10-22    140  |  99  |  26<H>  ----------------------------<  113<H>  3.6   |  29  |  0.81    Ca    8.5      22 Oct 2019 06:29  Phos  2.8     10-22  Mg     1.8     10-22          MICROBIOLOGY:  RECENT CULTURES:      RADIOLOGY REVIEWED:              Assessment:    Plan: CC: f/u for  morganella bacteremia  Patient reports nothing not speaking    REVIEW OF SYSTEMS:  All other review of systems cannot gete (Comprehensive ROS)    Antimicrobials Day #  :8  ertapenem  IVPB 1000 milliGRAM(s) IV Intermittent every 24 hours  ertapenem  IVPB      nystatin    Suspension [id number] Unit(s) Oral two times a day    Other Medications Reviewed    T(F): 98.3 (10-22-19 @ 05:47), Max: 98.7 (10-21-19 @ 22:14)  HR: 80 (10-22-19 @ 05:47)  BP: 113/63 (10-22-19 @ 05:47)  RR: 18 (10-22-19 @ 05:47)  SpO2: 98% (10-22-19 @ 05:47)  Wt(kg): --    PHYSICAL EXAM:  General: sleeps but  no acute distress  Eyes:  anicteric, no conjunctival injection, no discharge  Oropharynx: no lesions or injection 	  Neck: supple, without adenopathy  Lungs: clear to auscultation  Heart: regular rate and rhythm; no murmur, rubs or gallops  Abdomen: soft, nondistended, nontender, without mass or organomegaly  Skin: no lesions  Extremities: no clubbing, cyanosis, or edema. stumps are clean  Neurologic: confusedmoves all extremities    LAB RESULTS:                        8.5    9.92  )-----------( 273      ( 22 Oct 2019 06:30 )             26.5     10-22    140  |  99  |  26<H>  ----------------------------<  113<H>  3.6   |  29  |  0.81    Ca    8.5      22 Oct 2019 06:29  Phos  2.8     10-22  Mg     1.8     10-22          Assessment:  Patient with pvd, s/p left aka last month, returns with wet gangrene right foot, s/p guillotine bka with morganella bacteremia to have revision this week.   Plan:  continue ertapenem through planned surgery

## 2019-10-22 NOTE — PROVIDER CONTACT NOTE (OTHER) - ACTION/TREATMENT ORDERED:
OSMAR Levy made aware and agrees to remove condom catheter, and apply cavilon. Will continue to monitor patient.
continue aspiration precaution. Calorie count and   Speech and swallowing evaluation
OSMAR Hernandez made aware that IV needs to be removed by MD. no report was given to PA regarding EJ removal so OSMAR Hernandez will discuss with Day team in the morning, will continue to monitor.
Attempt placement of ivl unable IV nurse called to place iv access. Awaiting follow up from MD.
Continue to monitor and notify provider of next hour output.  Stat venous gas ordered.
MD aware and will contact radiology to amend xray reading to reflect appropriate placement of IJ, also central line access order will be placed, will continue to monitor.
OSMAR Levy made aware. PA at bedside to assess patient, ordered to hold Lantus for tonight, and to administer 1L of D5+0.45 NS. Encouraged pt to eat and drink. Will continue to monitor pt.
PA performed bedside ultrasound. Recommends continued monitoring and to make team aware if UO is less than 20 in the next hour.
Pt was turned and positioned every 2 hours as per protocol, Barrier paste applied, wound care consult ordered.
Will come to bedside echo, formal echo ordered.
none

## 2019-10-23 ENCOUNTER — APPOINTMENT (OUTPATIENT)
Dept: VASCULAR SURGERY | Facility: HOSPITAL | Age: 69
End: 2019-10-23

## 2019-10-23 ENCOUNTER — RESULT REVIEW (OUTPATIENT)
Age: 69
End: 2019-10-23

## 2019-10-23 LAB
ALBUMIN SERPL ELPH-MCNC: 2.3 G/DL — LOW (ref 3.3–5)
ALBUMIN SERPL ELPH-MCNC: 2.5 G/DL — LOW (ref 3.3–5)
ALP SERPL-CCNC: 108 U/L — SIGNIFICANT CHANGE UP (ref 40–120)
ALT FLD-CCNC: 22 U/L — SIGNIFICANT CHANGE UP (ref 10–45)
ANION GAP SERPL CALC-SCNC: 9 MMOL/L — SIGNIFICANT CHANGE UP (ref 5–17)
APTT BLD: 29.7 SEC — SIGNIFICANT CHANGE UP (ref 27.5–36.3)
AST SERPL-CCNC: 17 U/L — SIGNIFICANT CHANGE UP (ref 10–40)
BILIRUB DIRECT SERPL-MCNC: 0.2 MG/DL — SIGNIFICANT CHANGE UP (ref 0–0.2)
BILIRUB INDIRECT FLD-MCNC: 0.3 MG/DL — SIGNIFICANT CHANGE UP (ref 0.2–1)
BILIRUB SERPL-MCNC: 0.5 MG/DL — SIGNIFICANT CHANGE UP (ref 0.2–1.2)
BUN SERPL-MCNC: 21 MG/DL — SIGNIFICANT CHANGE UP (ref 7–23)
CA-I BLD-SCNC: 1.14 MMOL/L — SIGNIFICANT CHANGE UP (ref 1.12–1.3)
CALCIUM SERPL-MCNC: 8.1 MG/DL — LOW (ref 8.4–10.5)
CHLORIDE SERPL-SCNC: 100 MMOL/L — SIGNIFICANT CHANGE UP (ref 96–108)
CHOLEST SERPL-MCNC: 88 MG/DL — SIGNIFICANT CHANGE UP (ref 10–199)
CO2 SERPL-SCNC: 30 MMOL/L — SIGNIFICANT CHANGE UP (ref 22–31)
CREAT SERPL-MCNC: 0.74 MG/DL — SIGNIFICANT CHANGE UP (ref 0.5–1.3)
GLUCOSE SERPL-MCNC: 177 MG/DL — HIGH (ref 70–99)
HCT VFR BLD CALC: 26 % — LOW (ref 39–50)
HDLC SERPL-MCNC: 36 MG/DL — LOW
HGB BLD-MCNC: 8 G/DL — LOW (ref 13–17)
INR BLD: 1.37 RATIO — HIGH (ref 0.88–1.16)
LIPID PNL WITH DIRECT LDL SERPL: 40 MG/DL — SIGNIFICANT CHANGE UP
MAGNESIUM SERPL-MCNC: 2 MG/DL — SIGNIFICANT CHANGE UP (ref 1.6–2.6)
MCHC RBC-ENTMCNC: 27 PG — SIGNIFICANT CHANGE UP (ref 27–34)
MCHC RBC-ENTMCNC: 30.8 GM/DL — LOW (ref 32–36)
MCV RBC AUTO: 87.8 FL — SIGNIFICANT CHANGE UP (ref 80–100)
NRBC # BLD: 0 /100 WBCS — SIGNIFICANT CHANGE UP (ref 0–0)
PHOSPHATE SERPL-MCNC: 2.2 MG/DL — LOW (ref 2.5–4.5)
PLATELET # BLD AUTO: 279 K/UL — SIGNIFICANT CHANGE UP (ref 150–400)
POTASSIUM SERPL-MCNC: 3.4 MMOL/L — LOW (ref 3.5–5.3)
POTASSIUM SERPL-SCNC: 3.4 MMOL/L — LOW (ref 3.5–5.3)
PREALB SERPL-MCNC: 8 MG/DL — LOW (ref 20–40)
PREALB SERPL-MCNC: 8 MG/DL — LOW (ref 20–40)
PROT SERPL-MCNC: 6.8 G/DL — SIGNIFICANT CHANGE UP (ref 6–8.3)
PROTHROM AB SERPL-ACNC: 15.9 SEC — HIGH (ref 10–12.9)
RBC # BLD: 2.96 M/UL — LOW (ref 4.2–5.8)
RBC # FLD: 15.8 % — HIGH (ref 10.3–14.5)
SODIUM SERPL-SCNC: 139 MMOL/L — SIGNIFICANT CHANGE UP (ref 135–145)
TOTAL CHOLESTEROL/HDL RATIO MEASUREMENT: 2.4 RATIO — LOW (ref 3.4–9.6)
TRANSFERRIN SERPL-MCNC: 129 MG/DL — LOW (ref 200–360)
TRIGL SERPL-MCNC: 58 MG/DL — SIGNIFICANT CHANGE UP (ref 10–149)
VIT B12 SERPL-MCNC: 1468 PG/ML — HIGH (ref 232–1245)
WBC # BLD: 10.09 K/UL — SIGNIFICANT CHANGE UP (ref 3.8–10.5)
WBC # FLD AUTO: 10.09 K/UL — SIGNIFICANT CHANGE UP (ref 3.8–10.5)

## 2019-10-23 PROCEDURE — 88307 TISSUE EXAM BY PATHOLOGIST: CPT | Mod: 26

## 2019-10-23 PROCEDURE — 27590 AMPUTATE LEG AT THIGH: CPT | Mod: 58,79

## 2019-10-23 PROCEDURE — 99232 SBSQ HOSP IP/OBS MODERATE 35: CPT

## 2019-10-23 RX ORDER — NYSTATIN 500MM UNIT
500000 POWDER (EA) MISCELLANEOUS
Refills: 0 | Status: DISCONTINUED | OUTPATIENT
Start: 2019-10-23 | End: 2019-11-01

## 2019-10-23 RX ORDER — ATORVASTATIN CALCIUM 80 MG/1
10 TABLET, FILM COATED ORAL AT BEDTIME
Refills: 0 | Status: DISCONTINUED | OUTPATIENT
Start: 2019-10-23 | End: 2019-11-01

## 2019-10-23 RX ORDER — DEXTROSE MONOHYDRATE, SODIUM CHLORIDE, AND POTASSIUM CHLORIDE 50; .745; 4.5 G/1000ML; G/1000ML; G/1000ML
1000 INJECTION, SOLUTION INTRAVENOUS
Refills: 0 | Status: DISCONTINUED | OUTPATIENT
Start: 2019-10-23 | End: 2019-10-23

## 2019-10-23 RX ORDER — INSULIN LISPRO 100/ML
VIAL (ML) SUBCUTANEOUS
Refills: 0 | Status: DISCONTINUED | OUTPATIENT
Start: 2019-10-23 | End: 2019-10-31

## 2019-10-23 RX ORDER — POTASSIUM CHLORIDE 20 MEQ
10 PACKET (EA) ORAL ONCE
Refills: 0 | Status: DISCONTINUED | OUTPATIENT
Start: 2019-10-23 | End: 2019-10-23

## 2019-10-23 RX ORDER — ERTAPENEM SODIUM 1 G/1
1000 INJECTION, POWDER, LYOPHILIZED, FOR SOLUTION INTRAMUSCULAR; INTRAVENOUS EVERY 24 HOURS
Refills: 0 | Status: DISCONTINUED | OUTPATIENT
Start: 2019-10-23 | End: 2019-10-27

## 2019-10-23 RX ORDER — SODIUM CHLORIDE 9 MG/ML
1000 INJECTION, SOLUTION INTRAVENOUS
Refills: 0 | Status: DISCONTINUED | OUTPATIENT
Start: 2019-10-23 | End: 2019-10-25

## 2019-10-23 RX ORDER — ENOXAPARIN SODIUM 100 MG/ML
40 INJECTION SUBCUTANEOUS DAILY
Refills: 0 | Status: DISCONTINUED | OUTPATIENT
Start: 2019-10-23 | End: 2019-10-23

## 2019-10-23 RX ORDER — ERTAPENEM SODIUM 1 G/1
1000 INJECTION, POWDER, LYOPHILIZED, FOR SOLUTION INTRAMUSCULAR; INTRAVENOUS EVERY 24 HOURS
Refills: 0 | Status: DISCONTINUED | OUTPATIENT
Start: 2019-10-23 | End: 2019-10-23

## 2019-10-23 RX ORDER — ACETAMINOPHEN 500 MG
1000 TABLET ORAL ONCE
Refills: 0 | Status: COMPLETED | OUTPATIENT
Start: 2019-10-23 | End: 2019-10-23

## 2019-10-23 RX ORDER — OXYCODONE HYDROCHLORIDE 5 MG/1
5 TABLET ORAL EVERY 6 HOURS
Refills: 0 | Status: DISCONTINUED | OUTPATIENT
Start: 2019-10-23 | End: 2019-10-23

## 2019-10-23 RX ORDER — CLOPIDOGREL BISULFATE 75 MG/1
75 TABLET, FILM COATED ORAL DAILY
Refills: 0 | Status: DISCONTINUED | OUTPATIENT
Start: 2019-10-23 | End: 2019-10-25

## 2019-10-23 RX ORDER — ACETAMINOPHEN 500 MG
975 TABLET ORAL EVERY 6 HOURS
Refills: 0 | Status: DISCONTINUED | OUTPATIENT
Start: 2019-10-23 | End: 2019-10-25

## 2019-10-23 RX ORDER — POTASSIUM PHOSPHATE, MONOBASIC POTASSIUM PHOSPHATE, DIBASIC 236; 224 MG/ML; MG/ML
15 INJECTION, SOLUTION INTRAVENOUS ONCE
Refills: 0 | Status: COMPLETED | OUTPATIENT
Start: 2019-10-23 | End: 2019-10-23

## 2019-10-23 RX ORDER — LANOLIN ALCOHOL/MO/W.PET/CERES
3 CREAM (GRAM) TOPICAL AT BEDTIME
Refills: 0 | Status: DISCONTINUED | OUTPATIENT
Start: 2019-10-23 | End: 2019-11-01

## 2019-10-23 RX ORDER — HYDROMORPHONE HYDROCHLORIDE 2 MG/ML
0.25 INJECTION INTRAMUSCULAR; INTRAVENOUS; SUBCUTANEOUS
Refills: 0 | Status: DISCONTINUED | OUTPATIENT
Start: 2019-10-23 | End: 2019-10-23

## 2019-10-23 RX ORDER — ASPIRIN/CALCIUM CARB/MAGNESIUM 324 MG
81 TABLET ORAL DAILY
Refills: 0 | Status: DISCONTINUED | OUTPATIENT
Start: 2019-10-23 | End: 2019-11-01

## 2019-10-23 RX ORDER — HEPARIN SODIUM 5000 [USP'U]/ML
5000 INJECTION INTRAVENOUS; SUBCUTANEOUS EVERY 8 HOURS
Refills: 0 | Status: DISCONTINUED | OUTPATIENT
Start: 2019-10-23 | End: 2019-10-25

## 2019-10-23 RX ORDER — ONDANSETRON 8 MG/1
4 TABLET, FILM COATED ORAL ONCE
Refills: 0 | Status: DISCONTINUED | OUTPATIENT
Start: 2019-10-23 | End: 2019-10-23

## 2019-10-23 RX ORDER — POTASSIUM CHLORIDE 20 MEQ
10 PACKET (EA) ORAL ONCE
Refills: 0 | Status: COMPLETED | OUTPATIENT
Start: 2019-10-23 | End: 2019-10-23

## 2019-10-23 RX ORDER — INSULIN GLARGINE 100 [IU]/ML
5 INJECTION, SOLUTION SUBCUTANEOUS AT BEDTIME
Refills: 0 | Status: DISCONTINUED | OUTPATIENT
Start: 2019-10-23 | End: 2019-10-30

## 2019-10-23 RX ORDER — FUROSEMIDE 40 MG
20 TABLET ORAL EVERY 8 HOURS
Refills: 0 | Status: DISCONTINUED | OUTPATIENT
Start: 2019-10-23 | End: 2019-11-01

## 2019-10-23 RX ORDER — DIGOXIN 250 MCG
0.12 TABLET ORAL DAILY
Refills: 0 | Status: DISCONTINUED | OUTPATIENT
Start: 2019-10-23 | End: 2019-11-01

## 2019-10-23 RX ORDER — POTASSIUM CHLORIDE 20 MEQ
40 PACKET (EA) ORAL ONCE
Refills: 0 | Status: DISCONTINUED | OUTPATIENT
Start: 2019-10-23 | End: 2019-10-23

## 2019-10-23 RX ADMIN — POTASSIUM PHOSPHATE, MONOBASIC POTASSIUM PHOSPHATE, DIBASIC 62.5 MILLIMOLE(S): 236; 224 INJECTION, SOLUTION INTRAVENOUS at 09:40

## 2019-10-23 RX ADMIN — INSULIN GLARGINE 5 UNIT(S): 100 INJECTION, SOLUTION SUBCUTANEOUS at 21:39

## 2019-10-23 RX ADMIN — CLOPIDOGREL BISULFATE 75 MILLIGRAM(S): 75 TABLET, FILM COATED ORAL at 15:36

## 2019-10-23 RX ADMIN — Medication 4: at 18:03

## 2019-10-23 RX ADMIN — Medication 400 MILLIGRAM(S): at 14:25

## 2019-10-23 RX ADMIN — ERTAPENEM SODIUM 120 MILLIGRAM(S): 1 INJECTION, POWDER, LYOPHILIZED, FOR SOLUTION INTRAMUSCULAR; INTRAVENOUS at 23:42

## 2019-10-23 RX ADMIN — Medication 3 MILLIGRAM(S): at 21:39

## 2019-10-23 RX ADMIN — SODIUM CHLORIDE 75 MILLILITER(S): 9 INJECTION, SOLUTION INTRAVENOUS at 14:23

## 2019-10-23 RX ADMIN — Medication 500000 UNIT(S): at 18:03

## 2019-10-23 RX ADMIN — Medication 0.12 MILLIGRAM(S): at 05:38

## 2019-10-23 RX ADMIN — ATORVASTATIN CALCIUM 10 MILLIGRAM(S): 80 TABLET, FILM COATED ORAL at 21:39

## 2019-10-23 RX ADMIN — Medication 500000 UNIT(S): at 05:38

## 2019-10-23 RX ADMIN — Medication 2: at 07:56

## 2019-10-23 RX ADMIN — Medication 2: at 21:40

## 2019-10-23 RX ADMIN — Medication 100 MILLIEQUIVALENT(S): at 15:56

## 2019-10-23 RX ADMIN — HEPARIN SODIUM 5000 UNIT(S): 5000 INJECTION INTRAVENOUS; SUBCUTANEOUS at 21:43

## 2019-10-23 RX ADMIN — Medication 81 MILLIGRAM(S): at 15:36

## 2019-10-23 RX ADMIN — ERTAPENEM SODIUM 120 MILLIGRAM(S): 1 INJECTION, POWDER, LYOPHILIZED, FOR SOLUTION INTRAMUSCULAR; INTRAVENOUS at 00:00

## 2019-10-23 RX ADMIN — Medication 20 MILLIGRAM(S): at 21:40

## 2019-10-23 RX ADMIN — Medication 1 TABLET(S): at 15:36

## 2019-10-23 NOTE — BRIEF OPERATIVE NOTE - NSICDXBRIEFPROCEDURE_GEN_ALL_CORE_FT
PROCEDURES:  Right above knee amputation 23-Oct-2019 13:57:16  Gabe Le
PROCEDURES:  Donnaine amputation below knee 13-Oct-2019 12:12:22  Lang Borrego

## 2019-10-23 NOTE — BRIEF OPERATIVE NOTE - OPERATION/FINDINGS
Right above the knee amputation completion. Fascia and skin closed in a layered fashion with staples for skin. Covered in gauze, tegaderm, Kerlix, ACE wrap, and stretch stocking dressing.

## 2019-10-23 NOTE — CONSULT NOTE ADULT - ATTENDING COMMENTS
70 yo diabetic male , s/p left AKA , and recent right guillotine BKA, for revision today  Reported altered mental status  he is currently non verbal  Has wounds of buttocks and right shoulder  Patient currently at bedrest, able to be awakened, but uncooperative   Is not obese  Bilateral adherent  firm buttock eschars in a butterfly distribution, without purulence  Foam dressings applied  Stage 2 right shoulder decubitus-small- continue foam dressing  It does not appear that patient has the ability to comply with offloading instructions, given reported and obvious altered mental status   Consider air fluidized bed 70 yo diabetic male , s/p left AKA , and recent right guillotine BKA, for revision today  Reported altered mental status  he is currently non verbal  Has wounds of buttocks and right shoulder  Patient currently at bedrest, able to be awakened, but uncooperative   Is not obese- reported moderate protien - calorie malnutrition  Bilateral adherent  firm buttock eschars in a butterfly distribution, without purulence  Foam dressings applied  Stage 2 right shoulder decubitus-small- continue foam dressing  It does not appear that patient has the ability to comply with offloading instructions, given reported and obvious altered mental status   Consider air fluidized bed  Nutritional f/u 70 yo diabetic male , s/p left AKA , and recent right christel BKA, for revision today  Reported altered mental status  he is currently non verbal  Has wounds of buttocks and right shoulder  Patient currently at bedrest, able to be awakened, but uncooperative   Is not obese- reported moderate protein - calorie malnutrition  Bilateral adherent  firm buttock eschars in a butterfly distribution, without purulence  Foam dressings applied  Stage 2 right shoulder decubitus-small- continue foam dressing  It does not appear that patient has the ability to comply with offloading instructions, given reported and obvious altered mental status   Consider air fluidized bed  Nutritional f/u

## 2019-10-23 NOTE — CHART NOTE - NSCHARTNOTEFT_GEN_A_CORE
Vascular Surgery Post Operative Check Note    Procedure: CELESTE GALLAGHER   Surgeon: Dr. Barriga     Subjective:    Mr. Olivares is now 4 hours s/p Right above the knee amputation. He recovered in PACU before being transferred back to the surgical floor when PACU criteria were met. There were not acute events in PACU. Upon transfer to the surgical floor patient remained comfortable and repeatedly informed medical staff that his pain was controlled. He reported that his pain was between a 1-3/10 in severity when asked by using hand gestured and nodding in agreement. He reports that he is comfortable and is not experiencing nausea. There was no south used in the OR.     Objective:    ICU Vital Signs Last 24 Hrs  T(C): 36.7 (23 Oct 2019 16:35), Max: 37.1 (23 Oct 2019 00:11)  T(F): 98 (23 Oct 2019 16:35), Max: 98.7 (23 Oct 2019 00:11)  HR: 74 (23 Oct 2019 16:35) (54 - 88)  BP: 118/66 (23 Oct 2019 16:35) (101/56 - 131/79)  BP(mean): 75 (23 Oct 2019 14:45) (75 - 80)  ABP: --  ABP(mean): --  RR: 18 (23 Oct 2019 16:35) (15 - 18)  SpO2: 98% (23 Oct 2019 16:35) (96% - 100%)    I&O's Detail    22 Oct 2019 07:01  -  23 Oct 2019 07:00  --------------------------------------------------------  IN:    dextrose 5% + sodium chloride 0.45%.: 1200 mL    IV PiggyBack: 350 mL    Oral Fluid: 360 mL  Total IN: 1910 mL    OUT:  Total OUT: 0 mL    Total NET: 1910 mL      23 Oct 2019 07:01  -  23 Oct 2019 17:51  --------------------------------------------------------  IN:    dextrose 5% + sodium chloride 0.45% with potassium chloride 20 mEq/L: 175 mL    IV PiggyBack: 350 mL    lactated ringers.: 150 mL    Oral Fluid: 120 mL  Total IN: 795 mL    OUT:  Total OUT: 0 mL    Total NET: 795 mL    Physical Exam:  GEN: NAD, laying comfortably in bed on surgical floor  HEENT: IJ Catheter in place at beginning of exam and absent after removal during exam,  RESP: Unlabored respirations  EXT: Right AKA site dressing intact, dry, and clean, No evidence of bleeding at this time.   Neuro: responding appropriately to questioning with non-verbal responses     10-23    139  |  100  |  21  ----------------------------<  177<H>  3.4<L>   |  30  |  0.74    Ca    8.1<L>      23 Oct 2019 06:42  Phos  2.2     10-23  Mg     2.0     10-23    TPro  x   /  Alb  2.3<L>  /  TBili  x   /  DBili  x   /  AST  x   /  ALT  x   /  AlkPhos  x   10-23                          8.0    10.09 )-----------( 279      ( 23 Oct 2019 06:43 )             26.0       Assessment and Plan:    Patient is recovering well on the surgical floor from Right AKA in OR today.    - Continue IVF until PO intake adequate in post surgical state - pre operative diet order resumed  - IV K+ and Ca++ repletion   - DVT PPx  - Pain control   - Will be checked on AM rounds tomorrow       Vascular Surgery  [id number]

## 2019-10-23 NOTE — PROGRESS NOTE ADULT - SUBJECTIVE AND OBJECTIVE BOX
Surgery Progress Note    Subjective:     Patient seen and examined at bedside.     OBJECTIVE:     T(C): 36.7 (10-23-19 @ 06:00), Max: 37.1 (10-23-19 @ 00:11)  HR: 78 (10-23-19 @ 06:00) (54 - 78)  BP: 109/56 (10-23-19 @ 06:00) (95/58 - 112/66)  RR: 18 (10-23-19 @ 06:00) (18 - 18)  SpO2: 99% (10-23-19 @ 06:00) (92% - 100%)  Wt(kg): --    I&O's Detail    22 Oct 2019 07:01  -  23 Oct 2019 07:00  --------------------------------------------------------  IN:    dextrose 5% + sodium chloride 0.45%.: 1200 mL    IV PiggyBack: 350 mL    Oral Fluid: 360 mL  Total IN: 1910 mL    OUT:  Total OUT: 0 mL    Total NET: 1910 mL          PHYSICAL EXAM:  GENERAL: NAD, lying in bed comfortably  HEAD:  Atraumatic, Normocephalic  EYES: EOMI, PERRLA, conjunctiva and sclera clear  ENT: Moist mucous membranes  NECK: Supple, No JVD  CHEST/LUNG: Unlabored respirations  ABDOMEN: Soft, Nontender, Nondistended. No hepatomegally  NERVOUS SYSTEM:  Alert & Oriented X3, speech clear.   MSK: FROM all 4 extremities  SKIN: No rashes or lesions    MEDICATIONS  (STANDING):  aspirin  chewable 81 milliGRAM(s) Oral daily  atorvastatin 10 milliGRAM(s) Oral at bedtime  chlorhexidine 2% Cloths 1 Application(s) Topical <User Schedule>  clopidogrel Tablet 75 milliGRAM(s) Oral daily  dextrose 5% + sodium chloride 0.45%. 1000 milliLiter(s) (50 mL/Hr) IV Continuous <Continuous>  digoxin     Tablet 0.125 milliGRAM(s) Oral daily  enoxaparin Injectable 40 milliGRAM(s) SubCutaneous daily  ertapenem  IVPB 1000 milliGRAM(s) IV Intermittent every 24 hours  ertapenem  IVPB      furosemide   Injectable 20 milliGRAM(s) IV Push every 8 hours  influenza   Vaccine 0.5 milliLiter(s) IntraMuscular once  insulin glargine Injectable (LANTUS) 5 Unit(s) SubCutaneous at bedtime  insulin lispro (HumaLOG) corrective regimen sliding scale   SubCutaneous Before meals and at bedtime  melatonin 3 milliGRAM(s) Oral at bedtime  multivitamin 1 Tablet(s) Oral daily  nystatin    Suspension 161451 Unit(s) Oral two times a day  potassium chloride    Tablet ER 40 milliEquivalent(s) Oral once  potassium phosphate IVPB 15 milliMole(s) IV Intermittent once    MEDICATIONS  (PRN):  acetaminophen   Tablet .. 975 milliGRAM(s) Oral every 6 hours PRN Mild Pain (1 - 3), Moderate Pain (4 - 6)      LABS:                          8.0    10.09 )-----------( 279      ( 23 Oct 2019 06:43 )             26.0     10-23    139  |  100  |  21  ----------------------------<  177<H>  3.4<L>   |  30  |  0.74    Ca    8.1<L>      23 Oct 2019 06:42  Phos  2.2     10-23  Mg     2.0     10-23      PT/INR - ( 23 Oct 2019 06:47 )   PT: 15.9 sec;   INR: 1.37 ratio         PTT - ( 23 Oct 2019 06:47 )  PTT:29.7 sec

## 2019-10-23 NOTE — PROGRESS NOTE ADULT - ASSESSMENT
69y male with extensive LE vaso-occlusive disease, prior LLE AKA, who was brought in from his nursing home facility with sepsis likely secondary to his chronically necrotic RLE. Also with volume overload. 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    Plan:  - wound care for R shoulder and buttocks wounds - wound care to see, rec cavalon to wounds.   - continue pureed diet - will discuss other routes of nutrition given recent dietary note - PEG vs other tubes - will consider Dobbhoff today start increasing nutrition intake with consult for more permanent solution  - nutrition labs   - continue lasix push  - condom cath instead of south  - completion amp in OR today - pre op done and consent in chart.   - will leave triple lumen in for RTOR and have established PIV at this time. Will remove IJ after surgery pending patient stability.         Vascular Surgery   [phone]

## 2019-10-23 NOTE — CONSULT NOTE ADULT - SUBJECTIVE AND OBJECTIVE BOX
Wound SURGERY CONSULT NOTE    HPI:  Interval information was obtained from the patient's nephew who was present.    Mr. Erik Olivares is a 69 year old gentleman with pmhx of T2DM, HTN, CVA, NSTEMI complicated by cardiogenic shock, significant PVD s/p LLE fem-pop bypass (vein graft) with subsequent LLE BKA (07/2019, Dr. MARILEE Barriga), LLE AKA (09/2019, Dr. MARILEE Roper), known chronically ischemic RLE who was brought in from his nursing facility with progressively altered mentation, fevers and tachycardia. In the ED, the patient was agitated, required supplemental O2, and was tachycardic. Pt is violent and swings, refusing repositioning and unccoperative with exam.      PAST MEDICAL & SURGICAL HISTORY:  Hyperlipidemia  Hypertension  Diabetes  H/O mastoidectomy      REVIEW OF SYSTEMS    General: Thin frail, ill fatigue, 	    Skin/Breast: Currently reviwew of chart reveals wound to sacrum and right shoulder  	  Ophthalmologic: No Hx  	  ENMT: no Hx    Respiratory and Thorax:  nl  	  Cardiovascular:	HTN, Non stemis    Gastrointestinal:	nl    Genitourinary: incontinent    Musculoskeletal:	 Left AKA, right BKA wet gangrene    Neurological:	AMS, hx stroke    Psychiatric:	AMS,    Hematology/Lymphatics:	    Endocrine: DM    Allergic/Immunologic: Unknown    MEDICATIONS  (STANDING):  influenza   Vaccine 0.5 milliLiter(s) IntraMuscular once    MEDICATIONS  (PRN):    NKDA    SOCIAL HISTORY:  Non smoker, no hx alcohol, lives with family    FAMILY HISTORY:  FHx: diabetes mellitus: In all siblings(2 sisters and 4 brothers)      Vital Signs Last 24 Hrs  T(C): 36.4 (23 Oct 2019 09:26), Max: 37.1 (23 Oct 2019 00:11)  T(F): 97.5 (23 Oct 2019 09:26), Max: 98.7 (23 Oct 2019 00:11)  HR: 65 (23 Oct 2019 09:26) (54 - 78)  BP: 107/51 (23 Oct 2019 09:26) (95/58 - 112/66)  BP(mean): --  RR: 18 (23 Oct 2019 09:26) (18 - 18)  SpO2: 100% (23 Oct 2019 09:26) (92% - 100%)    NAD / gaurded but stable, Awake and alert, however confused and unable to contribute to history  WD/ WN/ WG  Total Care Sport/ Versa Care P500 bed  Cardiovascular: RRR (+)m  Respiratory: CTA  Gastrointestinal soft NT/ND (+)BS    Neurology: uncooperative, non verbal, ALBERTO's  Musculoskeletal/Vascular: LLE AKA, Right Lower extremity amputation wound vac on wound  Skin:  large 12 x 11 cm sacral eschar black with surrounding yellow no discharge, no erythema, no enduration    LABS:    WBC 10.9    139  |  100  |  21  ----------------------------<  177<H>  3.4<L>   |  30  |  0.74  Ca    8.1<L>      23 Oct 2019 06:42  Phos  2.2     10-23  Mg     2.0     10-23    TPro  x   /  Alb  2.3<L>  /  TBili  x   /  DBili  x   /  AST  x   /  ALT  x   /  AlkPhos  x   10-23        RADIOLOGY & ADDITIONAL STUDIES:  Cultures:    A/P:    DM  Bilateral Amputee  Left AKA  Right BKA  Large Sacral Unstageable with eschar intact  Right shoulder 1.5 x 1.5 stage 2   Recommend Allevyn daily to sacral wound    Cavilon daily to right shoulder wound daily  Turn and position every 2 hours, maintain on an alternating air with a low air loss surface, Nutrition Consult, and maintain incontinence management-cleanser  and pads, pericare bid, external female urinary catheter. Offer frequent toileting opportunities.  con't Nutrition (as tolerated)  con't Offloading   con't Pericare  Will Follow with you  PMcNicholas

## 2019-10-24 LAB
ANION GAP SERPL CALC-SCNC: 11 MMOL/L — SIGNIFICANT CHANGE UP (ref 5–17)
APTT BLD: 29.4 SEC — SIGNIFICANT CHANGE UP (ref 27.5–36.3)
BUN SERPL-MCNC: 17 MG/DL — SIGNIFICANT CHANGE UP (ref 7–23)
CALCIUM SERPL-MCNC: 8.1 MG/DL — LOW (ref 8.4–10.5)
CHLORIDE SERPL-SCNC: 96 MMOL/L — SIGNIFICANT CHANGE UP (ref 96–108)
CO2 SERPL-SCNC: 29 MMOL/L — SIGNIFICANT CHANGE UP (ref 22–31)
CREAT SERPL-MCNC: 0.75 MG/DL — SIGNIFICANT CHANGE UP (ref 0.5–1.3)
GLUCOSE SERPL-MCNC: 170 MG/DL — HIGH (ref 70–99)
HCT VFR BLD CALC: 25 % — LOW (ref 39–50)
HGB BLD-MCNC: 7.8 G/DL — LOW (ref 13–17)
INR BLD: 1.37 RATIO — HIGH (ref 0.88–1.16)
MCHC RBC-ENTMCNC: 27.7 PG — SIGNIFICANT CHANGE UP (ref 27–34)
MCHC RBC-ENTMCNC: 31.2 GM/DL — LOW (ref 32–36)
MCV RBC AUTO: 88.7 FL — SIGNIFICANT CHANGE UP (ref 80–100)
NRBC # BLD: 0 /100 WBCS — SIGNIFICANT CHANGE UP (ref 0–0)
PLATELET # BLD AUTO: 304 K/UL — SIGNIFICANT CHANGE UP (ref 150–400)
POTASSIUM SERPL-MCNC: 3.5 MMOL/L — SIGNIFICANT CHANGE UP (ref 3.5–5.3)
POTASSIUM SERPL-SCNC: 3.5 MMOL/L — SIGNIFICANT CHANGE UP (ref 3.5–5.3)
PROTHROM AB SERPL-ACNC: 15.9 SEC — HIGH (ref 10–12.9)
RBC # BLD: 2.82 M/UL — LOW (ref 4.2–5.8)
RBC # FLD: 16.6 % — HIGH (ref 10.3–14.5)
SODIUM SERPL-SCNC: 136 MMOL/L — SIGNIFICANT CHANGE UP (ref 135–145)
WBC # BLD: 12.14 K/UL — HIGH (ref 3.8–10.5)
WBC # FLD AUTO: 12.14 K/UL — HIGH (ref 3.8–10.5)

## 2019-10-24 PROCEDURE — 99223 1ST HOSP IP/OBS HIGH 75: CPT | Mod: GC

## 2019-10-24 PROCEDURE — 99233 SBSQ HOSP IP/OBS HIGH 50: CPT

## 2019-10-24 RX ADMIN — Medication 20 MILLIGRAM(S): at 05:13

## 2019-10-24 RX ADMIN — HEPARIN SODIUM 5000 UNIT(S): 5000 INJECTION INTRAVENOUS; SUBCUTANEOUS at 22:44

## 2019-10-24 RX ADMIN — HEPARIN SODIUM 5000 UNIT(S): 5000 INJECTION INTRAVENOUS; SUBCUTANEOUS at 05:13

## 2019-10-24 RX ADMIN — Medication 81 MILLIGRAM(S): at 12:14

## 2019-10-24 RX ADMIN — INSULIN GLARGINE 5 UNIT(S): 100 INJECTION, SOLUTION SUBCUTANEOUS at 22:52

## 2019-10-24 RX ADMIN — CLOPIDOGREL BISULFATE 75 MILLIGRAM(S): 75 TABLET, FILM COATED ORAL at 12:15

## 2019-10-24 RX ADMIN — Medication 20 MILLIGRAM(S): at 14:03

## 2019-10-24 RX ADMIN — Medication 2: at 09:15

## 2019-10-24 RX ADMIN — Medication 2: at 18:39

## 2019-10-24 RX ADMIN — Medication 3 MILLIGRAM(S): at 22:44

## 2019-10-24 RX ADMIN — Medication 0.12 MILLIGRAM(S): at 05:13

## 2019-10-24 RX ADMIN — HEPARIN SODIUM 5000 UNIT(S): 5000 INJECTION INTRAVENOUS; SUBCUTANEOUS at 13:24

## 2019-10-24 RX ADMIN — ATORVASTATIN CALCIUM 10 MILLIGRAM(S): 80 TABLET, FILM COATED ORAL at 22:44

## 2019-10-24 RX ADMIN — Medication 500000 UNIT(S): at 05:13

## 2019-10-24 RX ADMIN — Medication 1 TABLET(S): at 12:14

## 2019-10-24 RX ADMIN — Medication 2: at 13:24

## 2019-10-24 RX ADMIN — ERTAPENEM SODIUM 120 MILLIGRAM(S): 1 INJECTION, POWDER, LYOPHILIZED, FOR SOLUTION INTRAMUSCULAR; INTRAVENOUS at 22:44

## 2019-10-24 NOTE — MEDICAL STUDENT PROGRESS NOTE(EDUCATION) - SUBJECTIVE AND OBJECTIVE BOX
Surgery Progress Note    Subjective:     No events overnight. Patient seen and examined at bedside. Wound dressing changed. Pain is controlled.       OBJECTIVE:   V/S:  Vital Signs Last 24 Hrs  T(C): 37.2 (24 Oct 2019 05:01), Max: 37.2 (24 Oct 2019 05:01)  T(F): 98.9 (24 Oct 2019 05:01), Max: 98.9 (24 Oct 2019 05:01)  HR: 85 (24 Oct 2019 05:01) (68 - 88)  BP: 115/69 (24 Oct 2019 05:01) (100/58 - 131/79)  BP(mean): 75 (23 Oct 2019 14:45) (75 - 80)  RR: 18 (24 Oct 2019 05:01) (15 - 18)  SpO2: 96% (24 Oct 2019 05:01) (95% - 100%)    --------------------------------------------------------------------------------------------------  I/Os:    23 Oct 2019 07:01  -  24 Oct 2019 07:00  --------------------------------------------------------  IN:    dextrose 5% + sodium chloride 0.45% with potassium chloride 20 mEq/L: 175 mL    IV PiggyBack: 450 mL    lactated ringers.: 1125 mL    Oral Fluid: 360 mL  Total IN: 2110 mL    OUT:  Total OUT: 0 mL    Total NET: 2110 mL        --------------------------------------------------------------------------------------------------  LABS:                        8.0    10.09 )-----------( 279      ( 23 Oct 2019 06:43 )             26.0     23 Oct 2019 06:42    139    |  100    |  21     ----------------------------<  177    3.4     |  30     |  0.74     Ca    8.1        23 Oct 2019 06:42  Phos  2.2       23 Oct 2019 06:42  Mg     2.0       23 Oct 2019 06:42    TPro  x      /  Alb  2.3    /  TBili  x      /  DBili  x      /  AST  x      /  ALT  x      /  AlkPhos  x      23 Oct 2019 09:51    PT/INR - ( 23 Oct 2019 06:47 )   PT: 15.9 sec;   INR: 1.37 ratio         PTT - ( 23 Oct 2019 06:47 )  PTT:29.7 sec  CAPILLARY BLOOD GLUCOSE      POCT Blood Glucose.: 191 mg/dL (24 Oct 2019 08:59)  POCT Blood Glucose.: 166 mg/dL (23 Oct 2019 21:29)  POCT Blood Glucose.: 241 mg/dL (23 Oct 2019 17:17)  POCT Blood Glucose.: 208 mg/dL (23 Oct 2019 14:57)        LIVER FUNCTIONS - ( 23 Oct 2019 09:51 )  Alb: 2.3 g/dL / Pro: x     / ALK PHOS: x     / ALT: x     / AST: x     / GGT: x               --------------------------------------------------------------------------------------------------  MEDICATIONS  (STANDING):  aspirin  chewable 81 milliGRAM(s) Oral daily  atorvastatin 10 milliGRAM(s) Oral at bedtime  clopidogrel Tablet 75 milliGRAM(s) Oral daily  digoxin     Tablet 0.125 milliGRAM(s) Oral daily  ertapenem  IVPB 1000 milliGRAM(s) IV Intermittent every 24 hours  furosemide   Injectable 20 milliGRAM(s) IV Push every 8 hours  heparin  Injectable 5000 Unit(s) SubCutaneous every 8 hours  influenza   Vaccine 0.5 milliLiter(s) IntraMuscular once  insulin glargine Injectable (LANTUS) 5 Unit(s) SubCutaneous at bedtime  insulin lispro (HumaLOG) corrective regimen sliding scale   SubCutaneous Before meals and at bedtime  lactated ringers. 1000 milliLiter(s) (75 mL/Hr) IV Continuous <Continuous>  melatonin 3 milliGRAM(s) Oral at bedtime  multivitamin 1 Tablet(s) Oral daily  nystatin    Suspension 526648 Unit(s) Oral two times a day    MEDICATIONS  (PRN):  acetaminophen   Tablet .. 975 milliGRAM(s) Oral every 6 hours PRN Mild Pain (1 - 3), Moderate Pain (4 - 6)  oxyCODONE    IR 5 milliGRAM(s) Oral every 6 hours PRN Severe Pain (7 - 10)

## 2019-10-24 NOTE — CONSULT NOTE ADULT - SUBJECTIVE AND OBJECTIVE BOX
Chief Complaint:  Patient is a 69y old  Male who presents with a chief complaint of Altered Mental Status (23 Oct 2019 10:11)      HPI:    Allergies:  No Known Allergies      Home Medications:    Hospital Medications:  acetaminophen   Tablet .. 975 milliGRAM(s) Oral every 6 hours PRN  aspirin  chewable 81 milliGRAM(s) Oral daily  atorvastatin 10 milliGRAM(s) Oral at bedtime  clopidogrel Tablet 75 milliGRAM(s) Oral daily  digoxin     Tablet 0.125 milliGRAM(s) Oral daily  ertapenem  IVPB 1000 milliGRAM(s) IV Intermittent every 24 hours  furosemide   Injectable 20 milliGRAM(s) IV Push every 8 hours  heparin  Injectable 5000 Unit(s) SubCutaneous every 8 hours  influenza   Vaccine 0.5 milliLiter(s) IntraMuscular once  insulin glargine Injectable (LANTUS) 5 Unit(s) SubCutaneous at bedtime  insulin lispro (HumaLOG) corrective regimen sliding scale   SubCutaneous Before meals and at bedtime  lactated ringers. 1000 milliLiter(s) IV Continuous <Continuous>  melatonin 3 milliGRAM(s) Oral at bedtime  multivitamin 1 Tablet(s) Oral daily  nystatin    Suspension 342445 Unit(s) Oral two times a day  oxyCODONE    IR 5 milliGRAM(s) Oral every 6 hours PRN      PMHX/PSHX:  Hyperlipidemia  Hypertension  Diabetes  No pertinent past medical history  H/O mastoidectomy  No significant past surgical history  No significant past surgical history      Family history:  FHx: diabetes mellitus  No pertinent family history in first degree relatives      Social History:     ROS:   cannot obtain       PHYSICAL EXAM:     GENERAL:  Appears stated age, well-groomed, well-nourished, no distress  HEENT:  NC/AT,  conjunctivae clear and pink,  no JVD  CHEST:  Full & symmetric excursion, no increased effort, breath sounds clear  HEART:  Regular rhythm, S1, S2, no murmur/rub/S3/S4, no abdominal bruit, no edema  ABDOMEN:  Soft, non-tender, non-distended, normoactive bowel sounds,  no masses , no hepatosplenomegaly  EXTREMITIES:  B/L AKA  SKIN:  No rash/erythema/ecchymoses/petechiae/wounds/abscess/warm/dry  NEURO:  Alert, oriented    Vital Signs:  Vital Signs Last 24 Hrs  T(C): 37.2 (24 Oct 2019 05:01), Max: 37.2 (24 Oct 2019 05:01)  T(F): 98.9 (24 Oct 2019 05:01), Max: 98.9 (24 Oct 2019 05:01)  HR: 85 (24 Oct 2019 05:01) (65 - 88)  BP: 115/69 (24 Oct 2019 05:01) (100/58 - 131/79)  BP(mean): 75 (23 Oct 2019 14:45) (75 - 80)  RR: 18 (24 Oct 2019 05:01) (15 - 18)  SpO2: 96% (24 Oct 2019 05:01) (95% - 100%)  Daily Height in cm: 165.1 (23 Oct 2019 12:41)    Daily     LABS:                        8.0    10.09 )-----------( 279      ( 23 Oct 2019 06:43 )             26.0     10-23    139  |  100  |  21  ----------------------------<  177<H>  3.4<L>   |  30  |  0.74    Ca    8.1<L>      23 Oct 2019 06:42  Phos  2.2     10-23  Mg     2.0     10-23    TPro  x   /  Alb  2.3<L>  /  TBili  x   /  DBili  x   /  AST  x   /  ALT  x   /  AlkPhos  x   10-23    LIVER FUNCTIONS - ( 23 Oct 2019 09:51 )  Alb: 2.3 g/dL / Pro: x     / ALK PHOS: x     / ALT: x     / AST: x     / GGT: x           PT/INR - ( 23 Oct 2019 06:47 )   PT: 15.9 sec;   INR: 1.37 ratio         PTT - ( 23 Oct 2019 06:47 )  PTT:29.7 sec        Imaging: Chief Complaint:  Patient is a 69y old  Male who presents with a chief complaint of Altered Mental Status (23 Oct 2019 10:11)      HPI:    Allergies:  No Known Allergies      Home Medications:    Hospital Medications:  acetaminophen   Tablet .. 975 milliGRAM(s) Oral every 6 hours PRN  aspirin  chewable 81 milliGRAM(s) Oral daily  atorvastatin 10 milliGRAM(s) Oral at bedtime  clopidogrel Tablet 75 milliGRAM(s) Oral daily  digoxin     Tablet 0.125 milliGRAM(s) Oral daily  ertapenem  IVPB 1000 milliGRAM(s) IV Intermittent every 24 hours  furosemide   Injectable 20 milliGRAM(s) IV Push every 8 hours  heparin  Injectable 5000 Unit(s) SubCutaneous every 8 hours  influenza   Vaccine 0.5 milliLiter(s) IntraMuscular once  insulin glargine Injectable (LANTUS) 5 Unit(s) SubCutaneous at bedtime  insulin lispro (HumaLOG) corrective regimen sliding scale   SubCutaneous Before meals and at bedtime  lactated ringers. 1000 milliLiter(s) IV Continuous <Continuous>  melatonin 3 milliGRAM(s) Oral at bedtime  multivitamin 1 Tablet(s) Oral daily  nystatin    Suspension 506573 Unit(s) Oral two times a day  oxyCODONE    IR 5 milliGRAM(s) Oral every 6 hours PRN      PMHX/PSHX:  Hyperlipidemia  Hypertension  Diabetes  No pertinent past medical history  H/O mastoidectomy  No significant past surgical history  No significant past surgical history      Family history:  FHx: diabetes mellitus  No pertinent family history in first degree relatives      Social History:     ROS:   cannot obtain       PHYSICAL EXAM:     GENERAL:  Appears stated age, well-groomed, well-nourished, no distress  HEENT:  NC/AT,  conjunctivae clear and pink,  no JVD  CHEST:  Full & symmetric excursion, no increased effort, breath sounds clear  HEART:  Regular rhythm, S1, S2, no murmur/rub/S3/S4, no abdominal bruit, no edema  ABDOMEN:  Soft, non-tender, non-distended, normoactive bowel sounds,  no masses , no hepatosplenomegaly, no surgical scars  EXTREMITIES:  B/L AKA    NEURO: nonverbal    Vital Signs:  Vital Signs Last 24 Hrs  T(C): 37.2 (24 Oct 2019 05:01), Max: 37.2 (24 Oct 2019 05:01)  T(F): 98.9 (24 Oct 2019 05:01), Max: 98.9 (24 Oct 2019 05:01)  HR: 85 (24 Oct 2019 05:01) (65 - 88)  BP: 115/69 (24 Oct 2019 05:01) (100/58 - 131/79)  BP(mean): 75 (23 Oct 2019 14:45) (75 - 80)  RR: 18 (24 Oct 2019 05:01) (15 - 18)  SpO2: 96% (24 Oct 2019 05:01) (95% - 100%)  Daily Height in cm: 165.1 (23 Oct 2019 12:41)    Daily     LABS:                        8.0    10.09 )-----------( 279      ( 23 Oct 2019 06:43 )             26.0     10-23    139  |  100  |  21  ----------------------------<  177<H>  3.4<L>   |  30  |  0.74    Ca    8.1<L>      23 Oct 2019 06:42  Phos  2.2     10-23  Mg     2.0     10-23    TPro  x   /  Alb  2.3<L>  /  TBili  x   /  DBili  x   /  AST  x   /  ALT  x   /  AlkPhos  x   10-23    LIVER FUNCTIONS - ( 23 Oct 2019 09:51 )  Alb: 2.3 g/dL / Pro: x     / ALK PHOS: x     / ALT: x     / AST: x     / GGT: x           PT/INR - ( 23 Oct 2019 06:47 )   PT: 15.9 sec;   INR: 1.37 ratio         PTT - ( 23 Oct 2019 06:47 )  PTT:29.7 sec        Imaging: Chief Complaint:  Patient is a 69y old  Male who presents with a chief complaint of Altered Mental Status (23 Oct 2019 10:11)    Patient is agitated and is unable to provide history. Information obtained from charts.    HPI: 69 year old gentleman with T2DM, HTN, CVA, NSTEMI complicated by cardiogenic shock, significant PVD s/p LLE fem-pop bypass (vein graft) with subsequent LLE BKA (07/2019), LLE AKA (09/2019), known chronically ischemic RLE who was brought in from his nursing facility with progressively altered mentation, fevers and tachycardia. In the ED, the patient was agitated, required supplemental O2, and was tachycardic. GI consulted for possible PEG placement due to poor PO intake. As per nutrition notes, he had previous calorie count this admission, but unclear if this was during the time when he was septic with bacteremia/gangrene. Per RN, patient eats very little when prompted. He has had VFSS in 8/2019 and per notes, on repeat swallow evaluation request 3 days ago, no need to repeat as no signs of dysphagia.      Allergies:  No Known Allergies      Home Medications:    Hospital Medications:  acetaminophen   Tablet .. 975 milliGRAM(s) Oral every 6 hours PRN  aspirin  chewable 81 milliGRAM(s) Oral daily  atorvastatin 10 milliGRAM(s) Oral at bedtime  clopidogrel Tablet 75 milliGRAM(s) Oral daily  digoxin     Tablet 0.125 milliGRAM(s) Oral daily  ertapenem  IVPB 1000 milliGRAM(s) IV Intermittent every 24 hours  furosemide   Injectable 20 milliGRAM(s) IV Push every 8 hours  heparin  Injectable 5000 Unit(s) SubCutaneous every 8 hours  influenza   Vaccine 0.5 milliLiter(s) IntraMuscular once  insulin glargine Injectable (LANTUS) 5 Unit(s) SubCutaneous at bedtime  insulin lispro (HumaLOG) corrective regimen sliding scale   SubCutaneous Before meals and at bedtime  lactated ringers. 1000 milliLiter(s) IV Continuous <Continuous>  melatonin 3 milliGRAM(s) Oral at bedtime  multivitamin 1 Tablet(s) Oral daily  nystatin    Suspension 321448 Unit(s) Oral two times a day  oxyCODONE    IR 5 milliGRAM(s) Oral every 6 hours PRN      PMHX/PSHX:  Hyperlipidemia  Hypertension  Diabetes  No pertinent past medical history  H/O mastoidectomy  No significant past surgical history  No significant past surgical history      Family history:  FHx: diabetes mellitus  No pertinent family history in first degree relatives      Social History: Unable to obtain due to mental status    ROS:   cannot obtain       PHYSICAL EXAM:   GENERAL:  Appears stated age, well-groomed, well-nourished, no distress  HEENT:  NC/AT,  conjunctivae clear and pink,  no JVD  ABDOMEN:  Soft, non-tender, non-distended, normoactive bowel sounds,  no masses , no hepatosplenomegaly, no surgical scars  EXTREMITIES:  B/L AKA  NEURO: Patient agitated, fights and pushes away when examined; rest of exam is limited due to patient agitation    Vital Signs:  Vital Signs Last 24 Hrs  T(C): 37.2 (24 Oct 2019 05:01), Max: 37.2 (24 Oct 2019 05:01)  T(F): 98.9 (24 Oct 2019 05:01), Max: 98.9 (24 Oct 2019 05:01)  HR: 85 (24 Oct 2019 05:01) (65 - 88)  BP: 115/69 (24 Oct 2019 05:01) (100/58 - 131/79)  BP(mean): 75 (23 Oct 2019 14:45) (75 - 80)  RR: 18 (24 Oct 2019 05:01) (15 - 18)  SpO2: 96% (24 Oct 2019 05:01) (95% - 100%)  Daily Height in cm: 165.1 (23 Oct 2019 12:41)    Daily     LABS:                        8.0    10.09 )-----------( 279      ( 23 Oct 2019 06:43 )             26.0     10-23    139  |  100  |  21  ----------------------------<  177<H>  3.4<L>   |  30  |  0.74    Ca    8.1<L>      23 Oct 2019 06:42  Phos  2.2     10-23  Mg     2.0     10-23    TPro  x   /  Alb  2.3<L>  /  TBili  x   /  DBili  x   /  AST  x   /  ALT  x   /  AlkPhos  x   10-23    LIVER FUNCTIONS - ( 23 Oct 2019 09:51 )  Alb: 2.3 g/dL / Pro: x     / ALK PHOS: x     / ALT: x     / AST: x     / GGT: x           PT/INR - ( 23 Oct 2019 06:47 )   PT: 15.9 sec;   INR: 1.37 ratio         PTT - ( 23 Oct 2019 06:47 )  PTT:29.7 sec        Imaging:  < from: CT Abdomen and Pelvis No Cont (07.24.19 @ 23:32) >  FINDINGS:    Evaluation of solid organs are limited without intravenous contrast. Exam   is also limited due to motion and streak artifacts.    LOWER CHEST: Moderate bilateral pleural effusions with passive   atelectasis.    LIVER: Within normal limits.  BILE DUCTS: Normal caliber.  GALLBLADDER: Within normal limits.  SPLEEN: Within normal limits.  PANCREAS: Within normal limits.  ADRENALS: Mild thickening of the left adrenal gland.  KIDNEYS/URETERS: Within normal limits.    BLADDER: Layering focal hyperdensity in the dependent portion is   suggestive of layering calculi.  REPRODUCTIVE ORGANS: Prostate is enlarged.    BOWEL: No bowel obstruction. Appendix is normal.  PERITONEUM: No ascites.  VESSELS: Mild atherosclerotic changes.  RETROPERITONEUM: No lymphadenopathy.    ABDOMINAL WALL: Within normal limits.  BONES: Degenerative changes with large protruding anterior osteophytes at   the level of L3-L4.    IMPRESSION:     Limited noncontrast study. No acute intra-abdominal or pelvic pathology.    Moderate bilateral pleural effusions.      < end of copied text >      8/5/19  · Recommended Consistencies	Puree and thin liquids. 100% supervision with all PO intake. Small, SINGLE SIPS ONLY when drinking. Medication in applesauce. Maintain upright posture during meals and after eating/drinking for 30 minutes. Maintain good oral hygiene	    10/21/19  Swallow Bedside Assessment:   General Information:  · Comments	Pt was seen for a MBS 8/2019 with findings of: oropharyngeal dysphagia characterized by reduced bolus formation/control, spillover to the pharynx on all textures administered, delayed pharyngeal swallows and trace-mild post swallow residue in the pharynx. No aspiration evident on exam. Chewables not administered 2/2 edentulous state and altered mental status. Diet recommendation was made for Puree and thin liquids. 100% supervision with all PO intake. Small, SINGLE SIPS ONLY when drinking. Medication in applesauce.	    Past Medical History:   · 	Hyperlipidemia: Entered Date: 04-Apr-2015 08:48, Entered By: Jodee Salcido, Last Modified By: Jodee Salcido  · 	Hypertension: Entered Date: 04-Apr-2015 08:47, Entered By: Jodee Salcido, Last Modified By: Jodee Salcido  · 	Diabetes: Entered Date: 04-Apr-2015 08:46, Entered By: Jodee Salcido, Last Modified By: José Miguel Gage    Past Surgical History:   · 	H/O mastoidectomy: Entered By: José Miguel Gage, Entered Date: 06-Jul-2019 04:35, Last Modified By: José Miguel Gage    Clinical Impressions:  · Diagnosis	Consult for bedside swallow received and appreciated. Pt well known to this service with most recent MBS completed 8/2019 with recommendation for pureed texture diet with thin liquids (more details above). Case d/w vascular MD Rey. Unclear reason for consult for clinical assessment of swallow function. Patient appears to be tolerating diet since consult was requested on 10/18. CXR on 10/18 not concerning for pulmonary process. Per MD defer evaluation at this time. Medical team to reconsult if needed. This service remains available.

## 2019-10-24 NOTE — CONSULT NOTE ADULT - SUBJECTIVE AND OBJECTIVE BOX
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 Cardiology now requested for pre-op risk stratification prior to PEG. Patient responds with sternal rub with groaning. Not speaking/cooperating at the time of my visit.      ALLERGIES:      MEDICATIONS:  aspirin  chewable 81 milliGRAM(s) Oral daily  clopidogrel Tablet 75 milliGRAM(s) Oral daily  digoxin     Tablet 0.125 milliGRAM(s) Oral daily  furosemide   Injectable 20 milliGRAM(s) IV Push every 8 hours  heparin  Injectable 5000 Unit(s) SubCutaneous every 8 hours    ertapenem  IVPB 1000 milliGRAM(s) IV Intermittent every 24 hours  nystatin    Suspension 869007 Unit(s) Oral two times a day      melatonin 3 milliGRAM(s) Oral at bedtime      atorvastatin 10 milliGRAM(s) Oral at bedtime  insulin glargine Injectable (LANTUS) 5 Unit(s) SubCutaneous at bedtime  insulin lispro (HumaLOG) corrective regimen sliding scale   SubCutaneous Before meals and at bedtime    influenza   Vaccine 0.5 milliLiter(s) IntraMuscular once  lactated ringers. 1000 milliLiter(s) IV Continuous <Continuous>  multivitamin 1 Tablet(s) Oral daily      PAST MEDICAL & SURGICAL HISTORY:  Hyperlipidemia  Hypertension  Diabetes  H/O mastoidectomy      FAMILY HISTORY:  FHx: diabetes mellitus: In all siblings(2 sisters and 4 brothers)      SOCIAL HISTORY:    unable to obtain    REVIEW OF SYSTEMS:  unable to obtain    PHYSICAL EXAM:  T(C): 36.7 (10-24-19 @ 13:35), Max: 37.2 (10-24-19 @ 05:01)  HR: 91 (10-24-19 @ 13:35) (70 - 95)  BP: 145/74 (10-24-19 @ 13:35) (100/58 - 145/74)  RR: 18 (10-24-19 @ 13:35) (16 - 18)  SpO2: 99% (10-24-19 @ 13:35) (95% - 100%)  Wt(kg): --    Drug Dosing Weight  Height (cm): 165.1 (23 Oct 2019 12:41)  Weight (kg): 58.4 (23 Oct 2019 12:41)  BMI (kg/m2): 21.4 (23 Oct 2019 12:41)  BSA (m2): 1.64 (23 Oct 2019 12:41)    I&O's Summary    23 Oct 2019 07:01  -  24 Oct 2019 07:00  --------------------------------------------------------  IN: 2110 mL / OUT: 0 mL / NET: 2110 mL    24 Oct 2019 07:01  -  24 Oct 2019 15:50  --------------------------------------------------------  IN: 645 mL / OUT: 0 mL / NET: 645 mL        Appearance: thin, frail appearing  HEENT:   Normal oral mucosa, PERRL, EOMI	  Lymphatic: No lymphadenopathy  Cardiovascular: Normal S1 S2, No JVD, 1/6 PATRICIO  Respiratory: Lungs clear to auscultation	  Psychiatry: A & O x 0, responds to pain/voice  Gastrointestinal:  Soft, Non-tender, + BS	  Skin: No rashes, No ecchymoses, No cyanosis	  Neurologic: Non-focal  Extremities: s/p L BKA, s/p R BKA  Vascular: s/p BL amputations    LABS:	 	    CBC Full  -  ( 24 Oct 2019 09:43 )  WBC Count : 12.14 K/uL  Hemoglobin : 7.8 g/dL  Hematocrit : 25.0 %  Platelet Count - Automated : 304 K/uL  Mean Cell Volume : 88.7 fl  Mean Cell Hemoglobin : 27.7 pg  Mean Cell Hemoglobin Concentration : 31.2 gm/dL  Auto Neutrophil # : x  Auto Lymphocyte # : x  Auto Monocyte # : x  Auto Eosinophil # : x  Auto Basophil # : x  Auto Neutrophil % : x  Auto Lymphocyte % : x  Auto Monocyte % : x  Auto Eosinophil % : x  Auto Basophil % : x    10-24    136  |  96  |  17  ----------------------------<  170<H>  3.5   |  29  |  0.75  10-23    139  |  100  |  21  ----------------------------<  177<H>  3.4<L>   |  30  |  0.74    Ca    8.1<L>      24 Oct 2019 09:43  Ca    8.1<L>      23 Oct 2019 06:42  Phos  2.2     10-23  Mg     2.0     10-23    TPro  x   /  Alb  2.3<L>  /  TBili  x   /  DBili  x   /  AST  x   /  ALT  x   /  AlkPhos  x   10-23  TPro  6.8  /  Alb  2.5<L>  /  TBili  0.5  /  DBili  0.2  /  AST  17  /  ALT  22  /  AlkPhos  108  10-23    PT/INR - ( 24 Oct 2019 09:43 )   PT: 15.9 sec;   INR: 1.37 ratio         PTT - ( 24 Oct 2019 09:43 )  PTT:29.4 sec  LIVER FUNCTIONS - ( 23 Oct 2019 09:51 )  Alb: 2.3 g/dL / Pro: x     / ALK PHOS: x     / ALT: x     / AST: x     / GGT: x             hsTrop: 128 -> 138 (10/13/2019), down from prior admit peak 826 on 07/24/2019      EKG: NSR, Bigeminy, LBBB    Telemetry: None    CXR: 10/17/2019:  IMPRESSION:  No evidence of pulmonary congestion.     TTE: 10/14/2019:  Observations:  Mitral Valve: Tethered and thickened mitral valve leaflets.  Minimal mitral regurgitation.  Aortic Valve/Aorta: Normal trileaflet aortic valve. Minimal  aortic regurgitation.  Normal aortic root size. (Ao: 3.3 cm at the sinuses of  Valsalva).  Left Atrium: Normal left atrium.  LA volume index = 26  cc/m2.  Left Ventricle: Severe global left ventricular systolic  dysfunction. Estimated LVEF = 25%. Normal left ventricular  internal dimensions and wall thicknesses. Severe diastolic  dysfunction .  Right Heart: Normal right atrium. Normal right ventricular  size with decreased right ventricular systolic function.  Normal tricuspid valve. Normal pulmonic valve.  Pericardium/Pleura: Normal pericardium with no pericardial  effusion.  Bilateral pleural effusions.  Hemodynamic: Estimated right atrial pressure is 8 mm Hg.  ------------------------------------------------------------------------  Conclusions:  1. Tethered and thickened mitral valve leaflets. Minimal  mitral regurgitation.  2. Normal left ventricular internal dimensions and wall  thicknesses.  3. Severe global left ventricular systolic dysfunction.  Estimated LVEF = 25%.  4. Normal right ventricular size with decreased right  ventricular systolic function.  5. Bilateral pleural effusions.        A/P: 69 y.o M w/ DM2, HTN, HL, CVA, severe PAD s/p LLE fem-pop bypass with vein graft, s/p Left BKA on 7/22/19 with hospital course c/b NSTEMI /cardiogenic shock requiring pressors/CCU course (medically managed, no LHC performed), HFrEF (TTE 10/14/2019 with severe global LV dysfunction, LVEF 20-25%, RV dysfunction, severe diastolic dysfunction), now admitted 10/12/2019 with Sepsis in the setting of Right foot gangrene, seen by cardiology for pre-op risk stratification for BKA on 10/3/2019 and subsequently underwent Right BKA on 10/13/2019. Cardiology now requested for pre-op risk stratification prior to PEG. Patient responds with sternal rub with groaning. Not speaking/cooperating at the time of my visit.    1. Cardiovascular Risk Stratification - RCRI = 5 (CAD, HFREF, CVA, DM2 on insulin, intraperitoneal procedure).  -Known severe PAD (s/p mult amputations), and underlying CAD being medically managed in this patient with overall poor prognosis.  -Patient previously underwent a significant procedure (BKA) this admission without cardiac complication.  - Appears well compensate at this time in regards to HFrEF  - Overall this patient is at high risk ( >11%) for cardiac death, nonfatal myocardial infarction, and nonfatal cardiac arrest perioperatively for this moderate risk procedure.  -Cont ASA 81 mg po QD through the procedure  -From a cardiac standpoint, reasonable to hold plavix delphine-procedurally (as no prior stents placed), and resume once safe from a surgical standpoint.  -No further diagnostic or interventional procedures are required prior to the planned procedure.    2. Chronic HFrEF, RV dysfunction, diastolic dysfunction -  (TTE 10/14/2019 with severe global LV dysfunction, LVEF 20-25%, RV dysfunction, severe diastolic dysfunction)  -Appears well compensated at this time. I/O's don't appear accurate. Would trend daily weights or BNP's.  -Cont Lasix 20 mg IV Q8  -Cont Digoxin 0.125 mg po QD  -Would start low dose beta blockade (Coreg 3.125 mg po BID) and continue through the procedure.  -Check BNP with next set of labs to establish dry weight value. Appears well compensated at this time  -Would plan to switch to maintenance dosing once PEG or NGT established. (Lasix 40 mg po BID)    3. CAD - s/p NSTEMI, medically managed   -Cont ASA 81 mg po QD through the procedure  -Reasonable to hold plavix from a cardiac standpoint as noted above  -Cont Atorvastatin 10 mg po QHS    Cardiology will sign off at this time. Please re-consult as needed.      Demetris Kam MD  Cardiology Attending  [facility] / [facility]   Cell: [phone]  (Cardiology Late Attending number available 2 pm - 10 pm Mon-Fri. For day time coverage Mon-Fri see Non-Service Consult Attending on [email or web address], password: [email or web address]; daytime weekends covered by general cardiology consult service attending.)

## 2019-10-24 NOTE — CONSULT NOTE ADULT - CONSULT REQUESTED DATE/TIME
13-Oct-2019 01:14
23-Oct-2019
13-Oct-2019 12:44
21-Oct-2019 15:26
24-Oct-2019 09:16
24-Oct-2019 15:50

## 2019-10-24 NOTE — CONSULT NOTE ADULT - ASSESSMENT
Impression:    1. Malnutrition. Albumin 2.3. Prealbumin 8. Per RN patient did not eat well yesterday but he was s/p R AKA. Currently weihgs 60 kg. In july recorded close to 90 KG but in the setting of volume overload    2. CVA on dual antiplatelet.    3. HFREF 15-20%    4. Dementia    5. Bacteremia on Ertapenem    Recommendation:  -will need to assess patient's oral intake while not acutely ill  -would clarify with neurology re: safety of holding DAPT for 5 days if plan is to proceed with PEG  -will need to discuss with family if they are amenable to PEG  -if plan is to proceed would also require cardiology preop  - Impression:    1. Malnutrition. Albumin 2.3. Prealbumin 8. Per RN patient did not eat well yesterday but he was s/p R AKA. Currently weihgs 60 kg. In july recorded close to 90 KG but in the setting of volume overload    2. CVA on dual antiplatelet.    3. HFREF 15-20%    4. Dementia    5. Bacteremia on Ertapenem    Recommendation:  -will need to assess patient's oral intake while not acutely ill  -would clarify with neurology re: safety of holding DAPT for 5 days if plan is to proceed with PEG  -will need to discuss with family if they are amenable to PEG  -if plan is to proceed would also require cardiology preop Impression:    1. Malnutrition. Albumin 2.3. Prealbumin 8. Per RN patient did not eat well yesterday but he was s/p R AKA. Currently weihgs 60 kg. In july recorded close to 90 KG but in the setting of volume overload    2. CVA on dual antiplatelet.    3. HFREF 15-20%    4. Dementia    5. Bacteremia on Ertapenem    Recommendation:  -would clarify with neurology re: safety of holding DAPT for 5 days if plan is to proceed with PEG  -will need to assess patient's oral intake while not acutely ill  -Would repeat calorie count as patient's sepsis appears to have resolved, to accurately document inadequate PO intake as the need for PEG placement  -will need to discuss with family if they are amenable to PEG  -if plan is to proceed would also require cardiology preop given HFREF

## 2019-10-24 NOTE — PROGRESS NOTE ADULT - SUBJECTIVE AND OBJECTIVE BOX
SUBJECTIVE: Pt seen and examined at bedside. No acute events overnight. Pain well controlled.     Fletcher:  NGT:  DASHA:  MEDICATIONS  (STANDING):  aspirin  chewable 81 milliGRAM(s) Oral daily  atorvastatin 10 milliGRAM(s) Oral at bedtime  clopidogrel Tablet 75 milliGRAM(s) Oral daily  digoxin     Tablet 0.125 milliGRAM(s) Oral daily  ertapenem  IVPB 1000 milliGRAM(s) IV Intermittent every 24 hours  furosemide   Injectable 20 milliGRAM(s) IV Push every 8 hours  heparin  Injectable 5000 Unit(s) SubCutaneous every 8 hours  influenza   Vaccine 0.5 milliLiter(s) IntraMuscular once  insulin glargine Injectable (LANTUS) 5 Unit(s) SubCutaneous at bedtime  insulin lispro (HumaLOG) corrective regimen sliding scale   SubCutaneous Before meals and at bedtime  lactated ringers. 1000 milliLiter(s) (75 mL/Hr) IV Continuous <Continuous>  melatonin 3 milliGRAM(s) Oral at bedtime  multivitamin 1 Tablet(s) Oral daily  nystatin    Suspension 304454 Unit(s) Oral two times a day    MEDICATIONS  (PRN):  acetaminophen   Tablet .. 975 milliGRAM(s) Oral every 6 hours PRN Mild Pain (1 - 3), Moderate Pain (4 - 6)  oxyCODONE    IR 5 milliGRAM(s) Oral every 6 hours PRN Severe Pain (7 - 10)      OBJECTIVE:    Vital Signs Last 24 Hrs  T(C): 37.2 (24 Oct 2019 05:01), Max: 37.2 (24 Oct 2019 05:01)  T(F): 98.9 (24 Oct 2019 05:01), Max: 98.9 (24 Oct 2019 05:01)  HR: 85 (24 Oct 2019 05:01) (68 - 88)  BP: 115/69 (24 Oct 2019 05:01) (100/58 - 131/79)  BP(mean): 75 (23 Oct 2019 14:45) (75 - 80)  RR: 18 (24 Oct 2019 05:01) (15 - 18)  SpO2: 96% (24 Oct 2019 05:01) (95% - 100%)    General Appearance: NAD   Neck: Supple, NCAT  Chest: non-labored breathing, no respiratory distress, equal chest rise  Abdomen: Soft, non-tender, non-distended, no guarding or rebound tenderness,   Extremities: RLE dressing c/d/i. LLE staples removed     I&O's Summary    23 Oct 2019 07:01  -  24 Oct 2019 07:00  --------------------------------------------------------  IN: 2110 mL / OUT: 0 mL / NET: 2110 mL      I&O's Detail    23 Oct 2019 07:01  -  24 Oct 2019 07:00  --------------------------------------------------------  IN:    dextrose 5% + sodium chloride 0.45% with potassium chloride 20 mEq/L: 175 mL    IV PiggyBack: 450 mL    lactated ringers.: 1125 mL    Oral Fluid: 360 mL  Total IN: 2110 mL    OUT:  Total OUT: 0 mL    Total NET: 2110 mL            LABS:                        8.0    10.09 )-----------( 279      ( 23 Oct 2019 06:43 )             26.0     10-23    139  |  100  |  21  ----------------------------<  177<H>  3.4<L>   |  30  |  0.74    Ca    8.1<L>      23 Oct 2019 06:42  Phos  2.2     10-23  Mg     2.0     10-23    TPro  x   /  Alb  2.3<L>  /  TBili  x   /  DBili  x   /  AST  x   /  ALT  x   /  AlkPhos  x   10-23    PT/INR - ( 23 Oct 2019 06:47 )   PT: 15.9 sec;   INR: 1.37 ratio         PTT - ( 23 Oct 2019 06:47 )  PTT:29.7 sec      RADIOLOGY & ADDITIONAL STUDIES:

## 2019-10-24 NOTE — PROGRESS NOTE ADULT - SUBJECTIVE AND OBJECTIVE BOX
CC: f/u for  morganella bactermia  Patient reports    REVIEW OF SYSTEMS:  All other review of systems negative (Comprehensive ROS)    Antimicrobials Day #  :10/14  ertapenem  IVPB 1000 milliGRAM(s) IV Intermittent every 24 hours  nystatin    Suspension 311545 Unit(s) Oral two times a day    Other Medications Reviewed    T(F): 98 (10-24-19 @ 13:35), Max: 98.9 (10-24-19 @ 05:01)  HR: 91 (10-24-19 @ 13:35)  BP: 145/74 (10-24-19 @ 13:35)  RR: 18 (10-24-19 @ 13:35)  SpO2: 99% (10-24-19 @ 13:35)  Wt(kg): --    PHYSICAL EXAM:  General: more alert, no acute distress  Eyes:  anicteric, no conjunctival injection, no discharge  Oropharynx: no lesions or injection 	  Neck: supple, without adenopathy  Lungs: clear to auscultation  Heart: regular rate and rhythm; no murmur, rubs or gallops  Abdomen: soft, nondistended, nontender, without mass or organomegaly  Skin: no lesions  Extremities: no clubbing, cyanosis, or edema. stump right dressed, left clean wound  Neurologic: alert, oves all extremities    LAB RESULTS:                        7.8    12.14 )-----------( 304      ( 24 Oct 2019 09:43 )             25.0     10-24    136  |  96  |  17  ----------------------------<  170<H>  3.5   |  29  |  0.75    Ca    8.1<L>      24 Oct 2019 09:43  Phos  2.2     10-23  Mg     2.0     10-23    TPro  x   /  Alb  2.3<L>  /  TBili  x   /  DBili  x   /  AST  x   /  ALT  x   /  AlkPhos  x   10-23    LIVER FUNCTIONS - ( 23 Oct 2019 09:51 )  Alb: 2.3 g/dL / Pro: x     / ALK PHOS: x     / ALT: x     / AST: x     / GGT: x           Culture - Blood (10.13.19 @ 01:05)    -  Amikacin: S <=16    -  Aztreonam: R >16    Gram Stain:   Growth in anaerobic bottle: Gram Negative Rods    -  Ampicillin: R >16 These ampicillin results predict results for amoxicillin    -  Cefepime: S <=2    -  Ampicillin/Sulbactam: R >16/8 Enterobacter, Citrobacter, and Serratia may develop resistance during prolonged therapy (3-4 days)    -  Cefazolin: R >16 Enterobacter, Citrobacter, and Serratia may develop resistance during prolonged therapy (3-4 days)    -  Ciprofloxacin: R >2    -  Ertapenem: S <=0.5    -  Ceftriaxone: R >32 Enterobacter, Citrobacter, and Serratia may develop resistance during prolonged therapy    -  Imipenem: S <=1    -  Cefoxitin: R >16    -  Gentamicin: R >8    -  Meropenem: S <=1    -  Levofloxacin: I 4    -  Trimethoprim/Sulfamethoxazole: R >2/38    -    -  Tobramycin: I 8    -  Piperacillin/Tazobactam: R >64    Specimen Source: .Blood    Organism: Morganella morganii    Organism: Blood Culture PCR    Culture Results:   Growth in anaerobic bottle: Morganella morganii  "Due to technical problems, Proteus sp. will Not be reported as part of  the BCID panel until further notice"  ***Blood Panel PCR results on this specimen are available  approximately 3 hours after the Gram stain result.***  Gram stain, PCR, and/or culture results may not always  correspond due to difference in methodologies.  ************************************************************  This PCR assay was performed using [employer].  The following targets are tested for: Enterococcus,  vancomycin resistant enterococci, Listeria monocytogenes,  coagulase negative staphylococci, S. aureus,  methicillin resistant S. aureus, Streptococcus agalactiae  (Group B), S. pneumoniae, S. pyogenes (Group A),  Acinetobacter baumannii, Enterobacter cloacae, E. coli,  Klebsiella oxytoca, K. pneumoniae, Proteus sp.,  Serratia marcescens, Haemophilus influenzae,  Neisseria meningitidis, Pseudomonas aeruginosa, Candida  albicans, C. glabrata, C krusei, C parapsilosis,  C. tropicalis and the KPC resistance gene.    Organism Identification: Blood Culture PCR  Morganella morganii    Method Type: PCR    Method Type: HELEN        Assessment:  Patient with severe pvd , had left aka now right aka. On admission had severe sepsis with gangrene of the right foot, underwent guillotine bka and now had revision to aka. Had morganella bacteremia now resolved.   Plan:  complete 3 more days of ertapenem  No ID contraindication to peg

## 2019-10-24 NOTE — CONSULT NOTE ADULT - REASON FOR ADMISSION
Altered Mental Status

## 2019-10-24 NOTE — CONSULT NOTE ADULT - CONSULT REASON
Pre-op risk stratification
concern for airway protection
PEG Placement
morganella bacteremia
pre-op risk stratification
s/p emergent R BKA
Sacral Wound and Shoulder Wound

## 2019-10-24 NOTE — CONSULT NOTE ADULT - ATTENDING COMMENTS
Patient seen and examined. Agree with above. PEG requested due to poor PO intake but no dysphagia. Would first ask neurology for feasibility of stopping Plavix prior to PEG, it will need to be held for 5 days. In the meantime, would repeat calorie count as it's unclear if patient was altered/septic during the last calorie count. The patient is at high risk of pulling out his PEG, which can lead to peritonitis/free gastric perforation and death if this occurs in the first 4-6 weeks. Family will need to understand the risks of PEG dislodgement.

## 2019-10-24 NOTE — MEDICAL STUDENT PROGRESS NOTE(EDUCATION) - NS MD HP STUD ASPLAN ASSES FT
69y male POD 1 for completion of RLE AKA and with extensive LE vaso-occlusive disease, prior LLE AKA, who was brought in from his nursing home facility with sepsis likely secondary to his chronically necrotic RLE. Also with volume overload. 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 Patient is not eating well and is currently malnourished per labs - GI consulted for PEG. Per GI, "Recommendation:  -will need to assess patient's oral intake while not acutely ill  -would clarify with neurology re: safety of holding DAPT for 5 days if plan is to proceed with PEG  -will need to discuss with family if they are amenable to PEG  -if plan is to proceed would also require cardiology preop"

## 2019-10-24 NOTE — MEDICAL STUDENT PROGRESS NOTE(EDUCATION) - NS MD HP STUD ASPLAN PLAN FT
- f/u about possible PEG (not Dobbhoff) for malnutrition; continue pureed diet for now; need family consent  - wound care for R shoulder and buttocks wounds - wound care to see, rec cavalon to wounds.   - continue lasix push  - condom cath instead of south  - will leave triple lumen in for RTOR and have established PIV at this time.

## 2019-10-24 NOTE — PROGRESS NOTE ADULT - ASSESSMENT
A/P: 69y male with extensive LE vaso-occlusive disease, prior LLE AKA, who was brought in from his nursing home facility with sepsis likely secondary to his chronically necrotic RLE. Also with volume overload. 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 s/p Right AKA     - wound care for R shoulder and buttocks wounds - wound care to see, rec cavalon to wounds.   - continue pureed diet - will discuss other routes of nutrition given recent dietary note - PEG vs other tubes   - Appreciate GI recs, possible PEG  - continue lasix push  - condom cath instead of south    Vascular Surgery   [id number]

## 2019-10-25 ENCOUNTER — APPOINTMENT (OUTPATIENT)
Dept: VASCULAR SURGERY | Facility: CLINIC | Age: 69
End: 2019-10-25

## 2019-10-25 LAB
ANION GAP SERPL CALC-SCNC: 8 MMOL/L — SIGNIFICANT CHANGE UP (ref 5–17)
APTT BLD: 31.7 SEC — SIGNIFICANT CHANGE UP (ref 27.5–36.3)
BLD GP AB SCN SERPL QL: NEGATIVE — SIGNIFICANT CHANGE UP
BUN SERPL-MCNC: 14 MG/DL — SIGNIFICANT CHANGE UP (ref 7–23)
CALCIUM SERPL-MCNC: 8.1 MG/DL — LOW (ref 8.4–10.5)
CHLORIDE SERPL-SCNC: 100 MMOL/L — SIGNIFICANT CHANGE UP (ref 96–108)
CO2 SERPL-SCNC: 30 MMOL/L — SIGNIFICANT CHANGE UP (ref 22–31)
CREAT SERPL-MCNC: 0.61 MG/DL — SIGNIFICANT CHANGE UP (ref 0.5–1.3)
GLUCOSE SERPL-MCNC: 108 MG/DL — HIGH (ref 70–99)
HCT VFR BLD CALC: 23.1 % — LOW (ref 39–50)
HCT VFR BLD CALC: 27 % — LOW (ref 39–50)
HGB BLD-MCNC: 7.2 G/DL — LOW (ref 13–17)
HGB BLD-MCNC: 8.3 G/DL — LOW (ref 13–17)
INR BLD: 1.48 RATIO — HIGH (ref 0.88–1.16)
MAGNESIUM SERPL-MCNC: 1.7 MG/DL — SIGNIFICANT CHANGE UP (ref 1.6–2.6)
MCHC RBC-ENTMCNC: 27.2 PG — SIGNIFICANT CHANGE UP (ref 27–34)
MCHC RBC-ENTMCNC: 27.5 PG — SIGNIFICANT CHANGE UP (ref 27–34)
MCHC RBC-ENTMCNC: 30.7 GM/DL — LOW (ref 32–36)
MCHC RBC-ENTMCNC: 31.2 GM/DL — LOW (ref 32–36)
MCV RBC AUTO: 88.2 FL — SIGNIFICANT CHANGE UP (ref 80–100)
MCV RBC AUTO: 88.5 FL — SIGNIFICANT CHANGE UP (ref 80–100)
NRBC # BLD: 0 /100 WBCS — SIGNIFICANT CHANGE UP (ref 0–0)
NRBC # BLD: 0 /100 WBCS — SIGNIFICANT CHANGE UP (ref 0–0)
NT-PROBNP SERPL-SCNC: 5816 PG/ML — HIGH (ref 0–300)
PHOSPHATE SERPL-MCNC: 2 MG/DL — LOW (ref 2.5–4.5)
PHOSPHATE SERPL-MCNC: 2 MG/DL — LOW (ref 2.5–4.5)
PLATELET # BLD AUTO: 270 K/UL — SIGNIFICANT CHANGE UP (ref 150–400)
PLATELET # BLD AUTO: 272 K/UL — SIGNIFICANT CHANGE UP (ref 150–400)
POTASSIUM SERPL-MCNC: 3.1 MMOL/L — LOW (ref 3.5–5.3)
POTASSIUM SERPL-MCNC: 4 MMOL/L — SIGNIFICANT CHANGE UP (ref 3.5–5.3)
POTASSIUM SERPL-SCNC: 3.1 MMOL/L — LOW (ref 3.5–5.3)
POTASSIUM SERPL-SCNC: 4 MMOL/L — SIGNIFICANT CHANGE UP (ref 3.5–5.3)
PROTHROM AB SERPL-ACNC: 17.1 SEC — HIGH (ref 10–12.9)
RBC # BLD: 2.62 M/UL — LOW (ref 4.2–5.8)
RBC # BLD: 3.05 M/UL — LOW (ref 4.2–5.8)
RBC # FLD: 16.3 % — HIGH (ref 10.3–14.5)
RBC # FLD: 16.7 % — HIGH (ref 10.3–14.5)
RH IG SCN BLD-IMP: POSITIVE — SIGNIFICANT CHANGE UP
SODIUM SERPL-SCNC: 138 MMOL/L — SIGNIFICANT CHANGE UP (ref 135–145)
WBC # BLD: 9.82 K/UL — SIGNIFICANT CHANGE UP (ref 3.8–10.5)
WBC # BLD: 9.99 K/UL — SIGNIFICANT CHANGE UP (ref 3.8–10.5)
WBC # FLD AUTO: 9.82 K/UL — SIGNIFICANT CHANGE UP (ref 3.8–10.5)
WBC # FLD AUTO: 9.99 K/UL — SIGNIFICANT CHANGE UP (ref 3.8–10.5)

## 2019-10-25 PROCEDURE — 99232 SBSQ HOSP IP/OBS MODERATE 35: CPT | Mod: GC

## 2019-10-25 RX ORDER — ENOXAPARIN SODIUM 100 MG/ML
40 INJECTION SUBCUTANEOUS DAILY
Refills: 0 | Status: DISCONTINUED | OUTPATIENT
Start: 2019-10-25 | End: 2019-11-01

## 2019-10-25 RX ORDER — POTASSIUM CHLORIDE 20 MEQ
10 PACKET (EA) ORAL
Refills: 0 | Status: COMPLETED | OUTPATIENT
Start: 2019-10-25 | End: 2019-10-25

## 2019-10-25 RX ORDER — IBUPROFEN 200 MG
400 TABLET ORAL EVERY 6 HOURS
Refills: 0 | Status: DISCONTINUED | OUTPATIENT
Start: 2019-10-25 | End: 2019-11-01

## 2019-10-25 RX ORDER — SENNA PLUS 8.6 MG/1
2 TABLET ORAL AT BEDTIME
Refills: 0 | Status: DISCONTINUED | OUTPATIENT
Start: 2019-10-25 | End: 2019-11-01

## 2019-10-25 RX ORDER — DRONABINOL 2.5 MG
2.5 CAPSULE ORAL
Refills: 0 | Status: DISCONTINUED | OUTPATIENT
Start: 2019-10-25 | End: 2019-11-01

## 2019-10-25 RX ORDER — SODIUM,POTASSIUM PHOSPHATES 278-250MG
2 POWDER IN PACKET (EA) ORAL ONCE
Refills: 0 | Status: COMPLETED | OUTPATIENT
Start: 2019-10-25 | End: 2019-10-25

## 2019-10-25 RX ORDER — ACETAMINOPHEN 500 MG
975 TABLET ORAL EVERY 6 HOURS
Refills: 0 | Status: DISCONTINUED | OUTPATIENT
Start: 2019-10-25 | End: 2019-11-01

## 2019-10-25 RX ORDER — POLYETHYLENE GLYCOL 3350 17 G/17G
17 POWDER, FOR SOLUTION ORAL ONCE
Refills: 0 | Status: COMPLETED | OUTPATIENT
Start: 2019-10-25 | End: 2019-10-25

## 2019-10-25 RX ADMIN — POLYETHYLENE GLYCOL 3350 17 GRAM(S): 17 POWDER, FOR SOLUTION ORAL at 13:44

## 2019-10-25 RX ADMIN — Medication 2 TABLET(S): at 13:44

## 2019-10-25 RX ADMIN — Medication 2.5 MILLIGRAM(S): at 18:19

## 2019-10-25 RX ADMIN — Medication 85 MILLIMOLE(S): at 23:20

## 2019-10-25 RX ADMIN — Medication 975 MILLIGRAM(S): at 13:43

## 2019-10-25 RX ADMIN — Medication 4: at 22:20

## 2019-10-25 RX ADMIN — Medication 3 MILLIGRAM(S): at 22:16

## 2019-10-25 RX ADMIN — HEPARIN SODIUM 5000 UNIT(S): 5000 INJECTION INTRAVENOUS; SUBCUTANEOUS at 13:43

## 2019-10-25 RX ADMIN — INSULIN GLARGINE 5 UNIT(S): 100 INJECTION, SOLUTION SUBCUTANEOUS at 22:17

## 2019-10-25 RX ADMIN — ENOXAPARIN SODIUM 40 MILLIGRAM(S): 100 INJECTION SUBCUTANEOUS at 22:19

## 2019-10-25 RX ADMIN — Medication 0.12 MILLIGRAM(S): at 06:11

## 2019-10-25 RX ADMIN — Medication 975 MILLIGRAM(S): at 18:19

## 2019-10-25 RX ADMIN — SENNA PLUS 2 TABLET(S): 8.6 TABLET ORAL at 22:16

## 2019-10-25 RX ADMIN — Medication 100 MILLIEQUIVALENT(S): at 13:42

## 2019-10-25 RX ADMIN — ATORVASTATIN CALCIUM 10 MILLIGRAM(S): 80 TABLET, FILM COATED ORAL at 22:16

## 2019-10-25 RX ADMIN — Medication 81 MILLIGRAM(S): at 13:42

## 2019-10-25 RX ADMIN — Medication 1 TABLET(S): at 13:42

## 2019-10-25 RX ADMIN — Medication 100 MILLIEQUIVALENT(S): at 18:23

## 2019-10-25 RX ADMIN — HEPARIN SODIUM 5000 UNIT(S): 5000 INJECTION INTRAVENOUS; SUBCUTANEOUS at 06:11

## 2019-10-25 RX ADMIN — Medication 100 MILLIEQUIVALENT(S): at 12:08

## 2019-10-25 NOTE — MEDICAL STUDENT PROGRESS NOTE(EDUCATION) - NS MD HP STUD ASPLAN ASSES FT
A/P: 69y male with extensive LE vaso-occlusive disease, prior LLE AKA, who was brought in from his nursing home facility with sepsis likely secondary to his chronically necrotic RLE. Also with volume overload. 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 POD 2 Right AKA     - dc plavix today - per cardiology, need at least 5 days of no plavix, Possible discharge for Thurs 10/31.    - Appreciate GI recs, possible PEG. possible Dobhoff for nutrition in meantime   - increased pain control    - wound care for R shoulder and buttocks wounds - wound care to see, rec cavalon to wounds.   - continue pureed diet - will discuss other routes of nutrition given recent dietary note - PEG vs other tubes   - continue lasix push  - condom cath instead of south

## 2019-10-25 NOTE — PROGRESS NOTE ADULT - ASSESSMENT
Impression:    1. Malnutrition. Albumin 2.3. Prealbumin 8. Per RN patient did not eat well yesterday but he was s/p R AKA. Currently weihgs 60 kg. In july recorded close to 90 KG but in the setting of volume overload    2. CVA on dual antiplatelet.    3. HFREF 15-20%    4. Dementia    5. Bacteremia on Ertapenem    Recommendation:  -appreciate cardiology recommendations, would also clarify w neuro if safe to hold plavix.  -I spoke w pt's nephew and HCP yesterday, who is interested in proceeding with PEG, however we are still concerned of risk patient may pull out PEG given poor mental status, causing perforation, will advise Impression:    1. Malnutrition. Albumin 2.3. Prealbumin 8. Per RN patient did not eat well yesterday but he was s/p R AKA. Currently weihgs 60 kg. In july recorded close to 90 KG but in the setting of volume overload    2. CVA on dual antiplatelet.    3. HFREF 15-20%    4. Dementia    5. Bacteremia on Ertapenem    Recommendation:  -I spoke w pt's nephew and HCP yesterday, who is interested in proceeding with PEG, however there is a high risk patient may pull out PEG given poor mental status, causing perforation/peritonitis and possibly death  -appreciate cardiology recommendations, would also clarify w neuro if safe to hold plavix  -If ok to hold Plavix, will plan for next week after 5 days has passed  -Continue NGT feeds for now

## 2019-10-25 NOTE — CHART NOTE - NSCHARTNOTEFT_GEN_A_CORE
Nutrition Follow Up Note  Patient seen for calorie count consult and malnutrition follow-up. Chart reviewed, events noted. Per chart, 70 y/o male "with extensive LE vaso-occlusive disease, prior LLE AKA, who was brought in from his nursing home facility with sepsis likely secondary to his chronically necrotic RLE. Also with volume overload. 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     Rick Count starts today, 10/25. Per previous RD note regarding first calorie count, " Overall intake is <300 calories daily.  Inadequate oral pro energy intake and unable to meet nutritional Increased nutrient needs on PO diet."  Calorie count re-ordered, "as patient's sepsis appears to have resolved, to accurately document inadequate PO intake as the need for PEG placement." Per RN, pt is also poor candidate for KO feeds due to the likelihood of pt becoming agitated.     Source of information: RN, EMR. Pt was sleeping at time of visit.    Diet: Puree, consistent carbohydrate, Ensure Enlive x2 and Ensure pudding x2    Per RN, pt ate 1/2 bowl oatmeal this morning, 1 bowl scrambled eggs, 1/2 cup coffee. For lunch, pt had 1/4 pudding cup and 1/8 applesauce cup. She states pt's intake is very variable. Ensure Enlive was ordered today, pt has not yet tried. Noted pt is on consistent carbohydrate diet, however recommend to trial Ensure as Ensure has increased calories and protein and pt has very poor PO intake. Monitor BG. If needed, recommend switch to Glucerna x2 daily. Kitchen will send up one Ensure Enlive and one Ensure pudding for dinner for pt to try.    PO intake : poor, <25% needs    Source for PO intake: RN    Daily Weight in k pounds 10/19 vs. 122 pounds 10/17, 139 pounds 10/15  Difficult to assess true weight changes with above numbers and S/P B/L AKA with  pounds adjusted.       Pertinent Medications: MEDICATIONS  (STANDING):  acetaminophen   Tablet .. 975 milliGRAM(s) Oral every 6 hours  aspirin  chewable 81 milliGRAM(s) Oral daily  atorvastatin 10 milliGRAM(s) Oral at bedtime  digoxin     Tablet 0.125 milliGRAM(s) Oral daily  dronabinol 2.5 milliGRAM(s) Oral two times a day  enoxaparin Injectable 40 milliGRAM(s) SubCutaneous daily  ertapenem  IVPB 1000 milliGRAM(s) IV Intermittent every 24 hours  furosemide   Injectable 20 milliGRAM(s) IV Push every 8 hours  influenza   Vaccine 0.5 milliLiter(s) IntraMuscular once  insulin glargine Injectable (LANTUS) 5 Unit(s) SubCutaneous at bedtime  insulin lispro (HumaLOG) corrective regimen sliding scale   SubCutaneous Before meals and at bedtime  melatonin 3 milliGRAM(s) Oral at bedtime  multivitamin 1 Tablet(s) Oral daily  nystatin    Suspension 642540 Unit(s) Oral two times a day  potassium chloride  10 mEq/100 mL IVPB 10 milliEquivalent(s) IV Intermittent every 1 hour  senna 2 Tablet(s) Oral at bedtime    MEDICATIONS  (PRN):  ibuprofen  Tablet. 400 milliGRAM(s) Oral every 6 hours PRN Mild Pain (1 - 3), Moderate Pain (4 - 6)  oxyCODONE    IR 5 milliGRAM(s) Oral every 6 hours PRN Severe Pain (7 - 10)    Pertinent Labs: 10-25 @ 09:13: Na 138, BUN 14, Cr 0.61, <H>, K+ 3.1<L>, Phos 2.0<L>, Mg 1.7, Alk Phos --, ALT/SGPT --, AST/SGOT --, HbA1c --    Finger Sticks:  POCT Blood Glucose.: 146 mg/dL (10-25 @ 13:13)  POCT Blood Glucose.: 104 mg/dL (10-25 @ 08:05)  POCT Blood Glucose.: 121 mg/dL (10-24 @ 22:22)  POCT Blood Glucose.: 180 mg/dL (10-24 @ 18:30)      Skin per nursing documentation:  Stage II pressure injury R shoulder, b/L sacral deterioration of IAD  Edema: none    Estimated Needs:   [ ] no change since previous assessment   [x] recalculated: Amputation (bilateral) IBW: 116 pounds (+/- 10%)= 52.7 kg  Estimated Energy Needs 25-30 kcal/[id number] kcal/d  Estimated Protein Needs 1.2-1.4 g/k-74 g/d  Estimated Fluid Needs 25-30 mL/k5664-4329 mL/d    Previous Nutrition Diagnosis: moderate malnutrition   Nutrition Diagnosis is: ongoing, currently addressed with nutrition supplements.     New Nutrition Diagnosis: none      Recommend  1) Recommend Add vitamin C, continue Multivitamin, remove Consistent Carbohydrate diet restriction  2) RD will follow-up to assess calorie count (10/25-10/27)      Monitoring and Evaluation:   Continue to monitor Nutritional intake, Tolerance to diet prescription, weights, labs, skin integrity    Silvia Kulkarni RD  Pager [phone]  RD remains available upon request and will follow up per protocol

## 2019-10-25 NOTE — PROGRESS NOTE ADULT - ASSESSMENT
A/P: 69y male with extensive LE vaso-occlusive disease, prior LLE AKA, who was brought in from his nursing home facility with sepsis likely secondary to his chronically necrotic RLE. Also with volume overload. 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 POD2 Right AKA     - wound care for R shoulder and buttocks wounds - wound care to see, rec cavalon to wounds.   - continue pureed diet - will discuss other routes of nutrition given recent dietary note - PEG vs other tubes   - Appreciate GI recs, possible PEG  - continue lasix push  - condom cath instead of south    Vascular Surgery   [id number]

## 2019-10-25 NOTE — PROGRESS NOTE ADULT - SUBJECTIVE AND OBJECTIVE BOX
SUBJECTIVE: Pt seen and examined at bedside. No acute events overnight. Pain well controlled.       MEDICATIONS  (STANDING):  aspirin  chewable 81 milliGRAM(s) Oral daily  atorvastatin 10 milliGRAM(s) Oral at bedtime  clopidogrel Tablet 75 milliGRAM(s) Oral daily  digoxin     Tablet 0.125 milliGRAM(s) Oral daily  ertapenem  IVPB 1000 milliGRAM(s) IV Intermittent every 24 hours  furosemide   Injectable 20 milliGRAM(s) IV Push every 8 hours  heparin  Injectable 5000 Unit(s) SubCutaneous every 8 hours  influenza   Vaccine 0.5 milliLiter(s) IntraMuscular once  insulin glargine Injectable (LANTUS) 5 Unit(s) SubCutaneous at bedtime  insulin lispro (HumaLOG) corrective regimen sliding scale   SubCutaneous Before meals and at bedtime  lactated ringers. 1000 milliLiter(s) (75 mL/Hr) IV Continuous <Continuous>  melatonin 3 milliGRAM(s) Oral at bedtime  multivitamin 1 Tablet(s) Oral daily  nystatin    Suspension 998626 Unit(s) Oral two times a day    MEDICATIONS  (PRN):  acetaminophen   Tablet .. 975 milliGRAM(s) Oral every 6 hours PRN Mild Pain (1 - 3), Moderate Pain (4 - 6)  oxyCODONE    IR 5 milliGRAM(s) Oral every 6 hours PRN Severe Pain (7 - 10)      OBJECTIVE:    Vital Signs Last 24 Hrs  T(C): 36.8 (24 Oct 2019 22:37), Max: 37.2 (24 Oct 2019 05:01)  T(F): 98.2 (24 Oct 2019 22:37), Max: 98.9 (24 Oct 2019 05:01)  HR: 80 (24 Oct 2019 22:37) (75 - 95)  BP: 104/58 (24 Oct 2019 22:37) (100/58 - 145/74)  BP(mean): --  RR: 18 (24 Oct 2019 22:37) (16 - 18)  SpO2: 95% (24 Oct 2019 22:37) (95% - 100%)    General Appearance: NAD   Neck: Supple, NCAT  Chest: non-labored breathing, no respiratory distress, equal chest rise  Abdomen: Soft, non-tender, non-distended, no guarding or rebound tenderness,   Extremities: RLE dressing c/d/i. LLE staples removed   Total IN: 2110 mL    I&O's Detail    23 Oct 2019 07:01  -  24 Oct 2019 07:00  --------------------------------------------------------  IN:    dextrose 5% + sodium chloride 0.45% with potassium chloride 20 mEq/L: 175 mL    IV PiggyBack: 450 mL    lactated ringers.: 1125 mL    Oral Fluid: 360 mL  Total IN: 2110 mL    OUT:  Total OUT: 0 mL    Total NET: 2110 mL      24 Oct 2019 07:01  -  25 Oct 2019 01:03  --------------------------------------------------------  IN:    lactated ringers.: 900 mL    Oral Fluid: 200 mL  Total IN: 1100 mL    OUT:  Total OUT: 0 mL    Total NET: 1100 mL                LABS:                        8.0    10.09 )-----------( 279      ( 23 Oct 2019 06:43 )             26.0     10-23    139  |  100  |  21  ----------------------------<  177<H>  3.4<L>   |  30  |  0.74    Ca    8.1<L>      23 Oct 2019 06:42  Phos  2.2     10-23  Mg     2.0     10-23    TPro  x   /  Alb  2.3<L>  /  TBili  x   /  DBili  x   /  AST  x   /  ALT  x   /  AlkPhos  x   10-23    PT/INR - ( 23 Oct 2019 06:47 )   PT: 15.9 sec;   INR: 1.37 ratio         PTT - ( 23 Oct 2019 06:47 )  PTT:29.7 sec      RADIOLOGY & ADDITIONAL STUDIES:

## 2019-10-25 NOTE — PROGRESS NOTE ADULT - SUBJECTIVE AND OBJECTIVE BOX
CC: f/u for morganella bacteremia    Patient reports nothing not talking today    REVIEW OF SYSTEMS:  All other review of systems cannot get (Comprehensive ROS)    Antimicrobials Day #  :11/14  ertapenem  IVPB 1000 milliGRAM(s) IV Intermittent every 24 hours  nystatin    Suspension 061700 Unit(s) Oral two times a day    Other Medications Reviewed    T(F): 97.8 (10-25-19 @ 09:10), Max: 98.4 (10-24-19 @ 10:41)  HR: 79 (10-25-19 @ 09:10)  BP: 105/62 (10-25-19 @ 09:10)  RR: 19 (10-25-19 @ 09:10)  SpO2: 96% (10-25-19 @ 09:10)  Wt(kg): --    PHYSICAL EXAM:  General: sleeps no acute distress  Eyes:  anicteric, no conjunctival injection, no discharge  Oropharynx: no lesions or injection 	  Neck: supple, without adenopathy  Lungs: clear to auscultation  Heart: regular rate and rhythm; no murmur, rubs or gallops  Abdomen: soft, nondistended, nontender, without mass or organomegaly  Skin: no lesions  Extremities: left stump wound healed, right dressed  Neurologic: awakens from sleep, not interacting    LAB RESULTS:                        7.2    9.82  )-----------( 270      ( 25 Oct 2019 09:13 )             23.1     10-25    138  |  100  |  14  ----------------------------<  108<H>  3.1<L>   |  30  |  0.61    Ca    8.1<L>      25 Oct 2019 09:13  Phos  2.0     10-25  Mg     1.7     10-25          MICROBIOLOGY:  RECENT CULTURES:  Culture - Blood (10.14.19 @ 10:46)    Specimen Source: .Blood    Culture Results:   No growth at 5 days.        RADIOLOGY REVIEWED:    < from: Xray Chest 1 View- PORTABLE-Routine (10.18.19 @ 08:52) >  EXAM:  XR CHEST PORTABLE ROUTINE 1V                            *** ADDENDUM 10/22/2019  ***                                                               Addendum:    Review of the film indicates that the right jugular catheter is present,   but partially obscured by overlying feeding tube and thoracic vertebral   bodies.        *** END OF ADDENDUM 10/22/2019  ***      PROCEDURE DATE:  10/18/2019            INTERPRETATION:  Chest one view    HISTORY: Pulmonary edema    COMPARISON STUDY: 10/17/2019    Frontal expiratory view of the chest shows the heart to be similar in   size. Feeding tube remains present. Right jugular line has been removed.   The lungs show normal pulmonary vascularity, likely with skin fold over   the left chest. There is no evidence of pneumothorax nor pleural effusion.    IMPRESSION:  No evidence of pulmonary congestion.     Thank you for the courtesy of this referral.      < end of copied text >            Assessment:  Patient with pvd, s/p left aka now s/p right aka for gangrene, morganella bacteremia from limb cleared. Has dry gangrene of sacral area which is getting local care  Plan:  3 more days of ertapenem  local care to stumps and back

## 2019-10-25 NOTE — MEDICAL STUDENT PROGRESS NOTE(EDUCATION) - SUBJECTIVE AND OBJECTIVE BOX
SUBJECTIVE: Pt seen and examined at bedside. No acute events overnight.    MEDICATIONS  (STANDING):  aspirin  chewable 81 milliGRAM(s) Oral daily  atorvastatin 10 milliGRAM(s) Oral at bedtime  clopidogrel Tablet 75 milliGRAM(s) Oral daily  digoxin     Tablet 0.125 milliGRAM(s) Oral daily  ertapenem  IVPB 1000 milliGRAM(s) IV Intermittent every 24 hours  furosemide   Injectable 20 milliGRAM(s) IV Push every 8 hours  heparin  Injectable 5000 Unit(s) SubCutaneous every 8 hours  influenza   Vaccine 0.5 milliLiter(s) IntraMuscular once  insulin glargine Injectable (LANTUS) 5 Unit(s) SubCutaneous at bedtime  insulin lispro (HumaLOG) corrective regimen sliding scale   SubCutaneous Before meals and at bedtime  lactated ringers. 1000 milliLiter(s) (75 mL/Hr) IV Continuous <Continuous>  melatonin 3 milliGRAM(s) Oral at bedtime  multivitamin 1 Tablet(s) Oral daily  nystatin    Suspension 730572 Unit(s) Oral two times a day    MEDICATIONS  (PRN):  acetaminophen   Tablet .. 975 milliGRAM(s) Oral every 6 hours PRN Mild Pain (1 - 3), Moderate Pain (4 - 6)  oxyCODONE    IR 5 milliGRAM(s) Oral every 6 hours PRN Severe Pain (7 - 10)      OBJECTIVE:  V/S:  Vital Signs Last 24 Hrs  T(C): 36.7 (25 Oct 2019 05:00), Max: 36.9 (24 Oct 2019 10:41)  T(F): 98 (25 Oct 2019 05:00), Max: 98.4 (24 Oct 2019 10:41)  HR: 77 (25 Oct 2019 05:00) (76 - 95)  BP: 109/62 (25 Oct 2019 05:00) (104/58 - 145/74)  BP(mean): --  RR: 20 (25 Oct 2019 05:00) (17 - 20)  SpO2: 97% (25 Oct 2019 05:00) (95% - 100%)    General Appearance: NAD   Neck: Supple, NCAT  Chest: non-labored breathing, no respiratory distress, equal chest rise  Abdomen: Soft, non-tender, non-distended, no guarding or rebound tenderness,   Extremities: RLE dressing c/d/i. LLE staples removed       --------------------------------------------------------------------------------------------------  I/Os:    24 Oct 2019 07:01  -  25 Oct 2019 07:00  --------------------------------------------------------  IN:    IV PiggyBack: 50 mL    lactated ringers.: 1725 mL    Oral Fluid: 260 mL  Total IN: 2035 mL    OUT:  Total OUT: 0 mL    Total NET: 2035 mL        --------------------------------------------------------------------------------------------------  LABS:                        7.8    12.14 )-----------( 304      ( 24 Oct 2019 09:43 )             25.0     24 Oct 2019 09:43    136    |  96     |  17     ----------------------------<  170    3.5     |  29     |  0.75     Ca    8.1        24 Oct 2019 09:43    TPro  x      /  Alb  2.3    /  TBili  x      /  DBili  x      /  AST  x      /  ALT  x      /  AlkPhos  x      23 Oct 2019 09:51    PT/INR - ( 24 Oct 2019 09:43 )   PT: 15.9 sec;   INR: 1.37 ratio         PTT - ( 24 Oct 2019 09:43 )  PTT:29.4 sec  CAPILLARY BLOOD GLUCOSE      POCT Blood Glucose.: 104 mg/dL (25 Oct 2019 08:05)  POCT Blood Glucose.: 121 mg/dL (24 Oct 2019 22:22)  POCT Blood Glucose.: 180 mg/dL (24 Oct 2019 18:30)  POCT Blood Glucose.: 192 mg/dL (24 Oct 2019 13:14)  POCT Blood Glucose.: 191 mg/dL (24 Oct 2019 08:59)        LIVER FUNCTIONS - ( 23 Oct 2019 09:51 )  Alb: 2.3 g/dL / Pro: x     / ALK PHOS: x     / ALT: x     / AST: x     / GGT: x               --------------------------------------------------------------------------------------------------  MEDICATIONS  (STANDING):  acetaminophen   Tablet .. 975 milliGRAM(s) Oral every 6 hours  aspirin  chewable 81 milliGRAM(s) Oral daily  atorvastatin 10 milliGRAM(s) Oral at bedtime  digoxin     Tablet 0.125 milliGRAM(s) Oral daily  ertapenem  IVPB 1000 milliGRAM(s) IV Intermittent every 24 hours  furosemide   Injectable 20 milliGRAM(s) IV Push every 8 hours  heparin  Injectable 5000 Unit(s) SubCutaneous every 8 hours  influenza   Vaccine 0.5 milliLiter(s) IntraMuscular once  insulin glargine Injectable (LANTUS) 5 Unit(s) SubCutaneous at bedtime  insulin lispro (HumaLOG) corrective regimen sliding scale   SubCutaneous Before meals and at bedtime  lactated ringers. 1000 milliLiter(s) (75 mL/Hr) IV Continuous <Continuous>  melatonin 3 milliGRAM(s) Oral at bedtime  multivitamin 1 Tablet(s) Oral daily  nystatin    Suspension 139925 Unit(s) Oral two times a day    MEDICATIONS  (PRN):  ibuprofen  Tablet. 400 milliGRAM(s) Oral every 6 hours PRN Mild Pain (1 - 3), Moderate Pain (4 - 6)  oxyCODONE    IR 5 milliGRAM(s) Oral every 6 hours PRN Severe Pain (7 - 10)

## 2019-10-25 NOTE — PROGRESS NOTE ADULT - SUBJECTIVE AND OBJECTIVE BOX
Patient is a 69y old  Male who presents with a chief complaint of Altered Mental Status (25 Oct 2019 01:02)      SUBJECTIVE / OVERNIGHT EVENTS:  no events  MEDICATIONS  (STANDING):  acetaminophen   Tablet .. 975 milliGRAM(s) Oral every 6 hours  aspirin  chewable 81 milliGRAM(s) Oral daily  atorvastatin 10 milliGRAM(s) Oral at bedtime  digoxin     Tablet 0.125 milliGRAM(s) Oral daily  ertapenem  IVPB 1000 milliGRAM(s) IV Intermittent every 24 hours  furosemide   Injectable 20 milliGRAM(s) IV Push every 8 hours  heparin  Injectable 5000 Unit(s) SubCutaneous every 8 hours  influenza   Vaccine 0.5 milliLiter(s) IntraMuscular once  insulin glargine Injectable (LANTUS) 5 Unit(s) SubCutaneous at bedtime  insulin lispro (HumaLOG) corrective regimen sliding scale   SubCutaneous Before meals and at bedtime  lactated ringers. 1000 milliLiter(s) (75 mL/Hr) IV Continuous <Continuous>  melatonin 3 milliGRAM(s) Oral at bedtime  multivitamin 1 Tablet(s) Oral daily  nystatin    Suspension 175100 Unit(s) Oral two times a day  polyethylene glycol 3350 17 Gram(s) Oral once  senna 2 Tablet(s) Oral at bedtime    MEDICATIONS  (PRN):  ibuprofen  Tablet. 400 milliGRAM(s) Oral every 6 hours PRN Mild Pain (1 - 3), Moderate Pain (4 - 6)  oxyCODONE    IR 5 milliGRAM(s) Oral every 6 hours PRN Severe Pain (7 - 10)              PHYSICAL EXAM:  GENERAL: NAD, well-developed  HEAD:  Atraumatic, Normocephalic  EYES: EOMI, PERRLA, conjunctiva and sclera anicteric  NECK: Supple, No JVD  CHEST/LUNG: Clear to auscultation bilaterally; No wheeze  HEART: Regular rate and rhythm; No murmurs, rubs, or gallops  ABDOMEN: Soft, Nontender, Nondistended; Bowel sounds present, no hepatosplenomegaly, no rebound or guarding  EXTREMITIES:  B/L BKA    NEUROLOGY: min verbal  SKIN: No rashes or lesion    LABS:                        7.2    9.82  )-----------( 270      ( 25 Oct 2019 09:13 )             23.1     10-24    136  |  96  |  17  ----------------------------<  170<H>  3.5   |  29  |  0.75    Ca    8.1<L>      24 Oct 2019 09:43    TPro  x   /  Alb  2.3<L>  /  TBili  x   /  DBili  x   /  AST  x   /  ALT  x   /  AlkPhos  x   10-23    LIVER FUNCTIONS - ( 23 Oct 2019 09:51 )  Alb: 2.3 g/dL / Pro: x     / ALK PHOS: x     / ALT: x     / AST: x     / GGT: x           PT/INR - ( 24 Oct 2019 09:43 )   PT: 15.9 sec;   INR: 1.37 ratio         PTT - ( 24 Oct 2019 09:43 )  PTT:29.4 sec          RADIOLOGY & ADDITIONAL TESTS: Patient is a 69y old  Male who presents with a chief complaint of Altered Mental Status (25 Oct 2019 01:02)      SUBJECTIVE / OVERNIGHT EVENTS:  no events. Still confused.    MEDICATIONS  (STANDING):  acetaminophen   Tablet .. 975 milliGRAM(s) Oral every 6 hours  aspirin  chewable 81 milliGRAM(s) Oral daily  atorvastatin 10 milliGRAM(s) Oral at bedtime  digoxin     Tablet 0.125 milliGRAM(s) Oral daily  ertapenem  IVPB 1000 milliGRAM(s) IV Intermittent every 24 hours  furosemide   Injectable 20 milliGRAM(s) IV Push every 8 hours  heparin  Injectable 5000 Unit(s) SubCutaneous every 8 hours  influenza   Vaccine 0.5 milliLiter(s) IntraMuscular once  insulin glargine Injectable (LANTUS) 5 Unit(s) SubCutaneous at bedtime  insulin lispro (HumaLOG) corrective regimen sliding scale   SubCutaneous Before meals and at bedtime  lactated ringers. 1000 milliLiter(s) (75 mL/Hr) IV Continuous <Continuous>  melatonin 3 milliGRAM(s) Oral at bedtime  multivitamin 1 Tablet(s) Oral daily  nystatin    Suspension 800472 Unit(s) Oral two times a day  polyethylene glycol 3350 17 Gram(s) Oral once  senna 2 Tablet(s) Oral at bedtime    MEDICATIONS  (PRN):  ibuprofen  Tablet. 400 milliGRAM(s) Oral every 6 hours PRN Mild Pain (1 - 3), Moderate Pain (4 - 6)  oxyCODONE    IR 5 milliGRAM(s) Oral every 6 hours PRN Severe Pain (7 - 10)              PHYSICAL EXAM:  GENERAL: NAD, well-developed  HEAD:  Atraumatic, Normocephalic  EYES: EOMI, PERRLA, conjunctiva and sclera anicteric  NECK: Supple, No JVD  CHEST/LUNG: Clear to auscultation bilaterally; No wheeze  HEART: Regular rate and rhythm; No murmurs, rubs, or gallops  ABDOMEN: Soft, Nontender, Nondistended; Bowel sounds present, no hepatosplenomegaly, no rebound or guarding  EXTREMITIES:  B/L BKA    NEUROLOGY: min verbal  SKIN: No rashes or lesion    LABS:                        7.2    9.82  )-----------( 270      ( 25 Oct 2019 09:13 )             23.1     10-24    136  |  96  |  17  ----------------------------<  170<H>  3.5   |  29  |  0.75    Ca    8.1<L>      24 Oct 2019 09:43    TPro  x   /  Alb  2.3<L>  /  TBili  x   /  DBili  x   /  AST  x   /  ALT  x   /  AlkPhos  x   10-23    LIVER FUNCTIONS - ( 23 Oct 2019 09:51 )  Alb: 2.3 g/dL / Pro: x     / ALK PHOS: x     / ALT: x     / AST: x     / GGT: x           PT/INR - ( 24 Oct 2019 09:43 )   PT: 15.9 sec;   INR: 1.37 ratio         PTT - ( 24 Oct 2019 09:43 )  PTT:29.4 sec

## 2019-10-26 LAB
ANION GAP SERPL CALC-SCNC: 11 MMOL/L — SIGNIFICANT CHANGE UP (ref 5–17)
BUN SERPL-MCNC: 12 MG/DL — SIGNIFICANT CHANGE UP (ref 7–23)
CALCIUM SERPL-MCNC: 8 MG/DL — LOW (ref 8.4–10.5)
CHLORIDE SERPL-SCNC: 101 MMOL/L — SIGNIFICANT CHANGE UP (ref 96–108)
CO2 SERPL-SCNC: 28 MMOL/L — SIGNIFICANT CHANGE UP (ref 22–31)
CREAT SERPL-MCNC: 0.5 MG/DL — SIGNIFICANT CHANGE UP (ref 0.5–1.3)
GLUCOSE SERPL-MCNC: 156 MG/DL — HIGH (ref 70–99)
HCT VFR BLD CALC: 25.9 % — LOW (ref 39–50)
HGB BLD-MCNC: 8.3 G/DL — LOW (ref 13–17)
MAGNESIUM SERPL-MCNC: 1.7 MG/DL — SIGNIFICANT CHANGE UP (ref 1.6–2.6)
MCHC RBC-ENTMCNC: 28 PG — SIGNIFICANT CHANGE UP (ref 27–34)
MCHC RBC-ENTMCNC: 32 GM/DL — SIGNIFICANT CHANGE UP (ref 32–36)
MCV RBC AUTO: 87.5 FL — SIGNIFICANT CHANGE UP (ref 80–100)
NRBC # BLD: 0 /100 WBCS — SIGNIFICANT CHANGE UP (ref 0–0)
PHOSPHATE SERPL-MCNC: 3.1 MG/DL — SIGNIFICANT CHANGE UP (ref 2.5–4.5)
PLATELET # BLD AUTO: 284 K/UL — SIGNIFICANT CHANGE UP (ref 150–400)
POTASSIUM SERPL-MCNC: 3.1 MMOL/L — LOW (ref 3.5–5.3)
POTASSIUM SERPL-MCNC: 3.9 MMOL/L — SIGNIFICANT CHANGE UP (ref 3.5–5.3)
POTASSIUM SERPL-SCNC: 3.1 MMOL/L — LOW (ref 3.5–5.3)
POTASSIUM SERPL-SCNC: 3.9 MMOL/L — SIGNIFICANT CHANGE UP (ref 3.5–5.3)
RBC # BLD: 2.96 M/UL — LOW (ref 4.2–5.8)
RBC # FLD: 16.2 % — HIGH (ref 10.3–14.5)
RETINOL BIND PROT SERPL-MCNC: 1.6 MG/DL — SIGNIFICANT CHANGE UP (ref 1.5–6.7)
SODIUM SERPL-SCNC: 140 MMOL/L — SIGNIFICANT CHANGE UP (ref 135–145)
WBC # BLD: 10.29 K/UL — SIGNIFICANT CHANGE UP (ref 3.8–10.5)
WBC # FLD AUTO: 10.29 K/UL — SIGNIFICANT CHANGE UP (ref 3.8–10.5)

## 2019-10-26 RX ORDER — POTASSIUM CHLORIDE 20 MEQ
10 PACKET (EA) ORAL ONCE
Refills: 0 | Status: DISCONTINUED | OUTPATIENT
Start: 2019-10-26 | End: 2019-10-26

## 2019-10-26 RX ORDER — ASCORBIC ACID 60 MG
500 TABLET,CHEWABLE ORAL DAILY
Refills: 0 | Status: DISCONTINUED | OUTPATIENT
Start: 2019-10-26 | End: 2019-11-01

## 2019-10-26 RX ORDER — POTASSIUM CHLORIDE 20 MEQ
10 PACKET (EA) ORAL
Refills: 0 | Status: COMPLETED | OUTPATIENT
Start: 2019-10-26 | End: 2019-10-26

## 2019-10-26 RX ORDER — SODIUM,POTASSIUM PHOSPHATES 278-250MG
3 POWDER IN PACKET (EA) ORAL ONCE
Refills: 0 | Status: DISCONTINUED | OUTPATIENT
Start: 2019-10-26 | End: 2019-10-26

## 2019-10-26 RX ADMIN — Medication 100 MILLIEQUIVALENT(S): at 14:09

## 2019-10-26 RX ADMIN — SENNA PLUS 2 TABLET(S): 8.6 TABLET ORAL at 22:29

## 2019-10-26 RX ADMIN — ERTAPENEM SODIUM 120 MILLIGRAM(S): 1 INJECTION, POWDER, LYOPHILIZED, FOR SOLUTION INTRAMUSCULAR; INTRAVENOUS at 22:39

## 2019-10-26 RX ADMIN — Medication 975 MILLIGRAM(S): at 11:53

## 2019-10-26 RX ADMIN — Medication 100 MILLIEQUIVALENT(S): at 13:00

## 2019-10-26 RX ADMIN — ATORVASTATIN CALCIUM 10 MILLIGRAM(S): 80 TABLET, FILM COATED ORAL at 22:29

## 2019-10-26 RX ADMIN — ENOXAPARIN SODIUM 40 MILLIGRAM(S): 100 INJECTION SUBCUTANEOUS at 11:53

## 2019-10-26 RX ADMIN — Medication 2.5 MILLIGRAM(S): at 18:27

## 2019-10-26 RX ADMIN — ERTAPENEM SODIUM 120 MILLIGRAM(S): 1 INJECTION, POWDER, LYOPHILIZED, FOR SOLUTION INTRAMUSCULAR; INTRAVENOUS at 00:43

## 2019-10-26 RX ADMIN — Medication 1 TABLET(S): at 11:53

## 2019-10-26 RX ADMIN — Medication 20 MILLIGRAM(S): at 05:46

## 2019-10-26 RX ADMIN — Medication 100 MILLIEQUIVALENT(S): at 11:52

## 2019-10-26 RX ADMIN — Medication 500 MILLIGRAM(S): at 11:52

## 2019-10-26 RX ADMIN — Medication 3 MILLIGRAM(S): at 22:29

## 2019-10-26 RX ADMIN — Medication 975 MILLIGRAM(S): at 18:27

## 2019-10-26 RX ADMIN — Medication 4: at 14:10

## 2019-10-26 RX ADMIN — Medication 81 MILLIGRAM(S): at 11:53

## 2019-10-26 NOTE — PROGRESS NOTE ADULT - ASSESSMENT
A/P: 69y male with extensive LE vaso-occlusive disease, prior LLE AKA, who was brought in from his nursing home facility with sepsis likely secondary to his chronically necrotic RLE. Also with volume overload. 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 POD2 Right AKA     - pain control   - wound care for R shoulder and buttocks wounds - wound care to see, rec cavalon to wounds.   - continue pureed diet - will discuss other routes of nutrition given recent dietary note - PEG vs other tubes   - calorie count  - dronabinol   - Appreciate GI recs, possible PEG  - last day of ertapenem   - continue lasix push  - condom cath instead of south    Discussed plan with vascular surgery team   Vascular Surgery   [id number]

## 2019-10-26 NOTE — PROGRESS NOTE ADULT - SUBJECTIVE AND OBJECTIVE BOX
SUBJECTIVE: Pt seen and examined at bedside. No acute events overnight. Pain well controlled. Denies chest pain, shortness of breath, nausea, vomiting or fever.     Fletcher:  RAGINIT:  DASHA:  MEDICATIONS  (STANDING):  acetaminophen   Tablet .. 975 milliGRAM(s) Oral every 6 hours  ascorbic acid 500 milliGRAM(s) Oral daily  aspirin  chewable 81 milliGRAM(s) Oral daily  atorvastatin 10 milliGRAM(s) Oral at bedtime  digoxin     Tablet 0.125 milliGRAM(s) Oral daily  dronabinol 2.5 milliGRAM(s) Oral two times a day  enoxaparin Injectable 40 milliGRAM(s) SubCutaneous daily  ertapenem  IVPB 1000 milliGRAM(s) IV Intermittent every 24 hours  furosemide   Injectable 20 milliGRAM(s) IV Push every 8 hours  influenza   Vaccine 0.5 milliLiter(s) IntraMuscular once  insulin glargine Injectable (LANTUS) 5 Unit(s) SubCutaneous at bedtime  insulin lispro (HumaLOG) corrective regimen sliding scale   SubCutaneous Before meals and at bedtime  melatonin 3 milliGRAM(s) Oral at bedtime  multivitamin 1 Tablet(s) Oral daily  nystatin    Suspension 417706 Unit(s) Oral two times a day  potassium chloride  10 mEq/50 mL IVPB 10 milliEquivalent(s) IV Intermittent once  senna 2 Tablet(s) Oral at bedtime    MEDICATIONS  (PRN):  ibuprofen  Tablet. 400 milliGRAM(s) Oral every 6 hours PRN Mild Pain (1 - 3), Moderate Pain (4 - 6)  oxyCODONE    IR 5 milliGRAM(s) Oral every 6 hours PRN Severe Pain (7 - 10)      OBJECTIVE:    Vital Signs Last 24 Hrs  T(C): 36.7 (26 Oct 2019 09:05), Max: 37 (26 Oct 2019 01:00)  T(F): 98 (26 Oct 2019 09:05), Max: 98.6 (26 Oct 2019 01:00)  HR: 73 (26 Oct 2019 09:05) (71 - 80)  BP: 120/72 (26 Oct 2019 09:05) (101/61 - 120/72)  BP(mean): --  RR: 18 (26 Oct 2019 09:05) (18 - 19)  SpO2: 97% (26 Oct 2019 09:05) (95% - 99%)    General Appearance: NAD   Neck: Supple, NCAT  Chest: non-labored breathing, no respiratory distress, equal chest rise  Abdomen: Soft, non-tender, non-distended, no guarding or rebound tenderness,   Extremities: RLE dressing c/d/i.  I&O's Summary    25 Oct 2019 07:01  -  26 Oct 2019 07:00  --------------------------------------------------------  IN: 900 mL / OUT: 450 mL / NET: 450 mL      I&O's Detail    25 Oct 2019 07:01  -  26 Oct 2019 07:00  --------------------------------------------------------  IN:    IV PiggyBack: 550 mL    Oral Fluid: 350 mL  Total IN: 900 mL    OUT:    Voided: 450 mL  Total OUT: 450 mL    Total NET: 450 mL            LABS:                        8.3    10.29 )-----------( 284      ( 26 Oct 2019 08:35 )             25.9     10-26    140  |  101  |  12  ----------------------------<  156<H>  3.1<L>   |  28  |  0.50    Ca    8.0<L>      26 Oct 2019 08:35  Phos  3.1     10-26  Mg     1.7     10-26      PT/INR - ( 25 Oct 2019 09:13 )   PT: 17.1 sec;   INR: 1.48 ratio         PTT - ( 25 Oct 2019 09:13 )  PTT:31.7 sec      RADIOLOGY & ADDITIONAL STUDIES:

## 2019-10-27 LAB
ANION GAP SERPL CALC-SCNC: 9 MMOL/L — SIGNIFICANT CHANGE UP (ref 5–17)
BUN SERPL-MCNC: 12 MG/DL — SIGNIFICANT CHANGE UP (ref 7–23)
CALCIUM SERPL-MCNC: 8.3 MG/DL — LOW (ref 8.4–10.5)
CHLORIDE SERPL-SCNC: 103 MMOL/L — SIGNIFICANT CHANGE UP (ref 96–108)
CO2 SERPL-SCNC: 27 MMOL/L — SIGNIFICANT CHANGE UP (ref 22–31)
CREAT SERPL-MCNC: 0.55 MG/DL — SIGNIFICANT CHANGE UP (ref 0.5–1.3)
GLUCOSE SERPL-MCNC: 126 MG/DL — HIGH (ref 70–99)
HCT VFR BLD CALC: 32.1 % — LOW (ref 39–50)
HGB BLD-MCNC: 9.7 G/DL — LOW (ref 13–17)
MAGNESIUM SERPL-MCNC: 1.7 MG/DL — SIGNIFICANT CHANGE UP (ref 1.6–2.6)
MCHC RBC-ENTMCNC: 27.4 PG — SIGNIFICANT CHANGE UP (ref 27–34)
MCHC RBC-ENTMCNC: 30.2 GM/DL — LOW (ref 32–36)
MCV RBC AUTO: 90.7 FL — SIGNIFICANT CHANGE UP (ref 80–100)
NRBC # BLD: 0 /100 WBCS — SIGNIFICANT CHANGE UP (ref 0–0)
PHOSPHATE SERPL-MCNC: 2.4 MG/DL — LOW (ref 2.5–4.5)
PLATELET # BLD AUTO: 312 K/UL — SIGNIFICANT CHANGE UP (ref 150–400)
POTASSIUM SERPL-MCNC: 4.4 MMOL/L — SIGNIFICANT CHANGE UP (ref 3.5–5.3)
POTASSIUM SERPL-SCNC: 4.4 MMOL/L — SIGNIFICANT CHANGE UP (ref 3.5–5.3)
RBC # BLD: 3.54 M/UL — LOW (ref 4.2–5.8)
RBC # FLD: 16.4 % — HIGH (ref 10.3–14.5)
SODIUM SERPL-SCNC: 139 MMOL/L — SIGNIFICANT CHANGE UP (ref 135–145)
WBC # BLD: 11.83 K/UL — HIGH (ref 3.8–10.5)
WBC # FLD AUTO: 11.83 K/UL — HIGH (ref 3.8–10.5)

## 2019-10-27 RX ORDER — CALCIUM CARBONATE 500(1250)
1 TABLET ORAL DAILY
Refills: 0 | Status: DISCONTINUED | OUTPATIENT
Start: 2019-10-27 | End: 2019-11-01

## 2019-10-27 RX ADMIN — Medication 0.12 MILLIGRAM(S): at 05:41

## 2019-10-27 RX ADMIN — Medication 975 MILLIGRAM(S): at 05:43

## 2019-10-27 RX ADMIN — Medication 4: at 14:51

## 2019-10-27 RX ADMIN — Medication 500 MILLIGRAM(S): at 13:35

## 2019-10-27 RX ADMIN — Medication 500000 UNIT(S): at 17:50

## 2019-10-27 RX ADMIN — Medication 1 TABLET(S): at 13:35

## 2019-10-27 RX ADMIN — Medication 2.5 MILLIGRAM(S): at 05:41

## 2019-10-27 RX ADMIN — Medication 20 MILLIGRAM(S): at 13:34

## 2019-10-27 RX ADMIN — Medication 20 MILLIGRAM(S): at 05:42

## 2019-10-27 RX ADMIN — Medication 2.5 MILLIGRAM(S): at 17:51

## 2019-10-27 RX ADMIN — Medication 500000 UNIT(S): at 05:43

## 2019-10-27 RX ADMIN — Medication 975 MILLIGRAM(S): at 13:34

## 2019-10-27 RX ADMIN — Medication 975 MILLIGRAM(S): at 17:50

## 2019-10-27 RX ADMIN — INSULIN GLARGINE 5 UNIT(S): 100 INJECTION, SOLUTION SUBCUTANEOUS at 23:01

## 2019-10-27 RX ADMIN — ENOXAPARIN SODIUM 40 MILLIGRAM(S): 100 INJECTION SUBCUTANEOUS at 13:34

## 2019-10-27 RX ADMIN — Medication 81 MILLIGRAM(S): at 13:35

## 2019-10-27 RX ADMIN — Medication 975 MILLIGRAM(S): at 06:30

## 2019-10-27 RX ADMIN — INSULIN GLARGINE 5 UNIT(S): 100 INJECTION, SOLUTION SUBCUTANEOUS at 00:04

## 2019-10-27 NOTE — PROGRESS NOTE ADULT - SUBJECTIVE AND OBJECTIVE BOX
SUBJECTIVE: Pt seen and examined at bedside. No acute events overnight. Pain well controlled. Denies chest pain, shortness of breath, nausea, vomiting or fever.     Fletcher:  RAGINIT:  DASHA:  MEDICATIONS  (STANDING):  acetaminophen   Tablet .. 975 milliGRAM(s) Oral every 6 hours  ascorbic acid 500 milliGRAM(s) Oral daily  aspirin  chewable 81 milliGRAM(s) Oral daily  atorvastatin 10 milliGRAM(s) Oral at bedtime  digoxin     Tablet 0.125 milliGRAM(s) Oral daily  dronabinol 2.5 milliGRAM(s) Oral two times a day  enoxaparin Injectable 40 milliGRAM(s) SubCutaneous daily  ertapenem  IVPB 1000 milliGRAM(s) IV Intermittent every 24 hours  furosemide   Injectable 20 milliGRAM(s) IV Push every 8 hours  influenza   Vaccine 0.5 milliLiter(s) IntraMuscular once  insulin glargine Injectable (LANTUS) 5 Unit(s) SubCutaneous at bedtime  insulin lispro (HumaLOG) corrective regimen sliding scale   SubCutaneous Before meals and at bedtime  melatonin 3 milliGRAM(s) Oral at bedtime  multivitamin 1 Tablet(s) Oral daily  nystatin    Suspension 460406 Unit(s) Oral two times a day  senna 2 Tablet(s) Oral at bedtime    MEDICATIONS  (PRN):  ibuprofen  Tablet. 400 milliGRAM(s) Oral every 6 hours PRN Mild Pain (1 - 3), Moderate Pain (4 - 6)  oxyCODONE    IR 5 milliGRAM(s) Oral every 6 hours PRN Severe Pain (7 - 10)      OBJECTIVE:    Vital Signs Last 24 Hrs  T(C): 36.7 (26 Oct 2019 22:00), Max: 36.7 (26 Oct 2019 05:15)  T(F): 98.1 (26 Oct 2019 22:00), Max: 98.1 (26 Oct 2019 05:15)  HR: 68 (26 Oct 2019 22:00) (68 - 80)  BP: 93/52 (26 Oct 2019 22:00) (93/52 - 120/72)  BP(mean): --  RR: 17 (26 Oct 2019 22:00) (16 - 19)  SpO2: 100% (26 Oct 2019 22:00) (97% - 100%)    General Appearance: NAD   Neck: Supple, NCAT  Chest: non-labored breathing, no respiratory distress, equal chest rise  Abdomen: Soft, non-tender, non-distended, no guarding or rebound tenderness,   Extremities: RLE dressing c/d/i.    I&O's Summary    25 Oct 2019 07:01  -  26 Oct 2019 07:00  --------------------------------------------------------  IN: 900 mL / OUT: 450 mL / NET: 450 mL    26 Oct 2019 07:01  -  27 Oct 2019 01:01  --------------------------------------------------------  IN: 200 mL / OUT: 0 mL / NET: 200 mL      I&O's Detail    25 Oct 2019 07:01  -  26 Oct 2019 07:00  --------------------------------------------------------  IN:    IV PiggyBack: 550 mL    Oral Fluid: 350 mL  Total IN: 900 mL    OUT:    Voided: 450 mL  Total OUT: 450 mL    Total NET: 450 mL      26 Oct 2019 07:01  -  27 Oct 2019 01:01  --------------------------------------------------------  IN:    Oral Fluid: 200 mL  Total IN: 200 mL    OUT:  Total OUT: 0 mL    Total NET: 200 mL            LABS:                        8.3    10.29 )-----------( 284      ( 26 Oct 2019 08:35 )             25.9     10-26    x   |  x   |  x   ----------------------------<  x   3.9   |  x   |  x     Ca    8.0<L>      26 Oct 2019 08:35  Phos  3.1     10-26  Mg     1.7     10-26      PT/INR - ( 25 Oct 2019 09:13 )   PT: 17.1 sec;   INR: 1.48 ratio         PTT - ( 25 Oct 2019 09:13 )  PTT:31.7 sec      RADIOLOGY & ADDITIONAL STUDIES: SUBJECTIVE: Pt seen and examined at bedside. No acute events overnight. Pain well controlled. Denies chest pain, shortness of breath, nausea, vomiting or fever.       MEDICATIONS  (STANDING):  acetaminophen   Tablet .. 975 milliGRAM(s) Oral every 6 hours  ascorbic acid 500 milliGRAM(s) Oral daily  aspirin  chewable 81 milliGRAM(s) Oral daily  atorvastatin 10 milliGRAM(s) Oral at bedtime  digoxin     Tablet 0.125 milliGRAM(s) Oral daily  dronabinol 2.5 milliGRAM(s) Oral two times a day  enoxaparin Injectable 40 milliGRAM(s) SubCutaneous daily  ertapenem  IVPB 1000 milliGRAM(s) IV Intermittent every 24 hours  furosemide   Injectable 20 milliGRAM(s) IV Push every 8 hours  influenza   Vaccine 0.5 milliLiter(s) IntraMuscular once  insulin glargine Injectable (LANTUS) 5 Unit(s) SubCutaneous at bedtime  insulin lispro (HumaLOG) corrective regimen sliding scale   SubCutaneous Before meals and at bedtime  melatonin 3 milliGRAM(s) Oral at bedtime  multivitamin 1 Tablet(s) Oral daily  nystatin    Suspension 569693 Unit(s) Oral two times a day  senna 2 Tablet(s) Oral at bedtime    MEDICATIONS  (PRN):  ibuprofen  Tablet. 400 milliGRAM(s) Oral every 6 hours PRN Mild Pain (1 - 3), Moderate Pain (4 - 6)  oxyCODONE    IR 5 milliGRAM(s) Oral every 6 hours PRN Severe Pain (7 - 10)      OBJECTIVE:    Vital Signs Last 24 Hrs  T(C): 36.7 (26 Oct 2019 22:00), Max: 36.7 (26 Oct 2019 05:15)  T(F): 98.1 (26 Oct 2019 22:00), Max: 98.1 (26 Oct 2019 05:15)  HR: 68 (26 Oct 2019 22:00) (68 - 80)  BP: 93/52 (26 Oct 2019 22:00) (93/52 - 120/72)  BP(mean): --  RR: 17 (26 Oct 2019 22:00) (16 - 19)  SpO2: 100% (26 Oct 2019 22:00) (97% - 100%)    General Appearance: NAD   Neck: Supple, NCAT  Chest: non-labored breathing, no respiratory distress, equal chest rise  Abdomen: Soft, non-tender, non-distended, no guarding or rebound tenderness,   Extremities: RLE dressing c/d/i.    I&O's Summary    25 Oct 2019 07:01  -  26 Oct 2019 07:00  --------------------------------------------------------  IN: 900 mL / OUT: 450 mL / NET: 450 mL    26 Oct 2019 07:01  -  27 Oct 2019 01:01  --------------------------------------------------------  IN: 200 mL / OUT: 0 mL / NET: 200 mL      I&O's Detail    25 Oct 2019 07:01  -  26 Oct 2019 07:00  --------------------------------------------------------  IN:    IV PiggyBack: 550 mL    Oral Fluid: 350 mL  Total IN: 900 mL    OUT:    Voided: 450 mL  Total OUT: 450 mL    Total NET: 450 mL      26 Oct 2019 07:01  -  27 Oct 2019 01:01  --------------------------------------------------------  IN:    Oral Fluid: 200 mL  Total IN: 200 mL    OUT:  Total OUT: 0 mL    Total NET: 200 mL            LABS:                        8.3    10.29 )-----------( 284      ( 26 Oct 2019 08:35 )             25.9     10-26    x   |  x   |  x   ----------------------------<  x   3.9   |  x   |  x     Ca    8.0<L>      26 Oct 2019 08:35  Phos  3.1     10-26  Mg     1.7     10-26      PT/INR - ( 25 Oct 2019 09:13 )   PT: 17.1 sec;   INR: 1.48 ratio         PTT - ( 25 Oct 2019 09:13 )  PTT:31.7 sec      RADIOLOGY & ADDITIONAL STUDIES:

## 2019-10-27 NOTE — PROGRESS NOTE ADULT - ASSESSMENT
A/P: 69y male with extensive LE vaso-occlusive disease, prior LLE AKA, who was brought in from his nursing home facility with sepsis likely secondary to his chronically necrotic RLE. Also with volume overload. 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 s/p Right AKA     - pain control   - wound care for R shoulder and buttocks wounds - wound care to see, rec cavalon to wounds.   - continue pureed diet - will discuss other routes of nutrition given recent dietary note - PEG vs other tubes   - calorie count  - dronabinol   - Appreciate GI recs, possible PEG  - last day of ertapenem   - continue lasix push  - condom cath instead of south  - replete electrolytes as needed    Discussed plan with vascular surgery team   Vascular Surgery   [id number] A/P: 69y male with extensive LE vaso-occlusive disease, prior LLE AKA, who was brought in from his nursing home facility with sepsis likely secondary to his chronically necrotic RLE. Also with volume overload. 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 s/p Right AKA     - pain control   - wound care for R shoulder and buttocks wounds - wound care to see, rec cavalon to wounds.   - continue pureed diet - will discuss other routes of nutrition given recent dietary note - PEG vs other tubes   - calorie count day 3 today   - dronabinol   - last day of ertapenem   - continue lasix push  - condom cath instead of south  - replete electrolytes as needed    Discussed plan with vascular surgery team   Vascular Surgery   [id number]

## 2019-10-27 NOTE — PROGRESS NOTE ADULT - SUBJECTIVE AND OBJECTIVE BOX
CC: f/u for  gangrene of the right foot and morganella bacteremia  Patient reports  nothing   REVIEW OF SYSTEMS:  All other review of systems negative (Comprehensive ROS)    Antimicrobials Day #  :  nystatin    Suspension [id number] Unit(s) Oral two times a day    Other Medications Reviewed    T(F): 97.4 (10-27-19 @ 09:16), Max: 98.1 (10-26-19 @ 22:00)  HR: 73 (10-27-19 @ 09:16)  BP: 111/68 (10-27-19 @ 09:16)  RR: 18 (10-27-19 @ 09:16)  SpO2: 100% (10-27-19 @ 09:16)  Wt(kg): --    PHYSICAL EXAM:  General: alert, no acute distress  Eyes:  anicteric, no conjunctival injection, no discharge  Oropharynx: no lesions or injection 	  Neck: supple, without adenopathy  Lungs: clear to auscultation  Heart: regular rate and rhythm; no murmur, rubs or gallops  Abdomen: soft, nondistended, nontender, without mass or organomegaly  Skin: no lesions  Extremities: no clubbing, cyanosis, or edema  Neurologic: confused moves all extremities  right stump wrapped, left clean  refused to turn to examing sacrum  LAB RESULTS:                        9.7    11.83 )-----------( 312      ( 27 Oct 2019 08:16 )             32.1     10-27    139  |  103  |  12  ----------------------------<  126<H>  4.4   |  27  |  0.55    Ca    8.3<L>      27 Oct 2019 08:16  Phos  2.4     10-27  Mg     1.7     10-27          Assessment:  Patient with pvd, dementia, dm admitted with septic shock, morganella bacteremia from gangrene of the right foot now s/p aka. Bacteremia controlled. Left aka stump doing well. Has pressure necrosis of the sacral area. No infection apparent at present.   Plan:  monitor off antibiotics  local care to sacral area

## 2019-10-28 LAB
ANION GAP SERPL CALC-SCNC: 7 MMOL/L — SIGNIFICANT CHANGE UP (ref 5–17)
BUN SERPL-MCNC: 18 MG/DL — SIGNIFICANT CHANGE UP (ref 7–23)
CALCIUM SERPL-MCNC: 8.1 MG/DL — LOW (ref 8.4–10.5)
CHLORIDE SERPL-SCNC: 99 MMOL/L — SIGNIFICANT CHANGE UP (ref 96–108)
CO2 SERPL-SCNC: 30 MMOL/L — SIGNIFICANT CHANGE UP (ref 22–31)
CREAT SERPL-MCNC: 0.56 MG/DL — SIGNIFICANT CHANGE UP (ref 0.5–1.3)
GLUCOSE SERPL-MCNC: 229 MG/DL — HIGH (ref 70–99)
HCT VFR BLD CALC: 29.3 % — LOW (ref 39–50)
HGB BLD-MCNC: 9 G/DL — LOW (ref 13–17)
MAGNESIUM SERPL-MCNC: 1.8 MG/DL — SIGNIFICANT CHANGE UP (ref 1.6–2.6)
MCHC RBC-ENTMCNC: 27.3 PG — SIGNIFICANT CHANGE UP (ref 27–34)
MCHC RBC-ENTMCNC: 30.7 GM/DL — LOW (ref 32–36)
MCV RBC AUTO: 88.8 FL — SIGNIFICANT CHANGE UP (ref 80–100)
NRBC # BLD: 0 /100 WBCS — SIGNIFICANT CHANGE UP (ref 0–0)
PHOSPHATE SERPL-MCNC: 1.7 MG/DL — LOW (ref 2.5–4.5)
PLATELET # BLD AUTO: 302 K/UL — SIGNIFICANT CHANGE UP (ref 150–400)
POTASSIUM SERPL-MCNC: 3.8 MMOL/L — SIGNIFICANT CHANGE UP (ref 3.5–5.3)
POTASSIUM SERPL-SCNC: 3.8 MMOL/L — SIGNIFICANT CHANGE UP (ref 3.5–5.3)
RBC # BLD: 3.3 M/UL — LOW (ref 4.2–5.8)
RBC # FLD: 16.1 % — HIGH (ref 10.3–14.5)
SODIUM SERPL-SCNC: 136 MMOL/L — SIGNIFICANT CHANGE UP (ref 135–145)
WBC # BLD: 10.95 K/UL — HIGH (ref 3.8–10.5)
WBC # FLD AUTO: 10.95 K/UL — HIGH (ref 3.8–10.5)

## 2019-10-28 RX ADMIN — Medication 1 TABLET(S): at 13:27

## 2019-10-28 RX ADMIN — Medication 20 MILLIGRAM(S): at 06:31

## 2019-10-28 RX ADMIN — Medication 975 MILLIGRAM(S): at 13:28

## 2019-10-28 RX ADMIN — Medication 4: at 08:34

## 2019-10-28 RX ADMIN — Medication 20 MILLIGRAM(S): at 13:25

## 2019-10-28 RX ADMIN — Medication 2.5 MILLIGRAM(S): at 06:29

## 2019-10-28 RX ADMIN — Medication 81 MILLIGRAM(S): at 13:29

## 2019-10-28 RX ADMIN — Medication 400 MILLIGRAM(S): at 23:02

## 2019-10-28 RX ADMIN — INSULIN GLARGINE 5 UNIT(S): 100 INJECTION, SOLUTION SUBCUTANEOUS at 22:26

## 2019-10-28 RX ADMIN — Medication 500 MILLIGRAM(S): at 13:27

## 2019-10-28 RX ADMIN — Medication 500000 UNIT(S): at 06:29

## 2019-10-28 RX ADMIN — Medication 4: at 18:26

## 2019-10-28 RX ADMIN — Medication 62.5 MILLIMOLE(S): at 14:41

## 2019-10-28 RX ADMIN — ATORVASTATIN CALCIUM 10 MILLIGRAM(S): 80 TABLET, FILM COATED ORAL at 22:26

## 2019-10-28 RX ADMIN — Medication 2: at 13:52

## 2019-10-28 RX ADMIN — Medication 400 MILLIGRAM(S): at 22:32

## 2019-10-28 RX ADMIN — SENNA PLUS 2 TABLET(S): 8.6 TABLET ORAL at 22:26

## 2019-10-28 RX ADMIN — Medication 0.12 MILLIGRAM(S): at 06:30

## 2019-10-28 RX ADMIN — Medication 20 MILLIGRAM(S): at 22:27

## 2019-10-28 RX ADMIN — Medication 3 MILLIGRAM(S): at 22:26

## 2019-10-28 RX ADMIN — ENOXAPARIN SODIUM 40 MILLIGRAM(S): 100 INJECTION SUBCUTANEOUS at 13:28

## 2019-10-28 RX ADMIN — Medication 975 MILLIGRAM(S): at 06:29

## 2019-10-28 RX ADMIN — Medication 2.5 MILLIGRAM(S): at 18:20

## 2019-10-28 RX ADMIN — Medication 975 MILLIGRAM(S): at 18:19

## 2019-10-28 NOTE — CHART NOTE - NSCHARTNOTEFT_GEN_A_CORE
Plan for endoscopy and PEG placement tomorrow.     Recommendations:  - Please make NPO @ [facility]  - Pt's HCP was in agreement with PEG when last discussed w/ GI team, please contact us (see below) if anything has changed and they would not like to pursue PEG    Landon Mckay  Gastroenterology Fellow  Pager# [phone] or [phone]  Page on-call GI fellow after 5pm or on weekends

## 2019-10-28 NOTE — PROGRESS NOTE ADULT - ASSESSMENT
A/P: 69y male with extensive LE vaso-occlusive disease, prior LLE AKA, who was brought in from his nursing home facility with sepsis likely secondary to his chronically necrotic RLE. Also with volume overload. 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 s/p Right AKA     - pain control   - wound care for R shoulder and buttocks wounds - wound care to see, rec cavalon to wounds.   - continue pureed diet - will discuss other routes of nutrition given recent dietary note - PEG vs other tubes   - Follow up calorie count results  - dronabinol   - off abx    - continue lasix push  - condom cath instead of south  - replete electrolytes as needed    Discussed plan with vascular surgery team   Vascular Surgery   [id number]

## 2019-10-28 NOTE — PROGRESS NOTE ADULT - SUBJECTIVE AND OBJECTIVE BOX
CC: f/u for morganella bacteremia    Patient reports: He is cooperative, he has dementia, however follows commands    REVIEW OF SYSTEMS:  All other review of systems negative (Comprehensive ROS)    Antimicrobials Day #  :  nystatin    Suspension 620791 Unit(s) Oral two times a day    Other Medications Reviewed    T(F): 99 (10-28-19 @ 10:22), Max: 99 (10-28-19 @ 10:22)  HR: 81 (10-28-19 @ 10:22)  BP: 98/56 (10-28-19 @ 10:22)  RR: 18 (10-28-19 @ 10:22)  SpO2: 98% (10-28-19 @ 10:22)  Wt(kg): --    PHYSICAL EXAM:  General: alert, no acute distress  Eyes:  anicteric, no conjunctival injection, no discharge  Oropharynx: no lesions or injection 	  Neck: supple, without adenopathy  Lungs: clear to auscultation  Heart: regular rate and rhythm; no murmur, rubs or gallops  Abdomen: soft, nondistended, nontender, without mass or organomegaly  Skin: no lesions  Extremities: no clubbing, cyanosis, or edema.Left BKA stump healing well, RT BKA stump dressed  Neurologic: alert, oriented, moves all extremities    LAB RESULTS:                        9.0    10.95 )-----------( 302      ( 28 Oct 2019 11:26 )             29.3     10-28    136  |  99  |  18  ----------------------------<  229<H>  3.8   |  30  |  0.56    Ca    8.1<L>      28 Oct 2019 11:26  Phos  1.7     10-28  Mg     1.8     10-28          MICROBIOLOGY:  RECENT CULTURES:      RADIOLOGY REVIEWED:

## 2019-10-28 NOTE — PROGRESS NOTE ADULT - ASSESSMENT
70 yo male with PVD and dementia, s/p treatment for morganella bacteremia  S/P guillotine RT BKA and second stage closure.  S/P completion of antibiotic course  Awaiting peg  Stable from ID perspective  Monitor off antibiotics

## 2019-10-28 NOTE — PROGRESS NOTE ADULT - SUBJECTIVE AND OBJECTIVE BOX
SUBJECTIVE: Pt seen and examined at bedside. Refused oral medication overnight.  Pain well controlled. Denies chest pain, shortness of breath, nausea, vomiting or fever.     Fletcher:  NGT:  DASHA:  MEDICATIONS  (STANDING):  acetaminophen   Tablet .. 975 milliGRAM(s) Oral every 6 hours  ascorbic acid 500 milliGRAM(s) Oral daily  aspirin  chewable 81 milliGRAM(s) Oral daily  atorvastatin 10 milliGRAM(s) Oral at bedtime  calcium carbonate   1250 mG (OsCal) 1 Tablet(s) Oral daily  digoxin     Tablet 0.125 milliGRAM(s) Oral daily  dronabinol 2.5 milliGRAM(s) Oral two times a day  enoxaparin Injectable 40 milliGRAM(s) SubCutaneous daily  furosemide   Injectable 20 milliGRAM(s) IV Push every 8 hours  influenza   Vaccine 0.5 milliLiter(s) IntraMuscular once  insulin glargine Injectable (LANTUS) 5 Unit(s) SubCutaneous at bedtime  insulin lispro (HumaLOG) corrective regimen sliding scale   SubCutaneous Before meals and at bedtime  melatonin 3 milliGRAM(s) Oral at bedtime  multivitamin 1 Tablet(s) Oral daily  nystatin    Suspension 560548 Unit(s) Oral two times a day  senna 2 Tablet(s) Oral at bedtime    MEDICATIONS  (PRN):  ibuprofen  Tablet. 400 milliGRAM(s) Oral every 6 hours PRN Mild Pain (1 - 3), Moderate Pain (4 - 6)  oxyCODONE    IR 5 milliGRAM(s) Oral every 6 hours PRN Severe Pain (7 - 10)      OBJECTIVE:    Vital Signs Last 24 Hrs  T(C): 36.8 (28 Oct 2019 06:26), Max: 36.8 (28 Oct 2019 06:26)  T(F): 98.3 (28 Oct 2019 06:26), Max: 98.3 (28 Oct 2019 06:26)  HR: 84 (28 Oct 2019 06:26) (73 - 84)  BP: 117/69 (28 Oct 2019 06:26) (105/57 - 117/69)  BP(mean): --  RR: 18 (28 Oct 2019 06:26) (18 - 18)  SpO2: 99% (28 Oct 2019 06:26) (97% - 100%)    General Appearance: NAD   Neck: Supple, NCAT  Chest: non-labored breathing, no respiratory distress, equal chest rise  Abdomen: Soft, non-tender, non-distended, no guarding or rebound tenderness,   Extremities: RLE dressing c/d/i. Dressing changed    I&O's Summary    27 Oct 2019 07:01  -  28 Oct 2019 07:00  --------------------------------------------------------  IN: 600 mL / OUT: 0 mL / NET: 600 mL      I&O's Detail    27 Oct 2019 07:01  -  28 Oct 2019 07:00  --------------------------------------------------------  IN:    Oral Fluid: 600 mL  Total IN: 600 mL    OUT:  Total OUT: 0 mL    Total NET: 600 mL            LABS:                        9.7    11.83 )-----------( 312      ( 27 Oct 2019 08:16 )             32.1     10-27    139  |  103  |  12  ----------------------------<  126<H>  4.4   |  27  |  0.55    Ca    8.3<L>      27 Oct 2019 08:16  Phos  2.4     10-27  Mg     1.7     10-27            RADIOLOGY & ADDITIONAL STUDIES:

## 2019-10-28 NOTE — CHART NOTE - NSCHARTNOTEFT_GEN_A_CORE
Source: Team on rounds.  Patient seen for follow up for moderate malnutrition as well as Calorie Count x 3 days     As per RN and RN manager, patient continues to eat poorly although po intake has improved.  As per RN, patient does well for breakfast but then poorly as the day goes on.  The one thing patient enjoys is Oatmeal but this is not an adequate source of protein or calories.  Patient will accept Ensure Enlive when offered and does like puddings.  Calorie count reveals an average intake of approximately 750 calories daily and 25 grams of pro.  Unfortunately, this is not sufficient enough to meet the demands of wound healing and repair. Patient recommended for PEG.    Dxd- extensive LE vaso-occlusive disease, prior LLE AKA, who was brought in from his nursing home facility with sepsis likely secondary to his chronically necrotic RLE. Also with volume overload. 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 s/p Right AKA         Diet : Pureed with Ensure Enlive 8 ounces x3 and Ensure Ensure Pudding (170Kcal, 4gm pro) 1 container x2  Diet liberalized from Consistent CHO diet          PO intake:  < 50% [ x] 50-75% [ ]   % [ ]  other :         Current Weight: Weight (kg): 58.4 (10-23 @ 12:41)  10/19- 64.6.  Large fluctuations in weight on bed scale and unable to ascertain accuracy.   % Weight Change    Pertinent Medications: MEDICATIONS  (STANDING):  acetaminophen   Tablet .. 975 milliGRAM(s) Oral every 6 hours  ascorbic acid 500 milliGRAM(s) Oral daily  aspirin  chewable 81 milliGRAM(s) Oral daily  atorvastatin 10 milliGRAM(s) Oral at bedtime  calcium carbonate   1250 mG (OsCal) 1 Tablet(s) Oral daily  digoxin     Tablet 0.125 milliGRAM(s) Oral daily  dronabinol 2.5 milliGRAM(s) Oral two times a day  enoxaparin Injectable 40 milliGRAM(s) SubCutaneous daily  furosemide   Injectable 20 milliGRAM(s) IV Push every 8 hours  influenza   Vaccine 0.5 milliLiter(s) IntraMuscular once  insulin glargine Injectable (LANTUS) 5 Unit(s) SubCutaneous at bedtime  insulin lispro (HumaLOG) corrective regimen sliding scale   SubCutaneous Before meals and at bedtime  melatonin 3 milliGRAM(s) Oral at bedtime  multivitamin 1 Tablet(s) Oral daily  nystatin    Suspension 522923 Unit(s) Oral two times a day  senna 2 Tablet(s) Oral at bedtime    MEDICATIONS  (PRN):  ibuprofen  Tablet. 400 milliGRAM(s) Oral every 6 hours PRN Mild Pain (1 - 3), Moderate Pain (4 - 6)  oxyCODONE    IR 5 milliGRAM(s) Oral every 6 hours PRN Severe Pain (7 - 10)    Pertinent Labs:  10-27 Na139 mmol/L Glu 126 mg/dL<H> K+ 4.4 mmol/L Cr  0.55 mg/dL BUN 12 mg/dL 10-27 Phos 2.4 mg/dL<L> 10-23 Alb 2.3 g/dL<L> 10-23 PAB 8 mg/dL<L> 10-23 Chol 88 mg/dL LDL 40 mg/dL HDL 36 mg/dL<L> Trig 58 mg/dL      Skin: Stage 2 to right shoulder, vascular wound to buttocks/sacrum    Estimated Needs:   x[ ] no change since previous assessment  [ ] recalculated:       Previous Nutrition Diagnosis:     Moderate malnutrition.  Addressed with liberalized pureed diet as well as nutritional supplements.  Evaluated with calorie count and intake is inadequate for healing and repair.        New Nutrition Diagnosis: [ x] not applicable               Recommend:  Continue pureed diet  Encourage Ensure Enlive and puddings  Continue Vit C, multivitamin, oscal  D/C fiurther calorie counts  Suggest PEG placement to ensure adequate caloric intake for healing.           Monitoring and Evaluation: Monitor diet tolerance, po intake, GI tolerance, weight trends, labs, and skin integrity    RDN remains available for follow up   Melody Campbell MA,RD,CHES,CDN,CSG  Beeper [phone]

## 2019-10-29 LAB
ANION GAP SERPL CALC-SCNC: 9 MMOL/L — SIGNIFICANT CHANGE UP (ref 5–17)
APTT BLD: 32.4 SEC — SIGNIFICANT CHANGE UP (ref 27.5–36.3)
BUN SERPL-MCNC: 17 MG/DL — SIGNIFICANT CHANGE UP (ref 7–23)
CALCIUM SERPL-MCNC: 8.3 MG/DL — LOW (ref 8.4–10.5)
CHLORIDE SERPL-SCNC: 98 MMOL/L — SIGNIFICANT CHANGE UP (ref 96–108)
CO2 SERPL-SCNC: 30 MMOL/L — SIGNIFICANT CHANGE UP (ref 22–31)
CREAT SERPL-MCNC: 0.63 MG/DL — SIGNIFICANT CHANGE UP (ref 0.5–1.3)
GLUCOSE SERPL-MCNC: 102 MG/DL — HIGH (ref 70–99)
HCT VFR BLD CALC: 28.6 % — LOW (ref 39–50)
HGB BLD-MCNC: 8.9 G/DL — LOW (ref 13–17)
INR BLD: 1.3 RATIO — HIGH (ref 0.88–1.16)
LIDOCAIN SERPL-MCNC: 99 NMOL/L — HIGH
MAGNESIUM SERPL-MCNC: 1.8 MG/DL — SIGNIFICANT CHANGE UP (ref 1.6–2.6)
MCHC RBC-ENTMCNC: 27.6 PG — SIGNIFICANT CHANGE UP (ref 27–34)
MCHC RBC-ENTMCNC: 31.1 GM/DL — LOW (ref 32–36)
MCV RBC AUTO: 88.5 FL — SIGNIFICANT CHANGE UP (ref 80–100)
NRBC # BLD: 0 /100 WBCS — SIGNIFICANT CHANGE UP (ref 0–0)
PHOSPHATE SERPL-MCNC: 2.9 MG/DL — SIGNIFICANT CHANGE UP (ref 2.5–4.5)
PLATELET # BLD AUTO: 305 K/UL — SIGNIFICANT CHANGE UP (ref 150–400)
POTASSIUM SERPL-MCNC: 3.8 MMOL/L — SIGNIFICANT CHANGE UP (ref 3.5–5.3)
POTASSIUM SERPL-SCNC: 3.8 MMOL/L — SIGNIFICANT CHANGE UP (ref 3.5–5.3)
PROTHROM AB SERPL-ACNC: 14.9 SEC — HIGH (ref 10–12.9)
RBC # BLD: 3.23 M/UL — LOW (ref 4.2–5.8)
RBC # FLD: 16.5 % — HIGH (ref 10.3–14.5)
SODIUM SERPL-SCNC: 137 MMOL/L — SIGNIFICANT CHANGE UP (ref 135–145)
WBC # BLD: 11.53 K/UL — HIGH (ref 3.8–10.5)
WBC # FLD AUTO: 11.53 K/UL — HIGH (ref 3.8–10.5)

## 2019-10-29 PROCEDURE — 43246 EGD PLACE GASTROSTOMY TUBE: CPT | Mod: GC

## 2019-10-29 RX ORDER — PANTOPRAZOLE SODIUM 20 MG/1
40 TABLET, DELAYED RELEASE ORAL DAILY
Refills: 0 | Status: DISCONTINUED | OUTPATIENT
Start: 2019-10-29 | End: 2019-10-30

## 2019-10-29 RX ORDER — CLOPIDOGREL BISULFATE 75 MG/1
75 TABLET, FILM COATED ORAL DAILY
Refills: 0 | Status: DISCONTINUED | OUTPATIENT
Start: 2019-10-30 | End: 2019-11-01

## 2019-10-29 RX ADMIN — Medication 2.5 MILLIGRAM(S): at 18:18

## 2019-10-29 RX ADMIN — ENOXAPARIN SODIUM 40 MILLIGRAM(S): 100 INJECTION SUBCUTANEOUS at 18:17

## 2019-10-29 RX ADMIN — Medication 975 MILLIGRAM(S): at 18:17

## 2019-10-29 RX ADMIN — INSULIN GLARGINE 5 UNIT(S): 100 INJECTION, SOLUTION SUBCUTANEOUS at 22:55

## 2019-10-29 RX ADMIN — Medication 20 MILLIGRAM(S): at 22:55

## 2019-10-29 RX ADMIN — SENNA PLUS 2 TABLET(S): 8.6 TABLET ORAL at 22:54

## 2019-10-29 RX ADMIN — Medication 3 MILLIGRAM(S): at 22:54

## 2019-10-29 RX ADMIN — Medication 20 MILLIGRAM(S): at 18:18

## 2019-10-29 RX ADMIN — ATORVASTATIN CALCIUM 10 MILLIGRAM(S): 80 TABLET, FILM COATED ORAL at 22:54

## 2019-10-29 NOTE — CHART NOTE - NSCHARTNOTEFT_GEN_A_CORE
Nutrition Services  Diet Rxd- NPO for PEG placement  Dxd- PVD and dementia, s/p treatment for morganella bacteremia  S/P guillotine RT BKA and second stage closure.  S/P completion of antibiotic course  Patient seen for PEG tube recommendations for feeds.  Calorie count completed yesterday.  As per conversation with RN, patient to receive PEG placement today.  Patient eats a good breakfast but that is about it for the day.  Patient has also been refusing meds.  With PEG, will be able to provided medications and supplement po intake.   Patient has a diagnosis of moderate malnutrition as well as Increased nutrient needs.  Suggest resume oral diet of puree for comfort and palliation.  Suggest initiate PEG feeds with Glucerna 1.2 @ 30cc/hr x 24 hours.  Monitor tolerance and increase by 10cc/hr every 8 hours to a goal of 55ml/hr x 24 hours, to  provide 1320ml formula, 1584 bárbara/day, 79 Gm protein/day, 1063ml free water; meets 27cal/Kg and 1.35Gm protein/Kg per dosing wt 58.4Kg.  Once tolerated, may wish to consider Bolus feeds to allow for po intake and avoid fullness.  Consider intermittent feeds of Glucerna 1.2@ 330cc/hr 3x/day.      PLAN:  Resume pureed diet as tolerated.  Continue Ensure Pudding (170Kcal, 4gm pro) 1 container x3  Initiate PEG feeds 30cc/hr x 24 hours.  Goal rate 55cc/hr x 24 hours via PEG  Consider intermittent bolus feeds of 330cc/hr 3 times per day.  Free water as per team.   Monitor feed tolerance, po intake, GI tolerance, weight trends, labs, and skin integrity   RDN remains available for follow up   Melody Campbell MA,RD,CHES,CDN,CSG  Beeper [phone]

## 2019-10-29 NOTE — PROGRESS NOTE ADULT - ASSESSMENT
69y male with extensive LE vaso-occlusive disease, prior LLE AKA, who was brought in from his nursing home facility with sepsis likely secondary to his chronically necrotic RLE. Also with volume overload. 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 s/p Right AKA     - pain control   - wound care for R shoulder and buttocks wounds - wound care to see, rec cavalon to wounds.   - continue pureed diet - will discuss other routes of nutrition given recent dietary note - PEG vs other tubes   - Follow up calorie count results  - dronabinol   - off abx    - continue lasix push  - condom cath instead of south  - replete electrolytes as needed    Discussed plan with vascular surgery team   Vascular Surgery [id number] 69y male with extensive LE vaso-occlusive disease, prior LLE AKA, who was brought in from his nursing home facility with sepsis likely secondary to his chronically necrotic RLE. Also with volume overload. 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 s/p Right AKA     - pain control   - wound care for R shoulder and buttocks wounds - wound care to see, rec cavalon to wounds.   - Plan for PEG today with GI - f/u plan for use after procedure   - Follow up calorie count results - shows 750kcal/day with insufficient protein to sustain wound healing  - dronabinol   - continue lasix push  - condom cath instead of south to collect urine for measurement   - replete electrolytes as needed    Discussed plan with vascular surgery team   Vascular Surgery [id number]

## 2019-10-29 NOTE — PROVIDER CONTACT NOTE (MEDICATION) - ACTION/TREATMENT ORDERED:
no further interventions done per PA request. will continue to monitor.
Pt. A&OX1, continue to refused meds. OSMAR Hernandez aware.

## 2019-10-29 NOTE — PROGRESS NOTE ADULT - SUBJECTIVE AND OBJECTIVE BOX
S: Pt seen and examined.    Vital Signs Last 24 Hrs  T(C): 36.3 (29 Oct 2019 06:10), Max: 37.3 (28 Oct 2019 13:48)  T(F): 97.3 (29 Oct 2019 06:10), Max: 99.2 (28 Oct 2019 13:48)  HR: 81 (29 Oct 2019 06:10) (75 - 87)  BP: 109/60 (29 Oct 2019 06:10) (98/56 - 118/70)  BP(mean): --  RR: 16 (29 Oct 2019 06:10) (16 - 18)  SpO2: 97% (29 Oct 2019 06:10) (94% - 98%)        General Appearance: Appears well, NAD  Neck: Supple  Chest: Equal expansion bilaterally, equal breath sounds  CV: Pulse regular presently  Abdomen: Soft, nontense, appropriate incisional tenderness, dressings clean and dry and intact  Extremities: Grossly symmetric, SCD's in place     I&O's Summary    28 Oct 2019 07:01  -  29 Oct 2019 07:00  --------------------------------------------------------  IN: 1200 mL / OUT: 0 mL / NET: 1200 mL      I&O's Detail    28 Oct 2019 07:01  -  29 Oct 2019 07:00  --------------------------------------------------------  IN:    IV PiggyBack: 250 mL    Oral Fluid: 950 mL  Total IN: 1200 mL    OUT:  Total OUT: 0 mL    Total NET: 1200 mL          MEDICATIONS  (STANDING):  acetaminophen   Tablet .. 975 milliGRAM(s) Oral every 6 hours  ascorbic acid 500 milliGRAM(s) Oral daily  aspirin  chewable 81 milliGRAM(s) Oral daily  atorvastatin 10 milliGRAM(s) Oral at bedtime  calcium carbonate   1250 mG (OsCal) 1 Tablet(s) Oral daily  digoxin     Tablet 0.125 milliGRAM(s) Oral daily  dronabinol 2.5 milliGRAM(s) Oral two times a day  enoxaparin Injectable 40 milliGRAM(s) SubCutaneous daily  furosemide   Injectable 20 milliGRAM(s) IV Push every 8 hours  influenza   Vaccine 0.5 milliLiter(s) IntraMuscular once  insulin glargine Injectable (LANTUS) 5 Unit(s) SubCutaneous at bedtime  insulin lispro (HumaLOG) corrective regimen sliding scale   SubCutaneous Before meals and at bedtime  melatonin 3 milliGRAM(s) Oral at bedtime  multivitamin 1 Tablet(s) Oral daily  nystatin    Suspension 340487 Unit(s) Oral two times a day  senna 2 Tablet(s) Oral at bedtime    MEDICATIONS  (PRN):  ibuprofen  Tablet. 400 milliGRAM(s) Oral every 6 hours PRN Mild Pain (1 - 3), Moderate Pain (4 - 6)  oxyCODONE    IR 5 milliGRAM(s) Oral every 6 hours PRN Severe Pain (7 - 10)      LABS:                        9.0    10.95 )-----------( 302      ( 28 Oct 2019 11:26 )             29.3     10-28    136  |  99  |  18  ----------------------------<  229<H>  3.8   |  30  |  0.56    Ca    8.1<L>      28 Oct 2019 11:26  Phos  1.7     10-28  Mg     1.8     10-28 S: Pt seen and examined. Patient appears comfortable and denies being in pain. He nods yes when asked if he has been trying to eat more.     Vital Signs Last 24 Hrs  T(C): 36.3 (29 Oct 2019 06:10), Max: 37.3 (28 Oct 2019 13:48)  T(F): 97.3 (29 Oct 2019 06:10), Max: 99.2 (28 Oct 2019 13:48)  HR: 81 (29 Oct 2019 06:10) (75 - 87)  BP: 109/60 (29 Oct 2019 06:10) (98/56 - 118/70)  BP(mean): --  RR: 16 (29 Oct 2019 06:10) (16 - 18)  SpO2: 97% (29 Oct 2019 06:10) (94% - 98%)        General Appearance: Malnourished, NAD  PULM: no increased work of breathing   Extremities: Dressing CDI    I&O's Summary    28 Oct 2019 07:01  -  29 Oct 2019 07:00  --------------------------------------------------------  IN: 1200 mL / OUT: 0 mL / NET: 1200 mL      I&O's Detail    28 Oct 2019 07:01  -  29 Oct 2019 07:00  --------------------------------------------------------  IN:    IV PiggyBack: 250 mL    Oral Fluid: 950 mL  Total IN: 1200 mL    OUT:  Total OUT: 0 mL    Total NET: 1200 mL          MEDICATIONS  (STANDING):  acetaminophen   Tablet .. 975 milliGRAM(s) Oral every 6 hours  ascorbic acid 500 milliGRAM(s) Oral daily  aspirin  chewable 81 milliGRAM(s) Oral daily  atorvastatin 10 milliGRAM(s) Oral at bedtime  calcium carbonate   1250 mG (OsCal) 1 Tablet(s) Oral daily  digoxin     Tablet 0.125 milliGRAM(s) Oral daily  dronabinol 2.5 milliGRAM(s) Oral two times a day  enoxaparin Injectable 40 milliGRAM(s) SubCutaneous daily  furosemide   Injectable 20 milliGRAM(s) IV Push every 8 hours  influenza   Vaccine 0.5 milliLiter(s) IntraMuscular once  insulin glargine Injectable (LANTUS) 5 Unit(s) SubCutaneous at bedtime  insulin lispro (HumaLOG) corrective regimen sliding scale   SubCutaneous Before meals and at bedtime  melatonin 3 milliGRAM(s) Oral at bedtime  multivitamin 1 Tablet(s) Oral daily  nystatin    Suspension 007745 Unit(s) Oral two times a day  senna 2 Tablet(s) Oral at bedtime    MEDICATIONS  (PRN):  ibuprofen  Tablet. 400 milliGRAM(s) Oral every 6 hours PRN Mild Pain (1 - 3), Moderate Pain (4 - 6)  oxyCODONE    IR 5 milliGRAM(s) Oral every 6 hours PRN Severe Pain (7 - 10)      LABS:                        9.0    10.95 )-----------( 302      ( 28 Oct 2019 11:26 )             29.3     10-28    136  |  99  |  18  ----------------------------<  229<H>  3.8   |  30  |  0.56    Ca    8.1<L>      28 Oct 2019 11:26  Phos  1.7     10-28  Mg     1.8     10-28

## 2019-10-29 NOTE — PROGRESS NOTE ADULT - SUBJECTIVE AND OBJECTIVE BOX
CC: f/u for ischemic rt leg and morganella bacteremia    Patient reports: he is sleepy and poorly interactive, awaiting peg    REVIEW OF SYSTEMS:  All other review of systems negative (Comprehensive ROS): limited by condition    Antimicrobials Day #  :  nystatin    Suspension 005793 Unit(s) Oral two times a day    Other Medications Reviewed    T(F): 97.4 (10-29-19 @ 11:01), Max: 99.2 (10-28-19 @ 13:48)  HR: 84 (10-29-19 @ 11:01)  BP: 117/69 (10-29-19 @ 11:01)  RR: 17 (10-29-19 @ 11:01)  SpO2: 97% (10-29-19 @ 11:01)  Wt(kg): --    PHYSICAL EXAM:  General: lethargic, no acute distress  Eyes:  anicteric, no conjunctival injection, no discharge  Oropharynx: no lesions or injection 	  Neck: supple, without adenopathy  Lungs: clear to auscultation  Heart: regular rate and rhythm; no murmur, rubs or gallops  Abdomen: soft, nondistended, nontender, without mass or organomegaly  Skin: no lesions  Extremities: no clubbing, cyanosis, or edema.Left BKA well healed, RT BKA stump is dressed  Neurologic: lethargic,, oriented, moves all extremities    LAB RESULTS:                        8.9    11.53 )-----------( 305      ( 29 Oct 2019 09:29 )             28.6     10-29    137  |  98  |  17  ----------------------------<  102<H>  3.8   |  30  |  0.63    Ca    8.3<L>      29 Oct 2019 09:29  Phos  2.9     10-29  Mg     1.8     10-29          MICROBIOLOGY:  RECENT CULTURES:      RADIOLOGY REVIEWED:

## 2019-10-29 NOTE — PROGRESS NOTE ADULT - ASSESSMENT
70 yo male with PVD and dementia, s/p treatment for morganella bacteremia  S/P guillotine RT BKA and second stage closure.  S/P completion of antibiotic course  Awaiting peg  Stable from ID perspective  Lethargy is likely on a non infectious basis  Monitor off antibiotics

## 2019-10-29 NOTE — CHART NOTE - NSCHARTNOTEFT_GEN_A_CORE
STATUS POST:  PEG placement      SUBJECTIVE: Pt seen and examined. No CP. SIB, HA dizzyness, denies any abdominla pain        Vital Signs Last 24 Hrs  T(C): 36.3 (29 Oct 2019 11:01), Max: 36.9 (28 Oct 2019 18:30)  T(F): 97.4 (29 Oct 2019 11:01), Max: 98.4 (28 Oct 2019 18:30)  HR: 84 (29 Oct 2019 11:01) (81 - 87)  BP: 117/69 (29 Oct 2019 11:01) (109/60 - 118/70)  BP(mean): --  RR: 17 (29 Oct 2019 11:01) (16 - 18)  SpO2: 97% (29 Oct 2019 11:01) (94% - 97%)  I&O's Summary    28 Oct 2019 07:01  -  29 Oct 2019 07:00  --------------------------------------------------------  IN: 1200 mL / OUT: 0 mL / NET: 1200 mL      I&O's Detail    28 Oct 2019 07:01  -  29 Oct 2019 07:00  --------------------------------------------------------  IN:    IV PiggyBack: 250 mL    Oral Fluid: 950 mL  Total IN: 1200 mL    OUT:  Total OUT: 0 mL    Total NET: 1200 mL          MEDICATIONS  (STANDING):  acetaminophen   Tablet .. 975 milliGRAM(s) Oral every 6 hours  ascorbic acid 500 milliGRAM(s) Oral daily  aspirin  chewable 81 milliGRAM(s) Oral daily  atorvastatin 10 milliGRAM(s) Oral at bedtime  calcium carbonate   1250 mG (OsCal) 1 Tablet(s) Oral daily  digoxin     Tablet 0.125 milliGRAM(s) Oral daily  dronabinol 2.5 milliGRAM(s) Oral two times a day  enoxaparin Injectable 40 milliGRAM(s) SubCutaneous daily  furosemide   Injectable 20 milliGRAM(s) IV Push every 8 hours  influenza   Vaccine 0.5 milliLiter(s) IntraMuscular once  insulin glargine Injectable (LANTUS) 5 Unit(s) SubCutaneous at bedtime  insulin lispro (HumaLOG) corrective regimen sliding scale   SubCutaneous Before meals and at bedtime  melatonin 3 milliGRAM(s) Oral at bedtime  multivitamin 1 Tablet(s) Oral daily  nystatin    Suspension 287268 Unit(s) Oral two times a day  pantoprazole  Injectable 40 milliGRAM(s) IV Push daily  senna 2 Tablet(s) Oral at bedtime    MEDICATIONS  (PRN):  ibuprofen  Tablet. 400 milliGRAM(s) Oral every 6 hours PRN Mild Pain (1 - 3), Moderate Pain (4 - 6)  oxyCODONE    IR 5 milliGRAM(s) Oral every 6 hours PRN Severe Pain (7 - 10)      LABS:                        8.9    11.53 )-----------( 305      ( 29 Oct 2019 09:29 )             28.6     10-29    137  |  98  |  17  ----------------------------<  102<H>  3.8   |  30  |  0.63    Ca    8.3<L>      29 Oct 2019 09:29  Phos  2.9     10-29  Mg     1.8     10-29      PT/INR - ( 29 Oct 2019 09:29 )   PT: 14.9 sec;   INR: 1.30 ratio         PTT - ( 29 Oct 2019 09:29 )  PTT:32.4 sec        PHYSICAL EXAM:    HEENT: NCAT  NEck: trachea midline  CHest : non ;abored breathing  Abd: abd binder in place, soft ND NT  Extrem: FAROM WWP    ENdoscopy:        - Normal esophagus.                       - Normal stomach.                       - One non-bleeding duodenal ulcer with no stigmata of bleeding.                       - An externally removable PEG placement was successfully completed.                       - No specimens collected.        A/P: 69y Male  s/p PEG  - Diet: NPO  - Activity: OOB  - Labs: AM  - Pain medication  - DVT ppx  - GI Impression:                         - [facility] to [facility].                       - NPO For 4 hours then medications and water via PEG, will advise tomorrow                        about PEG feedings                       - Will advise about restarting Plavix tomorrow                       - PPI Daily.                       - Check H pylori serum Ab and treat if positive

## 2019-10-29 NOTE — PROGRESS NOTE ADULT - SUBJECTIVE AND OBJECTIVE BOX
Pre-Endoscopy Evaluation      Referring Physician: dr. crowell                                Procedure:  upper gastrointestinal endoscopy/peg placement    Indication for Procedure: malnutritiion    Pertinent History:  69y old male with PMH of DM2, HTN, HL, CVA, severe PAD s/p LLE fem-pop bypass with vein graft, s/p Left BKA on 7/22/19 with hospital course c/b NSTEMI /cardiogenic shock requiring pressors/CCU course (medically managed, no LHC performed), HFrEF (TTE 10/14/2019 with severe global LV dysfunction admitted 10/12/2019 with Sepsis in the setting of Right foot gangrene,  subsequently underwent Right BKA on 10/13/2019, now with malnutrition      Sedation by Anesthesia [X]    PAST MEDICAL & SURGICAL HISTORY:  Hyperlipidemia  Hypertension  Diabetes  H/O mastoidectomy      PMH of Gastroparesis [ ]  Gastric Surgery [ ]  Gastric Outlet Obstruction [ ]    Allergies    No Known Allergies    Intolerances    Latex allergy: [ ] yes [X] no    Medications:MEDICATIONS  (STANDING):  acetaminophen   Tablet .. 975 milliGRAM(s) Oral every 6 hours  ascorbic acid 500 milliGRAM(s) Oral daily  aspirin  chewable 81 milliGRAM(s) Oral daily  atorvastatin 10 milliGRAM(s) Oral at bedtime  calcium carbonate   1250 mG (OsCal) 1 Tablet(s) Oral daily  digoxin     Tablet 0.125 milliGRAM(s) Oral daily  dronabinol 2.5 milliGRAM(s) Oral two times a day  enoxaparin Injectable 40 milliGRAM(s) SubCutaneous daily  furosemide   Injectable 20 milliGRAM(s) IV Push every 8 hours  influenza   Vaccine 0.5 milliLiter(s) IntraMuscular once  insulin glargine Injectable (LANTUS) 5 Unit(s) SubCutaneous at bedtime  insulin lispro (HumaLOG) corrective regimen sliding scale   SubCutaneous Before meals and at bedtime  melatonin 3 milliGRAM(s) Oral at bedtime  multivitamin 1 Tablet(s) Oral daily  nystatin    Suspension 980474 Unit(s) Oral two times a day  senna 2 Tablet(s) Oral at bedtime    MEDICATIONS  (PRN):  ibuprofen  Tablet. 400 milliGRAM(s) Oral every 6 hours PRN Mild Pain (1 - 3), Moderate Pain (4 - 6)  oxyCODONE    IR 5 milliGRAM(s) Oral every 6 hours PRN Severe Pain (7 - 10)      Smoking: [ ] yes  [X] no    AICD/PPM: [ ] yes   [X] no    Pertinent lab data:                        8.9    11.53 )-----------( 305      ( 29 Oct 2019 09:29 )             28.6     10-29    137  |  98  |  17  ----------------------------<  102<H>  3.8   |  30  |  0.63    Ca    8.3<L>      29 Oct 2019 09:29  Phos  2.9     10-29  Mg     1.8     10-29      PT/INR - ( 29 Oct 2019 09:29 )   PT: 14.9 sec;   INR: 1.30 ratio      PTT - ( 29 Oct 2019 09:29 )  PTT:32.4 sec      CAPILLARY BLOOD GLUCOSE  POCT Blood Glucose.: 125 mg/dL (29 Oct 2019 11:59)      < from: Transthoracic Echocardiogram (10.14.19 @ 14:08) >    Fractional short: 11 %  ------------------------------------------------------------------------  Observations:  Mitral Valve: Tethered and thickened mitral valve leaflets.  Minimal mitral regurgitation.  Aortic Valve/Aorta: Normal trileaflet aortic valve. Minimal  aortic regurgitation.  Normal aortic root size. (Ao: 3.3 cm at thesinuses of  Valsalva).  Left Atrium: Normal left atrium.  LA volume index = 26  cc/m2.  Left Ventricle: Severe global left ventricular systolic  dysfunction. Estimated LVEF = 25%. Normal left ventricular  internal dimensions and wall thicknesses. Severe diastolic  dysfunction .  Right Heart: Normal right atrium. Normal right ventricular  size with decreased right ventricular systolic function.  Normal tricuspid valve. Normal pulmonic valve.  Pericardium/Pleura: Normal pericardium with no pericardial  effusion.  Bilateral pleural effusions.  Hemodynamic: Estimated right atrial pressure is 8 mm Hg.  ------------------------------------------------------------------------  Conclusions:  1. Tethered and thickened mitral valve leaflets. Minimal  mitral regurgitation.  2. Normal left ventricular internal dimensions and wall  thicknesses.  3. Severe global left ventricular systolic dysfunction.  Estimated LVEF = 25%.  4. Normal right ventricular size with decreased right  ventricular systolic function.  5. Bilateral pleural effusions.  -------------------------------------------        Physical Examination:     Daily   Vital Signs Last 24 Hrs  T(C): 36.3 (29 Oct 2019 11:01), Max: 37.3 (28 Oct 2019 13:48)  T(F): 97.4 (29 Oct 2019 11:01), Max: 99.2 (28 Oct 2019 13:48)  HR: 84 (29 Oct 2019 11:01) (75 - 87)  BP: 117/69 (29 Oct 2019 11:01) (103/63 - 118/70)  BP(mean): --  RR: 17 (29 Oct 2019 11:01) (16 - 18)  SpO2: 97% (29 Oct 2019 11:01) (94% - 97%)    Drug Dosing Weight  Height (cm): 165.1 (23 Oct 2019 12:41)  Weight (kg): 58.4 (23 Oct 2019 12:41)  BMI (kg/m2): 21.4 (23 Oct 2019 12:41)  BSA (m2): 1.64 (23 Oct 2019 12:41)    Constitutional: NAD     Neck:  No JVD    Respiratory: CTAB/L    Cardiovascular: S1 and S2    Gastrointestinal: BS+, soft, NT/ND    Extremities: No peripheral edema    Neurological: awale, non-verbal    : No Fletcher    Skin: No rashes    Comments:      The patient is a suitable candidate for the planned procedure unless box checked [ ]  No, explain:

## 2019-10-29 NOTE — PROVIDER CONTACT NOTE (MEDICATION) - ASSESSMENT
pt is a&ox1-2, with confusion refusing adamantly to open his mouth for meds. Digoxin and dronabinol was not given. HR was 81.

## 2019-10-30 ENCOUNTER — TRANSCRIPTION ENCOUNTER (OUTPATIENT)
Age: 69
End: 2019-10-30

## 2019-10-30 LAB
ANION GAP SERPL CALC-SCNC: 8 MMOL/L — SIGNIFICANT CHANGE UP (ref 5–17)
BUN SERPL-MCNC: 26 MG/DL — HIGH (ref 7–23)
CALCIUM SERPL-MCNC: 8.2 MG/DL — LOW (ref 8.4–10.5)
CHLORIDE SERPL-SCNC: 102 MMOL/L — SIGNIFICANT CHANGE UP (ref 96–108)
CO2 SERPL-SCNC: 31 MMOL/L — SIGNIFICANT CHANGE UP (ref 22–31)
CREAT SERPL-MCNC: 0.7 MG/DL — SIGNIFICANT CHANGE UP (ref 0.5–1.3)
GLUCOSE SERPL-MCNC: 148 MG/DL — HIGH (ref 70–99)
H PYLORI AB SER-ACNC: 82.2 UNITS — HIGH
H PYLORI IGA SER-ACNC: 109.9 UNITS — HIGH
HCT VFR BLD CALC: 26.7 % — LOW (ref 39–50)
HGB BLD-MCNC: 8.1 G/DL — LOW (ref 13–17)
MAGNESIUM SERPL-MCNC: 1.8 MG/DL — SIGNIFICANT CHANGE UP (ref 1.6–2.6)
MCHC RBC-ENTMCNC: 27.2 PG — SIGNIFICANT CHANGE UP (ref 27–34)
MCHC RBC-ENTMCNC: 30.3 GM/DL — LOW (ref 32–36)
MCV RBC AUTO: 89.6 FL — SIGNIFICANT CHANGE UP (ref 80–100)
NRBC # BLD: 0 /100 WBCS — SIGNIFICANT CHANGE UP (ref 0–0)
PHOSPHATE SERPL-MCNC: 2.6 MG/DL — SIGNIFICANT CHANGE UP (ref 2.5–4.5)
PLATELET # BLD AUTO: 289 K/UL — SIGNIFICANT CHANGE UP (ref 150–400)
POTASSIUM SERPL-MCNC: 4.1 MMOL/L — SIGNIFICANT CHANGE UP (ref 3.5–5.3)
POTASSIUM SERPL-SCNC: 4.1 MMOL/L — SIGNIFICANT CHANGE UP (ref 3.5–5.3)
RBC # BLD: 2.98 M/UL — LOW (ref 4.2–5.8)
RBC # FLD: 16.5 % — HIGH (ref 10.3–14.5)
SODIUM SERPL-SCNC: 141 MMOL/L — SIGNIFICANT CHANGE UP (ref 135–145)
WBC # BLD: 12.86 K/UL — HIGH (ref 3.8–10.5)
WBC # FLD AUTO: 12.86 K/UL — HIGH (ref 3.8–10.5)

## 2019-10-30 PROCEDURE — 99232 SBSQ HOSP IP/OBS MODERATE 35: CPT | Mod: GC

## 2019-10-30 RX ORDER — CLARITHROMYCIN 500 MG
500 TABLET ORAL
Refills: 0 | Status: DISCONTINUED | OUTPATIENT
Start: 2019-10-30 | End: 2019-11-01

## 2019-10-30 RX ORDER — INSULIN GLARGINE 100 [IU]/ML
5 INJECTION, SOLUTION SUBCUTANEOUS ONCE
Refills: 0 | Status: DISCONTINUED | OUTPATIENT
Start: 2019-10-30 | End: 2019-11-01

## 2019-10-30 RX ORDER — INSULIN GLARGINE 100 [IU]/ML
10 INJECTION, SOLUTION SUBCUTANEOUS AT BEDTIME
Refills: 0 | Status: DISCONTINUED | OUTPATIENT
Start: 2019-10-30 | End: 2019-11-01

## 2019-10-30 RX ORDER — PANTOPRAZOLE SODIUM 20 MG/1
40 TABLET, DELAYED RELEASE ORAL
Refills: 0 | Status: DISCONTINUED | OUTPATIENT
Start: 2019-10-30 | End: 2019-10-31

## 2019-10-30 RX ORDER — AMOXICILLIN 250 MG/5ML
1000 SUSPENSION, RECONSTITUTED, ORAL (ML) ORAL
Refills: 0 | Status: DISCONTINUED | OUTPATIENT
Start: 2019-10-30 | End: 2019-10-31

## 2019-10-30 RX ADMIN — Medication 4: at 22:33

## 2019-10-30 RX ADMIN — Medication 1000 MILLIGRAM(S): at 22:33

## 2019-10-30 RX ADMIN — Medication 1 TABLET(S): at 12:23

## 2019-10-30 RX ADMIN — Medication 500 MILLIGRAM(S): at 22:33

## 2019-10-30 RX ADMIN — PANTOPRAZOLE SODIUM 40 MILLIGRAM(S): 20 TABLET, DELAYED RELEASE ORAL at 12:24

## 2019-10-30 RX ADMIN — Medication 81 MILLIGRAM(S): at 12:23

## 2019-10-30 RX ADMIN — SENNA PLUS 2 TABLET(S): 8.6 TABLET ORAL at 22:34

## 2019-10-30 RX ADMIN — Medication 2.5 MILLIGRAM(S): at 17:50

## 2019-10-30 RX ADMIN — ATORVASTATIN CALCIUM 10 MILLIGRAM(S): 80 TABLET, FILM COATED ORAL at 22:34

## 2019-10-30 RX ADMIN — CLOPIDOGREL BISULFATE 75 MILLIGRAM(S): 75 TABLET, FILM COATED ORAL at 12:23

## 2019-10-30 RX ADMIN — Medication 0.12 MILLIGRAM(S): at 06:10

## 2019-10-30 RX ADMIN — Medication 500000 UNIT(S): at 17:53

## 2019-10-30 RX ADMIN — Medication 975 MILLIGRAM(S): at 12:24

## 2019-10-30 RX ADMIN — Medication 20 MILLIGRAM(S): at 12:29

## 2019-10-30 RX ADMIN — Medication 4: at 15:15

## 2019-10-30 RX ADMIN — INSULIN GLARGINE 5 UNIT(S): 100 INJECTION, SOLUTION SUBCUTANEOUS at 22:34

## 2019-10-30 RX ADMIN — Medication 975 MILLIGRAM(S): at 17:50

## 2019-10-30 RX ADMIN — ENOXAPARIN SODIUM 40 MILLIGRAM(S): 100 INJECTION SUBCUTANEOUS at 12:29

## 2019-10-30 RX ADMIN — Medication 500 MILLIGRAM(S): at 12:23

## 2019-10-30 RX ADMIN — Medication 3 MILLIGRAM(S): at 22:34

## 2019-10-30 RX ADMIN — Medication 20 MILLIGRAM(S): at 22:33

## 2019-10-30 RX ADMIN — Medication 4: at 17:50

## 2019-10-30 RX ADMIN — Medication 2.5 MILLIGRAM(S): at 06:10

## 2019-10-30 NOTE — DISCHARGE NOTE PROVIDER - CARE PROVIDERS DIRECT ADDRESSES
[email or web address]

## 2019-10-30 NOTE — DISCHARGE NOTE PROVIDER - NSDCFUADDINST_GEN_ALL_CORE_FT
Please follow up with your primary care doctor regarding your hospitalization within 2 weeks of discharge from the hospital.     Sacral Wound care: Clean sacral are with NS, avid allevyn to area, use cavilon to area daily or when soiled.  Follow up at the wound care center outpatient:  [facility] [address] [place] [address] [phone]    [facility] surgical incision:   Change dressing every other day- cover with dry sterile gauze, tegaderm, chio and ACE wrap

## 2019-10-30 NOTE — PROGRESS NOTE ADULT - SUBJECTIVE AND OBJECTIVE BOX
CC: f/u for RLE gangrene and morganella bacteremia    Patient reports: he is non verbal this AM,s/p peg last night    REVIEW OF SYSTEMS:  All other review of systems negative (Comprehensive ROS)    Antimicrobials Day #  :  nystatin    Suspension 612548 Unit(s) Oral two times a day    Other Medications Reviewed    T(F): 98 (10-30-19 @ 05:20), Max: 98.1 (10-29-19 @ 22:36)  HR: 84 (10-30-19 @ 05:20)  BP: 100/62 (10-30-19 @ 05:20)  RR: 18 (10-30-19 @ 05:20)  SpO2: 97% (10-30-19 @ 05:20)  Wt(kg): --    PHYSICAL EXAM:  General: sleepy, no acute distress  Eyes:  anicteric, no conjunctival injection, no discharge  Oropharynx: no lesions or injection 	  Neck: supple, without adenopathy  Lungs: clear to auscultation  Heart: regular rate and rhythm; no murmur, rubs or gallops  Abdomen: soft, nondistended, nontender, abdominal binder in place  Skin: no lesions  Extremities: B/L BKA  Neurologic: lethargic and poorly interactive    LAB RESULTS:                        8.9    11.53 )-----------( 305      ( 29 Oct 2019 09:29 )             28.6     10-29    137  |  98  |  17  ----------------------------<  102<H>  3.8   |  30  |  0.63    Ca    8.3<L>      29 Oct 2019 09:29  Phos  2.9     10-29  Mg     1.8     10-29          MICROBIOLOGY:  RECENT CULTURES:      RADIOLOGY REVIEWED:    < from: Xray Chest 1 View- PORTABLE-Routine (10.18.19 @ 08:52) >  IMPRESSION:  No evidence of pulmonary congestion.    < end of copied text >

## 2019-10-30 NOTE — PROGRESS NOTE ADULT - SUBJECTIVE AND OBJECTIVE BOX
Surgery Progress Note    Subjective:     Patient seen and examined at bedside. Patient without complaints this AM. Denies N/V/F/C. Reports he is not feeling that hungry this morning and does not want Rice Crispy cereal at this time. Pain is controlled.     OBJECTIVE:     T(C): 36.7 (10-30-19 @ 05:20), Max: 36.7 (10-29-19 @ 17:30)  HR: 84 (10-30-19 @ 05:20) (78 - 84)  BP: 100/62 (10-30-19 @ 05:20) (99/64 - 120/70)  RR: 18 (10-30-19 @ 05:20) (17 - 18)  SpO2: 97% (10-30-19 @ 05:20) (97% - 99%)  Wt(kg): --    I&O's Detail    29 Oct 2019 07:01  -  30 Oct 2019 07:00  --------------------------------------------------------  IN:  Total IN: 0 mL    OUT:  Total OUT: 0 mL    Total NET: 0 mL          PHYSICAL EXAM:    GENERAL: NAD, lying in bed comfortably  HEAD:  Atraumatic, Normocephalic  ENT: Moist mucous membranes  CHEST/LUNG: Unlabored respirations  ABDOMEN: Soft, Nontender, Nondistended. PEG in place.   EXT: B/L AKA, dressing change, SS drainage from staple line on right, no expressible material.         MEDICATIONS  (STANDING):  acetaminophen   Tablet .. 975 milliGRAM(s) Oral every 6 hours  ascorbic acid 500 milliGRAM(s) Oral daily  aspirin  chewable 81 milliGRAM(s) Oral daily  atorvastatin 10 milliGRAM(s) Oral at bedtime  calcium carbonate   1250 mG (OsCal) 1 Tablet(s) Oral daily  clopidogrel Tablet 75 milliGRAM(s) Oral daily  digoxin     Tablet 0.125 milliGRAM(s) Oral daily  dronabinol 2.5 milliGRAM(s) Oral two times a day  enoxaparin Injectable 40 milliGRAM(s) SubCutaneous daily  furosemide   Injectable 20 milliGRAM(s) IV Push every 8 hours  influenza   Vaccine 0.5 milliLiter(s) IntraMuscular once  insulin glargine Injectable (LANTUS) 5 Unit(s) SubCutaneous at bedtime  insulin lispro (HumaLOG) corrective regimen sliding scale   SubCutaneous Before meals and at bedtime  melatonin 3 milliGRAM(s) Oral at bedtime  multivitamin 1 Tablet(s) Oral daily  nystatin    Suspension [id number] Unit(s) Oral two times a day  pantoprazole  Injectable 40 milliGRAM(s) IV Push daily  senna 2 Tablet(s) Oral at bedtime    MEDICATIONS  (PRN):  ibuprofen  Tablet. 400 milliGRAM(s) Oral every 6 hours PRN Mild Pain (1 - 3), Moderate Pain (4 - 6)  oxyCODONE    IR 5 milliGRAM(s) Oral every 6 hours PRN Severe Pain (7 - 10)      LABS:                          8.9    11.53 )-----------( 305      ( 29 Oct 2019 09:29 )             28.6     10-29    137  |  98  |  17  ----------------------------<  102<H>  3.8   |  30  |  0.63    Ca    8.3<L>      29 Oct 2019 09:29  Phos  2.9     10-29  Mg     1.8     10-29      PT/INR - ( 29 Oct 2019 09:29 )   PT: 14.9 sec;   INR: 1.30 ratio         PTT - ( 29 Oct 2019 09:29 )  PTT:32.4 sec

## 2019-10-30 NOTE — PROGRESS NOTE ADULT - ASSESSMENT
68 yo male with PVD and dementia, s/p treatment for morganella bacteremia in setting of RLE gangrene  S/P guillotine RT BKA and second stage closure.  S/P completion of antibiotic course  S/P  peg on 10/29  Stable from ID perspective  Lethargy is likely on a non infectious basis  Monitor off antibiotics  Local wound care per vascular

## 2019-10-30 NOTE — DISCHARGE NOTE PROVIDER - NSDCMRMEDTOKEN_GEN_ALL_CORE_FT
ascorbic acid 500 mg oral tablet: 1 tab(s) orally once a day  aspirin 81 mg oral tablet, chewable: 1 tab(s) orally once a day  atorvastatin 10 mg oral tablet: 1 tab(s) orally once a day (at bedtime)  digoxin 125 mcg (0.125 mg) oral tablet: 1 tab(s) orally once a day  melatonin 3 mg oral tablet: 1 tab(s) orally once a day (at bedtime), As needed, Insomnia  Multiple Vitamins oral tablet: 1 tab(s) orally once a day acetaminophen 325 mg oral tablet: 3 tab(s) orally every 6 hours  ascorbic acid 500 mg oral tablet: 1 tab(s) orally once a day  aspirin 81 mg oral tablet, chewable: 1 tab(s) orally once a day  atorvastatin 10 mg oral tablet: 1 tab(s) orally once a day (at bedtime)  digoxin 125 mcg (0.125 mg) oral tablet: 1 tab(s) orally once a day  ibuprofen 400 mg oral tablet: 1 tab(s) orally every 6 hours, As needed, Mild Pain (1 - 3), Moderate Pain (4 - 6)  insulin glargine: 10 unit(s) subcutaneous once a day (at bedtime)  melatonin 3 mg oral tablet: 1 tab(s) orally once a day (at bedtime), As needed, Insomnia  Multiple Vitamins oral tablet: 1 tab(s) orally once a day acetaminophen 325 mg oral tablet: 3 tab(s) orally every 6 hours  amoxicillin 400 mg/5 mL oral liquid: 1000 milligram(s) orally 2 times a day  ascorbic acid 500 mg oral tablet: 1 tab(s) orally once a day  aspirin 81 mg oral tablet, chewable: 1 tab(s) orally once a day  atorvastatin 10 mg oral tablet: 1 tab(s) orally once a day (at bedtime)  calcium carbonate 1250 mg (500 mg elemental calcium) oral tablet: 1 tab(s) orally once a day  clarithromycin 500 mg oral tablet: 1 tab(s) orally 2 times a day  clopidogrel 75 mg oral tablet: 1 tab(s) orally once a day  digoxin 125 mcg (0.125 mg) oral tablet: 1 tab(s) orally once a day  dronabinol 2.5 mg oral capsule: 1 cap(s) orally 2 times a day  furosemide 40 mg/5 mL oral solution: 3.75 milliliter(s) orally 2 times a day  ibuprofen 400 mg oral tablet: 1 tab(s) orally every 6 hours, As needed, Mild Pain (1 - 3), Moderate Pain (4 - 6)  insulin glargine: 10 unit(s) subcutaneous once a day (at bedtime)  insulin lispro 100 units/mL subcutaneous solution: Sliding Scale-- every 6 hours   0 units if glucose 0-150  2 units if glucose 151-200  4 units if glucose 201-250  6 units if glucose 251-300  8 units if glucose 301-350  10 units if glucose 351- 400   12 units if glucose &gt;400 and alert provider   melatonin 3 mg oral tablet: 1 tab(s) orally once a day (at bedtime), As needed, Insomnia  Multiple Vitamins oral tablet: 1 tab(s) orally once a day  nystatin 100,000 units/mL oral suspension: 5 milliliter(s) orally 2 times a day  pantoprazole 40 mg oral granule, delayed release: 40 milligram(s) orally once a day  senna oral tablet: 2 tab(s) orally once a day (at bedtime)

## 2019-10-30 NOTE — DISCHARGE NOTE PROVIDER - NSDCCPCAREPLAN_GEN_ALL_CORE_FT
PRINCIPAL DISCHARGE DIAGNOSIS  Diagnosis: PVD (peripheral vascular disease)  Assessment and Plan of Treatment: Please follow up with Dr. Barriga 1-2 weeks after discharge. Please call his office to make an appointment [phone].  Notify your physician/surgeon and return to ER for temperatures greater than 101, chills sweats, pain not controlled with pain medications, persistent nausea and vomiting, or acutely concerning matters to you, that may require urgent medical attention.  Please keep incision sites clean and dry, shower only.  Do not bathe or immerse incision sites in water for a prolonged amount of time.  Steri-strips may fall of on their own in the shower.        SECONDARY DISCHARGE DIAGNOSES  Diagnosis: S/P percutaneous endoscopic gastrostomy (PEG) tube placement  Assessment and Plan of Treatment: Gastroenterology, Dr. Haque. Call their office,[phone] for appointment.      Diagnosis: Moderate protein-calorie malnutrition  Assessment and Plan of Treatment:     Diagnosis: Malnutrition  Assessment and Plan of Treatment:     Diagnosis: Sepsis  Assessment and Plan of Treatment: PRINCIPAL DISCHARGE DIAGNOSIS  Diagnosis: PVD (peripheral vascular disease)  Assessment and Plan of Treatment: Please follow up with Dr. Barriga 1-2 weeks after discharge. Please call his office to make an appointment [phone].  Notify your physician/surgeon and return to ER for temperatures greater than 101, chills sweats, pain not controlled with pain medications, persistent nausea and vomiting, or acutely concerning matters to you, that may require urgent medical attention.  Please keep incision sites clean and dry, shower only.  Do not bathe or immerse incision sites in water for a prolonged amount of time.  Steri-strips may fall of on their own in the shower.        SECONDARY DISCHARGE DIAGNOSES  Diagnosis: Duodenal ulcer due to Helicobacter pylori  Assessment and Plan of Treatment: Continue with triple therapy treatment with protonix, clarithromycin and amoxicillin. Continue the treatment through 11/13.    Diagnosis: S/P percutaneous endoscopic gastrostomy (PEG) tube placement  Assessment and Plan of Treatment: Gastroenterology, Dr. Haque. Call their office,[phone] for appointment.      Diagnosis: Moderate protein-calorie malnutrition  Assessment and Plan of Treatment:     Diagnosis: Malnutrition  Assessment and Plan of Treatment:     Diagnosis: Sepsis  Assessment and Plan of Treatment: PRINCIPAL DISCHARGE DIAGNOSIS  Diagnosis: PVD (peripheral vascular disease)  Assessment and Plan of Treatment: Please follow up with Dr. Barriga 1-2 weeks after discharge. Please call his office to make an appointment [phone].  Notify your physician/surgeon and return to ER for temperatures greater than 101, chills sweats, pain not controlled with pain medications, persistent nausea and vomiting, or acutely concerning matters to you, that may require urgent medical attention.  Please keep incision sites clean and dry, shower only.  Do not bathe or immerse incision sites in water for a prolonged amount of time.  Steri-strips may fall of on their own in the shower.        SECONDARY DISCHARGE DIAGNOSES  Diagnosis: Diabetes mellitus  Assessment and Plan of Treatment: Continue with lantus and an insulin sliding scale.   Please follow up with your primary care doctor within 2 weeks of discharge from the hospital.    Diagnosis: Duodenal ulcer due to Helicobacter pylori  Assessment and Plan of Treatment: Continue with triple therapy treatment with protonix, clarithromycin and amoxicillin. Continue the treatment through 11/13.    Diagnosis: S/P percutaneous endoscopic gastrostomy (PEG) tube placement  Assessment and Plan of Treatment: Gastroenterology, Dr. Haque. Call their office,[phone] for appointment.      Diagnosis: Moderate protein-calorie malnutrition  Assessment and Plan of Treatment:     Diagnosis: Malnutrition  Assessment and Plan of Treatment:     Diagnosis: Sepsis  Assessment and Plan of Treatment: PRINCIPAL DISCHARGE DIAGNOSIS  Diagnosis: PVD (peripheral vascular disease)  Assessment and Plan of Treatment: Please follow up with Dr. Barriga 1-2 weeks after discharge. Please call his office to make an appointment [phone]. Staples will be removed in the office with Dr. Barriga.  Notify your physician/surgeon and return to ER for temperatures greater than 101, chills sweats, pain not controlled with pain medications, persistent nausea and vomiting, or acutely concerning matters to you, that may require urgent medical attention.  Please keep incision sites clean and dry, shower only.  Do not bathe or immerse incision sites in water for a prolonged amount of time.  Steri-strips may fall of on their own in the shower.        SECONDARY DISCHARGE DIAGNOSES  Diagnosis: Diabetes mellitus  Assessment and Plan of Treatment: Continue with lantus and an insulin sliding scale.   Please follow up with your primary care doctor within 2 weeks of discharge from the hospital.    Diagnosis: Duodenal ulcer due to Helicobacter pylori  Assessment and Plan of Treatment: Continue with triple therapy treatment with protonix, clarithromycin and amoxicillin. Continue the treatment through 11/13.    Diagnosis: S/P percutaneous endoscopic gastrostomy (PEG) tube placement  Assessment and Plan of Treatment: Gastroenterology, Dr. Haque. Call their office,[phone] for appointment.      Diagnosis: Moderate protein-calorie malnutrition  Assessment and Plan of Treatment:     Diagnosis: Malnutrition  Assessment and Plan of Treatment:     Diagnosis: Sepsis  Assessment and Plan of Treatment:

## 2019-10-30 NOTE — DISCHARGE NOTE PROVIDER - PROVIDER TOKENS
PROVIDER:[TOKEN:[[id number]]],PROVIDER:[TOKEN:[[id number]]] PROVIDER:[TOKEN:[[id number]]],PROVIDER:[TOKEN:[[id number]]],PROVIDER:[TOKEN:[[id number]],FOLLOWUP:[Routine]]

## 2019-10-30 NOTE — MEDICAL STUDENT PROGRESS NOTE(EDUCATION) - NS MD HP STUD ASPLAN PLAN FT
- pain control   - wound care for R shoulder and buttocks wounds - wound care to see, rec cavalon to wounds.   - PPD1 PEG placement by GI - ok to start feeds today - plan is Glucerna 1.2 with a goal of 60cc/hr for 24 hours per dietary / nutrition recs. Will start at 20cc/hr and increase by 20 every 4 hours until at goal - if patient becomes nauseous/vomits/has abdominal pain/or becomes distended, will hold feeds and place PEG to gravity.   - dronabinol for appetite  - continue lasix push  - condom cath instead of south to collect urine for measurement   - dressing changed on R AKA leg, small amount of serosang material under dressing with nothing expressible during exam - will monitor   - replete electrolytes as needed  - dispo planning once tolerating feeds

## 2019-10-30 NOTE — MEDICAL STUDENT PROGRESS NOTE(EDUCATION) - SUBJECTIVE AND OBJECTIVE BOX
Surgery Progress Note    Subjective:     Patient seen and examined at bedside. Patient without complaints this AM; reports no pain. Denies N/V/F/C. Reports he is not feeling that hungry this morning. Pain is controlled.   Per wound care note today, patient was uncooperative with exam, verbally abusive and attempted to hit caregivers during turning and positioning.       OBJECTIVE:     V/S:  Vital Signs Last 24 Hrs  T(C): 37.2 (30 Oct 2019 10:49), Max: 37.2 (30 Oct 2019 10:49)  T(F): 98.9 (30 Oct 2019 10:49), Max: 98.9 (30 Oct 2019 10:49)  HR: 87 (30 Oct 2019 10:49) (78 - 87)  BP: 105/63 (30 Oct 2019 10:49) (99/64 - 120/70)  BP(mean): --  RR: 17 (30 Oct 2019 10:49) (17 - 18)  SpO2: 97% (30 Oct 2019 10:49) (97% - 99%)    --------------------------------------------------------------------------------------------------  PHYSICAL EXAM:    GENERAL: NAD, lying in bed comfortably  HEAD:  Atraumatic, Normocephalic  ENT: Moist mucous membranes  CHEST/LUNG: Unlabored respirations  ABDOMEN: Soft, Nontender, Nondistended. PEG in place.   EXT: B/L AKA, dressing change, SS drainage from staple line on right, no expressible material.  ====  I/Os:    29 Oct 2019 07:01  -  30 Oct 2019 07:00  --------------------------------------------------------  IN:  Total IN: 0 mL    OUT:  Total OUT: 0 mL    Total NET: 0 mL        --------------------------------------------------------------------------------------------------  LABS:                        8.1    12.86 )-----------( 289      ( 30 Oct 2019 08:36 )             26.7     30 Oct 2019 08:36    141    |  102    |  26     ----------------------------<  148    4.1     |  31     |  0.70     Ca    8.2        30 Oct 2019 08:36  Phos  2.6       30 Oct 2019 08:36  Mg     1.8       30 Oct 2019 08:36      PT/INR - ( 29 Oct 2019 09:29 )   PT: 14.9 sec;   INR: 1.30 ratio         PTT - ( 29 Oct 2019 09:29 )  PTT:32.4 sec  CAPILLARY BLOOD GLUCOSE      POCT Blood Glucose.: 137 mg/dL (30 Oct 2019 11:21)  POCT Blood Glucose.: 116 mg/dL (30 Oct 2019 05:57)  POCT Blood Glucose.: 146 mg/dL (29 Oct 2019 22:22)  POCT Blood Glucose.: 134 mg/dL (29 Oct 2019 18:15)              --------------------------------------------------------------------------------------------------  MEDICATIONS  (STANDING):  acetaminophen   Tablet .. 975 milliGRAM(s) Oral every 6 hours  ascorbic acid 500 milliGRAM(s) Oral daily  aspirin  chewable 81 milliGRAM(s) Oral daily  atorvastatin 10 milliGRAM(s) Oral at bedtime  calcium carbonate   1250 mG (OsCal) 1 Tablet(s) Oral daily  clopidogrel Tablet 75 milliGRAM(s) Oral daily  digoxin     Tablet 0.125 milliGRAM(s) Oral daily  dronabinol 2.5 milliGRAM(s) Oral two times a day  enoxaparin Injectable 40 milliGRAM(s) SubCutaneous daily  furosemide   Injectable 20 milliGRAM(s) IV Push every 8 hours  influenza   Vaccine 0.5 milliLiter(s) IntraMuscular once  insulin glargine Injectable (LANTUS) 5 Unit(s) SubCutaneous at bedtime  insulin lispro (HumaLOG) corrective regimen sliding scale   SubCutaneous Before meals and at bedtime  melatonin 3 milliGRAM(s) Oral at bedtime  multivitamin 1 Tablet(s) Oral daily  nystatin    Suspension 301147 Unit(s) Oral two times a day  pantoprazole  Injectable 40 milliGRAM(s) IV Push daily  senna 2 Tablet(s) Oral at bedtime    MEDICATIONS  (PRN):  ibuprofen  Tablet. 400 milliGRAM(s) Oral every 6 hours PRN Mild Pain (1 - 3), Moderate Pain (4 - 6)  oxyCODONE    IR 5 milliGRAM(s) Oral every 6 hours PRN Severe Pain (7 - 10)

## 2019-10-30 NOTE — DISCHARGE NOTE PROVIDER - HOSPITAL COURSE
69y Male with history of DM II, HTN, CVA, NSTEMI c/b cardiogenic shock, PVD s/p L AILEEN (discharged on 10/7) presents to [facility] ED from rehab for altered mental status. In ED patient was noted to have leukocytosis of 17, hyperglycemia, and tachycardic. At previous hospital discharge patient was noted to have right LE dry gangrene, however xray of LE upon ED presentation was significant for emphysematous changes along lateral malleolus. CXR also significant for bilateral pulmonary edema, BNP 47718. 40mg Lasixs was given prior to patient going to OR for emergent R sided guillotine BKA with vascular surgery (EBL minimal, 150 IVF, 0 UOP).         During surgery, purulent drainage noted within BKA stump, cultures obtained. Patient was brought to SICU intubated and required Levophed. Pt received daily wound care and VAC maintenance on the stump. Wound care for R shoulder and buttocks wounds was continued. Pt was placed on a Pureed diet. Pt however had minimal PO intake. ID consulted for gangrene of the right foot, Morganella bacteremia likely from the limb despite not growing it from surgical swab. Follow up cultures of blood negative. Had guillotine BKA so source control achieved, and noted to have dry necrosis over buttocks but I do not think that is source of bacteremia. ID recommendations:    continue Ertapenem through planned BKA revision, and can then limit it to a short post op course.        On 10/22 pt evaluated by Nutrition, and deemed to have Moderate Protein Calorie Malnutrition, with recs for Pureed diet as preet,  No Consistent CHO diet  Suggest PEG.  Supplement with multivitamin, Vit C, Vit D, Mejia 1 package x2 if PEG placed. If PEG placed suggest Glucerna 1.2 60cc/hr x 24 hrs provides 1728 kcals, 86 gm protein, 1159cc free water,  as goal rate,  based on previously estimated nutrient need for wound healing.         Pt underwent right above the knee amputation completion on 10/23. Pt. tolerated procedure well and was transferred to the recovery room and then the floor without incidence.  Pt. was mainly managed for postoperative pain, wound care, as well as close monitoring of fluid resuscitation and neurovascular checks. ID follow up on 10/25 recs: pt now s/p right aka for gangrene, Morganella bacteremia from limb cleared. Has dry gangrene of sacral area which is getting local care, plan 3 more days of Ertapenem, local care to stumps and back        Pt continued to have minimal PO intake. PEG placed 10/29. Tube feeds started on 10/30. Physical therapy evaluated pt and recommended Subacute Rehabilitation Facility.        Pain is now well controlled. Pt is tolerating a diet, voiding and ambulating.  Pt is ready for discharge in stable condition. Pt will follow up with as an outpatient in one to two weeks. 69y Male with history of DM II, HTN, CVA, NSTEMI c/b cardiogenic shock, PVD s/p L AILEEN (discharged on 10/7) presents to [facility] ED from rehab for altered mental status. In ED patient was noted to have leukocytosis of 17, hyperglycemia, and tachycardic. At previous hospital discharge patient was noted to have right LE dry gangrene, however xray of LE upon ED presentation was significant for emphysematous changes along lateral malleolus. CXR also significant for bilateral pulmonary edema, BNP 60482. 40mg Lasixs was given prior to patient going to OR for emergent R sided guillotine BKA with vascular surgery (EBL minimal, 150 IVF, 0 UOP).         During surgery, purulent drainage noted within BKA stump, cultures obtained. Patient was brought to SICU intubated and required Levophed. Pt received daily wound care and VAC maintenance on the stump. Wound care for R shoulder and buttocks wounds was continued. Pt was placed on a Pureed diet. Pt however had minimal PO intake. ID consulted for gangrene of the right foot, Morganella bacteremia likely from the limb despite not growing it from surgical swab. Follow up cultures of blood negative. Had guillotine BKA so source control achieved, and noted to have dry necrosis over buttocks but I do not think that is source of bacteremia. ID recommendations:    continue Ertapenem through planned BKA revision, and can then limit it to a short post op course.        On 10/22 pt evaluated by Nutrition, and deemed to have Moderate Protein Calorie Malnutrition, with recs for Pureed diet as preet,  No Consistent CHO diet  Suggest PEG.  Supplement with multivitamin, Vit C, Vit D, Mejia 1 package x2 if PEG placed. If PEG placed suggest Glucerna 1.2 60cc/hr x 24 hrs provides 1728 kcals, 86 gm protein, 1159cc free water,  as goal rate,  based on previously estimated nutrient need for wound healing.         Pt underwent right above the knee amputation completion on 10/23. Pt. tolerated procedure well and was transferred to the recovery room and then the floor without incidence.  Pt. was mainly managed for postoperative pain, wound care, as well as close monitoring of fluid resuscitation and neurovascular checks. ID follow up on 10/25 recs: pt now s/p right aka for gangrene, Morganella bacteremia from limb cleared. Has dry gangrene of sacral area which is getting local care, plan 3 more days of Ertapenem, local care to stumps and back        Pt continued to have minimal PO intake. PEG placed 10/29. Patient found to have a duodenal ulcer and cultures resulted on 10/31 that are positive for H. pylori and patient was started on thriple therapy treatment. Tube feeds started on 10/30. Physical therapy evaluated pt and recommended [facility]acute Rehabilitation Facility.         Pain is now well controlled. Pt is tolerating a diet, voiding and ambulating.  Pt is ready for discharge in stable condition. Pt will follow up with as an outpatient in one to two weeks.

## 2019-10-30 NOTE — PROGRESS NOTE ADULT - SUBJECTIVE AND OBJECTIVE BOX
Wound Surgery Follow up Note:    HPI:  Interval information was obtained from the patient's nephew who was present.    Mr. Erik Olivares is a 69 year old gentleman with pmhx of T2DM, HTN, CVA, NSTEMI complicated by cardiogenic shock, significant PVD s/p LLE fem-pop bypass (vein graft) with subsequent LLE BKA (07/2019, Dr. MARILEE Barriga), LLE AKA (09/2019, Dr. MARILEE Roper), known chronically ischemic RLE who was brought in from his nursing facility with progressively altered mentation, fevers and tachycardia. In the ED, the patient was agitated, required supplemental O2, and was tachycardic. (12 Oct 2019 23:02)    Mr. Olivares was encountered on an air fluidized surface with his clinical nurse and ancillary staff. He was uncooperative with exam, verbally abusive and attempted to hit caregivers. By report, he is consistently uncooperative with assessments and interventions. Specifically, he is uncooperative with turning and positioning. He is s/p PEG placement and is scheduled to begin tube feeds.    PAST MEDICAL & SURGICAL HISTORY:  Hyperlipidemia  Hypertension  Diabetes  H/O mastoidectomy      REVIEW OF SYSTEMS  Unable to obtain. Patient is uncooperative with exam.	    MEDICATIONS  (STANDING):  acetaminophen   Tablet .. 975 milliGRAM(s) Oral every 6 hours  ascorbic acid 500 milliGRAM(s) Oral daily  aspirin  chewable 81 milliGRAM(s) Oral daily  atorvastatin 10 milliGRAM(s) Oral at bedtime  calcium carbonate   1250 mG (OsCal) 1 Tablet(s) Oral daily  clopidogrel Tablet 75 milliGRAM(s) Oral daily  digoxin     Tablet 0.125 milliGRAM(s) Oral daily  dronabinol 2.5 milliGRAM(s) Oral two times a day  enoxaparin Injectable 40 milliGRAM(s) SubCutaneous daily  furosemide   Injectable 20 milliGRAM(s) IV Push every 8 hours  influenza   Vaccine 0.5 milliLiter(s) IntraMuscular once  insulin glargine Injectable (LANTUS) 5 Unit(s) SubCutaneous at bedtime  insulin lispro (HumaLOG) corrective regimen sliding scale   SubCutaneous Before meals and at bedtime  melatonin 3 milliGRAM(s) Oral at bedtime  multivitamin 1 Tablet(s) Oral daily  nystatin    Suspension 410453 Unit(s) Oral two times a day  pantoprazole  Injectable 40 milliGRAM(s) IV Push daily  senna 2 Tablet(s) Oral at bedtime    MEDICATIONS  (PRN):  ibuprofen  Tablet. 400 milliGRAM(s) Oral every 6 hours PRN Mild Pain (1 - 3), Moderate Pain (4 - 6)  oxyCODONE    IR 5 milliGRAM(s) Oral every 6 hours PRN Severe Pain (7 - 10)    Allergies    No Known Allergies    Intolerances    SOCIAL HISTORY:  unable to obtain. patient is uncooperative    FAMILY HISTORY:  FHx: diabetes mellitus: In all siblings(2 sisters and 4 brothers)    Vital Signs Last 24 Hrs  T(C): 36.7 (30 Oct 2019 05:20), Max: 36.7 (29 Oct 2019 17:30)  T(F): 98 (30 Oct 2019 05:20), Max: 98.1 (29 Oct 2019 22:36)  HR: 84 (30 Oct 2019 05:20) (78 - 84)  BP: 100/62 (30 Oct 2019 05:20) (99/64 - 120/70)  BP(mean): --  RR: 18 (30 Oct 2019 05:20) (17 - 18)  SpO2: 97% (30 Oct 2019 05:20) (97% - 99%)    Physical Exam:  General: NAD, combative  Respiratory: no SOB on room air  Gastrointestinal: soft NT/ND,  (+)PEG  Neurology: alert, uncooperative  Musculoskeletal: L AKA, R AKA  Vascular:  L AKA, R AKA stumps warm  Skin:  sacral wound L 16cm x W 17cm x D unknown, wound bed is 100% covered by dry, firm, fixed eschar, scant serosanguinous drainage, + malodor,  No erythema, increased warmth, tenderness, induration, fluctuance    LABS:  10-30    141  |  102  |  26<H>  ----------------------------<  148<H>  4.1   |  31  |  0.70    Ca    8.2<L>      30 Oct 2019 08:36  Phos  2.6     10-30  Mg     1.8     10-30                            8.1    12.86 )-----------( 289      ( 30 Oct 2019 08:36 )             26.7     PT/INR - ( 29 Oct 2019 09:29 )   PT: 14.9 sec;   INR: 1.30 ratio         PTT - ( 29 Oct 2019 09:29 )  PTT:32.4 sec

## 2019-10-30 NOTE — MEDICAL STUDENT PROGRESS NOTE(EDUCATION) - NS MD HP STUD ASPLAN ASSES FT
69y male is POD1 s/p PEG tube for malnutrition, s/p Right AKA and with hx of left AKA who was brought in from his nursing home facility with sepsis likely secondary to his chronically necrotic RLE. PMH of T2DM, HTN, CVA, NSTEMI complicated by cardiogenic shock, with extensive LE vaso-occlusive disease, prior LLE AKA, Also with volume overload. Admitted for AMS & sepsis with concern of wet gangrene, now s/p R below knee guillotine for wet gangrene performed on 10/13/19, recovered in SICU and since transferred to the floor in stable condition.

## 2019-10-30 NOTE — PROGRESS NOTE ADULT - ASSESSMENT
Impression:    Sacral unstageable pressure injury  Incontinence of bladder and bowel  Right scapular wound, healed    Recommend:  1.) Topical therapy: sacral wound - cleanse with NS, pat dry, apply cavilon daily  2.) Nutrition optimization  3.) Maintain on an air fluidized surface  4.) Turn and reposition Q 2 hours  5.) incontinence management - incontinence cleanser, pads, pericare BID  6.) Emollient therapy: Moisturize intact skin w/ SWEEN cream daily    Care as per medicine will follow w/ you  Upon [facility] [address] [place] [address] [phone]  Seen and discussed with clinical nurse  Thank you for this consult  Renu Pierce, NP-C ,CWOCN [id number]

## 2019-10-30 NOTE — PROGRESS NOTE ADULT - ASSESSMENT
Impression:    1. Malnutrition s/p PEG    2. CVA on dual antiplatelet.    3. HFREF 15-20%    4. Dementia    5. Bacteremia on Ertapenem    6. DUodenal ulcer, clean based    7. Anemia w/o overt GIB, slight downtrend today    Recommendation:  -ok to initiate feeding  -PPI QD, check serum for H pylori and eradicate if positive  -repeat hgb today given elevated BUN/Cr Impression:    1. Malnutrition s/p PEG    2. CVA on dual antiplatelet.    3. HFREF 15-20%    4. Dementia    5. Bacteremia on Ertapenem    6. DUodenal ulcer, clean based - no evidence of bleeding    7. Anemia w/o overt GIB, slight downtrend today    Recommendation:  -ok to initiate feeding  -PPI QD, check serum for H pylori and eradicate if positive  -repeat hgb today given elevated BUN/Cr  -Call GI immediately if PEG is dislodged within 4-6 weeks of placement; if dislodged, needs CT and surgical consultation  -Please call with questions

## 2019-10-30 NOTE — DISCHARGE NOTE PROVIDER - NSDCCPTREATMENT_GEN_ALL_CORE_FT
PRINCIPAL PROCEDURE  Procedure: Guillotine amputation below knee  Findings and Treatment:       SECONDARY PROCEDURE  Procedure: Right above knee amputation  Findings and Treatment:

## 2019-10-30 NOTE — PROGRESS NOTE ADULT - ASSESSMENT
69y male with extensive LE vaso-occlusive disease, prior LLE AKA, who was brought in from his nursing home facility with sepsis likely secondary to his chronically necrotic RLE. Also with volume overload. 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 s/p Right AKA     - pain control   - wound care for R shoulder and buttocks wounds - wound care to see, rec cavalon to wounds.   - PPD1 PEG placement by GI - ok to start feeds today - plan is Glucerna 1.2 with a goal of 60cc/hr for 24 hours per dietary / nutrition recs. Will start at 20cc/hr and increase by 20 every 4 hours until at goal - if patient becomes nauseous/vomits/has abdominal pain/or becomes distended, will hold feeds and place PEG to gravity.   - dronabinol   - continue lasix push  - condom cath instead of south to collect urine for measurement   - dressing changed on R AKA leg, small amount of serosang material under dressing with nothing expressible during exam - will monitor   - replete electrolytes as needed  - dispo planning once tolerating feeds     Discussed plan with vascular surgery team   Vascular Surgery [id number]

## 2019-10-30 NOTE — DISCHARGE NOTE PROVIDER - CARE PROVIDER_API CALL
Tomás Barrgia)  Vascular Surgery  [address] [place] [address], [address] [place] [address]  Phone: [phone]  Fax: [phone]  Follow Up Time:     Prashant Haque)  Gastroenterology; Internal Medicine  [address] [place] [address]  Phone: [phone]  Fax: [phone]  Follow Up Time: Tomás Barriga)  Vascular Surgery  [facility]  [place] [address]  Phone: [phone]  Fax: [phone]  Follow Up Time:     Prashant Haque)  Gastroenterology; Internal Medicine  [address] [place] [address]  Phone: [phone]  Fax: [phone]  Follow Up Time:     Vignesh Mason (MD)  Surgery  [facility]  [facility], [address]  [place] [address]  Phone: [phone]  Fax: [phone]  Follow Up Time: Routine

## 2019-10-30 NOTE — PROGRESS NOTE ADULT - SUBJECTIVE AND OBJECTIVE BOX
Patient is a 69y old  Male who presents with a chief complaint of Altered Mental Status (30 Oct 2019 09:28)      SUBJECTIVE / OVERNIGHT EVENTS:  no events  MEDICATIONS  (STANDING):  acetaminophen   Tablet .. 975 milliGRAM(s) Oral every 6 hours  ascorbic acid 500 milliGRAM(s) Oral daily  aspirin  chewable 81 milliGRAM(s) Oral daily  atorvastatin 10 milliGRAM(s) Oral at bedtime  calcium carbonate   1250 mG (OsCal) 1 Tablet(s) Oral daily  clopidogrel Tablet 75 milliGRAM(s) Oral daily  digoxin     Tablet 0.125 milliGRAM(s) Oral daily  dronabinol 2.5 milliGRAM(s) Oral two times a day  enoxaparin Injectable 40 milliGRAM(s) SubCutaneous daily  furosemide   Injectable 20 milliGRAM(s) IV Push every 8 hours  influenza   Vaccine 0.5 milliLiter(s) IntraMuscular once  insulin glargine Injectable (LANTUS) 5 Unit(s) SubCutaneous at bedtime  insulin lispro (HumaLOG) corrective regimen sliding scale   SubCutaneous Before meals and at bedtime  melatonin 3 milliGRAM(s) Oral at bedtime  multivitamin 1 Tablet(s) Oral daily  nystatin    Suspension 044067 Unit(s) Oral two times a day  pantoprazole  Injectable 40 milliGRAM(s) IV Push daily  senna 2 Tablet(s) Oral at bedtime    MEDICATIONS  (PRN):  ibuprofen  Tablet. 400 milliGRAM(s) Oral every 6 hours PRN Mild Pain (1 - 3), Moderate Pain (4 - 6)  oxyCODONE    IR 5 milliGRAM(s) Oral every 6 hours PRN Severe Pain (7 - 10)              PHYSICAL EXAM:  GENERAL: NAD, well-developed  HEAD:  Atraumatic, Normocephalic  EYES: EOMI, PERRLA, conjunctiva and sclera anicteric  NECK: Supple, No JVD  CHEST/LUNG: Clear to auscultation bilaterally; No wheeze  HEART: Regular rate and rhythm; No murmurs, rubs, or gallops  ABDOMEN: G Tube unremarkable at 3.5.  EXTREMITIES:  AKA B/L    NEUROLOGY: non-focal, no asterixis  SKIN: No rashes or lesion    LABS:                        8.1    12.86 )-----------( 289      ( 30 Oct 2019 08:36 )             26.7     10-30    141  |  102  |  26<H>  ----------------------------<  148<H>  4.1   |  31  |  0.70    Ca    8.2<L>      30 Oct 2019 08:36  Phos  2.6     10-30  Mg     1.8     10-30        PT/INR - ( 29 Oct 2019 09:29 )   PT: 14.9 sec;   INR: 1.30 ratio         PTT - ( 29 Oct 2019 09:29 )  PTT:32.4 sec          RADIOLOGY & ADDITIONAL TESTS: Patient is a 69y old  Male who presents with a chief complaint of Altered Mental Status (30 Oct 2019 09:28)      SUBJECTIVE / OVERNIGHT EVENTS:  no events. Denies abdominal pain.    MEDICATIONS  (STANDING):  acetaminophen   Tablet .. 975 milliGRAM(s) Oral every 6 hours  ascorbic acid 500 milliGRAM(s) Oral daily  aspirin  chewable 81 milliGRAM(s) Oral daily  atorvastatin 10 milliGRAM(s) Oral at bedtime  calcium carbonate   1250 mG (OsCal) 1 Tablet(s) Oral daily  clopidogrel Tablet 75 milliGRAM(s) Oral daily  digoxin     Tablet 0.125 milliGRAM(s) Oral daily  dronabinol 2.5 milliGRAM(s) Oral two times a day  enoxaparin Injectable 40 milliGRAM(s) SubCutaneous daily  furosemide   Injectable 20 milliGRAM(s) IV Push every 8 hours  influenza   Vaccine 0.5 milliLiter(s) IntraMuscular once  insulin glargine Injectable (LANTUS) 5 Unit(s) SubCutaneous at bedtime  insulin lispro (HumaLOG) corrective regimen sliding scale   SubCutaneous Before meals and at bedtime  melatonin 3 milliGRAM(s) Oral at bedtime  multivitamin 1 Tablet(s) Oral daily  nystatin    Suspension 086055 Unit(s) Oral two times a day  pantoprazole  Injectable 40 milliGRAM(s) IV Push daily  senna 2 Tablet(s) Oral at bedtime    MEDICATIONS  (PRN):  ibuprofen  Tablet. 400 milliGRAM(s) Oral every 6 hours PRN Mild Pain (1 - 3), Moderate Pain (4 - 6)  oxyCODONE    IR 5 milliGRAM(s) Oral every 6 hours PRN Severe Pain (7 - 10)              PHYSICAL EXAM:  GENERAL: NAD, well-developed  HEAD:  Atraumatic, Normocephalic  EYES: EOMI, PERRLA, conjunctiva and sclera anicteric  NECK: Supple, No JVD  CHEST/LUNG: Clear to auscultation bilaterally; No wheeze  HEART: Regular rate and rhythm; No murmurs, rubs, or gallops  ABDOMEN: G Tube unremarkable at 2.5. soft, non-distended  EXTREMITIES:  AKA B/L    NEUROLOGY: non-focal, no asterixis  SKIN: No rashes or lesion    LABS:                        8.1    12.86 )-----------( 289      ( 30 Oct 2019 08:36 )             26.7     10-30    141  |  102  |  26<H>  ----------------------------<  148<H>  4.1   |  31  |  0.70    Ca    8.2<L>      30 Oct 2019 08:36  Phos  2.6     10-30  Mg     1.8     10-30        PT/INR - ( 29 Oct 2019 09:29 )   PT: 14.9 sec;   INR: 1.30 ratio         PTT - ( 29 Oct 2019 09:29 )  PTT:32.4 sec

## 2019-10-30 NOTE — DISCHARGE NOTE PROVIDER - INSTRUCTIONS
Pureed Diet, Tube feeds Pureed Diet, Tube feeds  Tube feeds via gastrostomy   Glucerna 1.2 bárbara  total volume for 24 hours (mL): 1440   tube feed rate: 60 ml per hour   tube feed duration: 24 hours   Ensure enlive cans   ensure pudding cans

## 2019-10-31 LAB
ANION GAP SERPL CALC-SCNC: 18 MMOL/L — HIGH (ref 5–17)
BUN SERPL-MCNC: 27 MG/DL — HIGH (ref 7–23)
CALCIUM SERPL-MCNC: 8.6 MG/DL — SIGNIFICANT CHANGE UP (ref 8.4–10.5)
CHLORIDE SERPL-SCNC: 94 MMOL/L — LOW (ref 96–108)
CO2 SERPL-SCNC: 30 MMOL/L — SIGNIFICANT CHANGE UP (ref 22–31)
CREAT SERPL-MCNC: 0.59 MG/DL — SIGNIFICANT CHANGE UP (ref 0.5–1.3)
GLUCOSE SERPL-MCNC: 242 MG/DL — HIGH (ref 70–99)
HCT VFR BLD CALC: 27.9 % — LOW (ref 39–50)
HGB BLD-MCNC: 8.6 G/DL — LOW (ref 13–17)
MAGNESIUM SERPL-MCNC: 1.9 MG/DL — SIGNIFICANT CHANGE UP (ref 1.6–2.6)
MCHC RBC-ENTMCNC: 27.4 PG — SIGNIFICANT CHANGE UP (ref 27–34)
MCHC RBC-ENTMCNC: 30.8 GM/DL — LOW (ref 32–36)
MCV RBC AUTO: 88.9 FL — SIGNIFICANT CHANGE UP (ref 80–100)
NRBC # BLD: 0 /100 WBCS — SIGNIFICANT CHANGE UP (ref 0–0)
PHOSPHATE SERPL-MCNC: 1.6 MG/DL — LOW (ref 2.5–4.5)
PLATELET # BLD AUTO: 324 K/UL — SIGNIFICANT CHANGE UP (ref 150–400)
POTASSIUM SERPL-MCNC: 3.7 MMOL/L — SIGNIFICANT CHANGE UP (ref 3.5–5.3)
POTASSIUM SERPL-SCNC: 3.7 MMOL/L — SIGNIFICANT CHANGE UP (ref 3.5–5.3)
RBC # BLD: 3.14 M/UL — LOW (ref 4.2–5.8)
RBC # FLD: 16.8 % — HIGH (ref 10.3–14.5)
SODIUM SERPL-SCNC: 142 MMOL/L — SIGNIFICANT CHANGE UP (ref 135–145)
WBC # BLD: 15.2 K/UL — HIGH (ref 3.8–10.5)
WBC # FLD AUTO: 15.2 K/UL — HIGH (ref 3.8–10.5)

## 2019-10-31 PROCEDURE — 99232 SBSQ HOSP IP/OBS MODERATE 35: CPT

## 2019-10-31 RX ORDER — SODIUM,POTASSIUM PHOSPHATES 278-250MG
3 POWDER IN PACKET (EA) ORAL ONCE
Refills: 0 | Status: COMPLETED | OUTPATIENT
Start: 2019-10-31 | End: 2019-10-31

## 2019-10-31 RX ORDER — CHLORHEXIDINE GLUCONATE 213 G/1000ML
1 SOLUTION TOPICAL
Refills: 0 | Status: DISCONTINUED | OUTPATIENT
Start: 2019-10-31 | End: 2019-11-01

## 2019-10-31 RX ORDER — PANTOPRAZOLE SODIUM 20 MG/1
40 TABLET, DELAYED RELEASE ORAL DAILY
Refills: 0 | Status: DISCONTINUED | OUTPATIENT
Start: 2019-10-31 | End: 2019-11-01

## 2019-10-31 RX ORDER — AMOXICILLIN 250 MG/5ML
1000 SUSPENSION, RECONSTITUTED, ORAL (ML) ORAL
Refills: 0 | Status: DISCONTINUED | OUTPATIENT
Start: 2019-10-31 | End: 2019-11-01

## 2019-10-31 RX ORDER — INSULIN LISPRO 100/ML
VIAL (ML) SUBCUTANEOUS EVERY 6 HOURS
Refills: 0 | Status: DISCONTINUED | OUTPATIENT
Start: 2019-10-31 | End: 2019-11-01

## 2019-10-31 RX ADMIN — Medication 1000 MILLIGRAM(S): at 06:16

## 2019-10-31 RX ADMIN — PANTOPRAZOLE SODIUM 40 MILLIGRAM(S): 20 TABLET, DELAYED RELEASE ORAL at 12:40

## 2019-10-31 RX ADMIN — Medication 3 PACKET(S): at 14:06

## 2019-10-31 RX ADMIN — Medication 500 MILLIGRAM(S): at 18:38

## 2019-10-31 RX ADMIN — Medication 2.5 MILLIGRAM(S): at 06:16

## 2019-10-31 RX ADMIN — Medication 2.5 MILLIGRAM(S): at 18:38

## 2019-10-31 RX ADMIN — ENOXAPARIN SODIUM 40 MILLIGRAM(S): 100 INJECTION SUBCUTANEOUS at 12:40

## 2019-10-31 RX ADMIN — Medication 500000 UNIT(S): at 17:58

## 2019-10-31 RX ADMIN — Medication 3 MILLIGRAM(S): at 22:37

## 2019-10-31 RX ADMIN — Medication 500 MILLIGRAM(S): at 06:16

## 2019-10-31 RX ADMIN — Medication 1 TABLET(S): at 12:40

## 2019-10-31 RX ADMIN — Medication 0.12 MILLIGRAM(S): at 06:16

## 2019-10-31 RX ADMIN — Medication 975 MILLIGRAM(S): at 18:38

## 2019-10-31 RX ADMIN — Medication 4: at 17:58

## 2019-10-31 RX ADMIN — Medication 975 MILLIGRAM(S): at 13:11

## 2019-10-31 RX ADMIN — Medication 500000 UNIT(S): at 06:17

## 2019-10-31 RX ADMIN — Medication 81 MILLIGRAM(S): at 12:40

## 2019-10-31 RX ADMIN — CLOPIDOGREL BISULFATE 75 MILLIGRAM(S): 75 TABLET, FILM COATED ORAL at 12:40

## 2019-10-31 RX ADMIN — Medication 1000 MILLIGRAM(S): at 18:39

## 2019-10-31 RX ADMIN — Medication 975 MILLIGRAM(S): at 06:17

## 2019-10-31 RX ADMIN — Medication 4: at 12:41

## 2019-10-31 RX ADMIN — Medication 6: at 06:16

## 2019-10-31 RX ADMIN — Medication 975 MILLIGRAM(S): at 19:08

## 2019-10-31 RX ADMIN — Medication 975 MILLIGRAM(S): at 12:41

## 2019-10-31 RX ADMIN — CHLORHEXIDINE GLUCONATE 1 APPLICATION(S): 213 SOLUTION TOPICAL at 14:07

## 2019-10-31 RX ADMIN — Medication 500 MILLIGRAM(S): at 12:40

## 2019-10-31 RX ADMIN — ATORVASTATIN CALCIUM 10 MILLIGRAM(S): 80 TABLET, FILM COATED ORAL at 22:37

## 2019-10-31 NOTE — MEDICAL STUDENT PROGRESS NOTE(EDUCATION) - SUBJECTIVE AND OBJECTIVE BOX
Surgery Progress Note    Subjective:     Patient seen and examined at bedside. Patient without complaints this AM; more somnolent than baseline but answers questions. Denies N/V/F/C. Pain is controlled. Patient now POD2 from PEG placement. TF at goal and patient tolerating well.     OBJECTIVE:     V/S:  Vital Signs Last 24 Hrs  T(C): 37.3 (31 Oct 2019 10:43), Max: 37.3 (31 Oct 2019 10:43)  T(F): 99.2 (31 Oct 2019 10:43), Max: 99.2 (31 Oct 2019 10:43)  HR: 93 (31 Oct 2019 10:43) (72 - 115)  BP: 96/60 (31 Oct 2019 10:43) (94/55 - 114/74)  BP(mean): --  RR: 18 (31 Oct 2019 10:43) (18 - 18)  SpO2: 96% (31 Oct 2019 10:43) (95% - 100%)    -----------------------------------------------------------    PHYSICAL EXAM:    GENERAL: NAD, lying in bed comfortably  HEAD:  Atraumatic, Normocephalic  ENT: Moist mucous membranes  CHEST/LUNG: Unlabored respirations  ABDOMEN: Soft, Nontender, Nondistended. PEG in place.   EXT: B/L AKA, dressing changed yesterday, dressing c/d/i    --------------------------------------------------------------------------------------------------  I/Os:    30 Oct 2019 07:01  -  31 Oct 2019 07:00  --------------------------------------------------------  IN:    Glucerna 1.5: 160 mL    Oral Fluid: 500 mL  Total IN: 660 mL    OUT:  Total OUT: 0 mL    Total NET: 660 mL        --------------------------------------------------------------------------------------------------  LABS:                        8.6    15.20 )-----------( 324      ( 31 Oct 2019 07:12 )             27.9     31 Oct 2019 07:12    142    |  94     |  27     ----------------------------<  242    3.7     |  30     |  0.59     Ca    8.6        31 Oct 2019 07:12  Phos  1.6       31 Oct 2019 07:12  Mg     1.9       31 Oct 2019 07:12        CAPILLARY BLOOD GLUCOSE      POCT Blood Glucose.: 258 mg/dL (31 Oct 2019 06:14)  POCT Blood Glucose.: 232 mg/dL (31 Oct 2019 04:03)  POCT Blood Glucose.: 246 mg/dL (30 Oct 2019 21:57)  POCT Blood Glucose.: 217 mg/dL (30 Oct 2019 17:45)  POCT Blood Glucose.: 236 mg/dL (30 Oct 2019 15:10)  POCT Blood Glucose.: 137 mg/dL (30 Oct 2019 11:21)              --------------------------------------------------------------------------------------------------  MEDICATIONS  (STANDING):  acetaminophen   Tablet .. 975 milliGRAM(s) Oral every 6 hours  amoxicillin    80 mG/mL Suspension 1000 milliGRAM(s) Oral two times a day  ascorbic acid 500 milliGRAM(s) Oral daily  aspirin  chewable 81 milliGRAM(s) Oral daily  atorvastatin 10 milliGRAM(s) Oral at bedtime  calcium carbonate   1250 mG (OsCal) 1 Tablet(s) Oral daily  clarithromycin 500 milliGRAM(s) Oral two times a day  clopidogrel Tablet 75 milliGRAM(s) Oral daily  digoxin     Tablet 0.125 milliGRAM(s) Oral daily  dronabinol 2.5 milliGRAM(s) Oral two times a day  enoxaparin Injectable 40 milliGRAM(s) SubCutaneous daily  furosemide   Injectable 20 milliGRAM(s) IV Push every 8 hours  influenza   Vaccine 0.5 milliLiter(s) IntraMuscular once  insulin glargine Injectable (LANTUS) 10 Unit(s) SubCutaneous at bedtime  insulin glargine Injectable (LANTUS) 5 Unit(s) SubCutaneous once  insulin lispro (HumaLOG) corrective regimen sliding scale   SubCutaneous every 6 hours  melatonin 3 milliGRAM(s) Oral at bedtime  multivitamin 1 Tablet(s) Oral daily  nystatin    Suspension 826800 Unit(s) Oral two times a day  pantoprazole   Suspension 40 milliGRAM(s) Oral daily  potassium phosphate / sodium phosphate powder 3 Packet(s) Oral once  senna 2 Tablet(s) Oral at bedtime    MEDICATIONS  (PRN):  ibuprofen  Tablet. 400 milliGRAM(s) Oral every 6 hours PRN Mild Pain (1 - 3), Moderate Pain (4 - 6)

## 2019-10-31 NOTE — PROGRESS NOTE ADULT - SUBJECTIVE AND OBJECTIVE BOX
Surgery Progress Note    Subjective:     Patient seen and examined at bedside. Patient without complaints this AM. Denies N/V/F/C. Pain is controlled. Patient now POD2 from PEG placement. TF at goal and patient tolerating well.     OBJECTIVE:     ICU Vital Signs Last 24 Hrs  T(C): 36.7 (31 Oct 2019 01:54), Max: 37.2 (30 Oct 2019 10:49)  T(F): 98 (31 Oct 2019 01:54), Max: 98.9 (30 Oct 2019 10:49)  HR: 93 (31 Oct 2019 01:54) (72 - 93)  BP: 94/55 (31 Oct 2019 01:54) (94/55 - 114/74)  BP(mean): --  ABP: --  ABP(mean): --  RR: 18 (31 Oct 2019 01:54) (17 - 18)  SpO2: 98% (31 Oct 2019 01:54) (97% - 100%)    I&O's Detail    29 Oct 2019 07:01  -  30 Oct 2019 07:00  --------------------------------------------------------  IN:  Total IN: 0 mL    OUT:  Total OUT: 0 mL    Total NET: 0 mL      30 Oct 2019 07:01  -  31 Oct 2019 02:34  --------------------------------------------------------  IN:    Glucerna 1.5: 160 mL    Oral Fluid: 500 mL  Total IN: 660 mL    OUT:  Total OUT: 0 mL    Total NET: 660 mL          PHYSICAL EXAM:    GENERAL: NAD, lying in bed comfortably  HEAD:  Atraumatic, Normocephalic  ENT: Moist mucous membranes  CHEST/LUNG: Unlabored respirations  ABDOMEN: Soft, Nontender, Nondistended. PEG in place.   EXT: B/L AKA, dressing change, SS drainage from staple line on right, no expressible material.         MEDICATIONS  (STANDING):  acetaminophen   Tablet .. 975 milliGRAM(s) Oral every 6 hours  ascorbic acid 500 milliGRAM(s) Oral daily  aspirin  chewable 81 milliGRAM(s) Oral daily  atorvastatin 10 milliGRAM(s) Oral at bedtime  calcium carbonate   1250 mG (OsCal) 1 Tablet(s) Oral daily  clopidogrel Tablet 75 milliGRAM(s) Oral daily  digoxin     Tablet 0.125 milliGRAM(s) Oral daily  dronabinol 2.5 milliGRAM(s) Oral two times a day  enoxaparin Injectable 40 milliGRAM(s) SubCutaneous daily  furosemide   Injectable 20 milliGRAM(s) IV Push every 8 hours  influenza   Vaccine 0.5 milliLiter(s) IntraMuscular once  insulin glargine Injectable (LANTUS) 5 Unit(s) SubCutaneous at bedtime  insulin lispro (HumaLOG) corrective regimen sliding scale   SubCutaneous Before meals and at bedtime  melatonin 3 milliGRAM(s) Oral at bedtime  multivitamin 1 Tablet(s) Oral daily  nystatin    Suspension 487529 Unit(s) Oral two times a day  pantoprazole  Injectable 40 milliGRAM(s) IV Push daily  senna 2 Tablet(s) Oral at bedtime    MEDICATIONS  (PRN):  ibuprofen  Tablet. 400 milliGRAM(s) Oral every 6 hours PRN Mild Pain (1 - 3), Moderate Pain (4 - 6)  oxyCODONE    IR 5 milliGRAM(s) Oral every 6 hours PRN Severe Pain (7 - 10)      LABS:                          8.9    11.53 )-----------( 305      ( 29 Oct 2019 09:29 )             28.6     10-29    137  |  98  |  17  ----------------------------<  102<H>  3.8   |  30  |  0.63    Ca    8.3<L>      29 Oct 2019 09:29  Phos  2.9     10-29  Mg     1.8     10-29      PT/INR - ( 29 Oct 2019 09:29 )   PT: 14.9 sec;   INR: 1.30 ratio         PTT - ( 29 Oct 2019 09:29 )  PTT:32.4 sec Surgery Progress Note    Subjective:     Patient seen and examined at bedside. Patient without complaints this AM. Denies N/V/F/C. Pain is controlled. Patient now POD2 from PEG placement. TF at goal and patient tolerating well.     OBJECTIVE:     ICU Vital Signs Last 24 Hrs  T(C): 36.7 (31 Oct 2019 01:54), Max: 37.2 (30 Oct 2019 10:49)  T(F): 98 (31 Oct 2019 01:54), Max: 98.9 (30 Oct 2019 10:49)  HR: 93 (31 Oct 2019 01:54) (72 - 93)  BP: 94/55 (31 Oct 2019 01:54) (94/55 - 114/74)  BP(mean): --  ABP: --  ABP(mean): --  RR: 18 (31 Oct 2019 01:54) (17 - 18)  SpO2: 98% (31 Oct 2019 01:54) (97% - 100%)    I&O's Detail    29 Oct 2019 07:01  -  30 Oct 2019 07:00  --------------------------------------------------------  IN:  Total IN: 0 mL    OUT:  Total OUT: 0 mL    Total NET: 0 mL      30 Oct 2019 07:01  -  31 Oct 2019 02:34  --------------------------------------------------------  IN:    Glucerna 1.5: 160 mL    Oral Fluid: 500 mL  Total IN: 660 mL    OUT:  Total OUT: 0 mL    Total NET: 660 mL          PHYSICAL EXAM:    GENERAL: NAD, lying in bed comfortably  HEAD:  Atraumatic, Normocephalic  ENT: Moist mucous membranes  CHEST/LUNG: Unlabored respirations  ABDOMEN: Soft, Nontender, Nondistended. PEG in place.   EXT: B/L AKA, dressing change, SS drainage from staple line on right, no expressible material.         MEDICATIONS  (STANDING):  acetaminophen   Tablet .. 975 milliGRAM(s) Oral every 6 hours  ascorbic acid 500 milliGRAM(s) Oral daily  aspirin  chewable 81 milliGRAM(s) Oral daily  atorvastatin 10 milliGRAM(s) Oral at bedtime  calcium carbonate   1250 mG (OsCal) 1 Tablet(s) Oral daily  clopidogrel Tablet 75 milliGRAM(s) Oral daily  digoxin     Tablet 0.125 milliGRAM(s) Oral daily  dronabinol 2.5 milliGRAM(s) Oral two times a day  enoxaparin Injectable 40 milliGRAM(s) SubCutaneous daily  furosemide   Injectable 20 milliGRAM(s) IV Push every 8 hours  influenza   Vaccine 0.5 milliLiter(s) IntraMuscular once  insulin glargine Injectable (LANTUS) 5 Unit(s) SubCutaneous at bedtime  insulin lispro (HumaLOG) corrective regimen sliding scale   SubCutaneous Before meals and at bedtime  melatonin 3 milliGRAM(s) Oral at bedtime  multivitamin 1 Tablet(s) Oral daily  nystatin    Suspension 873317 Unit(s) Oral two times a day  pantoprazole  Injectable 40 milliGRAM(s) IV Push daily  senna 2 Tablet(s) Oral at bedtime    MEDICATIONS  (PRN):  ibuprofen  Tablet. 400 milliGRAM(s) Oral every 6 hours PRN Mild Pain (1 - 3), Moderate Pain (4 - 6)  oxyCODONE    IR 5 milliGRAM(s) Oral every 6 hours PRN Severe Pain (7 - 10)      LABS:                          8.9    11.53 )-----------( 305      ( 29 Oct 2019 09:29 )             28.6     10-29    137  |  98  |  17  ----------------------------<  102<H>  3.8   |  30  |  0.63    Ca    8.3<L>      29 Oct 2019 09:29  Phos  2.9     10-29  Mg     1.8     10-29      PT/INR - ( 29 Oct 2019 09:29 )   PT: 14.9 sec;   INR: 1.30 ratio         PTT - ( 29 Oct 2019 09:29 )  PTT:32.4 sec    Helicobacter pylori IgG/IgM (10.30.19 @ 11:01)    H pylori Antibody: 82.2:     H.pylori IgG MAYRA Interpretations:       Unit  Negative Result  < 20.0  Equivocal Result  20.1 - 24.9  Positive Result  >25.0 Units    Helicobacter Pylori IgA: 109.9:   H.pylori IgA MAYRA Interpretations:     Unit   Negative Result < 20.0   Equivocal Result 20.1 - 24.9   Positive Result > 25.0 Units Surgery Progress Note    Subjective:     Patient seen and examined at bedside. Patient without complaints this AM. Denies N/V/F/C. Pain is controlled. Patient now POD2 from PEG placement. TF at goal and patient tolerating well.     OBJECTIVE:     ICU Vital Signs Last 24 Hrs  T(C): 36.7 (31 Oct 2019 01:54), Max: 37.2 (30 Oct 2019 10:49)  T(F): 98 (31 Oct 2019 01:54), Max: 98.9 (30 Oct 2019 10:49)  HR: 93 (31 Oct 2019 01:54) (72 - 93)  BP: 94/55 (31 Oct 2019 01:54) (94/55 - 114/74)  BP(mean): --  ABP: --  ABP(mean): --  RR: 18 (31 Oct 2019 01:54) (17 - 18)  SpO2: 98% (31 Oct 2019 01:54) (97% - 100%)    I&O's Detail    29 Oct 2019 07:01  -  30 Oct 2019 07:00  --------------------------------------------------------  IN:  Total IN: 0 mL    OUT:  Total OUT: 0 mL    Total NET: 0 mL      30 Oct 2019 07:01  -  31 Oct 2019 02:34  --------------------------------------------------------  IN:    Glucerna 1.5: 160 mL    Oral Fluid: 500 mL  Total IN: 660 mL    OUT:  Total OUT: 0 mL    Total NET: 660 mL          PHYSICAL EXAM:    GENERAL: NAD, lying in bed comfortably  HEAD:  Atraumatic, Normocephalic  ENT: Moist mucous membranes  CHEST/LUNG: Unlabored respirations  ABDOMEN: Soft, Nontender, Nondistended. PEG in place.   EXT: B/L AKA, dressing changed yesterday, dressing c/d/i        MEDICATIONS  (STANDING):  acetaminophen   Tablet .. 975 milliGRAM(s) Oral every 6 hours  ascorbic acid 500 milliGRAM(s) Oral daily  aspirin  chewable 81 milliGRAM(s) Oral daily  atorvastatin 10 milliGRAM(s) Oral at bedtime  calcium carbonate   1250 mG (OsCal) 1 Tablet(s) Oral daily  clopidogrel Tablet 75 milliGRAM(s) Oral daily  digoxin     Tablet 0.125 milliGRAM(s) Oral daily  dronabinol 2.5 milliGRAM(s) Oral two times a day  enoxaparin Injectable 40 milliGRAM(s) SubCutaneous daily  furosemide   Injectable 20 milliGRAM(s) IV Push every 8 hours  influenza   Vaccine 0.5 milliLiter(s) IntraMuscular once  insulin glargine Injectable (LANTUS) 5 Unit(s) SubCutaneous at bedtime  insulin lispro (HumaLOG) corrective regimen sliding scale   SubCutaneous Before meals and at bedtime  melatonin 3 milliGRAM(s) Oral at bedtime  multivitamin 1 Tablet(s) Oral daily  nystatin    Suspension 845105 Unit(s) Oral two times a day  pantoprazole  Injectable 40 milliGRAM(s) IV Push daily  senna 2 Tablet(s) Oral at bedtime    MEDICATIONS  (PRN):  ibuprofen  Tablet. 400 milliGRAM(s) Oral every 6 hours PRN Mild Pain (1 - 3), Moderate Pain (4 - 6)  oxyCODONE    IR 5 milliGRAM(s) Oral every 6 hours PRN Severe Pain (7 - 10)      LABS:                          8.9    11.53 )-----------( 305      ( 29 Oct 2019 09:29 )             28.6     10-29    137  |  98  |  17  ----------------------------<  102<H>  3.8   |  30  |  0.63    Ca    8.3<L>      29 Oct 2019 09:29  Phos  2.9     10-29  Mg     1.8     10-29      PT/INR - ( 29 Oct 2019 09:29 )   PT: 14.9 sec;   INR: 1.30 ratio         PTT - ( 29 Oct 2019 09:29 )  PTT:32.4 sec    Helicobacter pylori IgG/IgM (10.30.19 @ 11:01)    H pylori Antibody: 82.2:     H.pylori IgG MAYRA Interpretations:       Unit  Negative Result  < 20.0  Equivocal Result  20.1 - 24.9  Positive Result  >25.0 Units    Helicobacter Pylori IgA: 109.9:   H.pylori IgA MAYRA Interpretations:     Unit   Negative Result < 20.0   Equivocal Result 20.1 - 24.9   Positive Result > 25.0 Units

## 2019-10-31 NOTE — PROGRESS NOTE ADULT - ASSESSMENT
69y male with extensive LE vaso-occlusive disease, prior LLE AKA, who was brought in from his nursing home facility with sepsis likely secondary to his chronically necrotic RLE. Also with volume overload. 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 s/p Right AKA     - pain control   - wound care for R shoulder and buttocks wounds - wound care to see, rec cavalon to wounds.   - PPD2 PEG placement by GI - Glucerna 1.2 at goal of 60cc/hr  - dronabinol   - continue lasix push  - condom cath instead of south to collect urine for measurement   - dressing changed on R AKA leg, small amount of serosang material under dressing with nothing expressible during exam - will monitor   - replete electrolytes as needed  - dispo planning to rehab - CM working on securing placement and insurance auth for tube feeds.    Discussed plan with vascular surgery team   Vascular Surgery [id number] 69y male with extensive LE vaso-occlusive disease, prior LLE AKA, who was brought in from his nursing home facility with sepsis likely secondary to his chronically necrotic RLE. Also with volume overload. 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 s/p Right AKA     - pain control   - wound care for R shoulder and buttocks wounds - wound care to see, rec cavalon to wounds.   - PPD2 PEG placement by GI - Glucerna 1.2 at goal of 60cc/hr  - start triple therapy for H.pylori with 1g amoxacillin BID, 500mg clarithromycin BID, and 40mg protonix BID.   - dronabinol   - continue lasix push  - condom cath instead of south to collect urine for measurement   - dressing changed on R AKA leg, small amount of serosang material under dressing with nothing expressible during exam - will monitor   - replete electrolytes as needed  - dispo planning to rehab - CM working on securing placement and insurance auth for tube feeds.    Discussed plan with vascular surgery team   Vascular Surgery [id number] 69y male with extensive LE vaso-occlusive disease, prior LLE AKA, who was brought in from his nursing home facility with sepsis likely secondary to his chronically necrotic RLE. Also with volume overload. 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 s/p Right AKA     - pain control   - wound care for R shoulder and buttocks wounds - wound care to see, rec cavalon to wounds.   - POD#2 PEG placement by GI - Glucerna 1.2 at goal of 60cc/hr  - start triple therapy for H.pylori with 1g amoxacillin BID, 500mg clarithromycin BID, and 40mg protonix BID.   - dronabinol   - continue lasix push  - condom cath instead of south to collect urine for measurement   - dressing changed on R AKA leg, small amount of serosang material under dressing with nothing expressible during exam - will monitor   - replete electrolytes as needed  - dispo planning to rehab - discharged to previous rehab pending open bed    Discussed plan with vascular surgery team   Vascular Surgery   [id number]

## 2019-10-31 NOTE — PROGRESS NOTE ADULT - ATTENDING COMMENTS
f/u exam for 69 yo AA male with sacral decubitus   S/P bilat AKA, with incontinence noted  Not able to comply with instructions or cooperate with exam  US sacral decubitus , not in need of debridement  Remains on Envella f/u exam for 67 yo AA male with sacral decubitus   S/P bilat AKA, with incontinence noted  Has completed treatment for Morganella bacteremia   Not able to comply with instructions or cooperate with exam  US sacral decubitus , not in need of debridement  Remains on Envella

## 2019-10-31 NOTE — PROGRESS NOTE ADULT - SUBJECTIVE AND OBJECTIVE BOX
SUBJECTIVE: Pt seen, chart reviewed.    Pain: [ ] YES [x ] NO  Pain (0-10): 0            Pain Control Adequate: [x ] YES [ ] NO  SOB: [ ]YES [x ] NO  Chest Discomfort: [ ] YES [x ] NO    Nausea: [ ] YES [ x] NO           Vomiting: [ ] YES [x ] NO  Flatus: [x ] YES [ ] NO             Bowel Movement: [x ] YES [ ] NO     Void: [x ]YES [ ]No    amoxicillin    80 mG/mL Suspension 1000 milliGRAM(s) Oral two times a day  aspirin  chewable 81 milliGRAM(s) Oral daily  clarithromycin 500 milliGRAM(s) Oral two times a day  clopidogrel Tablet 75 milliGRAM(s) Oral daily  enoxaparin Injectable 40 milliGRAM(s) SubCutaneous daily  nystatin    Suspension 795341 Unit(s) Oral two times a day      Physical Exam:  Vital Signs Last 24 Hrs  T(C): 37.3 (31 Oct 2019 10:43), Max: 37.3 (31 Oct 2019 10:43)  T(F): 99.2 (31 Oct 2019 10:43), Max: 99.2 (31 Oct 2019 10:43)  HR: 93 (31 Oct 2019 10:43) (72 - 115)  BP: 96/60 (31 Oct 2019 10:43) (94/55 - 114/74)  BP(mean): --  RR: 18 (31 Oct 2019 10:43) (18 - 18)  SpO2: 96% (31 Oct 2019 10:43) (95% - 100%)      LABS:                        8.6    15.20 )-----------( 324      ( 31 Oct 2019 07:12 )             27.9     Gen: Thin ill looking on tube feedings, which were held for this exam  Skin: warm black eschar odor from stool,   Chest: RA, w/o SOB  Abd: Soft  non distended non tender, peg site clean  Ext: Bilateral AKA, wrapped with chio, Moving bilateral upper extremities   Neuro: Confused one word two word answers  Psych: aggressive, pinching and grabbing  vasc; AkA bilaterally      A/P      Unstageable Sacral Decubitus  con't offloading, frequent turning, envella bed  con't Nuturition  con't Pericare  Con't per Medicine  F/u as outpatient in Wound Center [address] [place] [address] [phone]  D/w team and attending  [facility] SUBJECTIVE: Pt seen, chart reviewed.    Pain: [ ] YES [x ] NO  Pain (0-10): 0            Pain Control Adequate: [x ] YES [ ] NO  SOB: [ ]YES [x ] NO  Chest Discomfort: [ ] YES [x ] NO    Nausea: [ ] YES [ x] NO           Vomiting: [ ] YES [x ] NO  Flatus: [x ] YES [ ] NO             Bowel Movement: [x ] YES [ ] NO     Void: [x ]YES [ ]No    amoxicillin    80 mG/mL Suspension 1000 milliGRAM(s) Oral two times a day  aspirin  chewable 81 milliGRAM(s) Oral daily  clarithromycin 500 milliGRAM(s) Oral two times a day  clopidogrel Tablet 75 milliGRAM(s) Oral daily  enoxaparin Injectable 40 milliGRAM(s) SubCutaneous daily  nystatin    Suspension 026085 Unit(s) Oral two times a day      Physical Exam:  Vital Signs Last 24 Hrs  T(C): 37.3 (31 Oct 2019 10:43), Max: 37.3 (31 Oct 2019 10:43)  T(F): 99.2 (31 Oct 2019 10:43), Max: 99.2 (31 Oct 2019 10:43)  HR: 93 (31 Oct 2019 10:43) (72 - 115)  BP: 96/60 (31 Oct 2019 10:43) (94/55 - 114/74)  BP(mean): --  RR: 18 (31 Oct 2019 10:43) (18 - 18)  SpO2: 96% (31 Oct 2019 10:43) (95% - 100%)      LABS:                        8.6    15.20 )-----------( 324      ( 31 Oct 2019 07:12 )             27.9     Gen: Thin ill looking on tube feedings, which were held for this exam  Skin: warm black eschar odor from stool,   Chest: RA, w/o SOB  Abd: Soft  non distended non tender, peg site clean  Ext: Bilateral AKA, wrapped with chio, Moving bilateral upper extremities   Neuro: Confused one word two word answers  Psych: aggressive, pinching and grabbing  vasc; AkA bilaterally      A/P      Unstageable Sacral Decubitus  Avoid Allevyn and use Cavilon daily to sacral area  con't offloading, frequent turning, envella bed  con't Nuturition  con't Pericare  Con't per Medicine  F/u as outpatient in Wound Center [address] [facility] [phone]  D/w team and attending  Naheed

## 2019-10-31 NOTE — MEDICAL STUDENT PROGRESS NOTE(EDUCATION) - NS MD HP STUD ASPLAN ASSES FT
A/P: 69y male s/p Right AKA on 10/23 with extensive LE vaso-occlusive disease, prior LLE AKA, who was brought in from his nursing home facility with sepsis likely secondary to his chronically necrotic RLE. Also with volume overload. 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     - dispo planning to rehab - discharged to previous rehab pending open bed; possibly tomorrow  - POD#2 PEG placement by GI - Glucersonny 1.2 at goal of 60cc/hr  - started triple therapy for H.pylori with 1g amoxacillin BID, 500mg clarithromycin BID, and 40mg protonix BID on 10/30   - continued pain control   - wound care for R shoulder and buttocks wounds - wound care to see, rec cavalon to wounds.   - dronabinol for appetite   - continue lasix PRN as needed   - dressing changed on R AKA leg, small amount of serosang material under dressing with nothing expressible during exam - will monitor   - replete electrolytes as needed

## 2019-11-01 ENCOUNTER — TRANSCRIPTION ENCOUNTER (OUTPATIENT)
Age: 69
End: 2019-11-01

## 2019-11-01 VITALS
RESPIRATION RATE: 18 BRPM | OXYGEN SATURATION: 100 % | DIASTOLIC BLOOD PRESSURE: 63 MMHG | HEART RATE: 107 BPM | TEMPERATURE: 99 F | SYSTOLIC BLOOD PRESSURE: 95 MMHG

## 2019-11-01 LAB
HCT VFR BLD CALC: 27.7 % — LOW (ref 39–50)
HGB BLD-MCNC: 8.5 G/DL — LOW (ref 13–17)
MCHC RBC-ENTMCNC: 27.4 PG — SIGNIFICANT CHANGE UP (ref 27–34)
MCHC RBC-ENTMCNC: 30.7 GM/DL — LOW (ref 32–36)
MCV RBC AUTO: 89.4 FL — SIGNIFICANT CHANGE UP (ref 80–100)
NRBC # BLD: 0 /100 WBCS — SIGNIFICANT CHANGE UP (ref 0–0)
PLATELET # BLD AUTO: 311 K/UL — SIGNIFICANT CHANGE UP (ref 150–400)
RBC # BLD: 3.1 M/UL — LOW (ref 4.2–5.8)
RBC # FLD: 17.2 % — HIGH (ref 10.3–14.5)
WBC # BLD: 15.82 K/UL — HIGH (ref 3.8–10.5)
WBC # FLD AUTO: 15.82 K/UL — HIGH (ref 3.8–10.5)

## 2019-11-01 PROCEDURE — 71045 X-RAY EXAM CHEST 1 VIEW: CPT | Mod: 26

## 2019-11-01 RX ORDER — ACETAMINOPHEN 500 MG
3 TABLET ORAL
Qty: 0 | Refills: 0 | DISCHARGE
Start: 2019-11-01

## 2019-11-01 RX ORDER — INSULIN LISPRO 100/ML
0 VIAL (ML) SUBCUTANEOUS
Qty: 0 | Refills: 0 | DISCHARGE

## 2019-11-01 RX ORDER — FUROSEMIDE 40 MG
3.75 TABLET ORAL
Qty: 0 | Refills: 0 | DISCHARGE
Start: 2019-11-01

## 2019-11-01 RX ORDER — FUROSEMIDE 40 MG
20 TABLET ORAL ONCE
Refills: 0 | Status: COMPLETED | OUTPATIENT
Start: 2019-11-01 | End: 2019-11-01

## 2019-11-01 RX ORDER — AMOXICILLIN 250 MG/5ML
1000 SUSPENSION, RECONSTITUTED, ORAL (ML) ORAL
Qty: 0 | Refills: 0 | DISCHARGE
Start: 2019-11-01 | End: 2019-11-13

## 2019-11-01 RX ORDER — FUROSEMIDE 40 MG
30 TABLET ORAL
Refills: 0 | Status: DISCONTINUED | OUTPATIENT
Start: 2019-11-01 | End: 2019-11-01

## 2019-11-01 RX ORDER — CALCIUM CARBONATE 500(1250)
1 TABLET ORAL
Qty: 0 | Refills: 0 | DISCHARGE
Start: 2019-11-01

## 2019-11-01 RX ORDER — DRONABINOL 2.5 MG
1 CAPSULE ORAL
Qty: 0 | Refills: 0 | DISCHARGE
Start: 2019-11-01

## 2019-11-01 RX ORDER — CLOPIDOGREL BISULFATE 75 MG/1
1 TABLET, FILM COATED ORAL
Qty: 0 | Refills: 0 | DISCHARGE
Start: 2019-11-01

## 2019-11-01 RX ORDER — INSULIN GLARGINE 100 [IU]/ML
10 INJECTION, SOLUTION SUBCUTANEOUS
Qty: 0 | Refills: 0 | DISCHARGE
Start: 2019-11-01

## 2019-11-01 RX ORDER — CLARITHROMYCIN 500 MG
1 TABLET ORAL
Qty: 0 | Refills: 0 | DISCHARGE
Start: 2019-11-01 | End: 2019-11-13

## 2019-11-01 RX ORDER — IBUPROFEN 200 MG
1 TABLET ORAL
Qty: 0 | Refills: 0 | DISCHARGE
Start: 2019-11-01

## 2019-11-01 RX ORDER — SENNA PLUS 8.6 MG/1
2 TABLET ORAL
Qty: 0 | Refills: 0 | DISCHARGE
Start: 2019-11-01

## 2019-11-01 RX ORDER — NYSTATIN 500MM UNIT
5 POWDER (EA) MISCELLANEOUS
Qty: 0 | Refills: 0 | DISCHARGE
Start: 2019-11-01

## 2019-11-01 RX ORDER — PANTOPRAZOLE SODIUM 20 MG/1
40 TABLET, DELAYED RELEASE ORAL
Qty: 0 | Refills: 0 | DISCHARGE
Start: 2019-11-01

## 2019-11-01 RX ADMIN — Medication 2.5 MILLIGRAM(S): at 06:10

## 2019-11-01 RX ADMIN — Medication 81 MILLIGRAM(S): at 12:34

## 2019-11-01 RX ADMIN — Medication 1 TABLET(S): at 12:34

## 2019-11-01 RX ADMIN — INSULIN GLARGINE 10 UNIT(S): 100 INJECTION, SOLUTION SUBCUTANEOUS at 00:08

## 2019-11-01 RX ADMIN — Medication 0.12 MILLIGRAM(S): at 06:09

## 2019-11-01 RX ADMIN — Medication 500 MILLIGRAM(S): at 12:34

## 2019-11-01 RX ADMIN — Medication 2: at 06:09

## 2019-11-01 RX ADMIN — ENOXAPARIN SODIUM 40 MILLIGRAM(S): 100 INJECTION SUBCUTANEOUS at 12:35

## 2019-11-01 RX ADMIN — CHLORHEXIDINE GLUCONATE 1 APPLICATION(S): 213 SOLUTION TOPICAL at 10:10

## 2019-11-01 RX ADMIN — Medication 975 MILLIGRAM(S): at 06:09

## 2019-11-01 RX ADMIN — Medication 500000 UNIT(S): at 06:10

## 2019-11-01 RX ADMIN — Medication 6: at 12:36

## 2019-11-01 RX ADMIN — Medication 20 MILLIGRAM(S): at 12:34

## 2019-11-01 RX ADMIN — Medication 975 MILLIGRAM(S): at 12:35

## 2019-11-01 RX ADMIN — Medication 6: at 00:08

## 2019-11-01 RX ADMIN — Medication 500 MILLIGRAM(S): at 06:09

## 2019-11-01 RX ADMIN — PANTOPRAZOLE SODIUM 40 MILLIGRAM(S): 20 TABLET, DELAYED RELEASE ORAL at 12:34

## 2019-11-01 RX ADMIN — CLOPIDOGREL BISULFATE 75 MILLIGRAM(S): 75 TABLET, FILM COATED ORAL at 12:34

## 2019-11-01 RX ADMIN — Medication 1000 MILLIGRAM(S): at 06:09

## 2019-11-01 NOTE — MEDICAL STUDENT PROGRESS NOTE(EDUCATION) - SUBJECTIVE AND OBJECTIVE BOX
SURGERY DAILY PROGRESS NOTE:       SUBJECTIVE/ROS: SUBJECTIVE/ROS: Patient seen and examined at bedside. Patient without complaints this AM. Patient is non-somnolent today and says that he has more energy. Pt Denies N/V/F/C. Pain is controlled. Patient now POD3 from PEG placement. TF at goal and patient tolerating well.        OBJECTIVE:    V/S:  Vital Signs Last 24 Hrs  T(C): 36.8 (01 Nov 2019 06:00), Max: 36.9 (31 Oct 2019 22:16)  T(F): 98.3 (01 Nov 2019 06:00), Max: 98.5 (31 Oct 2019 22:16)  HR: 59 (01 Nov 2019 06:00) (59 - 101)  BP: 99/65 (01 Nov 2019 06:00) (92/55 - 101/60)  BP(mean): --  RR: 18 (01 Nov 2019 06:00) (18 - 18)  SpO2: 97% (01 Nov 2019 06:00) (93% - 97%)    -----------------------------------  PHYSICAL EXAM:    GENERAL: NAD, lying in bed comfortably  HEAD:  Atraumatic, Normocephalic  ENT: Moist mucous membranes  CHEST/LUNG: Unlabored respirations  ABDOMEN: Soft, Nontender, Nondistended. PEG in place. Bumper is 3.5. No edema, no redness at PEG site.    EXT: B/L AKA, dressing changed today, dressing c/d/i/ wound is non-erythematous, and show no signs of infection.     --------------------------------------------------------------------------------------------------  I/Os:    31 Oct 2019 07:01  -  01 Nov 2019 07:00  --------------------------------------------------------  IN:    Oral Fluid: 130 mL  Total IN: 130 mL    OUT:  Total OUT: 0 mL    Total NET: 130 mL        --------------------------------------------------------------------------------------------------  LABS:                        8.5    15.82 )-----------( 311      ( 01 Nov 2019 07:20 )             27.7     31 Oct 2019 07:12    142    |  94     |  27     ----------------------------<  242    3.7     |  30     |  0.59     Ca    8.6        31 Oct 2019 07:12  Phos  1.6       31 Oct 2019 07:12  Mg     1.9       31 Oct 2019 07:12        CAPILLARY BLOOD GLUCOSE      POCT Blood Glucose.: 185 mg/dL (01 Nov 2019 05:59)  POCT Blood Glucose.: 283 mg/dL (01 Nov 2019 00:00)  POCT Blood Glucose.: 237 mg/dL (31 Oct 2019 17:56)  POCT Blood Glucose.: 216 mg/dL (31 Oct 2019 12:38)              --------------------------------------------------------------------------------------------------  MEDICATIONS  (STANDING):  acetaminophen   Tablet .. 975 milliGRAM(s) Oral every 6 hours  amoxicillin    80 mG/mL Suspension 1000 milliGRAM(s) Oral two times a day  ascorbic acid 500 milliGRAM(s) Oral daily  aspirin  chewable 81 milliGRAM(s) Oral daily  atorvastatin 10 milliGRAM(s) Oral at bedtime  calcium carbonate   1250 mG (OsCal) 1 Tablet(s) Oral daily  chlorhexidine 2% Cloths 1 Application(s) Topical <User Schedule>  clarithromycin 500 milliGRAM(s) Oral two times a day  clopidogrel Tablet 75 milliGRAM(s) Oral daily  digoxin     Tablet 0.125 milliGRAM(s) Oral daily  dronabinol 2.5 milliGRAM(s) Oral two times a day  enoxaparin Injectable 40 milliGRAM(s) SubCutaneous daily  furosemide   Injectable 20 milliGRAM(s) IV Push once  furosemide Solution 30 milliGRAM(s) Oral two times a day  influenza   Vaccine 0.5 milliLiter(s) IntraMuscular once  insulin glargine Injectable (LANTUS) 10 Unit(s) SubCutaneous at bedtime  insulin glargine Injectable (LANTUS) 5 Unit(s) SubCutaneous once  insulin lispro (HumaLOG) corrective regimen sliding scale   SubCutaneous every 6 hours  melatonin 3 milliGRAM(s) Oral at bedtime  multivitamin 1 Tablet(s) Oral daily  nystatin    Suspension 701430 Unit(s) Oral two times a day  pantoprazole   Suspension 40 milliGRAM(s) Oral daily  senna 2 Tablet(s) Oral at bedtime    MEDICATIONS  (PRN):  ibuprofen  Tablet. 400 milliGRAM(s) Oral every 6 hours PRN Mild Pain (1 - 3), Moderate Pain (4 - 6)

## 2019-11-01 NOTE — PROGRESS NOTE ADULT - SUBJECTIVE AND OBJECTIVE BOX
CC: f/u for morganella bacteremia and RLE gangrene    Patient reports: he follows simple commands and is on H Pylori treatment    REVIEW OF SYSTEMS:  All other review of systems negative (Comprehensive ROS)    Antimicrobials Day #  :  amoxicillin    80 mG/mL Suspension 1000 milliGRAM(s) Oral two times a day  clarithromycin 500 milliGRAM(s) Oral two times a day  nystatin    Suspension 093233 Unit(s) Oral two times a day    Other Medications Reviewed    T(F): 98.3 (11-01-19 @ 06:00), Max: 99.2 (10-31-19 @ 10:43)  HR: 59 (11-01-19 @ 06:00)  BP: 99/65 (11-01-19 @ 06:00)  RR: 18 (11-01-19 @ 06:00)  SpO2: 97% (11-01-19 @ 06:00)  Wt(kg): --    PHYSICAL EXAM:  General: alert, no acute distress  Eyes:  anicteric, no conjunctival injection, no discharge  Oropharynx: no lesions or injection 	  Neck: supple, without adenopathy  Lungs: clear to auscultation  Heart: regular rate and rhythm; no murmur, rubs or gallops  Abdomen: soft, nondistended, nontender, peg in place  Skin: no lesions  Extremities: B/L BKA, rt stump is dressed  Neurologic: alert, oriented, moves all extremities    LAB RESULTS:                        8.5    15.82 )-----------( 311      ( 01 Nov 2019 07:20 )             27.7     10-31    142  |  94<L>  |  27<H>  ----------------------------<  242<H>  3.7   |  30  |  0.59    Ca    8.6      31 Oct 2019 07:12  Phos  1.6     10-31  Mg     1.9     10-31          MICROBIOLOGY:  RECENT CULTURES:      RADIOLOGY REVIEWED:

## 2019-11-01 NOTE — PROGRESS NOTE ADULT - REASON FOR ADMISSION
Altered Mental Status

## 2019-11-01 NOTE — PROGRESS NOTE ADULT - ASSESSMENT
69y male with extensive LE vaso-occlusive disease, prior LLE AKA, who was brought in from his nursing home facility with sepsis likely secondary to his chronically necrotic RLE. Also with volume overload. 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 s/p Right AKA     - pain control   - wound care for R shoulder and buttocks wounds - wound care to see, rec cavalon to wounds.   - POD#3 PEG placement by GI - Glucersonny 1.2 at goal of 60cc/hr  - start triple therapy for H.pylori with 1g amoxacillin BID, 500mg clarithromycin BID, and 40mg protonix BID for a 14 day total  - dronabinol   - change to PO lasix 30 mg BID   - condom cath instead of south to collect urine for measurement   - dressing changed on R AKA leg, small amount of serosang material under dressing with nothing expressible during exam - will monitor   - replete electrolytes as needed  - dispo planning to rehab - discharged to previous rehab pending open bed and WBC in AM     Discussed plan with vascular surgery team     Alicia Navas PA-C  Vascular Surgery   [id number] 69y male with extensive LE vaso-occlusive disease, prior LLE AKA, who was brought in from his nursing home facility with sepsis likely secondary to his chronically necrotic RLE. Also with volume overload. 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 s/p Right AKA     - pain control   - wound care for R shoulder and buttocks wounds - wound care to see, rec cavalon to wounds.   - POD#3 PEG placement by GI - Glucersonny 1.2 at goal of 60cc/hr  - start triple therapy for H.pylori with 1g amoxacillin BID, 500mg clarithromycin BID, and 40mg protonix BID for a 14 day total  - dronabinol   - change to PO lasix 30 mg BID   - condom cath instead of south to collect urine for measurement   - dressing changed on R AKA leg, small amount of serosang material under dressing with nothing expressible during exam - will monitor   - replete electrolytes as needed  - dispo planning to rehab - discharged to previous rehab pending open bed and WBC count and chest X-ray in AM    Discussed plan with vascular surgery team     Alicia Navas PA-C  Vascular Surgery   [id number] 69y male with extensive LE vaso-occlusive disease, prior LLE AKA, who was brought in from his nursing home facility with sepsis likely secondary to his chronically necrotic RLE. Also with volume overload. 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 s/p Right AKA     - pain control   - wound care for R shoulder and buttocks wounds - wound care to see, rec cavalon to wounds.   - POD#3 PEG placement by GI - Glucersonny 1.2 at goal of 60cc/hr  - start triple therapy for H.pylori with 1g amoxacillin BID, 500mg clarithromycin BID, and 40mg protonix BID for a 14 day total  - dronabinol   - change to PO lasix 30 mg BID   - condom cath instead of south to collect urine for measurement   - dressing changed on R AKA leg, small amount of serosang material under dressing with nothing expressible during exam - will monitor   - replete electrolytes as needed: hypokalemia and hypophosphatemia   - dispo planning to rehab - discharged to previous rehab pending open bed and WBC count and chest X-ray in AM    Discussed plan with vascular surgery team     Alicia Navas PA-C  Vascular Surgery   [id number]

## 2019-11-01 NOTE — PROGRESS NOTE ADULT - ASSESSMENT
68 yo male with PVD and dementia, s/p treatment for morganella bacteremia in setting of RLE gangrene  S/P guillotine RT BKA and second stage closure.  S/P completion of antibiotic course  S/P  peg on 10/29  On H Pylori treatment  Stable from ID perspective  Local wound care per vascular  No additional ID w/u planned we will stop actively following, please call if ID issues arise

## 2019-11-01 NOTE — DISCHARGE NOTE NURSING/CASE MANAGEMENT/SOCIAL WORK - PATIENT PORTAL LINK FT
You can access the FollowMyHealth Patient Portal offered by [facility] by registering at the following website: [email or web address]. By joining [facility]’s FollowMyHealth portal, you will also be able to view your health information using other applications (apps) compatible with our system.

## 2019-11-01 NOTE — MEDICAL STUDENT PROGRESS NOTE(EDUCATION) - NS MD HP STUD ASPLAN ASSES FT
69y male with extensive LE vaso-occlusive disease, prior LLE AKA, who was brought in from his nursing home facility with sepsis likely secondary to his chronically necrotic RLE. Also with volume overload. 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 s/p Right AKA     - pain control   - wound care for R shoulder and buttocks wounds - wound care to see, rec cavalon to wounds.   - POD#3 PEG placement by GI - Glucersonny 1.2 at goal of 60cc/hr  - start triple therapy for H.pylori with 1g amoxacillin BID, 500mg clarithromycin BID, and 40mg protonix BID for a 14 day total  - dronabinol   - change to PO lasix 30 mg BID   - condom cath instead of south to collect urine for measurement   - dressing changed on R AKA leg, small amount of serosang material under dressing with nothing expressible during exam - will monitor   - replete electrolytes as needed: hypokalemia and hypophosphatemia   - dispo planning to rehab - discharged to previous rehab pending open bed and WBC count and chest X-ray in AM

## 2019-11-01 NOTE — PROGRESS NOTE ADULT - PROVIDER SPECIALTY LIST ADULT
Cardiology
Gastroenterology
Infectious Disease
Internal Medicine
SICU
Vascular Surgery
Wound Care
Wound Care
SICU
Vascular Surgery

## 2019-11-01 NOTE — PROGRESS NOTE ADULT - SUBJECTIVE AND OBJECTIVE BOX
SURGERY DAILY PROGRESS NOTE:       SUBJECTIVE/ROS: Patient seen and examined at bedside. Patient without complaints this AM. Denies N/V/F/C. Pain is controlled. Patient now POD3 from PEG placement. TF at goal and patient tolerating well.        OBJECTIVE:    Vital Signs Last 24 Hrs  T(C): 36.8 (01 Nov 2019 06:00), Max: 37.3 (31 Oct 2019 10:43)  T(F): 98.3 (01 Nov 2019 06:00), Max: 99.2 (31 Oct 2019 10:43)  HR: 59 (01 Nov 2019 06:00) (59 - 101)  BP: 99/65 (01 Nov 2019 06:00) (92/55 - 101/60)  BP(mean): --  RR: 18 (01 Nov 2019 06:00) (18 - 18)  SpO2: 97% (01 Nov 2019 06:00) (93% - 97%)  I&O's Detail    31 Oct 2019 07:01  -  01 Nov 2019 07:00  --------------------------------------------------------  IN:    Oral Fluid: 130 mL  Total IN: 130 mL    OUT:  Total OUT: 0 mL    Total NET: 130 mL        Daily     Daily   MEDICATIONS  (STANDING):  acetaminophen   Tablet .. 975 milliGRAM(s) Oral every 6 hours  amoxicillin    80 mG/mL Suspension 1000 milliGRAM(s) Oral two times a day  ascorbic acid 500 milliGRAM(s) Oral daily  aspirin  chewable 81 milliGRAM(s) Oral daily  atorvastatin 10 milliGRAM(s) Oral at bedtime  calcium carbonate   1250 mG (OsCal) 1 Tablet(s) Oral daily  chlorhexidine 2% Cloths 1 Application(s) Topical <User Schedule>  clarithromycin 500 milliGRAM(s) Oral two times a day  clopidogrel Tablet 75 milliGRAM(s) Oral daily  digoxin     Tablet 0.125 milliGRAM(s) Oral daily  dronabinol 2.5 milliGRAM(s) Oral two times a day  enoxaparin Injectable 40 milliGRAM(s) SubCutaneous daily  furosemide   Injectable 20 milliGRAM(s) IV Push every 8 hours  influenza   Vaccine 0.5 milliLiter(s) IntraMuscular once  insulin glargine Injectable (LANTUS) 10 Unit(s) SubCutaneous at bedtime  insulin glargine Injectable (LANTUS) 5 Unit(s) SubCutaneous once  insulin lispro (HumaLOG) corrective regimen sliding scale   SubCutaneous every 6 hours  melatonin 3 milliGRAM(s) Oral at bedtime  multivitamin 1 Tablet(s) Oral daily  nystatin    Suspension 260726 Unit(s) Oral two times a day  pantoprazole   Suspension 40 milliGRAM(s) Oral daily  senna 2 Tablet(s) Oral at bedtime    MEDICATIONS  (PRN):  ibuprofen  Tablet. 400 milliGRAM(s) Oral every 6 hours PRN Mild Pain (1 - 3), Moderate Pain (4 - 6)      LABS:                        8.6    15.20 )-----------( 324      ( 31 Oct 2019 07:12 )             27.9     10-31    142  |  94<L>  |  27<H>  ----------------------------<  242<H>  3.7   |  30  |  0.59    Ca    8.6      31 Oct 2019 07:12  Phos  1.6     10-31  Mg     1.9     10-31            PHYSICAL EXAM:    GENERAL: NAD, lying in bed comfortably  HEAD:  Atraumatic, Normocephalic  ENT: Moist mucous membranes  CHEST/LUNG: Unlabored respirations  ABDOMEN: Soft, Nontender, Nondistended. PEG in place.   EXT: B/L AKA, dressing changed today, dressing c/d/i

## 2019-11-04 ENCOUNTER — INBOUND DOCUMENT (OUTPATIENT)
Age: 69
End: 2019-11-04

## 2019-11-12 PROCEDURE — 80048 BASIC METABOLIC PNL TOTAL CA: CPT

## 2019-11-12 PROCEDURE — 82947 ASSAY GLUCOSE BLOOD QUANT: CPT

## 2019-11-12 PROCEDURE — 80162 ASSAY OF DIGOXIN TOTAL: CPT

## 2019-11-12 PROCEDURE — 84295 ASSAY OF SERUM SODIUM: CPT

## 2019-11-12 PROCEDURE — 84145 PROCALCITONIN (PCT): CPT

## 2019-11-12 PROCEDURE — 85652 RBC SED RATE AUTOMATED: CPT

## 2019-11-12 PROCEDURE — 97112 NEUROMUSCULAR REEDUCATION: CPT

## 2019-11-12 PROCEDURE — 97163 PT EVAL HIGH COMPLEX 45 MIN: CPT

## 2019-11-12 PROCEDURE — 85027 COMPLETE CBC AUTOMATED: CPT

## 2019-11-12 PROCEDURE — 84484 ASSAY OF TROPONIN QUANT: CPT

## 2019-11-12 PROCEDURE — C1769: CPT

## 2019-11-12 PROCEDURE — 99285 EMERGENCY DEPT VISIT HI MDM: CPT | Mod: 25

## 2019-11-12 PROCEDURE — 88307 TISSUE EXAM BY PATHOLOGIST: CPT

## 2019-11-12 PROCEDURE — 82803 BLOOD GASES ANY COMBINATION: CPT

## 2019-11-12 PROCEDURE — 84134 ASSAY OF PREALBUMIN: CPT

## 2019-11-12 PROCEDURE — 85610 PROTHROMBIN TIME: CPT

## 2019-11-12 PROCEDURE — 97530 THERAPEUTIC ACTIVITIES: CPT

## 2019-11-12 PROCEDURE — 86850 RBC ANTIBODY SCREEN: CPT

## 2019-11-12 PROCEDURE — 93308 TTE F-UP OR LMTD: CPT

## 2019-11-12 PROCEDURE — 93306 TTE W/DOPPLER COMPLETE: CPT

## 2019-11-12 PROCEDURE — 36430 TRANSFUSION BLD/BLD COMPNT: CPT

## 2019-11-12 PROCEDURE — 80053 COMPREHEN METABOLIC PANEL: CPT

## 2019-11-12 PROCEDURE — 81001 URINALYSIS AUTO W/SCOPE: CPT

## 2019-11-12 PROCEDURE — 82550 ASSAY OF CK (CPK): CPT

## 2019-11-12 PROCEDURE — 87186 SC STD MICRODIL/AGAR DIL: CPT

## 2019-11-12 PROCEDURE — 94002 VENT MGMT INPAT INIT DAY: CPT

## 2019-11-12 PROCEDURE — 97110 THERAPEUTIC EXERCISES: CPT

## 2019-11-12 PROCEDURE — 83605 ASSAY OF LACTIC ACID: CPT

## 2019-11-12 PROCEDURE — 87075 CULTR BACTERIA EXCEPT BLOOD: CPT

## 2019-11-12 PROCEDURE — 82553 CREATINE MB FRACTION: CPT

## 2019-11-12 PROCEDURE — 87086 URINE CULTURE/COLONY COUNT: CPT

## 2019-11-12 PROCEDURE — 86140 C-REACTIVE PROTEIN: CPT

## 2019-11-12 PROCEDURE — 82435 ASSAY OF BLOOD CHLORIDE: CPT

## 2019-11-12 PROCEDURE — 86923 COMPATIBILITY TEST ELECTRIC: CPT

## 2019-11-12 PROCEDURE — 97535 SELF CARE MNGMENT TRAINING: CPT

## 2019-11-12 PROCEDURE — 82962 GLUCOSE BLOOD TEST: CPT

## 2019-11-12 PROCEDURE — L8699: CPT

## 2019-11-12 PROCEDURE — 82565 ASSAY OF CREATININE: CPT

## 2019-11-12 PROCEDURE — 71045 X-RAY EXAM CHEST 1 VIEW: CPT

## 2019-11-12 PROCEDURE — 80061 LIPID PANEL: CPT

## 2019-11-12 PROCEDURE — 94770: CPT

## 2019-11-12 PROCEDURE — 85014 HEMATOCRIT: CPT

## 2019-11-12 PROCEDURE — 84132 ASSAY OF SERUM POTASSIUM: CPT

## 2019-11-12 PROCEDURE — 82607 VITAMIN B-12: CPT

## 2019-11-12 PROCEDURE — 96365 THER/PROPH/DIAG IV INF INIT: CPT

## 2019-11-12 PROCEDURE — 86900 BLOOD TYPING SEROLOGIC ABO: CPT

## 2019-11-12 PROCEDURE — 96375 TX/PRO/DX INJ NEW DRUG ADDON: CPT

## 2019-11-12 PROCEDURE — 73630 X-RAY EXAM OF FOOT: CPT

## 2019-11-12 PROCEDURE — 83880 ASSAY OF NATRIURETIC PEPTIDE: CPT

## 2019-11-12 PROCEDURE — 82040 ASSAY OF SERUM ALBUMIN: CPT

## 2019-11-12 PROCEDURE — 87102 FUNGUS ISOLATION CULTURE: CPT

## 2019-11-12 PROCEDURE — 97167 OT EVAL HIGH COMPLEX 60 MIN: CPT

## 2019-11-12 PROCEDURE — 83883 ASSAY NEPHELOMETRY NOT SPEC: CPT

## 2019-11-12 PROCEDURE — 86677 HELICOBACTER PYLORI ANTIBODY: CPT

## 2019-11-12 PROCEDURE — 97605 NEG PRS WND THER DME<=50SQCM: CPT

## 2019-11-12 PROCEDURE — 83735 ASSAY OF MAGNESIUM: CPT

## 2019-11-12 PROCEDURE — 83695 ASSAY OF LIPOPROTEIN(A): CPT

## 2019-11-12 PROCEDURE — 82330 ASSAY OF CALCIUM: CPT

## 2019-11-12 PROCEDURE — P9016: CPT

## 2019-11-12 PROCEDURE — 94003 VENT MGMT INPAT SUBQ DAY: CPT

## 2019-11-12 PROCEDURE — 84100 ASSAY OF PHOSPHORUS: CPT

## 2019-11-12 PROCEDURE — 87150 DNA/RNA AMPLIFIED PROBE: CPT

## 2019-11-12 PROCEDURE — 85730 THROMBOPLASTIN TIME PARTIAL: CPT

## 2019-11-12 PROCEDURE — 86901 BLOOD TYPING SEROLOGIC RH(D): CPT

## 2019-11-12 PROCEDURE — 93005 ELECTROCARDIOGRAM TRACING: CPT

## 2019-11-12 PROCEDURE — 84466 ASSAY OF TRANSFERRIN: CPT

## 2019-11-12 PROCEDURE — P9045: CPT

## 2019-11-12 PROCEDURE — 97164 PT RE-EVAL EST PLAN CARE: CPT

## 2019-11-12 PROCEDURE — 80076 HEPATIC FUNCTION PANEL: CPT

## 2019-11-12 PROCEDURE — 85384 FIBRINOGEN ACTIVITY: CPT

## 2019-11-12 PROCEDURE — 87070 CULTURE OTHR SPECIMN AEROBIC: CPT

## 2019-11-12 PROCEDURE — 87040 BLOOD CULTURE FOR BACTERIA: CPT

## 2019-11-13 LAB
CULTURE RESULTS: SIGNIFICANT CHANGE UP
SPECIMEN SOURCE: SIGNIFICANT CHANGE UP

## 2020-01-04 NOTE — PATIENT PROFILE ADULT - NSPROEXTENSIONSOFSELF_GEN_A_NUR
none
88M p/w fall 40min pta, HT, fall on Eliquis, unknown loc, syncope vs mechanical. On physical dry blood in the nares with occipital hematoma. remainder of the physical not noted to have acute traumatic injuries. trauma alert called. ordred cxr/pelvis, ct panscan. collar placed.

## 2020-03-06 NOTE — PROGRESS NOTE ADULT - PROBLEM SELECTOR PLAN 1
rash -Test BG ac and hs   -Continue Lantus 14 units q hs with Humalog correction scales ac meals.  -Add Humalog low dose correction scale at hs  -Will add premeal HUmalog if needed.  -Please contact endo team with any changes on PO status  -Plan discussed with pt/RN and team.  Contact info: [phone] (24/7). pager [phone]

## 2020-11-03 NOTE — ED CLERICAL - DIVISION
[facility]... If you are a smoker, it is important for your health to stop smoking. Please be aware that second hand smoke is also harmful.

## 2021-02-05 NOTE — PHYSICAL THERAPY INITIAL EVALUATION ADULT - PATIENT/FAMILY/SIGNIFICANT OTHER GOALS STATEMENT, PT EVAL
pt did not state
pt did not state
Cimetidine Counseling:  I discussed with the patient the risks of Cimetidine including but not limited to gynecomastia, headache, diarrhea, nausea, drowsiness, arrhythmias, pancreatitis, skin rashes, psychosis, bone marrow suppression and kidney toxicity.

## 2021-03-24 NOTE — PROGRESS NOTE ADULT - PROBLEM SELECTOR PLAN 3
- no need for antibiotics, initially started on vancomycin/zosyn and monitoring off abx  - No signs of sepsis at this time   - saturating well on room air at this time  - speech to re MARK san in the am  - GI consult for PEG--> aug 5/6 if repeat MBS fails - no need for antibiotics, initially started on vancomycin/zosyn and monitoring off abx  - No signs of sepsis at this time   - saturating well on room air at this time  MBS reviewed with swallow therapist - puree diet with thin liquids. aspiration precautions. Information: Selecting Yes will display possible errors in your note based on the variables you have selected. This validation is only offered as a suggestion for you. PLEASE NOTE THAT THE VALIDATION TEXT WILL BE REMOVED WHEN YOU FINALIZE YOUR NOTE. IF YOU WANT TO FAX A PRELIMINARY NOTE YOU WILL NEED TO TOGGLE THIS TO 'NO' IF YOU DO NOT WANT IT IN YOUR FAXED NOTE.

## 2021-10-06 PROBLEM — I10 ESSENTIAL HYPERTENSION: Status: ACTIVE | Noted: 2019-08-12

## 2021-11-30 NOTE — ED ADULT TRIAGE NOTE - CADM TRG TX PRIOR TO ARRIVAL
Patient was seen just last night by Josh. I am not sure for what outside of possibly blood in stool. I cannot add her this AM. Do we have options for Thurs AM? If she cannot wait, I would have to add to [facility] as I am overbooked.   see ambulance record

## 2022-06-10 NOTE — PROGRESS NOTE ADULT - PROBLEM SELECTOR PROBLEM 4
Transaminitis Topical Sulfur Applications Counseling: Topical Sulfur Counseling: Patient counseled that this medication may cause skin irritation or allergic reactions.  In the event of skin irritation, the patient was advised to reduce the amount of the drug applied or use it less frequently.   The patient verbalized understanding of the proper use and possible adverse effects of topical sulfur application.  All of the patient's questions and concerns were addressed.

## 2022-09-07 NOTE — OCCUPATIONAL THERAPY INITIAL EVALUATION ADULT - OCCUPATION
Render Risk Assessment In Note?: no Detail Level: Simple Additional Notes: At patientsg request benign lesion was treated with the hyfrecator on 2.5 johns. Per Dr. Zuleta no charge for treatment  not working

## 2022-11-16 NOTE — PROGRESS NOTE ADULT - SUBJECTIVE AND OBJECTIVE BOX
Regarding: arthritis pain, left hand and wrist swelling, [place]   ----- Message from Cecile Bustamante sent at 11/16/2022  1:59 PM CST -----  Patient Name: Ibis Rose    Specialist or PCP Name: leopoldo jomarkhurram    Symptoms: arthritis pain, left hand and wrist swelling     Pregnant (females aged 13-60. If Yes, how long?) : no    Call Back # : [phone]    Which State are you currently located in?: [place]    Name of Clinic Site / Acct# : [facility]    Use following scripting for patients waiting for a callback:   “Nurse callback times vary based on call volumes; please be aware the return phone call may come from an unidentified phone number. If your symptoms worsen or become life threatening while waiting, you should seek immediate assistance by calling 911 or going to the ER for evaluation.”   Subjective:    Patient seen and evaluated this AM with team.  Overnight, 250 cc 5% albumin given, trops trended from 128 to 138, dobutamin gtt started, and triple lumen placed.  Dressing changed in AM.    OBJECTIVE:     ** PHYSICAL EXAM **    Physical Exam:  General: ill-appearing  Pulmonary: intubated and on vent  Abdominal: soft, ND  Extremities: RLE dressing in place c/d/i, no strikethrough        ** VITAL SIGNS / I&O's **    Vital Signs Last 24 Hrs  T(C): 36.2 (14 Oct 2019 06:00), Max: 36.7 (14 Oct 2019 03:00)  T(F): 97.2 (14 Oct 2019 06:00), Max: 98.1 (14 Oct 2019 03:00)  HR: 63 (14 Oct 2019 06:45) (49 - 82)  BP: 107/62 (13 Oct 2019 19:45) (78/51 - 123/72)  BP(mean): 80 (13 Oct 2019 19:45) (61 - 92)  RR: 13 (14 Oct 2019 06:45) (11 - 26)  SpO2: 100% (14 Oct 2019 06:45) (87% - 100%)  Mode: AC/ CMV (Assist Control/ Continuous Mandatory Ventilation)  RR (machine): 12  TV (machine): 450  FiO2: 30  PEEP: 5  ITime: 0.9  MAP: 7  PIP: 18      13 Oct 2019 07:01  -  14 Oct 2019 06:52  --------------------------------------------------------  IN:    Albumin 5%  - 250 mL: 250 mL    dexmedetomidine Infusion: 84.4 mL    dextrose 5% + lactated ringers.: 300 mL    DOBUTamine Infusion: 26.4 mL    insulin regular Infusion: 16.5 mL    IV PiggyBack: 650 mL    lactated ringers.: 240 mL    norepinephrine Infusion: 13.2 mL    norepinephrine Infusion: 88.7 mL    phenylephrine   Infusion: 140 mL  Total IN: 1809.2 mL    OUT:    Indwelling Catheter - Urethral: 810 mL    Nasoenteral Tube: 50 mL  Total OUT: 860 mL    Total NET: 949.2 mL            ** LABS **                          8.6    11.14 )-----------( 324      ( 14 Oct 2019 02:16 )             29.4     14 Oct 2019 02:16    146    |  111    |  31     ----------------------------<  138    4.0     |  22     |  1.14     Ca    8.3        14 Oct 2019 02:16  Phos  3.3       14 Oct 2019 02:16  Mg     2.3       14 Oct 2019 02:16    TPro  7.4    /  Alb  2.0    /  TBili  0.5    /  DBili  x      /  AST  31     /  ALT  51     /  AlkPhos  153    12 Oct 2019 23:14    PT/INR - ( 14 Oct 2019 02:16 )   PT: 18.5 sec;   INR: 1.60 ratio         PTT - ( 14 Oct 2019 02:16 )  PTT:32.8 sec  CAPILLARY BLOOD GLUCOSE      POCT Blood Glucose.: 112 mg/dL (13 Oct 2019 23:40)  POCT Blood Glucose.: 66 mg/dL (13 Oct 2019 23:20)  POCT Blood Glucose.: 91 mg/dL (13 Oct 2019 22:02)  POCT Blood Glucose.: 114 mg/dL (13 Oct 2019 21:00)  POCT Blood Glucose.: 138 mg/dL (13 Oct 2019 20:08)  POCT Blood Glucose.: 153 mg/dL (13 Oct 2019 18:57)  POCT Blood Glucose.: 180 mg/dL (13 Oct 2019 18:08)  POCT Blood Glucose.: 183 mg/dL (13 Oct 2019 16:57)  POCT Blood Glucose.: 199 mg/dL (13 Oct 2019 16:09)  POCT Blood Glucose.: 240 mg/dL (13 Oct 2019 14:56)    CARDIAC MARKERS ( 13 Oct 2019 12:14 )  x     / x     / 183 U/L / x     / 4.5 ng/mL      LIVER FUNCTIONS - ( 12 Oct 2019 23:14 )  Alb: 2.0 g/dL / Pro: 7.4 g/dL / ALK PHOS: 153 U/L / ALT: 51 U/L / AST: 31 U/L / GGT: x             Culture - Fungal, Other (collected 13 Oct 2019 17:56)  Source: .Other  Preliminary Report (14 Oct 2019 06:03):    Testing in progress    Culture - Blood (collected 13 Oct 2019 01:05)  Source: .Blood  Gram Stain (14 Oct 2019 02:34):    Growth in anaerobic bottle: Gram Negative Rods  Preliminary Report (14 Oct 2019 02:34):    Growth in anaerobic bottle: Gram Negative Rods    "Due to technical problems, Proteus sp. will Not be reported as part of    the BCID panel until further notice"    ***Blood Panel PCR results on this specimen are available    approximately 3 hours after the Gram stain result.***    Gram stain, PCR, and/or culture results may not always    correspond due to difference in methodologies.    ************************************************************    This PCR assay was performed using [employer].    The following targets are tested for: Enterococcus,    vancomycin resistant enterococci, Listeria monocytogenes,    coagulase negative staphylococci, S. aureus,    methicillin resistant S. aureus, Streptococcus agalactiae    (Group B), S. pneumoniae, S. pyogenes (Group A),    Acinetobacter baumannii, Enterobacter cloacae, E. coli,    Klebsiella oxytoca, K. pneumoniae, Proteus sp.,    Serratia marcescens, Haemophilus influenzae,    Neisseria meningitidis, Pseudomonas aeruginosa, Candida    albicans, C. glabrata, C krusei, C parapsilosis,    C. tropicalis and the KPC resistance gene.  Organism: Blood Culture PCR (14 Oct 2019 04:43)  Organism: Blood Culture PCR (14 Oct 2019 04:43)    Culture - Blood (collected 13 Oct 2019 01:05)  Source: .Blood  Preliminary Report (14 Oct 2019 02:01):    No growth to date.      Urinalysis Basic - ( 13 Oct 2019 21:58 )    Color: Yellow / Appearance: Turbid / S.024 / pH: x  Gluc: x / Ketone: Negative  / Bili: Negative / Urobili: Negative   Blood: x / Protein: 100 / Nitrite: Negative   Leuk Esterase: Large / RBC: >50 /hpf / WBC >50 /HPF   Sq Epi: x / Non Sq Epi: 3 / Bacteria: Negative        MEDICATIONS  (STANDING):  aspirin  chewable 81 milliGRAM(s) Oral daily  calcium gluconate IVPB 2 Gram(s) IV Intermittent once  chlorhexidine 0.12% Liquid 15 milliLiter(s) Oral Mucosa every 12 hours  chlorhexidine 2% Cloths 1 Application(s) Topical <User Schedule>  clindamycin IVPB 600 milliGRAM(s) IV Intermittent every 8 hours  dexmedetomidine Infusion 0.2 MICROgram(s)/kG/Hr (2.92 mL/Hr) IV Continuous <Continuous>  dextrose 5% + lactated ringers. 1000 milliLiter(s) (30 mL/Hr) IV Continuous <Continuous>  DOBUTamine Infusion 2.5 MICROgram(s)/kG/Min (4.38 mL/Hr) IV Continuous <Continuous>  enoxaparin Injectable 40 milliGRAM(s) SubCutaneous daily  influenza   Vaccine 0.5 milliLiter(s) IntraMuscular once  insulin lispro (HumaLOG) corrective regimen sliding scale   SubCutaneous every 4 hours  norepinephrine Infusion 0.1 MICROgram(s)/kG/Min (10.95 mL/Hr) IV Continuous <Continuous>  nystatin    Suspension 933630 Unit(s) Oral two times a day  pantoprazole  Injectable 40 milliGRAM(s) IV Push daily  piperacillin/tazobactam IVPB.. 4.5 Gram(s) IV Intermittent every 8 hours  vancomycin  IVPB 1000 milliGRAM(s) IV Intermittent every 12 hours    MEDICATIONS  (PRN):  HYDROmorphone  Injectable 0.5 milliGRAM(s) IV Push every 4 hours PRN Severe Pain (7 - 10)

## 2022-12-17 NOTE — ED PROVIDER NOTE - TEMPLATE, MLM
Goal Outcome Evaluation:  Plan of Care Reviewed With: patient        Progress: improving   Pt aox4, vss, room air, dressing to RLE cdi, voiding well, no c/o pain, repositioned q2h, IS encouraged, plan is rehab at [facility] following.    Wounds

## 2023-02-08 NOTE — PROGRESS NOTE ADULT - PROBLEM SELECTOR PROBLEM 3
Addended by: SHAW ABAD on: 2/8/2023 10:42 AM     Modules accepted: Orders     Ischemia of left lower extremity

## 2023-04-07 NOTE — PROGRESS NOTE ADULT - PROBLEM SELECTOR PLAN 4
Apixaban/Eliquis is used to treat and prevent blood clots. If you are not able to swallow the tablets whole, they may be crushed and mixed in water, apple juice, or applesauce and promptly taken within four hours. Never skip a dose of Apixaban/Eliquis. If you forget to take your Apixaban/Eliquis, take a dose as soon as you remember. If it is almost time for your next Apixaban/Eliquis dose, wait until then and take a regular dose. DO NOT take an extra pill to ‘catch up’.  NEVER TAKE A DOUBLE DOSE. Notify your doctor that you missed a dose. Take Apixaban/Eliquis at the same time each morning and evening. Apixaban/Eliquis may be taken with other medication or food. Trops downtrending  - TTE with EF 20-25%, global systolic dysfunction  - c/w DAPT. Statin stopped due to acute transaminitis.   - Metoprolol to be help by cardiology for possible acute CHF.  - No plan for C due to ongoing refusal and lack of capacity.  - Cardiology follow up appreciated.

## 2023-04-26 NOTE — PROGRESS NOTE ADULT - PROBLEM SELECTOR PLAN 8
[see HPI] : see HPI [Fever] : no fever [Chills] : no chills [Chest Pain] : no chest pain [Shortness Of Breath] : no shortness of breath [Abdominal Pain] : no abdominal pain [Vomiting] : no vomiting - dvt proph - c/w HSQ    communication: discussed with floor NP and floor nurse

## 2024-03-27 NOTE — PATIENT PROFILE ADULT - NSPROPTRIGHTBILLOFRIGHTS_GEN_A_NUR
Pt arrived by private car with report of SOB x1 month   Pt states he has a hx of asthma   Pt is on RA   Pt reports dry cough   
patient
Star Wedge Flap Text: The defect edges were debeveled with a #15 scalpel blade.  Given the location of the defect, shape of the defect and the proximity to free margins a star wedge flap was deemed most appropriate.  Using a sterile surgical marker, an appropriate rotation flap was drawn incorporating the defect and placing the expected incisions within the relaxed skin tension lines where possible. The area thus outlined was incised deep to adipose tissue with a #15 scalpel blade.  The skin margins were undermined to an appropriate distance in all directions utilizing iris scissors.

## 2024-04-24 NOTE — PROVIDER CONTACT NOTE (OTHER) - REASON
SLP ALL NOTES  Facility/Department: [address]   CLINICAL BEDSIDE SWALLOW EVALUATION    NAME: Clau Bueno  : 1951  MRN: [id number]    Impression  Dysphagia Diagnosis: Swallow function appears WFL    Clau Bueno was seen for a clinical bedside swallow evaluation following admission for Acute metabolic encephalopathy on chronic dementia, UTI, GAUDENCIO on CKD, Acute on chronic Anemia.  H/o Chronic respiratory failure with hypoxia, CVA with no residual deficits, GABRIEL, and GERD.  Pt required continuous BiPAP overnight, RN removed for completion of swallowing assessment.  Pt was lethargic but became adequately alert for participation given ongoing verbal/tactile stim.  He followed limited basic commands for the oral mechanism exam  which demonstrated no asymmetry.  Vocal quality was WNL, unable to assess cough strength.  PO trials of ice-chips, thins by spoon and small straw sips (direct supervision/sip size limited by SLP) and pureed solids completed with normal bolus acceptance and containment.  Slow a-p transit, clearance with suspected premature pharyngeal entry  r/t AMS.  Pharyngeal swallow appeared WFL with intact hyolaryngeal excursion/elevation with cough x 1 for thins by straw.  No further s/s aspiration observed with increased HARPREET.  Suspect normal swallow at baseline.    Recommend:  Full liquid diet/Thins by small straw sips.  Alert intake only.  Direct supervision.  Domitila stubbs in puree.   ST will follow for diet advancement when pt's mental status improves.    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

## 2024-08-06 NOTE — ED ADULT NURSE NOTE - NS ED PATIENT SAFETY CONCERN
Your rolling walker was ordered from [facility], and delivered to bedside on 8/6/24.  Unable to assess due to medical condition

## 2024-12-03 NOTE — SWALLOW BEDSIDE ASSESSMENT ADULT - CONSISTENCIES ADMINISTERED
Quality 226: Preventive Care And Screening: Tobacco Use: Screening And Cessation Intervention: Patient screened for tobacco use and is an ex/non-smoker Detail Level: Detailed Quality 47: Advance Care Plan: Advance Care Planning discussed and documented; advance care plan or surrogate decision maker documented in the medical record. thin liquid puree thin/puree thick ground nectar thick no
